# Patient Record
Sex: MALE | Race: WHITE | NOT HISPANIC OR LATINO | URBAN - NONMETROPOLITAN AREA
[De-identification: names, ages, dates, MRNs, and addresses within clinical notes are randomized per-mention and may not be internally consistent; named-entity substitution may affect disease eponyms.]

---

## 2017-01-20 ENCOUNTER — APPOINTMENT (OUTPATIENT)
Dept: LAB | Facility: MEDICAL CENTER | Age: 62
End: 2017-01-20
Payer: COMMERCIAL

## 2017-01-20 ENCOUNTER — TRANSCRIBE ORDERS (OUTPATIENT)
Dept: LAB | Facility: MEDICAL CENTER | Age: 62
End: 2017-01-20

## 2017-01-20 DIAGNOSIS — I10 ESSENTIAL (PRIMARY) HYPERTENSION: ICD-10-CM

## 2017-01-20 DIAGNOSIS — E11.9 TYPE 2 DIABETES MELLITUS WITHOUT COMPLICATIONS (HCC): ICD-10-CM

## 2017-01-20 DIAGNOSIS — I71.2 THORACIC AORTIC ANEURYSM WITHOUT RUPTURE (HCC): ICD-10-CM

## 2017-01-20 DIAGNOSIS — M54.50 LOW BACK PAIN: ICD-10-CM

## 2017-01-20 DIAGNOSIS — G57.92 MONONEUROPATHY OF LEFT LOWER EXTREMITY: ICD-10-CM

## 2017-01-20 DIAGNOSIS — I10 ESSENTIAL HYPERTENSION: ICD-10-CM

## 2017-01-20 DIAGNOSIS — I10 ESSENTIAL HYPERTENSION: Primary | ICD-10-CM

## 2017-01-20 DIAGNOSIS — E78.00 PURE HYPERCHOLESTEROLEMIA: ICD-10-CM

## 2017-01-20 LAB
ALBUMIN SERPL BCP-MCNC: 3.9 G/DL (ref 3.5–5)
ALP SERPL-CCNC: 67 U/L (ref 46–116)
ALT SERPL W P-5'-P-CCNC: 48 U/L (ref 12–78)
ANION GAP SERPL CALCULATED.3IONS-SCNC: 8 MMOL/L (ref 4–13)
AST SERPL W P-5'-P-CCNC: 14 U/L (ref 5–45)
BASOPHILS # BLD AUTO: 0.04 THOUSANDS/ΜL (ref 0–0.1)
BASOPHILS NFR BLD AUTO: 1 % (ref 0–1)
BILIRUB SERPL-MCNC: 0.51 MG/DL (ref 0.2–1)
BUN SERPL-MCNC: 15 MG/DL (ref 5–25)
CALCIUM SERPL-MCNC: 9.3 MG/DL (ref 8.3–10.1)
CHLORIDE SERPL-SCNC: 106 MMOL/L (ref 100–108)
CHOLEST SERPL-MCNC: 178 MG/DL (ref 50–200)
CO2 SERPL-SCNC: 27 MMOL/L (ref 21–32)
CREAT SERPL-MCNC: 1.19 MG/DL (ref 0.6–1.3)
CREAT UR-MCNC: 101 MG/DL
EOSINOPHIL # BLD AUTO: 0.1 THOUSAND/ΜL (ref 0–0.61)
EOSINOPHIL NFR BLD AUTO: 2 % (ref 0–6)
ERYTHROCYTE [DISTWIDTH] IN BLOOD BY AUTOMATED COUNT: 13.4 % (ref 11.6–15.1)
EST. AVERAGE GLUCOSE BLD GHB EST-MCNC: 177 MG/DL
GFR SERPL CREATININE-BSD FRML MDRD: >60 ML/MIN/1.73SQ M
GLUCOSE SERPL-MCNC: 180 MG/DL (ref 65–140)
HBA1C MFR BLD: 7.8 % (ref 4.2–6.3)
HCT VFR BLD AUTO: 43.3 % (ref 36.5–49.3)
HDLC SERPL-MCNC: 42 MG/DL (ref 40–60)
HGB BLD-MCNC: 14.7 G/DL (ref 12–17)
LDLC SERPL CALC-MCNC: 104 MG/DL (ref 0–100)
LYMPHOCYTES # BLD AUTO: 2.06 THOUSANDS/ΜL (ref 0.6–4.47)
LYMPHOCYTES NFR BLD AUTO: 31 % (ref 14–44)
MCH RBC QN AUTO: 32.2 PG (ref 26.8–34.3)
MCHC RBC AUTO-ENTMCNC: 33.9 G/DL (ref 31.4–37.4)
MCV RBC AUTO: 95 FL (ref 82–98)
MICROALBUMIN UR-MCNC: 11.9 MG/L (ref 0–20)
MICROALBUMIN/CREAT 24H UR: 12 MG/G CREATININE (ref 0–30)
MONOCYTES # BLD AUTO: 0.56 THOUSAND/ΜL (ref 0.17–1.22)
MONOCYTES NFR BLD AUTO: 8 % (ref 4–12)
NEUTROPHILS # BLD AUTO: 3.89 THOUSANDS/ΜL (ref 1.85–7.62)
NEUTS SEG NFR BLD AUTO: 58 % (ref 43–75)
NRBC BLD AUTO-RTO: 0 /100 WBCS
PLATELET # BLD AUTO: 192 THOUSANDS/UL (ref 149–390)
PMV BLD AUTO: 11.3 FL (ref 8.9–12.7)
POTASSIUM SERPL-SCNC: 4 MMOL/L (ref 3.5–5.3)
PROT SERPL-MCNC: 7.4 G/DL (ref 6.4–8.2)
RBC # BLD AUTO: 4.57 MILLION/UL (ref 3.88–5.62)
SODIUM SERPL-SCNC: 141 MMOL/L (ref 136–145)
TRIGL SERPL-MCNC: 158 MG/DL
WBC # BLD AUTO: 6.66 THOUSAND/UL (ref 4.31–10.16)

## 2017-01-20 PROCEDURE — 36415 COLL VENOUS BLD VENIPUNCTURE: CPT

## 2017-01-20 PROCEDURE — 83036 HEMOGLOBIN GLYCOSYLATED A1C: CPT

## 2017-01-20 PROCEDURE — 85025 COMPLETE CBC W/AUTO DIFF WBC: CPT

## 2017-01-20 PROCEDURE — 80053 COMPREHEN METABOLIC PANEL: CPT

## 2017-01-20 PROCEDURE — 82043 UR ALBUMIN QUANTITATIVE: CPT

## 2017-01-20 PROCEDURE — 80061 LIPID PANEL: CPT

## 2017-01-20 PROCEDURE — 82570 ASSAY OF URINE CREATININE: CPT

## 2017-01-22 ENCOUNTER — GENERIC CONVERSION - ENCOUNTER (OUTPATIENT)
Dept: OTHER | Facility: OTHER | Age: 62
End: 2017-01-22

## 2017-01-23 ENCOUNTER — ALLSCRIPTS OFFICE VISIT (OUTPATIENT)
Dept: OTHER | Facility: OTHER | Age: 62
End: 2017-01-23

## 2017-01-23 DIAGNOSIS — E04.1 NONTOXIC SINGLE THYROID NODULE: ICD-10-CM

## 2017-01-23 DIAGNOSIS — E11.9 TYPE 2 DIABETES MELLITUS WITHOUT COMPLICATIONS (HCC): ICD-10-CM

## 2017-01-23 DIAGNOSIS — E78.00 PURE HYPERCHOLESTEROLEMIA: ICD-10-CM

## 2017-01-23 DIAGNOSIS — M19.90 OSTEOARTHRITIS: ICD-10-CM

## 2017-01-23 DIAGNOSIS — I10 ESSENTIAL (PRIMARY) HYPERTENSION: ICD-10-CM

## 2017-05-19 ENCOUNTER — APPOINTMENT (OUTPATIENT)
Dept: LAB | Facility: MEDICAL CENTER | Age: 62
End: 2017-05-19
Payer: COMMERCIAL

## 2017-05-19 ENCOUNTER — TRANSCRIBE ORDERS (OUTPATIENT)
Dept: LAB | Facility: MEDICAL CENTER | Age: 62
End: 2017-05-19

## 2017-05-19 DIAGNOSIS — M19.90 OSTEOARTHRITIS: ICD-10-CM

## 2017-05-19 DIAGNOSIS — E11.9 TYPE 2 DIABETES MELLITUS WITHOUT COMPLICATIONS (HCC): ICD-10-CM

## 2017-05-19 DIAGNOSIS — E78.00 PURE HYPERCHOLESTEROLEMIA: ICD-10-CM

## 2017-05-19 DIAGNOSIS — E04.1 NONTOXIC SINGLE THYROID NODULE: ICD-10-CM

## 2017-05-19 DIAGNOSIS — I10 ESSENTIAL (PRIMARY) HYPERTENSION: ICD-10-CM

## 2017-05-19 LAB
ALBUMIN SERPL BCP-MCNC: 3.7 G/DL (ref 3.5–5)
ALP SERPL-CCNC: 64 U/L (ref 46–116)
ALT SERPL W P-5'-P-CCNC: 44 U/L (ref 12–78)
ANION GAP SERPL CALCULATED.3IONS-SCNC: 6 MMOL/L (ref 4–13)
AST SERPL W P-5'-P-CCNC: 14 U/L (ref 5–45)
BASOPHILS # BLD AUTO: 0.02 THOUSANDS/ΜL (ref 0–0.1)
BASOPHILS NFR BLD AUTO: 0 % (ref 0–1)
BILIRUB SERPL-MCNC: 0.43 MG/DL (ref 0.2–1)
BUN SERPL-MCNC: 17 MG/DL (ref 5–25)
CALCIUM SERPL-MCNC: 9.1 MG/DL (ref 8.3–10.1)
CHLORIDE SERPL-SCNC: 107 MMOL/L (ref 100–108)
CHOLEST SERPL-MCNC: 148 MG/DL (ref 50–200)
CO2 SERPL-SCNC: 30 MMOL/L (ref 21–32)
CREAT SERPL-MCNC: 1.12 MG/DL (ref 0.6–1.3)
EOSINOPHIL # BLD AUTO: 0.11 THOUSAND/ΜL (ref 0–0.61)
EOSINOPHIL NFR BLD AUTO: 2 % (ref 0–6)
ERYTHROCYTE [DISTWIDTH] IN BLOOD BY AUTOMATED COUNT: 13.5 % (ref 11.6–15.1)
EST. AVERAGE GLUCOSE BLD GHB EST-MCNC: 151 MG/DL
GFR SERPL CREATININE-BSD FRML MDRD: >60 ML/MIN/1.73SQ M
GLUCOSE P FAST SERPL-MCNC: 145 MG/DL (ref 65–99)
HBA1C MFR BLD: 6.9 % (ref 4.2–6.3)
HCT VFR BLD AUTO: 43.1 % (ref 36.5–49.3)
HDLC SERPL-MCNC: 35 MG/DL (ref 40–60)
HGB BLD-MCNC: 14.1 G/DL (ref 12–17)
LDLC SERPL CALC-MCNC: 92 MG/DL (ref 0–100)
LYMPHOCYTES # BLD AUTO: 1.73 THOUSANDS/ΜL (ref 0.6–4.47)
LYMPHOCYTES NFR BLD AUTO: 33 % (ref 14–44)
MCH RBC QN AUTO: 31.3 PG (ref 26.8–34.3)
MCHC RBC AUTO-ENTMCNC: 32.7 G/DL (ref 31.4–37.4)
MCV RBC AUTO: 96 FL (ref 82–98)
MONOCYTES # BLD AUTO: 0.48 THOUSAND/ΜL (ref 0.17–1.22)
MONOCYTES NFR BLD AUTO: 9 % (ref 4–12)
NEUTROPHILS # BLD AUTO: 2.85 THOUSANDS/ΜL (ref 1.85–7.62)
NEUTS SEG NFR BLD AUTO: 56 % (ref 43–75)
NRBC BLD AUTO-RTO: 0 /100 WBCS
PLATELET # BLD AUTO: 160 THOUSANDS/UL (ref 149–390)
PMV BLD AUTO: 12.1 FL (ref 8.9–12.7)
POTASSIUM SERPL-SCNC: 3.8 MMOL/L (ref 3.5–5.3)
PROT SERPL-MCNC: 7.1 G/DL (ref 6.4–8.2)
RBC # BLD AUTO: 4.5 MILLION/UL (ref 3.88–5.62)
SODIUM SERPL-SCNC: 143 MMOL/L (ref 136–145)
TRIGL SERPL-MCNC: 107 MG/DL
TSH SERPL DL<=0.05 MIU/L-ACNC: 1.19 UIU/ML (ref 0.36–3.74)
WBC # BLD AUTO: 5.19 THOUSAND/UL (ref 4.31–10.16)

## 2017-05-19 PROCEDURE — 85025 COMPLETE CBC W/AUTO DIFF WBC: CPT

## 2017-05-19 PROCEDURE — 83036 HEMOGLOBIN GLYCOSYLATED A1C: CPT

## 2017-05-19 PROCEDURE — 84443 ASSAY THYROID STIM HORMONE: CPT

## 2017-05-19 PROCEDURE — 36415 COLL VENOUS BLD VENIPUNCTURE: CPT

## 2017-05-19 PROCEDURE — 80061 LIPID PANEL: CPT

## 2017-05-19 PROCEDURE — 80053 COMPREHEN METABOLIC PANEL: CPT

## 2017-05-22 ENCOUNTER — GENERIC CONVERSION - ENCOUNTER (OUTPATIENT)
Dept: OTHER | Facility: OTHER | Age: 62
End: 2017-05-22

## 2017-05-25 ENCOUNTER — GENERIC CONVERSION - ENCOUNTER (OUTPATIENT)
Dept: OTHER | Facility: OTHER | Age: 62
End: 2017-05-25

## 2017-05-31 ENCOUNTER — ALLSCRIPTS OFFICE VISIT (OUTPATIENT)
Dept: OTHER | Facility: OTHER | Age: 62
End: 2017-05-31

## 2017-05-31 DIAGNOSIS — E11.9 TYPE 2 DIABETES MELLITUS WITHOUT COMPLICATIONS (HCC): ICD-10-CM

## 2017-05-31 DIAGNOSIS — I10 ESSENTIAL (PRIMARY) HYPERTENSION: ICD-10-CM

## 2017-05-31 DIAGNOSIS — E04.1 NONTOXIC SINGLE THYROID NODULE: ICD-10-CM

## 2017-05-31 DIAGNOSIS — E78.00 PURE HYPERCHOLESTEROLEMIA: ICD-10-CM

## 2017-09-29 ENCOUNTER — APPOINTMENT (OUTPATIENT)
Dept: LAB | Facility: MEDICAL CENTER | Age: 62
End: 2017-09-29
Payer: COMMERCIAL

## 2017-09-29 ENCOUNTER — TRANSCRIBE ORDERS (OUTPATIENT)
Dept: ADMINISTRATIVE | Facility: HOSPITAL | Age: 62
End: 2017-09-29

## 2017-09-29 DIAGNOSIS — E04.1 NONTOXIC SINGLE THYROID NODULE: ICD-10-CM

## 2017-09-29 DIAGNOSIS — E11.9 TYPE 2 DIABETES MELLITUS WITHOUT COMPLICATIONS (HCC): ICD-10-CM

## 2017-09-29 DIAGNOSIS — E78.00 PURE HYPERCHOLESTEROLEMIA: ICD-10-CM

## 2017-09-29 DIAGNOSIS — I10 ESSENTIAL (PRIMARY) HYPERTENSION: ICD-10-CM

## 2017-09-29 LAB
ALBUMIN SERPL BCP-MCNC: 3.4 G/DL (ref 3.5–5)
ALP SERPL-CCNC: 60 U/L (ref 46–116)
ALT SERPL W P-5'-P-CCNC: 48 U/L (ref 12–78)
ANION GAP SERPL CALCULATED.3IONS-SCNC: 9 MMOL/L (ref 4–13)
AST SERPL W P-5'-P-CCNC: 20 U/L (ref 5–45)
BASOPHILS # BLD AUTO: 0.04 THOUSANDS/ΜL (ref 0–0.1)
BASOPHILS NFR BLD AUTO: 1 % (ref 0–1)
BILIRUB SERPL-MCNC: 0.53 MG/DL (ref 0.2–1)
BUN SERPL-MCNC: 15 MG/DL (ref 5–25)
CALCIUM SERPL-MCNC: 8.5 MG/DL (ref 8.3–10.1)
CHLORIDE SERPL-SCNC: 107 MMOL/L (ref 100–108)
CHOLEST SERPL-MCNC: 150 MG/DL (ref 50–200)
CO2 SERPL-SCNC: 26 MMOL/L (ref 21–32)
CREAT SERPL-MCNC: 1.21 MG/DL (ref 0.6–1.3)
EOSINOPHIL # BLD AUTO: 0.15 THOUSAND/ΜL (ref 0–0.61)
EOSINOPHIL NFR BLD AUTO: 3 % (ref 0–6)
ERYTHROCYTE [DISTWIDTH] IN BLOOD BY AUTOMATED COUNT: 13.4 % (ref 11.6–15.1)
EST. AVERAGE GLUCOSE BLD GHB EST-MCNC: 163 MG/DL
GFR SERPL CREATININE-BSD FRML MDRD: 65 ML/MIN/1.73SQ M
GLUCOSE P FAST SERPL-MCNC: 168 MG/DL (ref 65–99)
HBA1C MFR BLD: 7.3 % (ref 4.2–6.3)
HCT VFR BLD AUTO: 42.2 % (ref 36.5–49.3)
HDLC SERPL-MCNC: 36 MG/DL (ref 40–60)
HGB BLD-MCNC: 14.4 G/DL (ref 12–17)
LDLC SERPL CALC-MCNC: 83 MG/DL (ref 0–100)
LYMPHOCYTES # BLD AUTO: 1.8 THOUSANDS/ΜL (ref 0.6–4.47)
LYMPHOCYTES NFR BLD AUTO: 31 % (ref 14–44)
MCH RBC QN AUTO: 32.4 PG (ref 26.8–34.3)
MCHC RBC AUTO-ENTMCNC: 34.1 G/DL (ref 31.4–37.4)
MCV RBC AUTO: 95 FL (ref 82–98)
MONOCYTES # BLD AUTO: 0.42 THOUSAND/ΜL (ref 0.17–1.22)
MONOCYTES NFR BLD AUTO: 7 % (ref 4–12)
NEUTROPHILS # BLD AUTO: 3.43 THOUSANDS/ΜL (ref 1.85–7.62)
NEUTS SEG NFR BLD AUTO: 58 % (ref 43–75)
NRBC BLD AUTO-RTO: 0 /100 WBCS
PLATELET # BLD AUTO: 155 THOUSANDS/UL (ref 149–390)
PMV BLD AUTO: 11.6 FL (ref 8.9–12.7)
POTASSIUM SERPL-SCNC: 3.5 MMOL/L (ref 3.5–5.3)
PROT SERPL-MCNC: 6.9 G/DL (ref 6.4–8.2)
RBC # BLD AUTO: 4.45 MILLION/UL (ref 3.88–5.62)
SODIUM SERPL-SCNC: 142 MMOL/L (ref 136–145)
TRIGL SERPL-MCNC: 155 MG/DL
TSH SERPL DL<=0.05 MIU/L-ACNC: 1.36 UIU/ML (ref 0.36–3.74)
WBC # BLD AUTO: 5.85 THOUSAND/UL (ref 4.31–10.16)

## 2017-09-29 PROCEDURE — 80053 COMPREHEN METABOLIC PANEL: CPT

## 2017-09-29 PROCEDURE — 84443 ASSAY THYROID STIM HORMONE: CPT

## 2017-09-29 PROCEDURE — 80061 LIPID PANEL: CPT

## 2017-09-29 PROCEDURE — 83036 HEMOGLOBIN GLYCOSYLATED A1C: CPT

## 2017-09-29 PROCEDURE — 85025 COMPLETE CBC W/AUTO DIFF WBC: CPT

## 2017-09-29 PROCEDURE — 36415 COLL VENOUS BLD VENIPUNCTURE: CPT

## 2017-10-01 ENCOUNTER — GENERIC CONVERSION - ENCOUNTER (OUTPATIENT)
Dept: OTHER | Facility: OTHER | Age: 62
End: 2017-10-01

## 2017-10-04 ENCOUNTER — ALLSCRIPTS OFFICE VISIT (OUTPATIENT)
Dept: OTHER | Facility: OTHER | Age: 62
End: 2017-10-04

## 2017-10-04 DIAGNOSIS — E11.9 TYPE 2 DIABETES MELLITUS WITHOUT COMPLICATIONS (HCC): ICD-10-CM

## 2017-10-04 DIAGNOSIS — I10 ESSENTIAL (PRIMARY) HYPERTENSION: ICD-10-CM

## 2017-10-04 DIAGNOSIS — E78.00 PURE HYPERCHOLESTEROLEMIA: ICD-10-CM

## 2017-10-05 NOTE — PROGRESS NOTES
Assessment  1  Type 2 diabetes mellitus (250 00) (E11 9)   2  Hypertension (401 9) (I10)   3  Hypercholesterolemia (272 0) (E78 00)   4  Edema (782 3) (R60 9)   5  Acute sinusitis (461 9) (J01 90)    Plan  Acute sinusitis    · Amoxicillin-Pot Clavulanate 875-125 MG Oral Tablet; TAKE 1 TABLET EVERY 12  HOURS WITH MEALS UNTIL GONE   · Drink at least 6 glasses of water or juice a day ; Status:Complete;   Done: 99YUR5016   · How to use a nasal spray ; Status:Complete;   Done: 58UKS6516   · Irrigate your nose twice a day ; Status:Complete;   Done: 47ZBH7937   · Taking a hot steamy shower may help your condition ; Status:Complete;   Done:  00PZY7649   · Call [phone] if: The sinus pain is not better in 1 week ; Status:Complete;   Done:  65SLT3277   · Call [phone] if: The symptoms are not better in 7 days ; Status:Complete;   Done:  78NIK0749   · Call [phone] if: The symptoms come back after the medications are finished ;  Status:Complete;   Done: 32FQG9065   · Call [phone] if: Your sinus pain is worse ; Status:Complete;   Done: 42BRV1025   · Seek Immediate Medical Attention if: You have a fever, headache, and vomiting, or have a  stiff neck ; Status:Complete;   Done: 19NST6742   · Seek Immediate Medical Attention if: You have signs of infection in or around the affected  area ; Status:Complete;   Done: 01PAG1053  Hypercholesterolemia    · Begin or continue regular aerobic exercise   Gradually work up to at least 3 sessions of  30 minutes of exercise a week ; Status:Complete;   Done: 85EKN4646   · Eat no more than 30 grams of fat per day ; Status:Complete;   Done: 05WFT7145   · There are many exercise options for seniors ; Status:Complete;   Done: 46LHN4288   · Call [phone] if: You have muscle cramps ; Status:Complete;   Done: 05CLL3783   · Call [phone] if: You have pain in the stomach area ; Status:Complete;   Done:  87CPS6632   · Call [phone] if: You start vomiting ; Status:Complete;   Done: 45KBZ4703   · Call 911 if: You experience a new kind of chest pain (angina) or pressure ;  Status:Complete;   Done: 58ASA3586   · Call 911 if: You have any symptoms of a stroke ; Status:Complete;   Done: 65UVV1834  Hypercholesterolemia, Hypertension, Type 2 diabetes mellitus    · (1) CBC/PLT/DIFF; Status:Active; Requested for:04Oct2017;    · (1) COMPREHENSIVE METABOLIC PANEL; Status:Active; Requested for:04Oct2017;    · (1) HEMOGLOBIN A1C; Status:Active; Requested for:04Oct2017;    · (1) LIPID PANEL, FASTING; Status:Active; Requested for:04Oct2017;    · (1) MICROALBUMIN CREATININE RATIO, RANDOM URINE; Status:Active; Requested  for:04Oct2017;    · (1) TSH; Status:Active; Requested for:04Oct2017;   Hypertension    · Benicar HCT 40-12 5 MG Oral Tablet (Olmesartan Medoxomil-HCTZ)   · A diet low in sodium and high in potassium, magnesium, and calcium can help your  blood pressure ; Status:Complete;   Done: 35MSG0249   · Begin a limited exercise program ; Status:Complete;   Done: 49MCY0761   · Eat a low fat and low cholesterol diet ; Status:Complete;   Done: 01VPG9798   · We encourage you to begin to make lifestyle changes to help control your blood  pressure  These may include losing weight, increasing your activity level, limiting salt in  your diet, decreasing alcohol intake, and eating a diet low in fat and rich in fruits  and vegetables ; Status:Complete;   Done: 94ADW6827   · Call [phone] if: You become dizzy or lightheaded, especially when you stand up  after sitting for a while ; Status:Complete;   Done: 34DHF1051   · Call [phone] if: You develop double vision (see two of everything) ;  Status:Complete;   Done: 12JAY2305   · Seek Immediate Medical Attention if: You have a severe headache that will not go away ;  Status:Complete;   Done: 92JBM3338   · Seek Immediate Medical Attention if: Your blood pressure is greater than 250/120 for 2  consecutive readings  ; Status:Complete;   Done: 27YCK8171  Type 2 diabetes mellitus    · Call [phone] if: You are having trouble following our instructions or treatment for  any reason ; Status:Complete;   Done: 09XCU7726   · Call [phone] if: Your blood sugar is higher than 250 ; Status:Complete;   Done:  83SXM0539   · Call [phone] if: Your blood sugar is steadily becoming higher ; Status:Complete;    Done: 04XTI0553   · Call 911 if: There are symptoms of ketoacidosis  ; Status:Complete;   Done: 64UXV8174   · Call 911 if: You have a seizure ; Status:Complete;   Done: 60RDH4000   · Call 911 if: You have fainted or passed out ; Status:Complete;   Done: 12BBX0281   · Seek Immediate Medical Attention if: There are signs that the blood sugar is too high  (hyperglycemia) ; Status:Complete;   Done: 16GXP9617   · Seek Immediate Medical Attention if: There are signs that the blood sugar is too low  (hypoglycemia) ; Status:Complete;   Done: 69CDG5893   · Seek Immediate Medical Attention if: You become dehydrated ; Status:Complete;   Done:  10PBR1005   · Seek Immediate Medical Attention if: You notice that breathing is rapid, more than 40  times a minute ; Status:Complete;   Done: 04MHR9111   · Seek Immediate Medical Attention if: Your blood sugar is higher than 400 ;  Status:Complete;   Done: 39ZNF7258   · Seek Immediate Medical Attention if: Your eyesight becomes blurry or you have difficulty  seeing ; Status:Complete;   Done: 91CTH9871   · Cut your nails straight across ; Status:Complete;   Done: 04WET6074   · Have your eyes examined by an eye doctor every year ; Status:Complete;   Done:  98HDN1255   · If you have symptoms of being hypoglycemic or your blood sugar is less than 60, you  need to eat or drink a source of sugar ; Status:Complete;   Done: 21XPD4235   · Inspect your feet and legs daily if you have vascular disease ; Status:Complete;   Done:  74NCL1570   · Inspect your feet daily ; Status:Complete;   Done: 68WAR9334   · It is important to take good care of your feet if you have diabetes ; Status:Complete;    Done: 84XZV2842   · It is important to take good care of your feet ; Status:Complete;   Done: 56FNG1340   · Restrict the salt in your diet by avoiding highly salted foods ; Status:Complete;   Done:  92EFJ0536   · Start eating more fiber ; Status:Complete;   Done: 32GLA0503   · Wear a medical alert bracelet or tag ; Status:Complete;   Done: 88DQG8084   · Wear shoes that give your toes plenty of room ; Status:Complete;   Done: 65XJA8373    Discussion/Summary    Reviewed recent laboratory testing with him  Hemoglobin A1c has increased slightly to 7 3  Advised him to watch his diet more closely  Continue to exercise  Cut back on carbohydrates  He wishes to avoid medication for the diabetes if possible  Blood pressure currently well controlled  Continue with the current medications  Cholesterol controlled  Continue with the atorvastatin  Continue with the Lasix and the potassium as previously for the persistent peripheral edema  Continue to follow-up with cardiology on a regular basis  Treatment for the sinusitis as noted above  Advised him to take the Augmentin with food and's paste 12 hours apart  Call or follow-up if the symptoms worsen or persist  Advised him that he get the flu shot and later that pneumonia shot after he recovers from this illness  He will give our office or call regarding this  We'll have him follow-up in about 3-4 months with laboratory testing prior to that visit  Follow-up sooner if needed  Possible side effects of new medications were reviewed with the patient/guardian today  The treatment plan was reviewed with the patient/guardian  The patient/guardian understands and agrees with the treatment plan      Chief Complaint  CC Patient is here for follow up visit  Complaints of sinus congestion with a cough  History of Present Illness  He presents for routine follow-up   Has been having some issues with nasal congestion and postnasal drip over the past 3-4 weeks  Initially thought it was allergies with nasal congestion being intermittent and not severe  Symptoms then seemed to improve somewhat when he was on vacation but then over the last week have progressively worsened  Feeling hot and cold  Increasingly achy  Some sinus pain  Slight headache  Cough worsening over the last few days  Low-grade fever  Has otherwise been doing relatively well  Back has been relatively stable  Has minimal pain and stiffness despite being very active  Feels this is because his wife is making him exercise more often and they are walking frequently  Tolerating his current medications without difficulty  He would like to try to lose weight and get off some of medications if possible  Tolerating his blood pressure medicines well  Denies any headaches or localized weakness  Still with some peripheral edema which seems to be worse when the weather is warm  Continues with the Lasix and the potassium as previously  Tolerating his atorvastatin without difficulty  Denies chest pain or palpitations  Denies any abdominal pain  Denies any significant muscle aches or weakness other than the intermittent back pain as noted above  Denies any current urinary symptoms  Is up-to-date on his colonoscopy  Review of Systems    Constitutional: feeling tired, but-as noted in HPI  Eyes: no eyesight problems  ENT: as noted in HPI  Cardiovascular: no chest pain-and-no palpitations  Respiratory: no shortness of breath  Gastrointestinal: no nausea-and-no diarrhea  Musculoskeletal: joint stiffness  Neurological: no dizziness  Psychiatric: no anxiety-and-no depression  ROS reviewed  Active Problems  1  Aneurysm of thoracic aorta (441 2) (I71 2)   2  Edema (782 3) (R60 9)   3  Heel spur (726 73) (M77 30)   4  Hypercholesterolemia (272 0) (E78 00)   5  Hypertension (401 9) (I10)   6   Hyperuricemia without signs inflammatory arthritis/tophaceous disease (790 6) (E79 0)   7  Low back pain (724 2) (M54 5)   8  Microscopic hematuria (599 72) (R31 29)   9  Neuropathy of left foot (355 8) (G57 92)   10  Non-toxic uninodular goiter (241 0) (E04 1)   11  Osteoarthritis (715 90) (M19 90)   12  Sciatica (724 3) (M54 30)   13  Type 2 diabetes mellitus (250 00) (E11 9)    Past Medical History  1  History of Cataract (366 9) (H26 9)   2  History of Cough (786 2) (R05)   3  History of Herniated nucleus pulposus, L4-5 left (722 10) (M51 26)   4  History of Herniated nucleus pulposus, L5-S1, left (722 10) (M51 27)   5  History of allergic rhinitis (V12 69) (Z87 09)   6  History of Lumbar radiculopathy (724 4) (M54 16)   7  History of Lumbar Radiculopathy At L4 (724 4)    The active problems and past medical history were reviewed and updated today  Surgical History  1  History of Back Surgery   2  History of Knee Arthroscopy (Therapeutic)   3  History of Tonsillectomy With Adenoidectomy    The surgical history was reviewed and updated today  Family History  Mother    1  Family history of Thyroid Disorder (V18 19)  Father    2  Family history of Hypertension (V17 49)  Brother    3  Family history of Coronary Artery Disease (V17 49)   4  Family history of Prior Myocardial Infarction    The family history was reviewed and updated today  Social History   · Being A Social Drinker   · Never A Smoker   · No drug use  The social history was reviewed and updated today  Current Meds   1  Allopurinol 100 MG Oral Tablet; Take 1 tablet daily; Therapy: 57LGU0751 to (Last TP:42GUP6162)  Requested for: 24Jun2017 Ordered   2  Amlodipine Besy-Benazepril HCl - 5-20 MG Oral Capsule; take 1 capsule twice a day; Therapy: 07EBB1422 to (Last Rx:55Hlv7327)  Requested for: 43Cjb1856 Ordered   3  Atenolol 50 MG Oral Tablet; take 1 tablet twice a day; Therapy: 39OVZ2937 to (Last TO:61SOZ4531)  Requested for: 24Jun2017 Ordered   4  Atorvastatin Calcium 20 MG Oral Tablet; Take 1 tablet daily; Therapy: 18KSY9531 to (Last Rx:09Cdo9848)  Requested for: 57Xil0355 Ordered   5  Benicar HCT 40-12 5 MG Oral Tablet; Take 1 tablet daily; Therapy: 32TMY3209 to (Last ZM:38YUM4317)  Requested for: 81QPA8615 Ordered   6  Furosemide 40 MG Oral Tablet; Take 1 tablet daily; Therapy: 69VDS9462 to (Last [facility])  Requested for: 73FZV2586 Ordered   7  Klor-Con 10 10 MEQ Oral Tablet Extended Release; take 2 tablets four times a day; Therapy: 18Kbc0048 to (Last Rx:57Ogq1080)  Requested for: 27Ubl9977 Ordered   8  Methocarbamol 750 MG Oral Tablet; TAKE 1 TABLET Twice daily PRN; Therapy: 76Efo3546 to (Last Rx:52Ijn6720)  Requested for: 55Hfn5365 Ordered   9  Olmesartan Medoxomil-HCTZ 40-12 5 MG Oral Tablet; Take 1 tablet daily; Therapy: 27IYQ8314 to (Last Rx:00Vzk6541)  Requested for: 75Lcs5364 Ordered    The medication list was reviewed and updated today  Allergies  Denied    1  LevoFLOXacin TABS    Vitals  Vital Signs    Recorded: 80OCD5123 07:09AM   Temperature 99 3 F, Temporal   Heart Rate 73   Respiration 18   Systolic 468, LUE, Sitting   Diastolic 80, LUE, Sitting   Height 5 ft 10 in   Weight 269 lb    BMI Calculated 38 6   BSA Calculated 2 37   O2 Saturation 97     Physical Exam    Constitutional   General appearance: Abnormal   overweight  Eyes   Conjunctiva and lids: No swelling, erythema, or discharge  Ears, Nose, Mouth, and Throat   External inspection of ears and nose: Normal     Otoscopic examination: Abnormal  -Slight cerumen  Nasal mucosa, septum, and turbinates: Abnormal  -Boggy nasal mucosa with purulent nasal discharge right greater than left  Oropharynx: Abnormal  -Posterior pharyngeal erythema  Pulmonary   Auscultation of lungs: Clear to auscultation, equal breath sounds bilaterally, no wheezes, no rales, no rhonci  Cardiovascular   Auscultation of heart: Normal rate and rhythm, normal S1 and S2, without murmurs  Examination of extremities for edema and/or varicosities: Abnormal  -Trace peripheral edema bilateral lower extremities  Lymphatic   Palpation of lymph nodes in neck: No lymphadenopathy  Musculoskeletal   Gait and station: Normal     Psychiatric   Mood and affect: Normal          Results/Data  (1) CBC/PLT/DIFF 81Bih7497 08:20AM [facility] Order Number: [id number]     Test Name Result Flag Reference   WBC COUNT 5 85 Thousand/uL  4 31-10 16   RBC COUNT 4 45 Million/uL  3 88-5 62   HEMOGLOBIN 14 4 g/dL  12 0-17 0   HEMATOCRIT 42 2 %  36 5-49 3   MCV 95 fL  82-98   MCH 32 4 pg  26 8-34 3   MCHC 34 1 g/dL  31 4-37 4   RDW 13 4 %  11 6-15 1   MPV 11 6 fL  8 9-12 7   PLATELET COUNT 606 Thousands/uL  149-390   nRBC AUTOMATED 0 /100 WBCs     NEUTROPHILS RELATIVE PERCENT 58 %  43-75   LYMPHOCYTES RELATIVE PERCENT 31 %  14-44   MONOCYTES RELATIVE PERCENT 7 %  4-12   EOSINOPHILS RELATIVE PERCENT 3 %  0-6   BASOPHILS RELATIVE PERCENT 1 %  0-1   NEUTROPHILS ABSOLUTE COUNT 3 43 Thousands/? ??L  1 85-7 62   LYMPHOCYTES ABSOLUTE COUNT 1 80 Thousands/? ??L  0 60-4 47   MONOCYTES ABSOLUTE COUNT 0 42 Thousand/? ??L  0 17-1 22   EOSINOPHILS ABSOLUTE COUNT 0 15 Thousand/? ??L  0 00-0 61   BASOPHILS ABSOLUTE COUNT 0 04 Thousands/? ??L  0 00-0 10     (1) COMPREHENSIVE METABOLIC PANEL 37KYX3443 13:28FL [facility] Order Number: [id number]     Test Name Result Flag Reference   SODIUM 142 mmol/L  136-145   POTASSIUM 3 5 mmol/L  3 5-5 3   CHLORIDE 107 mmol/L  100-108   CARBON DIOXIDE 26 mmol/L  21-32   ANION GAP (CALC) 9 mmol/L  4-13   BLOOD UREA NITROGEN 15 mg/dL  5-25   CREATININE 1 21 mg/dL  0 60-1 30   Standardized to IDMS reference method   CALCIUM 8 5 mg/dL  8 3-10 1   BILI, TOTAL 0 53 mg/dL  0 20-1 00   ALK PHOSPHATAS 60 U/L     ALT (SGPT) 48 U/L  12-78   Specimen collection should occur prior to Sulfasalazine and/or Sulfapyridine administration due to the potential for falsely collection should occur prior to N-Acetylcysteine or Metamizole administration due to the potential for falsely depressed results  (1) TSH 83Gwl5629 08:20AM [facility] Order Number: [id number]     Test Name Result Flag Reference   TSH 1 360 uIU/mL  0 358-3 740   Patients undergoing fluorescein dye angiography may retain small amounts of fluorescein in the body for 48-72 hours post procedure  Samples containing fluorescein can produce falsely depressed TSH values  If the patient had this procedure,a specimen should be resubmitted post fluorescein clearance  Health Management  History of Colon cancer screening   COLONOSCOPY; every 3 years; Last 45WZI4918; Next Due: 67KJQ0963; Active    Future Appointments    Date/Time Provider Specialty Site   01/04/2018 07:20 AM JAEL Langford   [address] [place] [address]     Signatures   Electronically signed by : JAEL Bobby ; Oct  4 2017  8:46AM EST                       (Author)

## 2017-12-01 ENCOUNTER — HOSPITAL ENCOUNTER (OUTPATIENT)
Dept: ULTRASOUND IMAGING | Facility: HOSPITAL | Age: 62
Discharge: HOME | End: 2017-12-01
Payer: COMMERCIAL

## 2017-12-01 DIAGNOSIS — E04.1 NONTOXIC SINGLE THYROID NODULE: ICD-10-CM

## 2017-12-01 PROCEDURE — 76536 US EXAM OF HEAD AND NECK: CPT

## 2017-12-04 ENCOUNTER — GENERIC CONVERSION - ENCOUNTER (OUTPATIENT)
Dept: OTHER | Facility: OTHER | Age: 62
End: 2017-12-04

## 2018-01-10 NOTE — RESULT NOTES
Verified Results  (1) CBC/PLT/DIFF 20Jan2017 11:24AM [facility] Order Number: [id number]     Test Name Result Flag Reference   WBC COUNT 6 66 Thousand/uL  4 31-10 16   RBC COUNT 4 57 Million/uL  3 88-5 62   HEMOGLOBIN 14 7 g/dL  12 0-17 0   HEMATOCRIT 43 3 %  36 5-49 3   MCV 95 fL  82-98   MCH 32 2 pg  26 8-34 3   MCHC 33 9 g/dL  31 4-37 4   RDW 13 4 %  11 6-15 1   MPV 11 3 fL  8 9-12 7   PLATELET COUNT 152 Thousands/uL  149-390   nRBC AUTOMATED 0 /100 WBCs     NEUTROPHILS RELATIVE PERCENT 58 %  43-75   LYMPHOCYTES RELATIVE PERCENT 31 %  14-44   MONOCYTES RELATIVE PERCENT 8 %  4-12   EOSINOPHILS RELATIVE PERCENT 2 %  0-6   BASOPHILS RELATIVE PERCENT 1 %  0-1   NEUTROPHILS ABSOLUTE COUNT 3 89 Thousands/?L  1 85-7 62   LYMPHOCYTES ABSOLUTE COUNT 2 06 Thousands/?L  0 60-4 47   MONOCYTES ABSOLUTE COUNT 0 56 Thousand/?L  0 17-1 22   EOSINOPHILS ABSOLUTE COUNT 0 10 Thousand/?L  0 00-0 61   BASOPHILS ABSOLUTE COUNT 0 04 Thousands/?L  0 00-0 10   - Patient Instructions: This bloodwork is non-fasting  Please drink two glasses of water morning of bloodwork  - Patient Instructions: This bloodwork is non-fasting  Please drink two glasses of water morning of bloodwork  (1) COMPREHENSIVE METABOLIC PANEL 22OCI1117 03:59TT [facility] Order Number: [id number]     Test Name Result Flag Reference   GLUCOSE,RANDM 180 mg/dL H    If the patient is fasting, the ADA then defines impaired fasting glucose as > 100 mg/dL and diabetes as > or equal to 123 mg/dL     SODIUM 141 mmol/L  136-145   POTASSIUM 4 0 mmol/L  3 5-5 3   CHLORIDE 106 mmol/L  100-108   CARBON DIOXIDE 27 mmol/L  21-32   ANION GAP (CALC) 8 mmol/L  4-13   BLOOD UREA NITROGEN 15 mg/dL  5-25   CREATININE 1 19 mg/dL  0 60-1 30   Standardized to IDMS reference method   CALCIUM 9 3 mg/dL  8 3-10 1   BILI, TOTAL 0 51 mg/dL  0 20-1 00   ALK PHOSPHATAS 67 U/L     ALT (SGPT) 48 U/L  12-78   AST(SGOT) 14 U/L  5-45 ALBUMIN 3 9 g/dL  3 5-5 0   TOTAL PROTEIN 7 4 g/dL  6 4-8 2   eGFR Non-African American      >60 0 ml/min/1 73sq m   - Patient Instructions: This is a fasting blood test  Water,black tea or black  coffee only after 9:00pm the night before test Drink 2 glasses of water the morning of test   National Kidney Disease Education Program recommendations are as follows:  GFR calculation is accurate only with a steady state creatinine  Chronic Kidney disease less than 60 ml/min/1 73 sq  meters  Kidney failure less than 15 ml/min/1 73 sq  meters  (1) HEMOGLOBIN A1C 20Jan2017 11:24AM [place] [facility] Order Number: [id number]     Test Name Result Flag Reference   HEMOGLOBIN A1C 7 8 % H 4 2-6 3   EST  AVG  GLUCOSE 177 mg/dl       (1) LIPID PANEL, FASTING 20Jan2017 11:24AM [facility] [place] Order Number: [id number]     Test Name Result Flag Reference   CHOLESTEROL 178 mg/dL     HDL,DIRECT 42 mg/dL  40-60   Specimen collection should occur prior to Metamizole administration due to the potential for falsely depressed results  LDL CHOLESTEROL CALCULATED 104 mg/dL H 0-100   - Patient Instructions: This is a fasting blood test  Water,black tea or black  coffee only after 9:00pm the night before test   Drink 2 glasses of water the morning of test     - Patient Instructions: This is a fasting blood test  Water,black tea or black  coffee only after 9:00pm the night before test Drink 2 glasses of water the morning of test   Triglyceride:         Normal              <150 mg/dl       Borderline High    150-199 mg/dl       High               200-499 mg/dl       Very High          >499 mg/dl  Cholesterol:         Desirable        <200 mg/dl      Borderline High  200-239 mg/dl      High             >239 mg/dl  HDL Cholesterol:        High    >59 mg/dL      Low     <41 mg/dL  LDL CALCULATED:    This screening LDL is a calculated result    It does not have the accuracy of the Direct Measured LDL in the monitoring of patients with hyperlipidemia and/or statin therapy  Direct Measure LDL (WTV449) must be ordered separately in these patients  TRIGLYCERIDES 158 mg/dL H <=150   Specimen collection should occur prior to N-Acetylcysteine or Metamizole administration due to the potential for falsely depressed results       (1) MICROALBUMIN CREATININE RATIO, RANDOM URINE 20Jan2017 11:24AM [facility] Order Number: [id number]     Test Name Result Flag Reference   MICROALBUMIN/ CREAT R 12 mg/g creatinine  0-30   MICROALBUMIN,URINE 11 9 mg/L  0 0-20 0   CREATININE URINE 101 0 mg/dL

## 2018-01-11 NOTE — RESULT NOTES
Verified Results  (1) CBC/PLT/DIFF [id number] 10:29AM Betha Gowers     Test Name Result Flag Reference   WBC COUNT 5 12 Thousand/uL  4 31-10 16   RBC COUNT 4 64 Million/uL  3 88-5 62   HEMOGLOBIN 14 9 g/dL  12 0-17 0   HEMATOCRIT 44 4 %  36 5-49 3   MCV 96 fL  82-98   MCH 32 1 pg  26 8-34 3   MCHC 33 6 g/dL  31 4-37 4   RDW 13 3 %  11 6-15 1   MPV 11 3 fL  8 9-12 7   PLATELET COUNT 065 Thousands/uL  149-390   NEUTROPHILS RELATIVE PERCENT 59 %  43-75   LYMPHOCYTES RELATIVE PERCENT 29 %  14-44   MONOCYTES RELATIVE PERCENT 9 %  4-12   EOSINOPHILS RELATIVE PERCENT 2 %  0-6   BASOPHILS RELATIVE PERCENT 1 %  0-1   NEUTROPHILS ABSOLUTE COUNT 3 06 Thousands/µL  1 85-7 62   LYMPHOCYTES ABSOLUTE COUNT 1 48 Thousands/µL  0 60-4 47   MONOCYTES ABSOLUTE COUNT 0 46 Thousand/µL  0 17-1 22   EOSINOPHILS ABSOLUTE COUNT 0 09 Thousand/µL  0 00-0 61   BASOPHILS ABSOLUTE COUNT 0 03 Thousands/µL  0 00-0 10     (1) COMPREHENSIVE METABOLIC PANEL [id number] 18:04ZH [facility] Kidney Disease Education Program recommendations are as follows:  GFR calculation is accurate only with a steady state creatinine  Chronic Kidney disease less than 60 ml/min/1 73 sq  meters  Kidney failure less than 15 ml/min/1 73 sq  meters  Test Name Result Flag Reference   GLUCOSE,RANDM 142 mg/dL H    If the patient is fasting, the ADA then defines impaired fasting glucose as > 100 mg/dL and diabetes as > or equal to 123 mg/dL     SODIUM 142 mmol/L  136-145   POTASSIUM 3 7 mmol/L  3 5-5 3   CHLORIDE 104 mmol/L  100-108   CARBON DIOXIDE 28 mmol/L  21-32   ANION GAP (CALC) 10 mmol/L  4-13   BLOOD UREA NITROGEN 16 mg/dL  5-25   CREATININE 1 03 mg/dL  0 60-1 30   Standardized to IDMS reference method   CALCIUM 8 5 mg/dL  8 3-10 1   BILI, TOTAL 0 48 mg/dL  0 20-1 00   ALK PHOSPHATAS 64 U/L     ALT (SGPT) 69 U/L  12-78   AST(SGOT) 21 U/L  5-45   ALBUMIN 4 1 g/dL  3 5-5 0   TOTAL PROTEIN 7 0 g/dL  6 4-8 2   eGFR Non- >60 0 ml/min/1 73sq m     (1) HEMOGLOBIN A1C [id number] 10:29AM Tosha Mtz   5 7-6 4% impaired fasting glucose  >=6 5% diagnosis of diabetes    Falsely low levels are seen in conditions linked to short RBC life span-  hemolytic anemia, and splenomegaly  Falsely elevated levels are seen in situations where there is an increased production of RBC- receipt of erythropoietin or blood transfusions  Adopted from ADA-Clinical Practice Recommendations     Test Name Result Flag Reference   HEMOGLOBIN A1C 7 0 % H 4 0-5 6   EST  AVG  GLUCOSE 154 mg/dl       (1) LIPID PANEL, FASTING [id number] 10:29AM Debbi Abdalla   Triglyceride:         Normal              <150 mg/dl       Borderline High    150-199 mg/dl       High               200-499 mg/dl       Very High          >499 mg/dl  Cholesterol:         Desirable        <200 mg/dl      Borderline High  200-239 mg/dl      High             >239 mg/dl  HDL Cholesterol:        High    >59 mg/dL      Low     <41 mg/dL  LDL CALCULATED:    This screening LDL is a calculated result  It does not have the accuracy of the Direct Measured LDL in the monitoring of patients with hyperlipidemia and/or statin therapy  Direct Measure LDL (UEM866) must be ordered separately in these patients  Test Name Result Flag Reference   CHOLESTEROL 161 mg/dL     HDL,DIRECT 35 mg/dL L 40-60   LDL CHOLESTEROL CALCULATED 104 mg/dL H 0-100   TRIGLYCERIDES 109 mg/dL  <=150     (1) TSH [id number] 10:29AM Tosha Mtz   Patients undergoing fluorescein dye angiography may retain small amounts of fluorescein in the body for 48-72 hours post procedure  Samples containing fluorescein can produce falsely depressed TSH values  If the patient had this procedure,a specimen should be resubmitted post fluorescein clearance       Test Name Result Flag Reference   TSH 0 994 uIU/mL  0 358-3 740     (1) URIC ACID [id number] 10:29AM Debbi Abdalla     Test Name Result Flag Reference   URIC ACID 6 5 mg/dL  4 2-8 0

## 2018-01-12 NOTE — RESULT NOTES
Verified Results  COLONOSCOPY [id number] 03:08PM Madi Canisteo     Test Name Result Flag Reference   Colonoscopy 09/30/2016        Summary / No summary entered :      No summary entered   Documents attached :      EPIC OP NOTE - Madi Campos; Enc: [id number] - Appointment -      Madi Campos - (Gastroenterology Adult) (Result Document)  (1) TISSUE EXAM [id number] 03:01PM Madi Canisteo     Test Name Result Flag Reference   LAB AP CASE REPORT (Report)     Surgical Pathology Report             Case: [id number]                   Authorizing Provider: Yvonne Guardado MD      Collected:      09/30/2016 1501        Ordering Location:   [facility] Received:      09/30/2016 1622                    Operating Room                                 Pathologist:      Licha Sheppard MD                              Specimens:  A) - Large Intestine, Left/Descending Colon, polyp retrieved with a cold bx forcep           B) - Large Intestine, Sigmoid Colon, polyp retrieved with a cold snare                 C) - Rectum, rectal polyp retrieved with a cold bx forcep   LAB AP FINAL DIAGNOSIS (Report)     A  Colon, ascending, polypectomy:        - Polypoid colonic mucosa with no significant pathologic   alteration         - No dysplasia or malignancy is identified  B  Colon, sigmoid, polypectomy:        - Tubular adenoma  - No high-grade dysplasia or malignancy is identified  C  Colon, rectum, polypectomy:        - Hyperplastic polyp         - No dysplasia or malignancy is identified  Interpretation performed at [address] [facility]    Electronically signed by Licha Sheppard MD on 10/4/2016 at 2:25 PM   LAB AP SURGICAL ADDITIONAL INFORMATION (Report)     These tests were developed and their performance characteristics   determined by [facility] or [facility]  They may not be cleared or approved by the [place]   Food and   Drug Administration  The FDA has determined that such clearance or   approval is not necessary  These tests are used for clinical purposes  They should not be regarded as investigational or for research  This   laboratory has been approved by CLIA 88, designated as a high-complexity   laboratory and is qualified to perform these tests  LAB AP GROSS DESCRIPTION (Report)     A  The specimen is received in formalin, labeled with the patient's name   and hospital number, and is designated descending colon polyp  The   specimen consists of one tan-pink soft tissue fragment measuring 0 3 cm in   greatest dimension  Entirely submitted  One cassette  B  The specimen is received in formalin, labeled with the patient's name   and hospital number, and is designated sigmoid colon polyp  The   specimen consists of one tan-pink polypoid to fragment measuring 0 5   centimeters in greatest dimension  The apparent margin of resection is   inked blue  The polypoid fragment is bisected revealing tan-pink dense cut   surfaces  Also submitted in the container is one tan-pink soft tissue   fragment measuring 0 2 cm in greatest dimension  Entirely submitted  One   cassette  C  The specimen is received in formalin, labeled with the patient's name   and hospital number, and is designated rectal polyp biopsy  The   specimen consists of one tan-pink soft tissue fragment measuring 0 2 cm in   greatest dimension  Entirely submitted  One cassette  Note: The estimated total formalin fixation time based upon information   provided by the submitting clinician and the standard processing schedule   is approximately 72 hours  MAS       Plan  Colon cancer screening    · COLONOSCOPY ; every 3 years;  Last [id number]; Status:Active

## 2018-01-12 NOTE — RESULT NOTES
Verified Results  (1) CBC/PLT/DIFF [id number] 07:38AM Georgiana Lout Order Number: [id number]     Test Name Result Flag Reference   WBC COUNT 5 04 Thousand/uL  4 31-10 16   RBC COUNT 4 41 Million/uL  3 88-5 62   HEMOGLOBIN 14 0 g/dL  12 0-17 0   HEMATOCRIT 41 6 %  36 5-49 3   MCV 94 fL  82-98   MCH 31 7 pg  26 8-34 3   MCHC 33 7 g/dL  31 4-37 4   RDW 13 4 %  11 6-15 1   MPV 11 1 fL  8 9-12 7   PLATELET COUNT 208 Thousands/uL  149-390   nRBC AUTOMATED 0 /100 WBCs     NEUTROPHILS RELATIVE PERCENT 50 %  43-75   LYMPHOCYTES RELATIVE PERCENT 38 %  14-44   MONOCYTES RELATIVE PERCENT 8 %  4-12   EOSINOPHILS RELATIVE PERCENT 3 %  0-6   BASOPHILS RELATIVE PERCENT 1 %  0-1   NEUTROPHILS ABSOLUTE COUNT 2 56 Thousands/?L  1 85-7 62   LYMPHOCYTES ABSOLUTE COUNT 1 89 Thousands/?L  0 60-4 47   MONOCYTES ABSOLUTE COUNT 0 38 Thousand/?L  0 17-1 22   EOSINOPHILS ABSOLUTE COUNT 0 16 Thousand/?L  0 00-0 61   BASOPHILS ABSOLUTE COUNT 0 04 Thousands/?L  0 00-0 10     (1) COMPREHENSIVE METABOLIC PANEL [id number] 22:05YO Georgiana Lout Order Number: [id number]     Test Name Result Flag Reference   GLUCOSE,RANDM 160 mg/dL H    If the patient is fasting, the ADA then defines impaired fasting glucose as > 100 mg/dL and diabetes as > or equal to 123 mg/dL     SODIUM 143 mmol/L  136-145   POTASSIUM 3 6 mmol/L  3 5-5 3   CHLORIDE 109 mmol/L H 100-108   CARBON DIOXIDE 26 mmol/L  21-32   ANION GAP (CALC) 8 mmol/L  4-13   BLOOD UREA NITROGEN 18 mg/dL  5-25   CREATININE 1 29 mg/dL  0 60-1 30   Standardized to IDMS reference method   CALCIUM 8 4 mg/dL  8 3-10 1   BILI, TOTAL 0 58 mg/dL  0 20-1 00   ALK PHOSPHATAS 64 U/L     ALT (SGPT) 46 U/L  12-78   AST(SGOT) 18 U/L  5-45   ALBUMIN 3 7 g/dL  3 5-5 0   TOTAL PROTEIN 6 5 g/dL  6 4-8 2   eGFR Non-African American 57 0 ml/min/1 73sq m     [employer] Disease Education Program recommendations are as follows:  GFR calculation is accurate only with a steady state creatinine  Chronic Kidney disease less than 60 ml/min/1 73 sq  meters  Kidney failure less than 15 ml/min/1 73 sq  meters  (1) HEMOGLOBIN A1C 15Sep2016 07:38AM Virgin Loss Order Number: [id number]     Test Name Result Flag Reference   HEMOGLOBIN A1C 7 3 % H 4 2-6 3   EST  AVG  GLUCOSE 163 mg/dl       (1) LIPID PANEL, FASTING [id number] 07:38AM Virgin Loss Order Number: [id number]     Test Name Result Flag Reference   CHOLESTEROL 161 mg/dL     HDL,DIRECT 32 mg/dL L 40-60   Specimen collection should occur prior to Metamizole administration due to the potential for falsely depressed results  LDL CHOLESTEROL CALCULATED 104 mg/dL H 0-100   Triglyceride:         Normal              <150 mg/dl       Borderline High    150-199 mg/dl       High               200-499 mg/dl       Very High          >499 mg/dl  Cholesterol:         Desirable        <200 mg/dl      Borderline High  200-239 mg/dl      High             >239 mg/dl  HDL Cholesterol:        High    >59 mg/dL      Low     <41 mg/dL  LDL CALCULATED:    This screening LDL is a calculated result  It does not have the accuracy of the Direct Measured LDL in the monitoring of patients with hyperlipidemia and/or statin therapy  Direct Measure LDL (ZRN360) must be ordered separately in these patients  TRIGLYCERIDES 125 mg/dL  <=150   Specimen collection should occur prior to N-Acetylcysteine or Metamizole administration due to the potential for falsely depressed results  (1) TSH 15Sep2016 07:38AM Virgin Loss Order Number: [id number]     Test Name Result Flag Reference   TSH 1 360 uIU/mL  0 358-3 740   Patients undergoing fluorescein dye angiography may retain small amounts of fluorescein in the body for 48-72 hours post procedure  Samples containing fluorescein can produce falsely depressed TSH values  If the patient had this procedure,a specimen should be resubmitted post fluorescein clearance

## 2018-01-13 VITALS
WEIGHT: 264 LBS | OXYGEN SATURATION: 98 % | HEART RATE: 60 BPM | SYSTOLIC BLOOD PRESSURE: 126 MMHG | HEIGHT: 70 IN | BODY MASS INDEX: 37.8 KG/M2 | RESPIRATION RATE: 18 BRPM | DIASTOLIC BLOOD PRESSURE: 80 MMHG | TEMPERATURE: 99.1 F

## 2018-01-13 VITALS
OXYGEN SATURATION: 97 % | SYSTOLIC BLOOD PRESSURE: 138 MMHG | TEMPERATURE: 99.3 F | BODY MASS INDEX: 38.51 KG/M2 | HEART RATE: 73 BPM | RESPIRATION RATE: 18 BRPM | DIASTOLIC BLOOD PRESSURE: 80 MMHG | WEIGHT: 269 LBS | HEIGHT: 70 IN

## 2018-01-14 VITALS
WEIGHT: 277 LBS | HEIGHT: 70 IN | BODY MASS INDEX: 39.65 KG/M2 | HEART RATE: 69 BPM | DIASTOLIC BLOOD PRESSURE: 82 MMHG | OXYGEN SATURATION: 96 % | TEMPERATURE: 97.3 F | SYSTOLIC BLOOD PRESSURE: 120 MMHG

## 2018-01-15 NOTE — RESULT NOTES
Verified Results  (1) CBC/PLT/DIFF [id number] 08:36AM [facility] Order Number: [id number]     Test Name Result Flag Reference   WBC COUNT 5 19 Thousand/uL  4 31-10 16   RBC COUNT 4 50 Million/uL  3 88-5 62   HEMOGLOBIN 14 1 g/dL  12 0-17 0   HEMATOCRIT 43 1 %  36 5-49 3   MCV 96 fL  82-98   MCH 31 3 pg  26 8-34 3   MCHC 32 7 g/dL  31 4-37 4   RDW 13 5 %  11 6-15 1   MPV 12 1 fL  8 9-12 7   PLATELET COUNT 531 Thousands/uL  149-390   nRBC AUTOMATED 0 /100 WBCs     NEUTROPHILS RELATIVE PERCENT 56 %  43-75   LYMPHOCYTES RELATIVE PERCENT 33 %  14-44   MONOCYTES RELATIVE PERCENT 9 %  4-12   EOSINOPHILS RELATIVE PERCENT 2 %  0-6   BASOPHILS RELATIVE PERCENT 0 %  0-1   NEUTROPHILS ABSOLUTE COUNT 2 85 Thousands/? ??L  1 85-7 62   LYMPHOCYTES ABSOLUTE COUNT 1 73 Thousands/? ??L  0 60-4 47   MONOCYTES ABSOLUTE COUNT 0 48 Thousand/? ??L  0 17-1 22   EOSINOPHILS ABSOLUTE COUNT 0 11 Thousand/? ??L  0 00-0 61   BASOPHILS ABSOLUTE COUNT 0 02 Thousands/? ??L  0 00-0 10   - Patient Instructions: This bloodwork is non-fasting  Please drink two glasses of water morning of bloodwork       (1) COMPREHENSIVE METABOLIC PANEL [id number] 54:39TE [facility] Order Number: [id number]     Test Name Result Flag Reference   SODIUM 143 mmol/L  136-145   POTASSIUM 3 8 mmol/L  3 5-5 3   CHLORIDE 107 mmol/L  100-108   CARBON DIOXIDE 30 mmol/L  21-32   ANION GAP (CALC) 6 mmol/L  4-13   BLOOD UREA NITROGEN 17 mg/dL  5-25   CREATININE 1 12 mg/dL  0 60-1 30   Standardized to IDMS reference method   CALCIUM 9 1 mg/dL  8 3-10 1   BILI, TOTAL 0 43 mg/dL  0 20-1 00   ALK PHOSPHATAS 64 U/L     ALT (SGPT) 44 U/L  12-78   AST(SGOT) 14 U/L  5-45   ALBUMIN 3 7 g/dL  3 5-5 0   TOTAL PROTEIN 7 1 g/dL  6 4-8 2   eGFR Non-African American      >60 0 ml/min/1 73sq m   [employer] Disease Education Program recommendations are as follows:  GFR calculation is accurate only with a steady state creatinine  Chronic Kidney disease less than 60 ml/min/1 73 sq  meters  Kidney failure less than 15 ml/min/1 73 sq  meters  GLUCOSE FASTING 145 mg/dL H 65-99     (1) HEMOGLOBIN A1C [id number] 08:36 Mark Gaytan Order Number: [id number]     Test Name Result Flag Reference   HEMOGLOBIN A1C 6 9 % H 4 2-6 3   EST  AVG  GLUCOSE 151 mg/dl       (1) LIPID PANEL, FASTING [id number] 08:36 Mark Gaytan Order Number: [id number]     Test Name Result Flag Reference   CHOLESTEROL 148 mg/dL     HDL,DIRECT 35 mg/dL L 40-60   Specimen collection should occur prior to Metamizole administration due to the potential for falsely depressed results  LDL CHOLESTEROL CALCULATED 92 mg/dL  0-100   - Patient Instructions: This is a fasting blood test  Water,black tea or black  coffee only after 9:00pm the night before test   Drink 2 glasses of water the morning of test       Triglyceride:         Normal              <150 mg/dl       Borderline High    150-199 mg/dl       High               200-499 mg/dl       Very High          >499 mg/dl  Cholesterol:         Desirable        <200 mg/dl      Borderline High  200-239 mg/dl      High             >239 mg/dl  HDL Cholesterol:        High    >59 mg/dL      Low     <41 mg/dL  LDL CALCULATED:    This screening LDL is a calculated result  It does not have the accuracy of the Direct Measured LDL in the monitoring of patients with hyperlipidemia and/or statin therapy  Direct Measure LDL (UHH462) must be ordered separately in these patients  TRIGLYCERIDES 107 mg/dL  <=150   Specimen collection should occur prior to N-Acetylcysteine or Metamizole administration due to the potential for falsely depressed results  (1) TSH [id number] 08:36 Mark Gaytan Order Number: [id number]     Test Name Result Flag Reference   TSH 1 190 uIU/mL  0 358-3 740   - Patient Instructions: This bloodwork is non-fasting    Please drink two glasses of water morning of bloodwork  Patients undergoing fluorescein dye angiography may retain small amounts of fluorescein in the body for 48-72 hours post procedure  Samples containing fluorescein can produce falsely depressed TSH values  If the patient had this procedure,a specimen should be resubmitted post fluorescein clearance

## 2018-01-15 NOTE — MISCELLANEOUS
Message  Return to work or school:   Avinash Pitt is under my professional care  He was seen in my office on 9/19/16  No lifting above 35 pounds  Excuse for 9/19/16 and 9/20/16        Future Appointments    Signatures   Electronically signed by : JAEL Soliz ; Sep 19 2016 10:14AM EST                       (Author)    Electronically signed by : JAEL Soliz ; Sep 19 2016  1:30PM EST                       (Author)    Electronically signed by : JAEL Soliz ; Sep 20 2016  4:04AM EST                       (Author)

## 2018-01-15 NOTE — RESULT NOTES
Verified Results  (1) CBC/PLT/DIFF [id number] 08:20AM [facility] Order Number: [id number]     Test Name Result Flag Reference   WBC COUNT 5 85 Thousand/uL  4 31-10 16   RBC COUNT 4 45 Million/uL  3 88-5 62   HEMOGLOBIN 14 4 g/dL  12 0-17 0   HEMATOCRIT 42 2 %  36 5-49 3   MCV 95 fL  82-98   MCH 32 4 pg  26 8-34 3   MCHC 34 1 g/dL  31 4-37 4   RDW 13 4 %  11 6-15 1   MPV 11 6 fL  8 9-12 7   PLATELET COUNT 246 Thousands/uL  149-390   nRBC AUTOMATED 0 /100 WBCs     NEUTROPHILS RELATIVE PERCENT 58 %  43-75   LYMPHOCYTES RELATIVE PERCENT 31 %  14-44   MONOCYTES RELATIVE PERCENT 7 %  4-12   EOSINOPHILS RELATIVE PERCENT 3 %  0-6   BASOPHILS RELATIVE PERCENT 1 %  0-1   NEUTROPHILS ABSOLUTE COUNT 3 43 Thousands/? ??L  1 85-7 62   LYMPHOCYTES ABSOLUTE COUNT 1 80 Thousands/? ??L  0 60-4 47   MONOCYTES ABSOLUTE COUNT 0 42 Thousand/? ??L  0 17-1 22   EOSINOPHILS ABSOLUTE COUNT 0 15 Thousand/? ??L  0 00-0 61   BASOPHILS ABSOLUTE COUNT 0 04 Thousands/? ??L  0 00-0 10     (1) COMPREHENSIVE METABOLIC PANEL [id number] 82:18EV [facility] Order Number: [id number]     Test Name Result Flag Reference   SODIUM 142 mmol/L  136-145   POTASSIUM 3 5 mmol/L  3 5-5 3   CHLORIDE 107 mmol/L  100-108   CARBON DIOXIDE 26 mmol/L  21-32   ANION GAP (CALC) 9 mmol/L  4-13   BLOOD UREA NITROGEN 15 mg/dL  5-25   CREATININE 1 21 mg/dL  0 60-1 30   Standardized to IDMS reference method   CALCIUM 8 5 mg/dL  8 3-10 1   BILI, TOTAL 0 53 mg/dL  0 20-1 00   ALK PHOSPHATAS 60 U/L     ALT (SGPT) 48 U/L  12-78   Specimen collection should occur prior to Sulfasalazine and/or Sulfapyridine administration due to the potential for falsely depressed results  AST(SGOT) 20 U/L  5-45   Specimen collection should occur prior to Sulfasalazine administration due to the potential for falsely depressed results     ALBUMIN 3 4 g/dL L 3 5-5 0   TOTAL PROTEIN 6 9 g/dL  6 4-8 2   eGFR 65 ml/min/1 73sq m     National Kidney Disease Education Program recommendations are as follows:  GFR calculation is accurate only with a steady state creatinine  Chronic Kidney disease less than 60 ml/min/1 73 sq  meters  Kidney failure less than 15 ml/min/1 73 sq  meters  GLUCOSE FASTING 168 mg/dL H 65-99   Specimen collection should occur prior to Sulfasalazine administration due to the potential for falsely depressed results  Specimen collection should occur prior to Sulfapyridine administration due to the potential for falsely elevated results  (1) HEMOGLOBIN A1C [id number] 08:20AM [employer] Order Number: [id number]     Test Name Result Flag Reference   HEMOGLOBIN A1C 7 3 % H 4 2-6 3   EST  AVG  GLUCOSE 163 mg/dl       (1) LIPID PANEL, FASTING [id number] 08:20AM [facility] Order Number: [id number]     Test Name Result Flag Reference   CHOLESTEROL 150 mg/dL     HDL,DIRECT 36 mg/dL L 40-60   Specimen collection should occur prior to Metamizole administration due to the potential for falsley depressed results  LDL CHOLESTEROL CALCULATED 83 mg/dL  0-100   Triglyceride:        Normal <150 mg/dl   Borderline High 150-199 mg/dl   High 200-499 mg/dl   Very High >499 mg/dl      Cholesterol:       Desirable <200 mg/dl    Borderline High 200-239 mg/dl    High >239 mg/dl      HDL Cholesterol:       High>59 mg/dL    Low <41 mg/dL      This screening LDL is a calculated result  It does not have the accuracy of the Direct Measured LDL in the monitoring of patients with hyperlipidemia and/or statin therapy  Direct Measure LDL (FWT267) must be ordered separately in these patients  TRIGLYCERIDES 155 mg/dL H <=150   Specimen collection should occur prior to N-Acetylcysteine or Metamizole administration due to the potential for falsely depressed results       (1) TSH [id number] 08:20AM [employer] Order Number: [id number]     Test Name Result Flag Reference   TSH 1 360 uIU/mL  0 358-3 740 Patients undergoing fluorescein dye angiography may retain small amounts of fluorescein in the body for 48-72 hours post procedure  Samples containing fluorescein can produce falsely depressed TSH values  If the patient had this procedure,a specimen should be resubmitted post fluorescein clearance

## 2018-01-17 ENCOUNTER — TRANSCRIBE ORDERS (OUTPATIENT)
Dept: ADMINISTRATIVE | Facility: HOSPITAL | Age: 63
End: 2018-01-17

## 2018-01-17 DIAGNOSIS — I71.2 THORACIC AORTIC ANEURYSM WITHOUT RUPTURE (HCC): Primary | ICD-10-CM

## 2018-01-22 ENCOUNTER — HOSPITAL ENCOUNTER (OUTPATIENT)
Dept: CT IMAGING | Facility: HOSPITAL | Age: 63
Discharge: HOME | End: 2018-01-22
Payer: COMMERCIAL

## 2018-01-22 ENCOUNTER — TRANSCRIBE ORDERS (OUTPATIENT)
Dept: ADMINISTRATIVE | Facility: HOSPITAL | Age: 63
End: 2018-01-22

## 2018-01-22 ENCOUNTER — TRANSCRIBE ORDERS (OUTPATIENT)
Dept: LAB | Facility: HOSPITAL | Age: 63
End: 2018-01-22

## 2018-01-22 ENCOUNTER — APPOINTMENT (OUTPATIENT)
Dept: LAB | Facility: HOSPITAL | Age: 63
End: 2018-01-22
Payer: COMMERCIAL

## 2018-01-22 DIAGNOSIS — E11.9 TYPE 2 DIABETES MELLITUS WITHOUT COMPLICATIONS (HCC): ICD-10-CM

## 2018-01-22 DIAGNOSIS — I10 ESSENTIAL (PRIMARY) HYPERTENSION: ICD-10-CM

## 2018-01-22 DIAGNOSIS — E78.00 PURE HYPERCHOLESTEROLEMIA: ICD-10-CM

## 2018-01-22 DIAGNOSIS — I71.2 THORACIC AORTIC ANEURYSM WITHOUT RUPTURE (HCC): ICD-10-CM

## 2018-01-22 LAB
ALBUMIN SERPL BCP-MCNC: 3.8 G/DL (ref 3.5–5)
ALP SERPL-CCNC: 60 U/L (ref 46–116)
ALT SERPL W P-5'-P-CCNC: 56 U/L (ref 12–78)
ANION GAP SERPL CALCULATED.3IONS-SCNC: 8 MMOL/L (ref 4–13)
AST SERPL W P-5'-P-CCNC: 21 U/L (ref 5–45)
BASOPHILS # BLD AUTO: 0.03 THOUSANDS/ΜL (ref 0–0.1)
BASOPHILS NFR BLD AUTO: 1 % (ref 0–1)
BILIRUB SERPL-MCNC: 0.4 MG/DL (ref 0.2–1)
BUN SERPL-MCNC: 12 MG/DL (ref 5–25)
CALCIUM SERPL-MCNC: 8.6 MG/DL (ref 8.3–10.1)
CHLORIDE SERPL-SCNC: 105 MMOL/L (ref 100–108)
CHOLEST SERPL-MCNC: 131 MG/DL (ref 50–200)
CO2 SERPL-SCNC: 27 MMOL/L (ref 21–32)
CREAT SERPL-MCNC: 1.02 MG/DL (ref 0.6–1.3)
CREAT UR-MCNC: 150 MG/DL
EOSINOPHIL # BLD AUTO: 0.07 THOUSAND/ΜL (ref 0–0.61)
EOSINOPHIL NFR BLD AUTO: 2 % (ref 0–6)
ERYTHROCYTE [DISTWIDTH] IN BLOOD BY AUTOMATED COUNT: 13.1 % (ref 11.6–15.1)
EST. AVERAGE GLUCOSE BLD GHB EST-MCNC: 177 MG/DL
GFR SERPL CREATININE-BSD FRML MDRD: 79 ML/MIN/1.73SQ M
GLUCOSE P FAST SERPL-MCNC: 171 MG/DL (ref 65–99)
HBA1C MFR BLD: 7.8 % (ref 4.2–6.3)
HCT VFR BLD AUTO: 43.3 % (ref 36.5–49.3)
HDLC SERPL-MCNC: 35 MG/DL (ref 40–60)
HGB BLD-MCNC: 14.7 G/DL (ref 12–17)
LDLC SERPL CALC-MCNC: 71 MG/DL (ref 0–100)
LYMPHOCYTES # BLD AUTO: 1.56 THOUSANDS/ΜL (ref 0.6–4.47)
LYMPHOCYTES NFR BLD AUTO: 36 % (ref 14–44)
MCH RBC QN AUTO: 31.8 PG (ref 26.8–34.3)
MCHC RBC AUTO-ENTMCNC: 33.9 G/DL (ref 31.4–37.4)
MCV RBC AUTO: 94 FL (ref 82–98)
MICROALBUMIN UR-MCNC: 39.8 MG/L (ref 0–20)
MICROALBUMIN/CREAT 24H UR: 27 MG/G CREATININE (ref 0–30)
MONOCYTES # BLD AUTO: 0.36 THOUSAND/ΜL (ref 0.17–1.22)
MONOCYTES NFR BLD AUTO: 8 % (ref 4–12)
NEUTROPHILS # BLD AUTO: 2.32 THOUSANDS/ΜL (ref 1.85–7.62)
NEUTS SEG NFR BLD AUTO: 53 % (ref 43–75)
PLATELET # BLD AUTO: 148 THOUSANDS/UL (ref 149–390)
PMV BLD AUTO: 10.5 FL (ref 8.9–12.7)
POTASSIUM SERPL-SCNC: 3.5 MMOL/L (ref 3.5–5.3)
PROT SERPL-MCNC: 7.1 G/DL (ref 6.4–8.2)
RBC # BLD AUTO: 4.62 MILLION/UL (ref 3.88–5.62)
SODIUM SERPL-SCNC: 140 MMOL/L (ref 136–145)
TRIGL SERPL-MCNC: 127 MG/DL
TSH SERPL DL<=0.05 MIU/L-ACNC: 1.5 UIU/ML (ref 0.36–3.74)
WBC # BLD AUTO: 4.34 THOUSAND/UL (ref 4.31–10.16)

## 2018-01-22 PROCEDURE — 71275 CT ANGIOGRAPHY CHEST: CPT

## 2018-01-22 PROCEDURE — 85025 COMPLETE CBC W/AUTO DIFF WBC: CPT

## 2018-01-22 PROCEDURE — 84443 ASSAY THYROID STIM HORMONE: CPT

## 2018-01-22 PROCEDURE — 80053 COMPREHEN METABOLIC PANEL: CPT

## 2018-01-22 PROCEDURE — 80061 LIPID PANEL: CPT

## 2018-01-22 PROCEDURE — 82043 UR ALBUMIN QUANTITATIVE: CPT

## 2018-01-22 PROCEDURE — 36415 COLL VENOUS BLD VENIPUNCTURE: CPT

## 2018-01-22 PROCEDURE — 82570 ASSAY OF URINE CREATININE: CPT

## 2018-01-22 PROCEDURE — 83036 HEMOGLOBIN GLYCOSYLATED A1C: CPT

## 2018-01-22 RX ADMIN — IOHEXOL 100 ML: 350 INJECTION, SOLUTION INTRAVENOUS at 10:35

## 2018-01-23 ENCOUNTER — GENERIC CONVERSION - ENCOUNTER (OUTPATIENT)
Dept: OTHER | Facility: OTHER | Age: 63
End: 2018-01-23

## 2018-01-23 NOTE — RESULT NOTES
Verified Results  US THYROID [id number] 10:39AM Jenaro Smiley Order Number: [id number]    - Patient Instructions: To schedule this appointment, please contact Central Scheduling at [phone]  Test Name Result Flag Reference   US THYROID (Report)     THYROID ULTRASOUND     INDICATION: Thyroid nodule  COMPARISON: 9/24/2015, 5/21/2014, 12/22/2011  TECHNIQUE:  Ultrasound of the thyroid was performed with a high frequency linear transducer in transverse and sagittal planes including volumetric imaging sweeps as well as traditional still imaging technique  FINDINGS: Thyroid parenchyma is diffusely heterogeneous in echotexture  Right lobe: 3 7 x 1 5 x 1 8 cm  Left lobe: 3 6 x 1 9 x 1 4 cm  Isthmus: 0 3 cm  Nodule #1   Right midpole nodule measuring 0 8 x 0 5 x 0 6 cm  This nodule has decreased in size from prior  COMPOSITION: 2 points, solid or almost completely solid   ECHOGENICITY: 2 points, hypoechoic  SHAPE: 0 points, wider-than-tall  MARGIN: 0 points, smooth  ECHOGENIC FOCI: 0 points, none or large comet-tail artifacts  TI-RADS Score: TR 4 (4-6 points), Moderately suspicious  FNA if > 1 5 cm  Follow if > 1cm  This nodule has been stable or decreased size for greater than 5 years and has a prior benign biopsy (1/20/2012)  If there is a high level of clinical concern,    continued surveillance at 2 year intervals could be considered, otherwise no additional workup recommended  IMPRESSION:   Slightly decreased size of the solitary subcentimeter right lobe nodule  According to ACR criteria, this requires no further biopsy or follow-up ultrasounds  Reference: ACR Thyroid Imaging, Reporting and Data System (TI-RADS): White Paper of the [employer]   J AM Sharmila Radiol [id number]  (additional recommendations based on American Thyroid Association 2015 guidelines )       Workstation performed: [id number]     Signed by:   Matt Baker MD 12/4/17

## 2018-01-24 NOTE — RESULT NOTES
Verified Results  (1) CBC/PLT/DIFF [id number] 09:30AM [facility] [place] Order Number: [id number]     Test Name Result Flag Reference   WBC COUNT 4 34 Thousand/uL  4 31-10 16   RBC COUNT 4 62 Million/uL  3 88-5 62   HEMOGLOBIN 14 7 g/dL  12 0-17 0   HEMATOCRIT 43 3 %  36 5-49 3   MCV 94 fL  82-98   MCH 31 8 pg  26 8-34 3   MCHC 33 9 g/dL  31 4-37 4   RDW 13 1 %  11 6-15 1   MPV 10 5 fL  8 9-12 7   PLATELET COUNT 773 Thousands/uL L 149-390   NEUTROPHILS RELATIVE PERCENT 53 %  43-75   LYMPHOCYTES RELATIVE PERCENT 36 %  14-44   MONOCYTES RELATIVE PERCENT 8 %  4-12   EOSINOPHILS RELATIVE PERCENT 2 %  0-6   BASOPHILS RELATIVE PERCENT 1 %  0-1   NEUTROPHILS ABSOLUTE COUNT 2 32 Thousands/? ??L  1 85-7 62   LYMPHOCYTES ABSOLUTE COUNT 1 56 Thousands/? ??L  0 60-4 47   MONOCYTES ABSOLUTE COUNT 0 36 Thousand/? ??L  0 17-1 22   EOSINOPHILS ABSOLUTE COUNT 0 07 Thousand/? ??L  0 00-0 61   BASOPHILS ABSOLUTE COUNT 0 03 Thousands/? ??L  0 00-0 10     (1) COMPREHENSIVE METABOLIC PANEL [id number] 87:38BU [facility] Order Number: [id number]     Test Name Result Flag Reference   SODIUM 140 mmol/L  136-145   POTASSIUM 3 5 mmol/L  3 5-5 3   CHLORIDE 105 mmol/L  100-108   CARBON DIOXIDE 27 mmol/L  21-32   ANION GAP (CALC) 8 mmol/L  4-13   BLOOD UREA NITROGEN 12 mg/dL  5-25   CREATININE 1 02 mg/dL  0 60-1 30   Standardized to IDMS reference method   CALCIUM 8 6 mg/dL  8 3-10 1   BILI, TOTAL 0 40 mg/dL  0 20-1 00   ALK PHOSPHATAS 60 U/L     ALT (SGPT) 56 U/L  12-78   Specimen collection should occur prior to Sulfasalazine administration due to the potential for falsely depressed results  AST(SGOT) 21 U/L  5-45   Specimen collection should occur prior to Sulfasalazine administration due to the potential for falsely depressed results     ALBUMIN 3 8 g/dL  3 5-5 0   TOTAL PROTEIN 7 1 g/dL  6 4-8 2   eGFR 79 ml/min/1 73sq m     National Kidney Disease Education Program recommendations are as follows:  GFR calculation is accurate only with a steady state creatinine  Chronic Kidney disease less than 60 ml/min/1 73 sq  meters  Kidney failure less than 15 ml/min/1 73 sq  meters  GLUCOSE FASTING 171 mg/dL H 65-99   Specimen collection should occur prior to Sulfasalazine administration due to the potential for falsely depressed results  Specimen collection should occur prior to Sulfapyridine administration due to the potential for falsely elevated results  (1) HEMOGLOBIN A1C 22Jan2018 09:30AM [facility] Order Number: [id number]     Test Name Result Flag Reference   HEMOGLOBIN A1C 7 8 % H 4 2-6 3   EST  AVG  GLUCOSE 177 mg/dl       (1) MICROALBUMIN CREATININE RATIO, RANDOM URINE [id number] 09:30AM [facility] Order Number: [id number]     Test Name Result Flag Reference   MICROALBUMIN/ CREAT R 27 mg/g creatinine  0-30   MICROALBUMIN,URINE 39 8 mg/L H 0 0-20 0   CREATININE URINE 150 0 mg/dL       (1) LIPID PANEL, FASTING [id number] 09:30AM [facility] Order Number: [id number]     Test Name Result Flag Reference   CHOLESTEROL 131 mg/dL     Cholesterol:    Desirable <200 mg/dl    Borderline 200-239 mg/dl    High>239   HDL,DIRECT 35 mg/dL L 40-60   HDL Cholesterol:    High>59 mg/dL    Low <41 mg/dL   LDL CHOLESTEROL CALCULATED 71 mg/dL  0-100   This screening LDL is a calculated result  It does not have the accuracy of the Direct Measured LDL in the monitoring of patients with hyperlipidemia and/or statin therapy  Direct Measure LDL (FQZ950) must be ordered separately in these patients  Triglyceride:        Normal <150 mg/dl   Borderline High 150-199 mg/dl   High 200-499 mg/dl   Very High >499 mg/dl   TRIGLYCERIDES 127 mg/dL  <=150   Specimen collection should occur prior to N-Acetylcysteine or Metamizole administration due to the potential for falsely depressed results       (1) TSH [id number] 09:30AM [facility] Order Number: [id number]     Test Name Result Flag Reference   TSH 1 499 uIU/mL  0 358-3 740   Patients undergoing fluorescein dye angiography may retain small amounts of fluorescein in the body for 48-72 hours post procedure  Samples containing fluorescein can produce falsely depressed TSH values  If the patient had this procedure,a specimen should be resubmitted post fluorescein clearance

## 2018-01-29 ENCOUNTER — IMMUNIZATION (OUTPATIENT)
Dept: INTERNAL MEDICINE CLINIC | Facility: CLINIC | Age: 63
End: 2018-01-29
Payer: COMMERCIAL

## 2018-01-29 DIAGNOSIS — Z23 NEED FOR INFLUENZA VACCINATION: Primary | ICD-10-CM

## 2018-01-29 PROCEDURE — 90656 IIV3 VACC NO PRSV 0.5 ML IM: CPT | Performed by: FAMILY MEDICINE

## 2018-01-29 PROCEDURE — 90471 IMMUNIZATION ADMIN: CPT | Performed by: FAMILY MEDICINE

## 2018-02-14 ENCOUNTER — OFFICE VISIT (OUTPATIENT)
Dept: INTERNAL MEDICINE CLINIC | Facility: CLINIC | Age: 63
End: 2018-02-14
Payer: COMMERCIAL

## 2018-02-14 VITALS
WEIGHT: 269 LBS | RESPIRATION RATE: 16 BRPM | HEIGHT: 70 IN | SYSTOLIC BLOOD PRESSURE: 130 MMHG | DIASTOLIC BLOOD PRESSURE: 70 MMHG | OXYGEN SATURATION: 94 % | TEMPERATURE: 99.6 F | BODY MASS INDEX: 38.51 KG/M2 | HEART RATE: 96 BPM

## 2018-02-14 DIAGNOSIS — E78.00 HYPERCHOLESTEROLEMIA: ICD-10-CM

## 2018-02-14 DIAGNOSIS — E11.9 TYPE 2 DIABETES MELLITUS WITHOUT COMPLICATION, WITHOUT LONG-TERM CURRENT USE OF INSULIN (HCC): Primary | ICD-10-CM

## 2018-02-14 DIAGNOSIS — I10 ESSENTIAL HYPERTENSION: ICD-10-CM

## 2018-02-14 DIAGNOSIS — Z23 NEED FOR PNEUMOCOCCAL VACCINE: ICD-10-CM

## 2018-02-14 DIAGNOSIS — R60.9 EDEMA, UNSPECIFIED TYPE: ICD-10-CM

## 2018-02-14 PROBLEM — M77.30 HEEL SPUR: Status: ACTIVE | Noted: 2017-05-31

## 2018-02-14 PROCEDURE — 99214 OFFICE O/P EST MOD 30 MIN: CPT | Performed by: FAMILY MEDICINE

## 2018-02-14 PROCEDURE — 90471 IMMUNIZATION ADMIN: CPT

## 2018-02-14 PROCEDURE — 90670 PCV13 VACCINE IM: CPT

## 2018-02-14 NOTE — ASSESSMENT & PLAN NOTE
Back symptoms relatively stable  Watch with lifting  Muscle strengthening discussed  Walking regularly discussed

## 2018-02-14 NOTE — PATIENT INSTRUCTIONS
10% - bad control"> 10% - bad control,Hemoglobin A1c (HbA1c) greater than 10% indicating poor diabetic control,Haemoglobin A1c greater than 10% indicating poor diabetic control">   Diabetes Mellitus Type 2 in Adults, Ambulatory Care   GENERAL INFORMATION:   Diabetes mellitus type 2  is a disease that affects how your body uses glucose (sugar)  Insulin helps move sugar out of the blood so it can be used for energy  Normally, when the blood sugar level increases, the pancreas makes more insulin  Type 2 diabetes develops because either the body cannot make enough insulin, or it cannot use the insulin correctly  After many years, your pancreas may stop making insulin  Common symptoms include the following:   · More hunger or thirst than usual     · Frequent urination     · Weight loss without trying     · Blurred vision  Seek immediate care for the following symptoms:   · Severe abdominal pain, or pain that spreads to your back  You may also be vomiting  · Trouble staying awake or focusing    · Shaking or sweating    · Blurred or double vision    · Breath has a fruity, sweet smell    · Breathing is deep and labored, or rapid and shallow    · Heartbeat is fast and weak  Treatment for diabetes mellitus type 2  includes keeping your blood sugar at a normal level  You must eat the right foods, and exercise regularly  You may also need medicine if you cannot control your blood sugar level with nutrition and exercise  Manage diabetes mellitus type 2:   · Check your blood sugar level  You will be taught how to check a small drop of blood in a glucose monitor  Ask your healthcare provider when and how often to check during the day  Ask your healthcare provider what your blood sugar levels should be when you check them  · Keep track of carbohydrates (sugar and starchy foods)  Your blood sugar level can get too high if you eat too many carbohydrates   Your dietitian will help you plan meals and snacks that have the right amount of carbohydrates  · Eat low-fat foods  Some examples are skinless chicken and low-fat milk  · Eat less sodium (salt)  Some examples of high-sodium foods to limit are soy sauce, potato chips, and soup  Do not add salt to food you cook  Limit your use of table salt  · Eat high-fiber foods  Foods that are a good source of fiber include vegetables, whole grain bread, and beans  · Limit alcohol  Alcohol affects your blood sugar level and can make it harder to manage your diabetes  Women should limit alcohol to 1 drink a day  Men should limit alcohol to 2 drinks a day  A drink of alcohol is 12 ounces of beer, 5 ounces of wine, or 1½ ounces of liquor  · Get regular exercise  Exercise can help keep your blood sugar level steady, decrease your risk of heart disease, and help you lose weight  Exercise for at least 30 minutes, 5 days a week  Include muscle strengthening activities 2 days each week  Work with your healthcare provider to create an exercise plan  · Check your feet each day  for injuries or open sores  Ask your healthcare provider for activities you can do if you have an open sore  · Quit smoking  If you smoke, it is never too late to quit  Smoking can worsen the problems that may occur with diabetes  Ask your healthcare provider for information about how to stop smoking if you are having trouble quitting  · Ask about your weight:  Ask healthcare providers if you need to lose weight, and how much to lose  Ask them to help you with a weight loss program  Even a 10 to 15 pound weight loss can help you manage your blood sugar level  · Carry medical alert identification  Wear medical alert jewelry or carry a card that says you have diabetes  Ask your healthcare provider where to get these items  · Ask about vaccines  Diabetes puts you at risk of serious illness if you get the flu, pneumonia, or hepatitis   Ask your healthcare provider if you should get a flu, pneumonia, or hepatitis B vaccine, and when to get the vaccine  Follow up with your healthcare provider as directed:  Write down your questions so you remember to ask them during your visits  CARE AGREEMENT:   You have the right to help plan your care  Learn about your health condition and how it may be treated  Discuss treatment options with your caregivers to decide what care you want to receive  You always have the right to refuse treatment  The above information is an  only  It is not intended as medical advice for individual conditions or treatments  Talk to your doctor, nurse or pharmacist before following any medical regimen to see if it is safe and effective for you  © 2014 [address] is for End User's use only and may not be sold, redistributed or otherwise used for commercial purposes  All illustrations and images included in CareNotes® are the copyrighted property of A D A M [employer]  or Eder Hu

## 2018-02-14 NOTE — ASSESSMENT & PLAN NOTE
Continue with the Lasix for now  Continue with the potassium supplementation  Elevate legs as much as possible  Consider compression stockings  Continue with plan for exercise and weight loss

## 2018-02-14 NOTE — ASSESSMENT & PLAN NOTE
Hemoglobin A1c somewhat more elevated at 7 8  Watch diet more closely  He wishes to avoid medications if possible  Increase activity level as planned  Slowly increase the intensity and the duration of the activities  Watch carbohydrate intake  If hemoglobin A1c remains elevated, will likely need medication  Follow-up with Ophthalmology on a regular basis  Check feet daily

## 2018-02-14 NOTE — ASSESSMENT & PLAN NOTE
Continue follow-up with Cardiology regularly  Recent CT scan did not show any significant change based on the reading available  Continue with blood pressure control

## 2018-02-14 NOTE — PROGRESS NOTES
Assessment/Plan:    Type 2 diabetes mellitus (HCC)  Hemoglobin A1c somewhat more elevated at 7 8  Watch diet more closely  He wishes to avoid medications if possible  Increase activity level as planned  Slowly increase the intensity and the duration of the activities  Watch carbohydrate intake  If hemoglobin A1c remains elevated, will likely need medication  Follow-up with Ophthalmology on a regular basis  Check feet daily  Hypertension  Blood pressure controlled  Continue current medications  Watch salt intake  Aneurysm of thoracic aorta [facility])  Continue follow-up with Cardiology regularly  Recent CT scan did not show any significant change based on the reading available  Continue with blood pressure control  Edema  Continue with the Lasix for now  Continue with the potassium supplementation  Elevate legs as much as possible  Consider compression stockings  Continue with plan for exercise and weight loss  Hypercholesterolemia  Cholesterol well controlled  Continue with the atorvastatin  Low back pain  Back symptoms relatively stable  Watch with lifting  Muscle strengthening discussed  Walking regularly discussed  Diagnoses and all orders for this visit:    Type 2 diabetes mellitus without complication, without long-term current use of insulin (HCC)  -     Comprehensive metabolic panel; Future  -     Hemoglobin A1c; Future    Essential hypertension  -     CBC and differential; Future    Hypercholesterolemia  -     Comprehensive metabolic panel; Future  -     Lipid panel; Future  -     TSH, 3rd generation; Future    Edema, unspecified type  -     CBC and differential; Future    Need for pneumococcal vaccine  -     PNEUMOCOCCAL CONJUGATE VACCINE 13-VALENT GREATER THAN 6 MONTHS        Reviewed recent laboratory testing with him  Blood work is generally stable  Hemoglobin A1c is slightly more elevated  See above    Will have him follow up in about 3-4 months with laboratory testing prior to that visit  Follow up sooner if needed  Subjective:      Patient ID: Jaiden Tucker is a 65 y o  male  He presents for routine follow-up  Has generally been doing relatively well  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  Tolerated this relatively well  Does feel much more relaxed  Is enjoying full detention  Has plans to go fly fishing in the summer  Once become more active  Has not been eating very healthy and has not been exercising regularly  Appetite has been generally good  Denies any nausea, vomiting, or diarrhea  Denies any changes in bowel movements  Back has been relatively stable  Does not have any severe pain or any severe radiation into legs  Continues to follow-up with Cardiology for the hypertension as well as the peripheral edema and thoracic aortic aneurysm  Had a recent CT scan of the chest as ordered by Cardiology  Denies any chest pain or palpitations  Denies any significant shortness of breath  Denies any urinary symptoms  He is trying to lose weight because he would like to get off of some of his medications especially the Lasix  The following portions of the patient's history were reviewed and updated as appropriate:   He  has a past medical history of Allergic rhinitis; Aneurysm of thoracic aorta ([address] ); Cataract; Diabetes mellitus ([address] ); Herniated nucleus pulposus, L4-5 left; Herniated nucleus pulposus, L5-S1, left; Hypercholesterolemia; Hypertension; and Lumbar radiculopathy  He  does not have any pertinent problems on file  He  has a past surgical history that includes Tonsillectomy and adenoidectomy; Knee surgery; Back surgery; pr colonoscopy flx dx w/collj spec when pfrmd (N/A, 9/30/2016); Back surgery; and Knee arthroscopy    His family history includes Coronary artery disease in his brother; Heart attack in his brother; Hypertension in his father; Thyroid disease in his mother  He  reports that he has never smoked  He does not have any smokeless tobacco history on file  He reports that he does not drink alcohol or use drugs  Current Outpatient Prescriptions   Medication Sig Dispense Refill    acetaminophen-codeine (TYLENOL #3) 300-30 mg per tablet Take 1 tablet by mouth every 6 (six) hours as needed for moderate pain      allopurinol (ZYLOPRIM) 100 mg tablet Take 100 mg by mouth daily      amLODIPine-benazepril (LOTREL 5-20) 5-20 MG per capsule Take 1 capsule by mouth 2 (two) times a day      atenolol (TENORMIN) 50 mg tablet Take 50 mg by mouth 2 (two) times a day      atorvastatin (LIPITOR) 20 mg tablet Take 20 mg by mouth daily      furosemide (LASIX) 40 mg tablet Take 40 mg by mouth daily      olmesartan-hydrochlorothiazide (BENICAR HCT) 40-12 5 MG per tablet Take 1 tablet by mouth daily      potassium chloride (K-DUR,KLOR-CON) 10 mEq tablet Take 10 mEq by mouth 4 (four) times a day       No current facility-administered medications for this visit  Current Outpatient Prescriptions on File Prior to Visit   Medication Sig    acetaminophen-codeine (TYLENOL #3) 300-30 mg per tablet Take 1 tablet by mouth every 6 (six) hours as needed for moderate pain    allopurinol (ZYLOPRIM) 100 mg tablet Take 100 mg by mouth daily    amLODIPine-benazepril (LOTREL 5-20) 5-20 MG per capsule Take 1 capsule by mouth 2 (two) times a day    atenolol (TENORMIN) 50 mg tablet Take 50 mg by mouth 2 (two) times a day    atorvastatin (LIPITOR) 20 mg tablet Take 20 mg by mouth daily    furosemide (LASIX) 40 mg tablet Take 40 mg by mouth daily    olmesartan-hydrochlorothiazide (BENICAR HCT) 40-12 5 MG per tablet Take 1 tablet by mouth daily    potassium chloride (K-DUR,KLOR-CON) 10 mEq tablet Take 10 mEq by mouth 4 (four) times a day     No current facility-administered medications on file prior to visit  He has No Known Allergies       Review of Systems Constitutional: Negative for activity change, appetite change, chills and fever  HENT: Negative for hearing loss  Eyes: Negative for pain and visual disturbance  Respiratory: Negative for chest tightness and shortness of breath  Cardiovascular: Negative for chest pain and palpitations  Gastrointestinal: Negative for abdominal pain, blood in stool, diarrhea, nausea and vomiting  Endocrine: Negative for polydipsia, polyphagia and polyuria  Genitourinary: Negative for dysuria  Musculoskeletal: Negative for arthralgias and gait problem  As per HPI   Skin: Negative for color change  Neurological: Negative for dizziness and headaches  Hematological: Does not bruise/bleed easily  Psychiatric/Behavioral: Negative for behavioral problems  The patient is not nervous/anxious  Objective:    Vitals:    02/14/18 0731   BP: 130/70   Pulse: 96   Resp: 16   Temp: 99 6 °F (37 6 °C)   SpO2: 94%        Physical Exam   Constitutional: He is oriented to person, place, and time  No distress  HENT:   Head: Normocephalic and atraumatic  Right Ear: Tympanic membrane normal    Left Ear: Tympanic membrane normal    Nose: Nose normal    Mouth/Throat: Oropharynx is clear and moist  No posterior oropharyngeal erythema  Eyes: Conjunctivae are normal  Pupils are equal, round, and reactive to light  Early cataracts   Neck: Normal range of motion  Cardiovascular: Normal rate, regular rhythm and normal heart sounds  Exam reveals no gallop and no friction rub  No murmur heard  Pulmonary/Chest: Breath sounds normal  He has no wheezes  He has no rales  He exhibits no tenderness  Abdominal: He exhibits no distension  There is no tenderness  There is no rebound and no guarding  Lymphadenopathy:     He has no cervical adenopathy  Neurological: He is alert and oriented to person, place, and time  Skin: Skin is warm and dry  Psychiatric: He has a normal mood and affect   His behavior is normal  Nursing note and vitals reviewed  Below is the patient's most recent value for Albumin, ALT, AST, BUN, Calcium, Chloride, Cholesterol, CO2, Creatinine, GFR, Glucose, HDL, Hematocrit, Hemoglobin, Hemoglobin A1C, LDL, Magnesium, Phosphorus, Platelets, Potassium, PSA, Sodium, Triglycerides, and WBC  Lab Results   Component Value Date    ALT 56 01/22/2018    AST 21 01/22/2018    BUN 12 01/22/2018    CALCIUM 8 6 01/22/2018     01/22/2018    CHOL 131 01/22/2018    CO2 27 01/22/2018    CREATININE 1 02 01/22/2018    HDL 35 (L) 01/22/2018    HCT 43 3 01/22/2018    HGB 14 7 01/22/2018    HGBA1C 7 8 (H) 01/22/2018     (L) 01/22/2018    K 3 5 01/22/2018    PSA 0 6 11/17/2015     01/22/2018    TRIG 127 01/22/2018    WBC 4 34 01/22/2018     Note: for a comprehensive list of the patient's lab results, access the Results Review activity

## 2018-02-28 DIAGNOSIS — E87.6 HYPOKALEMIA: Primary | ICD-10-CM

## 2018-02-28 RX ORDER — POTASSIUM CHLORIDE 750 MG/1
TABLET, FILM COATED, EXTENDED RELEASE ORAL
Qty: 720 TABLET | Refills: 1 | Status: SHIPPED | OUTPATIENT
Start: 2018-02-28 | End: 2018-08-27 | Stop reason: SDUPTHER

## 2018-04-03 ENCOUNTER — TRANSCRIBE ORDERS (OUTPATIENT)
Dept: LAB | Facility: MEDICAL CENTER | Age: 63
End: 2018-04-03

## 2018-04-03 ENCOUNTER — APPOINTMENT (OUTPATIENT)
Dept: LAB | Facility: MEDICAL CENTER | Age: 63
End: 2018-04-03
Payer: COMMERCIAL

## 2018-04-03 DIAGNOSIS — N13.8 ENLARGED PROSTATE WITH URINARY OBSTRUCTION: Primary | ICD-10-CM

## 2018-04-03 DIAGNOSIS — N40.1 ENLARGED PROSTATE WITH URINARY OBSTRUCTION: Primary | ICD-10-CM

## 2018-04-03 DIAGNOSIS — N40.1 ENLARGED PROSTATE WITH URINARY OBSTRUCTION: ICD-10-CM

## 2018-04-03 DIAGNOSIS — N13.8 ENLARGED PROSTATE WITH URINARY OBSTRUCTION: ICD-10-CM

## 2018-04-03 LAB — PSA SERPL-MCNC: 0.6 NG/ML (ref 0–4)

## 2018-04-03 PROCEDURE — 36415 COLL VENOUS BLD VENIPUNCTURE: CPT

## 2018-04-03 PROCEDURE — G0103 PSA SCREENING: HCPCS

## 2018-06-14 ENCOUNTER — APPOINTMENT (OUTPATIENT)
Dept: LAB | Facility: MEDICAL CENTER | Age: 63
End: 2018-06-14
Payer: COMMERCIAL

## 2018-06-14 DIAGNOSIS — I10 ESSENTIAL HYPERTENSION: ICD-10-CM

## 2018-06-14 DIAGNOSIS — R60.9 EDEMA, UNSPECIFIED TYPE: ICD-10-CM

## 2018-06-14 DIAGNOSIS — E78.00 HYPERCHOLESTEROLEMIA: ICD-10-CM

## 2018-06-14 DIAGNOSIS — E11.9 TYPE 2 DIABETES MELLITUS WITHOUT COMPLICATION, WITHOUT LONG-TERM CURRENT USE OF INSULIN (HCC): ICD-10-CM

## 2018-06-14 LAB
ALBUMIN SERPL BCP-MCNC: 3.8 G/DL (ref 3.5–5)
ALP SERPL-CCNC: 64 U/L (ref 46–116)
ALT SERPL W P-5'-P-CCNC: 53 U/L (ref 12–78)
ANION GAP SERPL CALCULATED.3IONS-SCNC: 9 MMOL/L (ref 4–13)
AST SERPL W P-5'-P-CCNC: 24 U/L (ref 5–45)
BASOPHILS # BLD AUTO: 0.04 THOUSANDS/ΜL (ref 0–0.1)
BASOPHILS NFR BLD AUTO: 1 % (ref 0–1)
BILIRUB SERPL-MCNC: 0.51 MG/DL (ref 0.2–1)
BUN SERPL-MCNC: 18 MG/DL (ref 5–25)
CALCIUM SERPL-MCNC: 8.6 MG/DL (ref 8.3–10.1)
CHLORIDE SERPL-SCNC: 105 MMOL/L (ref 100–108)
CHOLEST SERPL-MCNC: 152 MG/DL (ref 50–200)
CO2 SERPL-SCNC: 28 MMOL/L (ref 21–32)
CREAT SERPL-MCNC: 1.24 MG/DL (ref 0.6–1.3)
EOSINOPHIL # BLD AUTO: 0.1 THOUSAND/ΜL (ref 0–0.61)
EOSINOPHIL NFR BLD AUTO: 2 % (ref 0–6)
ERYTHROCYTE [DISTWIDTH] IN BLOOD BY AUTOMATED COUNT: 13.2 % (ref 11.6–15.1)
EST. AVERAGE GLUCOSE BLD GHB EST-MCNC: 183 MG/DL
GFR SERPL CREATININE-BSD FRML MDRD: 62 ML/MIN/1.73SQ M
GLUCOSE P FAST SERPL-MCNC: 165 MG/DL (ref 65–99)
HBA1C MFR BLD: 8 % (ref 4.2–6.3)
HCT VFR BLD AUTO: 44.5 % (ref 36.5–49.3)
HDLC SERPL-MCNC: 33 MG/DL (ref 40–60)
HGB BLD-MCNC: 14.5 G/DL (ref 12–17)
IMM GRANULOCYTES # BLD AUTO: 0.01 THOUSAND/UL (ref 0–0.2)
IMM GRANULOCYTES NFR BLD AUTO: 0 % (ref 0–2)
LDLC SERPL CALC-MCNC: 91 MG/DL (ref 0–100)
LYMPHOCYTES # BLD AUTO: 1.74 THOUSANDS/ΜL (ref 0.6–4.47)
LYMPHOCYTES NFR BLD AUTO: 39 % (ref 14–44)
MCH RBC QN AUTO: 31.7 PG (ref 26.8–34.3)
MCHC RBC AUTO-ENTMCNC: 32.6 G/DL (ref 31.4–37.4)
MCV RBC AUTO: 97 FL (ref 82–98)
MONOCYTES # BLD AUTO: 0.36 THOUSAND/ΜL (ref 0.17–1.22)
MONOCYTES NFR BLD AUTO: 8 % (ref 4–12)
NEUTROPHILS # BLD AUTO: 2.27 THOUSANDS/ΜL (ref 1.85–7.62)
NEUTS SEG NFR BLD AUTO: 50 % (ref 43–75)
NONHDLC SERPL-MCNC: 119 MG/DL
NRBC BLD AUTO-RTO: 0 /100 WBCS
PLATELET # BLD AUTO: 156 THOUSANDS/UL (ref 149–390)
PMV BLD AUTO: 11.1 FL (ref 8.9–12.7)
POTASSIUM SERPL-SCNC: 3.7 MMOL/L (ref 3.5–5.3)
PROT SERPL-MCNC: 7.1 G/DL (ref 6.4–8.2)
RBC # BLD AUTO: 4.57 MILLION/UL (ref 3.88–5.62)
SODIUM SERPL-SCNC: 142 MMOL/L (ref 136–145)
TRIGL SERPL-MCNC: 140 MG/DL
TSH SERPL DL<=0.05 MIU/L-ACNC: 1.03 UIU/ML (ref 0.36–3.74)
WBC # BLD AUTO: 4.52 THOUSAND/UL (ref 4.31–10.16)

## 2018-06-14 PROCEDURE — 80061 LIPID PANEL: CPT

## 2018-06-14 PROCEDURE — 85025 COMPLETE CBC W/AUTO DIFF WBC: CPT

## 2018-06-14 PROCEDURE — 80053 COMPREHEN METABOLIC PANEL: CPT

## 2018-06-14 PROCEDURE — 84443 ASSAY THYROID STIM HORMONE: CPT

## 2018-06-14 PROCEDURE — 36415 COLL VENOUS BLD VENIPUNCTURE: CPT

## 2018-06-14 PROCEDURE — 83036 HEMOGLOBIN GLYCOSYLATED A1C: CPT

## 2018-06-21 ENCOUNTER — OFFICE VISIT (OUTPATIENT)
Dept: INTERNAL MEDICINE CLINIC | Facility: CLINIC | Age: 63
End: 2018-06-21
Payer: COMMERCIAL

## 2018-06-21 VITALS
DIASTOLIC BLOOD PRESSURE: 70 MMHG | OXYGEN SATURATION: 97 % | WEIGHT: 270 LBS | HEART RATE: 70 BPM | HEIGHT: 70 IN | SYSTOLIC BLOOD PRESSURE: 124 MMHG | TEMPERATURE: 97.7 F | BODY MASS INDEX: 38.65 KG/M2

## 2018-06-21 DIAGNOSIS — G25.81 RESTLESS LEG SYNDROME: ICD-10-CM

## 2018-06-21 DIAGNOSIS — M54.5 CHRONIC LOW BACK PAIN, UNSPECIFIED BACK PAIN LATERALITY, WITH SCIATICA PRESENCE UNSPECIFIED: ICD-10-CM

## 2018-06-21 DIAGNOSIS — E11.9 TYPE 2 DIABETES MELLITUS WITHOUT COMPLICATION, WITHOUT LONG-TERM CURRENT USE OF INSULIN (HCC): Primary | ICD-10-CM

## 2018-06-21 DIAGNOSIS — G89.29 CHRONIC LOW BACK PAIN, UNSPECIFIED BACK PAIN LATERALITY, WITH SCIATICA PRESENCE UNSPECIFIED: ICD-10-CM

## 2018-06-21 DIAGNOSIS — I10 ESSENTIAL HYPERTENSION: ICD-10-CM

## 2018-06-21 DIAGNOSIS — E78.00 HYPERCHOLESTEROLEMIA: ICD-10-CM

## 2018-06-21 PROCEDURE — 3074F SYST BP LT 130 MM HG: CPT | Performed by: FAMILY MEDICINE

## 2018-06-21 PROCEDURE — 3008F BODY MASS INDEX DOCD: CPT | Performed by: FAMILY MEDICINE

## 2018-06-21 PROCEDURE — 99214 OFFICE O/P EST MOD 30 MIN: CPT | Performed by: FAMILY MEDICINE

## 2018-06-21 PROCEDURE — 3078F DIAST BP <80 MM HG: CPT | Performed by: FAMILY MEDICINE

## 2018-06-21 RX ORDER — ROPINIROLE 0.25 MG/1
0.5 TABLET, FILM COATED ORAL
Qty: 60 TABLET | Refills: 1 | Status: SHIPPED | OUTPATIENT
Start: 2018-06-21 | End: 2018-10-25

## 2018-06-21 NOTE — PROGRESS NOTES
Assessment/Plan:    No problem-specific Assessment & Plan notes found for this encounter  Diagnoses and all orders for this visit:    Type 2 diabetes mellitus without complication, without long-term current use of insulin (HCC)  -     Comprehensive metabolic panel; Future  -     HEMOGLOBIN A1C W/ EAG ESTIMATION; Future    Essential hypertension  -     CBC and differential; Future    Hypercholesterolemia  -     Comprehensive metabolic panel; Future  -     Lipid panel; Future  -     TSH, 3rd generation; Future    Chronic low back pain, unspecified back pain laterality, with sciatica presence unspecified    Restless leg syndrome  -     rOPINIRole (REQUIP) 0 25 mg tablet; Take 2 tablets (0 5 mg total) by mouth daily at bedtime        Reviewed recent laboratory testing with him  Hemoglobin A1c is more elevated  Discussed with him that goal for his hemoglobin A1c is under 7 and he is currently at 8  He is hesitant to start medication and wants to try to watch his diet more closely and continue to be active  He is willing to have this rechecked in about 3-4 months and if it is elevated would be willing to go on medication at that point  Discussed with him metformin  He will consider this at the next visit if the sugars remain elevated  Blood pressure well controlled  Continue the amlodipine/benazepril, atenolol, and Benicar/hydrochlorothiazide  Continue follow-up with Cardiology as previously  Cholesterol controlled on the atorvastatin  HDL remains low  Continue with increased activity level which may help somewhat  Discussed his restless leg symptoms with him  Discussed options with him  Will start Requip as noted above  Discussed trying tonic water prior to bedtime  Watch for any recurrence symptoms of the gout  Continue with the allopurinol  Continue follow-up with Ophthalmology on a regular basis  Discussed follow-up with Podiatry  He is up-to-date on his colonoscopy and his PSA    Will have him follow up in about 3-4 months with laboratory testing prior to that visit  Follow up sooner if needed  Subjective:      Patient ID: Escobar Murphy is a 65 y o  male  He presents for routine follow-up  Has generally been doing relatively well  Has been active  Has been walking almost on a daily basis and bike riding almost daily  He has done to 12 mi bike rides  Is back to fly fishing  Only complaint is some muscle tightness into his legs bilaterally especially after activity  His wife is complaining that he has restless legs at night that her jumping  This does not usually wake him up  Back has been doing very good  Still has the chronic numbness in the left foot but this has been present for years  Appetite has been generally good  He admits that he does not watch his diet as closely as he should  Has not been watching his carbohydrates  Was hoping that the increased activity would off set what he was eating  Had lost some weight but has not had any continued weight loss recently  Denies any polyuria, polydipsia, or polyphagia  Denies any chest pain or palpitations  Follows up with Cardiology on a regular basis  Tolerating his blood pressure medication without difficulty  Denies any headaches or localized weakness  Tolerating his atorvastatin well  Denies any significant issues with the medication  Vision has been stable  Follows up with Ophthalmology on a regular basis  Does not follow up with Podiatry regularly  The following portions of the patient's history were reviewed and updated as appropriate:   He  has a past medical history of Allergic rhinitis; Aneurysm of thoracic aorta ([address] ); Cataract; Diabetes mellitus ([address] ); Herniated nucleus pulposus, L4-5 left; Herniated nucleus pulposus, L5-S1, left; Hypercholesterolemia; Hypertension; and Lumbar radiculopathy    He   Patient Active Problem List    Diagnosis Date Noted    Restless leg syndrome 06/21/2018    Heel spur 05/31/2017    Neuropathy of left foot 09/19/2016    Type 2 diabetes mellitus ([facility] ) 08/06/2015    Sciatica 03/03/2014    Low back pain 02/27/2014    Edema 01/03/2013    Hyperuricemia without signs inflammatory arthritis/tophaceous disease 10/31/2012    Aneurysm of thoracic aorta ([facility] ) 08/06/2012    Hypercholesterolemia 05/01/2012    Hypertension 05/01/2012    Non-toxic uninodular goiter 05/01/2012    Osteoarthritis 05/01/2012     He  has a past surgical history that includes Tonsillectomy and adenoidectomy; Knee surgery; Back surgery; pr colonoscopy flx dx w/collj spec when pfrmd (N/A, 9/30/2016); Back surgery; and Knee arthroscopy  His family history includes Coronary artery disease in his brother; Heart attack in his brother; Hypertension in his father; Thyroid disease in his mother  He  reports that he has never smoked  He does not have any smokeless tobacco history on file  He reports that he does not drink alcohol or use drugs    Current Outpatient Prescriptions   Medication Sig Dispense Refill    acetaminophen-codeine (TYLENOL #3) 300-30 mg per tablet Take 1 tablet by mouth every 6 (six) hours as needed for moderate pain      allopurinol (ZYLOPRIM) 100 mg tablet Take 100 mg by mouth daily      amLODIPine-benazepril (LOTREL 5-20) 5-20 MG per capsule Take 1 capsule by mouth 2 (two) times a day      atenolol (TENORMIN) 50 mg tablet Take 50 mg by mouth 2 (two) times a day      atorvastatin (LIPITOR) 20 mg tablet Take 20 mg by mouth daily      furosemide (LASIX) 40 mg tablet Take 40 mg by mouth daily      KLOR-CON 10 10 MEQ tablet TAKE 2 TABLETS FOUR TIMES A  tablet 1    olmesartan-hydrochlorothiazide (BENICAR HCT) 40-12 5 MG per tablet Take 1 tablet by mouth daily      potassium chloride (K-DUR,KLOR-CON) 10 mEq tablet Take 10 mEq by mouth 4 (four) times a day      rOPINIRole (REQUIP) 0 25 mg tablet Take 2 tablets (0 5 mg total) by mouth daily at bedtime 60 tablet 1     No current facility-administered medications for this visit  Current Outpatient Prescriptions on File Prior to Visit   Medication Sig    acetaminophen-codeine (TYLENOL #3) 300-30 mg per tablet Take 1 tablet by mouth every 6 (six) hours as needed for moderate pain    allopurinol (ZYLOPRIM) 100 mg tablet Take 100 mg by mouth daily    amLODIPine-benazepril (LOTREL 5-20) 5-20 MG per capsule Take 1 capsule by mouth 2 (two) times a day    atenolol (TENORMIN) 50 mg tablet Take 50 mg by mouth 2 (two) times a day    atorvastatin (LIPITOR) 20 mg tablet Take 20 mg by mouth daily    furosemide (LASIX) 40 mg tablet Take 40 mg by mouth daily    KLOR-CON 10 10 MEQ tablet TAKE 2 TABLETS FOUR TIMES A DAY    olmesartan-hydrochlorothiazide (BENICAR HCT) 40-12 5 MG per tablet Take 1 tablet by mouth daily    potassium chloride (K-DUR,KLOR-CON) 10 mEq tablet Take 10 mEq by mouth 4 (four) times a day     No current facility-administered medications on file prior to visit  He has No Known Allergies       Review of Systems   Constitutional: Positive for activity change  Negative for appetite change, chills, fatigue and fever  HENT: Negative for hearing loss  Eyes: Negative for pain and visual disturbance  Respiratory: Negative for chest tightness and shortness of breath  Cardiovascular: Positive for leg swelling  Negative for chest pain and palpitations  Gastrointestinal: Negative for abdominal pain, blood in stool, diarrhea, nausea and vomiting  Endocrine: Negative for polydipsia, polyphagia and polyuria  Genitourinary: Negative for dysuria  Musculoskeletal: Positive for myalgias  Negative for arthralgias and gait problem  Skin: Negative for color change  Neurological: Positive for numbness  Negative for dizziness and headaches  Hematological: Does not bruise/bleed easily  Psychiatric/Behavioral: Negative for behavioral problems  The patient is not nervous/anxious            Objective:      /70 (BP Location: Left arm, Patient Position: Sitting, Cuff Size: Large)   Pulse 70   Temp 97 7 °F (36 5 °C) (Temporal)   Ht 5' 10" (1 778 m)   Wt 122 kg (270 lb)   SpO2 97%   BMI 38 74 kg/m²          Physical Exam   Constitutional: He is oriented to person, place, and time  He is cooperative  No distress  HENT:   Head: Normocephalic and atraumatic  Nose: Nose normal    Slight cerumen   Eyes: Conjunctivae are normal  Pupils are equal, round, and reactive to light  Cardiovascular: Normal rate, regular rhythm and normal heart sounds  Exam reveals no gallop and no friction rub  No murmur heard  Trace peripheral edema bilaterally   Pulmonary/Chest: He has no wheezes  He has no rales  He exhibits no tenderness  Abdominal: He exhibits no distension  There is no tenderness  There is no rebound and no guarding  Lymphadenopathy:     He has no cervical adenopathy  Neurological: He is alert and oriented to person, place, and time  Skin: Skin is warm and dry  Psychiatric: He has a normal mood and affect  His behavior is normal    Nursing note and vitals reviewed  Below is the patient's most recent value for Albumin, ALT, AST, BUN, Calcium, Chloride, Cholesterol, CO2, Creatinine, GFR, Glucose, HDL, Hematocrit, Hemoglobin, Hemoglobin A1C, LDL, Magnesium, Phosphorus, Platelets, Potassium, PSA, Sodium, Triglycerides, and WBC  Lab Results   Component Value Date    ALT 53 06/14/2018    AST 24 06/14/2018    BUN 18 06/14/2018    CALCIUM 8 6 06/14/2018     06/14/2018    CHOL 152 06/14/2018    CO2 28 06/14/2018    CREATININE 1 24 06/14/2018    HDL 33 (L) 06/14/2018    HCT 44 5 06/14/2018    HGB 14 5 06/14/2018    HGBA1C 8 0 (H) 06/14/2018     06/14/2018    K 3 7 06/14/2018    PSA 0 6 04/03/2018     06/14/2018    TRIG 140 06/14/2018    WBC 4 52 06/14/2018     Note: for a comprehensive list of the patient's lab results, access the Results Review activity

## 2018-06-21 NOTE — PATIENT INSTRUCTIONS
Diabetes in the Older Adult   AMBULATORY CARE:   What you need to know if you are an older adult with diabetes:  Older adults with diabetes are at risk for heart disease, stroke, kidney disease, blindness, and nerve damage  You may also be at risk for any of the following:  · Poor nutrition or low blood sugar levels    · Confusion or problems with memory, attention, or learning new things    · Trouble controlling urination or frequent urinary tract infections    · Trouble with coordination or balance    · Falls and injuries    · Pain    · Depression    · Open sores on your legs or feet  The ABCs of diabetes: The ABCs stand for certain things you can do to manage or prevent problems caused by diabetes:  · A  stands for A1c test   This test shows the average amount of sugar in your blood over the past 2 to 3 months  High levels of sugar in your blood can cause damage to your heart, blood vessels, kidneys, feet, and eyes  Most older adults with diabetes should have an A1c level less than 7 5  Ask your healthcare provider if this A1c goal is right for you  Your provider can help you make changes if your A1c is too high  · B  stands for blood pressure   High blood pressure can increase your risk for a heart attack, stroke, or kidney disease  Most older adults with diabetes should have a systolic blood pressure (first number) of 140  Your diastolic blood pressure (second number) should be below 90  Ask your healthcare provider if these blood pressure goals are right for you  · C  stands for cholesterol   High levels of cholesterol can block your arteries and cause a heart attack or stroke  Ask your healthcare provider what your cholesterol levels should be  · S  stands for stop smoking   Nicotine and other chemicals in cigarettes and cigars can cause lung damage and make it more difficult to manage your diabetes    Call 911 if you have any of the following:   · You have any of the following signs of a stroke: ¨ Numbness or drooping on one side of your face     ¨ Weakness in an arm or leg    ¨ Confusion or difficulty speaking    ¨ Dizziness, a severe headache, or vision loss    · You have any of the following signs of a heart attack:      ¨ Squeezing, pressure, or pain in your chest that lasts longer than 5 minutes or returns    ¨ Discomfort or pain in your back, neck, jaw, stomach, or arm     ¨ Trouble breathing    ¨ Nausea or vomiting    ¨ Lightheadedness or a sudden cold sweat, especially with chest pain or trouble breathing  Seek care immediately if:   · You have severe abdominal pain, or the pain spreads to your back  You may also be vomiting  · You have trouble staying awake or focusing  · You are shaking or sweating  · You have blurred or double vision  · Your breath has a fruity, sweet smell  · Your breathing is deep and labored, or rapid and shallow  · Your heartbeat is fast and weak  · You fall and get hurt  Contact your healthcare provider if:   · You are vomiting or have diarrhea  · You have an upset stomach and cannot eat the foods on your meal plan  · You feel weak or more tired than usual      · You feel dizzy, have headaches, or are easily irritated  · Your skin is red, warm, dry, or swollen  · You have a wound that does not heal      · You have numbness in your arms or legs  · You have trouble coping with your illness, or you feel anxious or depressed  · You have problems with your memory  · You have changes in your vision  · You have questions or concerns about your condition or care  Treatment for diabetes  includes keeping your blood sugar at a normal level  You must eat the right foods, and exercise regularly  You may need medicine if you cannot control your blood sugar level with nutrition and exercise  You may also need medicine to lower your blood pressure or cholesterol, or medicine to prevent blood clots     Manage the ABCs and prevent problems caused by diabetes:   · Check your blood sugar levels as directed  Your healthcare provider will tell you when and how often to check during the day  Your healthcare provider will also tell you what your blood sugar levels should be before and after a meal  You may need to check for ketones in your urine or blood if your level is higher than directed  Write down your results and show them to your healthcare provider  Your provider may use the results to make changes to your medicine, food, or exercise schedules  Ask your healthcare provider for more information about how to treat a high or low blood sugar level  · Follow your meal plan as directed  A dietitian will help you make a meal plan to keep your blood sugar level steady and make sure you get enough nutrition  Do not skip meals  Your blood sugar level may drop too low if you have taken diabetes medicine and do not eat  Ask your healthcare provider about programs in your community that can deliver the meals to your home  · Try to be active for 30 to 60 minutes most days of the week  Exercise can help keep your blood sugar level steady, decrease your risk of heart disease, and help you lose weight  It can also help improve your balance and decrease your risk for falls  Work with your healthcare provider to create an exercise plan  Ask a family member or friend to exercise with you  Start slow and exercise for 5 to 10 minutes at a time  Examples of activities include walking or swimming  Include muscle strengthening activities 2 to 3 days each week  Include balance training 2 to 3 times each week  Activities that help increase balance include yoga and madisyn chi      · Maintain a healthy weight  Ask your healthcare provider how much you should weigh  A healthy weight can help you control your diabetes and prevent heart disease  Ask your provider to help you create a weight loss plan if you are overweight   Together you can set manageable weight loss goals  · Do not smoke  Ask your healthcare provider for information if you currently smoke and need help to quit  Do not use e-cigarettes or smokeless tobacco in place of cigarettes or to help you quit  They still contain nicotine  · Manage stress  Stress may increase your blood sugar level  Deep breathing, muscle relaxation, and music may help you relax  Ask your healthcare provider for more information about these practices  Other ways to manage your diabetes:   · Check your feet every day for sores  Look at your whole foot, including the bottom, and between and under your toes  Check for wounds, corns, and calluses  Use a mirror to see the bottom of your feet  The skin on your feet may be shiny, tight, dry, or darker than normal  Your feet may also be cold and pale  Feel your feet by running your hands along the tops, bottoms, sides, and between your toes  Redness, swelling, and warmth are signs of blood flow problems that can lead to a foot ulcer  Do not try to remove corns or calluses yourself  · Wear medical alert identification  Wear medical alert jewelry or carry a card that says you have diabetes  Ask your healthcare provider where to get these items  · Ask about vaccines  You have a higher risk for serious illness if you get the flu, pneumonia, or hepatitis  Ask your healthcare provider if you should get a flu, pneumonia, shingles, or hepatitis B vaccine, and when to get the vaccine  · Keep all appointments  You may need to return to have your A1c checked every 3 months  You will need to return at least once each year to have your feet checked  You will need an eye exam once a year to check for retinopathy  You will also need urine tests every year to check for kidney problems  You may need tests to monitor for heart disease  Write down your questions so you remember to ask them during your visits  · Get help from family and friends    You may need help checking your blood sugar level, giving insulin injections, or preparing your meals  Ask your family and friends to help you with these tasks  Talk to your healthcare provider if you do not have someone at home to help you  A healthcare provider can come to your home to help you with these tasks  Follow up with your healthcare provider as directed: You may need to return to have your A1c checked every 3 months  You will need to return at least once each year to have your feet checked  You will need an eye exam once a year to check for retinopathy  You will also need urine tests every year to check for kidney problems  You may need tests to monitor for heart disease  Write down your questions so you remember to ask them during your visits  © 2017 [facility] Information is for End User's use only and may not be sold, redistributed or otherwise used for commercial purposes  All illustrations and images included in CareNotes® are the copyrighted property of [employer]  or [employer]  The above information is an  only  It is not intended as medical advice for individual conditions or treatments  Talk to your doctor, nurse or pharmacist before following any medical regimen to see if it is safe and effective for you

## 2018-08-13 DIAGNOSIS — I10 ESSENTIAL HYPERTENSION: Primary | ICD-10-CM

## 2018-08-13 DIAGNOSIS — E78.5 HYPERLIPIDEMIA, UNSPECIFIED HYPERLIPIDEMIA TYPE: ICD-10-CM

## 2018-08-13 RX ORDER — ATORVASTATIN CALCIUM 20 MG/1
TABLET, FILM COATED ORAL
Qty: 90 TABLET | Refills: 3 | Status: SHIPPED | OUTPATIENT
Start: 2018-08-13 | End: 2018-11-30 | Stop reason: SDUPTHER

## 2018-08-13 RX ORDER — AMLODIPINE BESYLATE AND BENAZEPRIL HYDROCHLORIDE 5; 20 MG/1; MG/1
CAPSULE ORAL
Qty: 180 CAPSULE | Refills: 3 | Status: SHIPPED | OUTPATIENT
Start: 2018-08-13 | End: 2019-09-23 | Stop reason: SDUPTHER

## 2018-08-27 DIAGNOSIS — E87.6 HYPOKALEMIA: ICD-10-CM

## 2018-08-27 RX ORDER — POTASSIUM CHLORIDE 750 MG/1
TABLET, FILM COATED, EXTENDED RELEASE ORAL
Qty: 720 TABLET | Refills: 1 | Status: SHIPPED | OUTPATIENT
Start: 2018-08-27 | End: 2018-10-25

## 2018-10-18 ENCOUNTER — APPOINTMENT (OUTPATIENT)
Dept: LAB | Facility: MEDICAL CENTER | Age: 63
End: 2018-10-18
Payer: COMMERCIAL

## 2018-10-18 DIAGNOSIS — E11.9 TYPE 2 DIABETES MELLITUS WITHOUT COMPLICATION, WITHOUT LONG-TERM CURRENT USE OF INSULIN (HCC): ICD-10-CM

## 2018-10-18 DIAGNOSIS — I10 ESSENTIAL HYPERTENSION: ICD-10-CM

## 2018-10-18 DIAGNOSIS — E78.00 HYPERCHOLESTEROLEMIA: ICD-10-CM

## 2018-10-18 LAB
ALBUMIN SERPL BCP-MCNC: 3.9 G/DL (ref 3.5–5)
ALP SERPL-CCNC: 61 U/L (ref 46–116)
ALT SERPL W P-5'-P-CCNC: 39 U/L (ref 12–78)
ANION GAP SERPL CALCULATED.3IONS-SCNC: 6 MMOL/L (ref 4–13)
AST SERPL W P-5'-P-CCNC: 15 U/L (ref 5–45)
BASOPHILS # BLD AUTO: 0.05 THOUSANDS/ΜL (ref 0–0.1)
BASOPHILS NFR BLD AUTO: 1 % (ref 0–1)
BILIRUB SERPL-MCNC: 0.61 MG/DL (ref 0.2–1)
BUN SERPL-MCNC: 17 MG/DL (ref 5–25)
CALCIUM SERPL-MCNC: 8.6 MG/DL (ref 8.3–10.1)
CHLORIDE SERPL-SCNC: 106 MMOL/L (ref 100–108)
CHOLEST SERPL-MCNC: 122 MG/DL (ref 50–200)
CO2 SERPL-SCNC: 27 MMOL/L (ref 21–32)
CREAT SERPL-MCNC: 1.17 MG/DL (ref 0.6–1.3)
EOSINOPHIL # BLD AUTO: 0.09 THOUSAND/ΜL (ref 0–0.61)
EOSINOPHIL NFR BLD AUTO: 2 % (ref 0–6)
ERYTHROCYTE [DISTWIDTH] IN BLOOD BY AUTOMATED COUNT: 13 % (ref 11.6–15.1)
EST. AVERAGE GLUCOSE BLD GHB EST-MCNC: 143 MG/DL
GFR SERPL CREATININE-BSD FRML MDRD: 66 ML/MIN/1.73SQ M
GLUCOSE P FAST SERPL-MCNC: 112 MG/DL (ref 65–99)
HBA1C MFR BLD: 6.6 % (ref 4.2–6.3)
HCT VFR BLD AUTO: 43 % (ref 36.5–49.3)
HDLC SERPL-MCNC: 37 MG/DL (ref 40–60)
HGB BLD-MCNC: 14 G/DL (ref 12–17)
IMM GRANULOCYTES # BLD AUTO: 0.01 THOUSAND/UL (ref 0–0.2)
IMM GRANULOCYTES NFR BLD AUTO: 0 % (ref 0–2)
LDLC SERPL CALC-MCNC: 69 MG/DL (ref 0–100)
LYMPHOCYTES # BLD AUTO: 1.44 THOUSANDS/ΜL (ref 0.6–4.47)
LYMPHOCYTES NFR BLD AUTO: 35 % (ref 14–44)
MCH RBC QN AUTO: 31.5 PG (ref 26.8–34.3)
MCHC RBC AUTO-ENTMCNC: 32.6 G/DL (ref 31.4–37.4)
MCV RBC AUTO: 97 FL (ref 82–98)
MONOCYTES # BLD AUTO: 0.34 THOUSAND/ΜL (ref 0.17–1.22)
MONOCYTES NFR BLD AUTO: 8 % (ref 4–12)
NEUTROPHILS # BLD AUTO: 2.15 THOUSANDS/ΜL (ref 1.85–7.62)
NEUTS SEG NFR BLD AUTO: 54 % (ref 43–75)
NONHDLC SERPL-MCNC: 85 MG/DL
NRBC BLD AUTO-RTO: 0 /100 WBCS
PLATELET # BLD AUTO: 163 THOUSANDS/UL (ref 149–390)
PMV BLD AUTO: 10.7 FL (ref 8.9–12.7)
POTASSIUM SERPL-SCNC: 3.5 MMOL/L (ref 3.5–5.3)
PROT SERPL-MCNC: 7.1 G/DL (ref 6.4–8.2)
RBC # BLD AUTO: 4.44 MILLION/UL (ref 3.88–5.62)
SODIUM SERPL-SCNC: 139 MMOL/L (ref 136–145)
TRIGL SERPL-MCNC: 78 MG/DL
TSH SERPL DL<=0.05 MIU/L-ACNC: 1.26 UIU/ML (ref 0.36–3.74)
WBC # BLD AUTO: 4.08 THOUSAND/UL (ref 4.31–10.16)

## 2018-10-18 PROCEDURE — 84443 ASSAY THYROID STIM HORMONE: CPT

## 2018-10-18 PROCEDURE — 80053 COMPREHEN METABOLIC PANEL: CPT

## 2018-10-18 PROCEDURE — 80061 LIPID PANEL: CPT

## 2018-10-18 PROCEDURE — 83036 HEMOGLOBIN GLYCOSYLATED A1C: CPT

## 2018-10-18 PROCEDURE — 85025 COMPLETE CBC W/AUTO DIFF WBC: CPT

## 2018-10-18 PROCEDURE — 36415 COLL VENOUS BLD VENIPUNCTURE: CPT

## 2018-10-25 ENCOUNTER — OFFICE VISIT (OUTPATIENT)
Dept: INTERNAL MEDICINE CLINIC | Facility: CLINIC | Age: 63
End: 2018-10-25
Payer: COMMERCIAL

## 2018-10-25 VITALS
WEIGHT: 251 LBS | OXYGEN SATURATION: 97 % | HEART RATE: 68 BPM | TEMPERATURE: 97.5 F | DIASTOLIC BLOOD PRESSURE: 70 MMHG | HEIGHT: 70 IN | BODY MASS INDEX: 35.93 KG/M2 | SYSTOLIC BLOOD PRESSURE: 122 MMHG

## 2018-10-25 DIAGNOSIS — I10 ESSENTIAL HYPERTENSION: ICD-10-CM

## 2018-10-25 DIAGNOSIS — Z23 NEED FOR INFLUENZA VACCINATION: ICD-10-CM

## 2018-10-25 DIAGNOSIS — E11.9 TYPE 2 DIABETES MELLITUS WITHOUT COMPLICATION, WITHOUT LONG-TERM CURRENT USE OF INSULIN (HCC): ICD-10-CM

## 2018-10-25 DIAGNOSIS — L91.8 SKIN TAG: ICD-10-CM

## 2018-10-25 DIAGNOSIS — E78.00 HYPERCHOLESTEROLEMIA: ICD-10-CM

## 2018-10-25 PROCEDURE — 90682 RIV4 VACC RECOMBINANT DNA IM: CPT

## 2018-10-25 PROCEDURE — 99214 OFFICE O/P EST MOD 30 MIN: CPT | Performed by: FAMILY MEDICINE

## 2018-10-25 PROCEDURE — 90471 IMMUNIZATION ADMIN: CPT

## 2018-10-25 RX ORDER — POTASSIUM CHLORIDE 750 MG/1
10 TABLET, FILM COATED, EXTENDED RELEASE ORAL
COMMUNITY
End: 2019-12-24 | Stop reason: SDUPTHER

## 2018-10-25 NOTE — PROGRESS NOTES
Assessment/Plan:    Type 2 diabetes mellitus (HCC)  Lab Results   Component Value Date    HGBA1C 6 6 (H) 10/18/2018       No results for input(s): POCGLU in the last 72 hours  Blood Sugar Average: Last 72 hrs:     Hemoglobin A1c much improved with the weight loss and increased activity  Discussed with him continuing to watch his diet  Continue to try to exercise at least 30 minutes 3 to 5 times a week  Follow-up with Ophthalmology regularly  Check feet daily  Recommend follow-up with Podiatry  Hypertension  Blood pressure well controlled  He is hoping to be able to decrease his blood pressure medication  He would like to be able to decrease the low trial to once a day  He would like to try to lose some more weight 1st and then try a trial on once a day  Advised him to continue with the dietary changes  Watch salt intake  Would recommend that he go to once a day about 3-4 weeks prior to his next visit if he continues with the dietary changes and weight loss  Try to follow blood pressure at home  Skin tag  Has skin tag on his right eyelid  This has been increasingly irritating  Will refer him to Dermatology for treatment  Hypercholesterolemia  Cholesterol well controlled  Continue with the atorvastatin  Continue watch diet  Increase fiber in diet  Diagnoses and all orders for this visit:    Type 2 diabetes mellitus without complication, without long-term current use of insulin (HCC)  -     Comprehensive metabolic panel; Future  -     HEMOGLOBIN A1C W/ EAG ESTIMATION; Future    Essential hypertension  -     CBC and differential; Future  -     Comprehensive metabolic panel; Future    Hypercholesterolemia  -     Comprehensive metabolic panel; Future  -     Lipid panel; Future  -     TSH, 3rd generation;  Future    Need for influenza vaccination  -     influenza vaccine, [id number], quadrivalent, recombinant, PF, 0 5 mL, for patients 18 yr+ (FLUBLOK)    Skin tag  -     Ambulatory referral to Dermatology; Future    Other orders  -     Discontinue: POTASSIUM CHLORIDE PO; Take 10 mEq by mouth  -     potassium chloride (K-DUR) 10 mEq tablet; Take 10 mEq by mouth 2 (two) times a day Take 4 tabs by mouth in the morning and 4 tabs by mouth in the evening  Orders and recommendations as noted above  Reviewed recent laboratory testing with him  He is up-to-date on his colonoscopy and his PSA  He is up-to-date on his Prevnar 15  Recommend repeating the Pneumovax at an upcoming appointment since it has been over 5 years  Flu shot given today  Will have him follow up in about 3-4 months with laboratory testing prior to that visit  Follow up sooner if needed  Subjective:      Patient ID: Suresh Raphael is a 59 y o  male  He presents for routine follow-up  Has generally been doing well  Has been exercising frequently  Has been watching his diet more closely  He has been doing this for the last 3-4 months  Does notice that he feels generally better  Back pain has improved  Still has the persistent left foot numbness that has persisted after his back surgery  Joint pains have improved significantly  Energy level has improved  Restless leg symptoms have improved  Has not had any recent episodes of the gout  Tolerating the atorvastatin without difficulty  Denies any significant muscle aches or weakness  Denies any chest pain or palpitations  Follows up with Cardiology regularly  Peripheral edema has improved  Continues to take the Lasix but would like to considering decreasing this or stopping it in the future if he continues with weight loss  Tolerating his blood pressure medications without difficulty  He is interested in trying to decrease the low trial to once a day eventually if he is weight loss and dietary changes continue  Denies any headaches or localized weakness  Appetite has been generally good  Denies any nausea, vomiting, or diarrhea    Denies any changes in bowel movements  He is up-to-date on his colonoscopy  Denies any urinary issues  Follows up with Ophthalmology regularly  Does not follow up with Podiatry  Checks his feet daily  He does have a skin tag on his right eyelid that has been bothering him more  Irritates opening and closing his eye  Denies any recent illnesses  No recent episodes of the gout  The following portions of the patient's history were reviewed and updated as appropriate:   He  has a past medical history of Allergic rhinitis; Aneurysm of thoracic aorta ([facility] ); Cataract; Diabetes mellitus ([facility] ); Herniated nucleus pulposus, L4-5 left; Herniated nucleus pulposus, L5-S1, left; echocardiogram (10/14/2016); Hypercholesterolemia; Hypertension; and Lumbar radiculopathy  He   Patient Active Problem List    Diagnosis Date Noted    Skin tag 10/25/2018    Restless leg syndrome 06/21/2018    Heel spur 05/31/2017    Neuropathy of left foot 09/19/2016    [facility] 08/06/2015    Sciatica 03/03/2014    Low back pain 02/27/2014    Edema 01/03/2013    Hyperuricemia without signs inflammatory arthritis/tophaceous disease 10/31/2012    Aneurysm of thoracic aorta ([facility] ) 08/06/2012    Hypercholesterolemia 05/01/2012    Hypertension 05/01/2012    Non-toxic uninodular goiter 05/01/2012    Osteoarthritis 05/01/2012     He  has a past surgical history that includes Tonsillectomy and adenoidectomy; Knee surgery; Back surgery; pr colonoscopy flx dx w/collj spec when pfrmd (N/A, 9/30/2016); Back surgery; and Knee arthroscopy  His family history includes Coronary artery disease in his brother; Heart attack in his brother; Hypertension in his father; Thyroid disease in his mother  He  reports that he has never smoked  He does not have any smokeless tobacco history on file  He reports that he does not drink alcohol or use drugs    Current Outpatient Prescriptions   Medication Sig Dispense Refill    allopurinol (ZYLOPRIM) 100 mg tablet Take 100 mg by mouth daily      amLODIPine-benazepril (LOTREL 5-20) 5-20 MG per capsule TAKE 1 CAPSULE TWICE A  capsule 3    atenolol (TENORMIN) 50 mg tablet Take 50 mg by mouth 2 (two) times a day      atorvastatin (LIPITOR) 20 mg tablet TAKE 1 TABLET DAILY 90 tablet 3    furosemide (LASIX) 40 mg tablet Take 40 mg by mouth daily      olmesartan-hydrochlorothiazide (BENICAR HCT) 40-12 5 MG per tablet Take 1 tablet by mouth daily      potassium chloride (K-DUR) 10 mEq tablet Take 10 mEq by mouth 2 (two) times a day Take 4 tabs by mouth in the morning and 4 tabs by mouth in the evening  No current facility-administered medications for this visit  Current Outpatient Prescriptions on File Prior to Visit   Medication Sig    allopurinol (ZYLOPRIM) 100 mg tablet Take 100 mg by mouth daily    amLODIPine-benazepril (LOTREL 5-20) 5-20 MG per capsule TAKE 1 CAPSULE TWICE A DAY    atenolol (TENORMIN) 50 mg tablet Take 50 mg by mouth 2 (two) times a day    atorvastatin (LIPITOR) 20 mg tablet TAKE 1 TABLET DAILY    furosemide (LASIX) 40 mg tablet Take 40 mg by mouth daily    olmesartan-hydrochlorothiazide (BENICAR HCT) 40-12 5 MG per tablet Take 1 tablet by mouth daily     No current facility-administered medications on file prior to visit  He has No Known Allergies       Review of Systems   Constitutional: Positive for activity change  Negative for appetite change, chills, fatigue and fever  HENT: Negative for hearing loss  Eyes: Negative for pain and visual disturbance  Respiratory: Negative for chest tightness and shortness of breath  Cardiovascular: Positive for leg swelling  Negative for chest pain and palpitations  Gastrointestinal: Negative for abdominal pain, blood in stool, diarrhea, nausea and vomiting  Endocrine: Negative for polydipsia, polyphagia and polyuria  Genitourinary: Negative for dysuria  Musculoskeletal: Positive for myalgias   Negative for arthralgias and gait problem  Skin: Negative for color change  Neurological: Positive for numbness  Negative for dizziness and headaches  Hematological: Does not bruise/bleed easily  Psychiatric/Behavioral: Negative for behavioral problems  The patient is not nervous/anxious  Objective:      /70 (BP Location: Left arm, Patient Position: Sitting, Cuff Size: Large)   Pulse 68   Temp 97 5 °F (36 4 °C) (Temporal)   Ht 5' 10" (1 778 m)   Wt 114 kg (251 lb)   SpO2 97%   BMI 36 01 kg/m²          Physical Exam   Constitutional: He is oriented to person, place, and time  He is cooperative  No distress  HENT:   Head: Normocephalic and atraumatic  Nose: Nose normal    Slight cerumen   Eyes: Pupils are equal, round, and reactive to light  Conjunctivae are normal    Neck: No JVD present  Carotid bruit is not present  Cardiovascular: Normal rate, regular rhythm and normal heart sounds  Exam reveals no gallop and no friction rub  No murmur heard  Trace peripheral edema bilaterally   Pulmonary/Chest: He has no wheezes  He has no rales  He exhibits no tenderness  Abdominal: He exhibits no distension  There is no tenderness  There is no rebound and no guarding  Lymphadenopathy:     He has no cervical adenopathy  Neurological: He is alert and oriented to person, place, and time  Skin: Skin is warm and dry  Skin tag right eyelid   Psychiatric: He has a normal mood and affect  His behavior is normal    Nursing note and vitals reviewed  Below is the patient's most recent value for Albumin, ALT, AST, BUN, Calcium, Chloride, Cholesterol, CO2, Creatinine, GFR, Glucose, HDL, Hematocrit, Hemoglobin, Hemoglobin A1C, LDL, Magnesium, Phosphorus, Platelets, Potassium, PSA, Sodium, Triglycerides, and WBC     Lab Results   Component Value Date    ALT 39 10/18/2018    AST 15 10/18/2018    BUN 17 10/18/2018    CALCIUM 8 6 10/18/2018     10/18/2018    CHOL 162 09/09/2015    CO2 27 10/18/2018 CREATININE 1 17 10/18/2018    HDL 37 (L) 10/18/2018    HCT 43 0 10/18/2018    HGB 14 0 10/18/2018    HGBA1C 6 6 (H) 10/18/2018     10/18/2018    K 3 5 10/18/2018    PSA 0 6 04/03/2018     09/09/2015    TRIG 78 10/18/2018    WBC 4 08 (L) 10/18/2018     Note: for a comprehensive list of the patient's lab results, access the Results Review activity

## 2018-10-25 NOTE — PATIENT INSTRUCTIONS
Skin Tags   WHAT YOU NEED TO KNOW:   What is a skin tag? A skin tag is a small growth that forms on the skin  The growth hangs off the skin from a small piece of tissue called a stalk  A skin tag often grows where skin rubs against skin and causes friction  Diabetes or obesity can increase your risk for skin tags  The risk also increases with age  Skin tags are usually harmless  What are the signs and symptoms of a skin tag? You may have a few skin tags in the same area  This is common  A skin tag is usually the same color as your skin, or it may be a little darker  A skin tag is usually small but can be as large as 1/2 inch (1 centimeter)  Skin tags usually do not cause pain, but they can cause irritation by rubbing against your clothes or jewelry  Common areas for skin tags to grow are your underarms, between folds of skin, eyelids, or inner thighs  They can also grow on your neck or other body areas  How is a skin tag diagnosed and treated? Your healthcare provider will be able to tell you have a skin tag by looking at it  He or she may do a biopsy (skin sample) if the skin tag looks unusual  This is to make sure it is not skin cancer  Skin tags are not always treated  The skin tag will not go away on its own, but you may not notice it or be bothered by it  You can help remove a skin tag by tying a string or dental floss around the skin tag  This will cut off the blood supply to the skin tag, and it will fall off after a few days  The following may be needed if the skin tag irritates your skin:  · Cryotherapy  is a procedure used to freeze the skin tag  Your healthcare provider uses liquid nitrogen to freeze the area  · Cauterization  is a procedure used to burn the skin tag off  Your healthcare provider uses a device to burn the area  · Surgery  may be used to remove the skin tag  Do not try to cut the skin tag off by yourself with a sharp object   Skin tags can bleed heavily when they still have a blood supply  What can I do to manage or prevent skin tags? · You can put a bandage over the skin tag to help with irritation  Change the bandage every day or if it gets wet or dirty  · Skin tags often cannot be prevented  You may be able to lower your risk by losing weight  This will reduce skin folds where skin tags tend to form  Talk to your healthcare provider about how much you should weigh  He or she can help you create a safe weight loss plan  When should I contact my healthcare provider? · The skin tag changes in size, shape, or color  · You have a rash or other skin problem around the skin tag  · The skin tag bleeds  · Your skin tag is affecting your ability to do your daily activities  · You have questions or concerns about your condition or care  CARE AGREEMENT:   You have the right to help plan your care  Learn about your health condition and how it may be treated  Discuss treatment options with your caregivers to decide what care you want to receive  You always have the right to refuse treatment  The above information is an  only  It is not intended as medical advice for individual conditions or treatments  Talk to your doctor, nurse or pharmacist before following any medical regimen to see if it is safe and effective for you  © 2017 [address]  Information is for End User's use only and may not be sold, redistributed or otherwise used for commercial purposes  All illustrations and images included in CareNotes® are the copyrighted property of [employer]  or [employer]

## 2018-11-12 ENCOUNTER — OFFICE VISIT (OUTPATIENT)
Dept: CARDIOLOGY CLINIC | Facility: CLINIC | Age: 63
End: 2018-11-12
Payer: COMMERCIAL

## 2018-11-12 VITALS
WEIGHT: 252 LBS | BODY MASS INDEX: 36.08 KG/M2 | SYSTOLIC BLOOD PRESSURE: 132 MMHG | DIASTOLIC BLOOD PRESSURE: 78 MMHG | HEART RATE: 64 BPM | HEIGHT: 70 IN

## 2018-11-12 DIAGNOSIS — I71.2 THORACIC AORTIC ANEURYSM WITHOUT RUPTURE (HCC): ICD-10-CM

## 2018-11-12 DIAGNOSIS — E78.00 HYPERCHOLESTEROLEMIA: ICD-10-CM

## 2018-11-12 DIAGNOSIS — I10 ESSENTIAL HYPERTENSION: Primary | ICD-10-CM

## 2018-11-12 PROCEDURE — 93000 ELECTROCARDIOGRAM COMPLETE: CPT | Performed by: INTERNAL MEDICINE

## 2018-11-12 PROCEDURE — 99214 OFFICE O/P EST MOD 30 MIN: CPT | Performed by: INTERNAL MEDICINE

## 2018-11-12 NOTE — ASSESSMENT & PLAN NOTE
Has skin tag on his right eyelid  This has been increasingly irritating  Will refer him to Dermatology for treatment

## 2018-11-12 NOTE — PATIENT INSTRUCTIONS
Cut Lasix to 20 mg daily  Decrease Potassium to 20 meq AM and 20 meq PM     Chronic Hypertension, Ambulatory Care   GENERAL INFORMATION:   Chronic hypertension  is a long-term condition in which your blood pressure (BP) is higher than normal  Your BP is the force of your blood moving against the walls of your arteries  Hypertension is a BP of 140/90 or higher  Common symptoms include the following:   · Headache     · Blurred vision    · Chest pain     · Dizziness or weakness     · Trouble breathing     · Nosebleeds  Seek immediate care for the following symptoms:   · Severe headache or vision loss    · Weakness in an arm or leg    · Confusion or difficulty speaking    · Discomfort in your chest that feels like squeezing, pressure, fullness, or pain    · Suddenly feeling lightheaded or trouble breathing    · Pain or discomfort in your back, neck, jaw, stomach, or arm  Treatment for chronic hypertension  may include medicine to lower your BP  You may also need to make lifestyle changes  Take your medicine exactly as directed  Manage chronic hypertension:   · Take your BP at home  Sit and rest for 5 minutes before you take your BP  Extend your arm and support it on a flat surface  Your arm should be at the same level as your heart  Follow the directions that came with your BP monitor  If possible, take at least 2 BP readings each time  Take your BP at least twice a day at the same times each day, such as morning and evening  Keep a log of your BP readings and bring it to your follow-up visits  · Eat less sodium (salt)  Do not add sodium to your food  Limit foods that are high in sodium, such as canned foods, potato chips, and cold cuts  Your healthcare provider may suggest that you follow the [address] [place] [address]  The plan is low in sodium, unhealthy fats, and total fat  It is high in potassium, calcium, and fiber  · Exercise regularly  Exercise at least 30 minutes per day, on most days of the week  This will help decrease your BP  Ask your healthcare provider about the best exercise plan for you  · Limit alcohol  Women should limit alcohol to 1 drink a day  Men should limit alcohol to 2 drinks a day  A drink of alcohol is 12 ounces of beer, 5 ounces of wine, or 1½ ounces of liquor  · Do not smoke  If you smoke, it is never too late to quit  Smoking can increase your BP  Smoking also worsens other health conditions you may have that can increase your risk for hypertension  Ask your healthcare provider for information if you need help quitting  Follow up with your healthcare provider as directed: You will need to return to have your BP checked and to have other lab tests done  Write down your questions so you remember to ask them during your visits  CARE AGREEMENT:   You have the right to help plan your care  Learn about your health condition and how it may be treated  Discuss treatment options with your caregivers to decide what care you want to receive  You always have the right to refuse treatment  The above information is an  only  It is not intended as medical advice for individual conditions or treatments  Talk to your doctor, nurse or pharmacist before following any medical regimen to see if it is safe and effective for you  © 2014 [address] is for End User's use only and may not be sold, redistributed or otherwise used for commercial purposes  All illustrations and images included in CareNotes® are the copyrighted property of [employer]  or [employer]

## 2018-11-12 NOTE — ASSESSMENT & PLAN NOTE
Cholesterol well controlled  Continue with the atorvastatin  Continue watch diet  Increase fiber in diet

## 2018-11-12 NOTE — ASSESSMENT & PLAN NOTE
Lab Results   Component Value Date    HGBA1C 6 6 (H) 10/18/2018       No results for input(s): POCGLU in the last 72 hours  Blood Sugar Average: Last 72 hrs:     Hemoglobin A1c much improved with the weight loss and increased activity  Discussed with him continuing to watch his diet  Continue to try to exercise at least 30 minutes 3 to 5 times a week  Follow-up with Ophthalmology regularly  Check feet daily  Recommend follow-up with Podiatry

## 2018-11-12 NOTE — ASSESSMENT & PLAN NOTE
Blood pressure well controlled  He is hoping to be able to decrease his blood pressure medication  He would like to be able to decrease the low trial to once a day  He would like to try to lose some more weight 1st and then try a trial on once a day  Advised him to continue with the dietary changes  Watch salt intake  Would recommend that he go to once a day about 3-4 weeks prior to his next visit if he continues with the dietary changes and weight loss  Try to follow blood pressure at home

## 2018-11-30 DIAGNOSIS — E79.0 HYPERURICEMIA: Primary | ICD-10-CM

## 2018-11-30 DIAGNOSIS — E78.5 HYPERLIPIDEMIA, UNSPECIFIED HYPERLIPIDEMIA TYPE: ICD-10-CM

## 2018-11-30 DIAGNOSIS — I10 ESSENTIAL HYPERTENSION: ICD-10-CM

## 2018-11-30 RX ORDER — OLMESARTAN MEDOXOMIL AND HYDROCHLOROTHIAZIDE 40/12.5 40; 12.5 MG/1; MG/1
1 TABLET ORAL DAILY
Qty: 90 TABLET | Refills: 1 | Status: SHIPPED | OUTPATIENT
Start: 2018-11-30 | End: 2018-12-28 | Stop reason: SDUPTHER

## 2018-11-30 RX ORDER — ATENOLOL 50 MG/1
50 TABLET ORAL 2 TIMES DAILY
Qty: 180 TABLET | Refills: 1 | Status: SHIPPED | OUTPATIENT
Start: 2018-11-30 | End: 2018-12-28 | Stop reason: SDUPTHER

## 2018-11-30 RX ORDER — ALLOPURINOL 100 MG/1
100 TABLET ORAL DAILY
Qty: 90 TABLET | Refills: 1 | Status: SHIPPED | OUTPATIENT
Start: 2018-11-30 | End: 2018-12-28 | Stop reason: SDUPTHER

## 2018-11-30 RX ORDER — ATORVASTATIN CALCIUM 20 MG/1
20 TABLET, FILM COATED ORAL DAILY
Qty: 90 TABLET | Refills: 1 | Status: SHIPPED | OUTPATIENT
Start: 2018-11-30 | End: 2019-08-13 | Stop reason: SDUPTHER

## 2018-12-28 DIAGNOSIS — E79.0 HYPERURICEMIA: ICD-10-CM

## 2018-12-28 DIAGNOSIS — I10 ESSENTIAL HYPERTENSION: ICD-10-CM

## 2018-12-28 DIAGNOSIS — R60.9 PERIPHERAL EDEMA: Primary | ICD-10-CM

## 2018-12-28 RX ORDER — ATENOLOL 50 MG/1
50 TABLET ORAL 2 TIMES DAILY
Qty: 180 TABLET | Refills: 3 | Status: SHIPPED | OUTPATIENT
Start: 2018-12-28 | End: 2019-12-23 | Stop reason: SDUPTHER

## 2018-12-28 RX ORDER — OLMESARTAN MEDOXOMIL AND HYDROCHLOROTHIAZIDE 40/12.5 40; 12.5 MG/1; MG/1
1 TABLET ORAL DAILY
Qty: 90 TABLET | Refills: 3 | Status: SHIPPED | OUTPATIENT
Start: 2018-12-28 | End: 2019-12-23 | Stop reason: SDUPTHER

## 2018-12-28 RX ORDER — ALLOPURINOL 100 MG/1
100 TABLET ORAL DAILY
Qty: 90 TABLET | Refills: 3 | Status: SHIPPED | OUTPATIENT
Start: 2018-12-28 | End: 2019-12-23 | Stop reason: SDUPTHER

## 2018-12-28 RX ORDER — FUROSEMIDE 40 MG/1
20 TABLET ORAL DAILY
Qty: 45 TABLET | Refills: 3 | Status: SHIPPED | OUTPATIENT
Start: 2018-12-28 | End: 2019-12-23 | Stop reason: SDUPTHER

## 2019-02-25 ENCOUNTER — APPOINTMENT (OUTPATIENT)
Dept: LAB | Facility: MEDICAL CENTER | Age: 64
End: 2019-02-25
Payer: COMMERCIAL

## 2019-02-25 DIAGNOSIS — E11.9 TYPE 2 DIABETES MELLITUS WITHOUT COMPLICATION, WITHOUT LONG-TERM CURRENT USE OF INSULIN (HCC): ICD-10-CM

## 2019-02-25 DIAGNOSIS — I10 ESSENTIAL HYPERTENSION: ICD-10-CM

## 2019-02-25 DIAGNOSIS — E78.00 HYPERCHOLESTEROLEMIA: ICD-10-CM

## 2019-02-25 LAB
ALBUMIN SERPL BCP-MCNC: 4.2 G/DL (ref 3.5–5)
ALP SERPL-CCNC: 62 U/L (ref 46–116)
ALT SERPL W P-5'-P-CCNC: 35 U/L (ref 12–78)
ANION GAP SERPL CALCULATED.3IONS-SCNC: 6 MMOL/L (ref 4–13)
AST SERPL W P-5'-P-CCNC: 15 U/L (ref 5–45)
BASOPHILS # BLD AUTO: 0.04 THOUSANDS/ΜL (ref 0–0.1)
BASOPHILS NFR BLD AUTO: 1 % (ref 0–1)
BILIRUB SERPL-MCNC: 0.54 MG/DL (ref 0.2–1)
BUN SERPL-MCNC: 16 MG/DL (ref 5–25)
CALCIUM SERPL-MCNC: 8.9 MG/DL (ref 8.3–10.1)
CHLORIDE SERPL-SCNC: 106 MMOL/L (ref 100–108)
CHOLEST SERPL-MCNC: 150 MG/DL (ref 50–200)
CO2 SERPL-SCNC: 30 MMOL/L (ref 21–32)
CREAT SERPL-MCNC: 1.1 MG/DL (ref 0.6–1.3)
EOSINOPHIL # BLD AUTO: 0.15 THOUSAND/ΜL (ref 0–0.61)
EOSINOPHIL NFR BLD AUTO: 2 % (ref 0–6)
ERYTHROCYTE [DISTWIDTH] IN BLOOD BY AUTOMATED COUNT: 13 % (ref 11.6–15.1)
EST. AVERAGE GLUCOSE BLD GHB EST-MCNC: 154 MG/DL
GFR SERPL CREATININE-BSD FRML MDRD: 72 ML/MIN/1.73SQ M
GLUCOSE P FAST SERPL-MCNC: 124 MG/DL (ref 65–99)
HBA1C MFR BLD: 7 % (ref 4.2–6.3)
HCT VFR BLD AUTO: 44.1 % (ref 36.5–49.3)
HDLC SERPL-MCNC: 39 MG/DL (ref 40–60)
HGB BLD-MCNC: 14.2 G/DL (ref 12–17)
IMM GRANULOCYTES # BLD AUTO: 0.01 THOUSAND/UL (ref 0–0.2)
IMM GRANULOCYTES NFR BLD AUTO: 0 % (ref 0–2)
LDLC SERPL CALC-MCNC: 84 MG/DL (ref 0–100)
LYMPHOCYTES # BLD AUTO: 1.89 THOUSANDS/ΜL (ref 0.6–4.47)
LYMPHOCYTES NFR BLD AUTO: 30 % (ref 14–44)
MCH RBC QN AUTO: 31 PG (ref 26.8–34.3)
MCHC RBC AUTO-ENTMCNC: 32.2 G/DL (ref 31.4–37.4)
MCV RBC AUTO: 96 FL (ref 82–98)
MONOCYTES # BLD AUTO: 0.47 THOUSAND/ΜL (ref 0.17–1.22)
MONOCYTES NFR BLD AUTO: 8 % (ref 4–12)
NEUTROPHILS # BLD AUTO: 3.65 THOUSANDS/ΜL (ref 1.85–7.62)
NEUTS SEG NFR BLD AUTO: 59 % (ref 43–75)
NONHDLC SERPL-MCNC: 111 MG/DL
NRBC BLD AUTO-RTO: 0 /100 WBCS
PLATELET # BLD AUTO: 159 THOUSANDS/UL (ref 149–390)
PMV BLD AUTO: 10.8 FL (ref 8.9–12.7)
POTASSIUM SERPL-SCNC: 3.4 MMOL/L (ref 3.5–5.3)
PROT SERPL-MCNC: 7.3 G/DL (ref 6.4–8.2)
RBC # BLD AUTO: 4.58 MILLION/UL (ref 3.88–5.62)
SODIUM SERPL-SCNC: 142 MMOL/L (ref 136–145)
TRIGL SERPL-MCNC: 136 MG/DL
TSH SERPL DL<=0.05 MIU/L-ACNC: 0.85 UIU/ML (ref 0.36–3.74)
WBC # BLD AUTO: 6.21 THOUSAND/UL (ref 4.31–10.16)

## 2019-02-25 PROCEDURE — 36415 COLL VENOUS BLD VENIPUNCTURE: CPT

## 2019-02-25 PROCEDURE — 83036 HEMOGLOBIN GLYCOSYLATED A1C: CPT

## 2019-02-25 PROCEDURE — 85025 COMPLETE CBC W/AUTO DIFF WBC: CPT

## 2019-02-25 PROCEDURE — 80053 COMPREHEN METABOLIC PANEL: CPT

## 2019-02-25 PROCEDURE — 80061 LIPID PANEL: CPT

## 2019-02-25 PROCEDURE — 84443 ASSAY THYROID STIM HORMONE: CPT

## 2019-02-28 ENCOUNTER — OFFICE VISIT (OUTPATIENT)
Dept: INTERNAL MEDICINE CLINIC | Facility: CLINIC | Age: 64
End: 2019-02-28
Payer: COMMERCIAL

## 2019-02-28 VITALS
DIASTOLIC BLOOD PRESSURE: 70 MMHG | HEART RATE: 70 BPM | SYSTOLIC BLOOD PRESSURE: 122 MMHG | WEIGHT: 253 LBS | TEMPERATURE: 96.9 F | HEIGHT: 70 IN | BODY MASS INDEX: 36.22 KG/M2 | OXYGEN SATURATION: 96 %

## 2019-02-28 DIAGNOSIS — I10 ESSENTIAL HYPERTENSION: ICD-10-CM

## 2019-02-28 DIAGNOSIS — E11.9 TYPE 2 DIABETES MELLITUS WITHOUT COMPLICATION, WITHOUT LONG-TERM CURRENT USE OF INSULIN (HCC): Primary | ICD-10-CM

## 2019-02-28 DIAGNOSIS — E78.00 HYPERCHOLESTEROLEMIA: ICD-10-CM

## 2019-02-28 DIAGNOSIS — Z11.59 NEED FOR HEPATITIS C SCREENING TEST: ICD-10-CM

## 2019-02-28 DIAGNOSIS — J06.9 UPPER RESPIRATORY TRACT INFECTION, UNSPECIFIED TYPE: ICD-10-CM

## 2019-02-28 PROCEDURE — 3078F DIAST BP <80 MM HG: CPT | Performed by: FAMILY MEDICINE

## 2019-02-28 PROCEDURE — 3074F SYST BP LT 130 MM HG: CPT | Performed by: FAMILY MEDICINE

## 2019-02-28 PROCEDURE — 3008F BODY MASS INDEX DOCD: CPT | Performed by: FAMILY MEDICINE

## 2019-02-28 PROCEDURE — 99214 OFFICE O/P EST MOD 30 MIN: CPT | Performed by: FAMILY MEDICINE

## 2019-02-28 PROCEDURE — 1036F TOBACCO NON-USER: CPT | Performed by: FAMILY MEDICINE

## 2019-02-28 RX ORDER — AZITHROMYCIN 250 MG/1
TABLET, FILM COATED ORAL
Qty: 6 TABLET | Refills: 0 | Status: SHIPPED | OUTPATIENT
Start: 2019-02-28 | End: 2019-03-05

## 2019-02-28 NOTE — PATIENT INSTRUCTIONS
Basic Carbohydrate Counting   AMBULATORY CARE:   Carbohydrate counting  is a way to plan your meals by counting the amount of carbohydrate in foods  Carbohydrates are the sugars, starches, and fiber found in fruit, grains, vegetables, and milk products  Carbohydrates increase your blood sugar levels  Carbohydrate counting can help you eat the right amount of carbohydrate to keep your blood sugar levels under control  What you need to know about planning meals using carbohydrate counting:  · A dietitian or healthcare provider will help you develop a healthy meal plan that works best for you  You will be taught how much carbohydrate to eat or drink for each meal and snack  Your meal plan will be based on your age, weight, usual food intake, and physical activity level  If you have diabetes, it will also include your blood sugar levels and diabetes medicine  Once you know how much carbohydrate you should eat, you can decide what type of food you want to eat  · You will need to know what foods contain carbohydrate and how much they contain  Keep track of the amount of carbohydrate in meals and snacks in order to follow your meal plan  Do not avoid carbohydrates or skip meals  Your blood sugar may fall too low if you do not eat enough carbohydrate or you skip meals  Foods that contain carbohydrate:   · Breads:  Each serving of food listed below contains about 15 g of carbohydrate   ¨ 1 slice of bread (1 ounce) or 1 flour or corn tortilla (6 inch)    ¨ ½ of a hamburger bun or ¼ of a large bagel (about 1 ounce)    ¨ 1 pancake (about 4 inches across and ¼ inch thick)    · Cereals and grains:  Serving sizes of ready-to-eat cereals vary  Look at the serving size and the total carbohydrate amount listed on the food label  Each serving of food listed below contains about 15 g of carbohydrate       ¨ ¾ cup of dry, unsweetened, ready-to-eat cereal or ¼ cup of low-fat granola     ¨ ½ cup of oatmeal or other cooked cereal ¨ ? cup of cooked rice or pasta    · Starchy vegetables and beans:  Each serving of food listed below contains about 15 g of carbohydrate   ¨ ½ cup of corn, green peas, sweet potatoes, or mashed potatoes    ¨ ¼ of a large baked potato    ¨ ½ cup of beans, lentils, and peas (garbanzo, luis, kidney, white, split, black-eyed)    · Crackers and snacks:  Each serving of food listed below contains about 15 g of carbohydrate   ¨ 3 verona cracker squares or 8 animal crackers     ¨ 6 saltine-type crackers    ¨ 3 cups of popcorn or ¾ ounce of pretzels, potato chips, or tortilla chips    · Fruit:  Each serving of food listed below contains about 15 g of carbohydrate   ¨ 1 small (4 ounce) piece of fresh fruit or ¾ to 1 cup of fresh fruit    ¨ ½ cup of canned or frozen fruit, packed in natural juice    ¨ ½ cup (4 ounces) of unsweetened fruit juice    ¨ 2 tablespoons of dried fruit    · Desserts or sugary foods:  Each serving of food listed below contains about 15 g of carbohydrate   ¨ 2-inch square unfrosted cake or brownie     ¨ 2 small cookies    ¨ ½ cup of ice cream, frozen yogurt, or nondairy frozen yogurt    ¨ ¼ cup of sherbet or sorbet    ¨ 1 tablespoon of regular syrup, jam, or jelly    ¨ 2 tablespoons of light syrup    · Milk and yogurt:  Foods from the milk group contain about 12 g of carbohydrate per serving  ¨ 1 cup of fat-free or low-fat milk    ¨ 1 cup of soy milk    ¨ ? cup of fat-free, yogurt sweetened with artificial sweetener    · Non-starchy vegetables:  Each serving contains about 5 g of carbohydrate   Three servings of non-starch vegetables count as 1 carbohydrate serving  ¨ ½ cup of cooked vegetables or 1 cup of raw vegetables  This includes beets, broccoli, cabbage, cauliflower, cucumber, mushrooms, tomatoes, and zucchini    ¨ ½ cup of vegetable juice  How to use carbohydrate counting to plan meals:   · Count carbohydrate amounts using serving sizes:      ¨ Pasta dinner example:   You plan to have pasta, tossed salad, and an 8-ounce glass of milk  Your healthcare provider tells you that you may have 4 carbohydrate servings for dinner  One carbohydrate serving of pasta is ? cup  One cup of pasta will equal 3 carbohydrate servings  An 8-ounce glass of milk will count as 1 carbohydrate serving  These amounts of food would equal 4 carbohydrate servings  One cup of tossed salad does not count toward your carbohydrate servings  · Count carbohydrate amounts using food labels:  Find the total amount of carbohydrate in a packaged food by reading the food label  Food labels tell you the serving size of the food and the total carbohydrate amount in each serving  Find the serving size on the food label and then decide how many servings you will eat  Multiply the number of servings you plan to eat by the carbohydrate amount per serving  ¨ Granola bar snack example: Your meal plan allows you to have 2 carbohydrate servings (30 grams) of carbohydrate for a snack  You plan to eat 1 package of granola bars, which contains 2 bars  According to the food label, the serving size of food in this package is 1 bar  Each serving (1 bar) contains 25 grams of carbohydrate  The total amount of carbohydrate in this package of granola bars would be 50 g  Based on your meal plan, you should eat only 1 bar  Follow up with your healthcare provider as directed:  Write down your questions so you remember to ask them during your visits  © 2017 [address] [employer] Information is for End User's use only and may not be sold, redistributed or otherwise used for commercial purposes  All illustrations and images included in CareNotes® are the copyrighted property of [employer] , [employer]  or [employer]  The above information is an  only  It is not intended as medical advice for individual conditions or treatments   Talk to your doctor, nurse or pharmacist before following any medical regimen to see if it is safe and effective for you

## 2019-02-28 NOTE — PROGRESS NOTES
Assessment/Plan:    Type 2 diabetes mellitus (HCC)  Lab Results   Component Value Date    HGBA1C 7 0 (H) 02/25/2019       No results for input(s): POCGLU in the last 72 hours  Blood Sugar Average: Last 72 hrs:     Hemoglobin A1c is slightly more elevated than it had been previously  Likely related to decreased activity level and changes in eating habits over the winter months  Discussed with him watching his food discussed goal of definitely keeping his hemoglobin A1c less than 7  He would like to avoid medications if possible  Increase activity level  Continue follow-up with Ophthalmology on a regular basis  Check feet daily  Will check urine microalbumin with next laboratory testing  Consider following up with Podiatry on a regular basis  URI (upper respiratory infection)  Respiratory symptoms persist but have been improving somewhat  Will give him a prescription for a Z-Charanjit  Advised him to start this if his symptoms persist or worsen  Hypertension  Blood pressure well controlled  Continue with the low trial as well as the Benicar HCTZ and the atenolol  Continue follow-up with Cardiology  Watch salt intake  Continue to be as active as possible  Continue with the weight loss  Hypercholesterolemia  Cholesterol well controlled  Continue with the atorvastatin  Watch diet  Try to remain as active as possible  Diagnoses and all orders for this visit:    Type 2 diabetes mellitus without complication, without long-term current use of insulin (HCC)  -     Comprehensive metabolic panel; Future  -     HEMOGLOBIN A1C W/ EAG ESTIMATION; Future  -     Microalbumin / creatinine urine ratio    Essential hypertension  -     CBC and differential; Future  -     Comprehensive metabolic panel; Future    Hypercholesterolemia  -     Comprehensive metabolic panel; Future  -     Lipid panel; Future  -     TSH, 3rd generation;  Future    Upper respiratory tract infection, unspecified type  - azithromycin (ZITHROMAX) 250 mg tablet; 2 pills today, then one daily for 4 more days    Need for hepatitis C screening test  -     Hepatitis C antibody; Future        Orders recommendations as noted above  He is up-to-date on his colonoscopy  He is due for the Pneumovax but is not available at today's visit  Up-to-date on his other immunizations  Will have him follow up in about 3-5 months with laboratory testing prior to that visit  Follow up sooner if needed  Subjective:      Patient ID: Melba Murphy is a 59 y o  male  He presents for routine visit  Has generally been doing relatively well  He has had some issues since the new year  He has not been exercising nearly as he feels that the decreased exercise has affected his blood sugars  Has been able to maintain his weight loss  Has been under more stress because of issues at his [employer] as well  Admits that he has been making poor food choices  Not eating large quantities but not eating healthy  Denies any significant polydipsia or polyphagia  Denies any vision changes S  Does follow-up with Ophthalmology on a regular basis  Tolerating blood pressure medications without difficulty  Denies any headaches or localized weakness  Did follow-up with Dr Rose Hammond and the dosage of his Lasix as well as the dosage of his potassium were decreased  Tolerating statin medication without difficulty  Denies muscle aches or weakness  Denies abdominal pain  Denies chest pain or palpitations  Has had some recent respiratory issues over the past 5 days  Has had increasing postnasal drip and nasal congestion as well as cough  Not sleeping very well because of the cough recently  Denies any fevers or chills  Wife recently had a similar illness  Denies any chest pain or palpitation since  Denies any significant shortness of breath  Appetite has been generally stable    Denies any nausea, vomiting, denies any changes in bowel moved with back symptoms remain relatively stable except for the about with sciatica after lifting heavy things in January  The following portions of the patient's history were reviewed and updated as appropriate:   He  has a past medical history of Allergic rhinitis, Aneurysm of thoracic aorta ([facility] ), Cataract, Diabetes mellitus ([facility] ), Herniated nucleus pulposus, L4-5 left, Herniated nucleus pulposus, L5-S1, left, echocardiogram (10/14/2016), Hypercholesterolemia, Hypertension, and Lumbar radiculopathy  He   Patient Active Problem List    Diagnosis Date Noted    URI (upper respiratory infection) 02/28/2019    Skin tag 10/25/2018    Restless leg syndrome 06/21/2018    Heel spur 05/31/2017    Neuropathy of left foot 09/19/2016    Type 2 diabetes mellitus ([facility] ) 08/06/2015    Sciatica 03/03/2014    Low back pain 02/27/2014    Edema 01/03/2013    Hyperuricemia without signs inflammatory arthritis/tophaceous disease 10/31/2012    Aneurysm of thoracic aorta ([facility] ) 08/06/2012    Hypercholesterolemia 05/01/2012    Hypertension 05/01/2012    Non-toxic uninodular goiter 05/01/2012    Osteoarthritis 05/01/2012     He  has a past surgical history that includes Tonsillectomy and adenoidectomy; Knee surgery; Back surgery; pr colonoscopy flx dx w/collj spec when pfrmd (N/A, 9/30/2016); Back surgery; and Knee arthroscopy  His family history includes Coronary artery disease in his brother; Heart attack in his brother; Hypertension in his father; Thyroid disease in his mother  He  reports that he has never smoked  He has never used smokeless tobacco  He reports that he does not drink alcohol or use drugs    Current Outpatient Medications   Medication Sig Dispense Refill    allopurinol (ZYLOPRIM) 100 mg tablet Take 1 tablet (100 mg total) by mouth daily 90 tablet 3    amLODIPine-benazepril (LOTREL 5-20) 5-20 MG per capsule TAKE 1 CAPSULE TWICE A  capsule 3    atenolol (TENORMIN) 50 mg tablet Take 1 tablet (50 mg total) by mouth 2 (two) times a day 180 tablet 3    atorvastatin (LIPITOR) 20 mg tablet Take 1 tablet (20 mg total) by mouth daily 90 tablet 1    furosemide (LASIX) 40 mg tablet Take 0 5 tablets (20 mg total) by mouth daily 45 tablet 3    olmesartan-hydrochlorothiazide (BENICAR HCT) 40-12 5 MG per tablet Take 1 tablet by mouth daily 90 tablet 3    potassium chloride (K-DUR) 10 mEq tablet Take 10 mEq by mouth 2 (two) times a day Take 4 tabs by mouth in the morning and 4 tabs by mouth in the evening   azithromycin (ZITHROMAX) 250 mg tablet 2 pills today, then one daily for 4 more days 6 tablet 0     No current facility-administered medications for this visit  Current Outpatient Medications on File Prior to Visit   Medication Sig    allopurinol (ZYLOPRIM) 100 mg tablet Take 1 tablet (100 mg total) by mouth daily    amLODIPine-benazepril (LOTREL 5-20) 5-20 MG per capsule TAKE 1 CAPSULE TWICE A DAY    atenolol (TENORMIN) 50 mg tablet Take 1 tablet (50 mg total) by mouth 2 (two) times a day    atorvastatin (LIPITOR) 20 mg tablet Take 1 tablet (20 mg total) by mouth daily    furosemide (LASIX) 40 mg tablet Take 0 5 tablets (20 mg total) by mouth daily    olmesartan-hydrochlorothiazide (BENICAR HCT) 40-12 5 MG per tablet Take 1 tablet by mouth daily    potassium chloride (K-DUR) 10 mEq tablet Take 10 mEq by mouth 2 (two) times a day Take 4 tabs by mouth in the morning and 4 tabs by mouth in the evening  No current facility-administered medications on file prior to visit  He has No Known Allergies       Review of Systems   Constitutional: Negative for activity change, appetite change, chills and fever  HENT: Positive for congestion and postnasal drip  Negative for hearing loss  Eyes: Negative for pain and visual disturbance  Respiratory: Positive for cough  Negative for chest tightness and shortness of breath  Cardiovascular: Negative for chest pain and palpitations     Gastrointestinal: Negative for abdominal pain, blood in stool, diarrhea, nausea and vomiting  Endocrine: Negative for polydipsia, polyphagia and polyuria  Genitourinary: Negative for dysuria  Musculoskeletal: Negative for arthralgias and gait problem  See HPI   Skin: Negative for color change  Neurological: Negative for dizziness and headaches  Hematological: Does not bruise/bleed easily  Psychiatric/Behavioral: Positive for sleep disturbance  Negative for behavioral problems  The patient is not nervous/anxious  Objective:      /70 (BP Location: Right arm, Patient Position: Sitting, Cuff Size: Large)   Pulse 70   Temp (!) 96 9 °F (36 1 °C) (Temporal)   Ht 5' 10" (1 778 m)   Wt 115 kg (253 lb)   SpO2 96%   BMI 36 30 kg/m²          Physical Exam   Constitutional: He is oriented to person, place, and time  He is cooperative  No distress  HENT:   Head: Normocephalic and atraumatic  Nose: Nose normal    Slight cerumen bilaterally; boggy nasal mucosa with slight white nasal discharge; slight posterior pharyngeal erythema   Eyes: Pupils are equal, round, and reactive to light  Conjunctivae are normal    Neck: Carotid bruit is not present  Cardiovascular: Normal rate, regular rhythm and normal heart sounds  Exam reveals no gallop and no friction rub  No murmur heard  Pulmonary/Chest: He has no wheezes  He has no rales  He exhibits no tenderness  Scattered rhonchi   Abdominal: He exhibits no distension  There is no tenderness  There is no rebound and no guarding  Lymphadenopathy:     He has no cervical adenopathy  Neurological: He is alert and oriented to person, place, and time  Skin: Skin is warm and dry  Psychiatric: He has a normal mood and affect  His behavior is normal  Judgment normal  Cognition and memory are normal    Nursing note and vitals reviewed        Below is the patient's most recent value for Albumin, ALT, AST, BUN, Calcium, Chloride, Cholesterol, CO2, Creatinine, GFR, Glucose, HDL, Hematocrit, Hemoglobin, Hemoglobin A1C, LDL, Magnesium, Phosphorus, Platelets, Potassium, PSA, Sodium, Triglycerides, and WBC  Lab Results   Component Value Date    ALT 35 02/25/2019    AST 15 02/25/2019    BUN 16 02/25/2019    CALCIUM 8 9 02/25/2019     02/25/2019    CHOL 162 09/09/2015    CO2 30 02/25/2019    CREATININE 1 10 02/25/2019    HDL 39 (L) 02/25/2019    HCT 44 1 02/25/2019    HGB 14 2 02/25/2019    HGBA1C 7 0 (H) 02/25/2019     02/25/2019    K 3 4 (L) 02/25/2019    PSA 0 6 04/03/2018     09/09/2015    TRIG 136 02/25/2019    WBC 6 21 02/25/2019     Note: for a comprehensive list of the patient's lab results, access the Results Review activity

## 2019-03-01 NOTE — ASSESSMENT & PLAN NOTE
Blood pressure well controlled  Continue with the low trial as well as the Benicar HCTZ and the atenolol  Continue follow-up with Cardiology  Watch salt intake  Continue to be as active as possible  Continue with the weight loss

## 2019-03-01 NOTE — ASSESSMENT & PLAN NOTE
Respiratory symptoms persist but have been improving somewhat  Will give him a prescription for a Z-Charanjit  Advised him to start this if his symptoms persist or worsen

## 2019-03-01 NOTE — ASSESSMENT & PLAN NOTE
Cholesterol well controlled  Continue with the atorvastatin  Watch diet  Try to remain as active as possible

## 2019-03-01 NOTE — ASSESSMENT & PLAN NOTE
Lab Results   Component Value Date    HGBA1C 7 0 (H) 02/25/2019       No results for input(s): POCGLU in the last 72 hours  Blood Sugar Average: Last 72 hrs:     Hemoglobin A1c is slightly more elevated than it had been previously  Likely related to decreased activity level and changes in eating habits over the winter months  Discussed with him watching his food discussed goal of definitely keeping his hemoglobin A1c less than 7  He would like to avoid medications if possible  Increase activity level  Continue follow-up with Ophthalmology on a regular basis  Check feet daily  Will check urine microalbumin with next laboratory testing  Consider following up with Podiatry on a regular basis

## 2019-03-07 DIAGNOSIS — J06.9 UPPER RESPIRATORY TRACT INFECTION, UNSPECIFIED TYPE: Primary | ICD-10-CM

## 2019-03-07 RX ORDER — GUAIFENESIN AND CODEINE PHOSPHATE 100; 10 MG/5ML; MG/5ML
5 SOLUTION ORAL 3 TIMES DAILY PRN
Qty: 120 ML | Refills: 0 | Status: SHIPPED | OUTPATIENT
Start: 2019-03-07 | End: 2019-04-02

## 2019-04-02 ENCOUNTER — OFFICE VISIT (OUTPATIENT)
Dept: INTERNAL MEDICINE CLINIC | Facility: CLINIC | Age: 64
End: 2019-04-02
Payer: COMMERCIAL

## 2019-04-02 VITALS
TEMPERATURE: 97.3 F | HEIGHT: 70 IN | DIASTOLIC BLOOD PRESSURE: 82 MMHG | WEIGHT: 250.5 LBS | SYSTOLIC BLOOD PRESSURE: 120 MMHG | HEART RATE: 71 BPM | BODY MASS INDEX: 35.86 KG/M2 | OXYGEN SATURATION: 95 %

## 2019-04-02 DIAGNOSIS — M25.512 ACUTE PAIN OF LEFT SHOULDER: Primary | ICD-10-CM

## 2019-04-02 PROCEDURE — 99213 OFFICE O/P EST LOW 20 MIN: CPT | Performed by: NURSE PRACTITIONER

## 2019-04-02 PROCEDURE — 1036F TOBACCO NON-USER: CPT | Performed by: NURSE PRACTITIONER

## 2019-04-02 PROCEDURE — 3008F BODY MASS INDEX DOCD: CPT | Performed by: NURSE PRACTITIONER

## 2019-04-02 RX ORDER — CYCLOBENZAPRINE HCL 10 MG
TABLET ORAL
Qty: 60 TABLET | Refills: 0 | Status: SHIPPED | OUTPATIENT
Start: 2019-04-02 | End: 2020-01-07 | Stop reason: HOSPADM

## 2019-05-30 ENCOUNTER — OFFICE VISIT (OUTPATIENT)
Dept: CARDIOLOGY CLINIC | Facility: CLINIC | Age: 64
End: 2019-05-30
Payer: COMMERCIAL

## 2019-05-30 VITALS
SYSTOLIC BLOOD PRESSURE: 124 MMHG | HEIGHT: 70 IN | BODY MASS INDEX: 36.51 KG/M2 | HEART RATE: 70 BPM | DIASTOLIC BLOOD PRESSURE: 78 MMHG | WEIGHT: 255 LBS

## 2019-05-30 DIAGNOSIS — I71.2 THORACIC AORTIC ANEURYSM WITHOUT RUPTURE (HCC): Primary | ICD-10-CM

## 2019-05-30 DIAGNOSIS — I10 ESSENTIAL HYPERTENSION: ICD-10-CM

## 2019-05-30 DIAGNOSIS — E78.00 HYPERCHOLESTEROLEMIA: ICD-10-CM

## 2019-05-30 PROCEDURE — 99214 OFFICE O/P EST MOD 30 MIN: CPT | Performed by: INTERNAL MEDICINE

## 2019-06-13 ENCOUNTER — HOSPITAL ENCOUNTER (OUTPATIENT)
Dept: NON INVASIVE DIAGNOSTICS | Facility: CLINIC | Age: 64
Discharge: HOME | End: 2019-06-13
Payer: COMMERCIAL

## 2019-06-13 DIAGNOSIS — I71.2 THORACIC AORTIC ANEURYSM WITHOUT RUPTURE (HCC): ICD-10-CM

## 2019-06-13 DIAGNOSIS — I10 ESSENTIAL HYPERTENSION: ICD-10-CM

## 2019-06-13 PROCEDURE — 93306 TTE W/DOPPLER COMPLETE: CPT

## 2019-06-13 PROCEDURE — 93306 TTE W/DOPPLER COMPLETE: CPT | Performed by: INTERNAL MEDICINE

## 2019-07-02 ENCOUNTER — OFFICE VISIT (OUTPATIENT)
Dept: INTERNAL MEDICINE CLINIC | Facility: CLINIC | Age: 64
End: 2019-07-02
Payer: COMMERCIAL

## 2019-07-02 VITALS
WEIGHT: 256 LBS | OXYGEN SATURATION: 96 % | DIASTOLIC BLOOD PRESSURE: 70 MMHG | HEIGHT: 70 IN | HEART RATE: 75 BPM | TEMPERATURE: 98.6 F | SYSTOLIC BLOOD PRESSURE: 122 MMHG | BODY MASS INDEX: 36.65 KG/M2

## 2019-07-02 DIAGNOSIS — E11.69 DIABETES MELLITUS TYPE 2 IN OBESE (HCC): ICD-10-CM

## 2019-07-02 DIAGNOSIS — E66.9 DIABETES MELLITUS TYPE 2 IN OBESE (HCC): ICD-10-CM

## 2019-07-02 DIAGNOSIS — G89.29 CHRONIC BILATERAL LOW BACK PAIN, WITH SCIATICA PRESENCE UNSPECIFIED: Primary | ICD-10-CM

## 2019-07-02 DIAGNOSIS — M25.512 ACUTE PAIN OF LEFT SHOULDER: ICD-10-CM

## 2019-07-02 DIAGNOSIS — M79.652 PAIN OF LEFT THIGH: ICD-10-CM

## 2019-07-02 DIAGNOSIS — M54.5 CHRONIC BILATERAL LOW BACK PAIN, WITH SCIATICA PRESENCE UNSPECIFIED: Primary | ICD-10-CM

## 2019-07-02 PROBLEM — R31.29 MICROSCOPIC HEMATURIA: Status: ACTIVE | Noted: 2019-07-02

## 2019-07-02 PROCEDURE — 1036F TOBACCO NON-USER: CPT | Performed by: FAMILY MEDICINE

## 2019-07-02 PROCEDURE — 3008F BODY MASS INDEX DOCD: CPT | Performed by: FAMILY MEDICINE

## 2019-07-02 PROCEDURE — 99213 OFFICE O/P EST LOW 20 MIN: CPT | Performed by: FAMILY MEDICINE

## 2019-07-02 RX ORDER — MELOXICAM 15 MG/1
15 TABLET ORAL DAILY PRN
Qty: 30 TABLET | Refills: 5 | Status: SHIPPED | OUTPATIENT
Start: 2019-07-02 | End: 2021-03-04

## 2019-07-02 NOTE — PROGRESS NOTES
Assessment/Plan:    No problem-specific Assessment & Plan notes found for this encounter  Diagnoses and all orders for this visit:    Chronic bilateral low back pain, with sciatica presence unspecified  -     Ambulatory referral to Physical Therapy; Future  -     meloxicam (MOBIC) 15 mg tablet; Take 1 tablet (15 mg total) by mouth daily as needed for mild pain    Acute pain of left shoulder  -     Ambulatory referral to Physical Therapy; Future  -     meloxicam (MOBIC) 15 mg tablet; Take 1 tablet (15 mg total) by mouth daily as needed for mild pain    Pain of left thigh  -     Ambulatory referral to Physical Therapy; Future  -     meloxicam (MOBIC) 15 mg tablet; Take 1 tablet (15 mg total) by mouth daily as needed for mild pain    Diabetes mellitus type 2 in obese (HCC)  -     Microalbumin / creatinine urine ratio    BMI 36 0-36 9,adult    Other orders  -     Cancel: Microalbumin / creatinine urine ratio  -     Cancel: Pneumococcal polysaccharide vaccine 23-valent greater than or equal to 3yo subcutaneous/IM        Orders recommendations as noted above  Start on the meloxicam for the anti-inflammatory effect  Will try to avoid steroids if possible given his diabetes  Will start physical therapy  Continue with the chiropractor  Muscle relaxant has not been significantly effective  Advised him to avoid this since it is not helping  Would consider MRI in the future if symptoms persist or worsen  Symptoms currently seem to be more muscular  Stretching exercises recommended prior to activity  Will have him follow up as previously scheduled or sooner if needed depending on his symptoms and response to physical therapy  Subjective:      Patient ID: Jessee Rogers is a 59 y o  male  He presents for acute visit  He started a few months ago with some pain into the right buttocks area  He states that it started into the right low back area and then into the right buttocks area and then into the leg  Symptoms slowly improved  He had been doing a lot of fishing and had a new fishing boat that he was taking in and out of the water  He felt that this may be contributing to his symptoms  He then developed some pain into the left shoulder area when he was fishing  He had followed up with chiropractor which did help significantly but still has some symptoms of tightness into the left shoulder area and radiating into the neck  Since that point he has developed some pain into the left low back and then buttocks  This pain started suddenly and worsened suddenly  Has improved somewhat with chiropractor treatment but now has pain radiating down his left leg at times especially into the thigh area with the muscles feeling tight and stiff  He has some chronic numbness into the left side from his previous back issues  This feels different than when he had his sciatic symptoms in the past   This feels much more muscular  The more he walks the more pain he has  Able to only walk short distances  Stretching exercises help somewhat  Denies any bowel or bladder issues  The following portions of the patient's history were reviewed and updated as appropriate:   He  has a past medical history of Allergic rhinitis, Aneurysm of thoracic aorta ([address] ), Cataract, Diabetes mellitus ([address] ), Herniated nucleus pulposus, L4-5 left, Herniated nucleus pulposus, L5-S1, left, echocardiogram (10/14/2016), Hypercholesterolemia, Hypertension, and Lumbar radiculopathy    He   Patient Active Problem List    Diagnosis Date Noted    Microscopic hematuria 07/02/2019    Pain of left thigh 07/02/2019    Acute pain of left shoulder 04/02/2019    URI (upper respiratory infection) 02/28/2019    Skin tag 10/25/2018    Restless leg syndrome 06/21/2018    Heel spur 05/31/2017    Neuropathy of left foot 09/19/2016    Diabetes mellitus type 2 in obese ([facility] [address] ) 08/06/2015    Sciatica 03/03/2014    Chronic bilateral low back pain 02/27/2014    Edema 01/03/2013    Hyperuricemia without signs inflammatory arthritis/tophaceous disease 10/31/2012    Aneurysm of thoracic aorta ([facility] [address] ) 08/06/2012    Hypercholesterolemia 05/01/2012    Hypertension 05/01/2012    Non-toxic uninodular goiter 05/01/2012    Osteoarthritis 05/01/2012     He  has a past surgical history that includes Tonsillectomy and adenoidectomy; Knee surgery; Back surgery; pr colonoscopy flx dx w/collj spec when pfrmd (N/A, 9/30/2016); Back surgery; and Knee arthroscopy  His family history includes Coronary artery disease in his brother; Heart attack in his brother; Hypertension in his father; Thyroid disease in his mother  He  reports that he has never smoked  He has never used smokeless tobacco  He reports that he does not drink alcohol or use drugs  Current Outpatient Medications   Medication Sig Dispense Refill    allopurinol (ZYLOPRIM) 100 mg tablet Take 1 tablet (100 mg total) by mouth daily 90 tablet 3    amLODIPine-benazepril (LOTREL 5-20) 5-20 MG per capsule TAKE 1 CAPSULE TWICE A  capsule 3    atenolol (TENORMIN) 50 mg tablet Take 1 tablet (50 mg total) by mouth 2 (two) times a day 180 tablet 3    atorvastatin (LIPITOR) 20 mg tablet Take 1 tablet (20 mg total) by mouth daily 90 tablet 1    cyclobenzaprine (FLEXERIL) 10 mg tablet Take one tablet by mouth every 12 hours as needed 60 tablet 0    furosemide (LASIX) 40 mg tablet Take 0 5 tablets (20 mg total) by mouth daily 45 tablet 3    olmesartan-hydrochlorothiazide (BENICAR HCT) 40-12 5 MG per tablet Take 1 tablet by mouth daily 90 tablet 3    potassium chloride (K-DUR) 10 mEq tablet Take 10 mEq by mouth Take 3 tabs by mouth in the morning and 3 tabs by mouth in the evening   meloxicam (MOBIC) 15 mg tablet Take 1 tablet (15 mg total) by mouth daily as needed for mild pain 30 tablet 5     No current facility-administered medications for this visit        Current Outpatient Medications on File Prior to Visit Medication Sig    allopurinol (ZYLOPRIM) 100 mg tablet Take 1 tablet (100 mg total) by mouth daily    amLODIPine-benazepril (LOTREL 5-20) 5-20 MG per capsule TAKE 1 CAPSULE TWICE A DAY    atenolol (TENORMIN) 50 mg tablet Take 1 tablet (50 mg total) by mouth 2 (two) times a day    atorvastatin (LIPITOR) 20 mg tablet Take 1 tablet (20 mg total) by mouth daily    cyclobenzaprine (FLEXERIL) 10 mg tablet Take one tablet by mouth every 12 hours as needed    furosemide (LASIX) 40 mg tablet Take 0 5 tablets (20 mg total) by mouth daily    olmesartan-hydrochlorothiazide (BENICAR HCT) 40-12 5 MG per tablet Take 1 tablet by mouth daily    potassium chloride (K-DUR) 10 mEq tablet Take 10 mEq by mouth Take 3 tabs by mouth in the morning and 3 tabs by mouth in the evening  No current facility-administered medications on file prior to visit  He has No Known Allergies       Review of Systems   Constitutional: Positive for activity change  Negative for appetite change, chills, fatigue and fever  Respiratory: Negative for shortness of breath  Gastrointestinal: Negative for abdominal pain  Genitourinary: Negative for difficulty urinating, dysuria and flank pain  Musculoskeletal: Positive for back pain, gait problem, myalgias, neck pain and neck stiffness  Neurological: Positive for numbness  Psychiatric/Behavioral: Positive for sleep disturbance  Objective:      /70 (BP Location: Left arm, Patient Position: Sitting, Cuff Size: Standard)   Pulse 75   Temp 98 6 °F (37 °C) (Temporal)   Ht 5' 10" (1 778 m)   Wt 116 kg (256 lb)   SpO2 96%   BMI 36 73 kg/m²          Physical Exam   Constitutional: He is cooperative  Musculoskeletal:   Some tenderness to palpation over the paraspinal muscles in the low back and buttocks  Some tenderness into the hamstring areas bilaterally left greater than right; sensation to light touch somewhat decreased into the left leg   Neurological: He is alert   A sensory deficit is present  Nursing note and vitals reviewed  BMI Counseling: Body mass index is 36 73 kg/m²  Discussed the patient's BMI with him  The BMI is above average  BMI counseling and education was provided to the patient  Nutrition recommendations include 3-5 servings of fruits/vegetables daily, decreasing soda and/or juice intake, moderation in carbohydrate intake and reducing intake of cholesterol  Exercise recommendations include strength training exercises

## 2019-07-02 NOTE — PATIENT INSTRUCTIONS

## 2019-07-10 ENCOUNTER — EVALUATION (OUTPATIENT)
Dept: PHYSICAL THERAPY | Facility: CLINIC | Age: 64
End: 2019-07-10
Payer: COMMERCIAL

## 2019-07-10 DIAGNOSIS — M79.652 PAIN OF LEFT THIGH: ICD-10-CM

## 2019-07-10 DIAGNOSIS — G89.29 CHRONIC BILATERAL LOW BACK PAIN, WITH SCIATICA PRESENCE UNSPECIFIED: ICD-10-CM

## 2019-07-10 DIAGNOSIS — M54.5 CHRONIC BILATERAL LOW BACK PAIN, WITH SCIATICA PRESENCE UNSPECIFIED: ICD-10-CM

## 2019-07-10 DIAGNOSIS — M25.512 ACUTE PAIN OF LEFT SHOULDER: ICD-10-CM

## 2019-07-10 PROCEDURE — 97162 PT EVAL MOD COMPLEX 30 MIN: CPT | Performed by: PHYSICAL THERAPIST

## 2019-07-10 PROCEDURE — 97535 SELF CARE MNGMENT TRAINING: CPT | Performed by: PHYSICAL THERAPIST

## 2019-07-10 NOTE — PROGRESS NOTES
PT Evaluation     Today's date: 7/10/2019  Patient name: Escobar Murphy  : 1955  MRN: [id number]  Referring provider: Vinay Underwood MD  Dx:   Encounter Diagnosis     ICD-10-CM    1  Chronic bilateral low back pain, with sciatica presence unspecified M54 5 Ambulatory referral to Physical Therapy    G89 29    2  Acute pain of left shoulder M25 512 Ambulatory referral to Physical Therapy   3  Pain of left thigh M79 652 Ambulatory referral to Physical Therapy                  Assessment  Assessment details: PT notes the patient with decrease ROM and strength t/o the lumbar spine as well as the bilateral hip and LE with demonstration of impaired gait pattern and balance leading to increase pain levels and functional limitations with need for course of skilled therapy for 4-6 weeks with focus on lumbar stabilization, manual therapy, posture, analgesic modalities, and HEP  PT notes resolution of left shoulder dysfunction so PT will focus on lumbar spine and LE for POC  Impairments: abnormal gait, abnormal or restricted ROM, activity intolerance, impaired balance, impaired physical strength, lacks appropriate home exercise program and pain with function  Understanding of Dx/Px/POC: good   Prognosis: good    Goals  ST  Initiate HEP  2  Decrease symptoms by 25-50%   3  Increase strength by 1-2 mm grades   4  Increase ROM by 25-50%   LT  Improve gait pattern and balance to good/normal  2  Decrease limitations with squatting, walking, and stair climbing activities  3  Return to recreational activities with minimal to no limitations  4  DC with HEP     Plan  Plan details: PT notes review of POC and findings with patient who is in agreement with PT recommendations of course of skilled therapy      Patient would benefit from: PT eval and skilled physical therapy  Planned modality interventions: thermotherapy: hydrocollator packs  Planned therapy interventions: manual therapy, neuromuscular re-education, patient education, postural training, self care, strengthening, stretching, therapeutic exercise, home exercise program, graded exercise, functional ROM exercises, flexibility, gait training and balance  Frequency: 3x week  Duration in visits: 12  Duration in weeks: 4  Treatment plan discussed with: patient        Subjective Evaluation    History of Present Illness  Mechanism of injury: Patient reports development of right LB and hip pain about 5 months ago from insidious onset  Patient reports overtime the symptoms have worsened with increase pain traveling down the left leg further  Patient reports during that time development of left shoulder and arm pain with N/T traveling down the left arm into the hand  Patient reports the symptoms in the arm have recently dissipated with decrease pain levels in the left shoulder but continuation of occasional N/T in the left hand and forearm  Patient reports PMH of lumbar laminectomy in  with N/T in the left foot  The patient states continuation of low back pain with limitations with bending, lifting, squatting and ADL  Patient reports he did receive multiple treatments from a chiropractor for the low back symptoms with minimal change in status  Patient reports significant PMH of AAA, right knee bursa sac removal, and HBP  Patient reports he his retired at this time     Pain  Current pain ratin  At best pain ratin  At worst pain ratin  Location: Low Back Pain and left LE-Numbness/Tingling   Relieving factors: rest  Aggravating factors: lifting    Treatments  Previous treatment: chiropractic and medication  Current treatment: medication and physical therapy  Patient Goals  Patient goals for therapy: decreased pain, improved balance, increased motion, increased strength, return to sport/leisure activities and independence with ADLs/IADLs          Objective     Postural Observations  Seated posture: fair  Standing posture: fair    Additional Postural Observation Details  PT notes flattening of the lumbar spine secondary to + spasm/tightness of the lumbar paraspinals     Palpation   Left   Muscle spasm in the erector spinae and lumbar paraspinals  Tenderness of the erector spinae and lumbar paraspinals  Right   Muscle spasm in the erector spinae and lumbar paraspinals  Tenderness of the erector spinae and lumbar paraspinals       Additional Palpation Details  PT notes + spasm/tightness of the lumbar paraspinals from levels L3-S1     Neurological Testing     Reflexes   Left   Biceps (C5/C6): normal (2+)  Brachioradialis (C6): normal (2+)  Patellar (L4): normal (2+)  Achilles (S1): normal (2+)    Right   Biceps (C5/C6): normal (2+)  Brachioradialis (C6): normal (2+)  Patellar (L4): normal (2+)  Achilles (S1): normal (2+)    Active Range of Motion   Cervical/Thoracic Spine     Normal active range of motion  Left Shoulder   Normal active range of motion    Right Shoulder   Normal active range of motion    Lumbar   Flexion: 35 degrees   Extension: 15 degrees   Left lateral flexion: 10 degrees       Right lateral flexion: 10 degrees   Left rotation: 15 degrees   Right rotation: 15 degrees   Left Hip   Flexion: 55 degrees   External rotation (90/90): 20 degrees   Internal rotation (90/90): 15 degrees     Right Hip   Flexion: 41 degrees   External rotation (90/90): 15 degrees   Internal rotation (90/90): 10 degrees   Left Knee   Normal active range of motion    Right Knee   Normal active range of motion  Left Ankle/Foot   Normal active range of motion    Right Ankle/Foot   Normal active range of motion    Additional Active Range of Motion Details  PT notes the patient with decrease ROM and strength t/o the lumbar spine   PT notes decrease ROM and strength t/o the bilateral hip and LE     Strength/Myotome Testing     Left Shoulder   Normal muscle strength    Right Shoulder   Normal muscle strength    Left Hip   Planes of Motion   Flexion: 4-  Extension: 4+  Abduction: 4+  Adduction: 4+  External rotation: 4-  Internal rotation: 4-    Right Hip   Planes of Motion   Flexion: 4  Extension: 5  Abduction: 5  Adduction: 5  External rotation: 4-  Internal rotation: 4    Left Knee   Flexion: 4+  Extension: 4    Right Knee   Flexion: 4+  Extension: 4+    Left Ankle/Foot   Dorsiflexion: 4+  Plantar flexion: 5  Inversion: 4+  Eversion: 4+    Right Ankle/Foot   Normal strength    Ambulation     Observational Gait   Decreased walking speed, stride length and left stance time  Additional Observational Gait Details  PT notes demonstration of impaired gait pattern with decrease stance on the left, decrease step length, decrease hip and knee flex, and decrease shekhar with rating of fair+ with static and dynamic activities                    Precautions  PMH Lumbar Laminectomy        Manual  7/10        Bilateral hip and LE stretching with manual lumbar traction  NV                                           Exercise Diary  7/10        Nu-step  10 min L5        Stand lumbar extension         Stand OAL         Monster walk         Side step and squat         High knee walk         TB Side step with resist trunk rotation  Arms Out         Tandem and side step walk on foam         Front and lateral step up         Step over         DKTC with Tball        Bridge with Tball        Supine Tball ABD crunch         Seated Tball LAQ and hip march         Seated Tball trunk rotation and PNF                                                     Modalities 7/10        MHP to the LB in seated  15 min

## 2019-07-11 ENCOUNTER — OFFICE VISIT (OUTPATIENT)
Dept: PHYSICAL THERAPY | Facility: CLINIC | Age: 64
End: 2019-07-11
Payer: COMMERCIAL

## 2019-07-11 DIAGNOSIS — M54.5 CHRONIC BILATERAL LOW BACK PAIN, WITH SCIATICA PRESENCE UNSPECIFIED: Primary | ICD-10-CM

## 2019-07-11 DIAGNOSIS — M25.512 ACUTE PAIN OF LEFT SHOULDER: ICD-10-CM

## 2019-07-11 DIAGNOSIS — M79.652 PAIN OF LEFT THIGH: ICD-10-CM

## 2019-07-11 DIAGNOSIS — G89.29 CHRONIC BILATERAL LOW BACK PAIN, WITH SCIATICA PRESENCE UNSPECIFIED: Primary | ICD-10-CM

## 2019-07-11 PROCEDURE — 97110 THERAPEUTIC EXERCISES: CPT

## 2019-07-11 PROCEDURE — 97140 MANUAL THERAPY 1/> REGIONS: CPT

## 2019-07-11 NOTE — PROGRESS NOTES
Daily Note     Today's date: 2019  Patient name: Cassi Arshad  : 1955  MRN: [id number]  Referring provider: Donnise Seip, MD  Dx:   Encounter Diagnosis     ICD-10-CM    1  Chronic bilateral low back pain, with sciatica presence unspecified M54 5     G89 29    2  Acute pain of left shoulder M25 512    3  Pain of left thigh M79 652                   Subjective: " I had a cramp in my leg this morning but besides that I'm good so far this morning       Objective: See treatment diary below    Precautions  PMH Lumbar Laminectomy        Manual  7/10  7/11      Bilateral hip and LE stretching with manual lumbar traction  NV 15 min                                          Exercise Diary  7/10  7/11      Nu-step  10 min L5  10 min L5      Stand lumbar extension   10x 5" hold      Stand OAL   10x 3" hold       Monster walk   4x 10 ft  Light blue       Side step and squat   4x 10 ft       High knee walk         TB Side step with resist trunk rotation  Arms Out   10x each way  Single green       Tandem and side step walk on foam         Front and lateral step up   15x bilateral  4" step       Step over         DKTC with Tball  10x 5" hold      Bridge with Tball        Supine Tball ABD crunch         Seated Tball LAQ and hip march         Seated Tball trunk rotation and PNF                                                     Modalities 7/10  7/11      MHP to the LB in seated  15 min  15 min                            Assessment: Educated patient on POC established for their specific diagnosis and their LOF  Verbal and visual cues required for proper execution of exercise with program  Overall tolerated treatment well  Will continue to progress in upcoming visits as able      Plan: Continue per plan of care  Progress treatment as tolerated

## 2019-07-22 ENCOUNTER — OFFICE VISIT (OUTPATIENT)
Dept: PHYSICAL THERAPY | Facility: CLINIC | Age: 64
End: 2019-07-22
Payer: COMMERCIAL

## 2019-07-22 DIAGNOSIS — M25.512 ACUTE PAIN OF LEFT SHOULDER: ICD-10-CM

## 2019-07-22 DIAGNOSIS — M54.5 CHRONIC BILATERAL LOW BACK PAIN, WITH SCIATICA PRESENCE UNSPECIFIED: Primary | ICD-10-CM

## 2019-07-22 DIAGNOSIS — G89.29 CHRONIC BILATERAL LOW BACK PAIN, WITH SCIATICA PRESENCE UNSPECIFIED: Primary | ICD-10-CM

## 2019-07-22 DIAGNOSIS — M79.652 PAIN OF LEFT THIGH: ICD-10-CM

## 2019-07-22 PROCEDURE — 97110 THERAPEUTIC EXERCISES: CPT

## 2019-07-22 PROCEDURE — 97140 MANUAL THERAPY 1/> REGIONS: CPT

## 2019-07-22 PROCEDURE — 97112 NEUROMUSCULAR REEDUCATION: CPT

## 2019-07-22 NOTE — PROGRESS NOTES
Daily Note     Today's date: 2019  Patient name: Eddie Orozco  : 1955  MRN: [id number]  Referring provider: Betha Gowers, MD  Dx:   Encounter Diagnosis     ICD-10-CM    1  Chronic bilateral low back pain, with sciatica presence unspecified M54 5     G89 29    2  Acute pain of left shoulder M25 512    3  Pain of left thigh M79 652                   Subjective: Patient reports left sided back and hip pain following an active weekend at a children's camp  Objective: See treatment diary below    Precautions  PMH Lumbar Laminectomy        Manual  7/10  7/11 7/22     Bilateral hip and LE stretching with manual lumbar traction  NV 15 min 15 min                                         Exercise Diary  7/10  7/11 7/22     Nu-step  10 min L5  10 min L5 10 min L5     HR/TR   2x10 4" Step     Stand lumbar extension   10x 5" hold 10x 5" hd     Stand OAL   10x 3" hold  10x 3" hold     Monster walk   4x 10 ft  Light blue  4x 10 ft Light Blue     Side step and squat   4x 10 ft Light Blue 4x 10 ft Light Blue     High knee walk         TB Side step with resist trunk rotation  Arms Out   10x each way  Single green  2x10 each way  Single green      Tandem and side step walk on foam         Front and lateral step up   15x bilateral  4" step  2x10 Bilateral 4" Step     Step over         DKTC with Tball  10x 5" hold 20x 5" hd     Bridge with Tball        Supine Tball ABD crunch         Seated Tball LAQ and hip march         Seated Tball trunk rotation and PNF                                                     Modalities 7/10  7/11 7/22     MHP to the LB in seated  15 min  15 min 15 min                           Assessment: Patient tolerated treatment well  Verbal and visual cues required for proper execution of exercise with program  Overall tolerated treatment well  Will continue to progress in upcoming visits as able      Plan: Continue per plan of care  Progress treatment as tolerated

## 2019-07-24 ENCOUNTER — OFFICE VISIT (OUTPATIENT)
Dept: PHYSICAL THERAPY | Facility: CLINIC | Age: 64
End: 2019-07-24
Payer: COMMERCIAL

## 2019-07-24 DIAGNOSIS — M79.652 PAIN OF LEFT THIGH: ICD-10-CM

## 2019-07-24 DIAGNOSIS — G89.29 CHRONIC BILATERAL LOW BACK PAIN, WITH SCIATICA PRESENCE UNSPECIFIED: Primary | ICD-10-CM

## 2019-07-24 DIAGNOSIS — M25.512 ACUTE PAIN OF LEFT SHOULDER: ICD-10-CM

## 2019-07-24 DIAGNOSIS — M54.5 CHRONIC BILATERAL LOW BACK PAIN, WITH SCIATICA PRESENCE UNSPECIFIED: Primary | ICD-10-CM

## 2019-07-24 PROCEDURE — 97110 THERAPEUTIC EXERCISES: CPT

## 2019-07-24 PROCEDURE — 97140 MANUAL THERAPY 1/> REGIONS: CPT

## 2019-07-24 NOTE — PROGRESS NOTES
Daily Note     Today's date: 2019  Patient name: Caroline Sue  : 1955  MRN: [id number]  Referring provider: Myesha Rosenthal MD  Dx:   Encounter Diagnosis     ICD-10-CM    1  Chronic bilateral low back pain, with sciatica presence unspecified M54 5     G89 29    2  Acute pain of left shoulder M25 512    3  Pain of left thigh M79 652                   Subjective: patient stated he feels good but he wishes he could get rid of the limp  Patient stated he was getting cramping in his leg and he believes it was from the squats he did in therapy        Objective: See treatment diary below    Precautions  PMH Lumbar Laminectomy        Manual  7/10  7/11 7/22 7/24    Bilateral hip and LE stretching with manual lumbar traction  NV 15 min 15 min 15 min                                        Exercise Diary  7/10  7/11 7/22 7/24    Nu-step  10 min L5  10 min L5 10 min L5 10 min L5    HR/TR   2x10 4" Step     Stand lumbar extension   10x 5" hold 10x 5" hd 10x 10" hold    Stand OAL   10x 3" hold  10x 3" hold 15x 3" hold     Monster walk   4x 10 ft  Light blue  4x 10 ft Light Blue 6x 10 feet light blue     Side step and squat   4x 10 ft Light Blue 4x 10 ft Light Blue 4x 10 feet light blue     High knee walk         TB Side step with resist trunk rotation  Arms Out   10x each way  Single green  2x10 each way  Single green  10x each way  Single blue     Tandem and side step walk on foam         Front and lateral step up   15x bilateral  4" step  2x10 Bilateral 4" Step 10x bilateral  6" step    Step over         DKTC with Tball  10x 5" hold 20x 5" hd 20x 5" hold     Bridge with Tball        Supine Tball ABD crunch         Seated Tball LAQ and hip march         Seated Tball trunk rotation and PNF                                                     Modalities 7/10  7/11 7/22 7/24    MHP to the LB in seated  15 min  15 min 15 min 15 min                          Assessment: Patient able to progress with repetitions and resistance with various exercises  Good tolerance with progression with minimal cues required  No progression with sidestep and squat due aforementioned subjective comments  Patient continues to present with deficits with strength and ROM requiring continued skilled physical therapy      Plan: Continue per plan of care  Progress treatment as tolerated

## 2019-07-25 ENCOUNTER — APPOINTMENT (OUTPATIENT)
Dept: LAB | Facility: CLINIC | Age: 64
End: 2019-07-25
Payer: COMMERCIAL

## 2019-07-25 ENCOUNTER — OFFICE VISIT (OUTPATIENT)
Dept: PHYSICAL THERAPY | Facility: CLINIC | Age: 64
End: 2019-07-25
Payer: COMMERCIAL

## 2019-07-25 DIAGNOSIS — G89.29 CHRONIC BILATERAL LOW BACK PAIN, WITH SCIATICA PRESENCE UNSPECIFIED: Primary | ICD-10-CM

## 2019-07-25 DIAGNOSIS — I10 ESSENTIAL HYPERTENSION: ICD-10-CM

## 2019-07-25 DIAGNOSIS — M79.652 PAIN OF LEFT THIGH: ICD-10-CM

## 2019-07-25 DIAGNOSIS — M54.5 CHRONIC BILATERAL LOW BACK PAIN, WITH SCIATICA PRESENCE UNSPECIFIED: Primary | ICD-10-CM

## 2019-07-25 DIAGNOSIS — E11.9 TYPE 2 DIABETES MELLITUS WITHOUT COMPLICATION, WITHOUT LONG-TERM CURRENT USE OF INSULIN (HCC): ICD-10-CM

## 2019-07-25 DIAGNOSIS — E78.00 HYPERCHOLESTEROLEMIA: ICD-10-CM

## 2019-07-25 DIAGNOSIS — Z11.59 NEED FOR HEPATITIS C SCREENING TEST: ICD-10-CM

## 2019-07-25 DIAGNOSIS — M25.512 ACUTE PAIN OF LEFT SHOULDER: ICD-10-CM

## 2019-07-25 LAB
ALBUMIN SERPL BCP-MCNC: 3.8 G/DL (ref 3.5–5)
ALP SERPL-CCNC: 62 U/L (ref 46–116)
ALT SERPL W P-5'-P-CCNC: 42 U/L (ref 12–78)
ANION GAP SERPL CALCULATED.3IONS-SCNC: 4 MMOL/L (ref 4–13)
AST SERPL W P-5'-P-CCNC: 16 U/L (ref 5–45)
BASOPHILS # BLD AUTO: 0.05 THOUSANDS/ΜL (ref 0–0.1)
BASOPHILS NFR BLD AUTO: 1 % (ref 0–1)
BILIRUB SERPL-MCNC: 0.49 MG/DL (ref 0.2–1)
BUN SERPL-MCNC: 18 MG/DL (ref 5–25)
CALCIUM SERPL-MCNC: 8.9 MG/DL (ref 8.3–10.1)
CHLORIDE SERPL-SCNC: 108 MMOL/L (ref 100–108)
CHOLEST SERPL-MCNC: 164 MG/DL (ref 50–200)
CO2 SERPL-SCNC: 30 MMOL/L (ref 21–32)
CREAT SERPL-MCNC: 1.12 MG/DL (ref 0.6–1.3)
CREAT UR-MCNC: 156 MG/DL
EOSINOPHIL # BLD AUTO: 0.21 THOUSAND/ΜL (ref 0–0.61)
EOSINOPHIL NFR BLD AUTO: 4 % (ref 0–6)
ERYTHROCYTE [DISTWIDTH] IN BLOOD BY AUTOMATED COUNT: 12.9 % (ref 11.6–15.1)
EST. AVERAGE GLUCOSE BLD GHB EST-MCNC: 166 MG/DL
GFR SERPL CREATININE-BSD FRML MDRD: 70 ML/MIN/1.73SQ M
GLUCOSE P FAST SERPL-MCNC: 171 MG/DL (ref 65–99)
HBA1C MFR BLD: 7.4 % (ref 4.2–6.3)
HCT VFR BLD AUTO: 42.9 % (ref 36.5–49.3)
HDLC SERPL-MCNC: 37 MG/DL (ref 40–60)
HGB BLD-MCNC: 14.4 G/DL (ref 12–17)
IMM GRANULOCYTES # BLD AUTO: 0.01 THOUSAND/UL (ref 0–0.2)
IMM GRANULOCYTES NFR BLD AUTO: 0 % (ref 0–2)
LDLC SERPL CALC-MCNC: 104 MG/DL (ref 0–100)
LYMPHOCYTES # BLD AUTO: 1.6 THOUSANDS/ΜL (ref 0.6–4.47)
LYMPHOCYTES NFR BLD AUTO: 33 % (ref 14–44)
MCH RBC QN AUTO: 32.7 PG (ref 26.8–34.3)
MCHC RBC AUTO-ENTMCNC: 33.6 G/DL (ref 31.4–37.4)
MCV RBC AUTO: 97 FL (ref 82–98)
MICROALBUMIN UR-MCNC: 45.7 MG/L (ref 0–20)
MICROALBUMIN/CREAT 24H UR: 29 MG/G CREATININE (ref 0–30)
MONOCYTES # BLD AUTO: 0.41 THOUSAND/ΜL (ref 0.17–1.22)
MONOCYTES NFR BLD AUTO: 9 % (ref 4–12)
NEUTROPHILS # BLD AUTO: 2.56 THOUSANDS/ΜL (ref 1.85–7.62)
NEUTS SEG NFR BLD AUTO: 53 % (ref 43–75)
NONHDLC SERPL-MCNC: 127 MG/DL
NRBC BLD AUTO-RTO: 0 /100 WBCS
PLATELET # BLD AUTO: 147 THOUSANDS/UL (ref 149–390)
PMV BLD AUTO: 11.1 FL (ref 8.9–12.7)
POTASSIUM SERPL-SCNC: 3.7 MMOL/L (ref 3.5–5.3)
PROT SERPL-MCNC: 7.1 G/DL (ref 6.4–8.2)
RBC # BLD AUTO: 4.41 MILLION/UL (ref 3.88–5.62)
SODIUM SERPL-SCNC: 142 MMOL/L (ref 136–145)
TRIGL SERPL-MCNC: 115 MG/DL
TSH SERPL DL<=0.05 MIU/L-ACNC: 1.23 UIU/ML (ref 0.36–3.74)
WBC # BLD AUTO: 4.84 THOUSAND/UL (ref 4.31–10.16)

## 2019-07-25 PROCEDURE — 85025 COMPLETE CBC W/AUTO DIFF WBC: CPT

## 2019-07-25 PROCEDURE — 82570 ASSAY OF URINE CREATININE: CPT | Performed by: FAMILY MEDICINE

## 2019-07-25 PROCEDURE — 86803 HEPATITIS C AB TEST: CPT

## 2019-07-25 PROCEDURE — 97140 MANUAL THERAPY 1/> REGIONS: CPT

## 2019-07-25 PROCEDURE — 97110 THERAPEUTIC EXERCISES: CPT

## 2019-07-25 PROCEDURE — 36415 COLL VENOUS BLD VENIPUNCTURE: CPT

## 2019-07-25 PROCEDURE — 84443 ASSAY THYROID STIM HORMONE: CPT

## 2019-07-25 PROCEDURE — 3061F NEG MICROALBUMINURIA REV: CPT | Performed by: FAMILY MEDICINE

## 2019-07-25 PROCEDURE — 83036 HEMOGLOBIN GLYCOSYLATED A1C: CPT

## 2019-07-25 PROCEDURE — 80061 LIPID PANEL: CPT

## 2019-07-25 PROCEDURE — 80053 COMPREHEN METABOLIC PANEL: CPT

## 2019-07-25 PROCEDURE — 82043 UR ALBUMIN QUANTITATIVE: CPT | Performed by: FAMILY MEDICINE

## 2019-07-25 NOTE — PROGRESS NOTES
Daily Note     Today's date: 2019  Patient name: Denise Daigle  : 1955  MRN: [id number]  Referring provider: Melvin Cortes MD  Dx:   Encounter Diagnosis     ICD-10-CM    1  Chronic bilateral low back pain, with sciatica presence unspecified M54 5     G89 29    2  Acute pain of left shoulder M25 512    3  Pain of left thigh M79 652        Start Time: 0900          Subjective: patient stated he has some pain in the hip yesterday but has subsided today  He feels like his walking is getting better         Objective: See treatment diary below    Precautions  PMH Lumbar Laminectomy        Manual  7/10  7/11 7/22 7/24 7/25   Bilateral hip and LE stretching with manual lumbar traction  NV 15 min 15 min 15 min 15 min                                       Exercise Diary  7/10  7/11 7/22 7/24 7/25   Nu-step  10 min L5  10 min L5 10 min L5 10 min L5 10 min L5   HR/TR   2x10 4" Step     Stand lumbar extension   10x 5" hold 10x 5" hd 10x 10" hold 10x 10" hold   Stand OAL   10x 3" hold  10x 3" hold 15x 3" hold  15x 3" hold    Monster walk   4x 10 ft  Light blue  4x 10 ft Light Blue 6x 10 feet light blue  6x 10 feet green   Side step and squat   4x 10 ft Light Blue 4x 10 ft Light Blue 4x 10 feet light blue  4x 10 feet   green   High knee walk         TB Side step with resist trunk rotation  Arms Out   10x each way  Single green  2x10 each way  Single green  10x each way  Single blue  15x each way  Single blue    Tandem and side step walk on foam         Front and lateral step up   15x bilateral  4" step  2x10 Bilateral 4" Step 10x bilateral  6" step x10 bilateral  6" step   Step over         DKTC with Tball  10x 5" hold 20x 5" hd 20x 5" hold  20x 5" hd   Bridge with Tball        Supine Tball ABD crunch         Seated Tball LAQ and hip march         Seated Tball trunk rotation and PNF                                                     Modalities 7/10  7/11 7/22 7/24 7/25   MHP to the LB in seated  15 min  15 min 15 min 15 min 15 min                         Assessment: Patient progressing well towards goals  Good execution of exercise with program  Patient demonstrated the appropriate amount of fatigue for program   Will continue to monitor pain levels and progress as able      Plan: Continue per plan of care  Progress treatment as tolerated

## 2019-07-26 LAB — HCV AB SER QL: NORMAL

## 2019-07-29 ENCOUNTER — OFFICE VISIT (OUTPATIENT)
Dept: PHYSICAL THERAPY | Facility: CLINIC | Age: 64
End: 2019-07-29
Payer: COMMERCIAL

## 2019-07-29 DIAGNOSIS — G89.29 CHRONIC BILATERAL LOW BACK PAIN, WITH SCIATICA PRESENCE UNSPECIFIED: Primary | ICD-10-CM

## 2019-07-29 DIAGNOSIS — M79.652 PAIN OF LEFT THIGH: ICD-10-CM

## 2019-07-29 DIAGNOSIS — M54.5 CHRONIC BILATERAL LOW BACK PAIN, WITH SCIATICA PRESENCE UNSPECIFIED: Primary | ICD-10-CM

## 2019-07-29 DIAGNOSIS — M25.512 ACUTE PAIN OF LEFT SHOULDER: ICD-10-CM

## 2019-07-29 PROCEDURE — 97110 THERAPEUTIC EXERCISES: CPT | Performed by: PHYSICAL THERAPIST

## 2019-07-29 PROCEDURE — 97140 MANUAL THERAPY 1/> REGIONS: CPT | Performed by: PHYSICAL THERAPIST

## 2019-07-29 NOTE — PROGRESS NOTES
Daily Note     Today's date: 2019  Patient name: Temo Milligan  : 1955  MRN: [id number]  Referring provider: Ravin Riley MD  Dx:   Encounter Diagnosis     ICD-10-CM    1  Chronic bilateral low back pain, with sciatica presence unspecified M54 5     G89 29    2  Acute pain of left shoulder M25 512    3  Pain of left thigh M79 652                   Subjective:  Patient reports he was carrying his grandchild yesterday and he slipped  Patient reports he twisted the back leading to increase soreness but prior to that the patient reports he was walking better with decrease pain  Patient reports 2/10 soreness in the low back at the start of the session  Post session, the patient reports no pain or soreness in the back          Objective: See treatment diary below    Precautions  PMH Lumbar Laminectomy        Manual     Bilateral hip and LE stretching with manual lumbar traction  NV    15 min                                       Exercise Diary     Nu-step  10 min L5  10 min L5 10 min L5 10 min L5 10 min L5   HR/TR   2x10 4" Step     Stand lumbar extension  2x10   3" Hold  10x 5" hold 10x 5" hd 10x 10" hold 10x 10" hold   Stand OAL  2x10 Bilat  10x 3" hold  10x 3" hold 15x 3" hold  15x 3" hold    Monster walk  6x10 Feet   Green  4x 10 ft  Light blue  4x 10 ft Light Blue 6x 10 feet light blue  6x 10 feet green   Side step and squat  6x10 Feet   Green  4x 10 ft Light Blue 4x 10 ft Light Blue 4x 10 feet light blue  4x 10 feet   green   High knee walk         TB Side step with resist trunk rotation  Arms Out  15x Bilat   Single Blue  10x each way  Single green  2x10 each way  Single green  10x each way  Single blue  15x each way  Single blue    Tandem and side step walk on foam  4x10 Feet        Front and lateral step up  15x Bilat   6" step  15x bilateral  4" step  2x10 Bilateral 4" Step 10x bilateral  6" step x10 bilateral  6" step   Step over         DKTC with Tball 10x 5" Hold  10x 5" hold 20x 5" hd 20x 5" hold  20x 5" hd   Bridge with Tball        Supine Tball ABD crunch  10x3" Hold   Orange Tball        Seated Tball LAQ and hip march         Seated Tball trunk rotation and PNF                                                     Modalities 7/29 7/25   MHP to the LB in seated  15 min     15 min                           Assessment: Tolerated treatment well  PT notes continuation of progression of TE program with focus on lumbar stabilization and manual therapy to decrease pain levels and improve functional limitations to meet therapy goals  PT notes addition of bridge with Tball and supine Tball ABD crunch to POC to address trunk/core weakness and poor lumbar stability to meet therapy goals  Plan: Continue per plan of care

## 2019-07-31 ENCOUNTER — OFFICE VISIT (OUTPATIENT)
Dept: PHYSICAL THERAPY | Facility: CLINIC | Age: 64
End: 2019-07-31
Payer: COMMERCIAL

## 2019-07-31 ENCOUNTER — OFFICE VISIT (OUTPATIENT)
Dept: INTERNAL MEDICINE CLINIC | Facility: CLINIC | Age: 64
End: 2019-07-31
Payer: COMMERCIAL

## 2019-07-31 VITALS
OXYGEN SATURATION: 98 % | SYSTOLIC BLOOD PRESSURE: 130 MMHG | TEMPERATURE: 97.7 F | BODY MASS INDEX: 37.22 KG/M2 | HEIGHT: 70 IN | WEIGHT: 260 LBS | DIASTOLIC BLOOD PRESSURE: 80 MMHG | HEART RATE: 70 BPM

## 2019-07-31 DIAGNOSIS — E78.00 HYPERCHOLESTEROLEMIA: ICD-10-CM

## 2019-07-31 DIAGNOSIS — E11.69 DIABETES MELLITUS TYPE 2 IN OBESE (HCC): Primary | ICD-10-CM

## 2019-07-31 DIAGNOSIS — E66.9 DIABETES MELLITUS TYPE 2 IN OBESE (HCC): Primary | ICD-10-CM

## 2019-07-31 DIAGNOSIS — M25.512 ACUTE PAIN OF LEFT SHOULDER: ICD-10-CM

## 2019-07-31 DIAGNOSIS — Z23 NEED FOR PNEUMOCOCCAL VACCINATION: ICD-10-CM

## 2019-07-31 DIAGNOSIS — G89.29 CHRONIC BILATERAL LOW BACK PAIN, WITH SCIATICA PRESENCE UNSPECIFIED: Primary | ICD-10-CM

## 2019-07-31 DIAGNOSIS — M54.16 LUMBAR RADICULOPATHY: ICD-10-CM

## 2019-07-31 DIAGNOSIS — M54.5 CHRONIC BILATERAL LOW BACK PAIN, WITH SCIATICA PRESENCE UNSPECIFIED: Primary | ICD-10-CM

## 2019-07-31 DIAGNOSIS — I10 ESSENTIAL HYPERTENSION: ICD-10-CM

## 2019-07-31 DIAGNOSIS — M79.652 PAIN OF LEFT THIGH: ICD-10-CM

## 2019-07-31 PROCEDURE — 1036F TOBACCO NON-USER: CPT | Performed by: FAMILY MEDICINE

## 2019-07-31 PROCEDURE — 90732 PPSV23 VACC 2 YRS+ SUBQ/IM: CPT | Performed by: FAMILY MEDICINE

## 2019-07-31 PROCEDURE — 97140 MANUAL THERAPY 1/> REGIONS: CPT | Performed by: PHYSICAL THERAPIST

## 2019-07-31 PROCEDURE — 3008F BODY MASS INDEX DOCD: CPT | Performed by: FAMILY MEDICINE

## 2019-07-31 PROCEDURE — 90471 IMMUNIZATION ADMIN: CPT | Performed by: FAMILY MEDICINE

## 2019-07-31 PROCEDURE — 99214 OFFICE O/P EST MOD 30 MIN: CPT | Performed by: FAMILY MEDICINE

## 2019-07-31 PROCEDURE — 97110 THERAPEUTIC EXERCISES: CPT | Performed by: PHYSICAL THERAPIST

## 2019-07-31 PROCEDURE — 3075F SYST BP GE 130 - 139MM HG: CPT | Performed by: FAMILY MEDICINE

## 2019-07-31 NOTE — ASSESSMENT & PLAN NOTE
Blood pressure controlled  Continue the Lotrel, atenolol, and Benicar hydrochlorothiazide  Continue follow-up with Cardiology as scheduled  Reviewed recent echocardiogram that cardiology had ordered with him  Watch salt intake  Gradually increase activity level  Resume slow but steady weight loss that he had been doing prior to the recent back issues

## 2019-07-31 NOTE — ASSESSMENT & PLAN NOTE
Cholesterol relatively well controlled but somewhat higher than previously  This is likely related to recent dietary changes and decreased activity with back issues  Continue with the atorvastatin  Watch diet more closely  Increase fiber in diet

## 2019-07-31 NOTE — ASSESSMENT & PLAN NOTE
Continue with physical therapy  Recommended slow but steady strengthening  Watch for any worsening or lack of improvement  Would consider MRI for further investigation if worsening numbness or the mild weakness in the leg persists or worsens  He was advised that since his pain has basically resolved, he may take the Mobic on an as-needed basis

## 2019-07-31 NOTE — PROGRESS NOTES
Daily Note     Today's date: 2019  Patient name: Ana Mcnmaara  : 1955  MRN: [id number]  Referring provider: Genaro Mccann MD  Dx:   Encounter Diagnosis     ICD-10-CM    1  Chronic bilateral low back pain, with sciatica presence unspecified M54 5     G89 29    2  Acute pain of left shoulder M25 512    3  Pain of left thigh M79 652                   Subjective:  Patient reports his back is feeling better but continuation of weakness in the legs  Patient reports he has been feeling better with increase tolerance to walking with increase distances in and around the house  Patient report 1/10 soreness in the low back t/o the session       Objective: See treatment diary below    Precautions  PMH Lumbar Laminectomy        Manual     Bilateral hip and LE stretching with manual lumbar traction  15 min  15 min    15 min                                       Exercise Diary     Nu-step  10 min L5  10 min L5 10 min L5 10 min L5 10 min L5   HR/TR   2x10 4" Step     Stand lumbar extension  2x10   3" Hold  2x10   3" hold 10x 5" hd 10x 10" hold 10x 10" hold   Stand OAL  2x10 Bilat  2x10 Bilat  10x 3" hold 15x 3" hold  15x 3" hold    Monster walk  6x10 Feet   Green  6x 10 Feet   Green  4x 10 ft Light Blue 6x 10 feet light blue  6x 10 feet green   Side step and squat  6x10 Feet   Green  8x 10 Feet  Green  4x 10 ft Light Blue 4x 10 feet light blue  4x 10 feet   green   High knee walk   Leg and calf press   10x Each   40#       TB Side step with resist trunk rotation  Arms Out  15x Bilat   Single Blue  15x each way  Single Blue   2x10 each way  Single green  10x each way  Single blue  15x each way  Single blue    Tandem and side step walk on foam  4x10 Feet  NT      Front and lateral step up  15x Bilat   6" step  2x10 Bilateral  6" step  2x10 Bilateral 4" Step 10x bilateral  6" step x10 bilateral  6" step   Step over   Hip AB/AD machine   45+1#/2x10   45#/2x10       DKTC with Tball 10x 5" Hold  10x 5" hold 20x 5" hd 20x 5" hold  20x 5" hd   Bridge with Tball        Supine Tball ABD crunch  10x3" Hold   Orange Tball  10x3" Hold   Orange Tball       Seated Tball LAQ and hip march         Seated Tball trunk rotation and PNF                                                     Modalities 7/29 7/31 7/25   MHP to the LB in seated  15 min  15 min    15 min                             Assessment: Tolerated treatment well  PT notes continuation of progression of TE program with focus on lumbar stabilization and manual therapy to decrease pain levels and improve functional limitations to meet therapy goals  PT notes addition of leg/calf press and hip AB/AD machine to POc to address LE strength deficits to meet therapy goals  Plan: Continue per plan of care

## 2019-07-31 NOTE — ASSESSMENT & PLAN NOTE
Lab Results   Component Value Date    HGBA1C 7 4 (H) 07/25/2019       No results for input(s): POCGLU in the last 72 hours  Blood Sugar Average: Last 72 hrs:     Hemoglobin A1c somewhat higher  This is likely related to his relative inactivity and somewhat poor eating habits with his recent back issues  Discussed with him gradually increasing his activity level  Start watching diet more closely  Continue follow-up with eye doctor on a regular basis  Check feet daily  Follow up with Podiatry regularly recommended

## 2019-07-31 NOTE — PROGRESS NOTES
Assessment/Plan:    Diabetes mellitus type 2 in obese [facility])  Lab Results   Component Value Date    HGBA1C 7 4 (H) 07/25/2019       No results for input(s): POCGLU in the last 72 hours  Blood Sugar Average: Last 72 hrs:     Hemoglobin A1c somewhat higher  This is likely related to his relative inactivity and somewhat poor eating habits with his recent back issues  Discussed with him gradually increasing his activity level  Start watching diet more closely  Continue follow-up with eye doctor on a regular basis  Check feet daily  Follow up with Podiatry regularly recommended  Hypertension  Blood pressure controlled  Continue the Lotrel, atenolol, and Benicar hydrochlorothiazide  Continue follow-up with Cardiology as scheduled  Reviewed recent echocardiogram that cardiology had ordered with him  Watch salt intake  Gradually increase activity level  Resume slow but steady weight loss that he had been doing prior to the recent back issues  Lumbar radiculopathy  Continue with physical therapy  Recommended slow but steady strengthening  Watch for any worsening or lack of improvement  Would consider MRI for further investigation if worsening numbness or the mild weakness in the leg persists or worsens  He was advised that since his pain has basically resolved, he may take the Mobic on an as-needed basis  Hypercholesterolemia  Cholesterol relatively well controlled but somewhat higher than previously  This is likely related to recent dietary changes and decreased activity with back issues  Continue with the atorvastatin  Watch diet more closely  Increase fiber in diet  Diagnoses and all orders for this visit:    Diabetes mellitus type 2 in obese (HCC)  -     Pneumococcal polysaccharide vaccine 23-valent greater than or equal to 3yo subcutaneous/IM  -     Comprehensive metabolic panel;  Future  -     Hemoglobin A1C; Future    Essential hypertension  -     CBC and differential; Future  - Comprehensive metabolic panel; Future    Hypercholesterolemia  -     Comprehensive metabolic panel; Future  -     Lipid panel; Future  -     TSH, 3rd generation; Future    Lumbar radiculopathy    Need for pneumococcal vaccination  -     Pneumococcal polysaccharide vaccine 23-valent greater than or equal to 3yo subcutaneous/IM        Orders recommendations as noted above  He is up-to-date on the Prevnar 13  Pneumovax given today  Recommend flu shot in the fall  PSA every 1-2 years  He will be due for his colonoscopy again in the fall  Slow but steady weight loss recommended  Will have him follow up in about 3-4 months or sooner if needed  Subjective:      Patient ID: Suraj Smith is a 59 y o  male  He presents for routine follow-up  Has generally been doing somewhat better  He no longer has the back pain but still has some persistent numbness into the left foot which is more pronounced than his chronic numbness  He also has some relative weakness in the left leg compared to the right  Notices that if he walks distances the leg fatigues easier  Has been doing physical therapy and does feel that this is helping but it is very slow  Is hopeful that he will be able to be more active in the near future  Is trying to walk more  Admits that he has not been exercising as regularly and has been eating more poorly at [facility]  States that he is eating ice cream most nights  Not eating as much fruit and vegetables  Denies any polyuria, polydipsia, polyphagia  Denies any vision changes  Did follow-up with Optometry yesterday and no diabetic changes were seen  Tolerating blood pressure medications without difficulty  Denies any headaches or localized weakness  Did follow-up with Cardiology and had a recent echocardiogram   Has intermittent peripheral edema but is doing okay on the lower dose of the Lasix  Tolerating the atorvastatin without difficulty  Denies any significant muscle aches  Appetite has been stable  Denies any nausea, vomiting, or diarrhea  Bowel movements have been stable  He will be due for his next colonoscopy in the fall  Denies any urinary symptoms  The following portions of the patient's history were reviewed and updated as appropriate:   He  has a past medical history of Allergic rhinitis, Aneurysm of thoracic aorta ([facility] ), Cataract, Diabetes mellitus ([facility] ), Herniated nucleus pulposus, L4-5 left, Herniated nucleus pulposus, L5-S1, left, echocardiogram (10/14/2016), Hypercholesterolemia, Hypertension, and Lumbar radiculopathy  He   Patient Active Problem List    Diagnosis Date Noted    Lumbar radiculopathy 07/31/2019    Microscopic hematuria 07/02/2019    Pain of left thigh 07/02/2019    Acute pain of left shoulder 04/02/2019    Skin tag 10/25/2018    Restless leg syndrome 06/21/2018    Heel spur 05/31/2017    Neuropathy of left foot 09/19/2016    Diabetes mellitus type 2 in obese ([facility] ) 08/06/2015    Sciatica 03/03/2014    Chronic bilateral low back pain 02/27/2014    Edema 01/03/2013    Hyperuricemia without signs inflammatory arthritis/tophaceous disease 10/31/2012    Aneurysm of thoracic aorta ([facility] ) 08/06/2012    Hypercholesterolemia 05/01/2012    Hypertension 05/01/2012    Non-toxic uninodular goiter 05/01/2012    Osteoarthritis 05/01/2012     He  has a past surgical history that includes Tonsillectomy and adenoidectomy; Knee surgery; Back surgery; pr colonoscopy flx dx w/collj spec when pfrmd (N/A, 9/30/2016); Back surgery; and Knee arthroscopy  His family history includes Coronary artery disease in his brother; Heart attack in his brother; Hypertension in his father; Thyroid disease in his mother  He  reports that he has never smoked  He has never used smokeless tobacco  He reports that he does not drink alcohol or use drugs    Current Outpatient Medications   Medication Sig Dispense Refill    allopurinol (ZYLOPRIM) 100 mg tablet Take 1 tablet (100 mg total) by mouth daily 90 tablet 3    amLODIPine-benazepril (LOTREL 5-20) 5-20 MG per capsule TAKE 1 CAPSULE TWICE A  capsule 3    atenolol (TENORMIN) 50 mg tablet Take 1 tablet (50 mg total) by mouth 2 (two) times a day 180 tablet 3    atorvastatin (LIPITOR) 20 mg tablet Take 1 tablet (20 mg total) by mouth daily 90 tablet 1    cyclobenzaprine (FLEXERIL) 10 mg tablet Take one tablet by mouth every 12 hours as needed 60 tablet 0    furosemide (LASIX) 40 mg tablet Take 0 5 tablets (20 mg total) by mouth daily 45 tablet 3    meloxicam (MOBIC) 15 mg tablet Take 1 tablet (15 mg total) by mouth daily as needed for mild pain 30 tablet 5    olmesartan-hydrochlorothiazide (BENICAR HCT) 40-12 5 MG per tablet Take 1 tablet by mouth daily 90 tablet 3    potassium chloride (K-DUR) 10 mEq tablet Take 10 mEq by mouth Take 3 tabs by mouth in the morning and 3 tabs by mouth in the evening  No current facility-administered medications for this visit  Current Outpatient Medications on File Prior to Visit   Medication Sig    allopurinol (ZYLOPRIM) 100 mg tablet Take 1 tablet (100 mg total) by mouth daily    amLODIPine-benazepril (LOTREL 5-20) 5-20 MG per capsule TAKE 1 CAPSULE TWICE A DAY    atenolol (TENORMIN) 50 mg tablet Take 1 tablet (50 mg total) by mouth 2 (two) times a day    atorvastatin (LIPITOR) 20 mg tablet Take 1 tablet (20 mg total) by mouth daily    cyclobenzaprine (FLEXERIL) 10 mg tablet Take one tablet by mouth every 12 hours as needed    furosemide (LASIX) 40 mg tablet Take 0 5 tablets (20 mg total) by mouth daily    meloxicam (MOBIC) 15 mg tablet Take 1 tablet (15 mg total) by mouth daily as needed for mild pain    olmesartan-hydrochlorothiazide (BENICAR HCT) 40-12 5 MG per tablet Take 1 tablet by mouth daily    potassium chloride (K-DUR) 10 mEq tablet Take 10 mEq by mouth Take 3 tabs by mouth in the morning and 3 tabs by mouth in the evening       No current facility-administered medications on file prior to visit  He has No Known Allergies       Review of Systems   Constitutional: Negative for activity change, appetite change, chills and fever  HENT: Negative for hearing loss  Eyes: Negative for pain and visual disturbance  Respiratory: Negative for chest tightness and shortness of breath  Cardiovascular: Negative for chest pain and palpitations  Gastrointestinal: Negative for abdominal pain, blood in stool, diarrhea, nausea and vomiting  Endocrine: Negative for polydipsia, polyphagia and polyuria  Genitourinary: Negative for dysuria  Musculoskeletal: Positive for gait problem  Negative for arthralgias  As per HPI   Skin: Negative for color change  Neurological: Positive for weakness and numbness  Negative for dizziness and headaches  Hematological: Does not bruise/bleed easily  Psychiatric/Behavioral: Negative for behavioral problems and decreased concentration  The patient is not nervous/anxious  Objective:      /80 (BP Location: Right arm, Patient Position: Sitting, Cuff Size: Large)   Pulse 70   Temp 97 7 °F (36 5 °C) (Temporal)   Ht 5' 10" (1 778 m)   Wt 118 kg (260 lb)   SpO2 98%   BMI 37 31 kg/m²          Physical Exam   Constitutional: He is oriented to person, place, and time  He is cooperative  No distress  HENT:   Head: Normocephalic and atraumatic  Nose: Nose normal    Slight cerumen   Eyes: Pupils are equal, round, and reactive to light  Conjunctivae are normal    Neck: Carotid bruit is not present  Cardiovascular: Normal rate, regular rhythm and normal heart sounds  Exam reveals no gallop and no friction rub  No murmur heard  Pulmonary/Chest: He has no wheezes  He has no rales  He exhibits no tenderness  Abdominal: He exhibits no distension  There is no tenderness  There is no rebound and no guarding  Lymphadenopathy:     He has no cervical adenopathy     Neurological: He is alert and oriented to person, place, and time  Decreased sensation to light touch over the lateral ankle and into the foot on left   Skin: Skin is warm and dry  Psychiatric: He has a normal mood and affect  His speech is normal and behavior is normal  Cognition and memory are normal    Nursing note and vitals reviewed  Below is the patient's most recent value for Albumin, ALT, AST, BUN, Calcium, Chloride, Cholesterol, CO2, Creatinine, GFR, Glucose, HDL, Hematocrit, Hemoglobin, Hemoglobin A1C, LDL, Magnesium, Phosphorus, Platelets, Potassium, PSA, Sodium, Triglycerides, and WBC  Lab Results   Component Value Date    ALT 42 07/25/2019    AST 16 07/25/2019    BUN 18 07/25/2019    CALCIUM 8 9 07/25/2019     07/25/2019    CHOL 162 09/09/2015    CO2 30 07/25/2019    CREATININE 1 12 07/25/2019    HDL 37 (L) 07/25/2019    HCT 42 9 07/25/2019    HGB 14 4 07/25/2019    HGBA1C 7 4 (H) 07/25/2019     (L) 07/25/2019    K 3 7 07/25/2019    PSA 0 6 04/03/2018     09/09/2015    TRIG 115 07/25/2019    WBC 4 84 07/25/2019     Note: for a comprehensive list of the patient's lab results, access the Results Review activity

## 2019-07-31 NOTE — PATIENT INSTRUCTIONS
Basic Carbohydrate Counting   WHAT YOU NEED TO KNOW:   What is carbohydrate counting? Carbohydrate counting is a way to plan your meals by counting the amount of carbohydrate in foods  Carbohydrates are the sugars, starches, and fiber found in fruit, grains, vegetables, and milk products  Carbohydrates increase your blood sugar levels  Carbohydrate counting can help you eat the right amount of carbohydrate to keep your blood sugar levels under control  What do I need to know about planning meals using carbohydrate counting? · A carbohydrate serving contains about 15 grams (g) of carbohydrate  A dietitian or healthcare provider will tell you how many carbohydrate servings you should have for each meal and snack  The number of servings will be based on your age, weight, usual food intake, and physical activity level  It will also be based on your blood sugar levels and diabetes medicine  · Check your serving sizes using measuring cups or a food scale  Also read nutrition labels to find out how much carbohydrate is in the foods you eat  See "Total Amount of Carbohydrate" on the label for the amount  The amount of carbohydrate in the serving size listed on the package may be more than 15 g  Keep track of the amount of carbohydrate servings you have for each meal and snack  The number of carbohydrate servings you have may need to be adjusted based on your blood sugar levels  Do not avoid carbohydrates or skip meals  Your blood sugar may fall too low if you do not eat enough carbohydrate or you skip meals  What are some foods that contain carbohydrate? · Breads:  Each serving of food listed below contains about 15 g of carbohydrate   ¨ 1 slice of bread (1 ounce) or 1 flour or corn tortilla (6 inch)    ¨ ½ of a hamburger bun or ¼ of a large bagel (about 1 ounce)    ¨ 1 pancake (about 4 inches across and ¼ inch thick)    · Cereals and grains:  Serving sizes of ready-to-eat cereals vary   Look at the serving size and the total carbohydrate amount listed on the food label  Each serving of food listed below contains about 15 g of carbohydrate   ¨ ¾ cup of dry, unsweetened, ready-to-eat cereal or ¼ cup of low-fat granola     ¨ ½ cup of oatmeal or other cooked cereal     ¨ ? cup of cooked rice or pasta    · Starchy vegetables and beans:  Each serving of food listed below contains about 15 g of carbohydrate   ¨ ½ cup of corn, green peas, sweet potatoes, or mashed potatoes    ¨ ¼ of a large baked potato    ¨ ½ cup of beans, lentils, and peas (garbanzo, luis, kidney, white, split, black-eyed)    · Crackers and snacks:  Each serving of food listed below contains about 15 g of carbohydrate   ¨ 3 verona cracker squares or 8 animal crackers     ¨ 6 saltine-type crackers    ¨ 3 cups of popcorn or ¾ ounce of pretzels, potato chips, or tortilla chips    · Fruit:  Each serving of food listed below contains about 15 g of carbohydrate   ¨ 1 small (4 ounce) piece of fresh fruit or ¾ to 1 cup of fresh fruit    ¨ ½ cup of canned or frozen fruit, packed in natural juice    ¨ ½ cup (4 ounces) of unsweetened fruit juice    ¨ 2 tablespoons of dried fruit    · Desserts or sugary foods:  Each serving of food listed below contains about 15 g of carbohydrate   ¨ 2-inch square unfrosted cake or brownie     ¨ 2 small cookies    ¨ ½ cup of ice cream, frozen yogurt, or nondairy frozen yogurt    ¨ ¼ cup of sherbet or sorbet    ¨ 1 tablespoon of regular syrup, jam, or jelly    ¨ 2 tablespoons of light syrup    · Milk and yogurt:  Foods from the milk group contain about 12 g of carbohydrate per serving  ¨ 1 cup of fat-free or low-fat milk    ¨ 1 cup of soy milk    ¨ ? cup of fat-free, yogurt sweetened with artificial sweetener    · Non-starchy vegetables:  Each serving contains about 5 g of carbohydrate   Three servings of non-starch vegetables count as 1 carbohydrate serving       ¨ ½ cup of cooked vegetables or 1 cup of raw vegetables  This includes beets, broccoli, cabbage, cauliflower, cucumber, mushrooms, tomatoes, and zucchini    ¨ ½ cup of vegetable juice  How do I use carbohydrate counting to plan meals? · Count carbohydrate amounts using serving sizes:      ¨ Pasta dinner example: You plan to have pasta, tossed salad, and an 8-ounce glass of milk  Your healthcare provider tells you that you may have 4 carbohydrate servings for dinner  One carbohydrate serving of pasta is ? cup  One cup of pasta will equal 3 carbohydrate servings  An 8-ounce glass of milk will count as 1 carbohydrate serving  These amounts of food would equal 4 carbohydrate servings  One cup of tossed salad does not count toward your carbohydrate servings  · Count carbohydrate amounts using food labels:  Find the total amount of carbohydrate in a packaged food by reading the food label  Food labels tell you the serving size of the food and the total carbohydrate amount in each serving  Find the serving size on the food label and then decide how many servings you will eat  Multiply the number of servings you plan to eat by the carbohydrate amount per serving  ¨ Granola bar snack example: Your meal plan allows you to have 2 carbohydrate servings (30 grams) of carbohydrate for a snack  You plan to eat 1 package of granola bars, which contains 2 bars  According to the food label, the serving size of food in this package is 1 bar  Each serving (1 bar) contains 25 grams of carbohydrate  The total amount of carbohydrate in this package of granola bars would be 50 g  Based on your meal plan, you should eat only 1 bar  CARE AGREEMENT:   You have the right to help plan your care  Discuss treatment options with your caregivers to decide what care you want to receive  You always have the right to refuse treatment  The above information is an  only  It is not intended as medical advice for individual conditions or treatments   Talk to your doctor, nurse or pharmacist before following any medical regimen to see if it is safe and effective for you  © 2017 [employer] Information is for End User's use only and may not be sold, redistributed or otherwise used for commercial purposes  All illustrations and images included in CareNotes® are the copyrighted property of [employer]  or [employer]

## 2019-08-01 ENCOUNTER — OFFICE VISIT (OUTPATIENT)
Dept: PHYSICAL THERAPY | Facility: CLINIC | Age: 64
End: 2019-08-01
Payer: COMMERCIAL

## 2019-08-01 DIAGNOSIS — M79.652 PAIN OF LEFT THIGH: ICD-10-CM

## 2019-08-01 DIAGNOSIS — M54.5 CHRONIC BILATERAL LOW BACK PAIN, WITH SCIATICA PRESENCE UNSPECIFIED: Primary | ICD-10-CM

## 2019-08-01 DIAGNOSIS — G89.29 CHRONIC BILATERAL LOW BACK PAIN, WITH SCIATICA PRESENCE UNSPECIFIED: Primary | ICD-10-CM

## 2019-08-01 DIAGNOSIS — M25.512 ACUTE PAIN OF LEFT SHOULDER: ICD-10-CM

## 2019-08-01 PROCEDURE — 97110 THERAPEUTIC EXERCISES: CPT

## 2019-08-01 PROCEDURE — 97112 NEUROMUSCULAR REEDUCATION: CPT

## 2019-08-01 PROCEDURE — 97140 MANUAL THERAPY 1/> REGIONS: CPT

## 2019-08-01 NOTE — PROGRESS NOTES
Daily Note     Today's date: 2019  Patient name: Yanci Muller  : 1955  MRN: [id number]  Referring provider: Bernarda Geller MD  Dx:   Encounter Diagnosis     ICD-10-CM    1  Chronic bilateral low back pain, with sciatica presence unspecified M54 5     G89 29    2  Acute pain of left shoulder M25 512    3  Pain of left thigh M79 652                   Subjective: Patient reports no pain coming into therapy today      Objective: See treatment diary below    Precautions  PMH Lumbar Laminectomy        Manual     Bilateral hip and LE stretching with manual lumbar traction  15 min  15 min  15 min  15 min                                       Exercise Diary     Nu-step  10 min L5  10 min L5 10 min L5 10 min L5 10 min L5   HR/TR        Stand lumbar extension  2x10   3" Hold  2x10   3" hold 2x10 3" hd 10x 10" hold 10x 10" hold   Stand OAL  2x10 Bilat  2x10 Bilat  2x10 Bilat 15x 3" hold  15x 3" hold    Monster walk  6x10 Feet   Green  6x 10 Feet   Green  6x 10 ft Green 6x 10 feet light blue  6x 10 feet green   Side step and squat  6x10 Feet   Green  8x 10 Feet  Green  4x 10 ft Green 4x 10 feet light blue  4x 10 feet   green   High knee walk   Leg and calf press   10x Each   40#  2x10 40# ea     TB Side step with resist trunk rotation  Arms Out  15x Bilat   Single Blue  15x each way  Single Blue   15x ea way Single Blue 10x each way  Single blue  15x each way  Single blue    Tandem and side step walk on foam  4x10 Feet  NT      Front and lateral step up  15x Bilat   6" step  2x10 Bilateral  6" step  2x10 Bilateral 6" Step 10x bilateral  6" step x10 bilateral  6" step   Step over   Hip AB/AD machine   45+1#/2x10   45#/2x10  45+1# AB  45# AD  2x10     DKTC with Tball 10x 5" Hold  10x 5" hold 20x 3-5" hd 20x 5" hold  20x 5" hd   Bridge with Tball        Supine Tball ABD crunch  10x3" Hold   Orange Tball  10x3" Hold   Orange Tball  10x 3-5" hd     Seated Tball LAQ and hip march         Seated Tball trunk rotation and PNF                                                     Modalities 7/29 7/31 8/1 7/25   MHP to the LB in seated  15 min  15 min  15 min  15 min                             Assessment: Tolerated treatment well  PT notes continuation of progression of TE program with focus on lumbar stabilization and manual therapy to decrease pain levels and improve functional limitations to meet therapy goals  Plan: Continue per plan of care

## 2019-08-06 ENCOUNTER — EVALUATION (OUTPATIENT)
Dept: PHYSICAL THERAPY | Facility: CLINIC | Age: 64
End: 2019-08-06
Payer: COMMERCIAL

## 2019-08-06 DIAGNOSIS — M25.512 ACUTE PAIN OF LEFT SHOULDER: ICD-10-CM

## 2019-08-06 DIAGNOSIS — M79.652 PAIN OF LEFT THIGH: ICD-10-CM

## 2019-08-06 DIAGNOSIS — G89.29 CHRONIC BILATERAL LOW BACK PAIN, WITH SCIATICA PRESENCE UNSPECIFIED: Primary | ICD-10-CM

## 2019-08-06 DIAGNOSIS — M54.5 CHRONIC BILATERAL LOW BACK PAIN, WITH SCIATICA PRESENCE UNSPECIFIED: Primary | ICD-10-CM

## 2019-08-06 PROCEDURE — 97110 THERAPEUTIC EXERCISES: CPT | Performed by: PHYSICAL THERAPIST

## 2019-08-06 PROCEDURE — 97140 MANUAL THERAPY 1/> REGIONS: CPT | Performed by: PHYSICAL THERAPIST

## 2019-08-06 PROCEDURE — 97112 NEUROMUSCULAR REEDUCATION: CPT | Performed by: PHYSICAL THERAPIST

## 2019-08-08 ENCOUNTER — OFFICE VISIT (OUTPATIENT)
Dept: PHYSICAL THERAPY | Facility: CLINIC | Age: 64
End: 2019-08-08
Payer: COMMERCIAL

## 2019-08-08 DIAGNOSIS — M79.652 PAIN OF LEFT THIGH: ICD-10-CM

## 2019-08-08 DIAGNOSIS — M25.512 ACUTE PAIN OF LEFT SHOULDER: ICD-10-CM

## 2019-08-08 DIAGNOSIS — G89.29 CHRONIC BILATERAL LOW BACK PAIN, WITH SCIATICA PRESENCE UNSPECIFIED: Primary | ICD-10-CM

## 2019-08-08 DIAGNOSIS — M54.5 CHRONIC BILATERAL LOW BACK PAIN, WITH SCIATICA PRESENCE UNSPECIFIED: Primary | ICD-10-CM

## 2019-08-08 PROCEDURE — 97112 NEUROMUSCULAR REEDUCATION: CPT

## 2019-08-08 PROCEDURE — 97110 THERAPEUTIC EXERCISES: CPT

## 2019-08-08 PROCEDURE — 97140 MANUAL THERAPY 1/> REGIONS: CPT

## 2019-08-08 NOTE — PROGRESS NOTES
Daily Note     Today's date: 2019  Patient name: Greer Quezada  : 1955  MRN: [id number]  Referring provider: Jose Luis Sousa MD  Dx:   Encounter Diagnosis     ICD-10-CM    1  Chronic bilateral low back pain, with sciatica presence unspecified M54 5     G89 29    2  Acute pain of left shoulder M25 512    3  Pain of left thigh M79 652                   Subjective: patient stated he has some pain but always has some pain  Patient said he feels like his flexibility is getting better and bending is getting easier  Objective: See treatment diary below      Assessment: Tolerated treatment well  Patient demonstrated fatigue post treatment, exhibited good technique with therapeutic exercises and would benefit from continued PT      Plan: Continue per plan of care  Progress treatment as tolerated         Precautions  PMH Lumbar Laminectomy         Manual     Bilateral hip and LE stretching with manual lumbar traction  15 min  15 min  15 min 15 min  15 min                                                             Exercise Diary     Nu-step  10 min L5  10 min L5 10 min L5 L5 10 min  L6 10 min    HR/TR           Stand lumbar extension  2x10   3" Hold  2x10   3" hold 2x10 3" hd 2x10 3" hd 2x10 3" hold   Stand OAL  2x10 Bilat  2x10 Bilat  2x10 Bilat 2x10 B/L  2x10 3" hold    Monster walk  6x10 Feet   Green  6x 10 Feet   Green  6x 10 ft Green 6x10 ft  Blue  6x 10 ft  Blue    Side step and squat  6x10 Feet   Green  8x 10 Feet  Green  4x 10 ft Green 6x10 ft  Blue  6x 10 ft  Blue    High knee walk    Leg and calf press   10x Each   40#  2x10 40# ea 2x10 ea   50# 2x10 each  50#   TB Side step with resist trunk rotation  Arms Out  15x Bilat   Single Blue  15x each way  Single Blue   15x ea way Single Blue 15x ea  Double blue  15x ea  Double bliue    Tandem and side step walk on foam  4x10 Feet  NT       Front and lateral step up  15x Bilat   6" step  2x10 Bilateral  6" step  2x10 Bilateral 6" Step 2x10 B/L  6" step  2x10 B/L  6" step   Step over    Hip AB/AD machine   45+1#/2x10   45#/2x10  45+1# AB  45# AD  2x10 2x10 ea  45+1# AB/AD 2x10 ea  45+1#  AB/AD   DKTC with Tball 10x 5" Hold  10x 5" hold 20x 3-5" hd 20x 3-5" hd 20x 3-5" hold   Bridge with Tball           Supine Tball ABD crunch  10x3" Hold   Orange Tball  10x3" Hold   Orange Tball  10x 3-5" hd 20x 3" hd  20x 3" hold   Seated Tball LAQ and hip march            Seated Tball trunk rotation and PNF                                                                              Modalities 7/29 7/31 8/1 8/6 8/8   MHP to the LB in seated  15 min  15 min  15 min 15 min  15 min

## 2019-08-12 ENCOUNTER — OFFICE VISIT (OUTPATIENT)
Dept: PHYSICAL THERAPY | Facility: CLINIC | Age: 64
End: 2019-08-12
Payer: COMMERCIAL

## 2019-08-12 DIAGNOSIS — G89.29 CHRONIC BILATERAL LOW BACK PAIN, WITH SCIATICA PRESENCE UNSPECIFIED: Primary | ICD-10-CM

## 2019-08-12 DIAGNOSIS — M54.5 CHRONIC BILATERAL LOW BACK PAIN, WITH SCIATICA PRESENCE UNSPECIFIED: Primary | ICD-10-CM

## 2019-08-12 DIAGNOSIS — M25.512 ACUTE PAIN OF LEFT SHOULDER: ICD-10-CM

## 2019-08-12 DIAGNOSIS — M79.652 PAIN OF LEFT THIGH: ICD-10-CM

## 2019-08-12 PROCEDURE — 97110 THERAPEUTIC EXERCISES: CPT

## 2019-08-12 PROCEDURE — 97140 MANUAL THERAPY 1/> REGIONS: CPT

## 2019-08-12 NOTE — PROGRESS NOTES
Daily Note     Today's date: 2019  Patient name: Ra Castro  : 1955  MRN: [id number]  Referring provider: Alison Coelho MD  Dx:   Encounter Diagnosis     ICD-10-CM    1  Chronic bilateral low back pain, with sciatica presence unspecified M54 5     G89 29    2  Acute pain of left shoulder M25 512    3  Pain of left thigh M79 652                   Subjective: patient c/o pain in left hip radiating down knee since last Thursday lasting into weekend  Patient stated he is back to normal pain but stiff  Objective: See treatment diary below      Assessment: Tolerated treatment well  Patient exhibited good technique with therapeutic exercises  No exacerbation of sx with program Stiffness decreased with exercise  Patient would benefit from continued therapy to decrease pain and increase strength and flexibility to improve LOF  Plan: Continue per plan of care  Progress treatment as tolerated         Precautions  PMH Lumbar Laminectomy         Manual     Bilateral hip and LE stretching with manual lumbar traction  15 min     15 min                                                             Exercise Diary     Nu-step  10 min L6     L6 10 min    HR/TR         Stand lumbar extension  2x10   3" Hold     2x10 3" hold   Stand OAL  2x10 Bilat     2x10 3" hold    Monster walk  6x10 Feet   Blue    6x 10 ft  Blue    Side step and squat  6x10 Feet   Blue     6x 10 ft  Blue    Leg and Calf press   50#  2x10 each     2x10 each  50#   TB Side step with resist trunk rotation  Arms Out  15x Bilat   Double  Blue     15x ea  Double bliue    Tandem and side step walk on foam         Front and lateral step up  2x10 Bilat   6" step     2x10 B/L  6" step   Hip abd/add machine    2x10 ea      2x10 ea  45+1#  AB/AD   DKTC with Tball 20xx 5" Hold     20x 3-5" hold   Bridge with Tball         Supine Tball ABD crunch  2x10 3" Hold   Orange Tball     20x 3" hold   Seated Tball LAQ and hip march          Seated Tball trunk rotation and PNF                                                                  Modalities 8/12 8/8   MHP to the LB in seated  15 min     15 min

## 2019-08-13 ENCOUNTER — OFFICE VISIT (OUTPATIENT)
Dept: PHYSICAL THERAPY | Facility: CLINIC | Age: 64
End: 2019-08-13
Payer: COMMERCIAL

## 2019-08-13 DIAGNOSIS — E78.5 HYPERLIPIDEMIA, UNSPECIFIED HYPERLIPIDEMIA TYPE: ICD-10-CM

## 2019-08-13 DIAGNOSIS — G89.29 CHRONIC BILATERAL LOW BACK PAIN, WITH SCIATICA PRESENCE UNSPECIFIED: Primary | ICD-10-CM

## 2019-08-13 DIAGNOSIS — M25.512 ACUTE PAIN OF LEFT SHOULDER: ICD-10-CM

## 2019-08-13 DIAGNOSIS — M54.5 CHRONIC BILATERAL LOW BACK PAIN, WITH SCIATICA PRESENCE UNSPECIFIED: Primary | ICD-10-CM

## 2019-08-13 DIAGNOSIS — M79.652 PAIN OF LEFT THIGH: ICD-10-CM

## 2019-08-13 PROCEDURE — 97110 THERAPEUTIC EXERCISES: CPT

## 2019-08-13 PROCEDURE — 97140 MANUAL THERAPY 1/> REGIONS: CPT

## 2019-08-13 RX ORDER — ATORVASTATIN CALCIUM 20 MG/1
TABLET, FILM COATED ORAL
Qty: 90 TABLET | Refills: 3 | Status: SHIPPED | OUTPATIENT
Start: 2019-08-13 | End: 2020-08-06

## 2019-08-13 NOTE — PROGRESS NOTES
Daily Note     Today's date: 2019  Patient name: Michaela Alexandre  : 1955  MRN: [id number]  Referring provider: Lauri Venegas MD  Dx:   Encounter Diagnosis     ICD-10-CM    1  Chronic bilateral low back pain, with sciatica presence unspecified M54 5     G89 29    2  Acute pain of left shoulder M25 512    3  Pain of left thigh M79 652                   Subjective: "I feel great today  I am walking the best I have walked in a while "      Objective: See treatment diary below      Assessment: Tolerated treatment well  Patient demonstrated fatigue post treatment      Plan: Continue per plan of care        Precautions  PMH Lumbar Laminectomy         Manual     Bilateral hip and LE stretching with manual lumbar traction  15 min  15 min   15 min                                                             Exercise Diary     Nu-step  10 min L6  10 min L6   L6 10 min    HR/TR         Stand lumbar extension  2x10   3" Hold  2x10 3" hd   2x10 3" hold   Stand OAL  2x10 Bilat  2x10 Bilat   2x10 3" hold    Monster walk  6x10 Feet   Blue 6x 10 ft Blue   6x 10 ft  Blue    Side step and squat  6x10 Feet   Blue  6x 10 ft Blue   6x 10 ft  Blue    Leg and Calf press   50#  2x10 each  50# 2x10 ea   2x10 each  50#   TB Side step with resist trunk rotation  Arms Out  15x Bilat   Double  Blue  15x Bilat Double Blue   15x ea  Double bliue    Tandem and side step walk on foam         Front and lateral step up  2x10 Bilat   6" step  Step up & Over 10x ea 6" Fwd/Lat   2x10 B/L  6" step   Hip abd/add machine    2x10 ea   3x10 45+1# AB/AD ea   2x10 ea  45+1#  AB/AD   DKTC with Tball 20x 5" Hold  20x 5" hold   20x 3-5" hold   Bridge with Tball         Supine Tball ABD crunch  2x10 3" Hold   Orange Tball  2x10 3" hd   20x 3" hold   Seated Tball LAQ and hip march    NV      Seated Tball trunk rotation and PNF                                                                  Modalities  MHP to the LB in seated  15 min  15 min   15 min

## 2019-08-19 ENCOUNTER — OFFICE VISIT (OUTPATIENT)
Dept: PHYSICAL THERAPY | Facility: CLINIC | Age: 64
End: 2019-08-19
Payer: COMMERCIAL

## 2019-08-19 DIAGNOSIS — G89.29 CHRONIC BILATERAL LOW BACK PAIN, WITH SCIATICA PRESENCE UNSPECIFIED: Primary | ICD-10-CM

## 2019-08-19 DIAGNOSIS — M54.5 CHRONIC BILATERAL LOW BACK PAIN, WITH SCIATICA PRESENCE UNSPECIFIED: Primary | ICD-10-CM

## 2019-08-19 DIAGNOSIS — M25.512 ACUTE PAIN OF LEFT SHOULDER: ICD-10-CM

## 2019-08-19 DIAGNOSIS — M79.652 PAIN OF LEFT THIGH: ICD-10-CM

## 2019-08-19 PROCEDURE — 97140 MANUAL THERAPY 1/> REGIONS: CPT

## 2019-08-19 PROCEDURE — 97110 THERAPEUTIC EXERCISES: CPT

## 2019-08-19 NOTE — PROGRESS NOTES
Daily Note     Today's date: 2019  Patient name: Sabine Kim  : 1955  MRN: [id number]  Referring provider: Nellie Maki MD  Dx:   Encounter Diagnosis     ICD-10-CM    1  Chronic bilateral low back pain, with sciatica presence unspecified M54 5     G89 29    2  Acute pain of left shoulder M25 512    3  Pain of left thigh M79 652                   Subjective: Patient states he is doing better with short distances, but tends to limp following extended walking  Objective: See treatment diary below      Assessment: Tolerated treatment well  Patient demonstrated fatigue post treatment      Plan: Progress treatment as tolerated         Precautions  PMH Lumbar Laminectomy         Manual     Bilateral hip and LE stretching with manual lumbar traction  15 min  15 min 15 min  15 min                                                             Exercise Diary     Nu-step  10 min L6  10 min L6 10 min L6  L6 10 min    HR/TR         Stand lumbar extension  2x10   3" Hold  2x10 3" hd 2x10 3" hd  2x10 3" hold   Stand OAL  2x10 Bilat  2x10 Bilat 2x10 Bilat  2x10 3" hold    Monster walk  6x10 Feet   Blue 6x 10 ft Blue 6x 10 ft Blue  6x 10 ft  Blue    Side step and squat  6x10 Feet   Blue  6x 10 ft Blue 6x 10 ft Blue  6x 10 ft  Blue    Leg and Calf press   50#  2x10 each  50# 2x10 ea 60# 2x10 ea  2x10 each  50#   TB Side step with resist trunk rotation  Arms Out  15x Bilat   Double  Blue  15x Bilat Double Blue 2x10 Bilat Double Blue  15x ea  Double bliue    Tandem and side step walk on foam         Front and lateral step up  2x10 Bilat   6" step  Step up & Over 10x ea 6" Fwd/Lat Step Up & Over 10x ea Fwd/Lat  2x10 B/L  6" step   Hip abd/add machine    2x10 ea   3x10 45+1# AB/AD ea 3x10 45+1# AB/AD ea  2x10 ea  45+1#  AB/AD   DKTC with Tball 20x 5" Hold  20x 5" hold 20x 5" hd  20x 3-5" hold   Bridge with Tball         Supine Tball ABD crunch  2x10 3" Hold   Orange Tball 2x10 3" hd 2x10 3" hd  20x 3" hold   Seated Tball LAQ and hip march          Seated Tball trunk rotation and PNF                                                                  Modalities 8/12 8/13 8/19 8/8   MHP to the LB in seated  15 min  15 min 15 min  15 min

## 2019-08-20 ENCOUNTER — OFFICE VISIT (OUTPATIENT)
Dept: PHYSICAL THERAPY | Facility: CLINIC | Age: 64
End: 2019-08-20
Payer: COMMERCIAL

## 2019-08-20 DIAGNOSIS — M54.5 CHRONIC BILATERAL LOW BACK PAIN, WITH SCIATICA PRESENCE UNSPECIFIED: Primary | ICD-10-CM

## 2019-08-20 DIAGNOSIS — M79.652 PAIN OF LEFT THIGH: ICD-10-CM

## 2019-08-20 DIAGNOSIS — G89.29 CHRONIC BILATERAL LOW BACK PAIN, WITH SCIATICA PRESENCE UNSPECIFIED: Primary | ICD-10-CM

## 2019-08-20 DIAGNOSIS — M25.512 ACUTE PAIN OF LEFT SHOULDER: ICD-10-CM

## 2019-08-20 PROCEDURE — 97110 THERAPEUTIC EXERCISES: CPT

## 2019-08-20 PROCEDURE — 97140 MANUAL THERAPY 1/> REGIONS: CPT

## 2019-08-20 NOTE — PROGRESS NOTES
Daily Note     Today's date: 2019  Patient name: Allan Jeffries  : 1955  MRN: [id number]  Referring provider: Jairo Sanders MD  Dx:   Encounter Diagnosis     ICD-10-CM    1  Chronic bilateral low back pain, with sciatica presence unspecified M54 5     G89 29    2  Acute pain of left shoulder M25 512    3  Pain of left thigh M79 652                   Subjective: Pt reports "I'm good right now, no pain "       Objective: See treatment diary below      Assessment: Tolerated treatment well  Patient demonstrated fatigue post treatment      Plan: Continue per plan of care        Precautions  PMH Lumbar Laminectomy         Manual     Bilateral hip and LE stretching with manual lumbar traction  15 min  15 min 15 min 15 min 15 min                                                             Exercise Diary     Nu-step  10 min L6  10 min L6 10 min L6 L6 10 min  L6 10 min    HR/TR         Stand lumbar extension  2x10   3" Hold  2x10 3" hd 2x10 3" hd 2x10 3" hd 2x10 3" hold   Stand OAL  2x10 Bilat  2x10 Bilat 2x10 Bilat 2x10 Bilat 2x10 3" hold    Monster walk  6x10 Feet   Blue 6x 10 ft Blue 6x 10 ft Blue 6x 10 ft Blue 6x 10 ft  Blue    Side step and squat  6x10 Feet   Blue  6x 10 ft Blue 6x 10 ft Blue 6x 10 ft Blue 6x 10 ft  Blue    Leg and Calf press   50#  2x10 each  50# 2x10 ea 60# 2x10 ea 60# 2x10 2x10 each  50#   TB Side step with resist trunk rotation  Arms Out  15x Bilat   Double  Blue  15x Bilat Double Blue 2x10 Bilat Double Blue 2x10 Bilat Double Blue 15x ea  Double bliue    Tandem and side step walk on foam         Front and lateral step up  2x10 Bilat   6" step  Step up & Over 10x ea 6" Fwd/Lat Step Up & Over 10x ea Fwd/Lat 2x10 B/L 6" Step 2x10 B/L  6" step   Hip abd/add machine    2x10 ea   3x10 45+1# AB/AD ea 3x10 45+1# AB/AD ea 2x10 60# AB  2x10 60# AD 2x10 ea  45+1#  AB/AD   DKTC with Tball 20x 5" Hold  20x 5" hold 20x 5" hd 20x 5" hd 20x 3-5" hold   Bridge with Tball         Supine Tball ABD crunch  2x10 3" Hold   Orange Tball  2x10 3" hd 2x10 3" hd 2x10 3" hd 20x 3" hold   Seated Tball LAQ and hip march          Seated Tball trunk rotation and PNF                                                                  Modalities 8/12 8/13 8/19 8/20 8/8   MHP to the LB in seated  15 min  15 min 15 min 15 min 15 min

## 2019-08-21 ENCOUNTER — APPOINTMENT (OUTPATIENT)
Dept: PHYSICAL THERAPY | Facility: CLINIC | Age: 64
End: 2019-08-21
Payer: COMMERCIAL

## 2019-08-22 ENCOUNTER — OFFICE VISIT (OUTPATIENT)
Dept: PHYSICAL THERAPY | Facility: CLINIC | Age: 64
End: 2019-08-22
Payer: COMMERCIAL

## 2019-08-22 DIAGNOSIS — G89.29 CHRONIC BILATERAL LOW BACK PAIN, WITH SCIATICA PRESENCE UNSPECIFIED: Primary | ICD-10-CM

## 2019-08-22 DIAGNOSIS — M79.652 PAIN OF LEFT THIGH: ICD-10-CM

## 2019-08-22 DIAGNOSIS — M25.512 ACUTE PAIN OF LEFT SHOULDER: ICD-10-CM

## 2019-08-22 DIAGNOSIS — M54.5 CHRONIC BILATERAL LOW BACK PAIN, WITH SCIATICA PRESENCE UNSPECIFIED: Primary | ICD-10-CM

## 2019-08-22 PROCEDURE — 97140 MANUAL THERAPY 1/> REGIONS: CPT

## 2019-08-22 PROCEDURE — 97110 THERAPEUTIC EXERCISES: CPT

## 2019-08-22 NOTE — PROGRESS NOTES
Daily Note     Today's date: 2019  Patient name: Melissa Mendez  : 1955  MRN: [id number]  Referring provider: Zach Carter MD  Dx:   Encounter Diagnosis     ICD-10-CM    1  Chronic bilateral low back pain, with sciatica presence unspecified M54 5     G89 29    2  Acute pain of left shoulder M25 512    3  Pain of left thigh M79 652                   Subjective: nothing new to report      Objective: See treatment diary below      Assessment: Patient progressing well towards goals  Good execution of exercise with program  Patient demonstrated the appropriate amount of fatigue for program   Will continue to monitor pain levels and progress as able      Plan: Continue per plan of care  Progress treatment as tolerated         Precautions  PMH Lumbar Laminectomy         Manual     Bilateral hip and LE stretching with manual lumbar traction  15 min  15 min 15 min 15 min 15 min                                                             Exercise Diary     Nu-step  10 min L6  10 min L6 10 min L6 L6 10 min  L6 10 min    HR/TR         Stand lumbar extension  2x10   3" Hold  2x10 3" hd 2x10 3" hd 2x10 3" hd 2x10 3" hold   Stand OAL  2x10 Bilat  2x10 Bilat 2x10 Bilat 2x10 Bilat 2x10 3" hold    Monster walk  6x10 Feet   Blue 6x 10 ft Blue 6x 10 ft Blue 6x 10 ft Blue 6x 10 ft  Blue    Side step and squat  6x10 Feet   Blue  6x 10 ft Blue 6x 10 ft Blue 6x 10 ft Blue 6x 10 ft  Blue    Leg and Calf press   50#  2x10 each  50# 2x10 ea 60# 2x10 ea 60# 2x10 2x10 each  60#   TB Side step with resist trunk rotation  Arms Out  15x Bilat   Double  Blue  15x Bilat Double Blue 2x10 Bilat Double Blue 2x10 Bilat Double Blue 15x ea  Double bliue    Tandem and side step walk on foam         Front and lateral step up  2x10 Bilat   6" step  Step up & Over 10x ea 6" Fwd/Lat Step Up & Over 10x ea Fwd/Lat 2x10 B/L 6" Step 2x10 B/L  6" step   Hip abd/add machine    2x10 ea   3x10 45+1# AB/AD ea 3x10 45+1# AB/AD ea 2x10 60# AB  2x10 60# AD 2x10 ea  60+1#  AB/AD   DKTC with Tball 20x 5" Hold  20x 5" hold 20x 5" hd 20x 5" hd 20x 3-5" hold   Bridge with Tball         Supine Tball ABD crunch  2x10 3" Hold   Orange Tball  2x10 3" hd 2x10 3" hd 2x10 3" hd 20x 3" hold   Seated Tball LAQ and hip march          Seated Tball trunk rotation and PNF                                                                  Modalities 8/12 8/13 8/19 8/20 8/22     MHP to the LB in seated  15 min  15 min 15 min 15 min 15 min

## 2019-08-26 ENCOUNTER — OFFICE VISIT (OUTPATIENT)
Dept: PHYSICAL THERAPY | Facility: CLINIC | Age: 64
End: 2019-08-26
Payer: COMMERCIAL

## 2019-08-26 DIAGNOSIS — M54.5 CHRONIC BILATERAL LOW BACK PAIN, WITH SCIATICA PRESENCE UNSPECIFIED: Primary | ICD-10-CM

## 2019-08-26 DIAGNOSIS — M25.512 ACUTE PAIN OF LEFT SHOULDER: ICD-10-CM

## 2019-08-26 DIAGNOSIS — M79.652 PAIN OF LEFT THIGH: ICD-10-CM

## 2019-08-26 DIAGNOSIS — G89.29 CHRONIC BILATERAL LOW BACK PAIN, WITH SCIATICA PRESENCE UNSPECIFIED: Primary | ICD-10-CM

## 2019-08-26 PROCEDURE — 97110 THERAPEUTIC EXERCISES: CPT

## 2019-08-26 PROCEDURE — 97140 MANUAL THERAPY 1/> REGIONS: CPT

## 2019-08-26 NOTE — PROGRESS NOTES
Daily Note     Today's date: 2019  Patient name: Marlys Argueta  : 1955  MRN: [id number]  Referring provider: Vu Barton MD  Dx:   Encounter Diagnosis     ICD-10-CM    1  Chronic bilateral low back pain, with sciatica presence unspecified M54 5     G89 29    2  Acute pain of left shoulder M25 512    3  Pain of left thigh M79 652                   Subjective: patient denied today and stated having a good weekend  Objective: See treatment diary below      Assessment: Patient progressing well towards goals  Good execution of exercise with program  Patient demonstrated the appropriate amount of fatigue for program   Will continue to monitor pain levels and progress as able      Plan: Continue per plan of care  Progress treatment as tolerated         Precautions  PMH Lumbar Laminectomy         Manual     Bilateral hip and LE stretching with manual lumbar traction  15 min  15 min 15 min 15 min 15 min                                                             Exercise Diary     Nu-step  10 min L6  10 min L6 10 min L6 L6 10 min  L6 10 min    HR/TR         Stand lumbar extension  2x10   3" Hold  2x10 3" hd 2x10 3" hd 2x10 3" hd 2x10 3" hold   Stand OAL  2x10 Bilat  2x10 Bilat 2x10 Bilat 2x10 Bilat 2x10 3" hold    Monster walk  6x10 Feet   Blue 6x 10 ft Blue 6x 10 ft Blue 6x 10 ft Blue 6x 10 ft  Blue    Side step and squat  6x10 Feet   Blue  6x 10 ft Blue 6x 10 ft Blue 6x 10 ft Blue 6x 10 ft  Blue    Leg and Calf press  60 #  2x10 each  50# 2x10 ea 60# 2x10 ea 60# 2x10 2x10 each  60#   TB Side step with resist trunk rotation  Arms Out  2x10 Bilat   Double  Blue  15x Bilat Double Blue 2x10 Bilat Double Blue 2x10 Bilat Double Blue 15x ea  Double bliue    Tandem and side step walk on foam         Front and lateral step up  2x10 Bilat   8" step  Step up & Over 10x ea 6" Fwd/Lat Step Up & Over 10x ea Fwd/Lat 2x10 B/L 6" Step 2x10 B/L  6" step   Hip abd/add machine    2x10 ea  60+1#     3x10 45+1# AB/AD ea 3x10 45+1# AB/AD ea 2x10 60# AB  2x10 60# AD 2x10 ea  60+1#  AB/AD   DKTC with Tball 20x 5" Hold  20x 5" hold 20x 5" hd 20x 5" hd 20x 3-5" hold   Bridge with Tball         Supine Tball ABD crunch  2x10 3" Hold   Orange Tball  2x10 3" hd 2x10 3" hd 2x10 3" hd 20x 3" hold   Seated Tball LAQ and hip march          Seated Tball trunk rotation and PNF                                                                  Modalities 8/26 8/13 8/19 8/20 8/22     MHP to the LB in seated  15 min  15 min 15 min 15 min 15 min

## 2019-08-28 ENCOUNTER — OFFICE VISIT (OUTPATIENT)
Dept: PHYSICAL THERAPY | Facility: CLINIC | Age: 64
End: 2019-08-28
Payer: COMMERCIAL

## 2019-08-28 DIAGNOSIS — M25.512 ACUTE PAIN OF LEFT SHOULDER: ICD-10-CM

## 2019-08-28 DIAGNOSIS — G89.29 CHRONIC BILATERAL LOW BACK PAIN, WITH SCIATICA PRESENCE UNSPECIFIED: Primary | ICD-10-CM

## 2019-08-28 DIAGNOSIS — M54.5 CHRONIC BILATERAL LOW BACK PAIN, WITH SCIATICA PRESENCE UNSPECIFIED: Primary | ICD-10-CM

## 2019-08-28 DIAGNOSIS — M79.652 PAIN OF LEFT THIGH: ICD-10-CM

## 2019-08-28 PROCEDURE — 97140 MANUAL THERAPY 1/> REGIONS: CPT

## 2019-08-28 PROCEDURE — 97110 THERAPEUTIC EXERCISES: CPT

## 2019-08-28 NOTE — PROGRESS NOTES
Daily Note     Today's date: 2019  Patient name: Marlys Argueta  : 1955  MRN: [id number]  Referring provider: Vu Barton MD  Dx:   Encounter Diagnosis     ICD-10-CM    1  Chronic bilateral low back pain, with sciatica presence unspecified M54 5     G89 29    2  Acute pain of left shoulder M25 512    3  Pain of left thigh M79 652                   Subjective: patient noticed while he was laying on her right side that he is weak raising his left leg  Objective: See treatment diary below      Assessment: Patient progressing well towards goals  Added cable crossover walkouts to program with good tolerance  Good execution of exercise with program  Patient demonstrated the appropriate amount of fatigue for program   Will continue to monitor pain levels and progress as able      Plan: Continue per plan of care  Progress treatment as tolerated         Precautions  PMH Lumbar Laminectomy         Manual     Bilateral hip and LE stretching with manual lumbar traction  15 min  15 min 15 min 15 min 15 min                                                             Exercise Diary     Nu-step  10 min L6  10 min L7 10 min L6 L6 10 min  L6 10 min    HR/TR         Stand lumbar extension  2x10   3" Hold  2x10 3" hd 2x10 3" hd 2x10 3" hd 2x10 3" hold   Stand OAL  2x10 Bilat  2x10 Bilat 2x10 Bilat 2x10 Bilat 2x10 3" hold    Monster walk  6x10 Feet   Blue 6x 10 ft Blue 6x 10 ft Blue 6x 10 ft Blue 6x 10 ft  Blue    Side step and squat  6x10 Feet   Blue  6x 10 ft Blue 6x 10 ft Blue 6x 10 ft Blue 6x 10 ft  Blue    Leg and Calf press  60 #  2x10 each  60# 2x10 ea 60# 2x10 ea 60# 2x10 2x10 each  60#   TB Side step with resist trunk rotation  Arms Out  2x10 Bilat   Double  Blue   2x10 Bilat Double Blue 2x10 Bilat Double Blue 15x ea  Double bliue    4 way walkout at [facility]  5x each way  24#      Front and lateral step up  2x10 Bilat   8" step  Step up & Over 10x ea 8" Fwd/Lat Step Up & Over 10x ea Fwd/Lat 2x10 B/L 6" Step 2x10 B/L  6" step   Hip abd/add machine    2x10 ea  60+1#     3x10 60+1# AB/AD ea 3x10 45+1# AB/AD ea 2x10 60# AB  2x10 60# AD 2x10 ea  60+1#  AB/AD   DKTC with Tball 20x 5" Hold  20x 5" hold 20x 5" hd 20x 5" hd 20x 3-5" hold   Bridge with Tball         Supine Tball ABD crunch  2x10 3" Hold   Orange Tball  2x10 3" hd 2x10 3" hd 2x10 3" hd 20x 3" hold   Seated Tball LAQ and hip march          Seated Tball trunk rotation and PNF                                                                  Modalities 8/26 8/28 8/19 8/20 8/22     MHP to the LB in seated  15 min  15 min 15 min 15 min 15 min

## 2019-08-30 ENCOUNTER — OFFICE VISIT (OUTPATIENT)
Dept: PHYSICAL THERAPY | Facility: CLINIC | Age: 64
End: 2019-08-30
Payer: COMMERCIAL

## 2019-08-30 DIAGNOSIS — G89.29 CHRONIC BILATERAL LOW BACK PAIN, WITH SCIATICA PRESENCE UNSPECIFIED: Primary | ICD-10-CM

## 2019-08-30 DIAGNOSIS — M25.512 ACUTE PAIN OF LEFT SHOULDER: ICD-10-CM

## 2019-08-30 DIAGNOSIS — M79.652 PAIN OF LEFT THIGH: ICD-10-CM

## 2019-08-30 DIAGNOSIS — M54.5 CHRONIC BILATERAL LOW BACK PAIN, WITH SCIATICA PRESENCE UNSPECIFIED: Primary | ICD-10-CM

## 2019-08-30 PROCEDURE — 97110 THERAPEUTIC EXERCISES: CPT

## 2019-08-30 PROCEDURE — 97140 MANUAL THERAPY 1/> REGIONS: CPT

## 2019-08-30 NOTE — PROGRESS NOTES
Daily Note     Today's date: 2019  Patient name: Saira Sawyer  : 1955  MRN: [id number]  Referring provider: Laura Brown MD  Dx:   Encounter Diagnosis     ICD-10-CM    1  Chronic bilateral low back pain, with sciatica presence unspecified M54 5     G89 29    2  Acute pain of left shoulder M25 512    3  Pain of left thigh M79 652        Start Time: 08  Stop Time: 930  Total time in clinic (min): 85 minutes    Subjective: " i'm definitely getting stronger "      Objective: See treatment diary below      Assessment: Tolerated treatment well  Patient demonstrated fatigue post treatment and exhibited good technique with therapeutic exercises  No exacerbation of sx with program  Patient would benefit from continued therapy to decrease pain and increase strength and flexibility to improve LOF  Plan: Continue per plan of care  Progress treatment as tolerated         Precautions  PMH Lumbar Laminectomy         Manual     Bilateral hip and LE stretching with manual lumbar traction  15 min  15 min 15 min 15 min 15 min                                                             Exercise Diary     Nu-step  10 min L6  10 min L7 10 min L7 L6 10 min  L6 10 min    HR/TR         Stand lumbar extension  2x10   3" Hold  2x10 3" hd 2x10 3" hd 2x10 3" hd 2x10 3" hold   Stand OAL  2x10 Bilat  2x10 Bilat 2x10 Bilat 2x10 Bilat 2x10 3" hold    Monster walk  6x10 Feet   Blue 6x 10 ft Blue 6x 10 ft Blue 6x 10 ft Blue 6x 10 ft  Blue    Side step and squat  6x10 Feet   Blue  6x 10 ft Blue 6x 10 ft Blue 6x 10 ft Blue 6x 10 ft  Blue    Leg and Calf press  60 #  2x10 each  60# 2x10 ea 60# 2x10 ea 60# 2x10 2x10 each  60#   TB Side step with resist trunk rotation  Arms Out  2x10 Bilat   Double  Blue    2x10 Bilat Double Blue 15x ea  Double bliue    4 way walkout at [facility]  5x each way  24# 5x each way  24#     Front and lateral step up  2x10 Bilat   8" step Step up & Over 10x ea 8" Fwd/Lat Step Up & Over 10x ea Fwd/Lat 8" step 2x10 B/L 6" Step 2x10 B/L  6" step   Hip abd/add machine    2x10 ea  60+1#     3x10 60+1# AB/AD ea 3x10 60+1# AB/AD ea 2x10 60# AB  2x10 60# AD 2x10 ea  60+1#  AB/AD   DKTC with Tball 20x 5" Hold  20x 5" hold 20x 5" hd 20x 5" hd 20x 3-5" hold   Bridge with Tball         Supine Tball ABD crunch  2x10 3" Hold   Orange Tball  2x10 3" hd 2x10 3" hd 2x10 3" hd 20x 3" hold   Seated Tball LAQ and hip march          Seated Tball trunk rotation and PNF                                                                  Modalities 8/26 8/28 8/30 8/20 8/22     MHP to the LB in seated  15 min  15 min 15 min 15 min 15 min

## 2019-09-03 ENCOUNTER — EVALUATION (OUTPATIENT)
Dept: PHYSICAL THERAPY | Facility: CLINIC | Age: 64
End: 2019-09-03
Payer: COMMERCIAL

## 2019-09-03 DIAGNOSIS — M54.5 CHRONIC BILATERAL LOW BACK PAIN, WITH SCIATICA PRESENCE UNSPECIFIED: Primary | ICD-10-CM

## 2019-09-03 DIAGNOSIS — M25.512 ACUTE PAIN OF LEFT SHOULDER: ICD-10-CM

## 2019-09-03 DIAGNOSIS — M79.652 PAIN OF LEFT THIGH: ICD-10-CM

## 2019-09-03 DIAGNOSIS — G89.29 CHRONIC BILATERAL LOW BACK PAIN, WITH SCIATICA PRESENCE UNSPECIFIED: Primary | ICD-10-CM

## 2019-09-03 PROCEDURE — 97110 THERAPEUTIC EXERCISES: CPT

## 2019-09-03 PROCEDURE — 97140 MANUAL THERAPY 1/> REGIONS: CPT | Performed by: PHYSICAL THERAPIST

## 2019-09-03 PROCEDURE — 97140 MANUAL THERAPY 1/> REGIONS: CPT

## 2019-09-03 NOTE — PROGRESS NOTES
Daily Note     Today's date: 9/3/2019  Patient name: Nara Pizano  : 1955  MRN: [id number]  Referring provider: Sarah Beth Cheney MD  Dx:   Encounter Diagnosis     ICD-10-CM    1  Chronic bilateral low back pain, with sciatica presence unspecified M54 5     G89 29    2  Acute pain of left shoulder M25 512    3  Pain of left thigh M79 652                   Subjective: patient stated he feels about 80% better but still does not have the stamina walking  Objective: See treatment diary below      Assessment: Patient progressing well towards goals  Good execution of exercise with program  Patient demonstrated the appropriate amount of fatigue for program   Will continue to monitor pain levels and progress as able  See Re-Eval for details       Plan: Continue per plan of care  Progress treatment as tolerated  Precautions  PMH Lumbar Laminectomy         Manual  8/26 8/28 9/3     Bilateral hip and LE stretching with manual lumbar traction  15 min  15 min 15 min                                                               Exercise Diary  8/26 8/28 9/3     Nu-step  10 min L6  10 min L7 10 min L7     HR/TR         Stand lumbar extension  2x10   3" Hold  2x10 3" hd 2x10 3" hd     Stand OAL  2x10 Bilat  2x10 Bilat 2x10 Bilat     Monster walk  6x10 Feet   Blue 6x 10 ft Blue 6x 10 ft Blue D/C    Side step and squat  6x10 Feet   Blue  6x 10 ft Blue 6x 10 ft Blue     Push pull and cart   Start NV     Crate carry    Start NV     Leg and Calf press  60 #  2x10 each  60# 2x10 ea 60# 2x10 ea     TB Side step with resist trunk rotation  Arms Out  2x10 Bilat   Double  Blue        4 way walkout at [facility]  5x each way  24# 5x each way  24#     Front and lateral step up  2x10 Bilat   8" step  Step up & Over 10x ea 8" Fwd/Lat Step Up & Over 10x ea Fwd/Lat 8" step     Hip abd/add machine    2x10 ea  60+1#     3x10 60+1# AB/AD ea 3x10 60+1# AB/AD ea     DKTC with Tball 20x 5" Hold  20x 5" hold 20x 5" hd Bridge with Tball         Supine Tball ABD crunch  2x10 3" Hold   Orange Tball  2x10 3" hd 2x10 3" hd     Seated Tball LAQ and hip march          Seated Tball trunk rotation and PNF                                                                  Modalities 8/26 8/28 9/3     MHP to the LB in seated  15 min  15 min 15 min

## 2019-09-03 NOTE — PROGRESS NOTES
PT Re-Evaluation     Today's date: 9/3/2019  Patient name: Allan Jeffries  : 1955  MRN: [id number]  Referring provider: Jairo Sanders MD  Dx:   Encounter Diagnosis     ICD-10-CM    1  Chronic bilateral low back pain, with sciatica presence unspecified M54 5     G89 29    2  Acute pain of left shoulder M25 512    3  Pain of left thigh M79 652        Start Time: 0800          Assessment  Assessment details: PT notes the patient with continuation of decrease ROM and strength t/o lumbar spine and left hip with demonstration of antalgic gait pattern leading to functional limitations with need for continuation of skilled therapy for 2-3 weeks with focus on lumbar stabilization, gait/balance, manual therapy, posture, analgesic modalities, and update/review of HEP with progression to HEP/wellness program    Impairments: abnormal gait, abnormal or restricted ROM, activity intolerance, impaired balance, impaired physical strength, lacks appropriate home exercise program and pain with function  Understanding of Dx/Px/POC: good   Prognosis: good    Goals  ST  Initiate HEP- MET  2  Decrease symptoms by 25-50%-MET  3  Increase strength by 1-2 mm grades- MET  4  Increase ROM by 25-50%- MET  LT  Improve gait pattern and balance to good/normal-Partial MET  2  Decrease limitations with squatting, walking, and stair climbing activities-Partial MET  3  Return to recreational activities with minimal to no limitations-Partial MET  4   DC with HEP-Progressing     Plan  Plan details: PT notes review of POC and findings with patient who is in agreement with PT recommendations of continuation of skilled therapy     Patient would benefit from: skilled physical therapy and PT eval  Planned modality interventions: thermotherapy: hydrocollator packs  Planned therapy interventions: manual therapy, neuromuscular re-education, patient education, postural training, self care, strengthening, stretching, therapeutic exercise, home exercise program, graded exercise, functional ROM exercises, flexibility, gait training and balance  Frequency: 2x week  Duration in visits: 6  Duration in weeks: 3  Treatment plan discussed with: patient        Subjective Evaluation    History of Present Illness  Mechanism of injury: Patient reports development of right LB and hip pain about 5 months ago from insidious onset  Patient reports overtime the symptoms have worsened with increase pain traveling down the left leg further  Patient reports during that time development of left shoulder and arm pain with N/T traveling down the left arm into the hand  Patient reports the symptoms in the arm have recently dissipated with decrease pain levels in the left shoulder but continuation of occasional N/T in the left hand and forearm  Patient reports PMH of lumbar laminectomy in  with N/T in the left foot  The patient states continuation of low back pain with limitations with bending, lifting, squatting and ADL  Patient reports he did receive multiple treatments from a chiropractor for the low back symptoms with minimal change in status  Patient reports significant PMH of AAA, right knee bursa sac removal, and HBP  Patient reports he his retired at this time  Presently, the patient has attended 19 sessions of skilled therapy and reports approximately 80% overall improvement  Reports significant improvement in LE flexibility  No radiating symptoms in left UE or right LE  Patient reports limitations with prolonged walking and carrying of household objects leading to antalgic gait pattern  Patient feels he needs more therapy to continue to get the legs stronger and improve walking and carrying tolerance      Pain  Current pain ratin  At best pain ratin  At worst pain ratin  Location: Low Back Pain and left LE-Numbness/Tingling   Relieving factors: rest  Aggravating factors: lifting  Progression: improved    Treatments  Previous treatment: chiropractic and medication  Current treatment: medication and physical therapy  Patient Goals  Patient goals for therapy: decreased pain, improved balance, increased motion, increased strength, return to sport/leisure activities and independence with ADLs/IADLs          Objective     Postural Observations  Seated posture: fair  Standing posture: fair    Additional Postural Observation Details  PT notes flattening of the lumbar spine     Palpation   Left   Tenderness of the erector spinae and lumbar paraspinals  Right   Tenderness of the erector spinae and lumbar paraspinals       Additional Palpation Details  PT notes decrease spasm/tightness of the lumbar paraspinals from levels L3-S1     Neurological Testing     Reflexes   Left   Biceps (C5/C6): normal (2+)  Brachioradialis (C6): normal (2+)  Patellar (L4): normal (2+)  Achilles (S1): normal (2+)    Right   Biceps (C5/C6): normal (2+)  Brachioradialis (C6): normal (2+)  Patellar (L4): normal (2+)  Achilles (S1): normal (2+)    Active Range of Motion   Cervical/Thoracic Spine     Normal active range of motion  Left Shoulder   Normal active range of motion    Right Shoulder   Normal active range of motion    Lumbar   Flexion: 47 degrees  Restriction level: minimal  Extension: 21 degrees  Restriction level: minimal  Left lateral flexion: 20 degrees    Restriction level: minimal  Right lateral flexion: 20 degrees  Restriction level: minimal  Left rotation: 15 degrees  Restriction level: minimal  Right rotation: 15 degrees  Restriction level: minimal  Left Hip   Flexion: 63 degrees   External rotation (90/90): 35 degrees   Internal rotation (90/90): 15 degrees     Right Hip   Flexion: 65 degrees   External rotation (90/90): 35 degrees   Internal rotation (90/90): 15 degrees   Left Knee   Normal active range of motion    Right Knee   Normal active range of motion  Left Ankle/Foot   Normal active range of motion    Right Ankle/Foot   Normal active range of motion    Additional Active Range of Motion Details  PT notes the patient with decreased AROM t/o L-spine with muscular tightness reported at end ranges   PT notes decreased flexibility t/o BLE as well as decrease left LE strength     Strength/Myotome Testing     Left Shoulder   Normal muscle strength    Right Shoulder   Normal muscle strength    Left Hip   Planes of Motion   Flexion: 4+  Extension: 5  Abduction: 4  Adduction: 5  External rotation: 4  Internal rotation: 4+    Right Hip   Planes of Motion   Flexion: 5  Extension: 5  Abduction: 5  Adduction: 5  External rotation: 5  Internal rotation: 5    Left Knee   Flexion: 5  Extension: 4+    Right Knee   Flexion: 5  Extension: 5    Left Ankle/Foot   Dorsiflexion: 5  Plantar flexion: 5    Right Ankle/Foot   Normal strength  Dorsiflexion: 5  Plantar flexion: 5    Ambulation     Observational Gait   Walking speed within functional limits  Decreased stride length and left stance time  Additional Observational Gait Details  PT notes demonstration of impaired gait pattern with decrease stance on the left, decrease tibial advancement on the left, and decrease step length with rating of fair+ with static and dynamic activities                    Precautions  PMH Lumbar Laminectomy

## 2019-09-05 ENCOUNTER — OFFICE VISIT (OUTPATIENT)
Dept: PHYSICAL THERAPY | Facility: CLINIC | Age: 64
End: 2019-09-05
Payer: COMMERCIAL

## 2019-09-05 DIAGNOSIS — G89.29 CHRONIC BILATERAL LOW BACK PAIN, WITH SCIATICA PRESENCE UNSPECIFIED: Primary | ICD-10-CM

## 2019-09-05 DIAGNOSIS — M25.512 ACUTE PAIN OF LEFT SHOULDER: ICD-10-CM

## 2019-09-05 DIAGNOSIS — M54.5 CHRONIC BILATERAL LOW BACK PAIN, WITH SCIATICA PRESENCE UNSPECIFIED: Primary | ICD-10-CM

## 2019-09-05 DIAGNOSIS — M79.652 PAIN OF LEFT THIGH: ICD-10-CM

## 2019-09-05 PROCEDURE — 97110 THERAPEUTIC EXERCISES: CPT

## 2019-09-05 PROCEDURE — 97140 MANUAL THERAPY 1/> REGIONS: CPT

## 2019-09-05 NOTE — PROGRESS NOTES
Daily Note     Today's date: 2019  Patient name: Anna Miller  : 1955  MRN: [id number]  Referring provider: Torres Boothe MD  Dx:   Encounter Diagnosis     ICD-10-CM    1  Chronic bilateral low back pain, with sciatica presence unspecified M54 5     G89 29    2  Acute pain of left shoulder M25 512    3  Pain of left thigh M79 652                   Subjective: patient stated he went out on his boat fishing for the first time in awhile and was sore the next day from bending and moving in boat  Objective: See treatment diary below      Assessment: Tolerated treatment well  Patient demonstrated fatigue post treatment and exhibited good technique with therapeutic exercises  No exacerbation of sx with program  Added push/pull cart and crate carry with good Patient would benefit from continued therapy to decrease pain and increase strength and flexibility to improve LOF  Plan: Continue per plan of care  Progress treatment as tolerated         Precautions  PMH Lumbar Laminectomy         Manual  8/26 8/28 9/3 9/5    Bilateral hip and LE stretching with manual lumbar traction  15 min  15 min 15 min 15 min                                                              Exercise Diary  8/26 8/28 9/3 9/5    Nu-step  10 min L6  10 min L7 10 min L7 10 min L7    HR/TR         Stand lumbar extension  2x10   3" Hold  2x10 3" hd 2x10 3" hd 2x10 3" hold    Stand OAL  2x10 Bilat  2x10 Bilat 2x10 Bilat 2x10 3" hold     Monster walk  6x10 Feet   Blue 6x 10 ft Blue 6x 10 ft Blue D/C    Side step and squat  6x10 Feet   Blue  6x 10 ft Blue 6x 10 ft Blue D/C    Push pull and cart   Start NV 15#  4x 10 feet     Crate carry    Start NV     Leg and Calf press  60 #  2x10 each  60# 2x10 ea 60# 2x10 ea 60#   2x10 ea    TB Side step with resist trunk rotation  Arms Out  2x10 Bilat   Double  Blue        4 way walkout at [facility]  5x each way  24# 5x each way  24# 5x each way  24#    Front and lateral step up 2x10 Bilat   8" step  Step up & Over 10x ea 8" Fwd/Lat Step Up & Over 10x ea Fwd/Lat 8" step 2x10  8" step    Hip abd/add machine    2x10 ea  60+1#     3x10 60+1# AB/AD ea 3x10 60+1# AB/AD ea 3x10  60+1 ABD  45+1 ADD    DKTC with Tball 20x 5" Hold  20x 5" hold 20x 5" hd 20x 5" hd    Bridge with Tball         Supine Tball ABD crunch  2x10 3" Hold   Orange Tball  2x10 3" hd 2x10 3" hd 2x10 3#    Seated Tball LAQ and hip march          Seated Tball trunk rotation and PNF                                                                  Modalities 8/26 8/28 9/3 9/5    MHP to the LB in seated  15 min  15 min 15 min 15 min

## 2019-09-06 ENCOUNTER — OFFICE VISIT (OUTPATIENT)
Dept: PHYSICAL THERAPY | Facility: CLINIC | Age: 64
End: 2019-09-06
Payer: COMMERCIAL

## 2019-09-06 DIAGNOSIS — M54.5 CHRONIC BILATERAL LOW BACK PAIN, WITH SCIATICA PRESENCE UNSPECIFIED: Primary | ICD-10-CM

## 2019-09-06 DIAGNOSIS — M25.512 ACUTE PAIN OF LEFT SHOULDER: ICD-10-CM

## 2019-09-06 DIAGNOSIS — M79.652 PAIN OF LEFT THIGH: ICD-10-CM

## 2019-09-06 DIAGNOSIS — G89.29 CHRONIC BILATERAL LOW BACK PAIN, WITH SCIATICA PRESENCE UNSPECIFIED: Primary | ICD-10-CM

## 2019-09-06 PROCEDURE — 97110 THERAPEUTIC EXERCISES: CPT

## 2019-09-06 PROCEDURE — 97140 MANUAL THERAPY 1/> REGIONS: CPT

## 2019-09-06 NOTE — PROGRESS NOTES
Daily Note     Today's date: 2019  Patient name: Cassi Arshad  : 1955  MRN: [id number]  Referring provider: Donnise Seip, MD  Dx:   Encounter Diagnosis     ICD-10-CM    1  Chronic bilateral low back pain, with sciatica presence unspecified M54 5     G89 29    2  Acute pain of left shoulder M25 512    3  Pain of left thigh M79 652                   Subjective: patient stated went fishing yesterday again in boat and had some cramping       Objective: See treatment diary below      Assessment: Tolerated treatment well  Patient demonstrated fatigue post treatment, exhibited good technique with therapeutic exercises and would benefit from continued PT      Plan: Continue per plan of care  Progress treatment as tolerated         Precautions  PMH Lumbar Laminectomy         Manual  8/26 8/28 9/3 9/5 9/6   Bilateral hip and LE stretching with manual lumbar traction  15 min  15 min 15 min 15 min 15 min                                                             Exercise Diary  8/26 8/28 9/3 9/5 9/6   Nu-step  10 min L6  10 min L7 10 min L7 10 min L7 10 min L7   HR/TR         Stand lumbar extension  2x10   3" Hold  2x10 3" hd 2x10 3" hd 2x10 3" hold 2x10 3" hold   Stand OAL  2x10 Bilat  2x10 Bilat 2x10 Bilat 2x10 3" hold  2x10 3" hold    Monster walk  6x10 Feet   Blue 6x 10 ft Blue 6x 10 ft Blue D/C    Side step and squat  6x10 Feet   Blue  6x 10 ft Blue 6x 10 ft Blue D/C    Push pull and cart   Start NV 15#  4x 10 feet  15#  8x 10 feet   Crate carry    Start NV  20#  8x 20 feet    Leg and Calf press  60 #  2x10 each  60# 2x10 ea 60# 2x10 ea 60#   2x10 ea 60#  2x10   TB Side step with resist trunk rotation  Arms Out  2x10 Bilat   Double  Blue        4 way walkout at [facility]  5x each way  24# 5x each way  24# 5x each way  24# 5x each way  24#   Front and lateral step up  2x10 Bilat   8" step  Step up & Over 10x ea 8" Fwd/Lat Step Up & Over 10x ea Fwd/Lat 8" step 2x10  8" step 2x10  8" step   Hip abd/add machine    2x10 ea  60+1#     3x10 60+1# AB/AD ea 3x10 60+1# AB/AD ea 3x10  60+1 ABD  45+1 ADD 2x10  60+1 ABD  60+1 ADD   DKTC with Tball 20x 5" Hold  20x 5" hold 20x 5" hd 20x 5" hd 20x 5" hold   Bridge with Tball         Supine Tball ABD crunch  2x10 3" Hold   Orange Tball  2x10 3" hd 2x10 3" hd 2x10 3# 2x10 3"   Seated Tball LAQ and hip march          Seated Tball trunk rotation and PNF                                                                  Modalities 8/26 8/28 9/3 9/5 9/6   MHP to the LB in seated  15 min  15 min 15 min 15 min 15 min

## 2019-09-09 ENCOUNTER — OFFICE VISIT (OUTPATIENT)
Dept: PHYSICAL THERAPY | Facility: CLINIC | Age: 64
End: 2019-09-09
Payer: COMMERCIAL

## 2019-09-09 DIAGNOSIS — M25.512 ACUTE PAIN OF LEFT SHOULDER: ICD-10-CM

## 2019-09-09 DIAGNOSIS — M54.5 CHRONIC BILATERAL LOW BACK PAIN, WITH SCIATICA PRESENCE UNSPECIFIED: Primary | ICD-10-CM

## 2019-09-09 DIAGNOSIS — G89.29 CHRONIC BILATERAL LOW BACK PAIN, WITH SCIATICA PRESENCE UNSPECIFIED: Primary | ICD-10-CM

## 2019-09-09 DIAGNOSIS — M79.652 PAIN OF LEFT THIGH: ICD-10-CM

## 2019-09-09 PROCEDURE — 97110 THERAPEUTIC EXERCISES: CPT

## 2019-09-09 PROCEDURE — 97140 MANUAL THERAPY 1/> REGIONS: CPT

## 2019-09-09 NOTE — PROGRESS NOTES
Daily Note     Today's date: 2019  Patient name: Sabine Kim  : 1955  MRN: [id number]  Referring provider: Nellie Maki MD  Dx:   Encounter Diagnosis     ICD-10-CM    1  Chronic bilateral low back pain, with sciatica presence unspecified M54 5     G89 29    2  Acute pain of left shoulder M25 512    3  Pain of left thigh M79 652                   Subjective: patient stated he was doing some work around the house this weekend and has a slight pain in his left hip today  Objective: See treatment diary below      Assessment: Patient progressing well towards goals  Good execution of exercise with program  Patient demonstrated the appropriate amount of fatigue for program  No exacerbation of sx with program  Patient continues to amb with gait deviation  Will continue to monitor pain levels and progress as able      Plan: Continue per plan of care  Progress treatment as tolerated         Precautions  PMH Lumbar Laminectomy         Manual  9/9 8/28 9/3 9/5 9/6   Bilateral hip and LE stretching with manual lumbar traction  15 min  15 min 15 min 15 min 15 min                                                             Exercise Diary  9/9 8/28 9/3 9/5 9/6   Nu-step  10 min L7 10 min L7 10 min L7 10 min L7 10 min L7   HR/TR         Stand lumbar extension  2x10   3" Hold  2x10 3" hd 2x10 3" hd 2x10 3" hold 2x10 3" hold   Stand OAL  2x10 Bilat  2x10 Bilat 2x10 Bilat 2x10 3" hold  2x10 3" hold    Monster walk   6x 10 ft Blue 6x 10 ft Blue D/C    Side step and squat   6x 10 ft Blue 6x 10 ft Blue D/C    Push pull and cart 20#  8x 10 feet  Start NV 15#  4x 10 feet  15#  8x 10 feet   Crate carry  20x  8x 20 feet   Start NV  20#  8x 20 feet    Leg and Calf press  60 #  2x10 each   (increase to 65# NV) 60# 2x10 ea 60# 2x10 ea 60#   2x10 ea 60#  2x10   TB Side step with resist trunk rotation  Arms Out          4 way walkout at [facility] 5x each way  24# 5x each way  24# 5x each way  24# 5x each way  24# 5x each way  24#   Front and lateral step up  2x10 Bilat   8" step  Step up & Over 10x ea 8" Fwd/Lat Step Up & Over 10x ea Fwd/Lat 8" step 2x10  8" step 2x10  8" step   Hip abd/add machine    2x10 ea  60+1#  (increase ABD NV)   3x10 60+1# AB/AD ea 3x10 60+1# AB/AD ea 3x10  60+1 ABD  45+1 ADD 2x10  60+1 ABD  60+1 ADD   DKTC with Tball 20x 5" Hold  20x 5" hold 20x 5" hd 20x 5" hd 20x 5" hold   Bridge with Tball         Supine Tball ABD crunch  2x10 3" Hold   Orange Tball  2x10 3" hd 2x10 3" hd 2x10 3# 2x10 3"   Seated Tball LAQ and hip march          Seated Tball trunk rotation and PNF                                                                  Modalities 9/9 8/28 9/3 9/5 9/6   MHP to the LB in seated  15 min  15 min 15 min 15 min 15 min

## 2019-09-11 ENCOUNTER — OFFICE VISIT (OUTPATIENT)
Dept: PHYSICAL THERAPY | Facility: CLINIC | Age: 64
End: 2019-09-11
Payer: COMMERCIAL

## 2019-09-11 DIAGNOSIS — M54.5 CHRONIC BILATERAL LOW BACK PAIN, WITH SCIATICA PRESENCE UNSPECIFIED: Primary | ICD-10-CM

## 2019-09-11 DIAGNOSIS — M25.512 ACUTE PAIN OF LEFT SHOULDER: ICD-10-CM

## 2019-09-11 DIAGNOSIS — M79.652 PAIN OF LEFT THIGH: ICD-10-CM

## 2019-09-11 DIAGNOSIS — G89.29 CHRONIC BILATERAL LOW BACK PAIN, WITH SCIATICA PRESENCE UNSPECIFIED: Primary | ICD-10-CM

## 2019-09-11 PROCEDURE — 97110 THERAPEUTIC EXERCISES: CPT

## 2019-09-11 PROCEDURE — 97140 MANUAL THERAPY 1/> REGIONS: CPT

## 2019-09-11 NOTE — PROGRESS NOTES
Daily Note     Today's date: 2019  Patient name: Anna Miller  : 1955  MRN: [id number]  Referring provider: Torres Boothe MD  Dx:   Encounter Diagnosis     ICD-10-CM    1  Chronic bilateral low back pain, with sciatica presence unspecified M54 5     G89 29    2  Acute pain of left shoulder M25 512    3  Pain of left thigh M79 652                   Subjective: patient offered no complaint  Objective: See treatment diary below      Assessment: Patient able to progress with repetitions and resistance with various exercises  Good tolerance with progression with minimal cues required  Patient continues to present with deficits with strength and ROM requiring continued skilled physical therapy      Plan: Continue per plan of care  Progress treatment as tolerated         Precautions  PMH Lumbar Laminectomy         Manual  9/9 9/11 9/3 9/5 9/6   Bilateral hip and LE stretching with manual lumbar traction  15 min  15 min 15 min 15 min 15 min                                                             Exercise Diary  9/9 9/11 9/3 9/5 9/6   Nu-step  10 min L7 10 min L7 10 min L7 10 min L7 10 min L7   HR/TR         Stand lumbar extension  2x10   3" Hold  2x10 3" hd 2x10 3" hd 2x10 3" hold 2x10 3" hold   Stand OAL  2x10 Bilat  2x10 Bilat 2x10 Bilat 2x10 3" hold  2x10 3" hold    Monster walk    6x 10 ft Blue D/C    Side step and squat    6x 10 ft Blue D/C    Push pull and cart 20#  8x 10 feet 20#  8x 10 feet Start NV 15#  4x 10 feet  15#  8x 10 feet   Crate carry  20x  8x 20 feet  20#  8x 10 feet Start NV  20#  8x 20 feet    Leg and Calf press  60 #  2x10 each   (increase to 65# NV) 65# 2x10 ea 60# 2x10 ea 60#   2x10 ea 60#  2x10   TB Side step with resist trunk rotation  Arms Out          4 way walkout at [facility] 5x each way  24# 5x each way  24# 5x each way  24# 5x each way  24# 5x each way  24#   Front and lateral step up  2x10 Bilat   8" step  Step up & Over 10x ea 8" Fwd/Lat Step Up & Over 10x ea Fwd/Lat 8" step 2x10  8" step 2x10  8" step   Hip abd/add machine    2x10 ea  60+1#  (increase ABD NV)   2x10   72# abd  60+1 add 3x10 60+1# AB/AD ea 3x10  60+1 ABD  45+1 ADD 2x10  60+1 ABD  60+1 ADD   DKTC with Tball 20x 5" Hold  20x 5" hold 20x 5" hd 20x 5" hd 20x 5" hold   Bridge with Tball         Supine Tball ABD crunch  2x10 3" Hold   Orange Tball  2x10 3" hd 2x10 3" hd 2x10 3# 2x10 3"   Seated Tball LAQ and hip march          Seated Tball trunk rotation and PNF                                                                  Modalities 9/9 9/11 9/3 9/5 9/6   MHP to the LB in seated  15 min  15 min 15 min 15 min 15 min

## 2019-09-13 ENCOUNTER — OFFICE VISIT (OUTPATIENT)
Dept: PHYSICAL THERAPY | Facility: CLINIC | Age: 64
End: 2019-09-13
Payer: COMMERCIAL

## 2019-09-13 DIAGNOSIS — M54.5 CHRONIC BILATERAL LOW BACK PAIN, WITH SCIATICA PRESENCE UNSPECIFIED: Primary | ICD-10-CM

## 2019-09-13 DIAGNOSIS — G89.29 CHRONIC BILATERAL LOW BACK PAIN, WITH SCIATICA PRESENCE UNSPECIFIED: Primary | ICD-10-CM

## 2019-09-13 DIAGNOSIS — M25.512 ACUTE PAIN OF LEFT SHOULDER: ICD-10-CM

## 2019-09-13 DIAGNOSIS — M79.652 PAIN OF LEFT THIGH: ICD-10-CM

## 2019-09-13 PROCEDURE — 97140 MANUAL THERAPY 1/> REGIONS: CPT

## 2019-09-13 PROCEDURE — 97110 THERAPEUTIC EXERCISES: CPT

## 2019-09-13 NOTE — PROGRESS NOTES
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 [place]      Objective: See treatment diary below      Assessment: Modified treatment due to aforementioned subjective comment  Will continue to monitor pain levels and level of endurance and adjust program as needed in upcoming visits  Patient would benefit from continued therapy to decrease pain and increase strength and flexibility to improve LOF      Plan: Continue per plan of care  Progress treatment as tolerated         Precautions  PMH Lumbar Laminectomy         Manual     Bilateral hip and LE stretching with manual lumbar traction  15 min  15 min 15 min 15 min 15 min                                                             Exercise Diary     Nu-step  10 min L7 10 min L7 10 min L5 10 min L7 10 min L7   HR/TR         Stand lumbar extension  2x10   3" Hold  2x10 3" hd 2x10 3" hd 2x10 3" hold 2x10 3" hold   Stand OAL  2x10 Bilat  2x10 Bilat 2x10 Bilat 2x10 3" hold  2x10 3" hold    Monster walk     D/C    Side step and squat     D/C    Push pull and cart 20#  8x 10 feet 20#  8x 10 feet 20#  8x 10 feet 15#  4x 10 feet  15#  8x 10 feet   Crate carry  20x  8x 20 feet  20#  8x 10 feet 20#  8x 10 feet  20#  8x 10 feet  20#  8x 20 feet    Leg and Calf press  60 #  2x10 each   (increase to 65# NV) 65# 2x10 ea 60# 2x10 ea 60#   2x10 ea 60#  2x10   TB Side step with resist trunk rotation  Arms Out          4 way walkout at [facility] 5x each way  24# 5x each way  24# 5x each way  24# 5x each way  24# 5x each way  24#   Front and lateral step up  2x10 Bilat   8" step  Step up & Over 10x ea 8" Fwd/Lat Step Up & Over 10x ea Fwd/Lat 8" step 2x10  8" step 2x10  8" step   Hip abd/add machine    2x10 ea  60+1#  (increase ABD NV)   2x10   72# abd  60+1 add 3x10 60+1# AB/AD ea 3x10  60+1 ABD  45+1 ADD 2x10  60+1 ABD  60+1 ADD   DKTC with Tball 20x 5" Hold  20x 5" hold 20x 5" hd 20x 5" hd 20x 5" hold   Bridge with Tball         Supine Tball ABD crunch  2x10 3" Hold   Orange Tball  2x10 3" hd 2x10 3" hd 2x10 3# 2x10 3"   Seated Tball LAQ and hip march          Seated Tball trunk rotation and PNF                                                                  Modalities 9/9 9/11 9/13 9/5 9/6   MHP to the LB in seated  15 min  15 min 15 min 15 min 15 min

## 2019-09-23 ENCOUNTER — OFFICE VISIT (OUTPATIENT)
Dept: PHYSICAL THERAPY | Facility: CLINIC | Age: 64
End: 2019-09-23
Payer: COMMERCIAL

## 2019-09-23 DIAGNOSIS — I10 ESSENTIAL HYPERTENSION: ICD-10-CM

## 2019-09-23 DIAGNOSIS — M79.652 PAIN OF LEFT THIGH: ICD-10-CM

## 2019-09-23 DIAGNOSIS — M54.5 CHRONIC BILATERAL LOW BACK PAIN, WITH SCIATICA PRESENCE UNSPECIFIED: Primary | ICD-10-CM

## 2019-09-23 DIAGNOSIS — M25.512 ACUTE PAIN OF LEFT SHOULDER: ICD-10-CM

## 2019-09-23 DIAGNOSIS — G89.29 CHRONIC BILATERAL LOW BACK PAIN, WITH SCIATICA PRESENCE UNSPECIFIED: Primary | ICD-10-CM

## 2019-09-23 PROCEDURE — 97140 MANUAL THERAPY 1/> REGIONS: CPT

## 2019-09-23 PROCEDURE — 97110 THERAPEUTIC EXERCISES: CPT

## 2019-09-23 RX ORDER — AMLODIPINE BESYLATE AND BENAZEPRIL HYDROCHLORIDE 5; 20 MG/1; MG/1
CAPSULE ORAL
Qty: 180 CAPSULE | Refills: 4 | Status: SHIPPED | OUTPATIENT
Start: 2019-09-23 | End: 2020-12-16

## 2019-09-23 NOTE — PROGRESS NOTES
Daily Note     Today's date: 2019  Patient name: Jakob Gonzalez  : 1955  MRN: [id number]  Referring provider: William Boyd MD  Dx:   Encounter Diagnosis     ICD-10-CM    1  Chronic bilateral low back pain, with sciatica presence unspecified M54 5     G89 29    2  Acute pain of left shoulder M25 512    3  Pain of left thigh M79 652                   Subjective: Patient reports improvement overall but does continue with stiffness after a seated rest       Objective: See treatment diary below      Assessment: Tolerated treatment well  Patient exhibited good technique with therapeutic exercises   Patient would benefit from continued therapy to decrease pain and increase strength and flexibility to improve LOF      Plan: Continue per plan of care  Precautions  PMH Lumbar Laminectomy         Manual      Bilateral hip and LE stretching with manual lumbar traction  15 min  15 min 15 min 15 min                                                              Exercise Diary      Nu-step  10 min L7 10 min L7 10 min L5 10 min L7    HR/TR         Stand lumbar extension  2x10   3" Hold  2x10 3" hd 2x10 3" hd 2x10 3" hd    Stand OAL  2x10 Bilat  2x10 Bilat 2x10 Bilat 2x10 Bilat     Monster walk         Side step and squat         Push pull and cart 20#  8x 10 feet 20#  8x 10 feet 20#  8x 10 feet 20x# 8x 10 ft    Crate carry  20x  8x 20 feet  20#  8x 10 feet 20#  8x 10 feet  20# 8x 10 ft    Leg and Calf press  60 #  2x10 each   (increase to 65# NV) 65# 2x10 ea 60# 2x10 ea 60# 2x10 ea    TB Side step with resist trunk rotation  Arms Out          4 way walkout at [facility] 5x each way  24# 5x each way  24# 5x each way  24# 5x ea way 24#    Front and lateral step up  2x10 Bilat   8" step  Step up & Over 10x ea 8" Fwd/Lat Step Up & Over 10x ea Fwd/Lat 8" step Step Up & Over 10x ea Fwd/Lat 8" Step    Hip abd/add machine    2x10 ea  60+1#  (increase ABD NV)   2x10   72# abd  60+1 add 3x10 60+1# AB/AD ea 3x10 60+1# AB/AD ea    DKTC with Tball 20x 5" Hold  20x 5" hold 20x 5" hd 20x 5" hd    Bridge with Tball         Supine Tball ABD crunch  2x10 3" Hold   Orange Tball  2x10 3" hd 2x10 3" hd 2x10 3" hd    Seated Tball LAQ and hip march          Seated Tball trunk rotation and PNF                                                                  Modalities 9/9 9/11 9/13 9/23    MHP to the LB in seated  15 min  15 min 15 min 15 min

## 2019-09-25 ENCOUNTER — APPOINTMENT (OUTPATIENT)
Dept: PHYSICAL THERAPY | Facility: CLINIC | Age: 64
End: 2019-09-25
Payer: COMMERCIAL

## 2019-09-27 ENCOUNTER — EVALUATION (OUTPATIENT)
Dept: PHYSICAL THERAPY | Facility: CLINIC | Age: 64
End: 2019-09-27
Payer: COMMERCIAL

## 2019-09-27 DIAGNOSIS — M54.5 CHRONIC BILATERAL LOW BACK PAIN, WITH SCIATICA PRESENCE UNSPECIFIED: Primary | ICD-10-CM

## 2019-09-27 DIAGNOSIS — G89.29 CHRONIC BILATERAL LOW BACK PAIN, WITH SCIATICA PRESENCE UNSPECIFIED: Primary | ICD-10-CM

## 2019-09-27 DIAGNOSIS — M79.652 PAIN OF LEFT THIGH: ICD-10-CM

## 2019-09-27 DIAGNOSIS — M25.512 ACUTE PAIN OF LEFT SHOULDER: ICD-10-CM

## 2019-09-27 PROCEDURE — 97140 MANUAL THERAPY 1/> REGIONS: CPT | Performed by: PHYSICAL THERAPIST

## 2019-09-27 PROCEDURE — 97535 SELF CARE MNGMENT TRAINING: CPT | Performed by: PHYSICAL THERAPIST

## 2019-11-11 ENCOUNTER — APPOINTMENT (OUTPATIENT)
Dept: LAB | Facility: CLINIC | Age: 64
End: 2019-11-11
Payer: COMMERCIAL

## 2019-11-11 DIAGNOSIS — E66.9 DIABETES MELLITUS TYPE 2 IN OBESE (HCC): ICD-10-CM

## 2019-11-11 DIAGNOSIS — E78.00 HYPERCHOLESTEROLEMIA: ICD-10-CM

## 2019-11-11 DIAGNOSIS — E11.69 DIABETES MELLITUS TYPE 2 IN OBESE (HCC): ICD-10-CM

## 2019-11-11 DIAGNOSIS — I10 ESSENTIAL HYPERTENSION: ICD-10-CM

## 2019-11-11 LAB
ALBUMIN SERPL BCP-MCNC: 3.8 G/DL (ref 3.5–5)
ALP SERPL-CCNC: 56 U/L (ref 46–116)
ALT SERPL W P-5'-P-CCNC: 45 U/L (ref 12–78)
ANION GAP SERPL CALCULATED.3IONS-SCNC: 6 MMOL/L (ref 4–13)
AST SERPL W P-5'-P-CCNC: 21 U/L (ref 5–45)
BASOPHILS # BLD AUTO: 0.03 THOUSANDS/ΜL (ref 0–0.1)
BASOPHILS NFR BLD AUTO: 1 % (ref 0–1)
BILIRUB SERPL-MCNC: 0.53 MG/DL (ref 0.2–1)
BUN SERPL-MCNC: 16 MG/DL (ref 5–25)
CALCIUM SERPL-MCNC: 9.2 MG/DL (ref 8.3–10.1)
CHLORIDE SERPL-SCNC: 106 MMOL/L (ref 100–108)
CHOLEST SERPL-MCNC: 148 MG/DL (ref 50–200)
CO2 SERPL-SCNC: 27 MMOL/L (ref 21–32)
CREAT SERPL-MCNC: 1.03 MG/DL (ref 0.6–1.3)
EOSINOPHIL # BLD AUTO: 0.11 THOUSAND/ΜL (ref 0–0.61)
EOSINOPHIL NFR BLD AUTO: 3 % (ref 0–6)
ERYTHROCYTE [DISTWIDTH] IN BLOOD BY AUTOMATED COUNT: 12.9 % (ref 11.6–15.1)
EST. AVERAGE GLUCOSE BLD GHB EST-MCNC: 180 MG/DL
GFR SERPL CREATININE-BSD FRML MDRD: 77 ML/MIN/1.73SQ M
GLUCOSE P FAST SERPL-MCNC: 152 MG/DL (ref 65–99)
HBA1C MFR BLD: 7.9 % (ref 4.2–6.3)
HCT VFR BLD AUTO: 42.9 % (ref 36.5–49.3)
HDLC SERPL-MCNC: 38 MG/DL
HGB BLD-MCNC: 14.1 G/DL (ref 12–17)
IMM GRANULOCYTES # BLD AUTO: 0.01 THOUSAND/UL (ref 0–0.2)
IMM GRANULOCYTES NFR BLD AUTO: 0 % (ref 0–2)
LDLC SERPL CALC-MCNC: 89 MG/DL (ref 0–100)
LYMPHOCYTES # BLD AUTO: 1.44 THOUSANDS/ΜL (ref 0.6–4.47)
LYMPHOCYTES NFR BLD AUTO: 36 % (ref 14–44)
MCH RBC QN AUTO: 31.5 PG (ref 26.8–34.3)
MCHC RBC AUTO-ENTMCNC: 32.9 G/DL (ref 31.4–37.4)
MCV RBC AUTO: 96 FL (ref 82–98)
MONOCYTES # BLD AUTO: 0.37 THOUSAND/ΜL (ref 0.17–1.22)
MONOCYTES NFR BLD AUTO: 9 % (ref 4–12)
NEUTROPHILS # BLD AUTO: 2.01 THOUSANDS/ΜL (ref 1.85–7.62)
NEUTS SEG NFR BLD AUTO: 51 % (ref 43–75)
NONHDLC SERPL-MCNC: 110 MG/DL
NRBC BLD AUTO-RTO: 0 /100 WBCS
PLATELET # BLD AUTO: 152 THOUSANDS/UL (ref 149–390)
PMV BLD AUTO: 11.3 FL (ref 8.9–12.7)
POTASSIUM SERPL-SCNC: 3.5 MMOL/L (ref 3.5–5.3)
PROT SERPL-MCNC: 7 G/DL (ref 6.4–8.2)
RBC # BLD AUTO: 4.48 MILLION/UL (ref 3.88–5.62)
SODIUM SERPL-SCNC: 139 MMOL/L (ref 136–145)
TRIGL SERPL-MCNC: 105 MG/DL
TSH SERPL DL<=0.05 MIU/L-ACNC: 0.85 UIU/ML (ref 0.36–3.74)
WBC # BLD AUTO: 3.97 THOUSAND/UL (ref 4.31–10.16)

## 2019-11-11 PROCEDURE — 80053 COMPREHEN METABOLIC PANEL: CPT

## 2019-11-11 PROCEDURE — 80061 LIPID PANEL: CPT

## 2019-11-11 PROCEDURE — 85025 COMPLETE CBC W/AUTO DIFF WBC: CPT

## 2019-11-11 PROCEDURE — 84443 ASSAY THYROID STIM HORMONE: CPT

## 2019-11-11 PROCEDURE — 36415 COLL VENOUS BLD VENIPUNCTURE: CPT

## 2019-11-11 PROCEDURE — 83036 HEMOGLOBIN GLYCOSYLATED A1C: CPT

## 2019-11-14 ENCOUNTER — TELEPHONE (OUTPATIENT)
Dept: GASTROENTEROLOGY | Facility: CLINIC | Age: 64
End: 2019-11-14

## 2019-11-14 ENCOUNTER — OFFICE VISIT (OUTPATIENT)
Dept: INTERNAL MEDICINE CLINIC | Facility: CLINIC | Age: 64
End: 2019-11-14
Payer: COMMERCIAL

## 2019-11-14 VITALS
WEIGHT: 262 LBS | TEMPERATURE: 97.5 F | DIASTOLIC BLOOD PRESSURE: 70 MMHG | HEART RATE: 64 BPM | OXYGEN SATURATION: 95 % | BODY MASS INDEX: 37.51 KG/M2 | HEIGHT: 70 IN | SYSTOLIC BLOOD PRESSURE: 128 MMHG

## 2019-11-14 DIAGNOSIS — E66.9 DIABETES MELLITUS TYPE 2 IN OBESE (HCC): Primary | ICD-10-CM

## 2019-11-14 DIAGNOSIS — M54.16 LUMBAR RADICULOPATHY: ICD-10-CM

## 2019-11-14 DIAGNOSIS — K63.5 POLYP OF COLON, UNSPECIFIED PART OF COLON, UNSPECIFIED TYPE: ICD-10-CM

## 2019-11-14 DIAGNOSIS — Z23 NEED FOR INFLUENZA VACCINATION: ICD-10-CM

## 2019-11-14 DIAGNOSIS — I10 ESSENTIAL HYPERTENSION: ICD-10-CM

## 2019-11-14 DIAGNOSIS — E78.2 MIXED HYPERLIPIDEMIA: ICD-10-CM

## 2019-11-14 DIAGNOSIS — E11.69 DIABETES MELLITUS TYPE 2 IN OBESE (HCC): Primary | ICD-10-CM

## 2019-11-14 PROCEDURE — 99214 OFFICE O/P EST MOD 30 MIN: CPT | Performed by: FAMILY MEDICINE

## 2019-11-14 PROCEDURE — 90682 RIV4 VACC RECOMBINANT DNA IM: CPT

## 2019-11-14 PROCEDURE — 90471 IMMUNIZATION ADMIN: CPT

## 2019-11-14 NOTE — PROGRESS NOTES
Assessment/Plan:    Colonic polyp  Has a history of colon polyps  Is overdue for his colonoscopy  Referral given  Diabetes mellitus type 2 in obese [facility])    Lab Results   Component Value Date    HGBA1C 7 9 (H) 11/11/2019     Hemoglobin A1c still increasing  Discussed with him a goal of hemoglobin A1c less than 7  Recommended medications to him  Discussed with him long-term affects of elevated blood sugars  He declines medication at present and wishes to have 1 more chance to try to control his blood sugar with diet alone  Continue to follow up with Ophthalmology on a regular basis  Check feet daily  Foot exam completed today  Since a shin intact with the monofilament which is surprising given his back issues in addition to the diabetes  Hypertension  Blood pressure controlled  Continue current medications  Watch salt intake  Try to increase activity level  Lumbar radiculopathy  Chronic decreased strength in the left leg  Although symptoms have improved with physical therapy, he is not near baseline at present  Has completed physical therapy  Has used muscle relaxants  Would recommend MRI especially with his previous back issues and persistently decreased strength in left leg  Mixed hyperlipidemia  Cholesterol relatively well controlled  Continue with the amlodipine/benazepril, atenolol, and Benicar HCTZ  Watch salt intake  Remain as active as possible          Diagnoses and all orders for this visit:    Diabetes mellitus type 2 in obese [facility])  -     Comprehensive metabolic panel; Future  -     Hemoglobin A1C; Future  -     Lipid panel; Future    Essential hypertension  -     CBC and differential; Future  -     Comprehensive metabolic panel; Future  -     Lipid panel; Future    Lumbar radiculopathy  -     MRI lumbar spine wo contrast; Future    Mixed hyperlipidemia  -     Lipid panel; Future  -     TSH, 3rd generation;  Future    Polyp of colon, unspecified part of colon, unspecified type  -     Ambulatory referral to Gastroenterology; Future    Need for influenza vaccination  -     influenza vaccine, 9575-3131, quadrivalent, recombinant, PF, 0 5 mL, for patients 18 yr+ (FLUBLOK)    Other orders  -     Cancel: Ambulatory referral to Podiatry; Future        Orders recommendations as noted above  Referral given to GI  Up-to-date on pneumonia vaccines  Up-to-date on flu vaccines  Will have him follow up in about 3-5 months or sooner if needed  Subjective:      Patient ID: Zenobia Orlando is a 62 y o  male  He presents for routine follow-up  Has generally been doing somewhat better  Left leg has improved somewhat  Still strength is not nearly back at baseline despite tires easily  Has still some persistent numbness  Denies any pain at present  He is concerned that this may return since it was very hard to function with his daily activities with this  Has not been exercising or following his diet regularly  He was somewhat down because of the left leg symptoms and his inability to be as active as he wanted throughout the summer months  He expected that his blood sugar review more elevated  Denies any polyuria, polydipsia, polyphagia  Denies any changes in vision in continues with the chronic numbness of the lateral aspect of the left foot  Appetite has been generally stable  Denies any nausea, vomiting, or diarrhea  Denies any significant shortness of breath  Tolerating blood pressure medications without difficulty  Denies any headaches or localized weakness  Tolerating statin medication without difficulty  Denies muscle aches or weakness  Denies abdominal pain  Denies chest pain or palpitations  Usually sleeps relatively well  Denies any recent vision changes he wishes to avoid medications if at all possible for the diabetes        The following portions of the patient's history were reviewed and updated as appropriate:   He  has a past medical history of Allergic rhinitis, Aneurysm of thoracic aorta (HCC), Cataract, Diabetes mellitus ([facility] ), Herniated nucleus pulposus, L4-5 left, Herniated nucleus pulposus, L5-S1, left, echocardiogram (10/14/2016), Hypercholesterolemia, Hypertension, and Lumbar radiculopathy  He   Patient Active Problem List    Diagnosis Date Noted    Colonic polyp 11/14/2019    Lumbar radiculopathy 07/31/2019    Microscopic hematuria 07/02/2019    Pain of left thigh 07/02/2019    Acute pain of left shoulder 04/02/2019    Skin tag 10/25/2018    Restless leg syndrome 06/21/2018    Heel spur 05/31/2017    Neuropathy of left foot 09/19/2016    [facility] ) 08/06/2015    Sciatica 03/03/2014    Chronic bilateral low back pain 02/27/2014    Edema 01/03/2013    Hyperuricemia without signs inflammatory arthritis/tophaceous disease 10/31/2012    Aneurysm of thoracic aorta ([facility] ) 08/06/2012    Mixed hyperlipidemia 05/01/2012    Hypertension 05/01/2012    Non-toxic uninodular goiter 05/01/2012    Osteoarthritis 05/01/2012     He  has a past surgical history that includes Tonsillectomy and adenoidectomy; Knee surgery; Back surgery; pr colonoscopy flx dx w/collj spec when pfrmd (N/A, 9/30/2016); Back surgery; and Knee arthroscopy  His family history includes Coronary artery disease in his brother; Heart attack in his brother; Hypertension in his father; Thyroid disease in his mother  He  reports that he has never smoked  He has never used smokeless tobacco  He reports that he does not drink alcohol or use drugs    Current Outpatient Medications   Medication Sig Dispense Refill    allopurinol (ZYLOPRIM) 100 mg tablet Take 1 tablet (100 mg total) by mouth daily 90 tablet 3    amLODIPine-benazepril (LOTREL 5-20) 5-20 MG per capsule TAKE 1 CAPSULE TWICE A  capsule 4    atenolol (TENORMIN) 50 mg tablet Take 1 tablet (50 mg total) by mouth 2 (two) times a day 180 tablet 3    atorvastatin (LIPITOR) 20 mg tablet TAKE 1 TABLET DAILY 90 tablet 3    cyclobenzaprine (FLEXERIL) 10 mg tablet Take one tablet by mouth every 12 hours as needed 60 tablet 0    furosemide (LASIX) 40 mg tablet Take 0 5 tablets (20 mg total) by mouth daily 45 tablet 3    meloxicam (MOBIC) 15 mg tablet Take 1 tablet (15 mg total) by mouth daily as needed for mild pain 30 tablet 5    olmesartan-hydrochlorothiazide (BENICAR HCT) 40-12 5 MG per tablet Take 1 tablet by mouth daily 90 tablet 3    potassium chloride (K-DUR) 10 mEq tablet Take 10 mEq by mouth Take 3 tabs by mouth in the morning and 3 tabs by mouth in the evening  No current facility-administered medications for this visit  Current Outpatient Medications on File Prior to Visit   Medication Sig    allopurinol (ZYLOPRIM) 100 mg tablet Take 1 tablet (100 mg total) by mouth daily    amLODIPine-benazepril (LOTREL 5-20) 5-20 MG per capsule TAKE 1 CAPSULE TWICE A DAY    atenolol (TENORMIN) 50 mg tablet Take 1 tablet (50 mg total) by mouth 2 (two) times a day    atorvastatin (LIPITOR) 20 mg tablet TAKE 1 TABLET DAILY    cyclobenzaprine (FLEXERIL) 10 mg tablet Take one tablet by mouth every 12 hours as needed    furosemide (LASIX) 40 mg tablet Take 0 5 tablets (20 mg total) by mouth daily    meloxicam (MOBIC) 15 mg tablet Take 1 tablet (15 mg total) by mouth daily as needed for mild pain    olmesartan-hydrochlorothiazide (BENICAR HCT) 40-12 5 MG per tablet Take 1 tablet by mouth daily    potassium chloride (K-DUR) 10 mEq tablet Take 10 mEq by mouth Take 3 tabs by mouth in the morning and 3 tabs by mouth in the evening  No current facility-administered medications on file prior to visit  He has No Known Allergies       Review of Systems   Constitutional: Negative for activity change, appetite change, chills, fatigue and fever  HENT: Negative for hearing loss  Eyes: Negative for pain and visual disturbance  Respiratory: Negative for chest tightness and shortness of breath  Cardiovascular: Negative for chest pain and palpitations  Gastrointestinal: Negative for abdominal pain, blood in stool, diarrhea, nausea and vomiting  Endocrine: Negative for polydipsia, polyphagia and polyuria  Genitourinary: Negative for dysuria  Musculoskeletal: Positive for gait problem  Negative for arthralgias  Skin: Negative for color change  Neurological: Positive for weakness  Negative for dizziness and headaches  Hematological: Does not bruise/bleed easily  Psychiatric/Behavioral: Negative for behavioral problems  The patient is not nervous/anxious  Objective:      /70 (BP Location: Left arm, Patient Position: Sitting, Cuff Size: Large)   Pulse 64   Temp 97 5 °F (36 4 °C) (Temporal)   Ht 5' 10" (1 778 m)   Wt 119 kg (262 lb)   SpO2 95%   BMI 37 59 kg/m²          Physical Exam   Constitutional: He is oriented to person, place, and time  No distress  HENT:   Head: Normocephalic and atraumatic  Nose: Nose normal    Eyes: Pupils are equal, round, and reactive to light  Conjunctivae are normal    Cardiovascular: Normal rate, regular rhythm and normal heart sounds  Exam reveals no gallop and no friction rub  No murmur heard  Pulmonary/Chest: He has no wheezes  He has no rales  He exhibits no tenderness  Abdominal: He exhibits no distension  There is no tenderness  There is no rebound and no guarding  Lymphadenopathy:     He has no cervical adenopathy  Neurological: He is alert and oriented to person, place, and time  Skin: Skin is warm and dry  Psychiatric: He has a normal mood and affect  His behavior is normal    Nursing note and vitals reviewed

## 2019-11-14 NOTE — PATIENT INSTRUCTIONS
Lumbar Radiculopathy   WHAT YOU NEED TO KNOW:   What is lumbar radiculopathy? Lumbar radiculopathy is a painful condition that happens when a nerve in your lumbar spine (lower back) is pinched or irritated  Nerves control feeling and movement in your body  What causes lumbar radiculopathy? You may get a pinched nerve in your lumbar spine if you have disc damage  Discs are natural, spongy cushions between your vertebrae (back bones) that allow your spine to move  Your discs may move out of place and pinch the nerve in your spine  Your risk for a pinched nerve and lumbar radiculopathy increases if:  · You smoke  · You have diabetes, a spinal infection, or a growth in your spine  · You are overweight  · You are male  · You are elderly  What are the signs and symptoms of lumbar radiculopathy? You may have any of the following:  · Pain that moves from your lower back to your buttocks, groin, and the back of your leg  The pain is often felt below your knee  Your pain may worsen when you cough, sneeze, stand, or sit  · Numbness, weakness, or tingling in your back or legs  How is lumbar radiculopathy diagnosed? Your healthcare provider will examine you and ask about your family history of back and leg pain  He may also test you for weakness, numbness, or tingling in your back, buttocks, and legs  Your healthcare provider may ask you to lie on your back and lift your leg to locate your pain  You may have any of the following:  · Blood tests: You may need blood taken to give caregivers information about how your body is working  The blood may be taken from your hand, arm, or IV  · Magnetic resonance imaging (MRI): An MRI machine is used to take a picture of your lower back  Your healthcare provider will use this picture to check for problems and changes in your back bones, nerves, and discs  You will need to lie still during this test  The MRI machine contains a very powerful magnet    Never enter the MRI room with any metal objects  This can cause serious injury  Tell your healthcare provider if you have any metal implants in your body  · X-ray: An x-ray is a picture of your lower back  A lumbar x-ray may show signs of infection or other problems with your spine  · An electromyography (EMG)  test measures the electrical activity of your muscles at rest and with movement  · Computed tomography (CT) scan: A special x-ray machine uses a computer to take pictures of your lower back  It may be used to look at your bones, discs, and nerves  You may be given dye in your IV to help improve the pictures  Tell your healthcare provider if you are allergic to shellfish, or have other allergies or medical conditions  People who are allergic to iodine or shellfish (lobster, crab, or shrimp) may be allergic to some dyes  How is lumbar radiculopathy treated? Treatment of lumbar radiculopathy may reduce pain and swelling, and improve your ability to walk and do your normal activities  Ask your healthcare provider for more information about these and other treatments for lumbar radiculopathy:  · Medicines:     ¨ NSAIDs , such as ibuprofen, help decrease swelling, pain, and fever  This medicine is available with or without a doctor's order  NSAIDs can cause stomach bleeding or kidney problems in certain people  If you take blood thinner medicine, always ask your healthcare provider if NSAIDs are safe for you  Always read the medicine label and follow directions  ¨ Muscle relaxers  help decrease pain and muscle spasms  ¨ Opioids: This is a strong medicine given to reduce severe pain  It is also called narcotic pain medicine  Take this medicine exactly as directed by your healthcare provider  ¨ Oral steroids: Steroids may be given to reduce swelling and pain  ¨ Steroid injections: Healthcare providers may give you steroid medicine through a needle (shot) into your lumbar spine   This may help decrease your nerve pain and swelling  You may need more than 1 injection if your symptoms do not improve after the first treatment  · Physical therapy: Your healthcare provider may suggest physical therapy  Your physical therapist may teach you certain exercises to improve posture (the way you stand and sit), flexibility, and strength in your lower back  He may also teach you how to remain safely active and avoid further injury  Follow the exercise plan given to you by your physical therapist     · Transcutaneous electrical nerve stimulation: This treatment, called TENS, stimulates your nerves and may decrease your pain  Wires are attached to pads  The pads are attached to your skin  The wires send a mild current through your nerves  · Surgery: You may need surgery to relieve your pinched nerve if your condition has not improved within 4 to 6 weeks  You may also need it if you have lumbar radiculopathy more than once  What are the risks of treatment for lumbar radiculopathy? · Without treatment, your pain may worsen  The pinched and swollen nerve may lead to problems when you walk or move  In severe cases, you may lose control of your urine or bowel movements  Bedrest can make your symptoms worse  · Pain medicines can cause vomiting, upset stomach, constipation (large, hard bowel movements that are difficult to pass), or kidney or liver problems  Opioid medicine may be addictive (hard to quit taking once you start)  Oral steroids and steroid injections may have side effects, such as facial redness, fluid retention (water weight gain), and mood changes  Steroid injections may be painful and can cause severe headaches, infections, allergic reactions, or nerve damage  Surgery risks include delayed problems with healing, spinal or bladder infection, damage to the spinal cord or other nerves, and ongoing pain  In rare cases, you could become paralyzed (not able to move your arms or legs)    How can I care for myself when I have lumbar radiculopathy? · Stay active: It is best to be active when you have lumbar radiculopathy  Your healthcare provider may tell you to take walks to ease yourself back into your daily routine  Avoid long periods of bed rest  Bed rest could worsen your symptoms  Do not move in ways that increase your pain  Ask your healthcare provider for more information about the best ways to stay active  · Use ice or heat packs:  Use ice or heat packs on the sore area of your body to decrease the pain and swelling  Put ice in a plastic bag covered with a towel on your low back  Cover heated items with a towel to avoid burns  Use ice and heat as directed  · Avoid heavy lifting: Your condition may worsen if you lift heavy things  Avoid lifting if possible  · Maintain a healthy weight:  Excess body weight may strain your back  Talk with your healthcare provider about ways to lose excess weight if you are overweight  When should I contact my healthcare provider? · Your pain does not improve within 1 to 3 weeks after treatment  · Your pain and weakness keep you from your normal activities at work, home, or school  · You lose more than 10 pounds in 6 months without trying  · You become depressed or sad because of the pain  · You have questions or concerns about your condition or care  When should I seek immediate care or call 911? · You have a fever greater than 100 4°F for longer than 2 days  · You have new, severe back or leg pain, or your pain spreads to both legs  · You have any new signs of numbness or weakness, especially in your lower back, legs, arms, or genital area  · You have new trouble controlling your urine and bowel movements  · You do not feel like your bladder empties when you urinate  CARE AGREEMENT:   You have the right to help plan your care  Learn about your health condition and how it may be treated   Discuss treatment options with your caregivers to decide what care you want to receive  You always have the right to refuse treatment  The above information is an  only  It is not intended as medical advice for individual conditions or treatments  Talk to your doctor, nurse or pharmacist before following any medical regimen to see if it is safe and effective for you  © 2017 [employer] Information is for End User's use only and may not be sold, redistributed or otherwise used for commercial purposes  All illustrations and images included in CareNotes® are the copyrighted property of A D A M , Inc  or [employer]

## 2019-11-14 NOTE — TELEPHONE ENCOUNTER
Patients GI provider:  Dr An Mcclellan    Number to return call: [phone]    Reason for call: Pt calling to schedule his recall Colonoscopy  He passed OA   No changes since the last time he was seen     Scheduled procedure/appointment date if applicable: Apt/procedure - n/a

## 2019-11-15 ENCOUNTER — TELEPHONE (OUTPATIENT)
Dept: INTERNAL MEDICINE CLINIC | Facility: CLINIC | Age: 64
End: 2019-11-15

## 2019-11-15 NOTE — ASSESSMENT & PLAN NOTE
Cholesterol relatively well controlled  Continue with the amlodipine/benazepril, atenolol, and Benicar HCTZ  Watch salt intake  Remain as active as possible  Declan Meredith

## 2019-11-15 NOTE — ASSESSMENT & PLAN NOTE
Chronic decreased strength in the left leg  Although symptoms have improved with physical therapy, he is not near baseline at present  Has completed physical therapy  Has used muscle relaxants  Would recommend MRI especially with his previous back issues and persistently decreased strength in left leg

## 2019-11-15 NOTE — TELEPHONE ENCOUNTER
I called and scheduled appointment for Tuesday November 19th 2019 @ 2:30pm at [facility] in [place]  Test has been approved  Auth # [id number]  Valid until 11/15/19-12/15/19  Tried faxing to [phone] but fax won't go through  Sent email to [email or web address]       I called and spoke with patient's wife and informed her of the appointment

## 2019-11-15 NOTE — ASSESSMENT & PLAN NOTE
Lab Results   Component Value Date    HGBA1C 7 9 (H) 11/11/2019     Hemoglobin A1c still increasing  Discussed with him a goal of hemoglobin A1c less than 7  Recommended medications to him  Discussed with him long-term affects of elevated blood sugars  He declines medication at present and wishes to have 1 more chance to try to control his blood sugar with diet alone  Continue to follow up with Ophthalmology on a regular basis  Check feet daily  Foot exam completed today  Since a shin intact with the monofilament which is surprising given his back issues in addition to the diabetes

## 2019-11-15 NOTE — ASSESSMENT & PLAN NOTE
Blood pressure controlled  Continue current medications  Watch salt intake  Try to increase activity level

## 2019-11-25 ENCOUNTER — PREP FOR PROCEDURE (OUTPATIENT)
Dept: GASTROENTEROLOGY | Facility: CLINIC | Age: 64
End: 2019-11-25

## 2019-11-25 DIAGNOSIS — Z12.11 SCREENING FOR COLON CANCER: Primary | ICD-10-CM

## 2019-11-26 ENCOUNTER — TRANSCRIBE ORDERS (OUTPATIENT)
Dept: GASTROENTEROLOGY | Facility: CLINIC | Age: 64
End: 2019-11-26

## 2019-11-27 ENCOUNTER — TELEPHONE (OUTPATIENT)
Dept: INTERNAL MEDICINE CLINIC | Facility: CLINIC | Age: 64
End: 2019-11-27

## 2019-11-27 NOTE — TELEPHONE ENCOUNTER
Patient's wife called asking for you to refer a surgeon for patient to see because they trust you so much  After speaking with you I called patient and told her, as instructed, that you would like patient to go see the surgeon who did the back surgery before  Wife is fine with that

## 2019-12-03 NOTE — TELEPHONE ENCOUNTER
Pt is scheduled 1/7/20 with Dr Juanis Mancia at [facility]  Miralax/Dulcolax instructions mailed to pt's home

## 2019-12-05 ENCOUNTER — OFFICE VISIT (OUTPATIENT)
Dept: CARDIOLOGY CLINIC | Facility: CLINIC | Age: 64
End: 2019-12-05
Payer: COMMERCIAL

## 2019-12-05 VITALS
HEIGHT: 70 IN | HEART RATE: 60 BPM | SYSTOLIC BLOOD PRESSURE: 140 MMHG | DIASTOLIC BLOOD PRESSURE: 80 MMHG | BODY MASS INDEX: 37.65 KG/M2 | WEIGHT: 263 LBS

## 2019-12-05 DIAGNOSIS — E78.2 MIXED HYPERLIPIDEMIA: ICD-10-CM

## 2019-12-05 DIAGNOSIS — I71.2 THORACIC AORTIC ANEURYSM WITHOUT RUPTURE (HCC): ICD-10-CM

## 2019-12-05 DIAGNOSIS — I10 ESSENTIAL HYPERTENSION: Primary | ICD-10-CM

## 2019-12-05 PROCEDURE — 93000 ELECTROCARDIOGRAM COMPLETE: CPT | Performed by: INTERNAL MEDICINE

## 2019-12-05 PROCEDURE — 99214 OFFICE O/P EST MOD 30 MIN: CPT | Performed by: INTERNAL MEDICINE

## 2019-12-05 NOTE — PROGRESS NOTES
Subjective:        Patient ID: Jaime Avila is a 62 y o  male  Chief Complaint:  Abimael Appiah is here for routine cardiac follow-up  He has been battling shoulder issues and sciatica problems, has been seeing a chiropractor for such  Is feeling a bit better  Denies any cardiopulmonary symptoms on extensive questioning specifically no chest pain shortness of breath palpitations presyncope syncope TIA or claudication like symptoms  Reviewed his echo from the summer revealed stable 4 1 cm thoracic aortic aneurysm dimensions  This has been stable for some time  The following portions of the patient's history were reviewed and updated as appropriate: allergies, current medications, past family history, past medical history, past social history, past surgical history and problem list   Review of Systems   Constitution: Negative for chills, diaphoresis, malaise/fatigue and weight gain  HENT: Negative for nosebleeds and stridor  Eyes: Negative for double vision, vision loss in left eye, vision loss in right eye and visual disturbance  Cardiovascular: Negative for chest pain, claudication, cyanosis, dyspnea on exertion, irregular heartbeat, leg swelling, near-syncope, orthopnea, palpitations, paroxysmal nocturnal dyspnea and syncope  Respiratory: Negative for cough, shortness of breath, snoring and wheezing  Endocrine: Negative for polydipsia, polyphagia and polyuria  Hematologic/Lymphatic: Negative for bleeding problem  Does not bruise/bleed easily  Skin: Negative for flushing and rash  Musculoskeletal: Positive for arthritis, joint pain and stiffness  Negative for falls and myalgias  Gastrointestinal: Negative for abdominal pain, heartburn, hematemesis, hematochezia, melena and nausea  Genitourinary: Negative for hematuria  Neurological: Negative for brief paralysis, dizziness, focal weakness, headaches, light-headedness, loss of balance and vertigo     Psychiatric/Behavioral: Negative for altered mental status and substance abuse  Allergic/Immunologic: Negative for hives  Objective:      /80   Pulse 60   Ht 5' 10" (1 778 m)   Wt 119 kg (263 lb)   BMI 37 74 kg/m²   Physical Exam   Constitutional: He is oriented to person, place, and time  He appears well-developed and well-nourished  No distress  HENT:   Head: Normocephalic and atraumatic  Eyes: Pupils are equal, round, and reactive to light  EOM are normal  No scleral icterus  Neck: Normal range of motion  Neck supple  No JVD present  No thyromegaly present  Cardiovascular: Normal rate, regular rhythm and normal heart sounds  Exam reveals no gallop and no friction rub  No murmur heard  Pulmonary/Chest: Effort normal and breath sounds normal  No stridor  No respiratory distress  He has no wheezes  He has no rales  Abdominal: Soft  Bowel sounds are normal  He exhibits no distension and no mass  There is no tenderness  Musculoskeletal: Normal range of motion  He exhibits no edema or deformity  Neurological: He is alert and oriented to person, place, and time  Coordination normal    Skin: Skin is warm and dry  No erythema  No pallor  Psychiatric: He has a normal mood and affect   His behavior is normal        Lab Review:   Appointment on 11/11/2019   Component Date Value    WBC 11/11/2019 3 97*    RBC 11/11/2019 4 48     Hemoglobin 11/11/2019 14 1     Hematocrit 11/11/2019 42 9     MCV 11/11/2019 96     MCH 11/11/2019 31 5     MCHC 11/11/2019 32 9     RDW 11/11/2019 12 9     MPV 11/11/2019 11 3     Platelets 12/71/0807 152     nRBC 11/11/2019 0     Neutrophils Relative 11/11/2019 51     Immat GRANS % 11/11/2019 0     Lymphocytes Relative 11/11/2019 36     Monocytes Relative 11/11/2019 9     Eosinophils Relative 11/11/2019 3     Basophils Relative 11/11/2019 1     Neutrophils Absolute 11/11/2019 2 01     Immature Grans Absolute 11/11/2019 0 01     Lymphocytes Absolute 11/11/2019 1 44     Monocytes Absolute 11/11/2019 0 37     Eosinophils Absolute 11/11/2019 0 11     Basophils Absolute 11/11/2019 0 03     Sodium 11/11/2019 139     Potassium 11/11/2019 3 5     Chloride 11/11/2019 106     CO2 11/11/2019 27     ANION GAP 11/11/2019 6     BUN 11/11/2019 16     Creatinine 11/11/2019 1 03     Glucose, Fasting 11/11/2019 152*    Calcium 11/11/2019 9 2     AST 11/11/2019 21     ALT 11/11/2019 45     Alkaline Phosphatase 11/11/2019 56     Total Protein 11/11/2019 7 0     Albumin 11/11/2019 3 8     Total Bilirubin 11/11/2019 0 53     eGFR 11/11/2019 77     Hemoglobin A1C 11/11/2019 7 9*    EAG 11/11/2019 180     Cholesterol 11/11/2019 148     Triglycerides 11/11/2019 105     HDL, Direct 11/11/2019 38*    LDL Calculated 11/11/2019 89     Non-HDL-Chol (CHOL-HDL) 11/11/2019 110     TSH 3RD GENERATON 11/11/2019 0 847      No results found  Assessment:       1  Essential hypertension  POCT ECG   2  Thoracic aortic aneurysm without rupture (HCC)  POCT ECG   3  Mixed hyperlipidemia          Plan:       [address] [facility] is without any signs or symptoms reminiscent of angina, heart failure nor electrical instability nor symptoms attributable to his chronic stable mild TAA  Lipids reasonably well controlled as is his blood pressure  At home his BP usually runs 130 over 70s  I made no changes today in his regimen, ordered no surveillance testing  I did tell him that should any elective surgery such as back/orthopedic surgery be required of intermediate risk or higher than I would recommend preoperative risk stratifying stress testing due to his inactivity, hypertension, diabetes and dyslipidemia  He will call should any elective surgery become a reality  Otherwise I will see him back in 6 months time, let me know if you need me to see him sooner    I will follow up with CTA of chest/aorta after my next visit with him

## 2019-12-09 ENCOUNTER — OFFICE VISIT (OUTPATIENT)
Dept: OBGYN CLINIC | Facility: HOSPITAL | Age: 64
End: 2019-12-09
Payer: COMMERCIAL

## 2019-12-09 VITALS
HEIGHT: 70 IN | WEIGHT: 263 LBS | HEART RATE: 71 BPM | BODY MASS INDEX: 37.65 KG/M2 | SYSTOLIC BLOOD PRESSURE: 154 MMHG | DIASTOLIC BLOOD PRESSURE: 88 MMHG

## 2019-12-09 DIAGNOSIS — M54.16 LUMBAR RADICULOPATHY: Primary | ICD-10-CM

## 2019-12-09 PROCEDURE — 99203 OFFICE O/P NEW LOW 30 MIN: CPT | Performed by: ORTHOPAEDIC SURGERY

## 2019-12-09 NOTE — PROGRESS NOTES
Assessment/Plan:    Lumbar radiculopathy  History of lumbar laminectomy L4-S1  · Continue physical therapy exercises  · Weight loss was encouraged  · Follow up as needed       Problem List Items Addressed This Visit        Nervous and Auditory    Lumbar radiculopathy - Primary            Subjective:      Patient ID: Colton Rojas is a 62 y o  male  HPI  Renetta Rich is a 62year old male who presents to the office for evaluation of low back pain ongoing since March 2019  He denies any injury or trauma  He has history of lumbar laminectomy at L4-S1 roughly 7-8 years ago with Dr Sheldon Scheuermann  He is experiencing left lower extremity numbness and weakness  His pain resolved with chiropractors and physical therapy  He notes difficulty ambulating and he has started limping  The following portions of the patient's history were reviewed and updated as appropriate: current medications, past family history, past medical history, past social history, past surgical history and problem list     Review of Systems   Constitutional: Negative for fever and unexpected weight change  HENT: Negative for hearing loss, nosebleeds and sore throat  Eyes: Negative for pain, redness and visual disturbance  Respiratory: Negative for cough, shortness of breath and wheezing  Cardiovascular: Negative for chest pain, palpitations and leg swelling  Gastrointestinal: Negative for abdominal pain, nausea and vomiting  Endocrine: Negative for polydipsia and polyuria  Genitourinary: Negative for dysuria and hematuria  Musculoskeletal: Negative for back pain  Skin: Negative for rash and wound  Neurological: Negative for dizziness and headaches  Psychiatric/Behavioral: Negative for agitation and suicidal ideas  Objective:      /88   Pulse 71   Ht 5' 10" (1 778 m)   Wt 119 kg (263 lb)   BMI 37 74 kg/m²          Physical Exam   Constitutional: He is oriented to person, place, and time   He appears well-developed and well-nourished  HENT:   Head: Normocephalic and atraumatic  Eyes: Pupils are equal, round, and reactive to light  Neck: Neck supple  Cardiovascular: Intact distal pulses  Pulmonary/Chest: Breath sounds normal    Neurological: He is alert and oriented to person, place, and time  Skin: Skin is warm and dry  Psychiatric: He has a normal mood and affect   His behavior is normal        Patient ambulates without assistance  Non-tender to palpation   Modified straight leg raise negative bilaterally  Strength L2-S1 5/5 bilaterally  Sensation L2-S1 intact bilaterally    Imaging  Outside MRI of lumbar spine 11/19/2019 was reviewed and shows left sided stenosis with post-surgical changes at L4-S1    Scribe Attestation    I,:   Zoltan Bethea am acting as a scribe while in the presence of the attending physician :        I,:   Andressa Ellsworth MD personally performed the services described in this documentation    as scribed in my presence :

## 2019-12-23 DIAGNOSIS — I10 ESSENTIAL HYPERTENSION: ICD-10-CM

## 2019-12-23 DIAGNOSIS — R60.9 PERIPHERAL EDEMA: ICD-10-CM

## 2019-12-23 DIAGNOSIS — E79.0 HYPERURICEMIA: ICD-10-CM

## 2019-12-23 RX ORDER — OLMESARTAN MEDOXOMIL AND HYDROCHLOROTHIAZIDE 40/12.5 40; 12.5 MG/1; MG/1
TABLET ORAL
Qty: 90 TABLET | Refills: 4 | Status: SHIPPED | OUTPATIENT
Start: 2019-12-23 | End: 2021-03-17

## 2019-12-23 RX ORDER — ALLOPURINOL 100 MG/1
TABLET ORAL
Qty: 90 TABLET | Refills: 4 | Status: SHIPPED | OUTPATIENT
Start: 2019-12-23 | End: 2021-03-16

## 2019-12-23 RX ORDER — FUROSEMIDE 40 MG/1
TABLET ORAL
Qty: 45 TABLET | Refills: 4 | Status: SHIPPED | OUTPATIENT
Start: 2019-12-23 | End: 2021-03-17

## 2019-12-23 RX ORDER — ATENOLOL 50 MG/1
TABLET ORAL
Qty: 180 TABLET | Refills: 4 | Status: SHIPPED | OUTPATIENT
Start: 2019-12-23 | End: 2021-03-16

## 2019-12-24 RX ORDER — POTASSIUM CHLORIDE 750 MG/1
30 TABLET, FILM COATED, EXTENDED RELEASE ORAL 2 TIMES DAILY
Qty: 540 TABLET | Refills: 2 | Status: SHIPPED | OUTPATIENT
Start: 2019-12-24 | End: 2020-08-27

## 2019-12-24 NOTE — TELEPHONE ENCOUNTER
Received a call from [facility] requesting a refill on potassium      Verified sig and pharmacy  Aware of 48 hr fill policy  Please advise

## 2020-01-02 ENCOUNTER — TELEPHONE (OUTPATIENT)
Dept: GASTROENTEROLOGY | Facility: CLINIC | Age: 65
End: 2020-01-02

## 2020-01-06 ENCOUNTER — ANESTHESIA EVENT (OUTPATIENT)
Dept: PERIOP | Facility: HOSPITAL | Age: 65
End: 2020-01-06

## 2020-01-07 ENCOUNTER — ANESTHESIA (OUTPATIENT)
Dept: PERIOP | Facility: HOSPITAL | Age: 65
End: 2020-01-07

## 2020-01-07 ENCOUNTER — HOSPITAL ENCOUNTER (OUTPATIENT)
Dept: PERIOP | Facility: HOSPITAL | Age: 65
Setting detail: OUTPATIENT SURGERY
Discharge: HOME | End: 2020-01-07
Attending: INTERNAL MEDICINE | Admitting: INTERNAL MEDICINE
Payer: COMMERCIAL

## 2020-01-07 VITALS
OXYGEN SATURATION: 98 % | TEMPERATURE: 97.9 F | DIASTOLIC BLOOD PRESSURE: 70 MMHG | HEART RATE: 70 BPM | RESPIRATION RATE: 18 BRPM | SYSTOLIC BLOOD PRESSURE: 124 MMHG

## 2020-01-07 DIAGNOSIS — Z12.11 SCREENING FOR COLON CANCER: ICD-10-CM

## 2020-01-07 LAB — GLUCOSE SERPL-MCNC: 144 MG/DL (ref 65–140)

## 2020-01-07 PROCEDURE — 88305 TISSUE EXAM BY PATHOLOGIST: CPT | Performed by: PATHOLOGY

## 2020-01-07 PROCEDURE — 82948 REAGENT STRIP/BLOOD GLUCOSE: CPT

## 2020-01-07 PROCEDURE — 45380 COLONOSCOPY AND BIOPSY: CPT | Performed by: INTERNAL MEDICINE

## 2020-01-07 RX ORDER — SODIUM CHLORIDE, SODIUM LACTATE, POTASSIUM CHLORIDE, CALCIUM CHLORIDE 600; 310; 30; 20 MG/100ML; MG/100ML; MG/100ML; MG/100ML
125 INJECTION, SOLUTION INTRAVENOUS CONTINUOUS
Status: DISCONTINUED | OUTPATIENT
Start: 2020-01-07 | End: 2020-01-11 | Stop reason: HOSPADM

## 2020-01-07 RX ORDER — METOPROLOL TARTRATE 5 MG/5ML
INJECTION INTRAVENOUS AS NEEDED
Status: DISCONTINUED | OUTPATIENT
Start: 2020-01-07 | End: 2020-01-07 | Stop reason: SURG

## 2020-01-07 RX ORDER — PROPOFOL 10 MG/ML
INJECTION, EMULSION INTRAVENOUS AS NEEDED
Status: DISCONTINUED | OUTPATIENT
Start: 2020-01-07 | End: 2020-01-07 | Stop reason: SURG

## 2020-01-07 RX ORDER — PROPOFOL 10 MG/ML
INJECTION, EMULSION INTRAVENOUS CONTINUOUS PRN
Status: DISCONTINUED | OUTPATIENT
Start: 2020-01-07 | End: 2020-01-07 | Stop reason: SURG

## 2020-01-07 RX ADMIN — METOPROLOL TARTRATE 1 MG: 1 INJECTION, SOLUTION INTRAVENOUS at 10:46

## 2020-01-07 RX ADMIN — PROPOFOL 100 MCG/KG/MIN: 10 INJECTION, EMULSION INTRAVENOUS at 10:30

## 2020-01-07 RX ADMIN — PROPOFOL 50 MG: 10 INJECTION, EMULSION INTRAVENOUS at 10:30

## 2020-01-07 RX ADMIN — PROPOFOL 50 MG: 10 INJECTION, EMULSION INTRAVENOUS at 10:29

## 2020-01-07 RX ADMIN — SODIUM CHLORIDE, SODIUM LACTATE, POTASSIUM CHLORIDE, AND CALCIUM CHLORIDE 125 ML/HR: .6; .31; .03; .02 INJECTION, SOLUTION INTRAVENOUS at 09:41

## 2020-01-07 NOTE — ANESTHESIA POSTPROCEDURE EVALUATION
Post-Op Assessment Note    CV Status:  Stable  Pain Score: 0    Pain management: adequate     Mental Status:  Alert and awake   Hydration Status:  Euvolemic   PONV Controlled:  Controlled   Airway Patency:  Patent   Post Op Vitals Reviewed: Yes      Staff: Anesthesiologist, CRNA           BP   128/78   Temp   97 8   Pulse  62   Resp   20   SpO2   99%

## 2020-01-07 NOTE — ANESTHESIA PREPROCEDURE EVALUATION
Review of Systems/Medical History  Patient summary reviewed  Chart reviewed  No history of anesthetic complications     Cardiovascular  Exercise tolerance (METS): >4,  Hyperlipidemia, Hypertension , No angina , No ANDREW, Aortic disease (thoracic aortic aneurysm),   Comment: Hx of Thoracic aneurysm,  Pulmonary  Negative pulmonary ROS Not a smoker , No recent URI ,        GI/Hepatic    Bowel prep            Endo/Other  Diabetes type 2 Diet controlled, History of thyroid disease (goiter - nonfunctional) ,   Obesity (BMI 37)    GYN       Hematology   Musculoskeletal  Back pain (with radicular symptoms) , lumbar pain, Sciatica,   Arthritis     Neurology    Neuromuscular disease (restless leg syndrome) ,    Psychology           Physical Exam    Airway    Mallampati score: II  TM Distance: >3 FB  Neck ROM: full     Dental   No notable dental hx     Cardiovascular  Cardiovascular exam normal    Pulmonary  Pulmonary exam normal     Other Findings        Anesthesia Plan  ASA Score- 3     Anesthesia Type- IV sedation with anesthesia with ASA Monitors  Additional Monitors:   Airway Plan:     Comment: Plan discussed  Consent obtained        Plan Factors-    Induction- intravenous  Postoperative Plan-     Informed Consent- Anesthetic plan and risks discussed with patient  I personally reviewed this patient with the CRNA  Discussed and agreed on the Anesthesia Plan with the CRNA  Merry Reynoso

## 2020-01-07 NOTE — H&P
H&P EXAM - Outpatient Endoscopy   Yenifer Palmer 62 y o  male MRN: [id number]    [address] [facility]   Encounter: [id number]        History and Physical -  [facility]  Yenifer Palmer 62 y o  male MRN: [id number]                  HPI: Yenifer Palmer is a 62y o  year old male who presents for colonoscopy      REVIEW OF SYSTEMS: Per the HPI, and otherwise unremarkable  Historical Information   Past Medical History:   Diagnosis Date    Allergic rhinitis     last assessed: 5/4/2015    Aneurysm of thoracic aorta (HCC)     Cataract     resolved: 12/12/2014    Colon polyp     Diabetes mellitus (HCC)     diet control    Herniated nucleus pulposus, L4-5 left     last assessed: 4/9/2014    Herniated nucleus pulposus, L5-S1, left     last assessed: 4/9/2014    Hx of echocardiogram 10/14/2016    EF 55%, Mild LVH, mild aortic root and ascending aorta enlargement, measuring 42 mm, trace MR, trace aortic regurg      Hypercholesterolemia     Hypertension     Lumbar radiculopathy      Past Surgical History:   Procedure Laterality Date    BACK SURGERY      BACK SURGERY      KNEE ARTHROSCOPY      (therapeutic)    KNEE SURGERY      IA COLONOSCOPY FLX DX W/COLLJ SPEC WHEN PFRMD N/A 9/30/2016    Procedure: COLONOSCOPY;  Surgeon: June Srivastava MD;  Location: [facility];  Service: Gastroenterology    TONSILLECTOMY AND ADENOIDECTOMY       Social History   Social History     Substance and Sexual Activity   Alcohol Use No    Comment: social     Social History     Substance and Sexual Activity   Drug Use No     Social History     Tobacco Use   Smoking Status Never Smoker   Smokeless Tobacco Never Used     Family History   Problem Relation Age of Onset    Thyroid disease Mother         disorder    Hypertension Father     Coronary artery disease Brother     Heart attack Brother         prior myocardial infarction       Meds/Allergies       (Not in a hospital admission)    No Known Allergies    Objective     /81   Pulse 69   Temp 98 3 °F (36 8 °C) (Tympanic)   Resp 18   SpO2 96%       PHYSICAL EXAM    Gen: NAD  CV: RRR  CHEST: Clear  ABD: soft, NT/ND  EXT: no edema      ASSESSMENT/PLAN:  This is a 62y o  year old male here for colonoscopy, and he is stable and optimized for his procedure

## 2020-01-16 ENCOUNTER — TELEPHONE (OUTPATIENT)
Dept: GASTROENTEROLOGY | Facility: MEDICAL CENTER | Age: 65
End: 2020-01-16

## 2020-01-16 NOTE — RESULT ENCOUNTER NOTE
I called patient with the results  IC valve polyp biopsy showed a tubular adenoma  I recommend he repeat the colonoscopy in 3-6 months to ensure the polyp was completely removed given the location

## 2020-01-16 NOTE — TELEPHONE ENCOUNTER
Left voicemail to return call for colonoscopy pathology results  IC valve biopsy showed tubular adenoma  He will need repeat colonoscopy in 3-6 months given the location to ensure complete resection of the polyp

## 2020-01-16 NOTE — TELEPHONE ENCOUNTER
Patient calling returning a call from Dr Neto Retnaa for results   Patient can be reached at [phone]

## 2020-01-17 ENCOUNTER — TELEPHONE (OUTPATIENT)
Dept: GASTROENTEROLOGY | Facility: MEDICAL CENTER | Age: 65
End: 2020-01-17

## 2020-01-17 ENCOUNTER — PREP FOR PROCEDURE (OUTPATIENT)
Dept: GASTROENTEROLOGY | Facility: MEDICAL CENTER | Age: 65
End: 2020-01-17

## 2020-01-17 DIAGNOSIS — K63.5 POLYP OF COLON, UNSPECIFIED PART OF COLON, UNSPECIFIED TYPE: Primary | ICD-10-CM

## 2020-01-17 NOTE — TELEPHONE ENCOUNTER
Pt is scheduled at [facility] with dr Zafar Mom on 5/29/20 for a recall colon  I went over miralax with pt and mailed out to pt home  Patient is aware she will need a  to and from   She will get a call the day before with an exact time for arrival

## 2020-01-17 NOTE — TELEPHONE ENCOUNTER
----- Message from Odell Menendez sent at 1/16/2020  2:46 PM EST -----      ----- Message -----  From: Manny Villegas MD  Sent: 1/16/2020  12:29 PM EST  To: [facility]astroenter[facility]    Can you schedule this patient for colonoscopy in 3-6 months with me at [facility]?     Thank you

## 2020-02-26 ENCOUNTER — APPOINTMENT (OUTPATIENT)
Dept: LAB | Facility: CLINIC | Age: 65
End: 2020-02-26
Payer: COMMERCIAL

## 2020-02-26 DIAGNOSIS — I10 ESSENTIAL HYPERTENSION: ICD-10-CM

## 2020-02-26 DIAGNOSIS — E66.9 DIABETES MELLITUS TYPE 2 IN OBESE (HCC): ICD-10-CM

## 2020-02-26 DIAGNOSIS — E78.2 MIXED HYPERLIPIDEMIA: ICD-10-CM

## 2020-02-26 DIAGNOSIS — E11.69 DIABETES MELLITUS TYPE 2 IN OBESE (HCC): ICD-10-CM

## 2020-02-26 LAB
BASOPHILS # BLD AUTO: 0.04 THOUSANDS/ΜL (ref 0–0.1)
BASOPHILS NFR BLD AUTO: 1 % (ref 0–1)
EOSINOPHIL # BLD AUTO: 0.1 THOUSAND/ΜL (ref 0–0.61)
EOSINOPHIL NFR BLD AUTO: 2 % (ref 0–6)
ERYTHROCYTE [DISTWIDTH] IN BLOOD BY AUTOMATED COUNT: 13.2 % (ref 11.6–15.1)
HCT VFR BLD AUTO: 43.7 % (ref 36.5–49.3)
HGB BLD-MCNC: 14.3 G/DL (ref 12–17)
IMM GRANULOCYTES # BLD AUTO: 0.01 THOUSAND/UL (ref 0–0.2)
IMM GRANULOCYTES NFR BLD AUTO: 0 % (ref 0–2)
LYMPHOCYTES # BLD AUTO: 1.59 THOUSANDS/ΜL (ref 0.6–4.47)
LYMPHOCYTES NFR BLD AUTO: 37 % (ref 14–44)
MCH RBC QN AUTO: 30.8 PG (ref 26.8–34.3)
MCHC RBC AUTO-ENTMCNC: 32.7 G/DL (ref 31.4–37.4)
MCV RBC AUTO: 94 FL (ref 82–98)
MONOCYTES # BLD AUTO: 0.34 THOUSAND/ΜL (ref 0.17–1.22)
MONOCYTES NFR BLD AUTO: 8 % (ref 4–12)
NEUTROPHILS # BLD AUTO: 2.21 THOUSANDS/ΜL (ref 1.85–7.62)
NEUTS SEG NFR BLD AUTO: 52 % (ref 43–75)
NRBC BLD AUTO-RTO: 0 /100 WBCS
PLATELET # BLD AUTO: 149 THOUSANDS/UL (ref 149–390)
PMV BLD AUTO: 11 FL (ref 8.9–12.7)
RBC # BLD AUTO: 4.64 MILLION/UL (ref 3.88–5.62)
WBC # BLD AUTO: 4.29 THOUSAND/UL (ref 4.31–10.16)

## 2020-02-26 PROCEDURE — 80061 LIPID PANEL: CPT

## 2020-02-26 PROCEDURE — 36415 COLL VENOUS BLD VENIPUNCTURE: CPT

## 2020-02-26 PROCEDURE — 80053 COMPREHEN METABOLIC PANEL: CPT

## 2020-02-26 PROCEDURE — 84443 ASSAY THYROID STIM HORMONE: CPT

## 2020-02-26 PROCEDURE — 83036 HEMOGLOBIN GLYCOSYLATED A1C: CPT

## 2020-02-26 PROCEDURE — 85025 COMPLETE CBC W/AUTO DIFF WBC: CPT

## 2020-02-27 ENCOUNTER — OFFICE VISIT (OUTPATIENT)
Dept: INTERNAL MEDICINE CLINIC | Facility: CLINIC | Age: 65
End: 2020-02-27
Payer: COMMERCIAL

## 2020-02-27 VITALS
TEMPERATURE: 98.1 F | HEART RATE: 65 BPM | DIASTOLIC BLOOD PRESSURE: 78 MMHG | HEIGHT: 70 IN | BODY MASS INDEX: 38.15 KG/M2 | OXYGEN SATURATION: 97 % | WEIGHT: 266.5 LBS | SYSTOLIC BLOOD PRESSURE: 132 MMHG

## 2020-02-27 DIAGNOSIS — E78.2 MIXED HYPERLIPIDEMIA: ICD-10-CM

## 2020-02-27 DIAGNOSIS — E66.9 DIABETES MELLITUS TYPE 2 IN OBESE (HCC): Primary | ICD-10-CM

## 2020-02-27 DIAGNOSIS — M54.16 LUMBAR RADICULOPATHY: ICD-10-CM

## 2020-02-27 DIAGNOSIS — I10 ESSENTIAL HYPERTENSION: ICD-10-CM

## 2020-02-27 DIAGNOSIS — E11.69 DIABETES MELLITUS TYPE 2 IN OBESE (HCC): Primary | ICD-10-CM

## 2020-02-27 LAB
ALBUMIN SERPL BCP-MCNC: 3.9 G/DL (ref 3.5–5)
ALP SERPL-CCNC: 59 U/L (ref 46–116)
ALT SERPL W P-5'-P-CCNC: 57 U/L (ref 12–78)
ANION GAP SERPL CALCULATED.3IONS-SCNC: 6 MMOL/L (ref 4–13)
AST SERPL W P-5'-P-CCNC: 25 U/L (ref 5–45)
BILIRUB SERPL-MCNC: 0.67 MG/DL (ref 0.2–1)
BUN SERPL-MCNC: 16 MG/DL (ref 5–25)
CALCIUM SERPL-MCNC: 8.8 MG/DL (ref 8.3–10.1)
CHLORIDE SERPL-SCNC: 107 MMOL/L (ref 100–108)
CHOLEST SERPL-MCNC: 170 MG/DL (ref 50–200)
CO2 SERPL-SCNC: 29 MMOL/L (ref 21–32)
CREAT SERPL-MCNC: 1.18 MG/DL (ref 0.6–1.3)
EST. AVERAGE GLUCOSE BLD GHB EST-MCNC: 177 MG/DL
GFR SERPL CREATININE-BSD FRML MDRD: 65 ML/MIN/1.73SQ M
GLUCOSE P FAST SERPL-MCNC: 189 MG/DL (ref 65–99)
HBA1C MFR BLD: 7.8 %
HDLC SERPL-MCNC: 37 MG/DL
LDLC SERPL CALC-MCNC: 105 MG/DL (ref 0–100)
NONHDLC SERPL-MCNC: 133 MG/DL
POTASSIUM SERPL-SCNC: 3.6 MMOL/L (ref 3.5–5.3)
PROT SERPL-MCNC: 7.1 G/DL (ref 6.4–8.2)
SODIUM SERPL-SCNC: 142 MMOL/L (ref 136–145)
TRIGL SERPL-MCNC: 141 MG/DL
TSH SERPL DL<=0.05 MIU/L-ACNC: 1.03 UIU/ML (ref 0.36–3.74)

## 2020-02-27 PROCEDURE — 3078F DIAST BP <80 MM HG: CPT | Performed by: FAMILY MEDICINE

## 2020-02-27 PROCEDURE — 99214 OFFICE O/P EST MOD 30 MIN: CPT | Performed by: FAMILY MEDICINE

## 2020-02-27 PROCEDURE — 1036F TOBACCO NON-USER: CPT | Performed by: FAMILY MEDICINE

## 2020-02-27 PROCEDURE — 3008F BODY MASS INDEX DOCD: CPT | Performed by: FAMILY MEDICINE

## 2020-02-27 PROCEDURE — 3051F HG A1C>EQUAL 7.0%<8.0%: CPT | Performed by: FAMILY MEDICINE

## 2020-02-27 PROCEDURE — 3075F SYST BP GE 130 - 139MM HG: CPT | Performed by: FAMILY MEDICINE

## 2020-02-27 NOTE — PROGRESS NOTES
Assessment/Plan:    Diabetes mellitus type 2 in obese [facility])    Lab Results   Component Value Date    HGBA1C 7 8 (H) 02/26/2020     Hemoglobin A1c persistently higher than goal   Discussed with him goal of hemoglobin A1c less than 7  Has not been improving  Discussed medication with him  Will start him on the metformin  Discussed with him starting the metformin 1 pill in the morning for 1 week then 1 pill twice a day for 1 week then 2 pills in the morning and 1 pill in the evening for 1 week and then 2 pills twice a day thereafter  Potential side effects discussed  Watch carbohydrate intake more closely  Try to remain as active as possible  Continue with the wellness program   Continue follow-up with Ophthalmology on a regular basis  Check feet daily  Recommend following up with Podiatry  Hypertension  Blood pressure well controlled  Continue current medications  Watch salt intake  Remain well hydrated  Lumbar radiculopathy  Continue with the wellness program   Continue to exercise on a regular basis hopefully this will help with his back symptoms  Follow-up with specialist if needed  Mixed hyperlipidemia  Continue with the atorvastatin  Watch diet  Increase fiber in diet  Increase activity level  Diagnoses and all orders for this visit:    Diabetes mellitus type 2 in obese [facility])  -     Comprehensive metabolic panel; Future  -     Hemoglobin A1C; Future  -     metFORMIN (GLUCOPHAGE) 500 mg tablet; Take 2 tablets (1,000 mg total) by mouth 2 (two) times a day with meals    Essential hypertension  -     CBC and differential; Future  -     Comprehensive metabolic panel; Future    Lumbar radiculopathy    Mixed hyperlipidemia  -     Comprehensive metabolic panel; Future  -     Lipid panel; Future  -     TSH, 3rd generation; Future        Orders and recommendations as noted above  Recent laboratory testing reviewed with him  Continue follow-up with GI    Follow-up colonoscopy in May as planned  He is up-to-date on immunizations  Will have him follow up in about 3-4 months and adjust medication if needed thereafter  Subjective:      Patient ID: Kush Fowler is a 62 y o  male  He presents for routine follow-up  Has generally been doing well  Has begun with physical therapy for his back  Did follow-up with back specialist but they deemed that surgery would carry more risks and possibly lack benefit because of his previous surgery  Has been improving with exercise  Still with some issues with balance  Has been working hard in therapy and has noticed significant improvement in his ambulation  Has been trying to walk more regularly but admits this is more difficult over the last year so  He admits that he has not been exercising as regularly except for at the wellness program   He tries to watch his diet but this is somewhat difficult  Gets up usually once to twice a night to urinate  Denies any polyuria, polydipsia, polyphagia  Tolerating blood pressure medications without difficulty  Denies any headaches or localized weakness  Tolerating statin medication without difficulty  Denies muscle aches or weakness  Denies abdominal pain  Denies chest pain or palpitations  Following up with Ophthalmology on a regular basis  Did follow-up with GI and had colonoscopy  Polyp found hand was incompletely removed per his report  He will have a follow-up colonoscopy in May  Usually sleeps relatively well  Denies any difficulty with urination  The following portions of the patient's history were reviewed and updated as appropriate:   He  has a past medical history of Allergic rhinitis, Aneurysm of thoracic aorta ([address] ), Cataract, Colon polyp, Diabetes mellitus ([address] ), Herniated nucleus pulposus, L4-5 left, Herniated nucleus pulposus, L5-S1, left, echocardiogram (10/14/2016), Hypercholesterolemia, Hypertension, and Lumbar radiculopathy    He   Patient Active Problem List Diagnosis Date Noted    Colonic polyp 11/14/2019    Lumbar radiculopathy 07/31/2019    Microscopic hematuria 07/02/2019    Pain of left thigh 07/02/2019    Acute pain of left shoulder 04/02/2019    Skin tag 10/25/2018    Restless leg syndrome 06/21/2018    Heel spur 05/31/2017    Neuropathy of left foot 09/19/2016    Diabetes mellitus type 2 in obese ([facility] ) 08/06/2015    Sciatica 03/03/2014    Chronic bilateral low back pain 02/27/2014    Edema 01/03/2013    Hyperuricemia without signs inflammatory arthritis/tophaceous disease 10/31/2012    Aneurysm of thoracic aorta ([facility] ) 08/06/2012    Mixed hyperlipidemia 05/01/2012    Hypertension 05/01/2012    Non-toxic uninodular goiter 05/01/2012    Osteoarthritis 05/01/2012     He  has a past surgical history that includes Tonsillectomy and adenoidectomy; Knee surgery; Back surgery; pr colonoscopy flx dx w/collj spec when pfrmd (N/A, 9/30/2016); Back surgery; and Knee arthroscopy  His family history includes Coronary artery disease in his brother; Heart attack in his brother; Hypertension in his father; Thyroid disease in his mother  He  reports that he has never smoked  He has never used smokeless tobacco  He reports that he does not drink alcohol or use drugs    Current Outpatient Medications   Medication Sig Dispense Refill    allopurinol (ZYLOPRIM) 100 mg tablet TAKE 1 TABLET DAILY 90 tablet 4    amLODIPine-benazepril (LOTREL 5-20) 5-20 MG per capsule TAKE 1 CAPSULE TWICE A  capsule 4    atenolol (TENORMIN) 50 mg tablet TAKE 1 TABLET TWICE A  tablet 4    atorvastatin (LIPITOR) 20 mg tablet TAKE 1 TABLET DAILY 90 tablet 3    furosemide (LASIX) 40 mg tablet TAKE ONE-HALF (1/2) TABLET DAILY 45 tablet 4    magnesium oxide (MAG-OX) 400 mg Take 400 mg by mouth 2 (two) times a day      meloxicam (MOBIC) 15 mg tablet Take 1 tablet (15 mg total) by mouth daily as needed for mild pain 30 tablet 5    olmesartan-hydrochlorothiazide (BENICAR HCT) 40-12 5 MG per tablet TAKE 1 TABLET DAILY 90 tablet 4    potassium chloride (K-DUR) 10 mEq tablet Take 3 tablets (30 mEq total) by mouth 2 (two) times a day Take 3 tabs by mouth in the morning and 3 tabs by mouth in the evening  540 tablet 2    metFORMIN (GLUCOPHAGE) 500 mg tablet Take 2 tablets (1,000 mg total) by mouth 2 (two) times a day with meals 360 tablet 3     No current facility-administered medications for this visit  Current Outpatient Medications on File Prior to Visit   Medication Sig    allopurinol (ZYLOPRIM) 100 mg tablet TAKE 1 TABLET DAILY    amLODIPine-benazepril (LOTREL 5-20) 5-20 MG per capsule TAKE 1 CAPSULE TWICE A DAY    atenolol (TENORMIN) 50 mg tablet TAKE 1 TABLET TWICE A DAY    atorvastatin (LIPITOR) 20 mg tablet TAKE 1 TABLET DAILY    furosemide (LASIX) 40 mg tablet TAKE ONE-HALF (1/2) TABLET DAILY    magnesium oxide (MAG-OX) 400 mg Take 400 mg by mouth 2 (two) times a day    meloxicam (MOBIC) 15 mg tablet Take 1 tablet (15 mg total) by mouth daily as needed for mild pain    olmesartan-hydrochlorothiazide (BENICAR HCT) 40-12 5 MG per tablet TAKE 1 TABLET DAILY    potassium chloride (K-DUR) 10 mEq tablet Take 3 tablets (30 mEq total) by mouth 2 (two) times a day Take 3 tabs by mouth in the morning and 3 tabs by mouth in the evening  No current facility-administered medications on file prior to visit  He has No Known Allergies       Review of Systems   Constitutional: Positive for activity change  Negative for appetite change, chills and fever  HENT: Negative for hearing loss  Eyes: Negative for pain and visual disturbance  Respiratory: Negative for cough, chest tightness and shortness of breath  Cardiovascular: Negative for chest pain and palpitations  Gastrointestinal: Negative for abdominal pain, blood in stool, diarrhea, nausea and vomiting  Endocrine: Negative for polydipsia, polyphagia and polyuria          As per HPI   Genitourinary: Negative for dysuria  Musculoskeletal: Positive for back pain and gait problem  Negative for arthralgias  Skin: Negative for color change  Neurological: Positive for numbness  Negative for dizziness and headaches  Hematological: Does not bruise/bleed easily  Psychiatric/Behavioral: Negative for behavioral problems, decreased concentration and dysphoric mood  The patient is not nervous/anxious  Objective:      /78 (BP Location: Left arm, Patient Position: Sitting, Cuff Size: Large)   Pulse 65   Temp 98 1 °F (36 7 °C) (Temporal)   Ht 5' 10" (1 778 m)   Wt 121 kg (266 lb 8 oz)   SpO2 97%   BMI 38 24 kg/m²          Physical Exam   Constitutional: He is oriented to person, place, and time  He is cooperative  No distress  HENT:   Head: Normocephalic and atraumatic  Nose: Nose normal    Eyes: Pupils are equal, round, and reactive to light  Conjunctivae are normal    Cardiovascular: Normal rate, regular rhythm and normal heart sounds  Exam reveals no gallop and no friction rub  No murmur heard  Pulmonary/Chest: He has no wheezes  He has no rales  He exhibits no tenderness  Abdominal: He exhibits no distension  There is no tenderness  There is no rebound and no guarding  Lymphadenopathy:     He has no cervical adenopathy  Neurological: He is alert and oriented to person, place, and time  Skin: Skin is warm and dry  Psychiatric: He has a normal mood and affect  His speech is normal and behavior is normal  Cognition and memory are normal    Nursing note and vitals reviewed

## 2020-02-28 NOTE — ASSESSMENT & PLAN NOTE
Blood pressure well controlled  Continue current medications  Watch salt intake  Remain well hydrated

## 2020-02-28 NOTE — ASSESSMENT & PLAN NOTE
Lab Results   Component Value Date    HGBA1C 7 8 (H) 02/26/2020     Hemoglobin A1c persistently higher than goal   Discussed with him goal of hemoglobin A1c less than 7  Has not been improving  Discussed medication with him  Will start him on the metformin  Discussed with him starting the metformin 1 pill in the morning for 1 week then 1 pill twice a day for 1 week then 2 pills in the morning and 1 pill in the evening for 1 week and then 2 pills twice a day thereafter  Potential side effects discussed  Watch carbohydrate intake more closely  Try to remain as active as possible  Continue with the wellness program   Continue follow-up with Ophthalmology on a regular basis  Check feet daily  Recommend following up with Podiatry

## 2020-02-28 NOTE — ASSESSMENT & PLAN NOTE
Continue with the wellness program   Continue to exercise on a regular basis hopefully this will help with his back symptoms  Follow-up with specialist if needed

## 2020-04-27 ENCOUNTER — TELEPHONE (OUTPATIENT)
Dept: INTERNAL MEDICINE CLINIC | Facility: CLINIC | Age: 65
End: 2020-04-27

## 2020-05-08 ENCOUNTER — TELEPHONE (OUTPATIENT)
Dept: GASTROENTEROLOGY | Facility: AMBULARY SURGERY CENTER | Age: 65
End: 2020-05-08

## 2020-05-19 DIAGNOSIS — Z11.59 SCREENING FOR VIRAL DISEASE: Primary | ICD-10-CM

## 2020-05-21 ENCOUNTER — TELEPHONE (OUTPATIENT)
Dept: GASTROENTEROLOGY | Facility: CLINIC | Age: 65
End: 2020-05-21

## 2020-05-22 DIAGNOSIS — Z11.59 SCREENING FOR VIRAL DISEASE: ICD-10-CM

## 2020-05-22 PROCEDURE — U0003 INFECTIOUS AGENT DETECTION BY NUCLEIC ACID (DNA OR RNA); SEVERE ACUTE RESPIRATORY SYNDROME CORONAVIRUS 2 (SARS-COV-2) (CORONAVIRUS DISEASE [COVID-19]), AMPLIFIED PROBE TECHNIQUE, MAKING USE OF HIGH THROUGHPUT TECHNOLOGIES AS DESCRIBED BY CMS-2020-01-R: HCPCS

## 2020-05-23 LAB — SARS-COV-2 RNA SPEC QL NAA+PROBE: NOT DETECTED

## 2020-05-28 ENCOUNTER — ANESTHESIA EVENT (OUTPATIENT)
Dept: PERIOP | Facility: HOSPITAL | Age: 65
End: 2020-05-28

## 2020-05-29 ENCOUNTER — HOSPITAL ENCOUNTER (OUTPATIENT)
Dept: PERIOP | Facility: HOSPITAL | Age: 65
Setting detail: OUTPATIENT SURGERY
Discharge: HOME | End: 2020-05-29
Attending: INTERNAL MEDICINE | Admitting: INTERNAL MEDICINE
Payer: COMMERCIAL

## 2020-05-29 ENCOUNTER — ANESTHESIA (OUTPATIENT)
Dept: PERIOP | Facility: HOSPITAL | Age: 65
End: 2020-05-29

## 2020-05-29 VITALS
SYSTOLIC BLOOD PRESSURE: 124 MMHG | TEMPERATURE: 98 F | OXYGEN SATURATION: 98 % | RESPIRATION RATE: 18 BRPM | DIASTOLIC BLOOD PRESSURE: 70 MMHG | HEART RATE: 78 BPM

## 2020-05-29 DIAGNOSIS — K63.5 POLYP OF COLON, UNSPECIFIED PART OF COLON, UNSPECIFIED TYPE: ICD-10-CM

## 2020-05-29 LAB — GLUCOSE SERPL-MCNC: 154 MG/DL (ref 65–140)

## 2020-05-29 PROCEDURE — 82948 REAGENT STRIP/BLOOD GLUCOSE: CPT

## 2020-05-29 PROCEDURE — 45378 DIAGNOSTIC COLONOSCOPY: CPT | Performed by: INTERNAL MEDICINE

## 2020-05-29 RX ORDER — SODIUM CHLORIDE, SODIUM LACTATE, POTASSIUM CHLORIDE, CALCIUM CHLORIDE 600; 310; 30; 20 MG/100ML; MG/100ML; MG/100ML; MG/100ML
20 INJECTION, SOLUTION INTRAVENOUS CONTINUOUS
Status: CANCELLED | OUTPATIENT
Start: 2020-05-29

## 2020-05-29 RX ORDER — ALBUTEROL SULFATE 2.5 MG/3ML
2.5 SOLUTION RESPIRATORY (INHALATION) ONCE AS NEEDED
Status: DISCONTINUED | OUTPATIENT
Start: 2020-05-29 | End: 2020-06-02 | Stop reason: HOSPADM

## 2020-05-29 RX ORDER — PROPOFOL 10 MG/ML
INJECTION, EMULSION INTRAVENOUS AS NEEDED
Status: DISCONTINUED | OUTPATIENT
Start: 2020-05-29 | End: 2020-05-29 | Stop reason: SURG

## 2020-05-29 RX ORDER — LIDOCAINE HYDROCHLORIDE 10 MG/ML
INJECTION, SOLUTION EPIDURAL; INFILTRATION; INTRACAUDAL; PERINEURAL AS NEEDED
Status: DISCONTINUED | OUTPATIENT
Start: 2020-05-29 | End: 2020-05-29 | Stop reason: SURG

## 2020-05-29 RX ORDER — SODIUM CHLORIDE, SODIUM LACTATE, POTASSIUM CHLORIDE, CALCIUM CHLORIDE 600; 310; 30; 20 MG/100ML; MG/100ML; MG/100ML; MG/100ML
125 INJECTION, SOLUTION INTRAVENOUS CONTINUOUS
Status: DISCONTINUED | OUTPATIENT
Start: 2020-05-29 | End: 2020-06-02 | Stop reason: HOSPADM

## 2020-05-29 RX ADMIN — LIDOCAINE HYDROCHLORIDE 50 MG: 10 INJECTION, SOLUTION EPIDURAL; INFILTRATION; INTRACAUDAL; PERINEURAL at 08:36

## 2020-05-29 RX ADMIN — PROPOFOL 30 MG: 10 INJECTION, EMULSION INTRAVENOUS at 08:44

## 2020-05-29 RX ADMIN — PROPOFOL 100 MG: 10 INJECTION, EMULSION INTRAVENOUS at 08:38

## 2020-05-29 RX ADMIN — PROPOFOL 150 MG: 10 INJECTION, EMULSION INTRAVENOUS at 08:36

## 2020-05-29 RX ADMIN — SODIUM CHLORIDE, SODIUM LACTATE, POTASSIUM CHLORIDE, AND CALCIUM CHLORIDE 125 ML/HR: .6; .31; .03; .02 INJECTION, SOLUTION INTRAVENOUS at 07:41

## 2020-05-29 RX ADMIN — PROPOFOL 30 MG: 10 INJECTION, EMULSION INTRAVENOUS at 08:50

## 2020-06-10 ENCOUNTER — APPOINTMENT (OUTPATIENT)
Dept: PHYSICAL THERAPY | Facility: CLINIC | Age: 65
End: 2020-06-10
Payer: COMMERCIAL

## 2020-06-11 ENCOUNTER — OFFICE VISIT (OUTPATIENT)
Dept: PHYSICAL THERAPY | Facility: CLINIC | Age: 65
End: 2020-06-11
Payer: COMMERCIAL

## 2020-06-11 DIAGNOSIS — M54.16 LUMBAR RADICULOPATHY: Primary | ICD-10-CM

## 2020-06-11 PROCEDURE — 97140 MANUAL THERAPY 1/> REGIONS: CPT | Performed by: PHYSICAL THERAPIST

## 2020-06-11 PROCEDURE — 97535 SELF CARE MNGMENT TRAINING: CPT | Performed by: PHYSICAL THERAPIST

## 2020-06-11 PROCEDURE — 97162 PT EVAL MOD COMPLEX 30 MIN: CPT | Performed by: PHYSICAL THERAPIST

## 2020-06-16 ENCOUNTER — OFFICE VISIT (OUTPATIENT)
Dept: PHYSICAL THERAPY | Facility: CLINIC | Age: 65
End: 2020-06-16
Payer: COMMERCIAL

## 2020-06-16 DIAGNOSIS — M54.16 LUMBAR RADICULOPATHY: Primary | ICD-10-CM

## 2020-06-16 PROCEDURE — 97140 MANUAL THERAPY 1/> REGIONS: CPT | Performed by: PHYSICAL THERAPIST

## 2020-06-16 PROCEDURE — 97112 NEUROMUSCULAR REEDUCATION: CPT | Performed by: PHYSICAL THERAPIST

## 2020-06-16 PROCEDURE — 97110 THERAPEUTIC EXERCISES: CPT | Performed by: PHYSICAL THERAPIST

## 2020-06-18 ENCOUNTER — OFFICE VISIT (OUTPATIENT)
Dept: PHYSICAL THERAPY | Facility: CLINIC | Age: 65
End: 2020-06-18
Payer: COMMERCIAL

## 2020-06-18 DIAGNOSIS — M54.16 LUMBAR RADICULOPATHY: Primary | ICD-10-CM

## 2020-06-18 PROCEDURE — 97112 NEUROMUSCULAR REEDUCATION: CPT | Performed by: PHYSICAL THERAPIST

## 2020-06-18 PROCEDURE — 97140 MANUAL THERAPY 1/> REGIONS: CPT | Performed by: PHYSICAL THERAPIST

## 2020-06-18 PROCEDURE — 97110 THERAPEUTIC EXERCISES: CPT | Performed by: PHYSICAL THERAPIST

## 2020-06-22 ENCOUNTER — OFFICE VISIT (OUTPATIENT)
Dept: CARDIOLOGY CLINIC | Facility: CLINIC | Age: 65
End: 2020-06-22
Payer: COMMERCIAL

## 2020-06-22 VITALS
WEIGHT: 257 LBS | DIASTOLIC BLOOD PRESSURE: 86 MMHG | SYSTOLIC BLOOD PRESSURE: 124 MMHG | BODY MASS INDEX: 36.88 KG/M2 | HEART RATE: 85 BPM

## 2020-06-22 DIAGNOSIS — I10 ESSENTIAL HYPERTENSION: Primary | ICD-10-CM

## 2020-06-22 DIAGNOSIS — I71.2 THORACIC AORTIC ANEURYSM WITHOUT RUPTURE (HCC): ICD-10-CM

## 2020-06-22 PROCEDURE — 1036F TOBACCO NON-USER: CPT | Performed by: INTERNAL MEDICINE

## 2020-06-22 PROCEDURE — 93000 ELECTROCARDIOGRAM COMPLETE: CPT | Performed by: INTERNAL MEDICINE

## 2020-06-22 PROCEDURE — 3079F DIAST BP 80-89 MM HG: CPT | Performed by: INTERNAL MEDICINE

## 2020-06-22 PROCEDURE — 99214 OFFICE O/P EST MOD 30 MIN: CPT | Performed by: INTERNAL MEDICINE

## 2020-06-22 PROCEDURE — 3051F HG A1C>EQUAL 7.0%<8.0%: CPT | Performed by: INTERNAL MEDICINE

## 2020-06-22 PROCEDURE — 3074F SYST BP LT 130 MM HG: CPT | Performed by: INTERNAL MEDICINE

## 2020-06-23 ENCOUNTER — OFFICE VISIT (OUTPATIENT)
Dept: PHYSICAL THERAPY | Facility: CLINIC | Age: 65
End: 2020-06-23
Payer: COMMERCIAL

## 2020-06-23 DIAGNOSIS — M54.16 LUMBAR RADICULOPATHY: Primary | ICD-10-CM

## 2020-06-23 PROCEDURE — 97110 THERAPEUTIC EXERCISES: CPT

## 2020-06-23 PROCEDURE — 97140 MANUAL THERAPY 1/> REGIONS: CPT

## 2020-06-23 PROCEDURE — 97112 NEUROMUSCULAR REEDUCATION: CPT

## 2020-06-25 ENCOUNTER — APPOINTMENT (OUTPATIENT)
Dept: LAB | Facility: MEDICAL CENTER | Age: 65
End: 2020-06-25
Payer: COMMERCIAL

## 2020-06-25 ENCOUNTER — OFFICE VISIT (OUTPATIENT)
Dept: PHYSICAL THERAPY | Facility: CLINIC | Age: 65
End: 2020-06-25
Payer: COMMERCIAL

## 2020-06-25 ENCOUNTER — TRANSCRIBE ORDERS (OUTPATIENT)
Dept: LAB | Facility: MEDICAL CENTER | Age: 65
End: 2020-06-25

## 2020-06-25 DIAGNOSIS — I10 ESSENTIAL HYPERTENSION: ICD-10-CM

## 2020-06-25 DIAGNOSIS — R35.1 NOCTURIA: ICD-10-CM

## 2020-06-25 DIAGNOSIS — E66.9 DIABETES MELLITUS TYPE 2 IN OBESE (HCC): ICD-10-CM

## 2020-06-25 DIAGNOSIS — M54.16 LUMBAR RADICULOPATHY: Primary | ICD-10-CM

## 2020-06-25 DIAGNOSIS — E11.69 DIABETES MELLITUS TYPE 2 IN OBESE (HCC): ICD-10-CM

## 2020-06-25 DIAGNOSIS — E78.2 MIXED HYPERLIPIDEMIA: ICD-10-CM

## 2020-06-25 DIAGNOSIS — R35.1 NOCTURIA: Primary | ICD-10-CM

## 2020-06-25 LAB
ALBUMIN SERPL BCP-MCNC: 3.9 G/DL (ref 3.5–5)
ALP SERPL-CCNC: 51 U/L (ref 46–116)
ALT SERPL W P-5'-P-CCNC: 56 U/L (ref 12–78)
ANION GAP SERPL CALCULATED.3IONS-SCNC: 8 MMOL/L (ref 4–13)
AST SERPL W P-5'-P-CCNC: 26 U/L (ref 5–45)
BASOPHILS # BLD AUTO: 0.06 THOUSANDS/ΜL (ref 0–0.1)
BASOPHILS NFR BLD AUTO: 1 % (ref 0–1)
BILIRUB SERPL-MCNC: 0.58 MG/DL (ref 0.2–1)
BUN SERPL-MCNC: 17 MG/DL (ref 5–25)
CALCIUM SERPL-MCNC: 9.1 MG/DL (ref 8.3–10.1)
CHLORIDE SERPL-SCNC: 105 MMOL/L (ref 100–108)
CHOLEST SERPL-MCNC: 142 MG/DL (ref 50–200)
CO2 SERPL-SCNC: 27 MMOL/L (ref 21–32)
CREAT SERPL-MCNC: 1.18 MG/DL (ref 0.6–1.3)
EOSINOPHIL # BLD AUTO: 0.27 THOUSAND/ΜL (ref 0–0.61)
EOSINOPHIL NFR BLD AUTO: 5 % (ref 0–6)
ERYTHROCYTE [DISTWIDTH] IN BLOOD BY AUTOMATED COUNT: 13.3 % (ref 11.6–15.1)
EST. AVERAGE GLUCOSE BLD GHB EST-MCNC: 160 MG/DL
GFR SERPL CREATININE-BSD FRML MDRD: 65 ML/MIN/1.73SQ M
GLUCOSE P FAST SERPL-MCNC: 139 MG/DL (ref 65–99)
HBA1C MFR BLD: 7.2 %
HCT VFR BLD AUTO: 42.3 % (ref 36.5–49.3)
HDLC SERPL-MCNC: 37 MG/DL
HGB BLD-MCNC: 14 G/DL (ref 12–17)
IMM GRANULOCYTES # BLD AUTO: 0.02 THOUSAND/UL (ref 0–0.2)
IMM GRANULOCYTES NFR BLD AUTO: 0 % (ref 0–2)
LDLC SERPL CALC-MCNC: 79 MG/DL (ref 0–100)
LYMPHOCYTES # BLD AUTO: 2.23 THOUSANDS/ΜL (ref 0.6–4.47)
LYMPHOCYTES NFR BLD AUTO: 40 % (ref 14–44)
MCH RBC QN AUTO: 31.7 PG (ref 26.8–34.3)
MCHC RBC AUTO-ENTMCNC: 33.1 G/DL (ref 31.4–37.4)
MCV RBC AUTO: 96 FL (ref 82–98)
MONOCYTES # BLD AUTO: 0.44 THOUSAND/ΜL (ref 0.17–1.22)
MONOCYTES NFR BLD AUTO: 8 % (ref 4–12)
NEUTROPHILS # BLD AUTO: 2.55 THOUSANDS/ΜL (ref 1.85–7.62)
NEUTS SEG NFR BLD AUTO: 46 % (ref 43–75)
NONHDLC SERPL-MCNC: 105 MG/DL
NRBC BLD AUTO-RTO: 0 /100 WBCS
PLATELET # BLD AUTO: 160 THOUSANDS/UL (ref 149–390)
PMV BLD AUTO: 10.9 FL (ref 8.9–12.7)
POTASSIUM SERPL-SCNC: 3.5 MMOL/L (ref 3.5–5.3)
PROT SERPL-MCNC: 7 G/DL (ref 6.4–8.2)
PSA SERPL-MCNC: 0.8 NG/ML (ref 0–4)
RBC # BLD AUTO: 4.41 MILLION/UL (ref 3.88–5.62)
SODIUM SERPL-SCNC: 140 MMOL/L (ref 136–145)
TRIGL SERPL-MCNC: 131 MG/DL
TSH SERPL DL<=0.05 MIU/L-ACNC: 1.26 UIU/ML (ref 0.36–3.74)
WBC # BLD AUTO: 5.57 THOUSAND/UL (ref 4.31–10.16)

## 2020-06-25 PROCEDURE — 80053 COMPREHEN METABOLIC PANEL: CPT

## 2020-06-25 PROCEDURE — 83036 HEMOGLOBIN GLYCOSYLATED A1C: CPT

## 2020-06-25 PROCEDURE — 36415 COLL VENOUS BLD VENIPUNCTURE: CPT

## 2020-06-25 PROCEDURE — 97535 SELF CARE MNGMENT TRAINING: CPT | Performed by: PHYSICAL THERAPIST

## 2020-06-25 PROCEDURE — 85025 COMPLETE CBC W/AUTO DIFF WBC: CPT

## 2020-06-25 PROCEDURE — 3051F HG A1C>EQUAL 7.0%<8.0%: CPT | Performed by: FAMILY MEDICINE

## 2020-06-25 PROCEDURE — 97110 THERAPEUTIC EXERCISES: CPT | Performed by: PHYSICAL THERAPIST

## 2020-06-25 PROCEDURE — 80061 LIPID PANEL: CPT

## 2020-06-25 PROCEDURE — G0103 PSA SCREENING: HCPCS

## 2020-06-25 PROCEDURE — 84443 ASSAY THYROID STIM HORMONE: CPT

## 2020-06-25 PROCEDURE — 97140 MANUAL THERAPY 1/> REGIONS: CPT | Performed by: PHYSICAL THERAPIST

## 2020-06-30 ENCOUNTER — OFFICE VISIT (OUTPATIENT)
Dept: INTERNAL MEDICINE CLINIC | Facility: CLINIC | Age: 65
End: 2020-06-30
Payer: COMMERCIAL

## 2020-06-30 VITALS
OXYGEN SATURATION: 95 % | BODY MASS INDEX: 37.02 KG/M2 | TEMPERATURE: 98.6 F | HEIGHT: 70 IN | DIASTOLIC BLOOD PRESSURE: 80 MMHG | SYSTOLIC BLOOD PRESSURE: 122 MMHG | WEIGHT: 258.6 LBS | HEART RATE: 78 BPM

## 2020-06-30 DIAGNOSIS — M54.30 SCIATICA, UNSPECIFIED LATERALITY: ICD-10-CM

## 2020-06-30 DIAGNOSIS — E66.9 DIABETES MELLITUS TYPE 2 IN OBESE (HCC): Primary | ICD-10-CM

## 2020-06-30 DIAGNOSIS — E11.69 DIABETES MELLITUS TYPE 2 IN OBESE (HCC): Primary | ICD-10-CM

## 2020-06-30 DIAGNOSIS — I10 ESSENTIAL HYPERTENSION: ICD-10-CM

## 2020-06-30 DIAGNOSIS — E78.2 MIXED HYPERLIPIDEMIA: ICD-10-CM

## 2020-06-30 PROCEDURE — 1036F TOBACCO NON-USER: CPT | Performed by: FAMILY MEDICINE

## 2020-06-30 PROCEDURE — 3008F BODY MASS INDEX DOCD: CPT | Performed by: FAMILY MEDICINE

## 2020-06-30 PROCEDURE — 3079F DIAST BP 80-89 MM HG: CPT | Performed by: FAMILY MEDICINE

## 2020-06-30 PROCEDURE — 3074F SYST BP LT 130 MM HG: CPT | Performed by: FAMILY MEDICINE

## 2020-06-30 PROCEDURE — 99214 OFFICE O/P EST MOD 30 MIN: CPT | Performed by: FAMILY MEDICINE

## 2020-06-30 PROCEDURE — 3051F HG A1C>EQUAL 7.0%<8.0%: CPT | Performed by: FAMILY MEDICINE

## 2020-08-06 DIAGNOSIS — E78.5 HYPERLIPIDEMIA, UNSPECIFIED HYPERLIPIDEMIA TYPE: ICD-10-CM

## 2020-08-06 RX ORDER — ATORVASTATIN CALCIUM 20 MG/1
TABLET, FILM COATED ORAL
Qty: 90 TABLET | Refills: 3 | Status: SHIPPED | OUTPATIENT
Start: 2020-08-06 | End: 2021-08-02

## 2020-08-20 ENCOUNTER — HOSPITAL ENCOUNTER (OUTPATIENT)
Dept: NON INVASIVE DIAGNOSTICS | Facility: CLINIC | Age: 65
Discharge: HOME | End: 2020-08-20
Payer: COMMERCIAL

## 2020-08-20 DIAGNOSIS — I71.2 THORACIC AORTIC ANEURYSM WITHOUT RUPTURE (HCC): ICD-10-CM

## 2020-08-20 DIAGNOSIS — I10 ESSENTIAL HYPERTENSION: ICD-10-CM

## 2020-08-20 PROCEDURE — 93306 TTE W/DOPPLER COMPLETE: CPT

## 2020-08-20 PROCEDURE — 93306 TTE W/DOPPLER COMPLETE: CPT | Performed by: INTERNAL MEDICINE

## 2020-08-27 DIAGNOSIS — R60.9 PERIPHERAL EDEMA: ICD-10-CM

## 2020-08-27 RX ORDER — POTASSIUM CHLORIDE 750 MG/1
TABLET, EXTENDED RELEASE ORAL
Qty: 540 TABLET | Refills: 3 | Status: SHIPPED | OUTPATIENT
Start: 2020-08-27 | End: 2021-08-22

## 2020-10-09 ENCOUNTER — IMMUNIZATIONS (OUTPATIENT)
Dept: INTERNAL MEDICINE CLINIC | Facility: CLINIC | Age: 65
End: 2020-10-09
Payer: COMMERCIAL

## 2020-10-09 DIAGNOSIS — Z23 ENCOUNTER FOR IMMUNIZATION: ICD-10-CM

## 2020-10-09 PROCEDURE — 90471 IMMUNIZATION ADMIN: CPT

## 2020-10-09 PROCEDURE — 90682 RIV4 VACC RECOMBINANT DNA IM: CPT

## 2020-10-30 ENCOUNTER — LAB (OUTPATIENT)
Dept: LAB | Facility: MEDICAL CENTER | Age: 65
End: 2020-10-30
Payer: COMMERCIAL

## 2020-10-30 DIAGNOSIS — E11.69 DIABETES MELLITUS TYPE 2 IN OBESE (HCC): ICD-10-CM

## 2020-10-30 DIAGNOSIS — E66.9 DIABETES MELLITUS TYPE 2 IN OBESE (HCC): ICD-10-CM

## 2020-10-30 DIAGNOSIS — E78.2 MIXED HYPERLIPIDEMIA: ICD-10-CM

## 2020-10-30 DIAGNOSIS — I10 ESSENTIAL HYPERTENSION: ICD-10-CM

## 2020-10-30 LAB
ALBUMIN SERPL BCP-MCNC: 3.9 G/DL (ref 3.5–5)
ALP SERPL-CCNC: 49 U/L (ref 46–116)
ALT SERPL W P-5'-P-CCNC: 59 U/L (ref 12–78)
ANION GAP SERPL CALCULATED.3IONS-SCNC: 7 MMOL/L (ref 4–13)
AST SERPL W P-5'-P-CCNC: 31 U/L (ref 5–45)
BASOPHILS # BLD AUTO: 0.04 THOUSANDS/ΜL (ref 0–0.1)
BASOPHILS NFR BLD AUTO: 1 % (ref 0–1)
BILIRUB SERPL-MCNC: 0.58 MG/DL (ref 0.2–1)
BUN SERPL-MCNC: 14 MG/DL (ref 5–25)
CALCIUM SERPL-MCNC: 9.3 MG/DL (ref 8.3–10.1)
CHLORIDE SERPL-SCNC: 105 MMOL/L (ref 100–108)
CHOLEST SERPL-MCNC: 147 MG/DL (ref 50–200)
CO2 SERPL-SCNC: 30 MMOL/L (ref 21–32)
CREAT SERPL-MCNC: 1.14 MG/DL (ref 0.6–1.3)
EOSINOPHIL # BLD AUTO: 0.25 THOUSAND/ΜL (ref 0–0.61)
EOSINOPHIL NFR BLD AUTO: 5 % (ref 0–6)
ERYTHROCYTE [DISTWIDTH] IN BLOOD BY AUTOMATED COUNT: 12.7 % (ref 11.6–15.1)
EST. AVERAGE GLUCOSE BLD GHB EST-MCNC: 206 MG/DL
GFR SERPL CREATININE-BSD FRML MDRD: 68 ML/MIN/1.73SQ M
GLUCOSE P FAST SERPL-MCNC: 160 MG/DL (ref 65–99)
HBA1C MFR BLD: 8.8 %
HCT VFR BLD AUTO: 41.3 % (ref 36.5–49.3)
HDLC SERPL-MCNC: 44 MG/DL
HGB BLD-MCNC: 14 G/DL (ref 12–17)
IMM GRANULOCYTES # BLD AUTO: 0.01 THOUSAND/UL (ref 0–0.2)
IMM GRANULOCYTES NFR BLD AUTO: 0 % (ref 0–2)
LDLC SERPL CALC-MCNC: 78 MG/DL (ref 0–100)
LYMPHOCYTES # BLD AUTO: 1.93 THOUSANDS/ΜL (ref 0.6–4.47)
LYMPHOCYTES NFR BLD AUTO: 35 % (ref 14–44)
MCH RBC QN AUTO: 32.7 PG (ref 26.8–34.3)
MCHC RBC AUTO-ENTMCNC: 33.9 G/DL (ref 31.4–37.4)
MCV RBC AUTO: 97 FL (ref 82–98)
MONOCYTES # BLD AUTO: 0.44 THOUSAND/ΜL (ref 0.17–1.22)
MONOCYTES NFR BLD AUTO: 8 % (ref 4–12)
NEUTROPHILS # BLD AUTO: 2.8 THOUSANDS/ΜL (ref 1.85–7.62)
NEUTS SEG NFR BLD AUTO: 51 % (ref 43–75)
NONHDLC SERPL-MCNC: 103 MG/DL
NRBC BLD AUTO-RTO: 0 /100 WBCS
PLATELET # BLD AUTO: 170 THOUSANDS/UL (ref 149–390)
PMV BLD AUTO: 10.9 FL (ref 8.9–12.7)
POTASSIUM SERPL-SCNC: 3.3 MMOL/L (ref 3.5–5.3)
PROT SERPL-MCNC: 7 G/DL (ref 6.4–8.2)
RBC # BLD AUTO: 4.28 MILLION/UL (ref 3.88–5.62)
SODIUM SERPL-SCNC: 142 MMOL/L (ref 136–145)
TRIGL SERPL-MCNC: 123 MG/DL
TSH SERPL DL<=0.05 MIU/L-ACNC: 0.81 UIU/ML (ref 0.36–3.74)
WBC # BLD AUTO: 5.47 THOUSAND/UL (ref 4.31–10.16)

## 2020-10-30 PROCEDURE — 80053 COMPREHEN METABOLIC PANEL: CPT

## 2020-10-30 PROCEDURE — 36415 COLL VENOUS BLD VENIPUNCTURE: CPT

## 2020-10-30 PROCEDURE — 85025 COMPLETE CBC W/AUTO DIFF WBC: CPT

## 2020-10-30 PROCEDURE — 80061 LIPID PANEL: CPT

## 2020-10-30 PROCEDURE — 84443 ASSAY THYROID STIM HORMONE: CPT

## 2020-10-30 PROCEDURE — 3052F HG A1C>EQUAL 8.0%<EQUAL 9.0%: CPT | Performed by: INTERNAL MEDICINE

## 2020-10-30 PROCEDURE — 83036 HEMOGLOBIN GLYCOSYLATED A1C: CPT

## 2020-11-04 ENCOUNTER — OFFICE VISIT (OUTPATIENT)
Dept: INTERNAL MEDICINE CLINIC | Facility: CLINIC | Age: 65
End: 2020-11-04
Payer: COMMERCIAL

## 2020-11-04 VITALS
HEIGHT: 70 IN | SYSTOLIC BLOOD PRESSURE: 136 MMHG | DIASTOLIC BLOOD PRESSURE: 82 MMHG | OXYGEN SATURATION: 95 % | HEART RATE: 75 BPM | TEMPERATURE: 96.2 F | WEIGHT: 262.4 LBS | BODY MASS INDEX: 37.56 KG/M2

## 2020-11-04 DIAGNOSIS — E87.6 HYPOKALEMIA: ICD-10-CM

## 2020-11-04 DIAGNOSIS — I10 ESSENTIAL HYPERTENSION: ICD-10-CM

## 2020-11-04 DIAGNOSIS — E11.69 DIABETES MELLITUS TYPE 2 IN OBESE (HCC): Primary | ICD-10-CM

## 2020-11-04 DIAGNOSIS — E78.2 MIXED HYPERLIPIDEMIA: ICD-10-CM

## 2020-11-04 DIAGNOSIS — E66.9 DIABETES MELLITUS TYPE 2 IN OBESE (HCC): Primary | ICD-10-CM

## 2020-11-04 PROCEDURE — 3725F SCREEN DEPRESSION PERFORMED: CPT | Performed by: FAMILY MEDICINE

## 2020-11-04 PROCEDURE — 99214 OFFICE O/P EST MOD 30 MIN: CPT | Performed by: FAMILY MEDICINE

## 2020-11-23 ENCOUNTER — OFFICE VISIT (OUTPATIENT)
Dept: CARDIOLOGY CLINIC | Facility: CLINIC | Age: 65
End: 2020-11-23
Payer: COMMERCIAL

## 2020-11-23 VITALS
HEART RATE: 78 BPM | BODY MASS INDEX: 36.94 KG/M2 | DIASTOLIC BLOOD PRESSURE: 78 MMHG | HEIGHT: 70 IN | WEIGHT: 258 LBS | SYSTOLIC BLOOD PRESSURE: 132 MMHG

## 2020-11-23 DIAGNOSIS — I10 ESSENTIAL HYPERTENSION: ICD-10-CM

## 2020-11-23 DIAGNOSIS — E87.6 HYPOKALEMIA: ICD-10-CM

## 2020-11-23 DIAGNOSIS — I71.2 THORACIC AORTIC ANEURYSM WITHOUT RUPTURE (HCC): Primary | ICD-10-CM

## 2020-11-23 DIAGNOSIS — E78.2 MIXED HYPERLIPIDEMIA: ICD-10-CM

## 2020-11-23 PROCEDURE — 3078F DIAST BP <80 MM HG: CPT | Performed by: INTERNAL MEDICINE

## 2020-11-23 PROCEDURE — 99214 OFFICE O/P EST MOD 30 MIN: CPT | Performed by: INTERNAL MEDICINE

## 2020-11-23 PROCEDURE — 3008F BODY MASS INDEX DOCD: CPT | Performed by: INTERNAL MEDICINE

## 2020-11-23 PROCEDURE — 3075F SYST BP GE 130 - 139MM HG: CPT | Performed by: INTERNAL MEDICINE

## 2020-11-23 PROCEDURE — 1036F TOBACCO NON-USER: CPT | Performed by: INTERNAL MEDICINE

## 2020-12-16 DIAGNOSIS — I10 ESSENTIAL HYPERTENSION: ICD-10-CM

## 2020-12-16 RX ORDER — AMLODIPINE BESYLATE AND BENAZEPRIL HYDROCHLORIDE 5; 20 MG/1; MG/1
CAPSULE ORAL
Qty: 180 CAPSULE | Refills: 3 | Status: SHIPPED | OUTPATIENT
Start: 2020-12-16 | End: 2021-12-13

## 2021-01-06 LAB
LEFT EYE DIABETIC RETINOPATHY: NORMAL
RIGHT EYE DIABETIC RETINOPATHY: NORMAL
SEVERITY (EYE EXAM): NORMAL

## 2021-02-03 DIAGNOSIS — E66.9 DIABETES MELLITUS TYPE 2 IN OBESE (HCC): ICD-10-CM

## 2021-02-03 DIAGNOSIS — E11.69 DIABETES MELLITUS TYPE 2 IN OBESE (HCC): ICD-10-CM

## 2021-02-26 ENCOUNTER — LAB (OUTPATIENT)
Dept: LAB | Facility: MEDICAL CENTER | Age: 66
End: 2021-02-26
Payer: COMMERCIAL

## 2021-02-26 DIAGNOSIS — E78.2 MIXED HYPERLIPIDEMIA: ICD-10-CM

## 2021-02-26 DIAGNOSIS — I10 ESSENTIAL HYPERTENSION: ICD-10-CM

## 2021-02-26 DIAGNOSIS — E11.69 DIABETES MELLITUS TYPE 2 IN OBESE (HCC): ICD-10-CM

## 2021-02-26 DIAGNOSIS — E66.9 DIABETES MELLITUS TYPE 2 IN OBESE (HCC): ICD-10-CM

## 2021-02-26 LAB
ALBUMIN SERPL BCP-MCNC: 4 G/DL (ref 3.5–5)
ALP SERPL-CCNC: 50 U/L (ref 46–116)
ALT SERPL W P-5'-P-CCNC: 66 U/L (ref 12–78)
ANION GAP SERPL CALCULATED.3IONS-SCNC: 6 MMOL/L (ref 4–13)
AST SERPL W P-5'-P-CCNC: 28 U/L (ref 5–45)
BASOPHILS # BLD AUTO: 0.04 THOUSANDS/ΜL (ref 0–0.1)
BASOPHILS NFR BLD AUTO: 1 % (ref 0–1)
BILIRUB SERPL-MCNC: 0.6 MG/DL (ref 0.2–1)
BUN SERPL-MCNC: 16 MG/DL (ref 5–25)
CALCIUM SERPL-MCNC: 9.7 MG/DL (ref 8.3–10.1)
CHLORIDE SERPL-SCNC: 110 MMOL/L (ref 100–108)
CHOLEST SERPL-MCNC: 143 MG/DL (ref 50–200)
CO2 SERPL-SCNC: 30 MMOL/L (ref 21–32)
CREAT SERPL-MCNC: 1.14 MG/DL (ref 0.6–1.3)
EOSINOPHIL # BLD AUTO: 0.28 THOUSAND/ΜL (ref 0–0.61)
EOSINOPHIL NFR BLD AUTO: 5 % (ref 0–6)
ERYTHROCYTE [DISTWIDTH] IN BLOOD BY AUTOMATED COUNT: 13.2 % (ref 11.6–15.1)
EST. AVERAGE GLUCOSE BLD GHB EST-MCNC: 160 MG/DL
GFR SERPL CREATININE-BSD FRML MDRD: 68 ML/MIN/1.73SQ M
GLUCOSE P FAST SERPL-MCNC: 153 MG/DL (ref 65–99)
HBA1C MFR BLD: 7.2 %
HCT VFR BLD AUTO: 42.8 % (ref 36.5–49.3)
HDLC SERPL-MCNC: 38 MG/DL
HGB BLD-MCNC: 14.2 G/DL (ref 12–17)
IMM GRANULOCYTES # BLD AUTO: 0.02 THOUSAND/UL (ref 0–0.2)
IMM GRANULOCYTES NFR BLD AUTO: 0 % (ref 0–2)
LDLC SERPL CALC-MCNC: 79 MG/DL (ref 0–100)
LYMPHOCYTES # BLD AUTO: 1.91 THOUSANDS/ΜL (ref 0.6–4.47)
LYMPHOCYTES NFR BLD AUTO: 31 % (ref 14–44)
MCH RBC QN AUTO: 32.1 PG (ref 26.8–34.3)
MCHC RBC AUTO-ENTMCNC: 33.2 G/DL (ref 31.4–37.4)
MCV RBC AUTO: 97 FL (ref 82–98)
MONOCYTES # BLD AUTO: 0.54 THOUSAND/ΜL (ref 0.17–1.22)
MONOCYTES NFR BLD AUTO: 9 % (ref 4–12)
NEUTROPHILS # BLD AUTO: 3.3 THOUSANDS/ΜL (ref 1.85–7.62)
NEUTS SEG NFR BLD AUTO: 54 % (ref 43–75)
NONHDLC SERPL-MCNC: 105 MG/DL
NRBC BLD AUTO-RTO: 0 /100 WBCS
PLATELET # BLD AUTO: 159 THOUSANDS/UL (ref 149–390)
PMV BLD AUTO: 11.2 FL (ref 8.9–12.7)
POTASSIUM SERPL-SCNC: 3.7 MMOL/L (ref 3.5–5.3)
PROT SERPL-MCNC: 7 G/DL (ref 6.4–8.2)
RBC # BLD AUTO: 4.43 MILLION/UL (ref 3.88–5.62)
SODIUM SERPL-SCNC: 146 MMOL/L (ref 136–145)
TRIGL SERPL-MCNC: 128 MG/DL
TSH SERPL DL<=0.05 MIU/L-ACNC: 2.1 UIU/ML (ref 0.36–3.74)
WBC # BLD AUTO: 6.09 THOUSAND/UL (ref 4.31–10.16)

## 2021-02-26 PROCEDURE — 85025 COMPLETE CBC W/AUTO DIFF WBC: CPT

## 2021-02-26 PROCEDURE — 84443 ASSAY THYROID STIM HORMONE: CPT

## 2021-02-26 PROCEDURE — 80061 LIPID PANEL: CPT

## 2021-02-26 PROCEDURE — 36415 COLL VENOUS BLD VENIPUNCTURE: CPT

## 2021-02-26 PROCEDURE — 3051F HG A1C>EQUAL 7.0%<8.0%: CPT | Performed by: FAMILY MEDICINE

## 2021-02-26 PROCEDURE — 80053 COMPREHEN METABOLIC PANEL: CPT

## 2021-02-26 PROCEDURE — 83036 HEMOGLOBIN GLYCOSYLATED A1C: CPT

## 2021-03-04 ENCOUNTER — OFFICE VISIT (OUTPATIENT)
Dept: INTERNAL MEDICINE CLINIC | Facility: CLINIC | Age: 66
End: 2021-03-04
Payer: COMMERCIAL

## 2021-03-04 VITALS
WEIGHT: 263.7 LBS | BODY MASS INDEX: 37.75 KG/M2 | OXYGEN SATURATION: 96 % | HEART RATE: 76 BPM | DIASTOLIC BLOOD PRESSURE: 84 MMHG | SYSTOLIC BLOOD PRESSURE: 136 MMHG | TEMPERATURE: 97.2 F | HEIGHT: 70 IN

## 2021-03-04 DIAGNOSIS — G89.29 CHRONIC BILATERAL LOW BACK PAIN, UNSPECIFIED WHETHER SCIATICA PRESENT: ICD-10-CM

## 2021-03-04 DIAGNOSIS — E66.9 DIABETES MELLITUS TYPE 2 IN OBESE (HCC): Primary | ICD-10-CM

## 2021-03-04 DIAGNOSIS — E78.2 MIXED HYPERLIPIDEMIA: ICD-10-CM

## 2021-03-04 DIAGNOSIS — E79.0 HYPERURICEMIA WITHOUT SIGNS INFLAMMATORY ARTHRITIS/TOPHACEOUS DISEASE: ICD-10-CM

## 2021-03-04 DIAGNOSIS — I71.2 THORACIC AORTIC ANEURYSM WITHOUT RUPTURE (HCC): ICD-10-CM

## 2021-03-04 DIAGNOSIS — E11.69 DIABETES MELLITUS TYPE 2 IN OBESE (HCC): Primary | ICD-10-CM

## 2021-03-04 DIAGNOSIS — I10 ESSENTIAL HYPERTENSION: ICD-10-CM

## 2021-03-04 DIAGNOSIS — M54.50 CHRONIC BILATERAL LOW BACK PAIN, UNSPECIFIED WHETHER SCIATICA PRESENT: ICD-10-CM

## 2021-03-04 PROCEDURE — 3075F SYST BP GE 130 - 139MM HG: CPT | Performed by: FAMILY MEDICINE

## 2021-03-04 PROCEDURE — 3008F BODY MASS INDEX DOCD: CPT | Performed by: FAMILY MEDICINE

## 2021-03-04 PROCEDURE — 99214 OFFICE O/P EST MOD 30 MIN: CPT | Performed by: FAMILY MEDICINE

## 2021-03-04 PROCEDURE — 3079F DIAST BP 80-89 MM HG: CPT | Performed by: FAMILY MEDICINE

## 2021-03-04 PROCEDURE — 1036F TOBACCO NON-USER: CPT | Performed by: FAMILY MEDICINE

## 2021-03-04 NOTE — PROGRESS NOTES
Assessment/Plan:    No problem-specific Assessment & Plan notes found for this encounter  Diagnoses and all orders for this visit:    Diabetes mellitus type 2 in obese (HCC)  -     canagliflozin (Invokana) 100 mg; Take 1 tablet (100 mg total) by mouth daily before breakfast  -     Hemoglobin A1C; Future  -     Comprehensive metabolic panel; Future    Essential hypertension  -     CBC and differential; Future  -     Comprehensive metabolic panel; Future  -     Comprehensive metabolic panel; Future    Mixed hyperlipidemia  -     Comprehensive metabolic panel; Future  -     Lipid panel; Future  -     TSH, 3rd generation; Future    Chronic bilateral low back pain, unspecified whether sciatica present    Hyperuricemia without signs inflammatory arthritis/tophaceous disease    Thoracic aortic aneurysm without rupture ([facility] )         orders and recommendations as noted above  Reviewed recent laboratory testing with him  Blood work is generally controlled  His hemoglobin A1c has improved  Discussed goal of hemoglobin A1c remaining below 7  He is not tolerating the metformin from a GI standpoint  Has not been able to take as regularly as he should  Discussed other options with him  Will start him on the Invokana 100 mg daily  Take this 1st thing in the morning  Potential side effects discussed  Stay adequately hydrated  Check CMP approximately 4-6 weeks after starting  Watch carbohydrate intake  Continue to increase activity level  Try to exercise regularly  Slow but steady weight loss recommended  Continue follow-up with Ophthalmology regularly  Check feet daily  Discussed with him potentially adding Ozempic or something similar in the future if needed  He will consider this  Blood pressure controlled  Continue current medication  Importance of good blood pressure controlled with stable thoracic aortic aneurysm discussed  Continue follow-up with Cardiology regularly     Cholesterol relatively well controlled  Continue with the atorvastatin  Watch diet  Increase fiber in diet  Discussed with him continuing with his exercise program   Watch for any return of back symptoms  Continue with the allopurinol for significant elevated uric acid in the past   Watch for any signs or symptoms of gout  He is up-to-date on his colorectal cancer screening  He is up-to-date on his PSA  This will be due again over the summer  Coronavirus vaccine discussed  He is up-to-date for on his other immunizations  Coronavirus precautions discussed  Will have him follow up in about 3-5 months with laboratory testing prior to that visit  Follow up sooner if needed  Subjective:      Patient ID: Dustin Luna is a 60 y o  male  He presents for routine follow-up  Has generally been doing relatively well  He has started a home exercise program to get him ready for fishing season  He is very excited about this  He plans on going out as much as possible  Does feel that he is more agile since beginning the exercise program   Has not had many issues with his back recently  Tolerating blood pressure medications without difficulty  Denies any headaches or localized weakness  Tolerating statin medication without difficulty  Denies muscle aches or weakness  Denies abdominal pain  Denies chest pain or palpitations  Continues to follow-up with Cardiology regularly  They have been following his thoracic aortic aneurysm which has been stable  Continues with the allopurinol without difficulty  Denies any episodes of gout  He has had some difficulty with metformin  Has caused reflux symptoms and  Some upper abdominal discomfort after taking  He can sometimes tolerate this for a few days but then has to stop it  He has been missing many doses because of this  Admits that he does not eat as healthy as he should  Denies any polyuria, polydipsia, or polyphagia  Follows up with Ophthalmology on a regular basis  Denies any recent vision changes  Checks feet daily  Has some chronic numbness into the left foot ever since his back issues  Usually gets up about once to twice a night to urinate  The following portions of the patient's history were reviewed and updated as appropriate:   He  has a past medical history of Allergic rhinitis, Aneurysm of thoracic aorta ([facility] ), Cataract, Colon polyp, Diabetes mellitus ([facility] ), Herniated nucleus pulposus, L4-5 left, Herniated nucleus pulposus, L5-S1, left, echocardiogram (10/14/2016), Hypercholesterolemia, Hypertension, and Lumbar radiculopathy  He   Patient Active Problem List    Diagnosis Date Noted    Hypokalemia 11/04/2020    Colonic polyp 11/14/2019    Lumbar radiculopathy 07/31/2019    Microscopic hematuria 07/02/2019    Pain of left thigh 07/02/2019    Acute pain of left shoulder 04/02/2019    Skin tag 10/25/2018    Restless leg syndrome 06/21/2018    Heel spur 05/31/2017    Neuropathy of left foot 09/19/2016    Diabetes mellitus type 2 in obese ([facility] ) 08/06/2015    Sciatica 03/03/2014    Chronic bilateral low back pain 02/27/2014    Edema 01/03/2013    Hyperuricemia without signs inflammatory arthritis/tophaceous disease 10/31/2012    Aneurysm of thoracic aorta ([facility] ) 08/06/2012    Mixed hyperlipidemia 05/01/2012    Hypertension 05/01/2012    Non-toxic uninodular goiter 05/01/2012    Osteoarthritis 05/01/2012     He  has a past surgical history that includes Tonsillectomy and adenoidectomy; Knee surgery; Back surgery; pr colonoscopy flx dx w/collj spec when pfrmd (N/A, 9/30/2016); Back surgery; and Knee arthroscopy  His family history includes Coronary artery disease in his brother; Heart attack in his brother; Hypertension in his father; Thyroid disease in his mother  He  reports that he has never smoked  He has never used smokeless tobacco  He reports that he does not drink alcohol or use drugs    Current Outpatient Medications   Medication Sig Dispense Refill    allopurinol (ZYLOPRIM) 100 mg tablet TAKE 1 TABLET DAILY 90 tablet 4    amLODIPine-benazepril (LOTREL 5-20) 5-20 MG per capsule TAKE 1 CAPSULE TWICE A  capsule 3    atenolol (TENORMIN) 50 mg tablet TAKE 1 TABLET TWICE A  tablet 4    atorvastatin (LIPITOR) 20 mg tablet TAKE 1 TABLET DAILY 90 tablet 3    furosemide (LASIX) 40 mg tablet TAKE ONE-HALF (1/2) TABLET DAILY 45 tablet 4    magnesium oxide (MAG-OX) 400 mg Take 400 mg by mouth 2 (two) times a day      olmesartan-hydrochlorothiazide (BENICAR HCT) 40-12 5 MG per tablet TAKE 1 TABLET DAILY 90 tablet 4    potassium chloride (K-DUR,KLOR-CON) 10 mEq tablet TAKE 3 TABLETS TWICE A DAY  TAKE 3 TABLETS IN THE MORNING AND TAKE 3 TABLETS IN THE EVENING 540 tablet 3    canagliflozin (Invokana) 100 mg Take 1 tablet (100 mg total) by mouth daily before breakfast 90 tablet 1     No current facility-administered medications for this visit  Current Outpatient Medications on File Prior to Visit   Medication Sig    allopurinol (ZYLOPRIM) 100 mg tablet TAKE 1 TABLET DAILY    amLODIPine-benazepril (LOTREL 5-20) 5-20 MG per capsule TAKE 1 CAPSULE TWICE A DAY    atenolol (TENORMIN) 50 mg tablet TAKE 1 TABLET TWICE A DAY    atorvastatin (LIPITOR) 20 mg tablet TAKE 1 TABLET DAILY    furosemide (LASIX) 40 mg tablet TAKE ONE-HALF (1/2) TABLET DAILY    magnesium oxide (MAG-OX) 400 mg Take 400 mg by mouth 2 (two) times a day    olmesartan-hydrochlorothiazide (BENICAR HCT) 40-12 5 MG per tablet TAKE 1 TABLET DAILY    potassium chloride (K-DUR,KLOR-CON) 10 mEq tablet TAKE 3 TABLETS TWICE A DAY   TAKE 3 TABLETS IN THE MORNING AND TAKE 3 TABLETS IN THE EVENING    [DISCONTINUED] metFORMIN (GLUCOPHAGE) 500 mg tablet TAKE 2 TABLETS TWICE A DAY WITH MEALS    [DISCONTINUED] meloxicam (MOBIC) 15 mg tablet Take 1 tablet (15 mg total) by mouth daily as needed for mild pain (Patient not taking: Reported on 11/4/2020)     No current facility-administered medications on file prior to visit  He has No Known Allergies       Review of Systems   Constitutional: Negative for activity change, appetite change, chills, fatigue and fever  HENT: Negative for congestion and hearing loss  Eyes: Negative for pain and visual disturbance  Respiratory: Negative for chest tightness and shortness of breath  Cardiovascular: Negative for chest pain and palpitations  Gastrointestinal: Negative for abdominal pain, blood in stool, diarrhea, nausea and vomiting  As per HPI   Endocrine: Negative for polydipsia, polyphagia and polyuria  Genitourinary: Negative for dysuria  Musculoskeletal: Positive for arthralgias and back pain  Negative for gait problem  Skin: Negative for color change  Neurological: Negative for dizziness and headaches  Hematological: Does not bruise/bleed easily  Psychiatric/Behavioral: Negative for behavioral problems  The patient is not nervous/anxious  Objective:      /84 (BP Location: Left arm, Patient Position: Sitting, Cuff Size: Large)   Pulse 76   Temp (!) 97 2 °F (36 2 °C)   Ht 5' 10" (1 778 m)   Wt 120 kg (263 lb 11 2 oz)   SpO2 96%   BMI 37 84 kg/m²          Physical Exam  Vitals signs and nursing note reviewed  Constitutional:       General: He is not in acute distress  Appearance: He is well-developed and well-groomed  He is obese  HENT:      Head: Normocephalic and atraumatic  Comments: Slight cerumen bilaterally     Nose: Nose normal    Eyes:      Conjunctiva/sclera: Conjunctivae normal       Pupils: Pupils are equal, round, and reactive to light  Neck:      Thyroid: No thyromegaly  Vascular: No carotid bruit  Cardiovascular:      Rate and Rhythm: Normal rate and regular rhythm  Heart sounds: Normal heart sounds  No murmur  No friction rub  No gallop  Pulmonary:      Breath sounds: No wheezing or rales  Chest:      Chest wall: No tenderness  Abdominal:      General: There is no distension  Tenderness: There is no abdominal tenderness  There is no guarding or rebound  Lymphadenopathy:      Cervical: No cervical adenopathy  Skin:     General: Skin is warm and dry  Neurological:      Mental Status: He is alert and oriented to person, place, and time  Psychiatric:         Mood and Affect: Mood and affect normal          Speech: Speech normal          Behavior: Behavior normal  Behavior is cooperative  BMI Counseling: Body mass index is 37 84 kg/m²  The BMI is above normal  Nutrition recommendations include decreasing portion sizes, encouraging healthy choices of fruits and vegetables, consuming healthier snacks, limiting drinks that contain sugar, moderation in carbohydrate intake, reducing intake of saturated and trans fat and reducing intake of cholesterol  Exercise recommendations include exercising 3-5 times per week

## 2021-03-05 ENCOUNTER — TELEPHONE (OUTPATIENT)
Dept: ADMINISTRATIVE | Facility: OTHER | Age: 66
End: 2021-03-05

## 2021-03-05 NOTE — LETTER
Diabetic Eye Exam Form    Date Requested: 21  Patient: Jaime Avila  Patient : 1955   Referring Provider: Irene Tony MD    Dilated Retinal Exam, Optomap-Iris Exam, or Fundus Photography Done         Yes ([place] one type above)         No     Date of Diabetic Eye Exam ______________________________  Left Eye      Exam did show retinopathy    Exam did not show retinopathy         Mild       Moderate       None       Proliferative       Severe     Right Eye     Exam did show retinopathy    Exam did not show retinopathy         Mild       Moderate       None       Proliferative       Severe     Comments ___DOS w/in the last 3 mo  _______________________________________________________    Practice Providing Exam ______________________________________________    Exam Performed By (print name) _______________________________________      Provider Signature ___________________________________________________      These reports are needed for  compliance    Please fax this completed form and a copy of the Diabetic Eye Exam report to our office located at [place] [address] as soon as possible to [phone] sanchez Vargas: Phone [phone]    We thank you for your assistance in treating our mutual patient

## 2021-03-05 NOTE — TELEPHONE ENCOUNTER
Upon review of the In Basket request and the patient's chart, initial outreach has been made via fax, please see Contacts section for details        Att x1 dm eye    Thank you  Nilson Covington

## 2021-03-05 NOTE — TELEPHONE ENCOUNTER
----- Message from Odell Valle sent at 3/4/2021  3:45 PM EST -----  03/04/21 3:46 PM    Hello, our patient Ok Butler has had Diabetic Eye Exam completed/performed  Please assist in updating the patient chart by making an External outreach to Dr Luz [facility] facility located in  [phone]  The date of service is within last 3 months      Thank you,  Arthur Fernandes MA   PRIMARY CARE [place] [address]

## 2021-03-10 DIAGNOSIS — Z23 ENCOUNTER FOR IMMUNIZATION: ICD-10-CM

## 2021-03-12 NOTE — TELEPHONE ENCOUNTER
Upon review of the In Basket request we were able to locate, review, and update the patient chart as requested for Diabetic Eye Exam     Any additional questions or concerns should be emailed to the Practice Liaisons via [email or web address] email, please do not reply via In Basket      Thank you  Bairon Owusu

## 2021-03-16 DIAGNOSIS — E79.0 HYPERURICEMIA: ICD-10-CM

## 2021-03-16 DIAGNOSIS — I10 ESSENTIAL HYPERTENSION: ICD-10-CM

## 2021-03-16 RX ORDER — ALLOPURINOL 100 MG/1
TABLET ORAL
Qty: 90 TABLET | Refills: 3 | Status: SHIPPED | OUTPATIENT
Start: 2021-03-16 | End: 2022-03-11

## 2021-03-16 RX ORDER — ATENOLOL 50 MG/1
TABLET ORAL
Qty: 180 TABLET | Refills: 3 | Status: SHIPPED | OUTPATIENT
Start: 2021-03-16 | End: 2022-03-11

## 2021-03-17 DIAGNOSIS — R60.9 PERIPHERAL EDEMA: ICD-10-CM

## 2021-03-17 DIAGNOSIS — I10 ESSENTIAL HYPERTENSION: ICD-10-CM

## 2021-03-17 RX ORDER — FUROSEMIDE 40 MG/1
TABLET ORAL
Qty: 45 TABLET | Refills: 3 | Status: SHIPPED | OUTPATIENT
Start: 2021-03-17 | End: 2022-02-18

## 2021-03-17 RX ORDER — OLMESARTAN MEDOXOMIL AND HYDROCHLOROTHIAZIDE 40/12.5 40; 12.5 MG/1; MG/1
TABLET ORAL
Qty: 90 TABLET | Refills: 3 | Status: SHIPPED | OUTPATIENT
Start: 2021-03-17 | End: 2022-02-19

## 2021-04-15 ENCOUNTER — APPOINTMENT (OUTPATIENT)
Dept: LAB | Facility: MEDICAL CENTER | Age: 66
End: 2021-04-15
Payer: COMMERCIAL

## 2021-04-15 DIAGNOSIS — I10 ESSENTIAL HYPERTENSION: ICD-10-CM

## 2021-04-15 DIAGNOSIS — E11.69 DIABETES MELLITUS TYPE 2 IN OBESE (HCC): ICD-10-CM

## 2021-04-15 DIAGNOSIS — E66.9 DIABETES MELLITUS TYPE 2 IN OBESE (HCC): ICD-10-CM

## 2021-04-15 LAB
ALBUMIN SERPL BCP-MCNC: 3.9 G/DL (ref 3.5–5)
ALP SERPL-CCNC: 50 U/L (ref 46–116)
ALT SERPL W P-5'-P-CCNC: 64 U/L (ref 12–78)
ANION GAP SERPL CALCULATED.3IONS-SCNC: 2 MMOL/L (ref 4–13)
AST SERPL W P-5'-P-CCNC: 25 U/L (ref 5–45)
BILIRUB SERPL-MCNC: 0.66 MG/DL (ref 0.2–1)
BUN SERPL-MCNC: 15 MG/DL (ref 5–25)
CALCIUM SERPL-MCNC: 9.1 MG/DL (ref 8.3–10.1)
CHLORIDE SERPL-SCNC: 111 MMOL/L (ref 100–108)
CO2 SERPL-SCNC: 32 MMOL/L (ref 21–32)
CREAT SERPL-MCNC: 1.28 MG/DL (ref 0.6–1.3)
GFR SERPL CREATININE-BSD FRML MDRD: 59 ML/MIN/1.73SQ M
GLUCOSE P FAST SERPL-MCNC: 183 MG/DL (ref 65–99)
POTASSIUM SERPL-SCNC: 3.8 MMOL/L (ref 3.5–5.3)
PROT SERPL-MCNC: 7.1 G/DL (ref 6.4–8.2)
SODIUM SERPL-SCNC: 145 MMOL/L (ref 136–145)

## 2021-04-15 PROCEDURE — 80053 COMPREHEN METABOLIC PANEL: CPT

## 2021-04-15 PROCEDURE — 36415 COLL VENOUS BLD VENIPUNCTURE: CPT

## 2021-05-24 ENCOUNTER — OFFICE VISIT (OUTPATIENT)
Dept: CARDIOLOGY CLINIC | Facility: CLINIC | Age: 66
End: 2021-05-24
Payer: COMMERCIAL

## 2021-05-24 VITALS
HEART RATE: 64 BPM | BODY MASS INDEX: 36.22 KG/M2 | WEIGHT: 253 LBS | HEIGHT: 70 IN | SYSTOLIC BLOOD PRESSURE: 138 MMHG | DIASTOLIC BLOOD PRESSURE: 78 MMHG

## 2021-05-24 DIAGNOSIS — E78.2 MIXED HYPERLIPIDEMIA: ICD-10-CM

## 2021-05-24 DIAGNOSIS — I10 ESSENTIAL HYPERTENSION: Primary | ICD-10-CM

## 2021-05-24 DIAGNOSIS — I71.2 THORACIC AORTIC ANEURYSM WITHOUT RUPTURE (HCC): ICD-10-CM

## 2021-05-24 PROCEDURE — 3075F SYST BP GE 130 - 139MM HG: CPT | Performed by: INTERNAL MEDICINE

## 2021-05-24 PROCEDURE — 3008F BODY MASS INDEX DOCD: CPT | Performed by: INTERNAL MEDICINE

## 2021-05-24 PROCEDURE — 3078F DIAST BP <80 MM HG: CPT | Performed by: INTERNAL MEDICINE

## 2021-05-24 PROCEDURE — 1036F TOBACCO NON-USER: CPT | Performed by: INTERNAL MEDICINE

## 2021-05-24 PROCEDURE — 93000 ELECTROCARDIOGRAM COMPLETE: CPT | Performed by: INTERNAL MEDICINE

## 2021-05-24 PROCEDURE — 99214 OFFICE O/P EST MOD 30 MIN: CPT | Performed by: INTERNAL MEDICINE

## 2021-05-24 NOTE — PROGRESS NOTES
Subjective:        Patient ID: Filomena Norris is a 60 y o  male  Chief Complaint:  Chelo Ball is here for hypertensive follow-up in addition to chronic ascending aortic dilatation/aneurysm, dyslipidemia  He continues to lose some weight intentionally, is feeling better, his edema is all but resolved  Denies any PND nor orthopnea  No chest pain arm neck lower jaw pain, no anginal symptoms  No palpitations presyncope or syncope  Offers no complaints  Reviewed his fall echocardiogram which was stable, ascending aorta 4 2 cm  The following portions of the patient's history were reviewed and updated as appropriate: allergies, current medications, past family history, past medical history, past social history, past surgical history and problem list   Review of Systems   Constitution: Negative for chills, diaphoresis, malaise/fatigue and weight gain  HENT: Negative for nosebleeds and stridor  Eyes: Negative for double vision, vision loss in left eye, vision loss in right eye and visual disturbance  Cardiovascular: Negative for chest pain, claudication, cyanosis, dyspnea on exertion, irregular heartbeat, leg swelling, near-syncope, orthopnea, palpitations, paroxysmal nocturnal dyspnea and syncope  Respiratory: Negative for cough, shortness of breath, snoring and wheezing  Endocrine: Negative for polydipsia, polyphagia and polyuria  Hematologic/Lymphatic: Negative for bleeding problem  Does not bruise/bleed easily  Skin: Negative for flushing and rash  Musculoskeletal: Negative for falls and myalgias  Gastrointestinal: Negative for abdominal pain, heartburn, hematemesis, hematochezia, melena and nausea  Genitourinary: Negative for hematuria  Neurological: Negative for brief paralysis, dizziness, focal weakness, headaches, light-headedness, loss of balance and vertigo  Psychiatric/Behavioral: Negative for altered mental status and substance abuse     Allergic/Immunologic: Negative for hives  Objective:      /78   Pulse 64   Ht 5' 10" (1 778 m)   Wt 115 kg (253 lb)   BMI 36 30 kg/m²   Physical Exam   Constitutional: He is oriented to person, place, and time  He appears well-developed and well-nourished  No distress  HENT:   Head: Normocephalic and atraumatic  Eyes: Pupils are equal, round, and reactive to light  EOM are normal  No scleral icterus  Neck: Normal range of motion  Neck supple  No JVD present  No thyromegaly present  Cardiovascular: Normal rate, regular rhythm and normal heart sounds  Exam reveals no gallop and no friction rub  No murmur heard  Pulmonary/Chest: Effort normal and breath sounds normal  No stridor  No respiratory distress  He has no wheezes  He has no rales  Abdominal: Soft  Bowel sounds are normal  He exhibits no distension and no mass  There is no abdominal tenderness  Musculoskeletal: Normal range of motion  General: No deformity or edema  Neurological: He is alert and oriented to person, place, and time  Coordination normal    Skin: Skin is warm and dry  No erythema  No pallor  Psychiatric: He has a normal mood and affect  His behavior is normal  Judgment and thought content normal        Lab Review:   Appointment on 04/15/2021   Component Date Value    Sodium 04/15/2021 145     Potassium 04/15/2021 3 8     Chloride 04/15/2021 111*    CO2 04/15/2021 32     ANION GAP 04/15/2021 2*    BUN 04/15/2021 15     Creatinine 04/15/2021 1 28     Glucose, Fasting 04/15/2021 183*    Calcium 04/15/2021 9 1     AST 04/15/2021 25     ALT 04/15/2021 64     Alkaline Phosphatase 04/15/2021 50     Total Protein 04/15/2021 7 1     Albumin 04/15/2021 3 9     Total Bilirubin 04/15/2021 0 66     eGFR 04/15/2021 59      No results found  Assessment:       1  Essential hypertension  POCT ECG   2  Thoracic aortic aneurysm without rupture ([address] )     3   Mixed hyperlipidemia          Plan:       Geraline Dire is without any evidence for angina heart failure nor electrical instability/dysrhythmia  His thoracic aortic aneurysm remains small, asymptomatic  Discussed signs and symptoms of aortic dissection in detail and provided proper instruction  His blood pressure has been well controlled, will continue aggressive antihypertensive therapy with amlodipine benazepril atenolol and Benicar hydrochlorothiazide with potassium supplementation  His lipids have been well controlled, he will continue statin therapy  I ordered no acute cardiac surveillance testing  Discussed many signs and symptoms of coronary insufficiency enlarging aortic aneurysm in detail, he will call prior to his 6 month scheduled follow-up visit should any of these arise

## 2021-05-24 NOTE — PATIENT INSTRUCTIONS
Chronic Hypertension, Ambulatory Care   GENERAL INFORMATION:   Chronic hypertension  is a long-term condition in which your blood pressure (BP) is higher than normal  Your BP is the force of your blood moving against the walls of your arteries  Hypertension is a BP of 140/90 or higher  Common symptoms include the following:   · Headache     · Blurred vision    · Chest pain     · Dizziness or weakness     · Trouble breathing     · Nosebleeds  Seek immediate care for the following symptoms:   · Severe headache or vision loss    · Weakness in an arm or leg    · Confusion or difficulty speaking    · Discomfort in your chest that feels like squeezing, pressure, fullness, or pain    · Suddenly feeling lightheaded or trouble breathing    · Pain or discomfort in your back, neck, jaw, stomach, or arm  Treatment for chronic hypertension  may include medicine to lower your BP  You may also need to make lifestyle changes  Take your medicine exactly as directed  Manage chronic hypertension:   · Take your BP at home  Sit and rest for 5 minutes before you take your BP  Extend your arm and support it on a flat surface  Your arm should be at the same level as your heart  Follow the directions that came with your BP monitor  If possible, take at least 2 BP readings each time  Take your BP at least twice a day at the same times each day, such as morning and evening  Keep a log of your BP readings and bring it to your follow-up visits  · Eat less sodium (salt)  Do not add sodium to your food  Limit foods that are high in sodium, such as canned foods, potato chips, and cold cuts  Your healthcare provider may suggest that you follow the [address] [place] [address] Street  The plan is low in sodium, unhealthy fats, and total fat  It is high in potassium, calcium, and fiber  · Exercise regularly  Exercise at least 30 minutes per day, on most days of the week  This will help decrease your BP   Ask your healthcare provider about the best exercise plan for you  · Limit alcohol  Women should limit alcohol to 1 drink a day  Men should limit alcohol to 2 drinks a day  A drink of alcohol is 12 ounces of beer, 5 ounces of wine, or 1½ ounces of liquor  · Do not smoke  If you smoke, it is never too late to quit  Smoking can increase your BP  Smoking also worsens other health conditions you may have that can increase your risk for hypertension  Ask your healthcare provider for information if you need help quitting  Follow up with your healthcare provider as directed: You will need to return to have your BP checked and to have other lab tests done  Write down your questions so you remember to ask them during your visits  CARE AGREEMENT:   You have the right to help plan your care  Learn about your health condition and how it may be treated  Discuss treatment options with your caregivers to decide what care you want to receive  You always have the right to refuse treatment  The above information is an  only  It is not intended as medical advice for individual conditions or treatments  Talk to your doctor, nurse or pharmacist before following any medical regimen to see if it is safe and effective for you  © 2014 [address] is for End User's use only and may not be sold, redistributed or otherwise used for commercial purposes  All illustrations and images included in CareNotes® are the copyrighted property of A D A M , Inc  or Eder Hu

## 2021-07-16 ENCOUNTER — RA CDI HCC (OUTPATIENT)
Dept: OTHER | Facility: HOSPITAL | Age: 66
End: 2021-07-16

## 2021-07-16 NOTE — PROGRESS NOTES
[facility]  coding opportunities          Chart reviewed, no opportunity found: CHART REVIEWED, NO OPPORTUNITY FOUND                     Patients insurance company: [employer] (Medicare Advantage and Commercial)

## 2021-07-20 ENCOUNTER — APPOINTMENT (OUTPATIENT)
Dept: LAB | Facility: MEDICAL CENTER | Age: 66
End: 2021-07-20
Payer: COMMERCIAL

## 2021-07-20 DIAGNOSIS — E66.9 DIABETES MELLITUS TYPE 2 IN OBESE (HCC): ICD-10-CM

## 2021-07-20 DIAGNOSIS — I10 ESSENTIAL HYPERTENSION: ICD-10-CM

## 2021-07-20 DIAGNOSIS — E78.2 MIXED HYPERLIPIDEMIA: ICD-10-CM

## 2021-07-20 DIAGNOSIS — E11.69 DIABETES MELLITUS TYPE 2 IN OBESE (HCC): ICD-10-CM

## 2021-07-20 LAB
ALBUMIN SERPL BCP-MCNC: 3.8 G/DL (ref 3.5–5)
ALP SERPL-CCNC: 57 U/L (ref 46–116)
ALT SERPL W P-5'-P-CCNC: 48 U/L (ref 12–78)
ANION GAP SERPL CALCULATED.3IONS-SCNC: 7 MMOL/L (ref 4–13)
AST SERPL W P-5'-P-CCNC: 22 U/L (ref 5–45)
BASOPHILS # BLD AUTO: 0.04 THOUSANDS/ΜL (ref 0–0.1)
BASOPHILS NFR BLD AUTO: 1 % (ref 0–1)
BILIRUB SERPL-MCNC: 0.67 MG/DL (ref 0.2–1)
BUN SERPL-MCNC: 16 MG/DL (ref 5–25)
CALCIUM SERPL-MCNC: 9.6 MG/DL (ref 8.3–10.1)
CHLORIDE SERPL-SCNC: 106 MMOL/L (ref 100–108)
CHOLEST SERPL-MCNC: 197 MG/DL (ref 50–200)
CO2 SERPL-SCNC: 28 MMOL/L (ref 21–32)
CREAT SERPL-MCNC: 1.19 MG/DL (ref 0.6–1.3)
EOSINOPHIL # BLD AUTO: 0.12 THOUSAND/ΜL (ref 0–0.61)
EOSINOPHIL NFR BLD AUTO: 2 % (ref 0–6)
ERYTHROCYTE [DISTWIDTH] IN BLOOD BY AUTOMATED COUNT: 13.5 % (ref 11.6–15.1)
EST. AVERAGE GLUCOSE BLD GHB EST-MCNC: 163 MG/DL
GFR SERPL CREATININE-BSD FRML MDRD: 64 ML/MIN/1.73SQ M
GLUCOSE P FAST SERPL-MCNC: 171 MG/DL (ref 65–99)
HBA1C MFR BLD: 7.3 %
HCT VFR BLD AUTO: 49.8 % (ref 36.5–49.3)
HDLC SERPL-MCNC: 42 MG/DL
HGB BLD-MCNC: 15.8 G/DL (ref 12–17)
IMM GRANULOCYTES # BLD AUTO: 0.02 THOUSAND/UL (ref 0–0.2)
IMM GRANULOCYTES NFR BLD AUTO: 0 % (ref 0–2)
LDLC SERPL CALC-MCNC: 115 MG/DL (ref 0–100)
LYMPHOCYTES # BLD AUTO: 1.92 THOUSANDS/ΜL (ref 0.6–4.47)
LYMPHOCYTES NFR BLD AUTO: 33 % (ref 14–44)
MCH RBC QN AUTO: 31.2 PG (ref 26.8–34.3)
MCHC RBC AUTO-ENTMCNC: 31.7 G/DL (ref 31.4–37.4)
MCV RBC AUTO: 98 FL (ref 82–98)
MONOCYTES # BLD AUTO: 0.46 THOUSAND/ΜL (ref 0.17–1.22)
MONOCYTES NFR BLD AUTO: 8 % (ref 4–12)
NEUTROPHILS # BLD AUTO: 3.18 THOUSANDS/ΜL (ref 1.85–7.62)
NEUTS SEG NFR BLD AUTO: 56 % (ref 43–75)
NONHDLC SERPL-MCNC: 155 MG/DL
NRBC BLD AUTO-RTO: 0 /100 WBCS
PLATELET # BLD AUTO: 157 THOUSANDS/UL (ref 149–390)
PMV BLD AUTO: 11.4 FL (ref 8.9–12.7)
POTASSIUM SERPL-SCNC: 3.9 MMOL/L (ref 3.5–5.3)
PROT SERPL-MCNC: 7.5 G/DL (ref 6.4–8.2)
RBC # BLD AUTO: 5.07 MILLION/UL (ref 3.88–5.62)
SODIUM SERPL-SCNC: 141 MMOL/L (ref 136–145)
TRIGL SERPL-MCNC: 199 MG/DL
TSH SERPL DL<=0.05 MIU/L-ACNC: 2.03 UIU/ML (ref 0.36–3.74)
WBC # BLD AUTO: 5.74 THOUSAND/UL (ref 4.31–10.16)

## 2021-07-20 PROCEDURE — 3051F HG A1C>EQUAL 7.0%<8.0%: CPT | Performed by: FAMILY MEDICINE

## 2021-07-20 PROCEDURE — 80053 COMPREHEN METABOLIC PANEL: CPT

## 2021-07-20 PROCEDURE — 84443 ASSAY THYROID STIM HORMONE: CPT

## 2021-07-20 PROCEDURE — 83036 HEMOGLOBIN GLYCOSYLATED A1C: CPT

## 2021-07-20 PROCEDURE — 36415 COLL VENOUS BLD VENIPUNCTURE: CPT

## 2021-07-20 PROCEDURE — 85025 COMPLETE CBC W/AUTO DIFF WBC: CPT

## 2021-07-20 PROCEDURE — 80061 LIPID PANEL: CPT

## 2021-07-22 ENCOUNTER — OFFICE VISIT (OUTPATIENT)
Dept: INTERNAL MEDICINE CLINIC | Facility: CLINIC | Age: 66
End: 2021-07-22
Payer: COMMERCIAL

## 2021-07-22 VITALS
DIASTOLIC BLOOD PRESSURE: 82 MMHG | SYSTOLIC BLOOD PRESSURE: 134 MMHG | OXYGEN SATURATION: 96 % | HEIGHT: 70 IN | BODY MASS INDEX: 36.26 KG/M2 | WEIGHT: 253.3 LBS | HEART RATE: 59 BPM | TEMPERATURE: 97.3 F

## 2021-07-22 DIAGNOSIS — E78.2 MIXED HYPERLIPIDEMIA: ICD-10-CM

## 2021-07-22 DIAGNOSIS — E11.69 DIABETES MELLITUS TYPE 2 IN OBESE (HCC): Primary | ICD-10-CM

## 2021-07-22 DIAGNOSIS — I71.2 THORACIC AORTIC ANEURYSM WITHOUT RUPTURE (HCC): ICD-10-CM

## 2021-07-22 DIAGNOSIS — E66.9 DIABETES MELLITUS TYPE 2 IN OBESE (HCC): Primary | ICD-10-CM

## 2021-07-22 DIAGNOSIS — E79.0 HYPERURICEMIA WITHOUT SIGNS INFLAMMATORY ARTHRITIS/TOPHACEOUS DISEASE: ICD-10-CM

## 2021-07-22 DIAGNOSIS — I10 ESSENTIAL HYPERTENSION: ICD-10-CM

## 2021-07-22 DIAGNOSIS — Z12.5 SCREENING FOR PROSTATE CANCER: ICD-10-CM

## 2021-07-22 PROCEDURE — 1036F TOBACCO NON-USER: CPT | Performed by: FAMILY MEDICINE

## 2021-07-22 PROCEDURE — 99214 OFFICE O/P EST MOD 30 MIN: CPT | Performed by: FAMILY MEDICINE

## 2021-07-22 PROCEDURE — 3075F SYST BP GE 130 - 139MM HG: CPT | Performed by: FAMILY MEDICINE

## 2021-07-22 PROCEDURE — 3008F BODY MASS INDEX DOCD: CPT | Performed by: FAMILY MEDICINE

## 2021-07-22 PROCEDURE — 3079F DIAST BP 80-89 MM HG: CPT | Performed by: FAMILY MEDICINE

## 2021-07-22 NOTE — PROGRESS NOTES
Assessment/Plan:    No problem-specific Assessment & Plan notes found for this encounter  Diagnoses and all orders for this visit:    Diabetes mellitus type 2 in obese ([facility] )  -     CBC and differential; Future  -     Comprehensive metabolic panel; Future  -     Hemoglobin A1C; Future    Essential hypertension  -     CBC and differential; Future  -     Comprehensive metabolic panel; Future    Mixed hyperlipidemia  -     CBC and differential; Future  -     Comprehensive metabolic panel; Future  -     Lipid panel; Future  -     TSH, 3rd generation; Future    Hyperuricemia without signs inflammatory arthritis/tophaceous disease  -     CBC and differential; Future  -     Uric acid; Future    Screening for prostate cancer  -     PSA, Total Screen; Future    Thoracic aortic aneurysm without rupture ([facility] )      Orders recommendations as noted above  Hemoglobin A1c reviewed with him  This is still above goal   Discussed goal of having hemoglobin A1c less than 7  Discussed continuing to watch diet and increase activity level  Discussed continuing weight loss  Discussed the option also of increasing the Invokana  He wishes to hold off on changes in medication and continue with lifestyle changes  Continue follow-up with Ophthalmology regularly  Check feet daily  Continue follow blood sugars at home  Blood pressure controlled  Continue current medications  Continue follow-up with Cardiology on a regular basis  Watch salt intake  Stay adequately hydrated  Continue with the atorvastatin  Watch fatty food intake  Increase fiber in diet  Continue with the allopurinol  Watch for any episodes of gout  Watch for any exacerbations of the chronic back issues  Up-to-date on COVID vaccination  Will have him follow up in about 3-5 months with laboratory testing prior to that visit  Follow up sooner if needed  Subjective:      Patient ID: Caroline Sue is a 60 y o  male        He presents for routine follow-up  Has generally been doing well  Has been more active  Walking more  Has had a significant weight loss with exercise and diet changes  He is frustrated though because his blood sugars in the morning still seem to be elevated  Denies eating anything heavy before bed  Usually sleeps relatively well  Continues with follow-up with Cardiology regularly  They have been following the aneurysm which has been stable  They have been following his blood pressure is well  Denies any chest pain or palpitations  Denies any significant shortness of breath  Tolerating blood pressure medications without difficulty  Denies any headaches or localized weakness  Tolerating statin medication without difficulty  Denies muscle aches or weakness  Denies abdominal pain  Denies chest pain or palpitations  Appetite has been generally stable  Denies any nausea, vomiting, or diarrhea  Denies any changes in bowel movements  Back symptoms have been relatively stable  No recent exacerbations  He feels that the weight loss and walking has helped this  Usually sleeps relatively well  Denies any urinary symptoms  Intermittent issues with swelling of the legs  The following portions of the patient's history were reviewed and updated as appropriate:   He  has a past medical history of Allergic rhinitis, Aneurysm of thoracic aorta ([address] ), Cataract, Colon polyp, Diabetes mellitus ([address] ), Herniated nucleus pulposus, L4-5 left, Herniated nucleus pulposus, L5-S1, left, echocardiogram (10/14/2016), Hypercholesterolemia, Hypertension, and Lumbar radiculopathy    He   Patient Active Problem List    Diagnosis Date Noted    Hypokalemia 11/04/2020    Colonic polyp 11/14/2019    Lumbar radiculopathy 07/31/2019    Microscopic hematuria 07/02/2019    Pain of left thigh 07/02/2019    Acute pain of left shoulder 04/02/2019    Skin tag 10/25/2018    Restless leg syndrome 06/21/2018    Heel spur 05/31/2017    Neuropathy of left foot 09/19/2016    Diabetes mellitus type 2 in obese ([facility] ) 08/06/2015    Sciatica 03/03/2014    Chronic bilateral low back pain 02/27/2014    Edema 01/03/2013    Hyperuricemia without signs inflammatory arthritis/tophaceous disease 10/31/2012    Aneur[facility] ([facility] ) 08/06/2012    Mixed hyperlipidemia 05/01/2012    Hypertension 05/01/2012    Non-toxic uninodular goiter 05/01/2012    Osteoarthritis 05/01/2012     He  has a past surgical history that includes Tonsillectomy and adenoidectomy; Knee surgery; Back surgery; pr colonoscopy flx dx w/collj spec when pfrmd (N/A, 9/30/2016); Back surgery; and Knee arthroscopy  His family history includes Coronary artery disease in his brother; Heart attack in his brother; Hypertension in his father; Thyroid disease in his mother  He  reports that he has never smoked  He has never used smokeless tobacco  He reports that he does not drink alcohol and does not use drugs  Current Outpatient Medications   Medication Sig Dispense Refill    allopurinol (ZYLOPRIM) 100 mg tablet TAKE 1 TABLET DAILY 90 tablet 3    amLODIPine-benazepril (LOTREL 5-20) 5-20 MG per capsule TAKE 1 CAPSULE TWICE A  capsule 3    atenolol (TENORMIN) 50 mg tablet TAKE 1 TABLET TWICE A  tablet 3    atorvastatin (LIPITOR) 20 mg tablet TAKE 1 TABLET DAILY 90 tablet 3    canagliflozin (Invokana) 100 mg Take 1 tablet (100 mg total) by mouth daily before breakfast 90 tablet 1    furosemide (LASIX) 40 mg tablet TAKE ONE-HALF (1/2) TABLET DAILY (Patient taking differently: Take 1/2 tablet daily) 45 tablet 3    magnesium oxide (MAG-OX) 400 mg Take 400 mg by mouth 2 (two) times a day      olmesartan-hydrochlorothiazide (BENICAR HCT) 40-12 5 MG per tablet TAKE 1 TABLET DAILY 90 tablet 3    potassium chloride (K-DUR,KLOR-CON) 10 mEq tablet TAKE 3 TABLETS TWICE A DAY   TAKE 3 TABLETS IN THE MORNING AND TAKE 3 TABLETS IN THE EVENING 540 tablet 3     No current facility-administered medications for this visit  Current Outpatient Medications on File Prior to Visit   Medication Sig    allopurinol (ZYLOPRIM) 100 mg tablet TAKE 1 TABLET DAILY    amLODIPine-benazepril (LOTREL 5-20) 5-20 MG per capsule TAKE 1 CAPSULE TWICE A DAY    atenolol (TENORMIN) 50 mg tablet TAKE 1 TABLET TWICE A DAY    atorvastatin (LIPITOR) 20 mg tablet TAKE 1 TABLET DAILY    canagliflozin (Invokana) 100 mg Take 1 tablet (100 mg total) by mouth daily before breakfast    furosemide (LASIX) 40 mg tablet TAKE ONE-HALF (1/2) TABLET DAILY (Patient taking differently: Take 1/2 tablet daily)    magnesium oxide (MAG-OX) 400 mg Take 400 mg by mouth 2 (two) times a day    olmesartan-hydrochlorothiazide (BENICAR HCT) 40-12 5 MG per tablet TAKE 1 TABLET DAILY    potassium chloride (K-DUR,KLOR-CON) 10 mEq tablet TAKE 3 TABLETS TWICE A DAY  TAKE 3 TABLETS IN THE MORNING AND TAKE 3 TABLETS IN THE EVENING     No current facility-administered medications on file prior to visit  He has No Known Allergies       Review of Systems   Constitutional: Positive for activity change  Negative for appetite change, chills, fatigue and fever  HENT: Negative for hearing loss  Eyes: Negative for pain and visual disturbance  Respiratory: Negative for cough, chest tightness and shortness of breath  Cardiovascular: Negative for chest pain and palpitations  Gastrointestinal: Negative for abdominal pain, blood in stool, diarrhea, nausea and vomiting  Endocrine: Negative for polydipsia, polyphagia and polyuria  Genitourinary: Negative for dysuria  Musculoskeletal: Negative for arthralgias and gait problem  Skin: Negative for color change  Neurological: Negative for dizziness and headaches  Hematological: Does not bruise/bleed easily  Psychiatric/Behavioral: Negative for behavioral problems and decreased concentration  The patient is not nervous/anxious            Objective:      /82 (BP Location: Left arm, Patient Position: Sitting, Cuff Size: Large)   Pulse 59   Temp (!) 97 3 °F (36 3 °C)   Ht 5' 10" (1 778 m)   Wt 115 kg (253 lb 4 8 oz)   SpO2 96%   BMI 36 34 kg/m²          Physical Exam  Vitals and nursing note reviewed  Constitutional:       General: He is not in acute distress  Appearance: He is well-developed, well-groomed and overweight  HENT:      Head: Normocephalic and atraumatic  Nose: Nose normal    Eyes:      Conjunctiva/sclera: Conjunctivae normal       Pupils: Pupils are equal, round, and reactive to light  Cardiovascular:      Rate and Rhythm: Normal rate and regular rhythm  Heart sounds: Normal heart sounds  No murmur heard  No friction rub  No gallop  Pulmonary:      Breath sounds: No wheezing or rales  Chest:      Chest wall: No tenderness  Abdominal:      General: There is no distension  Tenderness: There is no abdominal tenderness  There is no guarding or rebound  Lymphadenopathy:      Cervical: No cervical adenopathy  Skin:     General: Skin is warm and dry  Neurological:      Mental Status: He is alert and oriented to person, place, and time  Psychiatric:         Mood and Affect: Mood and affect normal          Speech: Speech normal          Behavior: Behavior normal  Behavior is cooperative

## 2021-08-01 DIAGNOSIS — E78.5 HYPERLIPIDEMIA, UNSPECIFIED HYPERLIPIDEMIA TYPE: ICD-10-CM

## 2021-08-02 RX ORDER — ATORVASTATIN CALCIUM 20 MG/1
TABLET, FILM COATED ORAL
Qty: 90 TABLET | Refills: 3 | Status: SHIPPED | OUTPATIENT
Start: 2021-08-02 | End: 2022-06-15 | Stop reason: SDUPTHER

## 2021-08-14 DIAGNOSIS — E11.69 DIABETES MELLITUS TYPE 2 IN OBESE (HCC): ICD-10-CM

## 2021-08-14 DIAGNOSIS — E66.9 DIABETES MELLITUS TYPE 2 IN OBESE (HCC): ICD-10-CM

## 2021-08-15 RX ORDER — CANAGLIFLOZIN 100 MG/1
TABLET, FILM COATED ORAL
Qty: 90 TABLET | Refills: 3 | Status: SHIPPED | OUTPATIENT
Start: 2021-08-15 | End: 2021-11-24 | Stop reason: SDUPTHER

## 2021-08-22 DIAGNOSIS — R60.9 PERIPHERAL EDEMA: ICD-10-CM

## 2021-08-22 RX ORDER — POTASSIUM CHLORIDE 750 MG/1
TABLET, EXTENDED RELEASE ORAL
Qty: 540 TABLET | Refills: 3 | Status: SHIPPED | OUTPATIENT
Start: 2021-08-22

## 2021-09-09 ENCOUNTER — APPOINTMENT (OUTPATIENT)
Dept: LAB | Facility: MEDICAL CENTER | Age: 66
End: 2021-09-09
Payer: COMMERCIAL

## 2021-09-09 DIAGNOSIS — Z12.5 SPECIAL SCREENING FOR MALIGNANT NEOPLASM OF PROSTATE: ICD-10-CM

## 2021-09-09 LAB — PSA SERPL-MCNC: 0.8 NG/ML (ref 0–4)

## 2021-09-09 PROCEDURE — 36415 COLL VENOUS BLD VENIPUNCTURE: CPT

## 2021-09-09 PROCEDURE — G0103 PSA SCREENING: HCPCS

## 2021-09-13 ENCOUNTER — IMMUNIZATIONS (OUTPATIENT)
Dept: INTERNAL MEDICINE CLINIC | Facility: CLINIC | Age: 66
End: 2021-09-13
Payer: COMMERCIAL

## 2021-09-13 DIAGNOSIS — Z23 ENCOUNTER FOR IMMUNIZATION: Primary | ICD-10-CM

## 2021-09-13 PROCEDURE — 90682 RIV4 VACC RECOMBINANT DNA IM: CPT

## 2021-09-13 PROCEDURE — 90471 IMMUNIZATION ADMIN: CPT

## 2021-11-17 ENCOUNTER — RA CDI HCC (OUTPATIENT)
Dept: OTHER | Facility: HOSPITAL | Age: 66
End: 2021-11-17

## 2021-11-18 ENCOUNTER — APPOINTMENT (OUTPATIENT)
Dept: LAB | Facility: MEDICAL CENTER | Age: 66
End: 2021-11-18
Payer: COMMERCIAL

## 2021-11-18 DIAGNOSIS — E79.0 HYPERURICEMIA WITHOUT SIGNS INFLAMMATORY ARTHRITIS/TOPHACEOUS DISEASE: ICD-10-CM

## 2021-11-18 DIAGNOSIS — E11.69 DIABETES MELLITUS TYPE 2 IN OBESE (HCC): ICD-10-CM

## 2021-11-18 DIAGNOSIS — E78.2 MIXED HYPERLIPIDEMIA: ICD-10-CM

## 2021-11-18 DIAGNOSIS — E66.9 DIABETES MELLITUS TYPE 2 IN OBESE (HCC): ICD-10-CM

## 2021-11-18 DIAGNOSIS — I10 ESSENTIAL HYPERTENSION: ICD-10-CM

## 2021-11-18 DIAGNOSIS — Z12.5 SCREENING FOR PROSTATE CANCER: ICD-10-CM

## 2021-11-18 LAB
ALBUMIN SERPL BCP-MCNC: 4.1 G/DL (ref 3.5–5)
ALP SERPL-CCNC: 63 U/L (ref 46–116)
ALT SERPL W P-5'-P-CCNC: 35 U/L (ref 12–78)
ANION GAP SERPL CALCULATED.3IONS-SCNC: 6 MMOL/L (ref 4–13)
AST SERPL W P-5'-P-CCNC: 18 U/L (ref 5–45)
BASOPHILS # BLD AUTO: 0.05 THOUSANDS/ΜL (ref 0–0.1)
BASOPHILS NFR BLD AUTO: 1 % (ref 0–1)
BILIRUB SERPL-MCNC: 0.74 MG/DL (ref 0.2–1)
BUN SERPL-MCNC: 18 MG/DL (ref 5–25)
CALCIUM SERPL-MCNC: 9.3 MG/DL (ref 8.3–10.1)
CHLORIDE SERPL-SCNC: 106 MMOL/L (ref 100–108)
CHOLEST SERPL-MCNC: 190 MG/DL (ref 50–200)
CO2 SERPL-SCNC: 28 MMOL/L (ref 21–32)
CREAT SERPL-MCNC: 1.18 MG/DL (ref 0.6–1.3)
EOSINOPHIL # BLD AUTO: 0.12 THOUSAND/ΜL (ref 0–0.61)
EOSINOPHIL NFR BLD AUTO: 2 % (ref 0–6)
ERYTHROCYTE [DISTWIDTH] IN BLOOD BY AUTOMATED COUNT: 13.2 % (ref 11.6–15.1)
EST. AVERAGE GLUCOSE BLD GHB EST-MCNC: 180 MG/DL
GFR SERPL CREATININE-BSD FRML MDRD: 64 ML/MIN/1.73SQ M
GLUCOSE P FAST SERPL-MCNC: 153 MG/DL (ref 65–99)
HBA1C MFR BLD: 7.9 %
HCT VFR BLD AUTO: 48 % (ref 36.5–49.3)
HDLC SERPL-MCNC: 40 MG/DL
HGB BLD-MCNC: 15.8 G/DL (ref 12–17)
IMM GRANULOCYTES # BLD AUTO: 0.01 THOUSAND/UL (ref 0–0.2)
IMM GRANULOCYTES NFR BLD AUTO: 0 % (ref 0–2)
LDLC SERPL CALC-MCNC: 119 MG/DL (ref 0–100)
LYMPHOCYTES # BLD AUTO: 1.59 THOUSANDS/ΜL (ref 0.6–4.47)
LYMPHOCYTES NFR BLD AUTO: 29 % (ref 14–44)
MCH RBC QN AUTO: 31.6 PG (ref 26.8–34.3)
MCHC RBC AUTO-ENTMCNC: 32.9 G/DL (ref 31.4–37.4)
MCV RBC AUTO: 96 FL (ref 82–98)
MONOCYTES # BLD AUTO: 0.48 THOUSAND/ΜL (ref 0.17–1.22)
MONOCYTES NFR BLD AUTO: 9 % (ref 4–12)
NEUTROPHILS # BLD AUTO: 3.19 THOUSANDS/ΜL (ref 1.85–7.62)
NEUTS SEG NFR BLD AUTO: 59 % (ref 43–75)
NONHDLC SERPL-MCNC: 150 MG/DL
NRBC BLD AUTO-RTO: 0 /100 WBCS
PLATELET # BLD AUTO: 181 THOUSANDS/UL (ref 149–390)
PMV BLD AUTO: 10.9 FL (ref 8.9–12.7)
POTASSIUM SERPL-SCNC: 3.6 MMOL/L (ref 3.5–5.3)
PROT SERPL-MCNC: 7.6 G/DL (ref 6.4–8.2)
RBC # BLD AUTO: 5 MILLION/UL (ref 3.88–5.62)
SODIUM SERPL-SCNC: 140 MMOL/L (ref 136–145)
TRIGL SERPL-MCNC: 157 MG/DL
TSH SERPL DL<=0.05 MIU/L-ACNC: 0.99 UIU/ML (ref 0.36–3.74)
URATE SERPL-MCNC: 5.2 MG/DL (ref 4.2–8)
WBC # BLD AUTO: 5.44 THOUSAND/UL (ref 4.31–10.16)

## 2021-11-18 PROCEDURE — 83036 HEMOGLOBIN GLYCOSYLATED A1C: CPT

## 2021-11-18 PROCEDURE — 84443 ASSAY THYROID STIM HORMONE: CPT

## 2021-11-18 PROCEDURE — 3051F HG A1C>EQUAL 7.0%<8.0%: CPT | Performed by: FAMILY MEDICINE

## 2021-11-18 PROCEDURE — 84550 ASSAY OF BLOOD/URIC ACID: CPT

## 2021-11-18 PROCEDURE — 36415 COLL VENOUS BLD VENIPUNCTURE: CPT

## 2021-11-18 PROCEDURE — 85025 COMPLETE CBC W/AUTO DIFF WBC: CPT

## 2021-11-18 PROCEDURE — 80061 LIPID PANEL: CPT

## 2021-11-18 PROCEDURE — 80053 COMPREHEN METABOLIC PANEL: CPT

## 2021-11-24 ENCOUNTER — OFFICE VISIT (OUTPATIENT)
Dept: INTERNAL MEDICINE CLINIC | Facility: CLINIC | Age: 66
End: 2021-11-24
Payer: COMMERCIAL

## 2021-11-24 VITALS
OXYGEN SATURATION: 94 % | SYSTOLIC BLOOD PRESSURE: 140 MMHG | DIASTOLIC BLOOD PRESSURE: 82 MMHG | TEMPERATURE: 97 F | HEIGHT: 70 IN | BODY MASS INDEX: 35.79 KG/M2 | HEART RATE: 70 BPM | WEIGHT: 250 LBS

## 2021-11-24 DIAGNOSIS — E66.9 DIABETES MELLITUS TYPE 2 IN OBESE (HCC): Primary | ICD-10-CM

## 2021-11-24 DIAGNOSIS — G57.92 NEUROPATHY OF LEFT FOOT: ICD-10-CM

## 2021-11-24 DIAGNOSIS — I10 PRIMARY HYPERTENSION: ICD-10-CM

## 2021-11-24 DIAGNOSIS — E78.2 MIXED HYPERLIPIDEMIA: ICD-10-CM

## 2021-11-24 DIAGNOSIS — E11.69 DIABETES MELLITUS TYPE 2 IN OBESE (HCC): Primary | ICD-10-CM

## 2021-11-24 DIAGNOSIS — M54.16 LUMBAR RADICULOPATHY: ICD-10-CM

## 2021-11-24 PROCEDURE — 3079F DIAST BP 80-89 MM HG: CPT | Performed by: FAMILY MEDICINE

## 2021-11-24 PROCEDURE — 1036F TOBACCO NON-USER: CPT | Performed by: FAMILY MEDICINE

## 2021-11-24 PROCEDURE — 3077F SYST BP >= 140 MM HG: CPT | Performed by: FAMILY MEDICINE

## 2021-11-24 PROCEDURE — 99214 OFFICE O/P EST MOD 30 MIN: CPT | Performed by: FAMILY MEDICINE

## 2021-11-24 PROCEDURE — 3008F BODY MASS INDEX DOCD: CPT | Performed by: FAMILY MEDICINE

## 2021-11-24 PROCEDURE — 3725F SCREEN DEPRESSION PERFORMED: CPT | Performed by: FAMILY MEDICINE

## 2021-12-13 DIAGNOSIS — I10 ESSENTIAL HYPERTENSION: ICD-10-CM

## 2021-12-13 RX ORDER — AMLODIPINE BESYLATE AND BENAZEPRIL HYDROCHLORIDE 5; 20 MG/1; MG/1
CAPSULE ORAL
Qty: 180 CAPSULE | Refills: 3 | Status: SHIPPED | OUTPATIENT
Start: 2021-12-13

## 2021-12-15 ENCOUNTER — OFFICE VISIT (OUTPATIENT)
Dept: CARDIOLOGY CLINIC | Facility: CLINIC | Age: 66
End: 2021-12-15
Payer: COMMERCIAL

## 2021-12-15 VITALS
BODY MASS INDEX: 36.22 KG/M2 | HEIGHT: 70 IN | SYSTOLIC BLOOD PRESSURE: 122 MMHG | WEIGHT: 253 LBS | HEART RATE: 70 BPM | DIASTOLIC BLOOD PRESSURE: 70 MMHG

## 2021-12-15 DIAGNOSIS — E78.2 MIXED HYPERLIPIDEMIA: ICD-10-CM

## 2021-12-15 DIAGNOSIS — I71.2 THORACIC AORTIC ANEURYSM WITHOUT RUPTURE (HCC): Primary | ICD-10-CM

## 2021-12-15 DIAGNOSIS — I10 PRIMARY HYPERTENSION: ICD-10-CM

## 2021-12-15 PROCEDURE — 3078F DIAST BP <80 MM HG: CPT | Performed by: INTERNAL MEDICINE

## 2021-12-15 PROCEDURE — 3008F BODY MASS INDEX DOCD: CPT | Performed by: INTERNAL MEDICINE

## 2021-12-15 PROCEDURE — 99214 OFFICE O/P EST MOD 30 MIN: CPT | Performed by: INTERNAL MEDICINE

## 2021-12-15 PROCEDURE — 3074F SYST BP LT 130 MM HG: CPT | Performed by: INTERNAL MEDICINE

## 2021-12-15 PROCEDURE — 1036F TOBACCO NON-USER: CPT | Performed by: INTERNAL MEDICINE

## 2021-12-27 ENCOUNTER — TELEPHONE (OUTPATIENT)
Dept: INTERNAL MEDICINE CLINIC | Facility: CLINIC | Age: 66
End: 2021-12-27

## 2021-12-27 DIAGNOSIS — Z20.822 EXPOSURE TO COVID-19 VIRUS: Primary | ICD-10-CM

## 2021-12-27 PROCEDURE — U0003 INFECTIOUS AGENT DETECTION BY NUCLEIC ACID (DNA OR RNA); SEVERE ACUTE RESPIRATORY SYNDROME CORONAVIRUS 2 (SARS-COV-2) (CORONAVIRUS DISEASE [COVID-19]), AMPLIFIED PROBE TECHNIQUE, MAKING USE OF HIGH THROUGHPUT TECHNOLOGIES AS DESCRIBED BY CMS-2020-01-R: HCPCS | Performed by: NURSE PRACTITIONER

## 2021-12-27 PROCEDURE — U0005 INFEC AGEN DETEC AMPLI PROBE: HCPCS | Performed by: NURSE PRACTITIONER

## 2021-12-28 ENCOUNTER — TELEPHONE (OUTPATIENT)
Dept: INTERNAL MEDICINE CLINIC | Facility: CLINIC | Age: 66
End: 2021-12-28

## 2021-12-28 NOTE — TELEPHONE ENCOUNTER
Patient's wife returned office call and I informed her that patient is negative for COVID  I asked how patient is feeling and he spoke to me stating that he feels better just that his sinuses are congested, and a little cough yet  I told Wife, as instructed, that Dr Sumi Nelson will send an antibiotic in for patient as a precaution in case he begins to worsen  Patient has no allergies according to wife

## 2022-01-25 ENCOUNTER — IMMUNIZATIONS (OUTPATIENT)
Dept: FAMILY MEDICINE CLINIC | Facility: HOSPITAL | Age: 67
End: 2022-01-25

## 2022-01-25 DIAGNOSIS — Z23 ENCOUNTER FOR IMMUNIZATION: Primary | ICD-10-CM

## 2022-01-25 PROCEDURE — 0064A COVID-19 MODERNA VACC 0.25 ML BOOSTER: CPT

## 2022-01-25 PROCEDURE — 91306 COVID-19 MODERNA VACC 0.25 ML BOOSTER: CPT

## 2022-02-03 ENCOUNTER — HOSPITAL ENCOUNTER (OUTPATIENT)
Dept: NON INVASIVE DIAGNOSTICS | Facility: CLINIC | Age: 67
Discharge: HOME | End: 2022-02-03
Payer: COMMERCIAL

## 2022-02-03 VITALS
BODY MASS INDEX: 36.22 KG/M2 | SYSTOLIC BLOOD PRESSURE: 138 MMHG | DIASTOLIC BLOOD PRESSURE: 80 MMHG | HEIGHT: 70 IN | HEART RATE: 80 BPM | WEIGHT: 253 LBS

## 2022-02-03 DIAGNOSIS — I71.2 THORACIC AORTIC ANEURYSM WITHOUT RUPTURE (HCC): ICD-10-CM

## 2022-02-03 LAB
AORTIC ROOT: 3.9 CM
AORTIC VALVE MEAN VELOCITY: 7 M/S
APICAL FOUR CHAMBER EJECTION FRACTION: 59 %
ASCENDING AORTA: 4.3 CM (ref 2.23–3.34)
AV LVOT MEAN GRADIENT: 2 MMHG
AV LVOT PEAK GRADIENT: 3 MMHG
AV MEAN GRADIENT: 2 MMHG
AV PEAK GRADIENT: 4 MMHG
AV REGURGITATION PRESSURE HALF TIME: 1293 MS
AV VELOCITY RATIO: 0.9
DOP CALC AO PEAK VEL: 0.98 M/S
DOP CALC AO VTI: 22.48 CM
DOP CALC LVOT PEAK VEL VTI: 19.13 CM
DOP CALC LVOT PEAK VEL: 0.88 M/S
DOP CALC RVOT PEAK VEL: 0.95 M/S
DOP CALC RVOT VTI: 19.86 CM
E WAVE DECELERATION TIME: 275 MS
FRACTIONAL SHORTENING: 35 % (ref 28–44)
INTERVENTRICULAR SEPTUM IN DIASTOLE (PARASTERNAL SHORT AXIS VIEW): 1.2 CM (ref 0.57–1.08)
LAAS-AP4: 14.6 CM2
LEFT ATRIUM SIZE: 4.2 CM
LEFT INTERNAL DIMENSION IN SYSTOLE: 3.3 CM (ref 2.1–4)
LEFT VENTRICULAR INTERNAL DIMENSION IN DIASTOLE: 5.1 CM (ref 9.37–13.97)
LEFT VENTRICULAR POSTERIOR WALL IN END DIASTOLE: 1.2 CM (ref 0.56–1.06)
LEFT VENTRICULAR STROKE VOLUME: 77 ML
MV E'TISSUE VEL-SEP: 5 CM/S
MV PEAK A VEL: 0.72 M/S
MV PEAK E VEL: 46 CM/S
MV STENOSIS PRESSURE HALF TIME: 0 MS
RIGHT VENTRICLE ID DIMENSION: 3.3 CM
SL CV AV DECELERATION TIME RETROGRADE: 4458 MS
SL CV AV PEAK GRADIENT RETROGRADE: 18 MMHG
SL CV LV EF: 60
SL CV PED ECHO LEFT VENTRICLE DIASTOLIC VOLUME (MOD BIPLANE) 2D: 122 ML
SL CV PED ECHO LEFT VENTRICLE SYSTOLIC VOLUME (MOD BIPLANE) 2D: 45 ML
SL CV RVOT MAX GRADIENT: 4 MMHG
SL CV RVOT MEAN GRADIENT: 2 MMHG
SL CV RVOT VMEAN: 0.65 M/S
TR MAX PG: 17 MMHG
TRICUSPID VALVE PEAK REGURGITATION VELOCITY: 2.07 M/S
Z-SCORE OF ASCENDING AORTA: 5.41 CM
Z-SCORE OF INTERVENTRICULAR SEPTUM IN END DIASTOLE: 2.92
Z-SCORE OF LEFT VENTRICULAR DIMENSION IN END SYSTOLE: -8.08
Z-SCORE OF LEFT VENTRICULAR POSTERIOR WALL IN END DIASTOLE: 3.03

## 2022-02-03 PROCEDURE — 93306 TTE W/DOPPLER COMPLETE: CPT

## 2022-02-03 PROCEDURE — 93306 TTE W/DOPPLER COMPLETE: CPT | Performed by: INTERNAL MEDICINE

## 2022-02-07 ENCOUNTER — TELEPHONE (OUTPATIENT)
Dept: CARDIOLOGY CLINIC | Facility: CLINIC | Age: 67
End: 2022-02-07

## 2022-02-10 ENCOUNTER — TELEPHONE (OUTPATIENT)
Dept: INTERNAL MEDICINE CLINIC | Facility: CLINIC | Age: 67
End: 2022-02-10

## 2022-02-10 NOTE — TELEPHONE ENCOUNTER
Maria Dolores/Pharmacist [facility]  Matteo Bajwa states that [employer] will not cover Invokana tabs 300mg    Some alternatives are:  -Metformin HCL tabs 500mg,850mg,1,000mg  -Metformin HCL ER tabs 500mg,750mg  -farxiga tabs 5mg,10mg  -jardiance 10mg,25mg  -steglatro 5mg,15mg

## 2022-02-11 NOTE — TELEPHONE ENCOUNTER
Script sent for the Katherine worth  This is very similar to the [address] [place] [address]

## 2022-02-18 DIAGNOSIS — R60.9 PERIPHERAL EDEMA: ICD-10-CM

## 2022-02-18 DIAGNOSIS — I10 ESSENTIAL HYPERTENSION: ICD-10-CM

## 2022-02-19 RX ORDER — FUROSEMIDE 40 MG/1
TABLET ORAL
Qty: 45 TABLET | Refills: 3 | Status: SHIPPED | OUTPATIENT
Start: 2022-02-19

## 2022-02-19 RX ORDER — OLMESARTAN MEDOXOMIL AND HYDROCHLOROTHIAZIDE 40/12.5 40; 12.5 MG/1; MG/1
TABLET ORAL
Qty: 90 TABLET | Refills: 3 | Status: SHIPPED | OUTPATIENT
Start: 2022-02-19

## 2022-03-11 DIAGNOSIS — E79.0 HYPERURICEMIA: ICD-10-CM

## 2022-03-11 DIAGNOSIS — I10 ESSENTIAL HYPERTENSION: ICD-10-CM

## 2022-03-11 RX ORDER — ATENOLOL 50 MG/1
TABLET ORAL
Qty: 180 TABLET | Refills: 3 | Status: SHIPPED | OUTPATIENT
Start: 2022-03-11

## 2022-03-11 RX ORDER — ALLOPURINOL 100 MG/1
TABLET ORAL
Qty: 90 TABLET | Refills: 3 | Status: SHIPPED | OUTPATIENT
Start: 2022-03-11

## 2022-03-14 ENCOUNTER — APPOINTMENT (OUTPATIENT)
Dept: LAB | Facility: MEDICAL CENTER | Age: 67
End: 2022-03-14
Payer: COMMERCIAL

## 2022-03-14 DIAGNOSIS — G57.92 NEUROPATHY OF LEFT FOOT: ICD-10-CM

## 2022-03-14 DIAGNOSIS — E11.69 DIABETES MELLITUS TYPE 2 IN OBESE (HCC): ICD-10-CM

## 2022-03-14 DIAGNOSIS — E78.2 MIXED HYPERLIPIDEMIA: ICD-10-CM

## 2022-03-14 DIAGNOSIS — E66.9 DIABETES MELLITUS TYPE 2 IN OBESE (HCC): ICD-10-CM

## 2022-03-14 DIAGNOSIS — I10 PRIMARY HYPERTENSION: ICD-10-CM

## 2022-03-14 DIAGNOSIS — M54.16 LUMBAR RADICULOPATHY: ICD-10-CM

## 2022-03-14 LAB
ALBUMIN SERPL BCP-MCNC: 4.1 G/DL (ref 3.5–5)
ALP SERPL-CCNC: 56 U/L (ref 46–116)
ALT SERPL W P-5'-P-CCNC: 35 U/L (ref 12–78)
ANION GAP SERPL CALCULATED.3IONS-SCNC: 3 MMOL/L (ref 4–13)
AST SERPL W P-5'-P-CCNC: 16 U/L (ref 5–45)
BASOPHILS # BLD AUTO: 0.04 THOUSANDS/ΜL (ref 0–0.1)
BASOPHILS NFR BLD AUTO: 1 % (ref 0–1)
BILIRUB SERPL-MCNC: 0.57 MG/DL (ref 0.2–1)
BUN SERPL-MCNC: 15 MG/DL (ref 5–25)
CALCIUM SERPL-MCNC: 9.5 MG/DL (ref 8.3–10.1)
CHLORIDE SERPL-SCNC: 109 MMOL/L (ref 100–108)
CHOLEST SERPL-MCNC: 177 MG/DL
CO2 SERPL-SCNC: 31 MMOL/L (ref 21–32)
CREAT SERPL-MCNC: 1.22 MG/DL (ref 0.6–1.3)
EOSINOPHIL # BLD AUTO: 0.08 THOUSAND/ΜL (ref 0–0.61)
EOSINOPHIL NFR BLD AUTO: 2 % (ref 0–6)
ERYTHROCYTE [DISTWIDTH] IN BLOOD BY AUTOMATED COUNT: 13.6 % (ref 11.6–15.1)
EST. AVERAGE GLUCOSE BLD GHB EST-MCNC: 174 MG/DL
GFR SERPL CREATININE-BSD FRML MDRD: 61 ML/MIN/1.73SQ M
GLUCOSE P FAST SERPL-MCNC: 157 MG/DL (ref 65–99)
HBA1C MFR BLD: 7.7 %
HCT VFR BLD AUTO: 45.8 % (ref 36.5–49.3)
HDLC SERPL-MCNC: 39 MG/DL
HGB BLD-MCNC: 15.4 G/DL (ref 12–17)
IMM GRANULOCYTES # BLD AUTO: 0 THOUSAND/UL (ref 0–0.2)
IMM GRANULOCYTES NFR BLD AUTO: 0 % (ref 0–2)
LDLC SERPL CALC-MCNC: 113 MG/DL (ref 0–100)
LYMPHOCYTES # BLD AUTO: 1.51 THOUSANDS/ΜL (ref 0.6–4.47)
LYMPHOCYTES NFR BLD AUTO: 36 % (ref 14–44)
MCH RBC QN AUTO: 31.6 PG (ref 26.8–34.3)
MCHC RBC AUTO-ENTMCNC: 33.6 G/DL (ref 31.4–37.4)
MCV RBC AUTO: 94 FL (ref 82–98)
MONOCYTES # BLD AUTO: 0.32 THOUSAND/ΜL (ref 0.17–1.22)
MONOCYTES NFR BLD AUTO: 8 % (ref 4–12)
NEUTROPHILS # BLD AUTO: 2.3 THOUSANDS/ΜL (ref 1.85–7.62)
NEUTS SEG NFR BLD AUTO: 53 % (ref 43–75)
NONHDLC SERPL-MCNC: 138 MG/DL
NRBC BLD AUTO-RTO: 0 /100 WBCS
PLATELET # BLD AUTO: 158 THOUSANDS/UL (ref 149–390)
PMV BLD AUTO: 10.1 FL (ref 8.9–12.7)
POTASSIUM SERPL-SCNC: 4.2 MMOL/L (ref 3.5–5.3)
PROT SERPL-MCNC: 7.3 G/DL (ref 6.4–8.2)
RBC # BLD AUTO: 4.87 MILLION/UL (ref 3.88–5.62)
SODIUM SERPL-SCNC: 143 MMOL/L (ref 136–145)
TRIGL SERPL-MCNC: 127 MG/DL
TSH SERPL DL<=0.05 MIU/L-ACNC: 1.03 UIU/ML (ref 0.36–3.74)
WBC # BLD AUTO: 4.25 THOUSAND/UL (ref 4.31–10.16)

## 2022-03-14 PROCEDURE — 36415 COLL VENOUS BLD VENIPUNCTURE: CPT

## 2022-03-14 PROCEDURE — 85025 COMPLETE CBC W/AUTO DIFF WBC: CPT

## 2022-03-14 PROCEDURE — 80061 LIPID PANEL: CPT

## 2022-03-14 PROCEDURE — 80053 COMPREHEN METABOLIC PANEL: CPT

## 2022-03-14 PROCEDURE — 83036 HEMOGLOBIN GLYCOSYLATED A1C: CPT

## 2022-03-14 PROCEDURE — 3051F HG A1C>EQUAL 7.0%<8.0%: CPT | Performed by: FAMILY MEDICINE

## 2022-03-14 PROCEDURE — 84443 ASSAY THYROID STIM HORMONE: CPT

## 2022-03-17 ENCOUNTER — RA CDI HCC (OUTPATIENT)
Dept: OTHER | Facility: HOSPITAL | Age: 67
End: 2022-03-17

## 2022-03-17 NOTE — PROGRESS NOTES
[place] [address]  coding opportunities          Chart Reviewed number of suggestions sent to Provider: 4   1) E66 01 Morbid Obesity (HCC)----Per CMS/ICD 10 coding guidelines, to be used when BMI > 35 &<40 with one or more comorbidity (HTN, DM, osteoarthritis, and Hyperlipidemia)     2) Refer to eye exam dated 3/12/21  W16 5531 - Type 2 diabetes mellitus with mild nonproliferative diabetic retinopathy without macular edema (HCC)    3)Refer to A1c labs  E11 65 Type 2 diabetes mellitus with hyperglycemia ([place] [address] )     4) Refer to eGFR labs  E11 22: Type 2 diabetes mellitus with diabetic chronic kidney disease ([place] [address] )                  N18  2  Chronic kidney disease, stage 2  -----According to the ICD 10 coding guidelines, these above two conditions are presumed to have    a causal-effect relationship and thus are always coded together, unless the documentation states they are not related to each other          Patients Insurance        Commercial Insurance: [address] [place] [address]

## 2022-03-18 PROBLEM — E11.65 UNCONTROLLED TYPE 2 DIABETES MELLITUS WITH HYPERGLYCEMIA (HCC): Status: ACTIVE | Noted: 2022-03-18

## 2022-03-18 PROBLEM — E66.01 SEVERE OBESITY (BMI 35.0-35.9 WITH COMORBIDITY) (HCC): Status: ACTIVE | Noted: 2022-03-18

## 2022-03-18 PROBLEM — E11.3293 TYPE 2 DIABETES MELLITUS WITH MILD NONPROLIFERATIVE RETINOPATHY OF BOTH EYES WITHOUT MACULAR EDEMA (HCC): Status: ACTIVE | Noted: 2022-03-18

## 2022-03-18 PROBLEM — N18.2 TYPE 2 DIABETES WITH STAGE 2 CHRONIC KIDNEY DISEASE GFR 60-89 (HCC): Status: ACTIVE | Noted: 2022-03-18

## 2022-03-18 PROBLEM — E11.22 TYPE 2 DIABETES WITH STAGE 2 CHRONIC KIDNEY DISEASE GFR 60-89 (HCC): Status: ACTIVE | Noted: 2022-03-18

## 2022-03-24 ENCOUNTER — OFFICE VISIT (OUTPATIENT)
Dept: INTERNAL MEDICINE CLINIC | Facility: CLINIC | Age: 67
End: 2022-03-24
Payer: COMMERCIAL

## 2022-03-24 VITALS
TEMPERATURE: 97.9 F | HEART RATE: 62 BPM | HEIGHT: 70 IN | OXYGEN SATURATION: 95 % | WEIGHT: 251 LBS | DIASTOLIC BLOOD PRESSURE: 86 MMHG | SYSTOLIC BLOOD PRESSURE: 142 MMHG | BODY MASS INDEX: 35.93 KG/M2

## 2022-03-24 DIAGNOSIS — I10 PRIMARY HYPERTENSION: ICD-10-CM

## 2022-03-24 DIAGNOSIS — N18.2 TYPE 2 DIABETES MELLITUS WITH STAGE 2 CHRONIC KIDNEY DISEASE, WITHOUT LONG-TERM CURRENT USE OF INSULIN (HCC): ICD-10-CM

## 2022-03-24 DIAGNOSIS — E66.01 SEVERE OBESITY (BMI 35.0-35.9 WITH COMORBIDITY) (HCC): ICD-10-CM

## 2022-03-24 DIAGNOSIS — E78.2 MIXED HYPERLIPIDEMIA: ICD-10-CM

## 2022-03-24 DIAGNOSIS — E79.0 HYPERURICEMIA WITHOUT SIGNS INFLAMMATORY ARTHRITIS/TOPHACEOUS DISEASE: ICD-10-CM

## 2022-03-24 DIAGNOSIS — E11.65 UNCONTROLLED TYPE 2 DIABETES MELLITUS WITH HYPERGLYCEMIA (HCC): Primary | ICD-10-CM

## 2022-03-24 DIAGNOSIS — E11.3293 TYPE 2 DIABETES MELLITUS WITH BOTH EYES AFFECTED BY MILD NONPROLIFERATIVE RETINOPATHY WITHOUT MACULAR EDEMA, WITHOUT LONG-TERM CURRENT USE OF INSULIN (HCC): ICD-10-CM

## 2022-03-24 DIAGNOSIS — E11.22 TYPE 2 DIABETES MELLITUS WITH STAGE 2 CHRONIC KIDNEY DISEASE, WITHOUT LONG-TERM CURRENT USE OF INSULIN (HCC): ICD-10-CM

## 2022-03-24 PROCEDURE — 3079F DIAST BP 80-89 MM HG: CPT | Performed by: FAMILY MEDICINE

## 2022-03-24 PROCEDURE — 99214 OFFICE O/P EST MOD 30 MIN: CPT | Performed by: FAMILY MEDICINE

## 2022-03-24 PROCEDURE — 1101F PT FALLS ASSESS-DOCD LE1/YR: CPT | Performed by: FAMILY MEDICINE

## 2022-03-24 PROCEDURE — 3288F FALL RISK ASSESSMENT DOCD: CPT | Performed by: FAMILY MEDICINE

## 2022-03-24 PROCEDURE — 3077F SYST BP >= 140 MM HG: CPT | Performed by: FAMILY MEDICINE

## 2022-03-24 PROCEDURE — 1160F RVW MEDS BY RX/DR IN RCRD: CPT | Performed by: FAMILY MEDICINE

## 2022-03-24 PROCEDURE — 3725F SCREEN DEPRESSION PERFORMED: CPT | Performed by: FAMILY MEDICINE

## 2022-03-24 PROCEDURE — 1036F TOBACCO NON-USER: CPT | Performed by: FAMILY MEDICINE

## 2022-03-24 PROCEDURE — 3066F NEPHROPATHY DOC TX: CPT | Performed by: FAMILY MEDICINE

## 2022-03-24 PROCEDURE — 3008F BODY MASS INDEX DOCD: CPT | Performed by: FAMILY MEDICINE

## 2022-03-24 NOTE — PROGRESS NOTES
Assessment/Plan:    No problem-specific Assessment & Plan notes found for this encounter  Diagnoses and all orders for this visit:    Uncontrolled type 2 diabetes mellitus with hyperglycemia (HCC)  -     CBC and differential; Future  -     Comprehensive metabolic panel; Future  -     Hemoglobin A1C; Future    Type 2 diabetes mellitus with both eyes affected by mild nonproliferative retinopathy without macular edema, without long-term current use of insulin (HCC)  -     CBC and differential; Future  -     Comprehensive metabolic panel; Future  -     Hemoglobin A1C; Future    Type 2 diabetes mellitus with stage 2 chronic kidney disease, without long-term current use of insulin (HCC)  -     CBC and differential; Future  -     Comprehensive metabolic panel; Future    Primary hypertension  -     CBC and differential; Future  -     Comprehensive metabolic panel; Future    Mixed hyperlipidemia  -     CBC and differential; Future  -     Comprehensive metabolic panel; Future  -     Lipid panel; Future  -     TSH, 3rd generation; Future    Severe obesity (BMI 35 0-35 9 with comorbidity) (HCC)  -     CBC and differential; Future    Hyperuricemia without signs inflammatory arthritis/tophaceous disease  -     CBC and differential; Future      orders and recommendations as noted above  Hemoglobin A1c still above goal but improving  Discussed goal of hemoglobin A1c less than 7  Continue with the change from the Invokana to the Jardiance because of formulary change  Did not tolerate the metformin in the past   Discussed consideration of Ozempic in the future  Discussed the potential risks and benefits of this  Continue follow-up with Ophthalmology on a regular basis  Check feet daily  Continue with routine exercises  Try to continue with increased activity level  Watch for any recurrent back symptoms  Blood pressure slightly higher than goal   Continue with the current blood pressure medications  Watch salt intake  Continue follow-up with Cardiology regularly  Stay adequately hydrated  Reviewed recent lipid panel  Cholesterol relatively well controlled  Continue with the atorvastatin  Watch diet  Increase fiber in diet  Increased activity level hopefully will continue  Slow but steady weight loss recommended  Continue with the allopurinol on a daily basis  Watch for any gout symptoms  He is up-to-date on his immunizations  Discussed with him the possibility of a 4th coronavirus vaccination  He is uncertain that he will proceed with this given his side effects with the 3rd shot  Will have him follow up in about 3-5 months with laboratory testing prior to that visit  Follow up sooner if needed  Subjective:      Patient ID: Ashtyn Luz is a 73 y o  male  He presents for routine follow-up  Has generally been doing relatively well  Has been much more active  Has accomplished much more in his yd and property this spring than in the past   Remains active almost every day  He feels that this is because his back symptoms have been well controlled  He is looking forward to the remainder of the spring and summer and is hopeful that he will be able to continue with his increased activity level and go fishing on a regular basis  He and his wife will be going to [place] again next month  Tolerating his blood pressure medications without difficulty  Denies any headaches or localized weakness  Follows up with Cardiology regularly and no changes were made at his last appointment  Denies any chest pain or palpitations  Tolerating the atorvastatin well  Denies any significant muscle aches or weakness with this  Back symptoms have been currently well controlled  Still with some chronic numbness into the left foot but does not seem to be extending up into the leg is much    Is concerned about potentially getting a 4th coronavirus vaccination since he had significant side effects with the 3rd vaccination with chills, nausea, and overall body aches for few days  Appetite has been generally good  Denies any nausea, vomiting, or diarrhea  Denies any changes in bowel movements  The following portions of the patient's history were reviewed and updated as appropriate:   He  has a past medical history of Allergic rhinitis, Aneurysm of thoracic aorta ([facility] ), Cataract, Colon polyp, Diabetes mellitus ([facility] ), Herniated nucleus pulposus, L4-5 left, Herniated nucleus pulposus, L5-S1, left, echocardiogram (10/14/2016), Hypercholesterolemia, Hypertension, and Lumbar radiculopathy  He   Patient Active Problem List    Diagnosis Date Noted    Severe obesity (BMI 35 0-35 9 with comorbidity) ([place] [id number] ) 03/18/2022    Type 2 diabetes mellitus with mild nonproliferative retinopathy of both eyes without macular edema ([place] [id number] ) 03/18/2022    Uncontrolled type 2 diabetes mellitus with hyperglycemia ([place] [id number] ) 03/18/2022    Type 2 diabetes with stage 2 chronic kidney disease GFR 60-89 ([place] [id number] ) 03/18/2022    Hypokalemia 11/04/2020    Colonic polyp 11/14/2019    Lumbar radiculopathy 07/31/2019    Microscopic hematuria 07/02/2019    Pain of left thigh 07/02/2019    Acute pain of left shoulder 04/02/2019    Skin tag 10/25/2018    Restless leg syndrome 06/21/2018    Heel spur 05/31/2017    Neuropathy of left foot 09/19/2016    Diabetes mellitus type 2 in obese ([place] [id number] ) 08/06/2015    Sciatica 03/03/2014    Chronic bilateral low back pain 02/27/2014    Edema 01/03/2013    Hyperuricemia without signs inflammatory arthritis/tophaceous disease 10/31/2012    Aneurysm of thoracic aorta ([facility] ) 08/06/2012    Mixed hyperlipidemia 05/01/2012    Hypertension 05/01/2012    Non-toxic uninodular goiter 05/01/2012    Osteoarthritis 05/01/2012     He  has a past surgical history that includes Tonsillectomy and adenoidectomy; Knee surgery; Back surgery; pr colonoscopy flx dx w/collj spec when pfrmd (N/A, 9/30/2016);  Back surgery; and Knee arthroscopy  His family history includes Coronary artery disease in his brother; Heart attack in his brother; Hypertension in his father; Thyroid disease in his mother  He  reports that he has never smoked  He has never used smokeless tobacco  He reports that he does not drink alcohol and does not use drugs  Current Outpatient Medications   Medication Sig Dispense Refill    allopurinol (ZYLOPRIM) 100 mg tablet TAKE 1 TABLET DAILY 90 tablet 3    amLODIPine-benazepril (LOTREL 5-20) 5-20 MG per capsule TAKE 1 CAPSULE TWICE A  capsule 3    atenolol (TENORMIN) 50 mg tablet TAKE 1 TABLET TWICE A  tablet 3    atorvastatin (LIPITOR) 20 mg tablet TAKE 1 TABLET DAILY 90 tablet 3    Empagliflozin (Jardiance) 25 MG TABS Take 1 tablet (25 mg total) by mouth every morning 90 tablet 1    furosemide (LASIX) 40 mg tablet TAKE ONE-HALF (1/2) TABLET DAILY 45 tablet 3    magnesium oxide (MAG-OX) 400 mg Take 400 mg by mouth 2 (two) times a day      olmesartan-hydrochlorothiazide (BENICAR HCT) 40-12 5 MG per tablet TAKE 1 TABLET DAILY 90 tablet 3    potassium chloride (K-DUR,KLOR-CON) 10 mEq tablet TAKE 3 TABLETS TWICE A DAY  TAKE 3 TABLETS IN THE MORNING AND TAKE 3 TABLETS IN THE EVENING 540 tablet 3     No current facility-administered medications for this visit       Current Outpatient Medications on File Prior to Visit   Medication Sig    allopurinol (ZYLOPRIM) 100 mg tablet TAKE 1 TABLET DAILY    amLODIPine-benazepril (LOTREL 5-20) 5-20 MG per capsule TAKE 1 CAPSULE TWICE A DAY    atenolol (TENORMIN) 50 mg tablet TAKE 1 TABLET TWICE A DAY    atorvastatin (LIPITOR) 20 mg tablet TAKE 1 TABLET DAILY    Empagliflozin (Jardiance) 25 MG TABS Take 1 tablet (25 mg total) by mouth every morning    furosemide (LASIX) 40 mg tablet TAKE ONE-HALF (1/2) TABLET DAILY    magnesium oxide (MAG-OX) 400 mg Take 400 mg by mouth 2 (two) times a day    olmesartan-hydrochlorothiazide (BENICAR HCT) 40-12 5 MG per tablet TAKE 1 TABLET DAILY    potassium chloride (K-DUR,KLOR-CON) 10 mEq tablet TAKE 3 TABLETS TWICE A DAY  TAKE 3 TABLETS IN THE MORNING AND TAKE 3 TABLETS IN THE EVENING     No current facility-administered medications on file prior to visit  He has No Known Allergies       Review of Systems   Constitutional: Positive for activity change  Negative for appetite change, chills, fatigue and fever  HENT: Negative for hearing loss  Eyes: Negative for pain and visual disturbance  Respiratory: Negative for cough, chest tightness and shortness of breath  Cardiovascular: Negative for chest pain and palpitations  Gastrointestinal: Negative for abdominal pain, blood in stool, diarrhea, nausea and vomiting  Endocrine: Negative for polydipsia, polyphagia and polyuria  Genitourinary: Negative for dysuria  Musculoskeletal: Negative for arthralgias and gait problem  Skin: Negative for color change  Neurological: Negative for dizziness and headaches  Hematological: Does not bruise/bleed easily  Psychiatric/Behavioral: Negative for behavioral problems, dysphoric mood and sleep disturbance  The patient is not nervous/anxious  Objective:      /86 (BP Location: Left arm, Patient Position: Sitting, Cuff Size: Large)   Pulse 62   Temp 97 9 °F (36 6 °C)   Ht 5' 10" (1 778 m)   Wt 114 kg (251 lb)   SpO2 95%   BMI 36 01 kg/m²          Physical Exam  Vitals and nursing note reviewed  Constitutional:       General: He is not in acute distress  Appearance: He is well-developed, well-groomed and overweight  HENT:      Head: Normocephalic and atraumatic  Nose: Nose normal    Eyes:      Conjunctiva/sclera: Conjunctivae normal       Pupils: Pupils are equal, round, and reactive to light  Neck:      Thyroid: No thyromegaly  Vascular: No carotid bruit  Cardiovascular:      Rate and Rhythm: Normal rate and regular rhythm  Heart sounds: Normal heart sounds  No murmur heard    No friction rub  No gallop  Pulmonary:      Breath sounds: No decreased breath sounds, wheezing or rales  Chest:      Chest wall: No tenderness  Abdominal:      General: There is no distension  Tenderness: There is no abdominal tenderness  There is no guarding or rebound  Musculoskeletal:      Cervical back: Neck supple  Lymphadenopathy:      Cervical: No cervical adenopathy  Skin:     General: Skin is warm and dry  Neurological:      Mental Status: He is alert and oriented to person, place, and time  Psychiatric:         Mood and Affect: Mood and affect normal          Speech: Speech normal          Behavior: Behavior normal  Behavior is cooperative  Cognition and Memory: Cognition and memory normal            Depression Screening and Follow-up Plan: Patient was screened for depression during today's encounter  They screened negative with a PHQ-2 score of 0

## 2022-03-24 NOTE — PATIENT INSTRUCTIONS

## 2022-05-10 ENCOUNTER — VBI (OUTPATIENT)
Dept: ADMINISTRATIVE | Facility: OTHER | Age: 67
End: 2022-05-10

## 2022-06-15 ENCOUNTER — OFFICE VISIT (OUTPATIENT)
Dept: CARDIOLOGY CLINIC | Facility: CLINIC | Age: 67
End: 2022-06-15
Payer: COMMERCIAL

## 2022-06-15 VITALS
HEART RATE: 61 BPM | SYSTOLIC BLOOD PRESSURE: 130 MMHG | BODY MASS INDEX: 35.5 KG/M2 | DIASTOLIC BLOOD PRESSURE: 80 MMHG | WEIGHT: 248 LBS | HEIGHT: 70 IN

## 2022-06-15 DIAGNOSIS — I10 PRIMARY HYPERTENSION: ICD-10-CM

## 2022-06-15 DIAGNOSIS — E78.5 HYPERLIPIDEMIA, UNSPECIFIED HYPERLIPIDEMIA TYPE: ICD-10-CM

## 2022-06-15 DIAGNOSIS — I71.2 THORACIC AORTIC ANEURYSM WITHOUT RUPTURE (HCC): Primary | ICD-10-CM

## 2022-06-15 PROCEDURE — 3008F BODY MASS INDEX DOCD: CPT | Performed by: INTERNAL MEDICINE

## 2022-06-15 PROCEDURE — 3075F SYST BP GE 130 - 139MM HG: CPT | Performed by: INTERNAL MEDICINE

## 2022-06-15 PROCEDURE — 3079F DIAST BP 80-89 MM HG: CPT | Performed by: INTERNAL MEDICINE

## 2022-06-15 PROCEDURE — 1160F RVW MEDS BY RX/DR IN RCRD: CPT | Performed by: INTERNAL MEDICINE

## 2022-06-15 PROCEDURE — 93000 ELECTROCARDIOGRAM COMPLETE: CPT | Performed by: INTERNAL MEDICINE

## 2022-06-15 PROCEDURE — 1036F TOBACCO NON-USER: CPT | Performed by: INTERNAL MEDICINE

## 2022-06-15 PROCEDURE — 99214 OFFICE O/P EST MOD 30 MIN: CPT | Performed by: INTERNAL MEDICINE

## 2022-06-15 RX ORDER — ATORVASTATIN CALCIUM 40 MG/1
40 TABLET, FILM COATED ORAL DAILY
Qty: 90 TABLET | Refills: 3 | Status: SHIPPED | OUTPATIENT
Start: 2022-06-15

## 2022-06-15 NOTE — PROGRESS NOTES
Subjective:        Patient ID: Jeffery Nash is a 73 y o  male  Chief Complaint:  Abi Hoyos is here for thoracic aortic aneurysm, hypertensive and dyslipidemia follow-up  He feels well no cardiopulmonary symptoms whatsoever on extensive questioning  No chest pain shortness breath palpitations back pain neck pain jaw pain, TIA or claudication symptoms  No edema  He is down 5 lb, intentionally  The following portions of the patient's history were reviewed and updated as appropriate: allergies, current medications, past family history, past medical history, past social history, past surgical history and problem list   Review of Systems   Constitutional: Negative for chills, diaphoresis, malaise/fatigue and weight gain  HENT: Negative for nosebleeds and stridor  Eyes: Negative for double vision, vision loss in left eye, vision loss in right eye and visual disturbance  Cardiovascular: Negative for chest pain, claudication, cyanosis, dyspnea on exertion, irregular heartbeat, leg swelling, near-syncope, orthopnea, palpitations, paroxysmal nocturnal dyspnea and syncope  Respiratory: Negative for cough, shortness of breath, snoring and wheezing  Endocrine: Negative for polydipsia, polyphagia and polyuria  Hematologic/Lymphatic: Negative for bleeding problem  Does not bruise/bleed easily  Skin: Negative for flushing and rash  Musculoskeletal: Negative for falls and myalgias  Gastrointestinal: Negative for abdominal pain, heartburn, hematemesis, hematochezia, melena and nausea  Genitourinary: Negative for hematuria  Neurological: Negative for brief paralysis, dizziness, focal weakness, headaches, light-headedness, loss of balance and vertigo  Psychiatric/Behavioral: Negative for altered mental status and substance abuse  Allergic/Immunologic: Negative for hives            Objective:      /80   Pulse 61   Ht 5' 10" (1 778 m)   Wt 112 kg (248 lb)   BMI 35 58 kg/m²   Physical Exam  Constitutional:       General: He is not in acute distress  Appearance: He is well-developed  He is not diaphoretic  HENT:      Head: Normocephalic and atraumatic  Eyes:      General: No scleral icterus  Pupils: Pupils are equal, round, and reactive to light  Neck:      Thyroid: No thyromegaly  Vascular: No carotid bruit or JVD  Cardiovascular:      Rate and Rhythm: Normal rate and regular rhythm  Heart sounds: Normal heart sounds  No murmur heard  No friction rub  No gallop  Pulmonary:      Effort: Pulmonary effort is normal  No respiratory distress  Breath sounds: Normal breath sounds  No stridor  No wheezing or rales  Abdominal:      General: Bowel sounds are normal  There is no distension  Palpations: Abdomen is soft  There is no mass  Tenderness: There is no abdominal tenderness  Musculoskeletal:         General: No deformity  Normal range of motion  Cervical back: Normal range of motion and neck supple  Right lower leg: No edema  Left lower leg: No edema  Skin:     General: Skin is warm and dry  Coloration: Skin is not pale  Findings: No erythema  Neurological:      Mental Status: He is alert and oriented to person, place, and time  Coordination: Coordination normal    Psychiatric:         Mood and Affect: Mood normal          Behavior: Behavior normal          Thought Content: Thought content normal          Judgment: Judgment normal          Lab Review:   No visits with results within 2 Month(s) from this visit     Latest known visit with results is:   Appointment on 03/14/2022   Component Date Value    WBC 03/14/2022 4 25 (A)    RBC 03/14/2022 4 87     Hemoglobin 03/14/2022 15 4     Hematocrit 03/14/2022 45 8     MCV 03/14/2022 94     MCH 03/14/2022 31 6     MCHC 03/14/2022 33 6     RDW 03/14/2022 13 6     MPV 03/14/2022 10 1     Platelets 20/40/9459 158     nRBC 03/14/2022 0     Neutrophils Relative 03/14/2022 53     Immat GRANS % 03/14/2022 0     Lymphocytes Relative 03/14/2022 36     Monocytes Relative 03/14/2022 8     Eosinophils Relative 03/14/2022 2     Basophils Relative 03/14/2022 1     Neutrophils Absolute 03/14/2022 2 30     Immature Grans Absolute 03/14/2022 0 00     Lymphocytes Absolute 03/14/2022 1 51     Monocytes Absolute 03/14/2022 0 32     Eosinophils Absolute 03/14/2022 0 08     Basophils Absolute 03/14/2022 0 04     Sodium 03/14/2022 143     Potassium 03/14/2022 4 2     Chloride 03/14/2022 109 (A)    CO2 03/14/2022 31     ANION GAP 03/14/2022 3 (A)    BUN 03/14/2022 15     Creatinine 03/14/2022 1 22     Glucose, Fasting 03/14/2022 157 (A)    Calcium 03/14/2022 9 5     AST 03/14/2022 16     ALT 03/14/2022 35     Alkaline Phosphatase 03/14/2022 56     Total Protein 03/14/2022 7 3     Albumin 03/14/2022 4 1     Total Bilirubin 03/14/2022 0 57     eGFR 03/14/2022 61     Hemoglobin A1C 03/14/2022 7 7 (A)    EAG 03/14/2022 174     Cholesterol 03/14/2022 177     Triglycerides 03/14/2022 127     HDL, Direct 03/14/2022 39 (A)    LDL Calculated 03/14/2022 113 (A)    Non-HDL-Chol (CHOL-HDL) 03/14/2022 138     TSH 3RD GENERATON 03/14/2022 1 030      No results found  Assessment:       1  Thoracic aortic aneurysm without rupture (HCC)  POCT ECG    CTA chest wo w contrast   2  Hyperlipidemia, unspecified hyperlipidemia type  atorvastatin (LIPITOR) 40 mg tablet   3  Primary hypertension          Plan:       We discussed how his recent echo suggested a 4 3 cm ascending thoracic aortic aneurysm  Looking back to a 2013 ct scan it was 4 2 cm  Multiple echoes in between as well as another CT scan in 2018 revealed readings between 4 0 and 4 3  He is quite concerned that it may be enlarging a slight degree, which I acknowledged but told him a slight oblique measurement may change things    As he has not had a CT of chest in about 40 years will order 1 this fall, he was reassured today, I told him he is on optimal medical therapy in the way of antihypertensive therapy, and because of some mild atherosclerosis seen on his coronary arteries seen on old CT scan, diabetes, and LDL over 110, will increase Lipitor to 40 mg a day, any myalgias or side effects he is to call my office for directions  You already have appropriate follow-up blood work ordered, asked him to get this done at the end of July if he changes his Lipitor dose today  I made no other changes today  Happy to report he has no signs or symptoms reminiscent of angina pectoris, heart failure, nor electrical instability  I will see him back in 6 months as long as the CT scan this fall is stable and his blood work lipids reveal that they are improved in late July  He will call sooner with any concerns

## 2022-06-15 NOTE — PATIENT INSTRUCTIONS
Cholesterol and Your Health   AMBULATORY CARE:   Cholesterol  is a waxy, fat-like substance  Your body uses cholesterol to make hormones and new cells, and to protect nerves  Cholesterol is made by your body  It also comes from certain foods you eat, such as meat and dairy products  Your healthcare provider can help you set goals for your cholesterol levels  He or she can help you create a plan to meet your goals  Cholesterol level goals: Your cholesterol level goals depend on your risk for heart disease, your age, and your other health conditions  The following are general guidelines: Total cholesterol  includes low-density lipoprotein (LDL), high-density lipoprotein (HDL), and triglyceride levels  The total cholesterol level should be lower than 200 mg/dL and is best at about 150 mg/dL  LDL cholesterol  is called bad cholesterol  because it forms plaque in your arteries  As plaque builds up, your arteries become narrow, and less blood flows through  When plaque decreases blood flow to your heart, you may have chest pain  If plaque completely blocks an artery that brings blood to your heart, you may have a heart attack  Plaque can break off and form blood clots  Blood clots may block arteries in your brain and cause a stroke  The level should be less than 130 mg/dL and is best at about 100 mg/dL  HDL cholesterol  is called good cholesterol  because it helps remove LDL cholesterol from your arteries  It does this by attaching to LDL cholesterol and carrying it to your liver  Your liver breaks down LDL cholesterol so your body can get rid of it  High levels of HDL cholesterol can help prevent a heart attack and stroke  Low levels of HDL cholesterol can increase your risk for heart disease, heart attack, and stroke  The level should be 60 mg/dL or higher  Triglycerides  are a type of fat that store energy from foods you eat  High levels of triglycerides also cause plaque buildup   This can increase your risk for a heart attack or stroke  If your triglyceride level is high, your LDL cholesterol level may also be high  The level should be less than 150 mg/dL  Any of the following can increase your risk for high cholesterol:   Smoking cigarettes    Being overweight or obese, or not getting enough exercise    Drinking large amounts of alcohol    A medical condition such as hypertension (high blood pressure) or diabetes    Certain genes passed from your parents to you    Age older than 65 years    What you need to know about having your cholesterol levels checked: Adults 21to 39years of age should have their cholesterol levels checked every 4 to 6 years  Adults 45 years or older should have their cholesterol checked every 1 to 2 years  You may need your cholesterol checked more often, or at a younger age, if you have risk factors for heart disease  You may also need to have your cholesterol checked more often if you have other health conditions, such as diabetes  Blood tests are used to check cholesterol levels  Blood tests measure your levels of triglycerides, LDL cholesterol, and HDL cholesterol  How healthy fats affect your cholesterol levels:  Healthy fats, also called unsaturated fats, help lower LDL cholesterol and triglyceride levels  Healthy fats include the following:  Monounsaturated fats  are found in foods such as olive oil, canola oil, avocado, nuts, and olives  Polyunsaturated fats,  such as omega 3 fats, are found in fish, such as salmon, trout, and tuna  They can also be found in plant foods such as flaxseed, walnuts, and soybeans  How unhealthy fats affect your cholesterol levels:  Unhealthy fats increase LDL cholesterol and triglyceride levels  They are found in foods high in cholesterol, saturated fat, and trans fat:  Cholesterol  is found in eggs, dairy, and meat  Saturated fat  is found in butter, cheese, ice cream, whole milk, and coconut oil   Saturated fat is also found in meat, such as sausage, hot dogs, and bologna  Trans fat  is found in liquid oils and is used in fried and baked foods  Foods that contain trans fats include chips, crackers, muffins, sweet rolls, microwave popcorn, and cookies  Treatment  for high cholesterol will also decrease your risk of heart disease, heart attack, and stroke  Treatment may include any of the following:  Lifestyle changes  may include food, exercise, weight loss, and quitting smoking  You may also need to decrease the amount of alcohol you drink  Your healthcare provider will want you to start with lifestyle changes  Other treatment may be added if lifestyle changes are not enough  Your healthcare provider may recommend you work with a team to manage hyperlipidemia  The team may include medical experts such as a dietitian, an exercise or physical therapist, and a behavior therapist  Your family members may be included in helping you create lifestyle changes  Medicines  may be given to lower your LDL cholesterol, triglyceride levels, or total cholesterol level  You may need medicines to lower your cholesterol if any of the following is true:    You have a history of stroke, TIA, unstable angina, or a heart attack  Your LDL cholesterol level is 190 mg/dL or higher  You are age 36 to 76 years, have diabetes or heart disease risk factors, and your LDL cholesterol is 70 mg/dL or higher  Supplements  include fish oil, red yeast rice, and garlic  Fish oil may help lower your triglyceride and LDL cholesterol levels  It may also increase your HDL cholesterol level  Red yeast rice may help decrease your total cholesterol level and LDL cholesterol level  Garlic may help lower your total cholesterol level  Do not take any supplements without talking to your healthcare provider  Food changes you can make to lower your cholesterol levels:  A dietitian can help you create a healthy eating plan   He or she can show you how to read food labels and choose foods low in saturated fat, trans fats, and cholesterol  Decrease the total amount of fat you eat  Choose lean meats, fat-free or 1% fat milk, and low-fat dairy products, such as yogurt and cheese  Try to limit or avoid red meats  Limit or do not eat fried foods or baked goods, such as cookies  Replace unhealthy fats with healthy fats  Cook foods in olive oil or canola oil  Choose soft margarines that are low in saturated fat and trans fat  Seeds, nuts, and avocados are other examples of healthy fats  Eat foods with omega-3 fats  Examples include salmon, tuna, mackerel, walnuts, and flaxseed  Eat fish 2 times per week  Pregnant women should not eat fish that have high levels of mercury, such as shark, swordfish, and virgil mackerel  Increase the amount of high-fiber foods you eat  High-fiber foods can help lower your LDL cholesterol  Aim to get between 20 and 30 grams of fiber each day  Fruits and vegetables are high in fiber  Eat at least 5 servings each day  Other high-fiber foods are whole-grain or whole-wheat breads, pastas, or cereals, and brown rice  Eat 3 ounces of whole-grain foods each day  Increase fiber slowly  You may have abdominal discomfort, bloating, and gas if you add fiber to your diet too quickly  Eat healthy protein foods  Examples include low-fat dairy products, skinless chicken and turkey, fish, and nuts  Limit foods and drinks that are high in sugar  Your dietitian or healthcare provider can help you create daily limits for high-sugar foods and drinks  The limit may be lower if you have diabetes or another health condition  Limits can also help you lose weight if needed  Lifestyle changes you can make to lower your cholesterol levels:   Maintain a healthy weight  Ask your healthcare provider what a healthy weight is for you  Ask him or her to help you create a weight loss plan if needed   Weight loss can decrease your total cholesterol and triglyceride levels  Weight loss may also help keep your blood pressure at a healthy level  Be physically active throughout the day  Physical activity, such as exercise, can help lower your total cholesterol level and maintain a healthy weight  Physical activity can also help increase your HDL cholesterol level  Work with your healthcare provider to create an program that is right for you  Get at least 30 to 40 minutes of moderate physical activity most days of the week  Examples of exercise include brisk walking, swimming, or biking  Also include strength training at least 2 times each week  Your healthcare providers can help you create a physical activity plan  Do not smoke  Nicotine and other chemicals in cigarettes and cigars can raise your cholesterol levels  Ask your healthcare provider for information if you currently smoke and need help to quit  E-cigarettes or smokeless tobacco still contain nicotine  Talk to your healthcare provider before you use these products  Limit or do not drink alcohol  Alcohol can increase your triglyceride levels  Ask your healthcare provider before you drink alcohol  Ask how much is okay for you to drink in 24 hours or 1 week  Follow up with your doctor as directed:  Write down your questions so you remember to ask them during your visits  © Copyright [employer] 2022 Information is for End User's use only and may not be sold, redistributed or otherwise used for commercial purposes  All illustrations and images included in CareNotes® are the copyrighted property of [employer]  or [facility] Hemalatha Dumont  The above information is an  only  It is not intended as medical advice for individual conditions or treatments  Talk to your doctor, nurse or pharmacist before following any medical regimen to see if it is safe and effective for you

## 2022-07-06 ENCOUNTER — VBI (OUTPATIENT)
Dept: ADMINISTRATIVE | Facility: OTHER | Age: 67
End: 2022-07-06

## 2022-07-06 LAB
LEFT EYE DIABETIC RETINOPATHY: NORMAL
RIGHT EYE DIABETIC RETINOPATHY: NORMAL

## 2022-07-06 PROCEDURE — 2023F DILAT RTA XM W/O RTNOPTHY: CPT | Performed by: INTERNAL MEDICINE

## 2022-07-08 ENCOUNTER — VBI (OUTPATIENT)
Dept: ADMINISTRATIVE | Facility: OTHER | Age: 67
End: 2022-07-08

## 2022-07-18 DIAGNOSIS — E66.9 DIABETES MELLITUS TYPE 2 IN OBESE (HCC): ICD-10-CM

## 2022-07-18 DIAGNOSIS — E11.69 DIABETES MELLITUS TYPE 2 IN OBESE (HCC): ICD-10-CM

## 2022-07-18 RX ORDER — EMPAGLIFLOZIN 25 MG/1
TABLET, FILM COATED ORAL
Qty: 90 TABLET | Refills: 3 | Status: SHIPPED | OUTPATIENT
Start: 2022-07-18

## 2022-07-28 ENCOUNTER — APPOINTMENT (OUTPATIENT)
Dept: LAB | Facility: MEDICAL CENTER | Age: 67
End: 2022-07-28
Payer: COMMERCIAL

## 2022-07-28 DIAGNOSIS — E78.2 MIXED HYPERLIPIDEMIA: ICD-10-CM

## 2022-07-28 DIAGNOSIS — N18.2 TYPE 2 DIABETES MELLITUS WITH STAGE 2 CHRONIC KIDNEY DISEASE, WITHOUT LONG-TERM CURRENT USE OF INSULIN (HCC): ICD-10-CM

## 2022-07-28 DIAGNOSIS — E11.65 UNCONTROLLED TYPE 2 DIABETES MELLITUS WITH HYPERGLYCEMIA (HCC): ICD-10-CM

## 2022-07-28 DIAGNOSIS — E11.3293 TYPE 2 DIABETES MELLITUS WITH BOTH EYES AFFECTED BY MILD NONPROLIFERATIVE RETINOPATHY WITHOUT MACULAR EDEMA, WITHOUT LONG-TERM CURRENT USE OF INSULIN (HCC): ICD-10-CM

## 2022-07-28 DIAGNOSIS — I10 PRIMARY HYPERTENSION: ICD-10-CM

## 2022-07-28 DIAGNOSIS — E11.22 TYPE 2 DIABETES MELLITUS WITH STAGE 2 CHRONIC KIDNEY DISEASE, WITHOUT LONG-TERM CURRENT USE OF INSULIN (HCC): ICD-10-CM

## 2022-07-28 DIAGNOSIS — E66.01 SEVERE OBESITY (BMI 35.0-35.9 WITH COMORBIDITY) (HCC): ICD-10-CM

## 2022-07-28 DIAGNOSIS — E79.0 HYPERURICEMIA WITHOUT SIGNS INFLAMMATORY ARTHRITIS/TOPHACEOUS DISEASE: ICD-10-CM

## 2022-07-28 LAB
ALBUMIN SERPL BCP-MCNC: 3.8 G/DL (ref 3.5–5)
ALP SERPL-CCNC: 64 U/L (ref 46–116)
ALT SERPL W P-5'-P-CCNC: 34 U/L (ref 12–78)
ANION GAP SERPL CALCULATED.3IONS-SCNC: 3 MMOL/L (ref 4–13)
AST SERPL W P-5'-P-CCNC: 17 U/L (ref 5–45)
BASOPHILS # BLD AUTO: 0.03 THOUSANDS/ΜL (ref 0–0.1)
BASOPHILS NFR BLD AUTO: 1 % (ref 0–1)
BILIRUB SERPL-MCNC: 0.72 MG/DL (ref 0.2–1)
BUN SERPL-MCNC: 13 MG/DL (ref 5–25)
CALCIUM SERPL-MCNC: 9.2 MG/DL (ref 8.3–10.1)
CHLORIDE SERPL-SCNC: 108 MMOL/L (ref 96–108)
CHOLEST SERPL-MCNC: 142 MG/DL
CO2 SERPL-SCNC: 31 MMOL/L (ref 21–32)
CREAT SERPL-MCNC: 1.14 MG/DL (ref 0.6–1.3)
EOSINOPHIL # BLD AUTO: 0.1 THOUSAND/ΜL (ref 0–0.61)
EOSINOPHIL NFR BLD AUTO: 2 % (ref 0–6)
ERYTHROCYTE [DISTWIDTH] IN BLOOD BY AUTOMATED COUNT: 13.8 % (ref 11.6–15.1)
GFR SERPL CREATININE-BSD FRML MDRD: 67 ML/MIN/1.73SQ M
GLUCOSE P FAST SERPL-MCNC: 168 MG/DL (ref 65–99)
HCT VFR BLD AUTO: 45.5 % (ref 36.5–49.3)
HDLC SERPL-MCNC: 35 MG/DL
HGB BLD-MCNC: 15 G/DL (ref 12–17)
IMM GRANULOCYTES # BLD AUTO: 0.01 THOUSAND/UL (ref 0–0.2)
IMM GRANULOCYTES NFR BLD AUTO: 0 % (ref 0–2)
LDLC SERPL CALC-MCNC: 85 MG/DL (ref 0–100)
LYMPHOCYTES # BLD AUTO: 1.48 THOUSANDS/ΜL (ref 0.6–4.47)
LYMPHOCYTES NFR BLD AUTO: 33 % (ref 14–44)
MCH RBC QN AUTO: 31.6 PG (ref 26.8–34.3)
MCHC RBC AUTO-ENTMCNC: 33 G/DL (ref 31.4–37.4)
MCV RBC AUTO: 96 FL (ref 82–98)
MONOCYTES # BLD AUTO: 0.37 THOUSAND/ΜL (ref 0.17–1.22)
MONOCYTES NFR BLD AUTO: 8 % (ref 4–12)
NEUTROPHILS # BLD AUTO: 2.52 THOUSANDS/ΜL (ref 1.85–7.62)
NEUTS SEG NFR BLD AUTO: 56 % (ref 43–75)
NONHDLC SERPL-MCNC: 107 MG/DL
NRBC BLD AUTO-RTO: 0 /100 WBCS
PLATELET # BLD AUTO: 165 THOUSANDS/UL (ref 149–390)
PMV BLD AUTO: 11.4 FL (ref 8.9–12.7)
POTASSIUM SERPL-SCNC: 3.6 MMOL/L (ref 3.5–5.3)
PROT SERPL-MCNC: 7.1 G/DL (ref 6.4–8.4)
RBC # BLD AUTO: 4.74 MILLION/UL (ref 3.88–5.62)
SODIUM SERPL-SCNC: 142 MMOL/L (ref 135–147)
TRIGL SERPL-MCNC: 108 MG/DL
TSH SERPL DL<=0.05 MIU/L-ACNC: 0.91 UIU/ML (ref 0.45–4.5)
WBC # BLD AUTO: 4.51 THOUSAND/UL (ref 4.31–10.16)

## 2022-07-28 PROCEDURE — 80061 LIPID PANEL: CPT

## 2022-07-28 PROCEDURE — 36415 COLL VENOUS BLD VENIPUNCTURE: CPT

## 2022-07-28 PROCEDURE — 80053 COMPREHEN METABOLIC PANEL: CPT

## 2022-07-28 PROCEDURE — 83036 HEMOGLOBIN GLYCOSYLATED A1C: CPT

## 2022-07-28 PROCEDURE — 84443 ASSAY THYROID STIM HORMONE: CPT

## 2022-07-28 PROCEDURE — 85025 COMPLETE CBC W/AUTO DIFF WBC: CPT

## 2022-07-29 LAB
EST. AVERAGE GLUCOSE BLD GHB EST-MCNC: 200 MG/DL
HBA1C MFR BLD: 8.6 %

## 2022-07-29 PROCEDURE — 3052F HG A1C>EQUAL 8.0%<EQUAL 9.0%: CPT | Performed by: INTERNAL MEDICINE

## 2022-08-02 ENCOUNTER — OFFICE VISIT (OUTPATIENT)
Dept: INTERNAL MEDICINE CLINIC | Facility: CLINIC | Age: 67
End: 2022-08-02
Payer: COMMERCIAL

## 2022-08-02 VITALS
WEIGHT: 247.9 LBS | BODY MASS INDEX: 35.49 KG/M2 | SYSTOLIC BLOOD PRESSURE: 142 MMHG | HEIGHT: 70 IN | HEART RATE: 66 BPM | TEMPERATURE: 97.3 F | DIASTOLIC BLOOD PRESSURE: 86 MMHG | OXYGEN SATURATION: 93 %

## 2022-08-02 DIAGNOSIS — I10 PRIMARY HYPERTENSION: ICD-10-CM

## 2022-08-02 DIAGNOSIS — E11.22 TYPE 2 DIABETES MELLITUS WITH STAGE 2 CHRONIC KIDNEY DISEASE, WITHOUT LONG-TERM CURRENT USE OF INSULIN (HCC): ICD-10-CM

## 2022-08-02 DIAGNOSIS — E11.65 UNCONTROLLED TYPE 2 DIABETES MELLITUS WITH HYPERGLYCEMIA (HCC): Primary | ICD-10-CM

## 2022-08-02 DIAGNOSIS — E66.01 SEVERE OBESITY (BMI 35.0-35.9 WITH COMORBIDITY) (HCC): ICD-10-CM

## 2022-08-02 DIAGNOSIS — M54.16 LUMBAR RADICULOPATHY: ICD-10-CM

## 2022-08-02 DIAGNOSIS — E78.2 MIXED HYPERLIPIDEMIA: ICD-10-CM

## 2022-08-02 DIAGNOSIS — G57.92 NEUROPATHY OF LEFT FOOT: ICD-10-CM

## 2022-08-02 DIAGNOSIS — E11.3293 TYPE 2 DIABETES MELLITUS WITH BOTH EYES AFFECTED BY MILD NONPROLIFERATIVE RETINOPATHY WITHOUT MACULAR EDEMA, WITHOUT LONG-TERM CURRENT USE OF INSULIN (HCC): ICD-10-CM

## 2022-08-02 DIAGNOSIS — N18.2 TYPE 2 DIABETES MELLITUS WITH STAGE 2 CHRONIC KIDNEY DISEASE, WITHOUT LONG-TERM CURRENT USE OF INSULIN (HCC): ICD-10-CM

## 2022-08-02 PROCEDURE — 3066F NEPHROPATHY DOC TX: CPT | Performed by: FAMILY MEDICINE

## 2022-08-02 PROCEDURE — 3077F SYST BP >= 140 MM HG: CPT | Performed by: FAMILY MEDICINE

## 2022-08-02 PROCEDURE — 3079F DIAST BP 80-89 MM HG: CPT | Performed by: FAMILY MEDICINE

## 2022-08-02 PROCEDURE — 99214 OFFICE O/P EST MOD 30 MIN: CPT | Performed by: FAMILY MEDICINE

## 2022-08-02 PROCEDURE — 1160F RVW MEDS BY RX/DR IN RCRD: CPT | Performed by: FAMILY MEDICINE

## 2022-08-02 RX ORDER — SEMAGLUTIDE 1.34 MG/ML
INJECTION, SOLUTION SUBCUTANEOUS
Qty: 1.5 ML | Refills: 0 | Status: SHIPPED | OUTPATIENT
Start: 2022-08-02 | End: 2022-08-30

## 2022-08-02 NOTE — PROGRESS NOTES
Assessment/Plan:    No problem-specific Assessment & Plan notes found for this encounter  Diagnoses and all orders for this visit:    Uncontrolled type 2 diabetes mellitus with hyperglycemia (HCC)  -     CBC and differential; Future  -     Comprehensive metabolic panel; Future  -     Hemoglobin A1C; Future  -     Semaglutide,0 25 or 0 5MG/DOS, (Ozempic, 0 25 or 0 5 MG/DOSE,) 2 MG/1 5ML SOPN; Inject 0 25 mg under the skin once a week for 14 days, THEN 0 5 mg once a week for 14 days  Type 2 diabetes mellitus with stage 2 chronic kidney disease, without long-term current use of insulin (HCC)  -     CBC and differential; Future  -     Comprehensive metabolic panel; Future  -     Hemoglobin A1C; Future    Type 2 diabetes mellitus with both eyes affected by mild nonproliferative retinopathy without macular edema, without long-term current use of insulin (HCC)  -     CBC and differential; Future  -     Comprehensive metabolic panel; Future  -     Hemoglobin A1C; Future    Primary hypertension  -     CBC and differential; Future  -     Comprehensive metabolic panel; Future    Mixed hyperlipidemia  -     CBC and differential; Future  -     Comprehensive metabolic panel; Future  -     Lipid panel; Future  -     TSH, 3rd generation; Future    Severe obesity (BMI 35 0-35 9 with comorbidity) (HCC)  -     CBC and differential; Future    Lumbar radiculopathy  -     CBC and differential; Future    Neuropathy of left foot  -     CBC and differential; Future    orders recommendations as noted above  Reviewed recent laboratory testing with him  Hemoglobin A1c has increased significantly  Discussed goal of hemoglobin A1c less than 7  Continue with the Jardiance  Does not tolerate metformin  Discussed medications like Ozempic  He is willing to start these  Advised him to hold off until after vacation to start the medication  Orders as noted above  Will gradually increase the dosage  Potential side effects discussed  Continue follow-up with Ophthalmology regularly  Check feet daily  Watch carbohydrate intake  Increase activity level  Slow but steady weight loss recommended  Blood pressure relatively well controlled  He did just take his blood pressure medications  Watch salt intake  Continue the atorvastatin as previously  Higher does through Cardiology has improved his LDL  Watch for any worsening of back symptoms  Watch for exacerbating activities  Be very careful to avoid falls  Coronavirus booster discussed  Recommend flu shot in the fall  Will have him follow up in about 3-5 months with laboratory testing prior to that visit  Follow up sooner if needed  Subjective:      Patient ID: Melba Murphy is a 73 y o  male  He presents for routine follow-up  Has generally been doing relatively well  Did have an exacerbation of his low back symptoms and sciatica about 2 months ago  This took quite a while to improved  Still working on strengthening the left leg again  He feels this may have affected his blood sugars since he could not be as active as he would have liked  Has been trying to watch his diet  Tolerating the Jardiance without difficulty  Did not tolerate the metformin because of GI issues  Vision has been stable  Follows up with Ophthalmology regularly  Checks his feet regularly  Tolerating the atorvastatin well  Cardiology did increase the dosage  Denies any muscle aches or weakness with this  Continues with his blood pressure medications  Denies any headaches or localized weakness  Denies any orthostatics symptoms  Appetite has been stable  Denies any nausea, vomiting, or diarrhea  Denies any changes in bowel movements  Usually sleeps relatively well  Will be going on vacation at the end of the week to [place]        The following portions of the patient's history were reviewed and updated as appropriate:   He  has a past medical history of Allergic rhinitis, Aneurysm of thoracic aorta ([facility] ), Cataract, Colon polyp, Diabetes mellitus ([facility] ), Herniated nucleus pulposus, L4-5 left, Herniated nucleus pulposus, L5-S1, left, echocardiogram (10/14/2016), Hypercholesterolemia, Hypertension, and Lumbar radiculopathy  He   Patient Active Problem List    Diagnosis Date Noted    Severe obesity (BMI 35 0-35 9 with comorbidity) ([place] [address] ) 03/18/2022    Type 2 diabetes mellitus with mild nonproliferative retinopathy of both eyes without macular edema ([facility] ) 03/18/2022    Uncontrolled type 2 diabetes mellitus with hyperglycemia ([place] [address] ) 03/18/2022    Type 2 diabetes with stage 2 chronic kidney disease GFR 60-89 ([place] [address] ) 03/18/2022    Hypokalemia 11/04/2020    Colonic polyp 11/14/2019    Lumbar radiculopathy 07/31/2019    Microscopic hematuria 07/02/2019    Pain of left thigh 07/02/2019    Acute pain of left shoulder 04/02/2019    Skin tag 10/25/2018    Restless leg syndrome 06/21/2018    Heel spur 05/31/2017    Neuropathy of left foot 09/19/2016    Diabetes mellitus type 2 in obese ([place] [address] ) 08/06/2015    Sciatica 03/03/2014    Chronic bilateral low back pain 02/27/2014    Edema 01/03/2013    Hyperuricemia without signs inflammatory arthritis/tophaceous disease 10/31/2012    Aneurysm of thoracic aorta ([facility] ) 08/06/2012    Mixed hyperlipidemia 05/01/2012    Hypertension 05/01/2012    Non-toxic uninodular goiter 05/01/2012    Osteoarthritis 05/01/2012     He  has a past surgical history that includes Tonsillectomy and adenoidectomy; Knee surgery; Back surgery; pr colonoscopy flx dx w/collj spec when pfrmd (N/A, 9/30/2016); Back surgery; and Knee arthroscopy  His family history includes Coronary artery disease in his brother; Heart attack in his brother; Hypertension in his father; Thyroid disease in his mother  He  reports that he has never smoked  He has never used smokeless tobacco  He reports that he does not drink alcohol and does not use drugs    Current Outpatient Medications Medication Sig Dispense Refill    allopurinol (ZYLOPRIM) 100 mg tablet TAKE 1 TABLET DAILY 90 tablet 3    amLODIPine-benazepril (LOTREL 5-20) 5-20 MG per capsule TAKE 1 CAPSULE TWICE A  capsule 3    atenolol (TENORMIN) 50 mg tablet TAKE 1 TABLET TWICE A  tablet 3    atorvastatin (LIPITOR) 40 mg tablet Take 1 tablet (40 mg total) by mouth daily 90 tablet 3    furosemide (LASIX) 40 mg tablet TAKE ONE-HALF (1/2) TABLET DAILY 45 tablet 3    Jardiance 25 MG TABS TAKE 1 TABLET EVERY MORNING 90 tablet 3    magnesium oxide (MAG-OX) 400 mg Take 400 mg by mouth 2 (two) times a day      olmesartan-hydrochlorothiazide (BENICAR HCT) 40-12 5 MG per tablet TAKE 1 TABLET DAILY 90 tablet 3    potassium chloride (K-DUR,KLOR-CON) 10 mEq tablet TAKE 3 TABLETS TWICE A DAY  TAKE 3 TABLETS IN THE MORNING AND TAKE 3 TABLETS IN THE EVENING 540 tablet 3    Semaglutide,0 25 or 0 5MG/DOS, (Ozempic, 0 25 or 0 5 MG/DOSE,) 2 MG/1 5ML SOPN Inject 0 25 mg under the skin once a week for 14 days, THEN 0 5 mg once a week for 14 days  1 5 mL 0     No current facility-administered medications for this visit  Current Outpatient Medications on File Prior to Visit   Medication Sig    allopurinol (ZYLOPRIM) 100 mg tablet TAKE 1 TABLET DAILY    amLODIPine-benazepril (LOTREL 5-20) 5-20 MG per capsule TAKE 1 CAPSULE TWICE A DAY    atenolol (TENORMIN) 50 mg tablet TAKE 1 TABLET TWICE A DAY    atorvastatin (LIPITOR) 40 mg tablet Take 1 tablet (40 mg total) by mouth daily    furosemide (LASIX) 40 mg tablet TAKE ONE-HALF (1/2) TABLET DAILY    Jardiance 25 MG TABS TAKE 1 TABLET EVERY MORNING    magnesium oxide (MAG-OX) 400 mg Take 400 mg by mouth 2 (two) times a day    olmesartan-hydrochlorothiazide (BENICAR HCT) 40-12 5 MG per tablet TAKE 1 TABLET DAILY    potassium chloride (K-DUR,KLOR-CON) 10 mEq tablet TAKE 3 TABLETS TWICE A DAY   TAKE 3 TABLETS IN THE MORNING AND TAKE 3 TABLETS IN THE EVENING     No current facility-administered medications on file prior to visit  He has No Known Allergies       Review of Systems   Constitutional: Positive for activity change  Negative for appetite change, chills, fatigue and fever  HENT: Negative for hearing loss  Eyes: Negative for pain and visual disturbance  Respiratory: Negative for cough, chest tightness and shortness of breath  Cardiovascular: Negative for chest pain and palpitations  Gastrointestinal: Negative for abdominal pain, blood in stool, diarrhea, nausea and vomiting  Endocrine: Negative for polydipsia, polyphagia and polyuria  Genitourinary: Negative for dysuria  Musculoskeletal: Positive for back pain  Negative for arthralgias and gait problem  Skin: Negative for color change  Neurological: Positive for weakness and numbness  Negative for dizziness and headaches  Hematological: Does not bruise/bleed easily  Psychiatric/Behavioral: Negative for behavioral problems  The patient is not nervous/anxious  Objective:      /86 (BP Location: Left arm, Patient Position: Sitting, Cuff Size: Large)   Pulse 66   Temp (!) 97 3 °F (36 3 °C)   Ht 5' 10" (1 778 m)   Wt 112 kg (247 lb 14 4 oz)   SpO2 93%   BMI 35 57 kg/m²          Physical Exam  Vitals and nursing note reviewed  Constitutional:       General: He is not in acute distress  Appearance: He is well-developed, well-groomed and overweight  HENT:      Head: Normocephalic and atraumatic  Nose: Nose normal    Eyes:      Conjunctiva/sclera: Conjunctivae normal       Pupils: Pupils are equal, round, and reactive to light  Cardiovascular:      Rate and Rhythm: Normal rate and regular rhythm  Heart sounds: Normal heart sounds  No murmur heard  No friction rub  No gallop  Pulmonary:      Breath sounds: No wheezing or rales  Chest:      Chest wall: No tenderness  Abdominal:      General: There is no distension  Tenderness:  There is no abdominal tenderness  There is no guarding or rebound  Lymphadenopathy:      Cervical: No cervical adenopathy  Skin:     General: Skin is warm and dry  Neurological:      Mental Status: He is alert and oriented to person, place, and time  Psychiatric:         Mood and Affect: Mood and affect normal          Speech: Speech normal          Behavior: Behavior normal  Behavior is cooperative  Cognition and Memory: Cognition and memory normal          BMI Counseling: Body mass index is 35 57 kg/m²  The BMI is above normal  Nutrition recommendations include decreasing portion sizes, encouraging healthy choices of fruits and vegetables, decreasing fast food intake, consuming healthier snacks, limiting drinks that contain sugar, moderation in carbohydrate intake and reducing intake of cholesterol  Exercise recommendations include exercising 3-5 times per week  Rationale for BMI follow-up plan is due to patient being overweight or obese  Below is the patient's most recent value for Albumin, ALT, AST, BUN, Calcium, Chloride, Cholesterol, CO2, Creatinine, GFR, Glucose, HDL, Hematocrit, Hemoglobin, Hemoglobin A1C, LDL, Magnesium, Phosphorus, Platelets, Potassium, PSA, Sodium, Triglycerides, and WBC  Lab Results   Component Value Date    ALT 34 07/28/2022    AST 17 07/28/2022    BUN 13 07/28/2022    CALCIUM 9 2 07/28/2022     07/28/2022    CHOL 162 09/09/2015    CO2 31 07/28/2022    CREATININE 1 14 07/28/2022    HDL 35 (L) 07/28/2022    HCT 45 5 07/28/2022    HGB 15 0 07/28/2022    HGBA1C 8 6 (H) 07/28/2022     07/28/2022    K 3 6 07/28/2022    PSA 0 8 09/09/2021     09/09/2015    TRIG 108 07/28/2022    WBC 4 51 07/28/2022     Note: for a comprehensive list of the patient's lab results, access the Results Review activity

## 2022-08-16 ENCOUNTER — TELEPHONE (OUTPATIENT)
Dept: INTERNAL MEDICINE CLINIC | Facility: CLINIC | Age: 67
End: 2022-08-16

## 2022-08-16 NOTE — TELEPHONE ENCOUNTER
Patient called and left a message stating he had questions about his medications  I did return patients call, had to leave a message for him to call our office back

## 2022-08-16 NOTE — TELEPHONE ENCOUNTER
Patient returned call  He started ozempic this week, he wanted to make sure he continued taking the Jardiance  Did see in last note that he is to continue the medication and ozempic is in addition to the jardiance

## 2022-08-17 DIAGNOSIS — R60.9 PERIPHERAL EDEMA: ICD-10-CM

## 2022-08-17 RX ORDER — POTASSIUM CHLORIDE 750 MG/1
TABLET, EXTENDED RELEASE ORAL
Qty: 540 TABLET | Refills: 3 | Status: SHIPPED | OUTPATIENT
Start: 2022-08-17

## 2022-08-22 ENCOUNTER — HOSPITAL ENCOUNTER (OUTPATIENT)
Dept: CT IMAGING | Facility: HOSPITAL | Age: 67
Discharge: HOME | End: 2022-08-22
Attending: INTERNAL MEDICINE
Payer: COMMERCIAL

## 2022-08-22 DIAGNOSIS — I71.20 THORACIC AORTIC ANEURYSM WITHOUT RUPTURE: ICD-10-CM

## 2022-08-22 PROCEDURE — G1004 CDSM NDSC: HCPCS

## 2022-08-22 PROCEDURE — 71275 CT ANGIOGRAPHY CHEST: CPT

## 2022-08-22 RX ADMIN — IOHEXOL 85 ML: 350 INJECTION, SOLUTION INTRAVENOUS at 08:36

## 2022-09-08 ENCOUNTER — TELEPHONE (OUTPATIENT)
Dept: INTERNAL MEDICINE CLINIC | Facility: CLINIC | Age: 67
End: 2022-09-08

## 2022-09-08 DIAGNOSIS — E11.65 UNCONTROLLED TYPE 2 DIABETES MELLITUS WITH HYPERGLYCEMIA (HCC): Primary | ICD-10-CM

## 2022-09-08 RX ORDER — SEMAGLUTIDE 1.34 MG/ML
1 INJECTION, SOLUTION SUBCUTANEOUS WEEKLY
Qty: 3 ML | Refills: 1 | Status: SHIPPED | OUTPATIENT
Start: 2022-09-08 | End: 2022-09-28 | Stop reason: SDUPTHER

## 2022-09-09 NOTE — TELEPHONE ENCOUNTER
Order placed for the higher dose  He had done 0 25 mg for 2 weeks and than 0 5 mg for 2 weeks according to previous order  If he is tolerated this, will increase to 1 mg weekly for at least 1 month and then increase thereafter if needed

## 2022-09-28 DIAGNOSIS — E11.65 UNCONTROLLED TYPE 2 DIABETES MELLITUS WITH HYPERGLYCEMIA (HCC): ICD-10-CM

## 2022-09-29 RX ORDER — SEMAGLUTIDE 1.34 MG/ML
1 INJECTION, SOLUTION SUBCUTANEOUS WEEKLY
Qty: 9 ML | Refills: 1 | Status: SHIPPED | OUTPATIENT
Start: 2022-09-29

## 2022-10-10 ENCOUNTER — IMMUNIZATIONS (OUTPATIENT)
Dept: INTERNAL MEDICINE CLINIC | Facility: CLINIC | Age: 67
End: 2022-10-10
Payer: COMMERCIAL

## 2022-10-10 DIAGNOSIS — Z23 ENCOUNTER FOR IMMUNIZATION: Primary | ICD-10-CM

## 2022-10-10 PROCEDURE — G0008 ADMIN INFLUENZA VIRUS VAC: HCPCS

## 2022-10-10 PROCEDURE — 90662 IIV NO PRSV INCREASED AG IM: CPT

## 2022-10-25 ENCOUNTER — VBI (OUTPATIENT)
Dept: ADMINISTRATIVE | Facility: OTHER | Age: 67
End: 2022-10-25

## 2022-11-08 ENCOUNTER — VBI (OUTPATIENT)
Dept: ADMINISTRATIVE | Facility: OTHER | Age: 67
End: 2022-11-08

## 2022-12-06 DIAGNOSIS — I10 ESSENTIAL HYPERTENSION: ICD-10-CM

## 2022-12-06 RX ORDER — AMLODIPINE BESYLATE AND BENAZEPRIL HYDROCHLORIDE 5; 20 MG/1; MG/1
CAPSULE ORAL
Qty: 180 CAPSULE | Refills: 3 | Status: SHIPPED | OUTPATIENT
Start: 2022-12-06

## 2022-12-07 ENCOUNTER — RA CDI HCC (OUTPATIENT)
Dept: OTHER | Facility: HOSPITAL | Age: 67
End: 2022-12-07

## 2022-12-07 NOTE — PROGRESS NOTES
[facility]  coding opportunities       Chart reviewed, no opportunity found: CHART REVIEWED, NO OPPORTUNITY FOUND        Patients Insurance        Commercial Insurance: [address] [place] [address]

## 2022-12-08 ENCOUNTER — APPOINTMENT (OUTPATIENT)
Dept: LAB | Facility: MEDICAL CENTER | Age: 67
End: 2022-12-08

## 2022-12-08 ENCOUNTER — VBI (OUTPATIENT)
Dept: ADMINISTRATIVE | Facility: OTHER | Age: 67
End: 2022-12-08

## 2022-12-08 DIAGNOSIS — E78.2 MIXED HYPERLIPIDEMIA: ICD-10-CM

## 2022-12-08 DIAGNOSIS — I10 PRIMARY HYPERTENSION: ICD-10-CM

## 2022-12-08 DIAGNOSIS — N18.2 TYPE 2 DIABETES MELLITUS WITH STAGE 2 CHRONIC KIDNEY DISEASE, WITHOUT LONG-TERM CURRENT USE OF INSULIN (HCC): ICD-10-CM

## 2022-12-08 DIAGNOSIS — E11.3293 TYPE 2 DIABETES MELLITUS WITH BOTH EYES AFFECTED BY MILD NONPROLIFERATIVE RETINOPATHY WITHOUT MACULAR EDEMA, WITHOUT LONG-TERM CURRENT USE OF INSULIN (HCC): ICD-10-CM

## 2022-12-08 DIAGNOSIS — E11.65 UNCONTROLLED TYPE 2 DIABETES MELLITUS WITH HYPERGLYCEMIA (HCC): ICD-10-CM

## 2022-12-08 DIAGNOSIS — E66.01 SEVERE OBESITY (BMI 35.0-35.9 WITH COMORBIDITY) (HCC): ICD-10-CM

## 2022-12-08 DIAGNOSIS — G57.92 NEUROPATHY OF LEFT FOOT: ICD-10-CM

## 2022-12-08 DIAGNOSIS — M54.16 LUMBAR RADICULOPATHY: ICD-10-CM

## 2022-12-08 DIAGNOSIS — E11.22 TYPE 2 DIABETES MELLITUS WITH STAGE 2 CHRONIC KIDNEY DISEASE, WITHOUT LONG-TERM CURRENT USE OF INSULIN (HCC): ICD-10-CM

## 2022-12-08 LAB
ALBUMIN SERPL BCP-MCNC: 4 G/DL (ref 3.5–5)
ALP SERPL-CCNC: 67 U/L (ref 46–116)
ALT SERPL W P-5'-P-CCNC: 36 U/L (ref 12–78)
ANION GAP SERPL CALCULATED.3IONS-SCNC: 6 MMOL/L (ref 4–13)
AST SERPL W P-5'-P-CCNC: 16 U/L (ref 5–45)
BASOPHILS # BLD AUTO: 0.03 THOUSANDS/ÂΜL (ref 0–0.1)
BASOPHILS NFR BLD AUTO: 1 % (ref 0–1)
BILIRUB SERPL-MCNC: 0.7 MG/DL (ref 0.2–1)
BUN SERPL-MCNC: 17 MG/DL (ref 5–25)
CALCIUM SERPL-MCNC: 9.4 MG/DL (ref 8.3–10.1)
CHLORIDE SERPL-SCNC: 105 MMOL/L (ref 96–108)
CHOLEST SERPL-MCNC: 141 MG/DL
CO2 SERPL-SCNC: 30 MMOL/L (ref 21–32)
CREAT SERPL-MCNC: 1.05 MG/DL (ref 0.6–1.3)
EOSINOPHIL # BLD AUTO: 0.09 THOUSAND/ÂΜL (ref 0–0.61)
EOSINOPHIL NFR BLD AUTO: 2 % (ref 0–6)
ERYTHROCYTE [DISTWIDTH] IN BLOOD BY AUTOMATED COUNT: 13.5 % (ref 11.6–15.1)
GFR SERPL CREATININE-BSD FRML MDRD: 73 ML/MIN/1.73SQ M
GLUCOSE P FAST SERPL-MCNC: 134 MG/DL (ref 65–99)
HCT VFR BLD AUTO: 47.9 % (ref 36.5–49.3)
HDLC SERPL-MCNC: 49 MG/DL
HGB BLD-MCNC: 15.3 G/DL (ref 12–17)
IMM GRANULOCYTES # BLD AUTO: 0.01 THOUSAND/UL (ref 0–0.2)
IMM GRANULOCYTES NFR BLD AUTO: 0 % (ref 0–2)
LDLC SERPL CALC-MCNC: 69 MG/DL (ref 0–100)
LYMPHOCYTES # BLD AUTO: 1.55 THOUSANDS/ÂΜL (ref 0.6–4.47)
LYMPHOCYTES NFR BLD AUTO: 27 % (ref 14–44)
MCH RBC QN AUTO: 30.8 PG (ref 26.8–34.3)
MCHC RBC AUTO-ENTMCNC: 31.9 G/DL (ref 31.4–37.4)
MCV RBC AUTO: 96 FL (ref 82–98)
MONOCYTES # BLD AUTO: 0.49 THOUSAND/ÂΜL (ref 0.17–1.22)
MONOCYTES NFR BLD AUTO: 9 % (ref 4–12)
NEUTROPHILS # BLD AUTO: 3.49 THOUSANDS/ÂΜL (ref 1.85–7.62)
NEUTS SEG NFR BLD AUTO: 61 % (ref 43–75)
NONHDLC SERPL-MCNC: 92 MG/DL
NRBC BLD AUTO-RTO: 0 /100 WBCS
PLATELET # BLD AUTO: 171 THOUSANDS/UL (ref 149–390)
PMV BLD AUTO: 10.4 FL (ref 8.9–12.7)
POTASSIUM SERPL-SCNC: 3.3 MMOL/L (ref 3.5–5.3)
PROT SERPL-MCNC: 7.3 G/DL (ref 6.4–8.4)
RBC # BLD AUTO: 4.97 MILLION/UL (ref 3.88–5.62)
SODIUM SERPL-SCNC: 141 MMOL/L (ref 135–147)
TRIGL SERPL-MCNC: 115 MG/DL
TSH SERPL DL<=0.05 MIU/L-ACNC: 0.93 UIU/ML (ref 0.45–4.5)
WBC # BLD AUTO: 5.66 THOUSAND/UL (ref 4.31–10.16)

## 2022-12-10 LAB
EST. AVERAGE GLUCOSE BLD GHB EST-MCNC: 146 MG/DL
HBA1C MFR BLD: 6.7 %

## 2022-12-14 ENCOUNTER — OFFICE VISIT (OUTPATIENT)
Dept: INTERNAL MEDICINE CLINIC | Facility: CLINIC | Age: 67
End: 2022-12-14

## 2022-12-14 VITALS
BODY MASS INDEX: 34.73 KG/M2 | OXYGEN SATURATION: 95 % | TEMPERATURE: 97 F | WEIGHT: 242.6 LBS | HEART RATE: 90 BPM | SYSTOLIC BLOOD PRESSURE: 138 MMHG | HEIGHT: 70 IN | DIASTOLIC BLOOD PRESSURE: 84 MMHG

## 2022-12-14 DIAGNOSIS — E11.22 TYPE 2 DIABETES MELLITUS WITH STAGE 2 CHRONIC KIDNEY DISEASE, WITHOUT LONG-TERM CURRENT USE OF INSULIN (HCC): ICD-10-CM

## 2022-12-14 DIAGNOSIS — E78.2 MIXED HYPERLIPIDEMIA: ICD-10-CM

## 2022-12-14 DIAGNOSIS — E11.3293 TYPE 2 DIABETES MELLITUS WITH BOTH EYES AFFECTED BY MILD NONPROLIFERATIVE RETINOPATHY WITHOUT MACULAR EDEMA, WITHOUT LONG-TERM CURRENT USE OF INSULIN (HCC): Primary | ICD-10-CM

## 2022-12-14 DIAGNOSIS — N18.2 TYPE 2 DIABETES MELLITUS WITH STAGE 2 CHRONIC KIDNEY DISEASE, WITHOUT LONG-TERM CURRENT USE OF INSULIN (HCC): ICD-10-CM

## 2022-12-14 DIAGNOSIS — E79.0 HYPERURICEMIA WITHOUT SIGNS INFLAMMATORY ARTHRITIS/TOPHACEOUS DISEASE: ICD-10-CM

## 2022-12-14 DIAGNOSIS — I10 PRIMARY HYPERTENSION: ICD-10-CM

## 2022-12-15 ENCOUNTER — OFFICE VISIT (OUTPATIENT)
Dept: CARDIOLOGY CLINIC | Facility: CLINIC | Age: 67
End: 2022-12-15

## 2022-12-15 VITALS
WEIGHT: 241 LBS | DIASTOLIC BLOOD PRESSURE: 76 MMHG | HEART RATE: 100 BPM | SYSTOLIC BLOOD PRESSURE: 124 MMHG | BODY MASS INDEX: 34.5 KG/M2 | HEIGHT: 70 IN

## 2022-12-15 DIAGNOSIS — E78.2 MIXED HYPERLIPIDEMIA: ICD-10-CM

## 2022-12-15 DIAGNOSIS — I71.21 ANEURYSM OF ASCENDING AORTA WITHOUT RUPTURE: Primary | ICD-10-CM

## 2022-12-15 DIAGNOSIS — I10 PRIMARY HYPERTENSION: ICD-10-CM

## 2022-12-15 NOTE — PROGRESS NOTES
Subjective:        Patient ID: Chandni Suresh is a 78 y o  male  Chief Complaint:  [facility] is here for routine cardiac follow-up for small thoracic ascending aortic aneurysm, hypertension and dyslipidemia  He offers no complaints of any concerns staying active denies any chest pain back pain shortness of breath palpitations presyncope syncope TIA or claudication-like symptoms  August CTA of the chest revealed an aortic root of 4 4 cm, ascending aorta maximal dimension 4 1 cm  In 2013 CT chest showed his ascending aorta to be 4 2 cm  Echo last year 4 3 cm here, aortic valve trileaflet  We discussed how this is quite stable  The following portions of the patient's history were reviewed and updated as appropriate: allergies, current medications, past family history, past medical history, past social history, past surgical history and problem list   Review of Systems   Constitutional: Negative for chills, diaphoresis, malaise/fatigue and weight gain  HENT: Negative for nosebleeds and stridor  Eyes: Negative for double vision, vision loss in left eye, vision loss in right eye and visual disturbance  Cardiovascular: Negative for chest pain, claudication, cyanosis, dyspnea on exertion, irregular heartbeat, leg swelling, near-syncope, orthopnea, palpitations, paroxysmal nocturnal dyspnea and syncope  Respiratory: Negative for cough, shortness of breath, snoring and wheezing  Endocrine: Negative for polydipsia, polyphagia and polyuria  Hematologic/Lymphatic: Negative for bleeding problem  Does not bruise/bleed easily  Skin: Negative for flushing and rash  Musculoskeletal: Negative for falls and myalgias  Gastrointestinal: Negative for abdominal pain, heartburn, hematemesis, hematochezia, melena and nausea  Genitourinary: Negative for hematuria  Neurological: Negative for brief paralysis, dizziness, focal weakness, headaches, light-headedness, loss of balance and vertigo  Psychiatric/Behavioral: Negative for altered mental status and substance abuse  Allergic/Immunologic: Negative for hives  Objective:      /76 (BP Location: Left arm, Patient Position: Sitting)   Pulse 100   Ht 5' 10" (1 778 m)   Wt 109 kg (241 lb)   BMI 34 58 kg/m²   Physical Exam  Constitutional:       General: He is not in acute distress  Appearance: He is well-developed  He is not diaphoretic  HENT:      Head: Normocephalic and atraumatic  Eyes:      General: No scleral icterus  Pupils: Pupils are equal, round, and reactive to light  Neck:      Thyroid: No thyromegaly  Vascular: No carotid bruit or JVD  Cardiovascular:      Rate and Rhythm: Normal rate and regular rhythm  Heart sounds: Normal heart sounds  No murmur heard  No friction rub  No gallop  Pulmonary:      Effort: Pulmonary effort is normal  No respiratory distress  Breath sounds: Normal breath sounds  No stridor  No wheezing or rales  Abdominal:      General: Bowel sounds are normal  There is no distension  Palpations: Abdomen is soft  There is no mass  Tenderness: There is no abdominal tenderness  Musculoskeletal:         General: No deformity  Cervical back: Normal range of motion and neck supple  Right lower leg: No edema  Left lower leg: No edema  Skin:     General: Skin is warm and dry  Coloration: Skin is not pale  Findings: No erythema  Neurological:      Mental Status: He is alert and oriented to person, place, and time        Coordination: Coordination normal    Psychiatric:         Mood and Affect: Mood normal          Behavior: Behavior normal          Lab Review:   Appointment on 12/08/2022   Component Date Value   • WBC 12/08/2022 5 66    • RBC 12/08/2022 4 97    • Hemoglobin 12/08/2022 15 3    • Hematocrit 12/08/2022 47 9    • MCV 12/08/2022 96    • MCH 12/08/2022 30 8    • MCHC 12/08/2022 31 9    • RDW 12/08/2022 13 5    • MPV 12/08/2022 10 4    • Platelets 36/34/1008 171    • nRBC 12/08/2022 0    • Neutrophils Relative 12/08/2022 61    • Immat GRANS % 12/08/2022 0    • Lymphocytes Relative 12/08/2022 27    • Monocytes Relative 12/08/2022 9    • Eosinophils Relative 12/08/2022 2    • Basophils Relative 12/08/2022 1    • Neutrophils Absolute 12/08/2022 3 49    • Immature Grans Absolute 12/08/2022 0 01    • Lymphocytes Absolute 12/08/2022 1 55    • Monocytes Absolute 12/08/2022 0 49    • Eosinophils Absolute 12/08/2022 0 09    • Basophils Absolute 12/08/2022 0 03    • Sodium 12/08/2022 141    • Potassium 12/08/2022 3 3 (L)    • Chloride 12/08/2022 105    • CO2 12/08/2022 30    • ANION GAP 12/08/2022 6    • BUN 12/08/2022 17    • Creatinine 12/08/2022 1 05    • Glucose, Fasting 12/08/2022 134 (H)    • Calcium 12/08/2022 9 4    • AST 12/08/2022 16    • ALT 12/08/2022 36    • Alkaline Phosphatase 12/08/2022 67    • Total Protein 12/08/2022 7 3    • Albumin 12/08/2022 4 0    • Total Bilirubin 12/08/2022 0 70    • eGFR 12/08/2022 73    • Hemoglobin A1C 12/08/2022 6 7 (H)    • EAG 12/08/2022 146    • Cholesterol 12/08/2022 141    • Triglycerides 12/08/2022 115    • HDL, Direct 12/08/2022 49    • LDL Calculated 12/08/2022 69    • Non-HDL-Chol (CHOL-HDL) 12/08/2022 92    • TSH 3RD GENERATON 12/08/2022 0 925    Appointment on 07/28/2022   Component Date Value   • WBC 07/28/2022 4 51    • RBC 07/28/2022 4 74    • Hemoglobin 07/28/2022 15 0    • Hematocrit 07/28/2022 45 5    • MCV 07/28/2022 96    • MCH 07/28/2022 31 6    • MCHC 07/28/2022 33 0    • RDW 07/28/2022 13 8    • MPV 07/28/2022 11 4    • Platelets 52/67/4926 165    • nRBC 07/28/2022 0    • Neutrophils Relative 07/28/2022 56    • Immat GRANS % 07/28/2022 0    • Lymphocytes Relative 07/28/2022 33    • Monocytes Relative 07/28/2022 8    • Eosinophils Relative 07/28/2022 2    • Basophils Relative 07/28/2022 1    • Neutrophils Absolute 07/28/2022 2 52    • Immature Grans Absolute 07/28/2022 0 01    • Lymphocytes Absolute 07/28/2022 1 48    • Monocytes Absolute 07/28/2022 0 37    • Eosinophils Absolute 07/28/2022 0 10    • Basophils Absolute 07/28/2022 0 03    • Sodium 07/28/2022 142    • Potassium 07/28/2022 3 6    • Chloride 07/28/2022 108    • CO2 07/28/2022 31    • ANION GAP 07/28/2022 3 (L)    • BUN 07/28/2022 13    • Creatinine 07/28/2022 1 14    • Glucose, Fasting 07/28/2022 168 (H)    • Calcium 07/28/2022 9 2    • AST 07/28/2022 17    • ALT 07/28/2022 34    • Alkaline Phosphatase 07/28/2022 64    • Total Protein 07/28/2022 7 1    • Albumin 07/28/2022 3 8    • Total Bilirubin 07/28/2022 0 72    • eGFR 07/28/2022 67    • Hemoglobin A1C 07/28/2022 8 6 (H)    • EAG 07/28/2022 200    • Cholesterol 07/28/2022 142    • Triglycerides 07/28/2022 108    • HDL, Direct 07/28/2022 35 (L)    • LDL Calculated 07/28/2022 85    • Non-HDL-Chol (CHOL-HDL) 07/28/2022 107    • TSH 3RD GENERATON 07/28/2022 0 907    Orders Only on 07/06/2022   Component Date Value   • Right Eye Diabetic Retin* 07/06/2022 None    • Left Eye Diabetic Retino* 07/06/2022 None      No results found  Assessment:       1  Aneurysm of ascending aorta without rupture  Echo complete w/ contrast if indicated      2  Primary hypertension  Echo complete w/ contrast if indicated      3  Mixed hyperlipidemia             Plan:       Nida Galvez has no signs or symptoms reminiscent of angina heart failure electrical instability nor enlarging aortic aneurysm  Blood pressure stable, lipids well controlled, I recommended no changes today  Advised that he continue atenolol 50 mg twice a day, amlodipine benazepril 5-20 twice daily, and olmisartan hydrochlorothiazide 40-12 0 5 daily  Lipids controlled continue atorvastatin 40 mg daily  Return office 6 months, echocardiogram however planned in February for hypertension and thoracic aortic aneurysm follow-up

## 2022-12-15 NOTE — PATIENT INSTRUCTIONS
Cholesterol and Your Health   AMBULATORY CARE:   Cholesterol  is a waxy, fat-like substance  Your body uses cholesterol to make hormones and new cells, and to protect nerves  Cholesterol is made by your body  It also comes from certain foods you eat, such as meat and dairy products  Your healthcare provider can help you set goals for your cholesterol levels  He or she can help you create a plan to meet your goals  Cholesterol level goals: Your cholesterol level goals depend on your risk for heart disease, your age, and your other health conditions  The following are general guidelines: Total cholesterol  includes low-density lipoprotein (LDL), high-density lipoprotein (HDL), and triglyceride levels  The total cholesterol level should be lower than 200 mg/dL and is best at about 150 mg/dL  LDL cholesterol  is called bad cholesterol  because it forms plaque in your arteries  As plaque builds up, your arteries become narrow, and less blood flows through  When plaque decreases blood flow to your heart, you may have chest pain  If plaque completely blocks an artery that brings blood to your heart, you may have a heart attack  Plaque can break off and form blood clots  Blood clots may block arteries in your brain and cause a stroke  The level should be less than 130 mg/dL and is best at about 100 mg/dL  HDL cholesterol  is called good cholesterol  because it helps remove LDL cholesterol from your arteries  It does this by attaching to LDL cholesterol and carrying it to your liver  Your liver breaks down LDL cholesterol so your body can get rid of it  High levels of HDL cholesterol can help prevent a heart attack and stroke  Low levels of HDL cholesterol can increase your risk for heart disease, heart attack, and stroke  The level should be 60 mg/dL or higher  Triglycerides  are a type of fat that store energy from foods you eat  High levels of triglycerides also cause plaque buildup   This can increase your risk for a heart attack or stroke  If your triglyceride level is high, your LDL cholesterol level may also be high  The level should be less than 150 mg/dL  Any of the following can increase your risk for high cholesterol:   Smoking cigarettes    Being overweight or obese, or not getting enough exercise    Drinking large amounts of alcohol    A medical condition such as hypertension (high blood pressure) or diabetes    Certain genes passed from your parents to you    Age older than 65 years    What you need to know about having your cholesterol levels checked: Adults 21to 39years of age should have their cholesterol levels checked every 4 to 6 years  Adults 45 years or older should have their cholesterol checked every 1 to 2 years  You may need your cholesterol checked more often, or at a younger age, if you have risk factors for heart disease  You may also need to have your cholesterol checked more often if you have other health conditions, such as diabetes  Blood tests are used to check cholesterol levels  Blood tests measure your levels of triglycerides, LDL cholesterol, and HDL cholesterol  How healthy fats affect your cholesterol levels:  Healthy fats, also called unsaturated fats, help lower LDL cholesterol and triglyceride levels  Healthy fats include the following:  Monounsaturated fats  are found in foods such as olive oil, canola oil, avocado, nuts, and olives  Polyunsaturated fats,  such as omega 3 fats, are found in fish, such as salmon, trout, and tuna  They can also be found in plant foods such as flaxseed, walnuts, and soybeans  How unhealthy fats affect your cholesterol levels:  Unhealthy fats increase LDL cholesterol and triglyceride levels  They are found in foods high in cholesterol, saturated fat, and trans fat:  Cholesterol  is found in eggs, dairy, and meat  Saturated fat  is found in butter, cheese, ice cream, whole milk, and coconut oil   Saturated fat is also found in meat, such as sausage, hot dogs, and bologna  Trans fat  is found in liquid oils and is used in fried and baked foods  Foods that contain trans fats include chips, crackers, muffins, sweet rolls, microwave popcorn, and cookies  Treatment  for high cholesterol will also decrease your risk of heart disease, heart attack, and stroke  Treatment may include any of the following:  Lifestyle changes  may include food, exercise, weight loss, and quitting smoking  You may also need to decrease the amount of alcohol you drink  Your healthcare provider will want you to start with lifestyle changes  Other treatment may be added if lifestyle changes are not enough  Your healthcare provider may recommend you work with a team to manage hyperlipidemia  The team may include medical experts such as a dietitian, an exercise or physical therapist, and a behavior therapist  Your family members may be included in helping you create lifestyle changes  Medicines  may be given to lower your LDL cholesterol, triglyceride levels, or total cholesterol level  You may need medicines to lower your cholesterol if any of the following is true:    You have a history of stroke, TIA, unstable angina, or a heart attack  Your LDL cholesterol level is 190 mg/dL or higher  You are age 36 to 76 years, have diabetes or heart disease risk factors, and your LDL cholesterol is 70 mg/dL or higher  Supplements  include fish oil, red yeast rice, and garlic  Fish oil may help lower your triglyceride and LDL cholesterol levels  It may also increase your HDL cholesterol level  Red yeast rice may help decrease your total cholesterol level and LDL cholesterol level  Garlic may help lower your total cholesterol level  Do not take any supplements without talking to your healthcare provider  Food changes you can make to lower your cholesterol levels:  A dietitian can help you create a healthy eating plan   He or she can show you how to read food labels and choose foods low in saturated fat, trans fats, and cholesterol  Decrease the total amount of fat you eat  Choose lean meats, fat-free or 1% fat milk, and low-fat dairy products, such as yogurt and cheese  Try to limit or avoid red meats  Limit or do not eat fried foods or baked goods, such as cookies  Replace unhealthy fats with healthy fats  Cook foods in olive oil or canola oil  Choose soft margarines that are low in saturated fat and trans fat  Seeds, nuts, and avocados are other examples of healthy fats  Eat foods with omega-3 fats  Examples include salmon, tuna, mackerel, walnuts, and flaxseed  Eat fish 2 times per week  Pregnant women should not eat fish that have high levels of mercury, such as shark, swordfish, and virgil mackerel  Increase the amount of high-fiber foods you eat  High-fiber foods can help lower your LDL cholesterol  Aim to get between 20 and 30 grams of fiber each day  Fruits and vegetables are high in fiber  Eat at least 5 servings each day  Other high-fiber foods are whole-grain or whole-wheat breads, pastas, or cereals, and brown rice  Eat 3 ounces of whole-grain foods each day  Increase fiber slowly  You may have abdominal discomfort, bloating, and gas if you add fiber to your diet too quickly  Eat healthy protein foods  Examples include low-fat dairy products, skinless chicken and turkey, fish, and nuts  Limit foods and drinks that are high in sugar  Your dietitian or healthcare provider can help you create daily limits for high-sugar foods and drinks  The limit may be lower if you have diabetes or another health condition  Limits can also help you lose weight if needed  Lifestyle changes you can make to lower your cholesterol levels:   Maintain a healthy weight  Ask your healthcare provider what a healthy weight is for you  Ask him or her to help you create a weight loss plan if needed   Weight loss can decrease your total cholesterol and triglyceride levels  Weight loss may also help keep your blood pressure at a healthy level  Be physically active throughout the day  Physical activity, such as exercise, can help lower your total cholesterol level and maintain a healthy weight  Physical activity can also help increase your HDL cholesterol level  Work with your healthcare provider to create an program that is right for you  Get at least 30 to 40 minutes of moderate physical activity most days of the week  Examples of exercise include brisk walking, swimming, or biking  Also include strength training at least 2 times each week  Your healthcare providers can help you create a physical activity plan  Do not smoke  Nicotine and other chemicals in cigarettes and cigars can raise your cholesterol levels  Ask your healthcare provider for information if you currently smoke and need help to quit  E-cigarettes or smokeless tobacco still contain nicotine  Talk to your healthcare provider before you use these products  Limit or do not drink alcohol  Alcohol can increase your triglyceride levels  Ask your healthcare provider before you drink alcohol  Ask how much is okay for you to drink in 24 hours or 1 week  Follow up with your doctor as directed:  Write down your questions so you remember to ask them during your visits  © Copyright [employer] 2022 Information is for End User's use only and may not be sold, redistributed or otherwise used for commercial purposes  All illustrations and images included in CareNotes® are the copyrighted property of [employer]  or [facility] Hemalatha Dumont  The above information is an  only  It is not intended as medical advice for individual conditions or treatments  Talk to your doctor, nurse or pharmacist before following any medical regimen to see if it is safe and effective for you

## 2022-12-20 ENCOUNTER — OFFICE VISIT (OUTPATIENT)
Dept: URGENT CARE | Facility: MEDICAL CENTER | Age: 67
End: 2022-12-20

## 2022-12-20 ENCOUNTER — TELEPHONE (OUTPATIENT)
Dept: INTERNAL MEDICINE CLINIC | Facility: CLINIC | Age: 67
End: 2022-12-20

## 2022-12-20 VITALS
BODY MASS INDEX: 34.58 KG/M2 | OXYGEN SATURATION: 94 % | HEART RATE: 110 BPM | RESPIRATION RATE: 18 BRPM | WEIGHT: 241 LBS | TEMPERATURE: 101.2 F

## 2022-12-20 DIAGNOSIS — U07.1 COVID-19: Primary | ICD-10-CM

## 2022-12-20 DIAGNOSIS — R50.9 FEVER, UNSPECIFIED FEVER CAUSE: ICD-10-CM

## 2022-12-20 LAB
SARS-COV-2 AG UPPER RESP QL IA: POSITIVE
VALID CONTROL: ABNORMAL

## 2022-12-20 RX ORDER — ACETAMINOPHEN 325 MG/1
975 TABLET ORAL ONCE
Status: COMPLETED | OUTPATIENT
Start: 2022-12-20 | End: 2022-12-20

## 2022-12-20 RX ORDER — ALBUTEROL SULFATE 90 UG/1
2 AEROSOL, METERED RESPIRATORY (INHALATION) EVERY 6 HOURS PRN
Qty: 8.5 G | Refills: 0 | Status: SHIPPED | OUTPATIENT
Start: 2022-12-20

## 2022-12-20 RX ORDER — BENZONATATE 200 MG/1
200 CAPSULE ORAL 3 TIMES DAILY PRN
Qty: 20 CAPSULE | Refills: 0 | Status: SHIPPED | OUTPATIENT
Start: 2022-12-20

## 2022-12-20 RX ADMIN — ACETAMINOPHEN 975 MG: 325 TABLET ORAL at 18:50

## 2022-12-20 NOTE — TELEPHONE ENCOUNTER
Patients wife called - Patient came home with fever of 101 4, chills, body aches, nauseous  Patient wasn't sure If he wanted to leave the house today as he's afraid of throwing up  Did let them know I can schedule an appt for tomorrow as we are getting ready to close, or they can go the  and get seen right away  She'll talk to [place] and see what he wants to do   Advised if they want seen tomorrow they should call ASAP as sick appts have been filling up fast

## 2022-12-20 NOTE — LETTER
December 20, 2022     Patient: Danica Crandall   YOB: 1955   Date of Visit: 12/20/2022       To Whom It May Concern: It is my medical opinion that Melissa Armendariz should not return to work until receipt of a negative Covid/Influenza test result  If you have any questions or concerns, please don't hesitate to call           Sincerely,        ROMEL Garcia    CC: No Recipients

## 2022-12-20 NOTE — PROGRESS NOTES
[facility]        NAME: Genia Benson is a 78 y o  male  : 1955    MRN: [id number]  DATE: 2022  TIME: 7:06 PM    Assessment and Plan   COVID-19 [U07 1]  1  COVID-19  Cov/Flu-Collected at [facility] or [facility]    benzonatate (TESSALON) 200 MG capsule    albuterol (ProAir HFA) 90 mcg/act inhaler    Poct Covid 19 Rapid Antigen Test      2  Fever, unspecified fever cause  acetaminophen (TYLENOL) tablet 975 mg        Patient instructed to report to the ED if any worsening or concerning symptoms  PCP follow up tomorrow to discuss further treatment options  Pt O2 sats increased to 97% w/ deep breathing  Pt stable upon d/c  Patient Instructions       Follow up with PCP in 3-5 days  Proceed to  ER if symptoms worsen  Chief Complaint     Chief Complaint   Patient presents with   • Flu Symptoms     X 1 day         History of Present Illness       Patient is a 79 y/o/m presenting [facility] with cough, fever, nausea, chills and body aches  Symptoms x 1 day  Patient has low grade temp at [facility] stime  Pt is in NAD  Pt denies any CP or SOB  Temp of 101 at home  Has not taken anything for symptoms  Flu Symptoms  The current episode started today  The problem occurs constantly  The problem has been gradually worsening since onset  Associated symptoms include congestion, fatigue, a fever, coughing, nausea and muscle aches  Pertinent negatives include no ear pain, eye pain, sore throat, chest pain, shortness of breath, abdominal pain, vomiting or rash  Review of Systems   Review of Systems   Constitutional: Positive for fatigue and fever  Negative for chills  HENT: Positive for congestion  Negative for ear pain and sore throat  Eyes: Negative for pain and visual disturbance  Respiratory: Positive for cough  Negative for shortness of breath  Cardiovascular: Negative for chest pain and palpitations  Gastrointestinal: Positive for nausea   Negative for abdominal pain and vomiting  Genitourinary: Negative for dysuria and hematuria  Musculoskeletal: Positive for myalgias  Negative for arthralgias and back pain  Skin: Negative for color change and rash  Neurological: Negative for seizures and syncope  All other systems reviewed and are negative  Current Medications       Current Outpatient Medications:   •  albuterol (ProAir HFA) 90 mcg/act inhaler, Inhale 2 puffs every 6 (six) hours as needed for wheezing, Disp: 8 5 g, Rfl: 0  •  allopurinol (ZYLOPRIM) 100 mg tablet, TAKE 1 TABLET DAILY, Disp: 90 tablet, Rfl: 3  •  amLODIPine-benazepril (LOTREL 5-20) 5-20 MG per capsule, TAKE 1 CAPSULE TWICE A DAY, Disp: 180 capsule, Rfl: 3  •  atenolol (TENORMIN) 50 mg tablet, TAKE 1 TABLET TWICE A DAY, Disp: 180 tablet, Rfl: 3  •  atorvastatin (LIPITOR) 40 mg tablet, Take 1 tablet (40 mg total) by mouth daily, Disp: 90 tablet, Rfl: 3  •  benzonatate (TESSALON) 200 MG capsule, Take 1 capsule (200 mg total) by mouth 3 (three) times a day as needed for cough, Disp: 20 capsule, Rfl: 0  •  furosemide (LASIX) 40 mg tablet, TAKE ONE-HALF (1/2) TABLET DAILY, Disp: 45 tablet, Rfl: 3  •  Jardiance 25 MG TABS, TAKE 1 TABLET EVERY MORNING, Disp: 90 tablet, Rfl: 3  •  magnesium oxide (MAG-OX) 400 mg, Take 400 mg by mouth 2 (two) times a day, Disp: , Rfl:   •  olmesartan-hydrochlorothiazide (BENICAR HCT) 40-12 5 MG per tablet, TAKE 1 TABLET DAILY, Disp: 90 tablet, Rfl: 3  •  potassium chloride (K-DUR,KLOR-CON) 10 mEq tablet, TAKE 3 TABLETS TWICE A DAY  TAKE 3 TABLETS IN THE MORNING AND TAKE 3 TABLETS IN THE EVENING, Disp: 540 tablet, Rfl: 3  •  semaglutide, 1 mg/dose, (Ozempic, 1 MG/DOSE,) 4 MG/3ML SOPN injection pen, Inject 0 75 mL (1 mg total) under the skin once a week, Disp: 9 mL, Rfl: 1  No current facility-administered medications for this visit      Current Allergies     Allergies as of 12/20/2022   • (No Known Allergies)            The following portions of the patient's history were reviewed and updated as appropriate: allergies, current medications, past family history, past medical history, past social history, past surgical history and problem list      Past Medical History:   Diagnosis Date   • Allergic rhinitis     last assessed: 5/4/2015   • Aneurysm of thoracic aorta    • Cataract     resolved: 12/12/2014   • Colon polyp    • Diabetes mellitus ([address] )     diet control   • Herniated nucleus pulposus, L4-5 left     last assessed: 4/9/2014   • Herniated nucleus pulposus, L5-S1, left     last assessed: 4/9/2014   • Hx of echocardiogram 10/14/2016    EF 55%, Mild LVH, mild aortic root and ascending aorta enlargement, measuring 42 mm, trace MR, trace aortic regurg  • Hypercholesterolemia    • Hypertension    • Lumbar radiculopathy        Past Surgical History:   Procedure Laterality Date   • BACK SURGERY     • BACK SURGERY     • KNEE ARTHROSCOPY      (therapeutic)   • KNEE SURGERY     • WA COLONOSCOPY FLX DX W/COLLJ SPEC WHEN PFRMD N/A 9/30/2016    Procedure: COLONOSCOPY;  Surgeon: Néstor Morfin MD;  Location: [facility];  Service: Gastroenterology   • TONSILLECTOMY AND ADENOIDECTOMY         Family History   Problem Relation Age of Onset   • Thyroid disease Mother         disorder   • Hypertension Father    • Coronary artery disease Brother    • Heart attack Brother         prior myocardial infarction         Medications have been verified  Objective   Pulse (!) 110   Temp (!) 101 2 °F (38 4 °C)   Resp 18   Wt 109 kg (241 lb)   SpO2 94%   BMI 34 58 kg/m²   No LMP for male patient  Physical Exam     Physical Exam  Constitutional:       Appearance: Normal appearance  He is normal weight  HENT:      Head: Normocephalic and atraumatic  Nose: Congestion present  Mouth/Throat:      Mouth: Mucous membranes are moist    Eyes:      Extraocular Movements: Extraocular movements intact        Conjunctiva/sclera: Conjunctivae normal       Pupils: Pupils are equal, round, and reactive to light  Cardiovascular:      Rate and Rhythm: Regular rhythm  Tachycardia present  Pulmonary:      Effort: Pulmonary effort is normal       Breath sounds: No wheezing, rhonchi or rales  Musculoskeletal:         General: Normal range of motion  Cervical back: Normal range of motion and neck supple  Skin:     General: Skin is warm and dry  Neurological:      General: No focal deficit present  Mental Status: He is alert and oriented to person, place, and time     Psychiatric:         Mood and Affect: Mood normal          Behavior: Behavior normal

## 2022-12-21 ENCOUNTER — TELEMEDICINE (OUTPATIENT)
Dept: INTERNAL MEDICINE CLINIC | Facility: CLINIC | Age: 67
End: 2022-12-21

## 2022-12-21 VITALS — TEMPERATURE: 100.4 F | HEART RATE: 104 BPM | OXYGEN SATURATION: 95 %

## 2022-12-21 DIAGNOSIS — U07.1 COVID-19: Primary | ICD-10-CM

## 2022-12-21 LAB
FLUAV RNA RESP QL NAA+PROBE: NEGATIVE
FLUBV RNA RESP QL NAA+PROBE: NEGATIVE
SARS-COV-2 RNA RESP QL NAA+PROBE: POSITIVE

## 2022-12-21 RX ORDER — NIRMATRELVIR AND RITONAVIR 300-100 MG
3 KIT ORAL 2 TIMES DAILY
Qty: 30 TABLET | Refills: 0 | Status: SHIPPED | OUTPATIENT
Start: 2022-12-21 | End: 2022-12-26

## 2022-12-21 RX ORDER — NIRMATRELVIR AND RITONAVIR 300-100 MG
3 KIT ORAL 2 TIMES DAILY
Qty: 30 TABLET | Refills: 0 | Status: SHIPPED | OUTPATIENT
Start: 2022-12-21 | End: 2022-12-21 | Stop reason: SDUPTHER

## 2022-12-21 NOTE — PROGRESS NOTES
COVID-19 Outpatient Progress Note    Assessment/Plan:    Problem List Items Addressed This Visit    None  Visit Diagnoses     COVID-19    -  Primary    Relevant Medications    nirmatrelvir & ritonavir (Paxlovid, 300/100,) tablet therapy pack         Disposition:     Patient has asymptomatic or mild COVID-19 infection  Based off CDC guidelines, they were recommended to isolate for 5 days  If they are asymptomatic or symptoms are improving with no fevers in the past 24 hours, isolation may be ended followed by 5 days of wearing a mask when around othes to minimize risk of infecting others  If still have a fever or other symptoms have not improved, continue to isolate until they improve  Regardless of when they end isolation, avoid being around people who are more likely to get very sick from COVID-19 until at least day 11  If COVID symptoms worsen after ending isolation, restart isolation at day 0  Discussed symptom directed medication options with patient  Fluids  Rest   Tylenol or Motrin if needed  Stay adequately hydrated  Deep breathing exercises recommended  Frequent position changes recommended  Prone positioning recommended  Coronavirus testing completed  Rapid coronavirus testing completed and was positive  Warning signs and symptoms discussed  Continue with the inhaler and the Tessalon as per urgent care  Flu testing pending  Antiviral treatment discussed  He is agreeable to this  Risks and benefits discussed  Potential for rebound symptoms discussed  Hold the Lipitor for the course of treatment and for 5 days thereafter  Patient meets criteria for PAXLOVID and they have been counseled appropriately according to EUA documentation released by the FDA  After discussion, patient agrees to treatment      Avinash Freedmanius is an investigational medicine used to treat mild-to-moderate COVID-19 in adults and children (15years of age and older weighing at least 80 pounds (40 kg)) with positive results of direct SARS-CoV-2 viral testing, and who are at high risk for progression to severe COVID-19, including hospitalization or death  PAXLOVID is investigational because it is still being studied  There is limited information about the safety and effectiveness of using PAXLOVID to treat people with mild-to-moderate COVID-19  The FDA has authorized the emergency use of PAXLOVID for the treatment of mild-tomoderate COVID-19 in adults and children (15years of age and older weighing at least 80 pounds (40 kg)) with a positive test for the virus that causes COVID-19, and who are at high risk for progression to severe COVID-19, including hospitalization or death, under an EUA  What should I tell my healthcare provider before I take PAXLOVID? Tell your healthcare provider if you:  - Have any allergies  - Have liver or kidney disease  - Are pregnant or plan to become pregnant  - Are breastfeeding a child  - Have any serious illnesses    Tell your healthcare provider about all the medicines you take, including prescription and over-the-counter medicines, vitamins, and herbal supplements  Some medicines may interact with PAXLOVID and may cause serious side effects  Keep a list of your medicines to show your healthcare provider and pharmacist when you get a new medicine  You can ask your healthcare provider or pharmacist for a list of medicines that interact with PAXLOVID  Do not start taking a new medicine without telling your healthcare provider  Your healthcare provider can tell you if it is safe to take PAXLOVID with other medicines  Tell your healthcare provider if you are taking combined hormonal contraceptive  PAXLOVID may affect how your birth control pills work  Females who are able to become pregnant should use another effective alternative form of contraception or an additional barrier method of contraception   Talk to your healthcare provider if you have any questions about contraceptive methods that might be right for you  How do I take PAXLOVID? PAXLOVID consists of 2 medicines: nirmatrelvir and ritonavir  - Take 2 pink tablets of nirmatrelvir with 1 white tablet of ritonavir by mouth 2 times each day (in the morning and in the evening) for 5 days  For each dose, take all 3 tablets at the same time  - If you have kidney disease, talk to your healthcare provider  You may need a different dose  - Swallow the tablets whole  Do not chew, break, or crush the tablets  - Take PAXLOVID with or without food  - Do not stop taking PAXLOVID without talking to your healthcare provider, even if you feel better  - If you miss a dose of PAXLOVID within 8 hours of the time it is usually taken, take it as soon as you remember  If you miss a dose by more than 8 hours, skip the missed dose and take the next dose at your regular time  Do not take 2 doses of PAXLOVID at the same time  - If you take too much PAXLOVID, call your healthcare provider or go to the nearest hospital emergency room right away  - If you are taking a ritonavir- or cobicistat-containing medicine to treat hepatitis C or Human Immunodeficiency Virus (HIV), you should continue to take your medicine as prescribed by your healthcare provider   - Talk to your healthcare provider if you do not feel better or if you feel worse after 5 days  Who should generally not take PAXLOVID? Do not take PAXLOVID if:  You are allergic to nirmatrelvir, ritonavir, or any of the ingredients in PAXLOVID      You are taking any of the following medicines:  - Alfuzosin  - Pethidine, piroxicam, propoxyphene  - Ranolazine  - Amiodarone, dronedarone, flecainide, propafenone, quinidine  - Colchicine  - Lurasidone, pimozide, clozapine  - Dihydroergotamine, ergotamine, methylergonovine  - Lovastatin, simvastatin  - Sildenafil (Revatio®) for pulmonary arterial hypertension (PAH)  - Triazolam, oral midazolam  - Apalutamide  - Carbamazepine, phenobarbital, phenytoin  - Rifampin  - St  Dilip’s Wort (hypericum perforatum)    What are the important possible side effects of PAXLOVID? Possible side effects of PAXLOVID are:  - Liver Problems  Tell your healthcare provider right away if you have any of these signs and symptoms of liver problems: loss of appetite, yellowing of your skin and the whites of eyes (jaundice), dark-colored urine, pale colored stools and itchy skin, stomach area (abdominal) pain  - Resistance to HIV Medicines  If you have untreated HIV infection, PAXLOVID may lead to some HIV medicines not working as well in the future  - Other possible side effects include: altered sense of taste, diarrhea, high blood pressure, or muscle aches    These are not all the possible side effects of PAXLOVID  Not many people have taken PAXLOVID  Serious and unexpected side effects may happen  Johnlincoln Marmolejo is still being studied, so it is possible that all of the risks are not known at this time  What other treatment choices are there? Like Daniel Benites may allow for the emergency use of other medicines to treat people with COVID-19  Go to https://[employer]/ for information on the emergency use of other medicines that are authorized by FDA to treat people with COVID-19  Your healthcare provider may talk with you about clinical trials for which you may be eligible  It is your choice to be treated or not to be treated with PAXLOVID  Should you decide not to receive it or for your child not to receive it, it will not change your standard medical care  What if I am pregnant or breastfeeding? There is no experience treating pregnant women or breastfeeding mothers with PAXLOVID  For a mother and unborn baby, the benefit of taking PAXLOVID may be greater than the risk from the treatment   If you are pregnant, discuss your options and specific situation with your healthcare provider  It is recommended that you use effective barrier contraception or do not have sexual activity while taking PAXLOVID  If you are breastfeeding, discuss your options and specific situation with your healthcare provider  How do I report side effects with PAXLOVID? Contact your healthcare provider if you have any side effects that bother you or do not go away  Report side effects to FDA MedWatch at www fda gov/medwatch or call 8-371-SMX8245 or you can report side effects to [employer]  at the contact information provided below  Website Fax number Telephone number   [employer] [phone]     How should I store [address]? Store PAXLOVID tablets at room temperature between 68°F to 77°F (20°C to 25°C)  Full fact sheet for patients, parents, and caregivers can be found at: [employer]    I have spent 15 minutes directly with the patient  Greater than 50% of this time was spent in counseling/coordination of care regarding: diagnostic results, prognosis, risks and benefits of treatment options, instructions for management, patient and family education, importance of treatment compliance, risk factor reductions and impressions  Encounter provider: Edgar Tijerina MD     Provider located at: [address] [address] [address] [facility] [id number]     Recent Visits  Date Type Provider Dept   12/21/22 Telephone Susan Chirinos [facility] [place]   12/20/22 Telephone Chris Banuelos MA  [facility] [place]   Showing recent visits within past 7 days and meeting all other requirements  Future Appointments  No visits were found meeting these conditions  Showing future appointments within next 150 days and meeting all other requirements     Subjective:   Genia Benson is a 78 y o  male who has been screened for COVID-19   Symptom change since last report: unchanged  Patient's symptoms include fever, chills, fatigue, malaise, nasal congestion, rhinorrhea, sore throat, cough, myalgias and headache  Patient denies shortness of breath, chest tightness, abdominal pain, nausea, vomiting and diarrhea  - Date of symptom onset: 12/19/2022  - Date of positive COVID-19 test: 12/20/2022  Type of test: Rapid antigen  COVID-19 vaccination status: Fully vaccinated with booster    Maritza Humphrey has been staying home and has isolated themselves in his home  He is taking care to not share personal items and is cleaning all surfaces that are touched often, like counters, tabletops, and doorknobs using household cleaning sprays or wipes  He is wearing a mask when he leaves his room  Was evaluated to the urgent care yesterday and tested positive by rapid testing for COVID  Flu test pending  They started him on Tessalon and inhaler  They were concerned because his pulse ox was borderline  Pulse ox at home since that point has been relatively stable  Continues with fevers and some chills  Persistent cough and some body aches  Is interested in antiviral treatment  Lab Results   Component Value Date    SARSCOV2 Positive (A) 12/20/2022    SARSCOV2 Not Detected 05/22/2020    SARSCOVAG Positive (A) 12/20/2022       Review of Systems   Constitutional: Positive for chills, fatigue and fever  HENT: Positive for congestion, rhinorrhea and sore throat  Respiratory: Positive for cough  Negative for chest tightness and shortness of breath  Gastrointestinal: Negative for abdominal pain, diarrhea, nausea and vomiting  Musculoskeletal: Positive for myalgias  Neurological: Positive for headaches       Current Outpatient Medications on File Prior to Visit   Medication Sig   • albuterol (ProAir HFA) 90 mcg/act inhaler Inhale 2 puffs every 6 (six) hours as needed for wheezing   • allopurinol (ZYLOPRIM) 100 mg tablet TAKE 1 TABLET DAILY   • amLODIPine-benazepril (LOTREL 5-20) 5-20 MG per capsule TAKE 1 CAPSULE TWICE A DAY   • atenolol (TENORMIN) 50 mg tablet TAKE 1 TABLET TWICE A DAY   • atorvastatin (LIPITOR) 40 mg tablet Take 1 tablet (40 mg total) by mouth daily   • benzonatate (TESSALON) 200 MG capsule Take 1 capsule (200 mg total) by mouth 3 (three) times a day as needed for cough   • furosemide (LASIX) 40 mg tablet TAKE ONE-HALF (1/2) TABLET DAILY   • Jardiance 25 MG TABS TAKE 1 TABLET EVERY MORNING   • magnesium oxide (MAG-OX) 400 mg Take 400 mg by mouth 2 (two) times a day   • olmesartan-hydrochlorothiazide (BENICAR HCT) 40-12 5 MG per tablet TAKE 1 TABLET DAILY   • potassium chloride (K-DUR,KLOR-CON) 10 mEq tablet TAKE 3 TABLETS TWICE A DAY  TAKE 3 TABLETS IN THE MORNING AND TAKE 3 TABLETS IN THE EVENING   • semaglutide, 1 mg/dose, (Ozempic, 1 MG/DOSE,) 4 MG/3ML SOPN injection pen Inject 0 75 mL (1 mg total) under the skin once a week       Objective:    Pulse 104   Temp 100 4 °F (38 °C)   SpO2 95%      Physical Exam  Vitals and nursing note reviewed  Constitutional:       Appearance: He is well-developed, well-groomed and overweight  HENT:      Head:      Comments: Moist mucous membranes; slight posterior pharyngeal erythema; boggy nasal mucosa with clear nasal discharge  Cardiovascular:      Rate and Rhythm: Regular rhythm  Tachycardia present  Pulmonary:      Breath sounds: Transmitted upper airway sounds present  No decreased breath sounds, wheezing or rhonchi  Musculoskeletal:      Cervical back: Neck supple  Lymphadenopathy:      Cervical: No cervical adenopathy  Neurological:      Mental Status: He is alert  Psychiatric:         Behavior: Behavior is cooperative         Raissa Lal MD

## 2022-12-22 ENCOUNTER — TELEPHONE (OUTPATIENT)
Dept: INTERNAL MEDICINE CLINIC | Facility: CLINIC | Age: 67
End: 2022-12-22

## 2022-12-29 ENCOUNTER — VBI (OUTPATIENT)
Dept: ADMINISTRATIVE | Facility: OTHER | Age: 67
End: 2022-12-29

## 2023-01-04 ENCOUNTER — VBI (OUTPATIENT)
Dept: ADMINISTRATIVE | Facility: OTHER | Age: 68
End: 2023-01-04

## 2023-02-13 DIAGNOSIS — R60.9 PERIPHERAL EDEMA: ICD-10-CM

## 2023-02-13 DIAGNOSIS — I10 ESSENTIAL HYPERTENSION: ICD-10-CM

## 2023-02-13 RX ORDER — OLMESARTAN MEDOXOMIL AND HYDROCHLOROTHIAZIDE 40/12.5 40; 12.5 MG/1; MG/1
TABLET ORAL
Qty: 90 TABLET | Refills: 3 | Status: SHIPPED | OUTPATIENT
Start: 2023-02-13

## 2023-02-14 RX ORDER — FUROSEMIDE 40 MG/1
TABLET ORAL
Qty: 45 TABLET | Refills: 3 | Status: SHIPPED | OUTPATIENT
Start: 2023-02-14

## 2023-02-16 ENCOUNTER — HOSPITAL ENCOUNTER (OUTPATIENT)
Dept: NON INVASIVE DIAGNOSTICS | Facility: CLINIC | Age: 68
Discharge: HOME | End: 2023-02-16

## 2023-02-16 VITALS
SYSTOLIC BLOOD PRESSURE: 130 MMHG | DIASTOLIC BLOOD PRESSURE: 70 MMHG | BODY MASS INDEX: 34.5 KG/M2 | HEART RATE: 71 BPM | HEIGHT: 70 IN | WEIGHT: 241 LBS

## 2023-02-16 DIAGNOSIS — I71.21 ANEURYSM OF ASCENDING AORTA WITHOUT RUPTURE: ICD-10-CM

## 2023-02-16 DIAGNOSIS — I10 PRIMARY HYPERTENSION: ICD-10-CM

## 2023-02-16 LAB
AORTIC ARCH: 3.4 CM
AORTIC ROOT: 4.1 CM
AORTIC VALVE MEAN VELOCITY: 7.4 M/S
APICAL FOUR CHAMBER EJECTION FRACTION: 50 %
ASCENDING AORTA: 4.4 CM
AV LVOT MEAN GRADIENT: 2 MMHG
AV LVOT PEAK GRADIENT: 4 MMHG
AV MEAN GRADIENT: 3 MMHG
AV PEAK GRADIENT: 5 MMHG
AV REGURGITATION PRESSURE HALF TIME: 925 MS
AV VELOCITY RATIO: 0.9
DOP CALC AO PEAK VEL: 1.07 M/S
DOP CALC AO VTI: 21.92 CM
DOP CALC LVOT PEAK VEL VTI: 19.34 CM
DOP CALC LVOT PEAK VEL: 0.96 M/S
DOP CALC RVOT PEAK VEL: 0.73 M/S
DOP CALC RVOT VTI: 15.72 CM
FRACTIONAL SHORTENING: 28 (ref 28–44)
INTERVENTRICULAR SEPTUM IN DIASTOLE (PARASTERNAL SHORT AXIS VIEW): 1.1 CM
INTERVENTRICULAR SEPTUM: 1.1 CM (ref 0.6–1.1)
LAAS-AP2: 13.4 CM2
LAAS-AP4: 14.2 CM2
LEFT ATRIUM SIZE: 3.6 CM
LEFT INTERNAL DIMENSION IN SYSTOLE: 3.4 CM (ref 2.1–4)
LEFT VENTRICULAR INTERNAL DIMENSION IN DIASTOLE: 4.7 CM (ref 3.5–6)
LEFT VENTRICULAR POSTERIOR WALL IN END DIASTOLE: 1.2 CM
LEFT VENTRICULAR STROKE VOLUME: 55 ML
LVSV (TEICH): 55 ML
RA PRESSURE ESTIMATED: 8 MMHG
RIGHT VENTRICLE ID DIMENSION: 2.4 CM
RV PSP: 34 MMHG
SL CV AV DECELERATION TIME RETROGRADE: 3188 MS
SL CV AV PEAK GRADIENT RETROGRADE: 55 MMHG
SL CV LEFT ATRIUM LENGTH A2C: 4.4 CM
SL CV LV EF: 60
SL CV PED ECHO LEFT VENTRICLE DIASTOLIC VOLUME (MOD BIPLANE) 2D: 102 ML
SL CV PED ECHO LEFT VENTRICLE SYSTOLIC VOLUME (MOD BIPLANE) 2D: 47 ML
SL CV RVOT MAX GRADIENT: 2 MMHG
SL CV RVOT MEAN GRADIENT: 1 MMHG
SL CV RVOT VMEAN: 0.52 M/S
TR MAX PG: 26 MMHG
TR PEAK VELOCITY: 2.6 M/S
TRICUSPID VALVE PEAK REGURGITATION VELOCITY: 2.56 M/S

## 2023-02-28 DIAGNOSIS — E11.65 UNCONTROLLED TYPE 2 DIABETES MELLITUS WITH HYPERGLYCEMIA (HCC): ICD-10-CM

## 2023-02-28 RX ORDER — SEMAGLUTIDE 1.34 MG/ML
INJECTION, SOLUTION SUBCUTANEOUS
Qty: 9 ML | Refills: 3 | Status: SHIPPED | OUTPATIENT
Start: 2023-02-28

## 2023-03-06 DIAGNOSIS — I10 ESSENTIAL HYPERTENSION: ICD-10-CM

## 2023-03-06 DIAGNOSIS — E79.0 HYPERURICEMIA: ICD-10-CM

## 2023-03-06 RX ORDER — ATENOLOL 50 MG/1
TABLET ORAL
Qty: 180 TABLET | Refills: 3 | Status: SHIPPED | OUTPATIENT
Start: 2023-03-06

## 2023-03-06 RX ORDER — ALLOPURINOL 100 MG/1
TABLET ORAL
Qty: 90 TABLET | Refills: 3 | Status: SHIPPED | OUTPATIENT
Start: 2023-03-06

## 2023-03-31 ENCOUNTER — APPOINTMENT (OUTPATIENT)
Dept: LAB | Facility: HOSPITAL | Age: 68
End: 2023-03-31

## 2023-03-31 ENCOUNTER — TELEPHONE (OUTPATIENT)
Dept: INTERNAL MEDICINE CLINIC | Facility: CLINIC | Age: 68
End: 2023-03-31

## 2023-03-31 ENCOUNTER — HOSPITAL ENCOUNTER (OUTPATIENT)
Dept: CT IMAGING | Facility: HOSPITAL | Age: 68
Discharge: HOME | End: 2023-03-31

## 2023-03-31 ENCOUNTER — OFFICE VISIT (OUTPATIENT)
Dept: INTERNAL MEDICINE CLINIC | Facility: CLINIC | Age: 68
End: 2023-03-31

## 2023-03-31 ENCOUNTER — HOSPITAL ENCOUNTER (EMERGENCY)
Facility: HOSPITAL | Age: 68
Discharge: HOME | End: 2023-03-31
Attending: EMERGENCY MEDICINE

## 2023-03-31 VITALS
TEMPERATURE: 97.7 F | RESPIRATION RATE: 18 BRPM | HEART RATE: 103 BPM | DIASTOLIC BLOOD PRESSURE: 79 MMHG | OXYGEN SATURATION: 92 % | SYSTOLIC BLOOD PRESSURE: 121 MMHG

## 2023-03-31 VITALS — TEMPERATURE: 98.2 F | HEART RATE: 72 BPM | OXYGEN SATURATION: 98 %

## 2023-03-31 DIAGNOSIS — R11.2 NAUSEA AND VOMITING, UNSPECIFIED VOMITING TYPE: ICD-10-CM

## 2023-03-31 DIAGNOSIS — N20.0 NEPHROLITHIASIS: Primary | ICD-10-CM

## 2023-03-31 DIAGNOSIS — R10.84 GENERALIZED ABDOMINAL PAIN: ICD-10-CM

## 2023-03-31 DIAGNOSIS — R11.2 NAUSEA AND VOMITING, UNSPECIFIED VOMITING TYPE: Primary | ICD-10-CM

## 2023-03-31 LAB
ALBUMIN SERPL BCP-MCNC: 4.4 G/DL (ref 3.5–5)
ALP SERPL-CCNC: 56 U/L (ref 34–104)
ALT SERPL W P-5'-P-CCNC: 12 U/L (ref 7–52)
ANION GAP SERPL CALCULATED.3IONS-SCNC: 11 MMOL/L (ref 4–13)
AST SERPL W P-5'-P-CCNC: 8 U/L (ref 13–39)
BACTERIA UR QL AUTO: ABNORMAL /HPF
BASOPHILS # BLD AUTO: 0.05 THOUSANDS/ÂΜL (ref 0–0.1)
BASOPHILS NFR BLD AUTO: 1 % (ref 0–1)
BILIRUB SERPL-MCNC: 0.85 MG/DL (ref 0.2–1)
BILIRUB UR QL STRIP: NEGATIVE
BUN SERPL-MCNC: 28 MG/DL (ref 5–25)
CALCIUM SERPL-MCNC: 9.8 MG/DL (ref 8.4–10.2)
CHLORIDE SERPL-SCNC: 98 MMOL/L (ref 96–108)
CLARITY UR: CLEAR
CO2 SERPL-SCNC: 28 MMOL/L (ref 21–32)
COLOR UR: YELLOW
CREAT SERPL-MCNC: 2.01 MG/DL (ref 0.6–1.3)
EOSINOPHIL # BLD AUTO: 0.05 THOUSAND/ÂΜL (ref 0–0.61)
EOSINOPHIL NFR BLD AUTO: 1 % (ref 0–6)
ERYTHROCYTE [DISTWIDTH] IN BLOOD BY AUTOMATED COUNT: 13.4 % (ref 11.6–15.1)
GFR SERPL CREATININE-BSD FRML MDRD: 33 ML/MIN/1.73SQ M
GLUCOSE P FAST SERPL-MCNC: 143 MG/DL (ref 65–99)
GLUCOSE UR STRIP-MCNC: ABNORMAL MG/DL
HCT VFR BLD AUTO: 47.4 % (ref 36.5–49.3)
HGB BLD-MCNC: 16.2 G/DL (ref 12–17)
HGB UR QL STRIP.AUTO: ABNORMAL
HYALINE CASTS #/AREA URNS LPF: ABNORMAL /LPF
IMM GRANULOCYTES # BLD AUTO: 0.03 THOUSAND/UL (ref 0–0.2)
IMM GRANULOCYTES NFR BLD AUTO: 0 % (ref 0–2)
KETONES UR STRIP-MCNC: ABNORMAL MG/DL
LEUKOCYTE ESTERASE UR QL STRIP: ABNORMAL
LYMPHOCYTES # BLD AUTO: 1.31 THOUSANDS/ÂΜL (ref 0.6–4.47)
LYMPHOCYTES NFR BLD AUTO: 12 % (ref 14–44)
MCH RBC QN AUTO: 32.3 PG (ref 26.8–34.3)
MCHC RBC AUTO-ENTMCNC: 34.2 G/DL (ref 31.4–37.4)
MCV RBC AUTO: 95 FL (ref 82–98)
MONOCYTES # BLD AUTO: 1.14 THOUSAND/ÂΜL (ref 0.17–1.22)
MONOCYTES NFR BLD AUTO: 11 % (ref 4–12)
NEUTROPHILS # BLD AUTO: 8.08 THOUSANDS/ÂΜL (ref 1.85–7.62)
NEUTS SEG NFR BLD AUTO: 75 % (ref 43–75)
NITRITE UR QL STRIP: NEGATIVE
NON-SQ EPI CELLS URNS QL MICRO: ABNORMAL /HPF
NRBC BLD AUTO-RTO: 0 /100 WBCS
PH UR STRIP.AUTO: 5.5 [PH]
PLATELET # BLD AUTO: 213 THOUSANDS/UL (ref 149–390)
PMV BLD AUTO: 10.1 FL (ref 8.9–12.7)
POTASSIUM SERPL-SCNC: 3.7 MMOL/L (ref 3.5–5.3)
PROT SERPL-MCNC: 7.5 G/DL (ref 6.4–8.4)
PROT UR STRIP-MCNC: NEGATIVE MG/DL
RBC # BLD AUTO: 5.01 MILLION/UL (ref 3.88–5.62)
RBC #/AREA URNS AUTO: ABNORMAL /HPF
SODIUM SERPL-SCNC: 137 MMOL/L (ref 135–147)
SP GR UR STRIP.AUTO: 1.01 (ref 1–1.03)
UROBILINOGEN UR QL STRIP.AUTO: 0.2 E.U./DL
WBC # BLD AUTO: 10.66 THOUSAND/UL (ref 4.31–10.16)
WBC #/AREA URNS AUTO: ABNORMAL /HPF

## 2023-03-31 RX ORDER — ONDANSETRON 4 MG/1
4 TABLET, ORALLY DISINTEGRATING ORAL ONCE
Status: COMPLETED | OUTPATIENT
Start: 2023-03-31 | End: 2023-03-31

## 2023-03-31 RX ORDER — ONDANSETRON 4 MG/1
4 TABLET, FILM COATED ORAL EVERY 8 HOURS PRN
Qty: 12 TABLET | Refills: 0 | Status: SHIPPED | OUTPATIENT
Start: 2023-03-31

## 2023-03-31 RX ORDER — TAMSULOSIN HYDROCHLORIDE 0.4 MG/1
0.4 CAPSULE ORAL
Qty: 5 CAPSULE | Refills: 0 | Status: SHIPPED | OUTPATIENT
Start: 2023-03-31 | End: 2023-04-05

## 2023-03-31 RX ORDER — OXYCODONE HYDROCHLORIDE 5 MG/1
5 TABLET ORAL ONCE
Status: COMPLETED | OUTPATIENT
Start: 2023-03-31 | End: 2023-03-31

## 2023-03-31 RX ORDER — OXYCODONE HYDROCHLORIDE 5 MG/1
5 TABLET ORAL EVERY 6 HOURS PRN
Qty: 11 TABLET | Refills: 0 | Status: SHIPPED | OUTPATIENT
Start: 2023-03-31

## 2023-03-31 RX ORDER — TAMSULOSIN HYDROCHLORIDE 0.4 MG/1
0.4 CAPSULE ORAL ONCE
Status: COMPLETED | OUTPATIENT
Start: 2023-03-31 | End: 2023-03-31

## 2023-03-31 RX ADMIN — ONDANSETRON 4 MG: 4 TABLET, ORALLY DISINTEGRATING ORAL at 13:16

## 2023-03-31 RX ADMIN — OXYCODONE HYDROCHLORIDE 5 MG: 5 TABLET ORAL at 13:16

## 2023-03-31 RX ADMIN — TAMSULOSIN HYDROCHLORIDE 0.4 MG: 0.4 CAPSULE ORAL at 13:16

## 2023-03-31 NOTE — TELEPHONE ENCOUNTER
Yady Saliva  This message is for Natalya Coradomariam  It's Haze Aguilera I have Prashant at the and the doctor said it was just a small stone  He's going to give him some medication to try and pass it  I just want to make sure with you that that's at home instead of being admitted to the hospital  And he said there was something else by his kidney and I'm not sure if you got to see that or not  If you can give me a call back at [phone]  Thank you

## 2023-03-31 NOTE — ED PROVIDER NOTES
Final Diagnosis:  1  Nephrolithiasis        Chief Complaint   Patient presents with   • Flank Pain     Right sided flank pain for the past week along with nausea and vomiting  Had outpatient CT scan done today, has a kidney stone  Denies any history of kidney stones      HPI  Patient presents for evaluation of right-sided flank pain  Review of records show that he was at his PCP today  They did blood work which showed decrease in renal function  CT performed without contrast showed right-sided kidney stone  Patient noticed then instructed come into the emergency department for further evaluation  Wife is at bedside  They state that the patient has not had any oral intake for approximately the last 4 to 5 days  Has had ongoing nausea associated with the flank pain as well  Denies any fever or chills  No vanda blood in his urine  No malodorous urine  No history of kidney stones in the past       Unless otherwise specified:  - No language barrier    - History obtained from patient  - There are no limitations to the history obtained  - Previous charting was reviewed    PMH:   has a past medical history of Allergic rhinitis, Aneurysm of thoracic aorta, Cataract, Colon polyp, Diabetes mellitus ([facility] [address] ), Herniated nucleus pulposus, L4-5 left, Herniated nucleus pulposus, L5-S1, left, echocardiogram (10/14/2016), Hypercholesterolemia, Hypertension, and Lumbar radiculopathy  PSH:   has a past surgical history that includes Tonsillectomy and adenoidectomy; Knee surgery; Back surgery; pr colonoscopy flx dx w/collj spec when pfrmd (N/A, 9/30/2016); Back surgery; and Knee arthroscopy  ROS:  Review of Systems   - 13 point ROS was performed and all are normal unless stated in the history above  - Nursing note reviewed  Vitals reviewed  - Orders placed by myself and/or advanced practitioner / resident          PE:   Vitals:    03/31/23 1236 03/31/23 1500   BP: 145/64 121/79   BP Location: Left arm Left arm Pulse: 90 103   Resp: 18 18   Temp: 97 7 °F (36 5 °C)    TempSrc: Temporal    SpO2: 91% 92%     Vitals reviewed by me  Patient appears uncomfortable in bed  General right-sided flank tenderness  Abdomen is soft and nontender  Unless otherwise specified above:    General: VS reviewed  Appears in NAD    Head: Normocephalic, atraumatic  Eyes: EOM-I      ENT: Atraumatic external nose and ears  No drooling  Neck: No JVD  CV: No pallor noted  Lungs:   No tachypnea  No respiratory distress    Abdomen:  Soft, non-tender, non-distended    MSK:   No obvious deformity    Skin: Dry, intact  No obvious rash  Psychiatric/Behavioral: Appropriate mood and affect   Exam: deferred    Physical Exam     Procedures   A:  - Nursing note reviewed  ED Course as of 03/31/23 1556   Fri Mar 31, 2023   1416 Patient reevaluated  Feels improved  Provided with glass of water  No orders to display     Orders Placed This Encounter   Procedures   • UA w Reflex to Microscopic w Reflex to Culture   • Urine Microscopic   • Ambulatory Referral to Urology     Labs Reviewed   UA W REFLEX TO MICROSCOPIC WITH REFLEX TO CULTURE - Abnormal       Result Value Ref Range Status    Color, UA Yellow   Final    Clarity, UA Clear   Final    Specific [place], UA 1 010  1 003 - 1 030 Final    pH, UA 5 5  4 5, 5 0, 5 5, 6 0, 6 5, 7 0, 7 5, 8 0 Final    Leukocytes, UA   (*) Negative Final    Value: Elevated glucose may cause decreased leukocyte values   See urine microscopic for [facility] result/    Nitrite, UA Negative  Negative Final    Protein, UA Negative  Negative mg/dl Final    Glucose, UA >=1000 (1%) (*) Negative mg/dl Final    Ketones, UA 15 (1+) (*) Negative mg/dl Final    Urobilinogen, UA 0 2  0 2, 1 0 E U /dl E U /dl Final    Bilirubin, UA Negative  Negative Final    Occult Blood, UA Moderate (*) Negative Final   URINE MICROSCOPIC - Abnormal    RBC, UA 1-2  None Seen, 0-1, 1-2, 2-4, 0-5 /hpf Final    WBC, UA 0-1  None Seen, 0-1, 1-2, 0-5, 2-4 /hpf Final    Epithelial Cells Occasional  None Seen, Occasional /hpf Final    Bacteria, UA None Seen  None Seen, Occasional /hpf Final    Hyaline Casts, UA 1-2 (*) (none) /lpf Final         Final Diagnosis:  1  Nephrolithiasis        P:  -I reviewed options with patient and wife who is bedside  Given his baseline kidney function appears to be 1 and on recent labs this was down to a 2 he meets criteria for an ALLIE and could be admitted to the hospital for hydration, fluids and monitoring  Patient states that he would prefer to go home if possible  Overall I believe this could be a reasonable choice as his ALLIE is likely secondary to dehydration due to his nausea  We will start off with symptomatic management and reassess  We will also get urinalysis to evaluate for any signs of infection though I have a lower suspicion for this given recent imaging that did not show any acute pyelonephritis  - Reviewed the case with urology  Particularly discussed the incidental finding of the possible blood in the left collecting system  I discussed with patient and he states that he has not had any contrast recently  -UA without signs of infection  Patient felt improved here in the emergency department  I discussed with him and wife options  I discussed with him admission to the hospital secondary to his ALLIE as well as pain versus discharge home  Patient states that he would prefer to go home  Overall I believe this is reasonable  He is likely dehydrated leading to his ALLIE  Otherwise he has no signs of impending renal issues  Will provide with antiemetics, analgesia, Flomax  I did review the case with urology  They stated that he could follow-up with them for his incidental CT findings  Referral placed  I also encouraged him to call his PCP next week and get follow-up blood work to ensure improvement of renal function        Unless otherwise noted the patient's medications were reviewed and they should continue as directed  Medications   oxyCODONE (ROXICODONE) IR tablet 5 mg (5 mg Oral Given 3/31/23 1316)   ondansetron (ZOFRAN-ODT) dispersible tablet 4 mg (4 mg Oral Given 3/31/23 1316)   tamsulosin (FLOMAX) capsule 0 4 mg (0 4 mg Oral Given 3/31/23 1316)     Time reflects when diagnosis was documented in both MDM as applicable and the Disposition within this note     Time User Action Codes Description Comment    3/31/2023  3:52 PM Sherol Dates Add [N20 0] Nephrolithiasis       ED Disposition     ED Disposition   Discharge    Condition   Stable    Date/Time   Fri Mar 31, 2023  3:52 PM    [facility]self care                 Follow-up Information     Follow up With Specialties Details Why 101[address] Reji Dumont MD Family Medicine   [address] For U[facility]   [address] [id number]  [address] [place] [address] For Urology [address], [address] [place] [address]        Patient's Medications   Discharge Prescriptions    ONDANSETRON (ZOFRAN) 4 MG TABLET    Take 1 tablet (4 mg total) by mouth every 8 (eight) hours as needed for nausea or vomiting       Start Date: 3/31/2023 End Date: --       Order Dose: 4 mg       Quantity: 12 tablet    Refills: 0    OXYCODONE (ROXICODONE) 5 IMMEDIATE RELEASE TABLET    Take 1 tablet (5 mg total) by mouth every 6 (six) hours as needed for moderate pain for up to 11 doses Max Daily Amount: 20 mg       Start Date: 3/31/2023 End Date: --       Order Dose: 5 mg       Quantity: 11 tablet    Refills: 0    TAMSULOSIN (FLOMAX) 0 4 MG    Take 1 capsule (0 4 mg total) by mouth daily with dinner for 5 days       Start Date: 3/31/2023 End Date: 4/5/2023       Order Dose: 0 4 mg       Quantity: 5 capsule    Refills: 0       Prior to Admission Medications "  Prescriptions Last Dose Informant Patient Reported? Taking? Jardiance 25 MG TABS   No No   Sig: TAKE 1 TABLET EVERY MORNING   Ozempic, 1 MG/DOSE, 4 MG/3ML injection pen   No No   Sig: INJECT 1 MG UNDER THE SKIN ONCE A WEEK   albuterol (ProAir HFA) 90 mcg/act inhaler   No No   Sig: Inhale 2 puffs every 6 (six) hours as needed for wheezing   allopurinol (ZYLOPRIM) 100 mg tablet   No No   Sig: TAKE 1 TABLET DAILY   amLODIPine-benazepril (LOTREL 5-20) 5-20 MG per capsule   No No   Sig: TAKE 1 CAPSULE TWICE A DAY   atenolol (TENORMIN) 50 mg tablet   No No   Sig: TAKE 1 TABLET TWICE A DAY   atorvastatin (LIPITOR) 40 mg tablet   No No   Sig: Take 1 tablet (40 mg total) by mouth daily   benzonatate (TESSALON) 200 MG capsule   No No   Sig: Take 1 capsule (200 mg total) by mouth 3 (three) times a day as needed for cough   furosemide (LASIX) 40 mg tablet   No No   Sig: TAKE ONE-HALF (1/2) TABLET DAILY   magnesium oxide (MAG-OX) 400 mg   Yes No   Sig: Take 400 mg by mouth 2 (two) times a day   olmesartan-hydrochlorothiazide (BENICAR HCT) 40-12 5 MG per tablet   No No   Sig: TAKE 1 TABLET DAILY   potassium chloride (K-DUR,KLOR-CON) 10 mEq tablet   No No   Sig: TAKE 3 TABLETS TWICE A DAY  TAKE 3 TABLETS IN THE MORNING AND TAKE 3 TABLETS IN THE EVENING      Facility-Administered Medications: None       Portions of the record may have been created with voice recognition software  Occasional wrong word or \"sound a like\" substitutions may have occurred due to the inherent limitations of voice recognition software  Read the chart carefully and recognize, using context, where substitutions have occurred      Electronically signed by:  MD Zoe Linares MD  03/31/23 3241    "

## 2023-03-31 NOTE — PROGRESS NOTES
Name: Weston Farrell      : 1955      MRN: [id number]  Encounter Provider: VIN Pennington  Encounter Date: 3/31/2023   Encounter department: [address] [place] [address] W  Will obtain STAT ct of the abdomen and pelvis  Will order STAT labs as well  Will notify once labs and imaging are back  1  Nausea and vomiting, unspecified vomiting type  -     Comprehensive metabolic panel; Future  -     CBC and differential; Future  -     CT abdomen pelvis w contrast; Future; Expected date: 2023    2  Generalized abdominal pain  -     Comprehensive metabolic panel; Future  -     CBC and differential; Future  -     CT abdomen pelvis w contrast; Future; Expected date: 2023           [address] [place] [address]  is for an acute visit  He was on vacation in [place] and did come home last night  He did end up getting sick with vomiting and abdominal pain while there  He is not having any fever but can not keep anything down  He has not moved his bowels since   He is having abdominal pain radiating to his lower back and does take Tylenol with some relief but after around two hours the pain is back  He is not voiding much but can not drink anything due to not feeling well  He denies any abdominal issues  He offers no other issues  Review of Systems   Gastrointestinal: Positive for abdominal pain, nausea and vomiting  All other systems reviewed and are negative        Current Outpatient Medications on File Prior to Visit   Medication Sig   • albuterol (ProAir HFA) 90 mcg/act inhaler Inhale 2 puffs every 6 (six) hours as needed for wheezing   • allopurinol (ZYLOPRIM) 100 mg tablet TAKE 1 TABLET DAILY   • amLODIPine-benazepril (LOTREL 5-20) 5-20 MG per capsule TAKE 1 CAPSULE TWICE A DAY   • atenolol (TENORMIN) 50 mg tablet TAKE 1 TABLET TWICE A DAY   • atorvastatin (LIPITOR) 40 mg tablet Take 1 tablet (40 mg total) by mouth daily   • benzonatate (TESSALON) 200 MG capsule Take 1 capsule (200 mg total) by mouth 3 (three) times a day as needed for cough   • furosemide (LASIX) 40 mg tablet TAKE ONE-HALF (1/2) TABLET DAILY   • Jardiance 25 MG TABS TAKE 1 TABLET EVERY MORNING   • magnesium oxide (MAG-OX) 400 mg Take 400 mg by mouth 2 (two) times a day   • olmesartan-hydrochlorothiazide (BENICAR HCT) 40-12 5 MG per tablet TAKE 1 TABLET DAILY   • Ozempic, 1 MG/DOSE, 4 MG/3ML injection pen INJECT 1 MG UNDER THE SKIN ONCE A WEEK   • potassium chloride (K-DUR,KLOR-CON) 10 mEq tablet TAKE 3 TABLETS TWICE A DAY  TAKE 3 TABLETS IN THE MORNING AND TAKE 3 TABLETS IN THE EVENING       Objective     Pulse 72   Temp 98 2 °F (36 8 °C)   SpO2 98%     Physical Exam  Vitals reviewed  Constitutional:       Appearance: Normal appearance  He is ill-appearing  Cardiovascular:      Rate and Rhythm: Normal rate and regular rhythm  Pulses: Normal pulses  Heart sounds: Normal heart sounds  Pulmonary:      Effort: Pulmonary effort is normal       Breath sounds: Normal breath sounds  Abdominal:      General: Abdomen is flat  Bowel sounds are normal       Palpations: Abdomen is soft  Tenderness: There is abdominal tenderness  Musculoskeletal:         General: Normal range of motion  Skin:     General: Skin is warm and dry  Capillary Refill: Capillary refill takes less than 2 seconds  Neurological:      General: No focal deficit present  Mental Status: He is alert and oriented to person, place, and time  Mental status is at baseline  Psychiatric:         Mood and Affect: Mood normal          Behavior: Behavior normal          Thought Content:  Thought content normal          Judgment: Judgment normal        VIN Thompson

## 2023-04-03 ENCOUNTER — APPOINTMENT (OUTPATIENT)
Dept: LAB | Facility: MEDICAL CENTER | Age: 68
End: 2023-04-03

## 2023-04-03 ENCOUNTER — TELEPHONE (OUTPATIENT)
Dept: UROLOGY | Facility: AMBULATORY SURGERY CENTER | Age: 68
End: 2023-04-03

## 2023-04-03 DIAGNOSIS — N20.0 KIDNEY STONES: Primary | ICD-10-CM

## 2023-04-03 DIAGNOSIS — E11.3293 TYPE 2 DIABETES MELLITUS WITH BOTH EYES AFFECTED BY MILD NONPROLIFERATIVE RETINOPATHY WITHOUT MACULAR EDEMA, WITHOUT LONG-TERM CURRENT USE OF INSULIN (HCC): ICD-10-CM

## 2023-04-03 DIAGNOSIS — E11.22 TYPE 2 DIABETES MELLITUS WITH STAGE 2 CHRONIC KIDNEY DISEASE, WITHOUT LONG-TERM CURRENT USE OF INSULIN (HCC): ICD-10-CM

## 2023-04-03 DIAGNOSIS — N18.2 TYPE 2 DIABETES MELLITUS WITH STAGE 2 CHRONIC KIDNEY DISEASE, WITHOUT LONG-TERM CURRENT USE OF INSULIN (HCC): ICD-10-CM

## 2023-04-03 DIAGNOSIS — N20.0 KIDNEY STONES: ICD-10-CM

## 2023-04-03 DIAGNOSIS — E79.0 HYPERURICEMIA WITHOUT SIGNS INFLAMMATORY ARTHRITIS/TOPHACEOUS DISEASE: ICD-10-CM

## 2023-04-03 DIAGNOSIS — I10 PRIMARY HYPERTENSION: ICD-10-CM

## 2023-04-03 DIAGNOSIS — E78.2 MIXED HYPERLIPIDEMIA: ICD-10-CM

## 2023-04-03 LAB
ALBUMIN SERPL BCP-MCNC: 3.3 G/DL (ref 3.5–5)
ALP SERPL-CCNC: 60 U/L (ref 46–116)
ALT SERPL W P-5'-P-CCNC: 17 U/L (ref 12–78)
ANION GAP SERPL CALCULATED.3IONS-SCNC: 5 MMOL/L (ref 4–13)
AST SERPL W P-5'-P-CCNC: 10 U/L (ref 5–45)
BASOPHILS # BLD AUTO: 0.03 THOUSANDS/ÂΜL (ref 0–0.1)
BASOPHILS NFR BLD AUTO: 0 % (ref 0–1)
BILIRUB SERPL-MCNC: 0.51 MG/DL (ref 0.2–1)
BUN SERPL-MCNC: 22 MG/DL (ref 5–25)
CALCIUM ALBUM COR SERPL-MCNC: 9.6 MG/DL (ref 8.3–10.1)
CALCIUM SERPL-MCNC: 9 MG/DL (ref 8.3–10.1)
CHLORIDE SERPL-SCNC: 103 MMOL/L (ref 96–108)
CHOLEST SERPL-MCNC: 124 MG/DL
CO2 SERPL-SCNC: 29 MMOL/L (ref 21–32)
CREAT SERPL-MCNC: 2.09 MG/DL (ref 0.6–1.3)
EOSINOPHIL # BLD AUTO: 0.12 THOUSAND/ÂΜL (ref 0–0.61)
EOSINOPHIL NFR BLD AUTO: 2 % (ref 0–6)
ERYTHROCYTE [DISTWIDTH] IN BLOOD BY AUTOMATED COUNT: 13.3 % (ref 11.6–15.1)
GFR SERPL CREATININE-BSD FRML MDRD: 32 ML/MIN/1.73SQ M
GLUCOSE P FAST SERPL-MCNC: 168 MG/DL (ref 65–99)
HCT VFR BLD AUTO: 43.1 % (ref 36.5–49.3)
HDLC SERPL-MCNC: 34 MG/DL
HGB BLD-MCNC: 14.5 G/DL (ref 12–17)
IMM GRANULOCYTES # BLD AUTO: 0.02 THOUSAND/UL (ref 0–0.2)
IMM GRANULOCYTES NFR BLD AUTO: 0 % (ref 0–2)
LDLC SERPL CALC-MCNC: 61 MG/DL (ref 0–100)
LYMPHOCYTES # BLD AUTO: 1.14 THOUSANDS/ÂΜL (ref 0.6–4.47)
LYMPHOCYTES NFR BLD AUTO: 17 % (ref 14–44)
MCH RBC QN AUTO: 31.6 PG (ref 26.8–34.3)
MCHC RBC AUTO-ENTMCNC: 33.6 G/DL (ref 31.4–37.4)
MCV RBC AUTO: 94 FL (ref 82–98)
MONOCYTES # BLD AUTO: 0.7 THOUSAND/ÂΜL (ref 0.17–1.22)
MONOCYTES NFR BLD AUTO: 10 % (ref 4–12)
NEUTROPHILS # BLD AUTO: 4.86 THOUSANDS/ÂΜL (ref 1.85–7.62)
NEUTS SEG NFR BLD AUTO: 71 % (ref 43–75)
NONHDLC SERPL-MCNC: 90 MG/DL
NRBC BLD AUTO-RTO: 0 /100 WBCS
PLATELET # BLD AUTO: 215 THOUSANDS/UL (ref 149–390)
PMV BLD AUTO: 10.6 FL (ref 8.9–12.7)
POTASSIUM SERPL-SCNC: 3.6 MMOL/L (ref 3.5–5.3)
PROT SERPL-MCNC: 7.1 G/DL (ref 6.4–8.4)
RBC # BLD AUTO: 4.59 MILLION/UL (ref 3.88–5.62)
SODIUM SERPL-SCNC: 137 MMOL/L (ref 135–147)
TRIGL SERPL-MCNC: 147 MG/DL
TSH SERPL DL<=0.05 MIU/L-ACNC: 1.36 UIU/ML (ref 0.45–4.5)
URATE SERPL-MCNC: 6.1 MG/DL (ref 3.5–8.5)
WBC # BLD AUTO: 6.87 THOUSAND/UL (ref 4.31–10.16)

## 2023-04-03 NOTE — TELEPHONE ENCOUNTER
Please Triage  New Patient    What is the reason for the patient’s appointment? NP- ER f/u for kidney stone and abnormal CT  Akhil May from PCP office called to make the appointment  Her phone number is [phone] option 1  KIDNEYS/URETERS:  Obstructive 3 mm right ureteral calculus located just proximal to the right UVJ causes mild right hydroureteronephrosis      High density material within the left renal collecting system could represent hemorrhage or contrast from a prior study      Simple left renal parapelvic cysts  What office location does the patient prefer? Any [facility] but willing to go anywhere that could see him soon  Imaging/Lab Results: in Epic     Do we accept the patient's insurance or is the patient Self-Pay? CBC     Has the patient had any previous Urologist(s)? No    Have patient records been requested? N/A    Has the patient had any outside testing done? N/A    Does the patient have a personal history of cancer?  N/A

## 2023-04-03 NOTE — TELEPHONE ENCOUNTER
Called and scheduled patient for NP appointment with Cindy Brown on 4/18/23 @ 10:00 am in [facility]  Patient provided with office address

## 2023-04-04 LAB
EST. AVERAGE GLUCOSE BLD GHB EST-MCNC: 140 MG/DL
HBA1C MFR BLD: 6.5 %

## 2023-04-05 DIAGNOSIS — N20.0 NEPHROLITHIASIS: Primary | ICD-10-CM

## 2023-04-06 ENCOUNTER — APPOINTMENT (OUTPATIENT)
Dept: LAB | Facility: MEDICAL CENTER | Age: 68
End: 2023-04-06

## 2023-04-06 DIAGNOSIS — N20.0 NEPHROLITHIASIS: ICD-10-CM

## 2023-04-06 LAB
BASOPHILS # BLD AUTO: 0.05 THOUSANDS/ÂΜL (ref 0–0.1)
BASOPHILS NFR BLD AUTO: 1 % (ref 0–1)
EOSINOPHIL # BLD AUTO: 0.15 THOUSAND/ÂΜL (ref 0–0.61)
EOSINOPHIL NFR BLD AUTO: 2 % (ref 0–6)
ERYTHROCYTE [DISTWIDTH] IN BLOOD BY AUTOMATED COUNT: 13.2 % (ref 11.6–15.1)
HCT VFR BLD AUTO: 45.2 % (ref 36.5–49.3)
HGB BLD-MCNC: 14.4 G/DL (ref 12–17)
IMM GRANULOCYTES # BLD AUTO: 0.02 THOUSAND/UL (ref 0–0.2)
IMM GRANULOCYTES NFR BLD AUTO: 0 % (ref 0–2)
LYMPHOCYTES # BLD AUTO: 1.53 THOUSANDS/ÂΜL (ref 0.6–4.47)
LYMPHOCYTES NFR BLD AUTO: 20 % (ref 14–44)
MCH RBC QN AUTO: 30.8 PG (ref 26.8–34.3)
MCHC RBC AUTO-ENTMCNC: 31.9 G/DL (ref 31.4–37.4)
MCV RBC AUTO: 97 FL (ref 82–98)
MONOCYTES # BLD AUTO: 0.69 THOUSAND/ÂΜL (ref 0.17–1.22)
MONOCYTES NFR BLD AUTO: 9 % (ref 4–12)
NEUTROPHILS # BLD AUTO: 5.07 THOUSANDS/ÂΜL (ref 1.85–7.62)
NEUTS SEG NFR BLD AUTO: 68 % (ref 43–75)
NRBC BLD AUTO-RTO: 0 /100 WBCS
PLATELET # BLD AUTO: 260 THOUSANDS/UL (ref 149–390)
PMV BLD AUTO: 10.1 FL (ref 8.9–12.7)
RBC # BLD AUTO: 4.68 MILLION/UL (ref 3.88–5.62)
WBC # BLD AUTO: 7.51 THOUSAND/UL (ref 4.31–10.16)

## 2023-04-07 LAB
ALBUMIN SERPL BCP-MCNC: 3.4 G/DL (ref 3.5–5)
ALP SERPL-CCNC: 66 U/L (ref 46–116)
ALT SERPL W P-5'-P-CCNC: 30 U/L (ref 12–78)
ANION GAP SERPL CALCULATED.3IONS-SCNC: 6 MMOL/L (ref 4–13)
AST SERPL W P-5'-P-CCNC: 17 U/L (ref 5–45)
BILIRUB SERPL-MCNC: 0.51 MG/DL (ref 0.2–1)
BUN SERPL-MCNC: 15 MG/DL (ref 5–25)
CALCIUM ALBUM COR SERPL-MCNC: 10.1 MG/DL (ref 8.3–10.1)
CALCIUM SERPL-MCNC: 9.6 MG/DL (ref 8.3–10.1)
CHLORIDE SERPL-SCNC: 104 MMOL/L (ref 96–108)
CO2 SERPL-SCNC: 29 MMOL/L (ref 21–32)
CREAT SERPL-MCNC: 1.6 MG/DL (ref 0.6–1.3)
GFR SERPL CREATININE-BSD FRML MDRD: 44 ML/MIN/1.73SQ M
GLUCOSE P FAST SERPL-MCNC: 135 MG/DL (ref 65–99)
POTASSIUM SERPL-SCNC: 3.6 MMOL/L (ref 3.5–5.3)
PROT SERPL-MCNC: 7.3 G/DL (ref 6.4–8.4)
SODIUM SERPL-SCNC: 139 MMOL/L (ref 135–147)

## 2023-04-20 PROBLEM — N18.31 STAGE 3A CHRONIC KIDNEY DISEASE (HCC): Status: ACTIVE | Noted: 2023-04-20

## 2023-04-28 ENCOUNTER — APPOINTMENT (OUTPATIENT)
Dept: LAB | Facility: MEDICAL CENTER | Age: 68
End: 2023-04-28

## 2023-04-28 DIAGNOSIS — R93.89 ABNORMAL FINDING ON CT SCAN: ICD-10-CM

## 2023-04-28 DIAGNOSIS — N20.0 NEPHROLITHIASIS: ICD-10-CM

## 2023-04-28 LAB
ANION GAP SERPL CALCULATED.3IONS-SCNC: 9 MMOL/L (ref 4–13)
BUN SERPL-MCNC: 27 MG/DL (ref 5–25)
CALCIUM SERPL-MCNC: 9.5 MG/DL (ref 8.3–10.1)
CHLORIDE SERPL-SCNC: 105 MMOL/L (ref 96–108)
CO2 SERPL-SCNC: 27 MMOL/L (ref 21–32)
CREAT SERPL-MCNC: 1.26 MG/DL (ref 0.6–1.3)
GFR SERPL CREATININE-BSD FRML MDRD: 59 ML/MIN/1.73SQ M
GLUCOSE P FAST SERPL-MCNC: 129 MG/DL (ref 65–99)
POTASSIUM SERPL-SCNC: 3.1 MMOL/L (ref 3.5–5.3)
SODIUM SERPL-SCNC: 141 MMOL/L (ref 135–147)

## 2023-05-02 ENCOUNTER — HOSPITAL ENCOUNTER (OUTPATIENT)
Dept: CT IMAGING | Facility: HOSPITAL | Age: 68
Discharge: HOME | End: 2023-05-02

## 2023-05-02 ENCOUNTER — TELEPHONE (OUTPATIENT)
Dept: OTHER | Facility: OTHER | Age: 68
End: 2023-05-02

## 2023-05-02 DIAGNOSIS — R93.89 ABNORMAL FINDING ON CT SCAN: ICD-10-CM

## 2023-05-02 DIAGNOSIS — R93.89 ABNORMAL FINDING ON CT SCAN: Primary | ICD-10-CM

## 2023-05-02 RX ADMIN — IOHEXOL 100 ML: 350 INJECTION, SOLUTION INTRAVENOUS at 07:10

## 2023-05-02 NOTE — LETTER
[address] [place] [address]  [address] [facility] [address]      May 5, 2023    MRN: [id number]     Phone: [phone]     Dear Mr  Gemma Carrasquillo recently had a(n) Cat Scan performed on 5/2/2023 at  [address] [place] [address] that was requested by Fernando Gill  The study was reviewed by a radiologist, which is a physician who specializes in medical imaging  The radiologist issued a report describing his or her findings  In that report there was a finding that the radiologist felt warranted further discussion with your health care provider and that discussion would be beneficial to you  The results were sent to Artie Cole on 05/02/2023  8:58 AM  We recommend that you contact Artie Cole at [phone] or set up an appointment to discuss the results of the imaging test  If you have already heard from Fernando Gill regarding the results of your study, you can disregard this letter  This letter is not meant to alarm you, but intended to encourage you to follow-up on your results with the provider that sent you for the imaging study  In addition, we have enclosed answers to frequently asked questions by other patients who have also received a letter to review results with their health care provider (see page two)  Thank you for choosing [address] [place] [address] for your medical imaging needs  FREQUENTLY ASKED QUESTIONS    1  Why am I receiving this letter? [address] [place] [address] requires us to notify patients who have findings on imaging exams that may require more testing or follow-up with a health professional within the next 3 months          2  How serious is the finding on the imaging test?  This letter is sent to all patients who may need follow-up or more testing within the next 3 months  Receiving this letter does not necessarily mean you have a life-threatening imaging finding or disease  Recommendations in the radiologists imaging report are general in nature and it is up to your healthcare provider to say whether those recommendations make sense for your situation  You are strongly encouraged to talk to your health care provider about the results and ask whether additional steps need to be taken  3  Where can I get a copy of the final report for my recent radiology exam?  To get a full copy of the report you can access your records online at [email or web address] or please contact [address] [place] [address] Records Department at [phone] Monday through Friday between 8 am and 6 pm          4  What do I need to do now? Please contact your health care provider who requested the imaging study to discuss what further actions (if any) are needed  You may have already heard from (your ordering provider) in regard to this test in which case you can disregard this letter  NOTICE IN ACCORDANCE WITH THE [facility] PATIENT TEST RESULT INFORMATION ACT OF 2018    You are receiving this notice as a result of a determination by your diagnostic imaging service that further discussions of your test results are warranted and would be beneficial to you  The complete results of your test or tests have been or will be sent to the health care practitioner that ordered the test or tests  It is recommended that you contact your health care practitioner to discuss your results as soon as possible

## 2023-05-02 NOTE — TELEPHONE ENCOUNTER
Patient requesting a call back to discuss test results         Ordering Provider:   Date Completed:  05/02/2023    Lab []  MRI []  X-Ray []  CT [x]  Colonoscopy []  EGD []  Biopsy []  Other []

## 2023-05-02 NOTE — RESULT ENCOUNTER NOTE
I personally reviewed these results with the patient over the phone today  There is resolution of the previously seen right ureteral calculi and hydronephrosis  There is also resolution of the hyperdense appearance of the left collecting system on prior CT imaging  There is no enhancing solid renal mass identified bilaterally  He does have a stable 14 mm right adrenal nodule  There is a incidental finding of enhancing mesenteric mass or enlarged lymph nodes in the mid abdomen  There is concern for possible neoplastic process  I have placed a ambulatory referral to surgical oncology for further evaluation

## 2023-05-03 ENCOUNTER — TELEPHONE (OUTPATIENT)
Dept: INTERNAL MEDICINE CLINIC | Facility: CLINIC | Age: 68
End: 2023-05-03

## 2023-05-03 NOTE — TELEPHONE ENCOUNTER
Patient wife is asking to speak to you about the CT results and the DR Betsy Escobedo they are sending him to for oncology    Just so they have a little info before they go to appt on 5/11

## 2023-05-09 PROBLEM — R59.0 LYMPHADENOPATHY, ABDOMINAL: Status: ACTIVE | Noted: 2023-05-09

## 2023-05-09 PROBLEM — E27.9 ADRENAL NODULE (HCC): Status: ACTIVE | Noted: 2023-05-09

## 2023-05-09 PROBLEM — E27.8 ADRENAL NODULE (HCC): Status: ACTIVE | Noted: 2023-05-09

## 2023-05-11 ENCOUNTER — CONSULT (OUTPATIENT)
Dept: SURGICAL ONCOLOGY | Facility: CLINIC | Age: 68
End: 2023-05-11

## 2023-05-11 VITALS
HEART RATE: 96 BPM | TEMPERATURE: 97.5 F | OXYGEN SATURATION: 97 % | DIASTOLIC BLOOD PRESSURE: 78 MMHG | HEIGHT: 70 IN | WEIGHT: 231 LBS | SYSTOLIC BLOOD PRESSURE: 120 MMHG | BODY MASS INDEX: 33.07 KG/M2

## 2023-05-11 DIAGNOSIS — R59.0 LYMPHADENOPATHY, ABDOMINAL: Primary | ICD-10-CM

## 2023-05-11 DIAGNOSIS — E27.8 ADRENAL NODULE (HCC): ICD-10-CM

## 2023-05-11 DIAGNOSIS — R93.89 ABNORMAL FINDING ON CT SCAN: ICD-10-CM

## 2023-05-11 RX ORDER — DEXAMETHASONE 1 MG
1 TABLET ORAL ONCE
Qty: 1 TABLET | Refills: 0 | Status: SHIPPED | OUTPATIENT
Start: 2023-05-11 | End: 2023-05-11

## 2023-05-11 NOTE — PROGRESS NOTES
Surgical Oncology Consult       [facility] [place] [id number] [facility] [address]  [address]  1955  [id number]  [address] [place] [address] [facility] [address] [address] [phone]    Diagnoses and all orders for this visit:    Lymphadenopathy, abdominal  -     Cancel: NM PET CT skull base to mid thigh; Future  -     NM PET CT skull base to mid thigh; Future    Adrenal nodule (HCC)  -     Cancel: NM PET CT skull base to mid thigh; Future  -     Aldosterone; Future  -     Catecholamines, fractionated, plasma; Future  -     Basic metabolic panel; Future  -     Catecholamines, fractionated, urine, 24 hour; Future  -     Cortisol Level, AM Specimen; Future  -     Cortisol, Free, Urine, 24 Hour; Future  -     Metanephrine, Fractionated Plasma Free; Future  -     Renin Direct Assay; Future  -     dexamethasone (DECADRON) 1 mg tablet; Take 1 tablet (1 mg total) by mouth once for 1 dose Take at midnight  Lab draw at 8 am     Abnormal finding on CT scan  -     Ambulatory Referral to Surgical Oncology        Chief Complaint   Patient presents with   • Consult       Return in about 3 weeks (around 6/1/2023) for Office Visit, Imaging - See orders, Labs - See Treatment Plan  Oncology History    No history exists  History of Present Illness: 29-year-old male who was recently found to have a kidney stone  CT of the abdomen on March 31, 2023 showed low-density lesions in the liver  There is a 1 4 cm right adrenal nodule, but this has been stable for over a year  There was no suspicious lymphadenopathy and there was some high density material within the left collecting system  Follow-up CT of the abdomen with and without IV contrast   This showed no renal masses  The 1 4 cm right adrenal nodule was indeterminate but stable    There was a 3 5 x 2 7 x 3 2 cm lymph node/mass  I personally reviewed the films  On my review, the lymphadenopathy was present on his initial CT in March  He denies any flushing, diarrhea, palpitations, headaches or sweats  No fevers, chills or night sweats  He has lost approximately 35 pounds since starting Ozempic  His blood sugars are under good control  He denies any abdominal pain or bloating after eating  No personal history of malignancy  His mother did have leukemia and his brother had bladder cancer  Review of Systems  Complete ROS Surg Onc:   Constitutional: The patient denies new or recent history of general fatigue, no recent weight loss, no change in appetite  Eyes: No complaints of visual problems, no scleral icterus  ENT: no complaints of ear pain, no hoarseness, no difficulty swallowing,  no tinnitus and no new masses in head, oral cavity, or neck  Cardiovascular: No complaints of chest pain, no palpitations, no ankle edema  Respiratory: No complaints of shortness of breath, no cough  Gastrointestinal: No complaints of jaundice, no bloody stools, no pale stools  Genitourinary: No complaints of dysuria, no hematuria, no nocturia, no frequent urination, no urethral discharge  Musculoskeletal: No complaints of weakness, paralysis, joint stiffness or arthralgias  Integumentary: No complaints of rash, no new lesions  Neurological: No complaints of convulsions, no seizures, no dizziness  Hematologic/Lymphatic: No complaints of easy bruising  Endocrine:  No hot or cold intolerance  No polydipsia, polyphagia, or polyuria  Allergy/immunology:  No environmental allergies  No food allergies  Not immunocompromised  Skin:  No pallor or rash  No wound            Patient Active Problem List   Diagnosis   • Aneurysm of thoracic aorta (HCC)   • Edema   • Heel spur   • Mixed hyperlipidemia   • Hypertension   • Hyperuricemia without signs inflammatory arthritis/tophaceous disease   • Chronic bilateral low back pain   • Neuropathy of left foot   • Non-toxic uninodular goiter   • Osteoarthritis   • Sciatica   • Diabetes mellitus type 2 in obese [facility])   • Restless leg syndrome   • Skin tag   • Acute pain of left shoulder   • Microscopic hematuria   • Pain of left thigh   • Lumbar radiculopathy   • Colonic polyp   • Hypokalemia   • Severe obesity (BMI 35 0-35 9 with comorbidity) ([facility])   • 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)   • Type 2 diabetes with stage 2 chronic kidney disease GFR 60-89 (HCC)   • Nausea and vomiting   • Generalized abdominal pain   • Stage 3a chronic kidney disease (HCC)   • Adrenal nodule ([facility])   • Lymphadenopathy, abdominal     Past Medical History:   Diagnosis Date   • Allergic rhinitis     last assessed: 5/4/2015   • Aneurysm of thoracic aorta ([facility])    • Cataract     resolved: 12/12/2014   • Colon polyp    • Diabetes mellitus ([address] )     diet control   • Herniated nucleus pulposus, L4-5 left     last assessed: 4/9/2014   • Herniated nucleus pulposus, L5-S1, left     last assessed: 4/9/2014   • Hx of echocardiogram 10/14/2016    EF 55%, Mild LVH, mild aortic root and ascending aorta enlargement, measuring 42 mm, trace MR, trace aortic regurg     • Hypercholesterolemia    • Hypertension    • Lumbar radiculopathy      Past Surgical History:   Procedure Laterality Date   • BACK SURGERY     • BACK SURGERY     • KNEE ARTHROSCOPY      (therapeutic)   • KNEE SURGERY     • NH COLONOSCOPY FLX DX W/COLLJ SPEC WHEN PFRMD N/A 9/30/2016    Procedure: COLONOSCOPY;  Surgeon: Rossana Peng MD;  Location: [facility];  Service: Gastroenterology   • TONSILLECTOMY AND ADENOIDECTOMY       Family History   Problem Relation Age of Onset   • Thyroid disease Mother         disorder   • Hypertension Father    • Coronary artery disease Brother    • Heart attack Brother         prior myocardial infarction     Social History     Socioeconomic History   • Marital status: /Civil Union     Spouse name: Not on file   • Number of children: Not on file   • Years of education: Not on file   • Highest education level: Not on file   Occupational History   • Not on file   Tobacco Use   • Smoking status: Never   • Smokeless tobacco: Never   Vaping Use   • Vaping Use: Never used   Substance and Sexual Activity   • Alcohol use: No     Comment: social   • Drug use: No   • Sexual activity: Not on file   Other Topics Concern   • Not on file   Social History Narrative   • Not on file     Social Determinants of Health     Financial Resource Strain: Not on file   Food Insecurity: Not on file   Transportation Needs: Not on file   Physical Activity: Not on file   Stress: Not on file   Social Connections: Not on file   Intimate Partner Violence: Not on file   Housing Stability: Not on file       Current Outpatient Medications:   •  allopurinol (ZYLOPRIM) 100 mg tablet, TAKE 1 TABLET DAILY, Disp: 90 tablet, Rfl: 3  •  amLODIPine-benazepril (LOTREL 5-20) 5-20 MG per capsule, TAKE 1 CAPSULE TWICE A DAY, Disp: 180 capsule, Rfl: 3  •  atenolol (TENORMIN) 50 mg tablet, TAKE 1 TABLET TWICE A DAY, Disp: 180 tablet, Rfl: 3  •  atorvastatin (LIPITOR) 40 mg tablet, Take 1 tablet (40 mg total) by mouth daily, Disp: 90 tablet, Rfl: 3  •  dexamethasone (DECADRON) 1 mg tablet, Take 1 tablet (1 mg total) by mouth once for 1 dose Take at midnight   Lab draw at 8 am , Disp: 1 tablet, Rfl: 0  •  furosemide (LASIX) 40 mg tablet, TAKE ONE-HALF (1/2) TABLET DAILY, Disp: 45 tablet, Rfl: 3  •  Jardiance 25 MG TABS, TAKE 1 TABLET EVERY MORNING, Disp: 90 tablet, Rfl: 3  •  magnesium oxide (MAG-OX) 400 mg, Take 400 mg by mouth 2 (two) times a day, Disp: , Rfl:   •  olmesartan-hydrochlorothiazide (BENICAR HCT) 40-12 5 MG per tablet, TAKE 1 TABLET DAILY, Disp: 90 tablet, Rfl: 3  •  Ozempic, 1 MG/DOSE, 4 MG/3ML injection pen, INJECT 1 MG UNDER THE SKIN ONCE A WEEK, Disp: 9 mL, Rfl: 3  •  potassium chloride (K-DUR,KLOR-CON) 10 mEq tablet, TAKE 3 TABLETS TWICE A DAY  TAKE 3 TABLETS IN THE MORNING AND TAKE 3 TABLETS IN THE EVENING, Disp: 540 tablet, Rfl: 3  •  predniSONE 10 mg tablet, Five pills a day for 2 days, 4 pills a day for 2 days, 3 pills a day for 2 days, 2 pills a day for 2 days, 1 pill a day for 2 days (Patient not taking: Reported on 5/11/2023), Disp: 30 tablet, Rfl: 0  No Known Allergies  Vitals:    05/11/23 0941   BP: 120/78   Pulse: 96   Temp: 97 5 °F (36 4 °C)   SpO2: 97%       Physical Exam   Constitutional: General appearance: The Patient is well-developed and well-nourished who appears the stated age in no acute distress  Patient is pleasant and talkative  HEENT:  Normocephalic  Sclerae are anicteric  Mucous membranes are moist  Neck is supple without adenopathy  No JVD  Chest: The lungs are clear to auscultation  Cardiac: Heart is regular rate  Abdomen: Abdomen is soft, non-tender, non-distended and without masses  Extremities: There is no clubbing or cyanosis  There is no edema  Symmetric  Neuro: Grossly nonfocal  Gait is normal      Lymphatic: No evidence of cervical adenopathy bilaterally  No evidence of axillary adenopathy bilaterally  No evidence of inguinal adenopathy bilaterally  Skin: Warm, anicteric  Psych:  Patient is pleasant and talkative  Breasts:      Pathology:  [unfilled]    Labs:      Imaging  CT renal protocol    Result Date: 5/2/2023  Narrative: CT ABDOMEN AND PELVIS WITH AND WITHOUT IV CONTRAST INDICATION:   R93 89: Abnormal findings on diagnostic imaging of other specified body structures   COMPARISON: 3/31/2023 TECHNIQUE: Initial CT of the abdomen and pelvis was performed without intravenous contrast   Subsequent dynamic CT evaluation of the abdomen and pelvis was performed after the administration of intravenous contrast in both nephrographic and delayed phases after the administration of intravenous contrast    Multiplanar 2D reformatted images were created from the source data  Radiation dose length product (DLP) for this visit:  2699 37 mGy-cm   This examination, like all CT scans performed in the [facility], was performed utilizing techniques to minimize radiation dose exposure, including the use of iterative reconstruction and automated exposure control  IV Contrast:  100 mL of iohexol (OMNIPAQUE) Enteric Contrast:  Enteric contrast was not administered  FINDINGS: ABDOMEN RIGHT KIDNEY AND URETER: No solid renal mass  No detectable urothelial mass  Extrarenal right renal pelvis  No hydronephrosis or hydroureter  No urinary tract calculi  No perinephric collection  LEFT KIDNEY AND URETER: No solid renal mass  No detectable urothelial mass  2 7 cm interpolar cyst  No hydronephrosis or hydroureter  No urinary tract calculi  No perinephric collection  URINARY BLADDER: No bladder wall mass  No calculi  LOWER CHEST:  No clinically significant abnormality identified in the visualized lower chest  LIVER/BILIARY TREE:  One or more subcentimeter sharply circumscribed low-density hepatic lesion(s) are noted, too small to accurately characterize, but statistically most likely to represent subcentimeter hepatic cysts  No suspicious solid hepatic lesion is identified  Hepatic contours are normal   No biliary dilatation  GALLBLADDER:  No calcified gallstones  No pericholecystic inflammatory change  SPLEEN:  Unremarkable  PANCREAS:  Unremarkable  ADRENAL GLANDS: There is a 14 mm right adrenal nodule  Although its imaging features on this study is indeterminate, because this lesion has been stable for > 1 year, it is considered benign and no further followup or workup is needed  Adrenal recommendation based on institutional consensus and Journal of [employer] of Radiology 2017;14:[id number] Left adrenal gland is unremarkable  STOMACH AND BOWEL: There is colonic diverticulosis without evidence of acute diverticulitis  APPENDIX:  No findings to suggest appendicitis  ABDOMINOPELVIC CAVITY: No ascites or intraperitoneal free air  Interloop mesenteric mass or lymph node in the right mid abdomen measuring 3 5 x 2 7 x 3 2 cm and a similar enhancing mass or lymph node measuring 1 8 x 2 9 x 3 cm  VESSELS:  Unremarkable for patient's age  PELVIS REPRODUCTIVE ORGANS:  Unremarkable for patient's age  ABDOMINAL WALL/INGUINAL REGIONS: Bilateral fat-containing inguinal hernias  OSSEOUS STRUCTURES:  There are age appropriate degenerative changes  No acute fracture or destructive osseous lesion  Impression: 1  Resolution of the hyperdense appearance of the left collecting system on the prior exam  No enhancing solid renal masses are identified bilaterally  2  Resolution of the previously seen right-sided hydronephrosis and hydroureter  Dilatation of the right renal pelvis  3  Enhancing mesenteric masses or enlarged lymph nodes in the midabdomen  Concern for a neoplastic process  4  Stable 14 mm right adrenal nodule  This study was marked significant in epic  Workstation performed: [id number]     I reviewed the above laboratory and imaging data  Discussion/Summary: 59-year-old male with right adrenal nodule and mesenteric lymphadenopathy  I suspect the adrenal nodule is benign  He has never had a functional work-up that he can recall  We will perform the functional work-up  We also discussed the lymphadenopathy  It is unclear how long he has had this  I could not find this on previous imaging in the distant past   We discussed this could be benign or potentially malignant  On the CT in March, there would be a window for IR biopsy, but on his most recent CT there would be in the window  I discussed going directly to IR to see if this could be biopsied  We also discussed surgical intervention as well as obtaining a PET/CT  If the PET shows this is not FDG avid we could consider close observation    We also discussed this could be a neuroendocrine tumor, but I would suspect the lymph nodes would look a little more worrisome  I will see him back once we have the results of the PET/CT and a functional work-up  If the PET/CT does show activity, we will plan on surgical intervention  He is agreeable to this plan  All his questions were answered

## 2023-05-15 ENCOUNTER — APPOINTMENT (OUTPATIENT)
Dept: LAB | Facility: MEDICAL CENTER | Age: 68
End: 2023-05-15

## 2023-05-15 ENCOUNTER — TELEPHONE (OUTPATIENT)
Dept: OTHER | Facility: OTHER | Age: 68
End: 2023-05-15

## 2023-05-15 DIAGNOSIS — E11.22 TYPE 2 DIABETES MELLITUS WITH STAGE 2 CHRONIC KIDNEY DISEASE, WITHOUT LONG-TERM CURRENT USE OF INSULIN (HCC): ICD-10-CM

## 2023-05-15 DIAGNOSIS — M54.16 LUMBAR RADICULOPATHY: ICD-10-CM

## 2023-05-15 DIAGNOSIS — E27.8 ADRENAL NODULE (HCC): ICD-10-CM

## 2023-05-15 DIAGNOSIS — E66.9 DIABETES MELLITUS TYPE 2 IN OBESE (HCC): ICD-10-CM

## 2023-05-15 DIAGNOSIS — N18.2 TYPE 2 DIABETES MELLITUS WITH STAGE 2 CHRONIC KIDNEY DISEASE, WITHOUT LONG-TERM CURRENT USE OF INSULIN (HCC): ICD-10-CM

## 2023-05-15 DIAGNOSIS — E11.3293 TYPE 2 DIABETES MELLITUS WITH BOTH EYES AFFECTED BY MILD NONPROLIFERATIVE RETINOPATHY WITHOUT MACULAR EDEMA, WITHOUT LONG-TERM CURRENT USE OF INSULIN (HCC): ICD-10-CM

## 2023-05-15 DIAGNOSIS — E11.69 DIABETES MELLITUS TYPE 2 IN OBESE (HCC): ICD-10-CM

## 2023-05-15 DIAGNOSIS — E78.2 MIXED HYPERLIPIDEMIA: ICD-10-CM

## 2023-05-15 DIAGNOSIS — I10 PRIMARY HYPERTENSION: ICD-10-CM

## 2023-05-15 LAB
ANION GAP SERPL CALCULATED.3IONS-SCNC: 1 MMOL/L (ref 4–13)
BUN SERPL-MCNC: 16 MG/DL (ref 5–25)
CALCIUM SERPL-MCNC: 9.2 MG/DL (ref 8.3–10.1)
CHLORIDE SERPL-SCNC: 109 MMOL/L (ref 96–108)
CO2 SERPL-SCNC: 28 MMOL/L (ref 21–32)
CORTIS AM PEAK SERPL-MCNC: 1.6 UG/DL (ref 4.2–22.4)
CREAT SERPL-MCNC: 1.09 MG/DL (ref 0.6–1.3)
GFR SERPL CREATININE-BSD FRML MDRD: 70 ML/MIN/1.73SQ M
GLUCOSE P FAST SERPL-MCNC: 176 MG/DL (ref 65–99)
POTASSIUM SERPL-SCNC: 3.8 MMOL/L (ref 3.5–5.3)
SODIUM SERPL-SCNC: 138 MMOL/L (ref 135–147)

## 2023-05-15 NOTE — TELEPHONE ENCOUNTER
Lab Result: Cortisol 1 6   Date/Time Drawn: 5/15 @ 8:04am   Ordering Provider: Keri Velázquez Name: Anayeli Rivers       The following critical/stat result was read back to the lab as stated above and [employer] to the on-call provider  The provider confirmed receipt of the message

## 2023-05-18 ENCOUNTER — APPOINTMENT (OUTPATIENT)
Dept: LAB | Facility: MEDICAL CENTER | Age: 68
End: 2023-05-18

## 2023-05-18 DIAGNOSIS — E27.8 ADRENAL NODULE (HCC): ICD-10-CM

## 2023-05-18 LAB
ALDOST SERPL-MCNC: 16.1 NG/DL (ref 0–30)
DOPAMINE 24H UR-MRATE: <30 PG/ML (ref 0–48)
EPINEPH PLAS-MCNC: 40 PG/ML (ref 0–62)
NOREPINEPH PLAS-MCNC: 442 PG/ML (ref 0–874)

## 2023-05-19 LAB
METANEPH FREE SERPL-MCNC: 37.7 PG/ML (ref 0–88)
NORMETANEPHRINE SERPL-MCNC: 129.9 PG/ML (ref 0–285.2)

## 2023-05-22 LAB
CORTIS F 24H UR-MRATE: 51 UG/24 HR (ref 5–64)
CORTIS F UR-MCNC: 20 UG/L
RENIN PLAS-CCNC: <0.167 NG/ML/HR (ref 0.17–5.38)

## 2023-05-23 DIAGNOSIS — E78.5 HYPERLIPIDEMIA, UNSPECIFIED HYPERLIPIDEMIA TYPE: ICD-10-CM

## 2023-05-23 LAB
DOPAMINE 24H UR-MRATE: 237 UG/24 HR (ref 0–510)
DOPAMINE UR-MCNC: 93 UG/L
EPINEPH 24H UR-MRATE: 5 UG/24 HR (ref 0–20)
EPINEPH UR-MCNC: 2 UG/L
NOREPINEPH 24H UR-MRATE: 54 UG/24 HR (ref 0–135)
NOREPINEPH UR-MCNC: 21 UG/L

## 2023-05-23 RX ORDER — ATORVASTATIN CALCIUM 40 MG/1
TABLET, FILM COATED ORAL
Qty: 90 TABLET | Refills: 3 | Status: SHIPPED | OUTPATIENT
Start: 2023-05-23

## 2023-05-24 ENCOUNTER — HOSPITAL ENCOUNTER (OUTPATIENT)
Dept: RADIOLOGY | Age: 68
Discharge: HOME | End: 2023-05-24

## 2023-05-24 ENCOUNTER — TELEPHONE (OUTPATIENT)
Dept: SURGICAL ONCOLOGY | Facility: CLINIC | Age: 68
End: 2023-05-24

## 2023-05-24 ENCOUNTER — TELEPHONE (OUTPATIENT)
Dept: HEMATOLOGY ONCOLOGY | Facility: CLINIC | Age: 68
End: 2023-05-24

## 2023-05-24 DIAGNOSIS — D48.7 NEOPLASM OF UNCERTAIN BEHAVIOR OF LYMPH NODE: ICD-10-CM

## 2023-05-24 DIAGNOSIS — D48.1 NEOPLASM OF UNCERTAIN BEHAVIOR OF SOFT TISSUES OF ABDOMEN: ICD-10-CM

## 2023-05-24 DIAGNOSIS — R59.0 LYMPHADENOPATHY, ABDOMINAL: ICD-10-CM

## 2023-05-24 LAB — GLUCOSE SERPL-MCNC: 142 MG/DL (ref 65–140)

## 2023-05-24 NOTE — TELEPHONE ENCOUNTER
Spoke to patient and discussed PET CT results  Will discuss more with Dr Colonel Daniel when he is back from his vacation

## 2023-05-24 NOTE — TELEPHONE ENCOUNTER
----- Message from Radha Jeronimo RN sent at 5/24/2023 10:19 AM EDT -----  Patient had all his testing done sooner than expected  Can you call and see if he would want to come in next week instead of waiting until 6/9?

## 2023-05-24 NOTE — TELEPHONE ENCOUNTER
Patient Call    Who are you speaking with? Patient    If it is not the patient, are they listed on an active communication consent form? N/A   What is the reason for this call? Patient is requesting if somebody from Dr Isela Potts team can go over the CT results with him when they come back  Does this require a call back? Yes   If a call back is required, please list best call back number                  [phone]    If a call back is required, advise that a message will be forwarded to their care team and someone will return their call as soon as possible  Did you relay this information to the patient?  Yes

## 2023-05-24 NOTE — LETTER
[address] [place] [address]  [address] [facility] [address]      May 30, 2023    MRN: [id number]     Phone: [phone]     Dear Mr Joe Menendez recently had a(n) Nuclear Medicine performed on 5/24/2023 at  [address] [place] [address] that was requested by Aristides Colunga MD  The study was reviewed by a radiologist, which is a physician who specializes in medical imaging  The radiologist issued a report describing his or her findings  In that report there was a finding that the radiologist felt warranted further discussion with your health care provider and that discussion would be beneficial to you  The results were sent to Aristides Colunga MD on 05/24/2023 10:10 AM  We recommend that you contact Aristides Colunga MD at [phone] or set up an appointment to discuss the results of the imaging test  If you have already heard from Aristides Colunga MD regarding the results of your study, you can disregard this letter  This letter is not meant to alarm you, but intended to encourage you to follow-up on your results with the provider that sent you for the imaging study  In addition, we have enclosed answers to frequently asked questions by other patients who have also received a letter to review results with their health care provider (see page two)  Thank you for choosing [address] [place] [address] for your medical imaging needs  FREQUENTLY ASKED QUESTIONS    Why am I receiving this letter? [facility] requires us to notify patients who have findings on imaging exams that may require more testing or follow-up with a health professional within the next 3 months  How serious is the finding on the imaging test?  This letter is sent to all patients who may need follow-up or more testing within the next 3 months  Receiving this letter does not necessarily mean you have a life-threatening imaging finding or disease  Recommendations in the radiologist’s imaging report are general in nature and it is up to your healthcare provider to say whether those recommendations make sense for your situation  You are strongly encouraged to talk to your health care provider about the results and ask whether additional steps need to be taken  Where can I get a copy of the final report for my recent radiology exam?  To get a full copy of the report you can access your records online at [email or web address] or please contact [facility] Department at [phone] Monday through Friday between 8 am and 6 pm          What do I need to do now? Please contact your health care provider who requested the imaging study to discuss what further actions (if any) are needed  You may have already heard from (your ordering provider) in regard to this test in which case you can disregard this letter  NOTICE IN ACCORDANCE WITH THE [place] STATE “PATIENT TEST RESULT INFORMATION ACT OF 2018”    You are receiving this notice as a result of a determination by your diagnostic imaging service that further discussions of your test results are warranted and would be beneficial to you  The complete results of your test or tests have been or will be sent to the health care practitioner that ordered the test or tests  It is recommended that you contact your health care practitioner to discuss your results as soon as possible

## 2023-05-24 NOTE — TELEPHONE ENCOUNTER
Called and spoke with patient and offered sooner appointment  He declined as they will be away next week at the [place]  Appointment kept as scheduled  He was agreeable

## 2023-06-06 ENCOUNTER — OFFICE VISIT (OUTPATIENT)
Dept: UROLOGY | Facility: CLINIC | Age: 68
End: 2023-06-06
Payer: COMMERCIAL

## 2023-06-06 VITALS
HEIGHT: 70 IN | BODY MASS INDEX: 32.96 KG/M2 | SYSTOLIC BLOOD PRESSURE: 122 MMHG | HEART RATE: 64 BPM | WEIGHT: 230.2 LBS | DIASTOLIC BLOOD PRESSURE: 70 MMHG

## 2023-06-06 DIAGNOSIS — N20.0 NEPHROLITHIASIS: Primary | ICD-10-CM

## 2023-06-06 DIAGNOSIS — E27.8 ADRENAL NODULE (HCC): ICD-10-CM

## 2023-06-06 DIAGNOSIS — R59.1 LYMPHADENOPATHY: ICD-10-CM

## 2023-06-06 DIAGNOSIS — Z12.5 PROSTATE CANCER SCREENING: ICD-10-CM

## 2023-06-06 DIAGNOSIS — R93.89 ABNORMAL FINDING ON CT SCAN: ICD-10-CM

## 2023-06-06 PROCEDURE — 99213 OFFICE O/P EST LOW 20 MIN: CPT

## 2023-06-06 NOTE — PROGRESS NOTES
6/6/2023    Chief Complaint   Patient presents with   • Follow-up     Ct results        Assessment and Plan    78 y o  male     1  Ureterolithiasis   · Spontaneous passage of a 3 mm right ureteral calculi confirmed on follow-up CT Renal Protocol from 5/2/2023  · Follow-up as needed    2  Abnormal findings on CT  · Repeat CT Renal protocol from 5/2/2023 shows resolution of previously seen high density material within left renal collecting system seen on prior CT from March  There is no enhancing solid renal mass identified bilaterally  3  Adrenal Nodule  · Stable 14 mm right adrenal nodule noted on CT Renal protocol from 5/2/2023  · Biochemical workup was normal   · Follow-up 1 year    4  Prostate Cancer Screening  · Last PSA 0 8 (9/9/2021)  · ERIC benign today  · Will check update PSA and call him with those results  5  Lymphadenopathy  · Incidental finding of enhancing mesenteric masses or enlarged lymph nodes in the midabdomen on CT Renal Protocol from 5/2/2023  · Seen by Surgical Oncology Dr Radha Vee who ordered PET/CT and biochemical workup for adrenal nodule  · PET/CT (5/24/2023)  · At most there is mild radiotracer uptake in the mesenteric masses  Additionally, mild patchy radiotracer uptake related to a suspected small bowel lesion in the right hemiabdomen  · Incidentally noted small focus of FDG uptake at the left base of the tongue  Asymmetric activity also noted in the left lingual tonsillar region  · Has follow-up with Surgical Oncology on Thursday to review imaging in person  Likely plan for IR biopsy  Subjective:     He presents today reporting doing well since our last office visit  He continues to deny any flank pain or gross hematuria  At baseline he has no issues voiding  He is scheduled for follow-up with surgical oncology in 2 days to review his PET/CT in person and will likely require a IR biopsy  We reviewed his CT renal protocol imaging in detail today    All questions were answered  History of Present Illness  Patti Quintero is a 78 y o  male here for follow-up evaluation of a 3 mm right ureteral calculi as well as abnormal findings on his CT imaging from 3/31/2023  He was initially seen by me on 4/18/2023 for evaluation of the above  He reported prior urologic care in the past with Dr Valentine Stanford for history of microscopic hematuria and annual prostate cancer screening  He denies any history of kidney stones  He was last seen by Dr Garett Louis about 2-3 years ago and reports negative microscopic hematuria workup in the past       He presented to [address] [place] [address] emergency department on 3/31/2023 for complaints of right flank pain with nausea and vomiting  Outpatient CT from 3/31/2023 showed a obstructing 3 mm right ureteral calculi located just proximal to the right UVJ with mild right-sided hydroureteronephrosis  Additional finding of high density material in the left collecting system could represent hemorrhage or excreting recently administered IV contrast material   There is also a simple left renal parapelvic cyst   Review of labs at time of ER visit showed ALLIE with creatinine 2 01, slight leukocytosis of 10 66, and urine was negative for infection  He has had repeat labs completed on 4/6/2023 which showed a improved ALLIE with creatinine 1 60 and again negative leukocytosis  At the time of his initial office visit with me he reported that he believed he may have passed the right ureteral calculi about 1 week prior  He reported that he had experienced some discomfort and pressure with urination, with sudden relief of pain  He did not strain his urine and did not visualize any stone  He has since not required any pain medication and denies any gross hematuria       He denies any smoking or chemical exposure history  He does reports a family history of bladder cancer in his brother who was a heavy smoker        I recommended a CT renal protocol for evaluation of both the right ureteral calculi as well as for reassessment of the high density material in the left collecting system  CT renal protocol was completed on 5/2/2023 which showed resolution of the hyperdense appearance of the left collecting system on prior exam   There is no enhancing solid renal mass identified bilaterally  There is also resolution of the previously seen right-sided hydronephrosis and hydroureter  There is a 1 4 cm right adrenal nodule, but this has been stable for over a year  Incidentally there was a enhancing mesenteric masses or enlarged lymph nodes in the mid abdomen with concern for neoplastic process  Personally reviewed these findings with the patient over the phone and referred him to surgical oncology for further evaluation of the mesenteric masses or enlarged lymph nodes  He will be seen by Dr Ida Page with Surgical Oncology on 5/11/2023 felt that the adrenal nodule was benign however he never had a functional work-up and this was ordered  In regards to the lymphadenopathy he discussed consideration for IR biopsy versus a PET/CT  If the PET showed no FDG avid we will consider close observation  I also discussed the possibility of a neuroendocrine tumor, but felt the lymph nodes would look more worrisome  PET/CT was completed on 5/24/2023 which showed mild radiotracer uptake in the mesenteric masses  Additionally, mild patchy radiotracer uptake related to a suspected small bowel lesion in the right hemiabdomen  Differential would include entities such as well-differentiated neuroendocrine tumor vs low-grade lymphoma  Additionally incidentally noted small focus of FDG uptake at the left base of the tongue  Asymmetric activity also noted in the left lingual tonsillar region  Review of Systems   Constitutional: Negative for chills and fever  HENT: Negative for congestion and sore throat  Respiratory: Negative for cough and shortness of breath      Cardiovascular: "Negative for chest pain and leg swelling  Gastrointestinal: Negative for abdominal pain, constipation and diarrhea  Genitourinary: Negative for difficulty urinating, dysuria, frequency, hematuria and urgency  Musculoskeletal: Negative for back pain and gait problem  Skin: Negative for wound  Allergic/Immunologic: Negative for immunocompromised state  Hematological: Does not bruise/bleed easily  Vitals  Vitals:    06/06/23 1010   BP: 122/70   BP Location: Right arm   Patient Position: Sitting   Cuff Size: Adult   Pulse: 64   Weight: 104 kg (230 lb 3 2 oz)   Height: 5' 10\" (1 778 m)       Physical Exam  Vitals reviewed  Constitutional:       General: He is not in acute distress  Appearance: Normal appearance  He is not ill-appearing or toxic-appearing  HENT:      Head: Normocephalic and atraumatic  Eyes:      General: No scleral icterus  Conjunctiva/sclera: Conjunctivae normal    Cardiovascular:      Rate and Rhythm: Normal rate  Pulmonary:      Effort: Pulmonary effort is normal  No respiratory distress  Abdominal:      Tenderness: There is no right CVA tenderness or left CVA tenderness  Hernia: No hernia is present  Genitourinary:     Comments: Rectal exam reveals normal tone, no masses and his prostate is smooth, symmetric, and benign  No nodules  Musculoskeletal:      Cervical back: Normal range of motion  Right lower leg: No edema  Left lower leg: No edema  Skin:     General: Skin is warm and dry  Coloration: Skin is not jaundiced or pale  Neurological:      General: No focal deficit present  Mental Status: He is alert and oriented to person, place, and time  Mental status is at baseline  Gait: Gait normal    Psychiatric:         Mood and Affect: Mood normal          Behavior: Behavior normal          Thought Content:  Thought content normal          Judgment: Judgment normal          Past History  Past Medical History:   Diagnosis " Date   • Allergic rhinitis     last assessed: 2015   • Aneurysm of thoracic aorta [facility])    • Cataract     resolved: 2014   • Colon polyp    • Diabetes mellitus (HCC)     diet control   • Herniated nucleus pulposus, L4-5 left     last assessed: 2014   • Herniated nucleus pulposus, L5-S1, left     last assessed: 2014   • Hx of echocardiogram 10/14/2016    EF 55%, Mild LVH, mild aortic root and ascending aorta enlargement, measuring 42 mm, trace MR, trace aortic regurg     • Hypercholesterolemia    • Hypertension    • Lumbar radiculopathy      Social History     Socioeconomic History   • Marital status: /Civil Union     Spouse name: None   • Number of children: None   • Years of education: None   • Highest education level: None   Occupational History   • None   Tobacco Use   • Smoking status: Never   • Smokeless tobacco: Never   Vaping Use   • Vaping Use: Never used   Substance and Sexual Activity   • Alcohol use: No     Comment: social   • Drug use: No   • Sexual activity: None   Other Topics Concern   • None   Social History Narrative   • None     Social Determinants of Health     Financial Resource Strain: Not on file   Food Insecurity: Not on file   Transportation Needs: Not on file   Physical Activity: Not on file   Stress: Not on file   Social Connections: Not on file   Intimate Partner Violence: Not on file   Housing Stability: Not on file     Social History     Tobacco Use   Smoking Status Never   Smokeless Tobacco Never     Family History   Problem Relation Age of Onset   • Thyroid disease Mother         disorder   • Hypertension Father    • Coronary artery disease Brother    • Heart attack Brother         prior myocardial infarction       The following portions of the patient's history were reviewed and updated as appropriate allergies, current medications, past medical history, past social history, past surgical history and problem list    Imagin/02/2023  CT ABDOMEN AND PELVIS WITH AND WITHOUT IV CONTRAST     INDICATION:   R93 89: Abnormal findings on diagnostic imaging of other specified body structures      COMPARISON: 3/31/2023     TECHNIQUE: Initial CT of the abdomen and pelvis was performed without intravenous contrast   Subsequent dynamic CT evaluation of the abdomen and pelvis was performed after the administration of intravenous contrast in both nephrographic and delayed   phases after the administration of intravenous contrast    Multiplanar 2D reformatted images were created from the source data      Radiation dose length product (DLP) for this visit:  2699 37 mGy-cm   This examination, like all CT scans performed in the [facility], was performed utilizing techniques to minimize radiation dose exposure, including the use of   iterative reconstruction and automated exposure control      IV Contrast:  100 mL of iohexol (OMNIPAQUE)  Enteric Contrast:  Enteric contrast was not administered      FINDINGS:     ABDOMEN     RIGHT KIDNEY AND URETER:  No solid renal mass  No detectable urothelial mass  Extrarenal right renal pelvis  No hydronephrosis or hydroureter  No urinary tract calculi  No perinephric collection      LEFT KIDNEY AND URETER:  No solid renal mass  No detectable urothelial mass  2 7 cm interpolar cyst   No hydronephrosis or hydroureter  No urinary tract calculi  No perinephric collection      URINARY BLADDER:  No bladder wall mass  No calculi         LOWER CHEST:  No clinically significant abnormality identified in the visualized lower chest      LIVER/BILIARY TREE:  One or more subcentimeter sharply circumscribed low-density hepatic lesion(s) are noted, too small to accurately characterize, but statistically most likely to represent subcentimeter hepatic cysts  No suspicious solid hepatic   lesion is identified  Hepatic contours are normal   No biliary dilatation      GALLBLADDER:  No calcified gallstones   No pericholecystic inflammatory change      SPLEEN:  Unremarkable      PANCREAS:  Unremarkable      ADRENAL GLANDS: There is a 14 mm right adrenal nodule  Although its imaging features on this study is indeterminate, because this lesion has been stable for > 1 year, it is considered benign and no further followup or workup is needed      Adrenal recommendation based on institutional consensus and Journal of [facility] of Radiology 2017;14:[id number]     Left adrenal gland is unremarkable      STOMACH AND BOWEL: There is colonic diverticulosis without evidence of acute diverticulitis      APPENDIX:  No findings to suggest appendicitis      ABDOMINOPELVIC CAVITY: No ascites or intraperitoneal free air  Interloop mesenteric mass or lymph node in the right mid abdomen measuring 3 5 x 2 7 x 3 2 cm and a similar enhancing mass or lymph node measuring 1 8 x 2 9 x 3 cm      VESSELS:  Unremarkable for patient's age      PELVIS     REPRODUCTIVE ORGANS:  Unremarkable for patient's age      ABDOMINAL WALL/INGUINAL REGIONS: Bilateral fat-containing inguinal hernias      OSSEOUS STRUCTURES:  There are age appropriate degenerative changes  No acute fracture or destructive osseous lesion      IMPRESSION:     1  Resolution of the hyperdense appearance of the left collecting system on the prior exam  No enhancing solid renal masses are identified bilaterally  2  Resolution of the previously seen right-sided hydronephrosis and hydroureter  Dilatation of the right renal pelvis  3  Enhancing mesenteric masses or enlarged lymph nodes in the midabdomen  Concern for a neoplastic process  4  Stable 14 mm right adrenal nodule                      3/31/2023  CT ABDOMEN AND PELVIS WITHOUT IV CONTRAST     INDICATION:   R11 2: Nausea with vomiting, unspecified  R10 84: Generalized abdominal pain      COMPARISON:  Retroperitoneal ultrasound 9/22/2009      TECHNIQUE:  CT examination of the abdomen and pelvis was performed without intravenous contrast  Multiplanar 2D reformatted images were created from the source data      Radiation dose length product (DLP) for this visit:  1001 49 mGy-cm   This examination, like all CT scans performed in the [facility], was performed utilizing techniques to minimize radiation dose exposure, including the use of   iterative reconstruction and automated exposure control       Enteric contrast was administered       FINDINGS:     ABDOMEN     LOWER CHEST:  No clinically significant abnormality identified in the visualized lower chest      LIVER/BILIARY TREE:  One or more subcentimeter sharply circumscribed low-density hepatic lesion(s) are noted, too small to accurately characterize, but statistically most likely to represent subcentimeter hepatic cysts  No suspicious solid hepatic   lesion is identified  Hepatic contours are normal   No biliary dilatation      GALLBLADDER:  No calcified gallstones  No pericholecystic inflammatory change      SPLEEN:  Unremarkable      PANCREAS:  Unremarkable      ADRENAL GLANDS: There is a 14 mm right adrenal nodule  Although its imaging features on this study is indeterminate, because this lesion has been stable for > 1 year, it is considered benign and no further followup or workup is needed       Adrenal recommendation based on institutional consensus and Journal of [address] [place] [address] of Radiology 2017;14:[id number]           KIDNEYS/URETERS:  Obstructive 3 mm right ureteral calculus located just proximal to the right UVJ causes mild right hydroureteronephrosis      High density material within the left renal collecting system could represent hemorrhage or contrast from a prior study      Simple left renal parapelvic cysts      STOMACH AND BOWEL:  There is colonic diverticulosis without evidence of acute diverticulitis      APPENDIX:  No findings to suggest appendicitis      ABDOMINOPELVIC CAVITY:  No ascites  No pneumoperitoneum    No lymphadenopathy      VESSELS: Unremarkable for patient's age      PELVIS     REPRODUCTIVE ORGANS:  Prostamegaly      URINARY BLADDER:  Unremarkable      ABDOMINAL WALL/INGUINAL REGIONS:  Small uncomplicated fat-containing left inguinal hernia      OSSEOUS STRUCTURES:  No acute fracture or destructive osseous lesion      IMPRESSION:     Obstructive 3 mm right ureteral calculus located just proximal to the right UVJ causes mild right hydroureteronephrosis      High density material in the left collecting system could represent hemorrhage or excreted recently administered IV contrast material   Correlate with the patient's recent outside history; if the patient has not recently had IV contrast then consider   follow-up with CT renal protocol  Results  No results found for this or any previous visit (from the past 1 hour(s)) ]  Lab Results   Component Value Date    PSA 0 8 09/09/2021    PSA 0 8 06/25/2020    PSA 0 6 04/03/2018    PSA 0 6 11/17/2015     Lab Results   Component Value Date    BUN 16 05/15/2023    CALCIUM 9 2 05/15/2023     (H) 05/15/2023    CO2 28 05/15/2023    CREATININE 1 09 05/15/2023    GLUCOSE 141 (H) 09/09/2015    K 3 8 05/15/2023     09/09/2015     Lab Results   Component Value Date    HCT 45 2 04/06/2023    HGB 14 4 04/06/2023    MCV 97 04/06/2023     04/06/2023    WBC 7 51 04/06/2023       Please Note:  Voice dictation software has been used to create this document  There may be inadvertent transcriptions errors       VIN Richmond 06/06/23

## 2023-06-08 ENCOUNTER — OFFICE VISIT (OUTPATIENT)
Dept: SURGICAL ONCOLOGY | Facility: CLINIC | Age: 68
End: 2023-06-08
Payer: COMMERCIAL

## 2023-06-08 ENCOUNTER — APPOINTMENT (OUTPATIENT)
Dept: LAB | Facility: HOSPITAL | Age: 68
End: 2023-06-08
Payer: COMMERCIAL

## 2023-06-08 ENCOUNTER — APPOINTMENT (OUTPATIENT)
Dept: LAB | Facility: HOSPITAL | Age: 68
End: 2023-06-08
Attending: SURGERY
Payer: COMMERCIAL

## 2023-06-08 VITALS
DIASTOLIC BLOOD PRESSURE: 70 MMHG | BODY MASS INDEX: 32.64 KG/M2 | HEART RATE: 67 BPM | SYSTOLIC BLOOD PRESSURE: 124 MMHG | RESPIRATION RATE: 18 BRPM | WEIGHT: 228 LBS | HEIGHT: 70 IN | OXYGEN SATURATION: 97 % | TEMPERATURE: 96.7 F

## 2023-06-08 DIAGNOSIS — R59.0 LYMPHADENOPATHY, ABDOMINAL: ICD-10-CM

## 2023-06-08 DIAGNOSIS — R59.0 LYMPHADENOPATHY, ABDOMINAL: Primary | ICD-10-CM

## 2023-06-08 DIAGNOSIS — E27.8 ADRENAL NODULE (HCC): ICD-10-CM

## 2023-06-08 LAB
ALBUMIN SERPL BCP-MCNC: 4.7 G/DL (ref 3.5–5)
ALP SERPL-CCNC: 65 U/L (ref 34–104)
ALT SERPL W P-5'-P-CCNC: 26 U/L (ref 7–52)
ANION GAP SERPL CALCULATED.3IONS-SCNC: 9 MMOL/L (ref 4–13)
APTT PPP: 24 SECONDS (ref 23–37)
AST SERPL W P-5'-P-CCNC: 14 U/L (ref 13–39)
BASOPHILS # BLD AUTO: 0.03 THOUSANDS/ÂΜL (ref 0–0.1)
BASOPHILS NFR BLD AUTO: 1 % (ref 0–1)
BILIRUB SERPL-MCNC: 0.71 MG/DL (ref 0.2–1)
BUN SERPL-MCNC: 16 MG/DL (ref 5–25)
CALCIUM SERPL-MCNC: 10.3 MG/DL (ref 8.4–10.2)
CHLORIDE SERPL-SCNC: 103 MMOL/L (ref 96–108)
CO2 SERPL-SCNC: 34 MMOL/L (ref 21–32)
CREAT SERPL-MCNC: 1.14 MG/DL (ref 0.6–1.3)
EOSINOPHIL # BLD AUTO: 0.07 THOUSAND/ÂΜL (ref 0–0.61)
EOSINOPHIL NFR BLD AUTO: 1 % (ref 0–6)
ERYTHROCYTE [DISTWIDTH] IN BLOOD BY AUTOMATED COUNT: 13.8 % (ref 11.6–15.1)
GFR SERPL CREATININE-BSD FRML MDRD: 66 ML/MIN/1.73SQ M
GLUCOSE SERPL-MCNC: 169 MG/DL (ref 65–140)
HCT VFR BLD AUTO: 47.3 % (ref 36.5–49.3)
HGB BLD-MCNC: 15.4 G/DL (ref 12–17)
IMM GRANULOCYTES # BLD AUTO: 0 THOUSAND/UL (ref 0–0.2)
IMM GRANULOCYTES NFR BLD AUTO: 0 % (ref 0–2)
INR PPP: 0.94 (ref 0.84–1.19)
LYMPHOCYTES # BLD AUTO: 1.27 THOUSANDS/ÂΜL (ref 0.6–4.47)
LYMPHOCYTES NFR BLD AUTO: 22 % (ref 14–44)
MCH RBC QN AUTO: 31.5 PG (ref 26.8–34.3)
MCHC RBC AUTO-ENTMCNC: 32.6 G/DL (ref 31.4–37.4)
MCV RBC AUTO: 97 FL (ref 82–98)
MONOCYTES # BLD AUTO: 0.44 THOUSAND/ÂΜL (ref 0.17–1.22)
MONOCYTES NFR BLD AUTO: 7 % (ref 4–12)
NEUTROPHILS # BLD AUTO: 4.11 THOUSANDS/ÂΜL (ref 1.85–7.62)
NEUTS SEG NFR BLD AUTO: 69 % (ref 43–75)
NRBC BLD AUTO-RTO: 0 /100 WBCS
PLATELET # BLD AUTO: 163 THOUSANDS/UL (ref 149–390)
PMV BLD AUTO: 9.8 FL (ref 8.9–12.7)
POTASSIUM SERPL-SCNC: 3.4 MMOL/L (ref 3.5–5.3)
PROT SERPL-MCNC: 7.2 G/DL (ref 6.4–8.4)
PROTHROMBIN TIME: 12.8 SECONDS (ref 11.6–14.5)
RBC # BLD AUTO: 4.89 MILLION/UL (ref 3.88–5.62)
SODIUM SERPL-SCNC: 146 MMOL/L (ref 135–147)
WBC # BLD AUTO: 5.92 THOUSAND/UL (ref 4.31–10.16)

## 2023-06-08 PROCEDURE — 80053 COMPREHEN METABOLIC PANEL: CPT

## 2023-06-08 PROCEDURE — 99215 OFFICE O/P EST HI 40 MIN: CPT | Performed by: SURGERY

## 2023-06-08 PROCEDURE — 85025 COMPLETE CBC W/AUTO DIFF WBC: CPT

## 2023-06-08 PROCEDURE — 85730 THROMBOPLASTIN TIME PARTIAL: CPT

## 2023-06-08 PROCEDURE — 36415 COLL VENOUS BLD VENIPUNCTURE: CPT

## 2023-06-08 PROCEDURE — 93005 ELECTROCARDIOGRAM TRACING: CPT

## 2023-06-08 PROCEDURE — 85610 PROTHROMBIN TIME: CPT

## 2023-06-08 RX ORDER — OXYCODONE HYDROCHLORIDE 5 MG/1
5 TABLET ORAL EVERY 6 HOURS PRN
Qty: 10 TABLET | Refills: 0 | Status: SHIPPED | OUTPATIENT
Start: 2023-06-08 | End: 2023-06-08 | Stop reason: CLARIF

## 2023-06-08 RX ORDER — CEFAZOLIN SODIUM 2 G/50ML
2000 SOLUTION INTRAVENOUS ONCE
OUTPATIENT
Start: 2023-06-08 | End: 2023-06-08

## 2023-06-08 NOTE — PROGRESS NOTES
Surgical Oncology Follow Up       [address] [facility] [place]  [address] [facility] [place]  [place] [address]  Nayeli Basilio Solid  1955  [id number]  [address] [facility] [place]  [address] [facility] [address] [place] [address] [phone]    Diagnoses and all orders for this visit:    Lymphadenopathy, abdominal  -     Case request operating room: RESECTION MESENTERIC MASS, POSSIBLE SMALL BOWEL RESECTION; Standing  -     Comprehensive metabolic panel; Future  -     CBC and differential; Future  -     EKG 12 lead; Future  -     oxyCODONE (Roxicodone) 5 immediate release tablet; Take 1 tablet (5 mg total) by mouth every 6 (six) hours as needed for moderate pain Max Daily Amount: 20 mg  -     Case request operating room: RESECTION MESENTERIC MASS, POSSIBLE SMALL BOWEL RESECTION    Adrenal nodule ([facility] [address] )    Other orders  -     Incentive spirometry; Standing  -     Insert and maintain IV line; Standing  -     Void On-Call to O R ; Standing  -     Place sequential compression device; Standing  -     ceFAZolin (ANCEF) IVPB (premix in dextrose) 2,000 mg 50 mL        Chief Complaint   Patient presents with   • Follow-up       No follow-ups on file  Oncology History    No history exists  History of Present Illness: Patient returns in follow-up  He is feeling well  No abdominal pain, nausea or vomiting  He has lost 35 pounds since starting Ozempic  His biggest complaint is his sciatica  PET/CT from May 24, 2023 shows low-level FDG uptake in the right mesenteric masses  There is the possibility of a nodularity in the small bowel loop with an SUV of 3 2  There is no FDG activity in the adrenal nodule  There was incidental FDG activity at the base of the tongue  I personally reviewed the films      Review of Systems  Complete ROS Surg Onc:   Complete ROS Surg Onc:   Constitutional: The patient denies new or recent history of general fatigue, no recent weight loss, no change in appetite  Eyes: No complaints of visual problems, no scleral icterus  ENT: no complaints of ear pain, no hoarseness, no difficulty swallowing,  no tinnitus and no new masses in head, oral cavity, or neck  Cardiovascular: No complaints of chest pain, no palpitations, no ankle edema  Respiratory: No complaints of shortness of breath, no cough  Gastrointestinal: No complaints of jaundice, no bloody stools, no pale stools  Genitourinary: No complaints of dysuria, no hematuria, no nocturia, no frequent urination, no urethral discharge  Musculoskeletal: No complaints of weakness, paralysis, joint stiffness or arthralgias  Integumentary: No complaints of rash, no new lesions  Neurological: No complaints of convulsions, no seizures, no dizziness  Hematologic/Lymphatic: No complaints of easy bruising  Endocrine:  No hot or cold intolerance  No polydipsia, polyphagia, or polyuria  Allergy/immunology:  No environmental allergies  No food allergies  Not immunocompromised  Skin:  No pallor or rash  No wound          Patient Active Problem List   Diagnosis   • Aneurysm of thoracic aorta ([facility])   • Edema   • Heel spur   • Mixed hyperlipidemia   • Hypertension   • Hyperuricemia without signs inflammatory arthritis/tophaceous disease   • Chronic bilateral low back pain   • Neuropathy of left foot   • Non-toxic uninodular goiter   • Osteoarthritis   • Sciatica   • Diabetes mellitus type 2 in obese [facility])   • Restless leg syndrome   • Skin tag   • Acute pain of left shoulder   • Microscopic hematuria   • Pain of left thigh   • Lumbar radiculopathy   • Colonic polyp   • Hypokalemia   • Severe obesity (BMI 35 0-35 9 with comorbidity) ([facility])   • Type 2 diabetes mellitus with mild nonproliferative retinopathy of both eyes without macular edema ([facility])   • Type 2 diabetes with stage 2 chronic kidney disease GFR 60-89 ([facility] [address] ) • Nausea and vomiting   • Generalized abdominal pain   • Stage 3a chronic kidney disease (HCC)   • Adrenal nodule (HCC)   • Lymphadenopathy, abdominal     Past Medical History:   Diagnosis Date   • Allergic rhinitis     last assessed: 5/4/2015   • Aneurysm of thoracic aorta (HCC)    • Cataract     resolved: 12/12/2014   • Colon polyp    • Diabetes mellitus (HCC)     diet control   • Herniated nucleus pulposus, L4-5 left     last assessed: 4/9/2014   • Herniated nucleus pulposus, L5-S1, left     last assessed: 4/9/2014   • Hx of echocardiogram 10/14/2016    EF 55%, Mild LVH, mild aortic root and ascending aorta enlargement, measuring 42 mm, trace MR, trace aortic regurg     • Hypercholesterolemia    • Hypertension    • Lumbar radiculopathy      Past Surgical History:   Procedure Laterality Date   • BACK SURGERY     • BACK SURGERY     • KNEE ARTHROSCOPY      (therapeutic)   • KNEE SURGERY     • RI COLONOSCOPY FLX DX W/COLLJ SPEC WHEN PFRMD N/A 9/30/2016    Procedure: COLONOSCOPY;  Surgeon: Jonh Silvestre MD;  Location: [facility];  Service: Gastroenterology   • TONSILLECTOMY AND ADENOIDECTOMY       Family History   Problem Relation Age of Onset   • Thyroid disease Mother         disorder   • Hypertension Father    • Coronary artery disease Brother    • Heart attack Brother         prior myocardial infarction     Social History     Socioeconomic History   • Marital status: /Civil Union     Spouse name: Not on file   • Number of children: Not on file   • Years of education: Not on file   • Highest education level: Not on file   Occupational History   • Not on file   Tobacco Use   • Smoking status: Never   • Smokeless tobacco: Never   Vaping Use   • Vaping Use: Never used   Substance and Sexual Activity   • Alcohol use: No     Comment: social   • Drug use: No   • Sexual activity: Not on file   Other Topics Concern   • Not on file   Social History Narrative   • Not on file     Social Determinants of Health Financial Resource Strain: Not on file   Food Insecurity: Not on file   Transportation Needs: Not on file   Physical Activity: Not on file   Stress: Not on file   Social Connections: Not on file   Intimate Partner Violence: Not on file   Housing Stability: Not on file       Current Outpatient Medications:   •  allopurinol (ZYLOPRIM) 100 mg tablet, TAKE 1 TABLET DAILY, Disp: 90 tablet, Rfl: 3  •  amLODIPine-benazepril (LOTREL 5-20) 5-20 MG per capsule, TAKE 1 CAPSULE TWICE A DAY, Disp: 180 capsule, Rfl: 3  •  atenolol (TENORMIN) 50 mg tablet, TAKE 1 TABLET TWICE A DAY, Disp: 180 tablet, Rfl: 3  •  atorvastatin (LIPITOR) 40 mg tablet, TAKE 1 TABLET DAILY, Disp: 90 tablet, Rfl: 3  •  furosemide (LASIX) 40 mg tablet, TAKE ONE-HALF (1/2) TABLET DAILY, Disp: 45 tablet, Rfl: 3  •  Jardiance 25 MG TABS, TAKE 1 TABLET EVERY MORNING, Disp: 90 tablet, Rfl: 3  •  magnesium oxide (MAG-OX) 400 mg, Take 400 mg by mouth 2 (two) times a day, Disp: , Rfl:   •  olmesartan-hydrochlorothiazide (BENICAR HCT) 40-12 5 MG per tablet, TAKE 1 TABLET DAILY, Disp: 90 tablet, Rfl: 3  •  oxyCODONE (Roxicodone) 5 immediate release tablet, Take 1 tablet (5 mg total) by mouth every 6 (six) hours as needed for moderate pain Max Daily Amount: 20 mg, Disp: 10 tablet, Rfl: 0  •  Ozempic, 1 MG/DOSE, 4 MG/3ML injection pen, INJECT 1 MG UNDER THE SKIN ONCE A WEEK, Disp: 9 mL, Rfl: 3  •  potassium chloride (K-DUR,KLOR-CON) 10 mEq tablet, TAKE 3 TABLETS TWICE A DAY   TAKE 3 TABLETS IN THE MORNING AND TAKE 3 TABLETS IN THE EVENING, Disp: 540 tablet, Rfl: 3  •  predniSONE 10 mg tablet, Five pills a day for 2 days, 4 pills a day for 2 days, 3 pills a day for 2 days, 2 pills a day for 2 days, 1 pill a day for 2 days (Patient not taking: Reported on 5/11/2023), Disp: 30 tablet, Rfl: 0  No Known Allergies  Vitals:    06/08/23 0813   BP: 124/70   Pulse: 67   Resp: 18   Temp: (!) 96 7 °F (35 9 °C)   SpO2: 97%       Physical Exam  Constitutional: General appearance: The Patient is well-developed and well-nourished who appears the stated age in no acute distress  Patient is pleasant and talkative  HEENT:  Normocephalic  Sclerae are anicteric  Mucous membranes are moist  Neck is supple without adenopathy  No JVD  Chest: The lungs are clear to auscultation  Cardiac: Heart is regular rate  Abdomen: Abdomen is soft, non-tender, non-distended and without masses  Extremities: There is no clubbing or cyanosis  There is no edema  Symmetric  Neuro: Grossly nonfocal  Gait is normal      Lymphatic: No evidence of cervical adenopathy bilaterally  No evidence of axillary adenopathy bilaterally  Skin: Warm, anicteric  Psych:  Patient is pleasant and talkative  Breasts:        Pathology:  [unfilled]    Labs:   Ref Range & Units 5/15/23  8:04 AM   Aldosterone 0 0 - 30 0 ng/dL 16 1       Ref Range & Units 5/15/23  8:04 AM   Norepinephrine Plasma 0 - 874 pg/mL 442    Epinephrine Plasma 0 - 62 pg/mL 40    Dopamine Plasma 0 - 48 pg/mL <30       Ref Range & Units 5/15/23  8:04 AM   Cortisol - AM 4 2 - 22 4 ug/dL 1 6 Low Panic       Ref Range & Units 5/15/23  8:04 AM   Normetanephrine, Free 0 0 - 285 2 pg/mL 129 9    Metanephrine, Free 0 0 - 88 0 pg/mL 37 7       Ref Range & Units 5/15/23  8:04 AM   Renin 0 167 - 5 380 ng/mL/hr <0 167       0 Result Notes       Component Ref Range & Units 5/18/23 10:40 AM   Epinephrine, 24H Ur 0 - 20 ug/24 hr 5    Norepinephrine, 24H Ur 0 - 135 ug/24 hr 54    Dopamine , 24H Ur 0 - 510 ug/24 hr 237    Epinephrine, Mulga Ur Undefined ug/L 2    Comment: Total Volume: 2550 mL   Norepinephrine, Rand Ur Undefined ug/L 21    Dopamine, Rand Ur Undefined ug/L 93               Imaging  NM PET CT skull base to mid thigh    Result Date: 5/24/2023  Narrative: PET/CT SCAN INDICATION: Abnormal CT  Lymphadenopathy   D48 7: Neoplasm of uncertain behavior of other specified sites D48 1: Neoplasm of uncertain behavior of connective and other soft tissue R59 0: Localized enlarged lymph nodes MODIFIER: PI COMPARISON: CT abdomen pelvis 5/2/2023 and additional priors CELL TYPE: N/A TECHNIQUE:   10 1 mCi F-18-FDG administered IV  Multiplanar attenuation corrected and non attenuation corrected PET images are available for interpretation, and contiguous, low dose, axial CT sections were obtained from the skull base through the femurs  Intravenous contrast material was not utilized  This examination, like all CT scans performed in the [place], was performed utilizing techniques to minimize radiation dose exposure, including the use of iterative reconstruction and automated exposure control  Fasting serum glucose: 142 mg/dl FINDINGS: VISUALIZED BRAIN: No acute abnormalities are seen  HEAD/NECK: There is a small focus of FDG uptake at the left base of the tongue, SUV max of 3 8  No obvious findings on the limited CT  See image 46 series 1200  There is also asymmetric activity in the left lingual tonsillar region SUV max of 3 7  No FDG-avid lymph nodes  CT images: No additional significant findings  CHEST: No FDG avid soft tissue lesions are seen  CT images: Moderate coronary artery calcifications  ABDOMEN: There is low-level FDG uptake in the right mesenteric masses  More superior mesenteric mass demonstrates SUV max of 2 5 and measures 3 4 x 2 7 cm image 175 series 3  The more inferolateral mass demonstrates SUV max of 1 7  This measures 3 0 x 1 5 cm image 181 series 3  Slight nodularity related to a small bowel loop in the right hemiabdomen with mild patchy radiotracer uptake, SUV max of 3 2  In retrospect on prior contrast CT there is likely an enhancing small bowel lesion in this region  See for example image 85 series 3 on the CT of 5/2/2023  Otherwise variable physiologic bowel activity  CT images: Stable 1 4 cm right adrenal nodule, not FDG avid  Small left renal parapelvic cyst  Colonic diverticulosis   PELVIS: No FDG avid soft tissue lesions are seen  Slightly linear activity in the region of the prostatic urethra probably related to urine retention  CT images: Prostate is mildly enlarged  Suggestion of a small fat-containing right inguinal hernia  OSSEOUS STRUCTURES: No FDG avid lesions are seen  CT images: No significant findings  For reference: SUV max of the mediastinal blood pool: 2 9 SUV max of the liver: 3 9     Impression: 1  At most there is mild radiotracer uptake in the mesenteric masses  Additionally, mild patchy radiotracer uptake related to a suspected small bowel lesion in the right hemiabdomen  Differential would include entities such as well-differentiated neuroendocrine tumor vs low-grade lymphoma  If an underlying neuroendocrine tumor is suspected based on clinical grounds, consider follow-up with Netspot PET/CT as this would be more sensitive than FDG for neuroendocrine lesions  2  Incidentally noted small focus of FDG uptake at the left base of the tongue  Asymmetric activity also noted in the left lingual tonsillar region  Underlying lesions should be excluded  Correlate with direct visualization  The study was marked in EPIC for significant notification  Workstation performed: [id number]     I reviewed the above laboratory and imaging data  Discussion/Summary: 60-year-old male with a right adrenal nodule and mesenteric lymphadenopathy and a possible small bowel lesion  A functional work-up of his adrenal lesion is essentially negative  His low reading level, I suspect is related to his medications  I would not recommend any intervention outside of continued observation  He will discuss this with his primary physician  In regard to the mesenteric lymphadenopathy, this has low-level SUV  This may be neuroendocrine tumor  I suspect because of the low level of FDG activity this is a low-grade tumor  I would recommend resection of this with a possible small bowel resection    A dotatate PET/CT would likely not change any management  The risks of resection of his mesenteric masses with possible small bowel resection were explained and included bleeding, infection, recurrence, need for further surgery, wound complications and adjacent organ injury and anastomotic leak  Informed consent was obtained  We will schedule this at our earliest mutual convenience  He is agreeable to this plan  All his questions were answered

## 2023-06-08 NOTE — LETTER
June 8, 2023     Darby Gamboa, [address] [facility]  [address]    Patient: Lane Azar   YOB: 1955   Date of Visit: 6/8/2023       Dear Dr Lorna Mauricio:    Thank you for referring Lesli Hernandez to me for evaluation  Below are my notes for this consultation  If you have questions, please do not hesitate to call me  I look forward to following your patient along with you  Sincerely,        Cecy Azar MD        CC: No Recipients    Cecy Azar MD  6/8/2023  8:38 AM  Sign when Signing Visit               Surgical Oncology Follow Up       [address] [facility] [address] [address]  [facility]   1955  [id number]  [address] [place] [address] [facility] [address] [address] [phone]    Diagnoses and all orders for this visit:    Lymphadenopathy, abdominal  -     Case request operating room: RESECTION MESENTERIC MASS, POSSIBLE SMALL BOWEL RESECTION; Standing  -     Comprehensive metabolic panel; Future  -     CBC and differential; Future  -     EKG 12 lead; Future  -     oxyCODONE (Roxicodone) 5 immediate release tablet; Take 1 tablet (5 mg total) by mouth every 6 (six) hours as needed for moderate pain Max Daily Amount: 20 mg  -     Case request operating room: RESECTION MESENTERIC MASS, POSSIBLE SMALL BOWEL RESECTION    Adrenal nodule ([address] )    Other orders  -     Incentive spirometry; Standing  -     Insert and maintain IV line; Standing  -     Void On-Call to O R ; Standing  -     Place sequential compression device; Standing  -     ceFAZolin (ANCEF) IVPB (premix in dextrose) 2,000 mg 50 mL        Chief Complaint   Patient presents with   • Follow-up       No follow-ups on file  Oncology History    No history exists  History of Present Illness: Patient returns in follow-up  He is feeling well  No abdominal pain, nausea or vomiting  He has lost 35 pounds since starting Ozempic  His biggest complaint is his sciatica  PET/CT from May 24, 2023 shows low-level FDG uptake in the right mesenteric masses  There is the possibility of a nodularity in the small bowel loop with an SUV of 3 2  There is no FDG activity in the adrenal nodule  There was incidental FDG activity at the base of the tongue  I personally reviewed the films  Review of Systems  Complete ROS Surg Onc:   Complete ROS Surg Onc:   Constitutional: The patient denies new or recent history of general fatigue, no recent weight loss, no change in appetite  Eyes: No complaints of visual problems, no scleral icterus  ENT: no complaints of ear pain, no hoarseness, no difficulty swallowing,  no tinnitus and no new masses in head, oral cavity, or neck  Cardiovascular: No complaints of chest pain, no palpitations, no ankle edema  Respiratory: No complaints of shortness of breath, no cough  Gastrointestinal: No complaints of jaundice, no bloody stools, no pale stools  Genitourinary: No complaints of dysuria, no hematuria, no nocturia, no frequent urination, no urethral discharge  Musculoskeletal: No complaints of weakness, paralysis, joint stiffness or arthralgias  Integumentary: No complaints of rash, no new lesions  Neurological: No complaints of convulsions, no seizures, no dizziness  Hematologic/Lymphatic: No complaints of easy bruising  Endocrine:  No hot or cold intolerance  No polydipsia, polyphagia, or polyuria  Allergy/immunology:  No environmental allergies  No food allergies  Not immunocompromised  Skin:  No pallor or rash  No wound          Patient Active Problem List   Diagnosis   • Aneurysm of thoracic aorta (HCC)   • Edema   • Heel spur   • Mixed hyperlipidemia   • Hypertension   • Hyperuricemia without signs inflammatory arthritis/tophaceous disease   • Chronic bilateral low back pain   • Neuropathy of left foot   • Non-toxic uninodular goiter   • Osteoarthritis   • Sciatica   • Diabetes mellitus type 2 in obese [facility])   • Restless leg syndrome   • Skin tag   • Acute pain of left shoulder   • Microscopic hematuria   • Pain of left thigh   • Lumbar radiculopathy   • Colonic polyp   • Hypokalemia   • Severe obesity (BMI 35 0-35 9 with comorbidity) ([facility])   • 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)   • Type 2 diabetes with stage 2 chronic kidney disease GFR 60-89 (HCC)   • Nausea and vomiting   • Generalized abdominal pain   • Stage 3a chronic kidney disease (HCC)   • Adrenal nodule ([facility])   • Lymphadenopathy, abdominal     Past Medical History:   Diagnosis Date   • Allergic rhinitis     last assessed: 5/4/2015   • Aneurysm of thoracic aorta ([facility])    • Cataract     resolved: 12/12/2014   • Colon polyp    • Diabetes mellitus ([address] )     diet control   • Herniated nucleus pulposus, L4-5 left     last assessed: 4/9/2014   • Herniated nucleus pulposus, L5-S1, left     last assessed: 4/9/2014   • Hx of echocardiogram 10/14/2016    EF 55%, Mild LVH, mild aortic root and ascending aorta enlargement, measuring 42 mm, trace MR, trace aortic regurg     • Hypercholesterolemia    • Hypertension    • Lumbar radiculopathy      Past Surgical History:   Procedure Laterality Date   • BACK SURGERY     • BACK SURGERY     • KNEE ARTHROSCOPY      (therapeutic)   • KNEE SURGERY     • MA COLONOSCOPY FLX DX W/COLLJ SPEC WHEN PFRMD N/A 9/30/2016    Procedure: COLONOSCOPY;  Surgeon: Margarito Mak MD;  Location: [facility];  Service: Gastroenterology   • TONSILLECTOMY AND ADENOIDECTOMY       Family History   Problem Relation Age of Onset   • Thyroid disease Mother         disorder   • Hypertension Father    • Coronary artery disease Brother    • Heart attack Brother         prior myocardial infarction     Social History     Socioeconomic History   • Marital status: /[employer]     Spouse name: Not on file   • Number of children: Not on file   • Years of education: Not on file   • Highest education level: Not on file   Occupational History   • Not on file   Tobacco Use   • Smoking status: Never   • Smokeless tobacco: Never   Vaping Use   • Vaping Use: Never used   Substance and Sexual Activity   • Alcohol use: No     Comment: social   • Drug use: No   • Sexual activity: Not on file   Other Topics Concern   • Not on file   Social History Narrative   • Not on file     Social Determinants of Health     Financial Resource Strain: Not on file   Food Insecurity: Not on file   Transportation Needs: Not on file   Physical Activity: Not on file   Stress: Not on file   Social Connections: Not on file   Intimate Partner Violence: Not on file   Housing Stability: Not on file       Current Outpatient Medications:   •  allopurinol (ZYLOPRIM) 100 mg tablet, TAKE 1 TABLET DAILY, Disp: 90 tablet, Rfl: 3  •  amLODIPine-benazepril (LOTREL 5-20) 5-20 MG per capsule, TAKE 1 CAPSULE TWICE A DAY, Disp: 180 capsule, Rfl: 3  •  atenolol (TENORMIN) 50 mg tablet, TAKE 1 TABLET TWICE A DAY, Disp: 180 tablet, Rfl: 3  •  atorvastatin (LIPITOR) 40 mg tablet, TAKE 1 TABLET DAILY, Disp: 90 tablet, Rfl: 3  •  furosemide (LASIX) 40 mg tablet, TAKE ONE-HALF (1/2) TABLET DAILY, Disp: 45 tablet, Rfl: 3  •  Jardiance 25 MG TABS, TAKE 1 TABLET EVERY MORNING, Disp: 90 tablet, Rfl: 3  •  magnesium oxide (MAG-OX) 400 mg, Take 400 mg by mouth 2 (two) times a day, Disp: , Rfl:   •  olmesartan-hydrochlorothiazide (BENICAR HCT) 40-12 5 MG per tablet, TAKE 1 TABLET DAILY, Disp: 90 tablet, Rfl: 3  •  oxyCODONE (Roxicodone) 5 immediate release tablet, Take 1 tablet (5 mg total) by mouth every 6 (six) hours as needed for moderate pain Max Daily Amount: 20 mg, Disp: 10 tablet, Rfl: 0  •  Ozempic, 1 MG/DOSE, 4 MG/3ML injection pen, INJECT 1 MG UNDER THE SKIN ONCE A WEEK, Disp: 9 mL, Rfl: 3  •  potassium chloride (K-DUR,KLOR-CON) 10 mEq tablet, TAKE 3 TABLETS TWICE A DAY  TAKE 3 TABLETS IN THE MORNING AND TAKE 3 TABLETS IN THE EVENING, Disp: 540 tablet, Rfl: 3  •  predniSONE 10 mg tablet, Five pills a day for 2 days, 4 pills a day for 2 days, 3 pills a day for 2 days, 2 pills a day for 2 days, 1 pill a day for 2 days (Patient not taking: Reported on 5/11/2023), Disp: 30 tablet, Rfl: 0  No Known Allergies  Vitals:    06/08/23 0813   BP: 124/70   Pulse: 67   Resp: 18   Temp: (!) 96 7 °F (35 9 °C)   SpO2: 97%       Physical Exam  Constitutional: General appearance: The Patient is well-developed and well-nourished who appears the stated age in no acute distress  Patient is pleasant and talkative  HEENT:  Normocephalic  Sclerae are anicteric  Mucous membranes are moist  Neck is supple without adenopathy  No JVD  Chest: The lungs are clear to auscultation  Cardiac: Heart is regular rate  Abdomen: Abdomen is soft, non-tender, non-distended and without masses  Extremities: There is no clubbing or cyanosis  There is no edema  Symmetric  Neuro: Grossly nonfocal  Gait is normal      Lymphatic: No evidence of cervical adenopathy bilaterally  No evidence of axillary adenopathy bilaterally  Skin: Warm, anicteric  Psych:  Patient is pleasant and talkative    Breasts:        Pathology:  [unfilled]    Labs:   Ref Range & Units 5/15/23  8:04 AM   Aldosterone 0 0 - 30 0 ng/dL 16 1       Ref Range & Units 5/15/23  8:04 AM   Norepinephrine Plasma 0 - 874 pg/mL 442    Epinephrine Plasma 0 - 62 pg/mL 40    Dopamine Plasma 0 - 48 pg/mL <30       Ref Range & Units 5/15/23  8:04 AM   Cortisol - AM 4 2 - 22 4 ug/dL 1 6 Low Panic       Ref Range & Units 5/15/23  8:04 AM   Normetanephrine, Free 0 0 - 285 2 pg/mL 129 9    Metanephrine, Free 0 0 - 88 0 pg/mL 37 7       Ref Range & Units 5/15/23  8:04 AM   Renin 0 167 - 5 380 ng/mL/hr <0 167       0 Result Notes       Component Ref Range & Units 5/18/23 10:40 AM Epinephrine, 24H Ur 0 - 20 ug/24 hr 5    Norepinephrine, 24H Ur 0 - 135 ug/24 hr 54    Dopamine , 24H Ur 0 - 510 ug/24 hr 237    Epinephrine, [place] Ur Undefined ug/L 2    Comment: Total Volume: 2550 mL   Norepinephrine, [place] Ur Undefined ug/L 21    Dopamine, [place] Ur Undefined ug/L 93               Imaging  NM PET CT skull base to mid thigh    Result Date: 5/24/2023  Narrative: PET/CT SCAN INDICATION: Abnormal CT  Lymphadenopathy  D48 7: Neoplasm of uncertain behavior of other specified sites D48 1: Neoplasm of uncertain behavior of connective and other soft tissue R59 0: Localized enlarged lymph nodes MODIFIER: PI COMPARISON: CT abdomen pelvis 5/2/2023 and additional priors CELL TYPE: N/A TECHNIQUE:   10 1 mCi F-18-FDG administered IV  Multiplanar attenuation corrected and non attenuation corrected PET images are available for interpretation, and contiguous, low dose, axial CT sections were obtained from the skull base through the femurs  Intravenous contrast material was not utilized  This examination, like all CT scans performed in the [facility], was performed utilizing techniques to minimize radiation dose exposure, including the use of iterative reconstruction and automated exposure control  Fasting serum glucose: 142 mg/dl FINDINGS: VISUALIZED BRAIN: No acute abnormalities are seen  HEAD/NECK: There is a small focus of FDG uptake at the left base of the tongue, SUV max of 3 8  No obvious findings on the limited CT  See image 46 series 1200  There is also asymmetric activity in the left lingual tonsillar region SUV max of 3 7  No FDG-avid lymph nodes  CT images: No additional significant findings  CHEST: No FDG avid soft tissue lesions are seen  CT images: Moderate coronary artery calcifications  ABDOMEN: There is low-level FDG uptake in the right mesenteric masses  More superior mesenteric mass demonstrates SUV max of 2 5 and measures 3 4 x 2 7 cm image 175 series 3   The more inferolateral mass demonstrates SUV max of 1 7  This measures 3 0 x 1 5 cm image 181 series 3  Slight nodularity related to a small bowel loop in the right hemiabdomen with mild patchy radiotracer uptake, SUV max of 3 2  In retrospect on prior contrast CT there is likely an enhancing small bowel lesion in this region  See for example image 85 series 3 on the CT of 5/2/2023  Otherwise variable physiologic bowel activity  CT images: Stable 1 4 cm right adrenal nodule, not FDG avid  Small left renal parapelvic cyst  Colonic diverticulosis  PELVIS: No FDG avid soft tissue lesions are seen  Slightly linear activity in the region of the prostatic urethra probably related to urine retention  CT images: Prostate is mildly enlarged  Suggestion of a small fat-containing right inguinal hernia  OSSEOUS STRUCTURES: No FDG avid lesions are seen  CT images: No significant findings  For reference: SUV max of the mediastinal blood pool: 2 9 SUV max of the liver: 3 9     Impression: 1  At most there is mild radiotracer uptake in the mesenteric masses  Additionally, mild patchy radiotracer uptake related to a suspected small bowel lesion in the right hemiabdomen  Differential would include entities such as well-differentiated neuroendocrine tumor vs low-grade lymphoma  If an underlying neuroendocrine tumor is suspected based on clinical grounds, consider follow-up with Netspot PET/CT as this would be more sensitive than FDG for neuroendocrine lesions  2  Incidentally noted small focus of FDG uptake at the left base of the tongue  Asymmetric activity also noted in the left lingual tonsillar region  Underlying lesions should be excluded  Correlate with direct visualization  The study was marked in EPIC for significant notification  Workstation performed: [id number]     I reviewed the above laboratory and imaging data      Discussion/Summary: 60-year-old male with a right adrenal nodule and mesenteric lymphadenopathy and a possible small bowel lesion  A functional work-up of his adrenal lesion is essentially negative  His low reading level, I suspect is related to his medications  I would not recommend any intervention outside of continued observation  He will discuss this with his primary physician  In regard to the mesenteric lymphadenopathy, this has low-level SUV  This may be neuroendocrine tumor  I suspect because of the low level of FDG activity this is a low-grade tumor  I would recommend resection of this with a possible small bowel resection  A dotatate PET/CT would likely not change any management  The risks of resection of his mesenteric masses with possible small bowel resection were explained and included bleeding, infection, recurrence, need for further surgery, wound complications and adjacent organ injury and anastomotic leak  Informed consent was obtained  We will schedule this at our earliest mutual convenience  He is agreeable to this plan  All his questions were answered

## 2023-06-08 NOTE — H&P (VIEW-ONLY)
Surgical Oncology Follow Up       [address] [facility] [place]  [address]  [place] [address]  [place] Fitting  1955  [id number]  [address] [facility] [place]  [address]  [place] [id number] [phone]    Diagnoses and all orders for this visit:    Lymphadenopathy, abdominal  -     Case request operating room: RESECTION MESENTERIC MASS, POSSIBLE SMALL BOWEL RESECTION; Standing  -     Comprehensive metabolic panel; Future  -     CBC and differential; Future  -     EKG 12 lead; Future  -     oxyCODONE (Roxicodone) 5 immediate release tablet; Take 1 tablet (5 mg total) by mouth every 6 (six) hours as needed for moderate pain Max Daily Amount: 20 mg  -     Case request operating room: RESECTION MESENTERIC MASS, POSSIBLE SMALL BOWEL RESECTION    Adrenal nodule ([address] )    Other orders  -     Incentive spirometry; Standing  -     Insert and maintain IV line; Standing  -     Void On-Call to O R ; Standing  -     Place sequential compression device; Standing  -     ceFAZolin (ANCEF) IVPB (premix in dextrose) 2,000 mg 50 mL        Chief Complaint   Patient presents with   • Follow-up       No follow-ups on file  Oncology History    No history exists  History of Present Illness: Patient returns in follow-up  He is feeling well  No abdominal pain, nausea or vomiting  He has lost 35 pounds since starting Ozempic  His biggest complaint is his sciatica  PET/CT from May 24, 2023 shows low-level FDG uptake in the right mesenteric masses  There is the possibility of a nodularity in the small bowel loop with an SUV of 3 2  There is no FDG activity in the adrenal nodule  There was incidental FDG activity at the base of the tongue  I personally reviewed the films      Review of Systems  Complete ROS Surg Onc:   Complete ROS Surg Onc:   Constitutional: The patient denies new or recent history of general fatigue, no recent weight loss, no change in appetite  Eyes: No complaints of visual problems, no scleral icterus  ENT: no complaints of ear pain, no hoarseness, no difficulty swallowing,  no tinnitus and no new masses in head, oral cavity, or neck  Cardiovascular: No complaints of chest pain, no palpitations, no ankle edema  Respiratory: No complaints of shortness of breath, no cough  Gastrointestinal: No complaints of jaundice, no bloody stools, no pale stools  Genitourinary: No complaints of dysuria, no hematuria, no nocturia, no frequent urination, no urethral discharge  Musculoskeletal: No complaints of weakness, paralysis, joint stiffness or arthralgias  Integumentary: No complaints of rash, no new lesions  Neurological: No complaints of convulsions, no seizures, no dizziness  Hematologic/Lymphatic: No complaints of easy bruising  Endocrine:  No hot or cold intolerance  No polydipsia, polyphagia, or polyuria  Allergy/immunology:  No environmental allergies  No food allergies  Not immunocompromised  Skin:  No pallor or rash  No wound          Patient Active Problem List   Diagnosis   • Aneurysm of thoracic aorta ([facility])   • Edema   • Heel spur   • Mixed hyperlipidemia   • Hypertension   • Hyperuricemia without signs inflammatory arthritis/tophaceous disease   • Chronic bilateral low back pain   • Neuropathy of left foot   • Non-toxic uninodular goiter   • Osteoarthritis   • Sciatica   • Diabetes mellitus type 2 in obese [facility])   • Restless leg syndrome   • Skin tag   • Acute pain of left shoulder   • Microscopic hematuria   • Pain of left thigh   • Lumbar radiculopathy   • Colonic polyp   • Hypokalemia   • Severe obesity (BMI 35 0-35 9 with comorbidity) ([facility])   • Type 2 diabetes mellitus with mild nonproliferative retinopathy of both eyes without macular edema ([facility])   • Type 2 diabetes with stage 2 chronic kidney disease GFR 60-89 ([facility] [address] ) • Nausea and vomiting   • Generalized abdominal pain   • Stage 3a chronic kidney disease (HCC)   • Adrenal nodule (HCC)   • Lymphadenopathy, abdominal     Past Medical History:   Diagnosis Date   • Allergic rhinitis     last assessed: 5/4/2015   • Aneurysm of thoracic aorta (HCC)    • Cataract     resolved: 12/12/2014   • Colon polyp    • Diabetes mellitus (HCC)     diet control   • Herniated nucleus pulposus, L4-5 left     last assessed: 4/9/2014   • Herniated nucleus pulposus, L5-S1, left     last assessed: 4/9/2014   • Hx of echocardiogram 10/14/2016    EF 55%, Mild LVH, mild aortic root and ascending aorta enlargement, measuring 42 mm, trace MR, trace aortic regurg     • Hypercholesterolemia    • Hypertension    • Lumbar radiculopathy      Past Surgical History:   Procedure Laterality Date   • BACK SURGERY     • BACK SURGERY     • KNEE ARTHROSCOPY      (therapeutic)   • KNEE SURGERY     • FL COLONOSCOPY FLX DX W/COLLJ SPEC WHEN PFRMD N/A 9/30/2016    Procedure: COLONOSCOPY;  Surgeon: Leidy Tabares MD;  Location: [facility];  Service: Gastroenterology   • TONSILLECTOMY AND ADENOIDECTOMY       Family History   Problem Relation Age of Onset   • Thyroid disease Mother         disorder   • Hypertension Father    • Coronary artery disease Brother    • Heart attack Brother         prior myocardial infarction     Social History     Socioeconomic History   • Marital status: /Civil Union     Spouse name: Not on file   • Number of children: Not on file   • Years of education: Not on file   • Highest education level: Not on file   Occupational History   • Not on file   Tobacco Use   • Smoking status: Never   • Smokeless tobacco: Never   Vaping Use   • Vaping Use: Never used   Substance and Sexual Activity   • Alcohol use: No     Comment: social   • Drug use: No   • Sexual activity: Not on file   Other Topics Concern   • Not on file   Social History Narrative   • Not on file     Social Determinants of Health Financial Resource Strain: Not on file   Food Insecurity: Not on file   Transportation Needs: Not on file   Physical Activity: Not on file   Stress: Not on file   Social Connections: Not on file   Intimate Partner Violence: Not on file   Housing Stability: Not on file       Current Outpatient Medications:   •  allopurinol (ZYLOPRIM) 100 mg tablet, TAKE 1 TABLET DAILY, Disp: 90 tablet, Rfl: 3  •  amLODIPine-benazepril (LOTREL 5-20) 5-20 MG per capsule, TAKE 1 CAPSULE TWICE A DAY, Disp: 180 capsule, Rfl: 3  •  atenolol (TENORMIN) 50 mg tablet, TAKE 1 TABLET TWICE A DAY, Disp: 180 tablet, Rfl: 3  •  atorvastatin (LIPITOR) 40 mg tablet, TAKE 1 TABLET DAILY, Disp: 90 tablet, Rfl: 3  •  furosemide (LASIX) 40 mg tablet, TAKE ONE-HALF (1/2) TABLET DAILY, Disp: 45 tablet, Rfl: 3  •  Jardiance 25 MG TABS, TAKE 1 TABLET EVERY MORNING, Disp: 90 tablet, Rfl: 3  •  magnesium oxide (MAG-OX) 400 mg, Take 400 mg by mouth 2 (two) times a day, Disp: , Rfl:   •  olmesartan-hydrochlorothiazide (BENICAR HCT) 40-12 5 MG per tablet, TAKE 1 TABLET DAILY, Disp: 90 tablet, Rfl: 3  •  oxyCODONE (Roxicodone) 5 immediate release tablet, Take 1 tablet (5 mg total) by mouth every 6 (six) hours as needed for moderate pain Max Daily Amount: 20 mg, Disp: 10 tablet, Rfl: 0  •  Ozempic, 1 MG/DOSE, 4 MG/3ML injection pen, INJECT 1 MG UNDER THE SKIN ONCE A WEEK, Disp: 9 mL, Rfl: 3  •  potassium chloride (K-DUR,KLOR-CON) 10 mEq tablet, TAKE 3 TABLETS TWICE A DAY   TAKE 3 TABLETS IN THE MORNING AND TAKE 3 TABLETS IN THE EVENING, Disp: 540 tablet, Rfl: 3  •  predniSONE 10 mg tablet, Five pills a day for 2 days, 4 pills a day for 2 days, 3 pills a day for 2 days, 2 pills a day for 2 days, 1 pill a day for 2 days (Patient not taking: Reported on 5/11/2023), Disp: 30 tablet, Rfl: 0  No Known Allergies  Vitals:    06/08/23 0813   BP: 124/70   Pulse: 67   Resp: 18   Temp: (!) 96 7 °F (35 9 °C)   SpO2: 97%       Physical Exam  Constitutional: General appearance: The Patient is well-developed and well-nourished who appears the stated age in no acute distress  Patient is pleasant and talkative  HEENT:  Normocephalic  Sclerae are anicteric  Mucous membranes are moist  Neck is supple without adenopathy  No JVD  Chest: The lungs are clear to auscultation  Cardiac: Heart is regular rate  Abdomen: Abdomen is soft, non-tender, non-distended and without masses  Extremities: There is no clubbing or cyanosis  There is no edema  Symmetric  Neuro: Grossly nonfocal  Gait is normal      Lymphatic: No evidence of cervical adenopathy bilaterally  No evidence of axillary adenopathy bilaterally  Skin: Warm, anicteric  Psych:  Patient is pleasant and talkative  Breasts:        Pathology:  [unfilled]    Labs:   Ref Range & Units 5/15/23  8:04 AM   Aldosterone 0 0 - 30 0 ng/dL 16 1       Ref Range & Units 5/15/23  8:04 AM   Norepinephrine Plasma 0 - 874 pg/mL 442    Epinephrine Plasma 0 - 62 pg/mL 40    Dopamine Plasma 0 - 48 pg/mL <30       Ref Range & Units 5/15/23  8:04 AM   Cortisol - AM 4 2 - 22 4 ug/dL 1 6 Low Panic       Ref Range & Units 5/15/23  8:04 AM   Normetanephrine, Free 0 0 - 285 2 pg/mL 129 9    Metanephrine, Free 0 0 - 88 0 pg/mL 37 7       Ref Range & Units 5/15/23  8:04 AM   Renin 0 167 - 5 380 ng/mL/hr <0 167       0 Result Notes       Component Ref Range & Units 5/18/23 10:40 AM   Epinephrine, 24H Ur 0 - 20 ug/24 hr 5    Norepinephrine, 24H Ur 0 - 135 ug/24 hr 54    Dopamine , 24H Ur 0 - 510 ug/24 hr 237    Epinephrine, Aiken Ur Undefined ug/L 2    Comment: Total Volume: 2550 mL   Norepinephrine, Rand Ur Undefined ug/L 21    Dopamine, Rand Ur Undefined ug/L 93               Imaging  NM PET CT skull base to mid thigh    Result Date: 5/24/2023  Narrative: PET/CT SCAN INDICATION: Abnormal CT  Lymphadenopathy   D48 7: Neoplasm of uncertain behavior of other specified sites D48 1: Neoplasm of uncertain behavior of connective and other soft tissue R59 0: Localized enlarged lymph nodes MODIFIER: PI COMPARISON: CT abdomen pelvis 5/2/2023 and additional priors CELL TYPE: N/A TECHNIQUE:   10 1 mCi F-18-FDG administered IV  Multiplanar attenuation corrected and non attenuation corrected PET images are available for interpretation, and contiguous, low dose, axial CT sections were obtained from the skull base through the femurs  Intravenous contrast material was not utilized  This examination, like all CT scans performed in the [facility], was performed utilizing techniques to minimize radiation dose exposure, including the use of iterative reconstruction and automated exposure control  Fasting serum glucose: 142 mg/dl FINDINGS: VISUALIZED BRAIN: No acute abnormalities are seen  HEAD/NECK: There is a small focus of FDG uptake at the left base of the tongue, SUV max of 3 8  No obvious findings on the limited CT  See image 46 series 1200  There is also asymmetric activity in the left lingual tonsillar region SUV max of 3 7  No FDG-avid lymph nodes  CT images: No additional significant findings  CHEST: No FDG avid soft tissue lesions are seen  CT images: Moderate coronary artery calcifications  ABDOMEN: There is low-level FDG uptake in the right mesenteric masses  More superior mesenteric mass demonstrates SUV max of 2 5 and measures 3 4 x 2 7 cm image 175 series 3  The more inferolateral mass demonstrates SUV max of 1 7  This measures 3 0 x 1 5 cm image 181 series 3  Slight nodularity related to a small bowel loop in the right hemiabdomen with mild patchy radiotracer uptake, SUV max of 3 2  In retrospect on prior contrast CT there is likely an enhancing small bowel lesion in this region  See for example image 85 series 3 on the CT of 5/2/2023  Otherwise variable physiologic bowel activity  CT images: Stable 1 4 cm right adrenal nodule, not FDG avid  Small left renal parapelvic cyst  Colonic diverticulosis   PELVIS: No FDG avid soft tissue lesions are seen  Slightly linear activity in the region of the prostatic urethra probably related to urine retention  CT images: Prostate is mildly enlarged  Suggestion of a small fat-containing right inguinal hernia  OSSEOUS STRUCTURES: No FDG avid lesions are seen  CT images: No significant findings  For reference: SUV max of the mediastinal blood pool: 2 9 SUV max of the liver: 3 9     Impression: 1  At most there is mild radiotracer uptake in the mesenteric masses  Additionally, mild patchy radiotracer uptake related to a suspected small bowel lesion in the right hemiabdomen  Differential would include entities such as well-differentiated neuroendocrine tumor vs low-grade lymphoma  If an underlying neuroendocrine tumor is suspected based on clinical grounds, consider follow-up with Netspot PET/CT as this would be more sensitive than FDG for neuroendocrine lesions  2  Incidentally noted small focus of FDG uptake at the left base of the tongue  Asymmetric activity also noted in the left lingual tonsillar region  Underlying lesions should be excluded  Correlate with direct visualization  The study was marked in EPIC for significant notification  Workstation performed: [id number]     I reviewed the above laboratory and imaging data  Discussion/Summary: 80-year-old male with a right adrenal nodule and mesenteric lymphadenopathy and a possible small bowel lesion  A functional work-up of his adrenal lesion is essentially negative  His low reading level, I suspect is related to his medications  I would not recommend any intervention outside of continued observation  He will discuss this with his primary physician  In regard to the mesenteric lymphadenopathy, this has low-level SUV  This may be neuroendocrine tumor  I suspect because of the low level of FDG activity this is a low-grade tumor  I would recommend resection of this with a possible small bowel resection    A dotatate PET/CT would likely not change any management  The risks of resection of his mesenteric masses with possible small bowel resection were explained and included bleeding, infection, recurrence, need for further surgery, wound complications and adjacent organ injury and anastomotic leak  Informed consent was obtained  We will schedule this at our earliest mutual convenience  He is agreeable to this plan  All his questions were answered

## 2023-06-09 LAB
ATRIAL RATE: 82 BPM
P AXIS: 41 DEGREES
PR INTERVAL: 160 MS
QRS AXIS: -10 DEGREES
QRSD INTERVAL: 100 MS
QT INTERVAL: 376 MS
QTC INTERVAL: 439 MS
T WAVE AXIS: 18 DEGREES
VENTRICULAR RATE: 82 BPM

## 2023-06-09 PROCEDURE — 93010 ELECTROCARDIOGRAM REPORT: CPT | Performed by: INTERNAL MEDICINE

## 2023-06-15 ENCOUNTER — ANESTHESIA EVENT (OUTPATIENT)
Dept: PERIOP | Facility: HOSPITAL | Age: 68
End: 2023-06-15
Payer: COMMERCIAL

## 2023-06-15 ENCOUNTER — APPOINTMENT (OUTPATIENT)
Dept: LAB | Facility: MEDICAL CENTER | Age: 68
End: 2023-06-15
Payer: COMMERCIAL

## 2023-06-15 DIAGNOSIS — Z12.5 PROSTATE CANCER SCREENING: ICD-10-CM

## 2023-06-15 LAB — PSA SERPL-MCNC: 0.73 NG/ML (ref 0–4)

## 2023-06-15 PROCEDURE — G0103 PSA SCREENING: HCPCS

## 2023-06-15 PROCEDURE — 36415 COLL VENOUS BLD VENIPUNCTURE: CPT

## 2023-06-15 NOTE — PRE-PROCEDURE INSTRUCTIONS
Pre-Surgery Instructions:   Medication Instructions   • allopurinol (ZYLOPRIM) 100 mg tablet take evening   • amLODIPine-benazepril (LOTREL 5-20) 5-20 MG per capsule Do not take this medication the day before and the morning of the day of surgery/procedure   • atenolol (TENORMIN) 50 mg tablet Take day of surgery  • atorvastatin (LIPITOR) 40 mg tablet take evening   • furosemide (LASIX) 40 mg tablet Hold day of surgery  • Jardiance 25 MG TABS Stop taking 4 days prior to surgery  • magnesium oxide (MAG-OX) 400 mg Stop taking 7 days prior to surgery  • olmesartan-hydrochlorothiazide (BENICAR HCT) 40-12 5 MG per tablet Do not take this medication the day before and the morning of the day of surgery/procedure   • Ozempic, 1 MG/DOSE, 4 MG/3ML injection pen sundays   • potassium chloride (K-DUR,KLOR-CON) 10 mEq tablet Hold day of surgery  Review with patient via phone medications and showering instructions  Instructed to avoid all ASA and OTC Vit/Supp prior to surgery and to avoid NSAIDs 3 days prior to surgery per anesthesia instructions  Tylenol ok to take prn  [facility] call with surgery schedule time, nothing eat or drink after midnight  Verbalized understanding 
same name as above

## 2023-06-19 ENCOUNTER — OFFICE VISIT (OUTPATIENT)
Dept: CARDIOLOGY CLINIC | Facility: CLINIC | Age: 68
End: 2023-06-19
Payer: COMMERCIAL

## 2023-06-19 VITALS
HEIGHT: 70 IN | DIASTOLIC BLOOD PRESSURE: 82 MMHG | HEART RATE: 88 BPM | SYSTOLIC BLOOD PRESSURE: 124 MMHG | WEIGHT: 228 LBS | BODY MASS INDEX: 32.64 KG/M2

## 2023-06-19 DIAGNOSIS — E78.2 MIXED HYPERLIPIDEMIA: ICD-10-CM

## 2023-06-19 DIAGNOSIS — I71.21 ANEURYSM OF ASCENDING AORTA WITHOUT RUPTURE (HCC): Primary | ICD-10-CM

## 2023-06-19 DIAGNOSIS — I10 PRIMARY HYPERTENSION: ICD-10-CM

## 2023-06-19 PROCEDURE — 99213 OFFICE O/P EST LOW 20 MIN: CPT | Performed by: INTERNAL MEDICINE

## 2023-06-19 NOTE — PATIENT INSTRUCTIONS
Cholesterol and Your Health   AMBULATORY CARE:   Cholesterol  is a waxy, fat-like substance  Your body uses cholesterol to make hormones and new cells, and to protect nerves  Cholesterol is made by your body  It also comes from certain foods you eat, such as meat and dairy products  Your healthcare provider can help you set goals for your cholesterol levels  He or she can help you create a plan to meet your goals  Cholesterol level goals: Your cholesterol level goals depend on your risk for heart disease, your age, and your other health conditions  The following are general guidelines: Total cholesterol  includes low-density lipoprotein (LDL), high-density lipoprotein (HDL), and triglyceride levels  The total cholesterol level should be lower than 200 mg/dL and is best at about 150 mg/dL  LDL cholesterol  is called bad cholesterol  because it forms plaque in your arteries  As plaque builds up, your arteries become narrow, and less blood flows through  When plaque decreases blood flow to your heart, you may have chest pain  If plaque completely blocks an artery that brings blood to your heart, you may have a heart attack  Plaque can break off and form blood clots  Blood clots may block arteries in your brain and cause a stroke  The level should be less than 130 mg/dL and is best at about 100 mg/dL  HDL cholesterol  is called good cholesterol  because it helps remove LDL cholesterol from your arteries  It does this by attaching to LDL cholesterol and carrying it to your liver  Your liver breaks down LDL cholesterol so your body can get rid of it  High levels of HDL cholesterol can help prevent a heart attack and stroke  Low levels of HDL cholesterol can increase your risk for heart disease, heart attack, and stroke  The level should be 60 mg/dL or higher  Triglycerides  are a type of fat that store energy from foods you eat  High levels of triglycerides also cause plaque buildup   This can increase your risk for a heart attack or stroke  If your triglyceride level is high, your LDL cholesterol level may also be high  The level should be less than 150 mg/dL  Any of the following can increase your risk for high cholesterol:   Smoking cigarettes    Being overweight or obese, or not getting enough exercise    Drinking large amounts of alcohol    A medical condition such as hypertension (high blood pressure) or diabetes    Certain genes passed from your parents to you    Age older than 65 years    What you need to know about having your cholesterol levels checked: Adults 21to 39years of age should have their cholesterol levels checked every 4 to 6 years  Adults 45 years or older should have their cholesterol checked every 1 to 2 years  You may need your cholesterol checked more often, or at a younger age, if you have risk factors for heart disease  You may also need to have your cholesterol checked more often if you have other health conditions, such as diabetes  Blood tests are used to check cholesterol levels  Blood tests measure your levels of triglycerides, LDL cholesterol, and HDL cholesterol  How healthy fats affect your cholesterol levels:  Healthy fats, also called unsaturated fats, help lower LDL cholesterol and triglyceride levels  Healthy fats include the following:  Monounsaturated fats  are found in foods such as olive oil, canola oil, avocado, nuts, and olives  Polyunsaturated fats,  such as omega 3 fats, are found in fish, such as salmon, trout, and tuna  They can also be found in plant foods such as flaxseed, walnuts, and soybeans  How unhealthy fats affect your cholesterol levels:  Unhealthy fats increase LDL cholesterol and triglyceride levels  They are found in foods high in cholesterol, saturated fat, and trans fat:  Cholesterol  is found in eggs, dairy, and meat  Saturated fat  is found in butter, cheese, ice cream, whole milk, and coconut oil   Saturated fat is also found in meat, such as sausage, hot dogs, and bologna  Trans fat  is found in liquid oils and is used in fried and baked foods  Foods that contain trans fats include chips, crackers, muffins, sweet rolls, microwave popcorn, and cookies  Treatment  for high cholesterol will also decrease your risk of heart disease, heart attack, and stroke  Treatment may include any of the following:  Lifestyle changes  may include food, exercise, weight loss, and quitting smoking  You may also need to decrease the amount of alcohol you drink  Your healthcare provider will want you to start with lifestyle changes  Other treatment may be added if lifestyle changes are not enough  Your healthcare provider may recommend you work with a team to manage hyperlipidemia  The team may include medical experts such as a dietitian, an exercise or physical therapist, and a behavior therapist  Your family members may be included in helping you create lifestyle changes  Medicines  may be given to lower your LDL cholesterol, triglyceride levels, or total cholesterol level  You may need medicines to lower your cholesterol if any of the following is true:    You have a history of stroke, TIA, unstable angina, or a heart attack  Your LDL cholesterol level is 190 mg/dL or higher  You are age 36 to 76 years, have diabetes or heart disease risk factors, and your LDL cholesterol is 70 mg/dL or higher  Supplements  include fish oil, red yeast rice, and garlic  Fish oil may help lower your triglyceride and LDL cholesterol levels  It may also increase your HDL cholesterol level  Red yeast rice may help decrease your total cholesterol level and LDL cholesterol level  Garlic may help lower your total cholesterol level  Do not take any supplements without talking to your healthcare provider  Food changes you can make to lower your cholesterol levels:  A dietitian can help you create a healthy eating plan   He or she can show you how to read food labels and choose foods low in saturated fat, trans fats, and cholesterol  Decrease the total amount of fat you eat  Choose lean meats, fat-free or 1% fat milk, and low-fat dairy products, such as yogurt and cheese  Try to limit or avoid red meats  Limit or do not eat fried foods or baked goods, such as cookies  Replace unhealthy fats with healthy fats  Cook foods in olive oil or canola oil  Choose soft margarines that are low in saturated fat and trans fat  Seeds, nuts, and avocados are other examples of healthy fats  Eat foods with omega-3 fats  Examples include salmon, tuna, mackerel, walnuts, and flaxseed  Eat fish 2 times per week  Pregnant women should not eat fish that have high levels of mercury, such as shark, swordfish, and virgil mackerel  Increase the amount of high-fiber foods you eat  High-fiber foods can help lower your LDL cholesterol  Aim to get between 20 and 30 grams of fiber each day  Fruits and vegetables are high in fiber  Eat at least 5 servings each day  Other high-fiber foods are whole-grain or whole-wheat breads, pastas, or cereals, and brown rice  Eat 3 ounces of whole-grain foods each day  Increase fiber slowly  You may have abdominal discomfort, bloating, and gas if you add fiber to your diet too quickly  Eat healthy protein foods  Examples include low-fat dairy products, skinless chicken and turkey, fish, and nuts  Limit foods and drinks that are high in sugar  Your dietitian or healthcare provider can help you create daily limits for high-sugar foods and drinks  The limit may be lower if you have diabetes or another health condition  Limits can also help you lose weight if needed  Lifestyle changes you can make to lower your cholesterol levels:   Maintain a healthy weight  Ask your healthcare provider what a healthy weight is for you  Ask him or her to help you create a weight loss plan if needed   Weight loss can decrease your total cholesterol and triglyceride levels  Weight loss may also help keep your blood pressure at a healthy level  Be physically active throughout the day  Physical activity, such as exercise, can help lower your total cholesterol level and maintain a healthy weight  Physical activity can also help increase your HDL cholesterol level  Work with your healthcare provider to create an program that is right for you  Get at least 30 to 40 minutes of moderate physical activity most days of the week  Examples of exercise include brisk walking, swimming, or biking  Also include strength training at least 2 times each week  Your healthcare providers can help you create a physical activity plan  Do not smoke  Nicotine and other chemicals in cigarettes and cigars can raise your cholesterol levels  Ask your healthcare provider for information if you currently smoke and need help to quit  E-cigarettes or smokeless tobacco still contain nicotine  Talk to your healthcare provider before you use these products  Limit or do not drink alcohol  Alcohol can increase your triglyceride levels  Ask your healthcare provider before you drink alcohol  Ask how much is okay for you to drink in 24 hours or 1 week  Follow up with your doctor as directed:  Write down your questions so you remember to ask them during your visits  © Copyright [employer] 2022 Information is for End User's use only and may not be sold, redistributed or otherwise used for commercial purposes  The above information is an  only  It is not intended as medical advice for individual conditions or treatments  Talk to your doctor, nurse or pharmacist before following any medical regimen to see if it is safe and effective for you

## 2023-06-19 NOTE — PROGRESS NOTES
"Subjective:        Patient ID: Maribel Natarajan is a 78 y o  male  Chief Complaint:  Tim Tomlin is here for routine follow-up  Echo in February showed stable 4 4 cm ascending aortic dimension, unchanged from 2022 CT scan  Denies any chest pain shortness of breath palpitations presyncope syncope TIA or claudication-like symptoms  The following portions of the patient's history were reviewed and updated as appropriate: allergies, current medications, past family history, past medical history, past social history, past surgical history and problem list   Review of Systems   Constitutional: Negative for chills, diaphoresis, malaise/fatigue and weight gain  HENT: Negative for nosebleeds and stridor  Eyes: Negative for double vision, vision loss in left eye, vision loss in right eye and visual disturbance  Cardiovascular: Negative for chest pain, claudication, cyanosis, dyspnea on exertion, irregular heartbeat, leg swelling, near-syncope, orthopnea, palpitations, paroxysmal nocturnal dyspnea and syncope  Respiratory: Negative for cough, shortness of breath, snoring and wheezing  Endocrine: Negative for polydipsia, polyphagia and polyuria  Hematologic/Lymphatic: Negative for bleeding problem  Does not bruise/bleed easily  Skin: Negative for flushing and rash  Musculoskeletal: Negative for falls and myalgias  Gastrointestinal: Negative for abdominal pain, heartburn, hematemesis, hematochezia, melena and nausea  Genitourinary: Negative for hematuria  Neurological: Negative for brief paralysis, dizziness, focal weakness, headaches, light-headedness, loss of balance and vertigo  Psychiatric/Behavioral: Negative for altered mental status and substance abuse  Allergic/Immunologic: Negative for hives  Objective:      /82   Pulse 88   Ht 5' 10\" (1 778 m)   Wt 103 kg (228 lb)   BMI 32 71 kg/m²   Physical Exam  Constitutional:       General: He is not in acute distress       " Appearance: He is well-developed  He is not diaphoretic  HENT:      Head: Normocephalic and atraumatic  Eyes:      General: No scleral icterus  Pupils: Pupils are equal, round, and reactive to light  Neck:      Thyroid: No thyromegaly  Vascular: No carotid bruit or JVD  Cardiovascular:      Rate and Rhythm: Normal rate and regular rhythm  Heart sounds: Normal heart sounds  No murmur heard  No friction rub  No gallop  Pulmonary:      Effort: Pulmonary effort is normal  No respiratory distress  Breath sounds: Normal breath sounds  No stridor  No wheezing or rales  Abdominal:      General: Bowel sounds are normal  There is no distension  Palpations: Abdomen is soft  There is no mass  Tenderness: There is no abdominal tenderness  Musculoskeletal:      Cervical back: Normal range of motion and neck supple  Right lower leg: No edema  Left lower leg: No edema  Skin:     General: Skin is warm and dry  Coloration: Skin is not pale  Findings: No erythema  Neurological:      Mental Status: He is alert and oriented to person, place, and time  Coordination: Coordination normal    Psychiatric:         Mood and Affect: Mood normal          Behavior: Behavior normal          Thought Content:  Thought content normal          Judgment: Judgment normal          Lab Review:   Lab on 06/15/2023   Component Date Value   • PSA 06/15/2023 0 73    Appointment on 06/08/2023   Component Date Value   • Ventricular Rate 06/08/2023 82    • Atrial Rate 06/08/2023 82    • TN Interval 06/08/2023 160    • QRSD Interval 06/08/2023 100    • QT Interval 06/08/2023 376    • QTC Interval 06/08/2023 439    • P Axis 06/08/2023 41    • QRS Axis 06/08/2023 -10    • T Wave [place] 06/08/2023 18    Appointment on 06/08/2023   Component Date Value   • Sodium 06/08/2023 146    • Potassium 06/08/2023 3 4 (L)    • Chloride 06/08/2023 103    • CO2 06/08/2023 34 (H)    • ANION GAP 06/08/2023 9 • BUN 06/08/2023 16    • Creatinine 06/08/2023 1 14    • Glucose 06/08/2023 169 (H)    • Calcium 06/08/2023 10 3 (H)    • AST 06/08/2023 14    • ALT 06/08/2023 26    • Alkaline Phosphatase 06/08/2023 65    • Total Protein 06/08/2023 7 2    • Albumin 06/08/2023 4 7    • Total Bilirubin 06/08/2023 0 71    • eGFR 06/08/2023 66    • WBC 06/08/2023 5 92    • RBC 06/08/2023 4 89    • Hemoglobin 06/08/2023 15 4    • Hematocrit 06/08/2023 47 3    • MCV 06/08/2023 97    • MCH 06/08/2023 31 5    • MCHC 06/08/2023 32 6    • RDW 06/08/2023 13 8    • MPV 06/08/2023 9 8    • Platelets 32/96/5113 163    • nRBC 06/08/2023 0    • Neutrophils Relative 06/08/2023 69    • Immat GRANS % 06/08/2023 0    • Lymphocytes Relative 06/08/2023 22    • Monocytes Relative 06/08/2023 7    • Eosinophils Relative 06/08/2023 1    • Basophils Relative 06/08/2023 1    • Neutrophils Absolute 06/08/2023 4 11    • Immature Grans Absolute 06/08/2023 0 00    • Lymphocytes Absolute 06/08/2023 1 27    • Monocytes Absolute 06/08/2023 0 44    • Eosinophils Absolute 06/08/2023 0 07    • Basophils Absolute 06/08/2023 0 03    • PTT 06/08/2023 24    • Protime 06/08/2023 12 8    • INR 06/08/2023 0 94    Hospital Outpatient Visit on 05/24/2023   Component Date Value   • POC Glucose 05/24/2023 142 (H)    Appointment on 05/18/2023   Component Date Value   • Epinephrine, 24H Ur 05/18/2023 5    • Norepinephrine, 24H Ur 05/18/2023 54    • Dopamine , 24H Ur 05/18/2023 237    • Epinephrine, Rand Ur 05/18/2023 2    • Norepinephrine, Rand Ur 05/18/2023 21    • Dopamine, Rand Ur 05/18/2023 93    • Cortisol,F,ug/L,U 05/18/2023 20    • Cortisol, 24H Ur 05/18/2023 51    Appointment on 05/15/2023   Component Date Value   • Aldosterone 05/15/2023 16 1    • Norepinephrine Plasma 05/15/2023 442    • Epinephrine Plasma 05/15/2023 40    • Dopamine Plasma 05/15/2023 <30    • Sodium 05/15/2023 138    • Potassium 05/15/2023 3 8    • Chloride 05/15/2023 109 (H)    • CO2 05/15/2023 28 • ANION GAP 05/15/2023 1 (L)    • BUN 05/15/2023 16    • Creatinine 05/15/2023 1 09    • Glucose, Fasting 05/15/2023 176 (H)    • Calcium 05/15/2023 9 2    • eGFR 05/15/2023 70    • Cortisol - AM 05/15/2023 1 6 (LL)    • Normetanephrine, Free 05/15/2023 129 9    • Metanephrine, Free 05/15/2023 37 7    • Renin 05/15/2023 <0 167 (L)    Appointment on 04/28/2023   Component Date Value   • Sodium 04/28/2023 141    • Potassium 04/28/2023 3 1 (L)    • Chloride 04/28/2023 105    • CO2 04/28/2023 27    • ANION GAP 04/28/2023 9    • BUN 04/28/2023 27 (H)    • Creatinine 04/28/2023 1 26    • Glucose, Fasting 04/28/2023 129 (H)    • Calcium 04/28/2023 9 5    • eGFR 04/28/2023 59      NM PET CT skull base to mid thigh    Result Date: 5/24/2023  Narrative: PET/CT SCAN INDICATION: Abnormal CT  Lymphadenopathy  D48 7: Neoplasm of uncertain behavior of other specified sites D48 1: Neoplasm of uncertain behavior of connective and other soft tissue R59 0: Localized enlarged lymph nodes MODIFIER: PI COMPARISON: CT abdomen pelvis 5/2/2023 and additional priors CELL TYPE: N/A TECHNIQUE:   10 1 mCi F-18-FDG administered IV  Multiplanar attenuation corrected and non attenuation corrected PET images are available for interpretation, and contiguous, low dose, axial CT sections were obtained from the skull base through the femurs  Intravenous contrast material was not utilized  This examination, like all CT scans performed in the [facility], was performed utilizing techniques to minimize radiation dose exposure, including the use of iterative reconstruction and automated exposure control  Fasting serum glucose: 142 mg/dl FINDINGS: VISUALIZED BRAIN: No acute abnormalities are seen  HEAD/NECK: There is a small focus of FDG uptake at the left base of the tongue, SUV max of 3 8  No obvious findings on the limited CT  See image 46 series 1200   There is also asymmetric activity in the left lingual tonsillar region SUV max of 3 7  No FDG-avid lymph nodes  CT images: No additional significant findings  CHEST: No FDG avid soft tissue lesions are seen  CT images: Moderate coronary artery calcifications  ABDOMEN: There is low-level FDG uptake in the right mesenteric masses  More superior mesenteric mass demonstrates SUV max of 2 5 and measures 3 4 x 2 7 cm image 175 series 3  The more inferolateral mass demonstrates SUV max of 1 7  This measures 3 0 x 1 5 cm image 181 series 3  Slight nodularity related to a small bowel loop in the right hemiabdomen with mild patchy radiotracer uptake, SUV max of 3 2  In retrospect on prior contrast CT there is likely an enhancing small bowel lesion in this region  See for example image 85 series 3 on the CT of 5/2/2023  Otherwise variable physiologic bowel activity  CT images: Stable 1 4 cm right adrenal nodule, not FDG avid  Small left renal parapelvic cyst  Colonic diverticulosis  PELVIS: No FDG avid soft tissue lesions are seen  Slightly linear activity in the region of the prostatic urethra probably related to urine retention  CT images: Prostate is mildly enlarged  Suggestion of a small fat-containing right inguinal hernia  OSSEOUS STRUCTURES: No FDG avid lesions are seen  CT images: No significant findings  For reference: SUV max of the mediastinal blood pool: 2 9 SUV max of the liver: 3 9     Impression: 1  At most there is mild radiotracer uptake in the mesenteric masses  Additionally, mild patchy radiotracer uptake related to a suspected small bowel lesion in the right hemiabdomen  Differential would include entities such as well-differentiated neuroendocrine tumor vs low-grade lymphoma  If an underlying neuroendocrine tumor is suspected based on clinical grounds, consider follow-up with Netspot PET/CT as this would be more sensitive than FDG for neuroendocrine lesions  2  Incidentally noted small focus of FDG uptake at the left base of the tongue   Asymmetric activity also noted in the left lingual tonsillar region  Underlying lesions should be excluded  Correlate with direct visualization  The study was marked in EPIC for significant notification  Workstation performed: [id number]         Assessment:       1  Aneurysm of ascending aorta without rupture ([address] )        2  Primary hypertension        3  Mixed hyperlipidemia             Plan:       Tim Tomlin has no signs nor symptoms reminiscent of angina pectoris, heart failure, nor electrical instability  Examines in sinus rhythm, euvolemic, and essentially normotensive  A sending thoracic aortic aneurysm remains stable in dimension, asymptomatic  Continue current antihypertensive regimen which includes atenolol Lotrel and Benicar hydrochlorothiazide  I made no changes today, ordered no acute cardiac testing  We will see him back in 6 months asked him to call sooner with any concerning potential cardiac symptoms in the meantime

## 2023-06-19 NOTE — ANESTHESIA PREPROCEDURE EVALUATION
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)   (+) Type 2 diabetes with stage 2 chronic kidney disease GFR 60-89 (HCC)      /RENAL   (+) Stage 3a chronic kidney disease (HCC)      MUSCULOSKELETAL   (+) Chronic bilateral low back pain   (+) Osteoarthritis   (+) Sciatica      NEURO/PSYCH   (+) Chronic bilateral low back pain      Other   (+) Lymphadenopathy, abdominal     TTE: normal biventricular systolic function, mild MR, mild TR          Anesthesia Plan  ASA Score- 3     Anesthesia Type- general with ASA Monitors  Additional Monitors:   Airway Plan: ETT  Comment: General anesthesia, endotracheal tube; standard ASA monitors  Risks and benefits discussed with patient; patient consented and agrees to proceed  I saw and evaluated the patient  If seen with CRNA, we have discussed the anesthetic plan and I am in agreement that the plan is appropriate for the patient  Tap block by surgeon intra-op  Plan Factors-    Induction- intravenous  Postoperative Plan- Plan for postoperative opioid use  Planned trial extubation    Informed Consent- Anesthetic plan and risks discussed with patient  I personally reviewed this patient with the CRNA  Discussed and agreed on the Anesthesia Plan with the CRNA  Xavier Wheatley

## 2023-06-20 ENCOUNTER — HOSPITAL ENCOUNTER (INPATIENT)
Facility: HOSPITAL | Age: 68
LOS: 4 days | Discharge: HOME | DRG: 804 | End: 2023-06-24
Attending: SURGERY | Admitting: SURGERY
Payer: COMMERCIAL

## 2023-06-20 ENCOUNTER — ANESTHESIA (OUTPATIENT)
Dept: PERIOP | Facility: HOSPITAL | Age: 68
End: 2023-06-20
Payer: COMMERCIAL

## 2023-06-20 DIAGNOSIS — R59.0 LYMPHADENOPATHY, ABDOMINAL: ICD-10-CM

## 2023-06-20 LAB
ABO GROUP BLD: NORMAL
ABO GROUP BLD: NORMAL
BLD GP AB SCN SERPL QL: NEGATIVE
GLUCOSE SERPL-MCNC: 131 MG/DL (ref 65–140)
GLUCOSE SERPL-MCNC: 146 MG/DL (ref 65–140)
GLUCOSE SERPL-MCNC: 202 MG/DL (ref 65–140)
GLUCOSE SERPL-MCNC: 205 MG/DL (ref 65–140)
RH BLD: POSITIVE
RH BLD: POSITIVE
SPECIMEN EXPIRATION DATE: NORMAL

## 2023-06-20 PROCEDURE — 88307 TISSUE EXAM BY PATHOLOGIST: CPT | Performed by: PATHOLOGY

## 2023-06-20 PROCEDURE — 88341 IMHCHEM/IMCYTCHM EA ADD ANTB: CPT | Performed by: PATHOLOGY

## 2023-06-20 PROCEDURE — 07BB0ZX EXCISION OF MESENTERIC LYMPHATIC, OPEN APPROACH, DIAGNOSTIC: ICD-10-PCS | Performed by: SURGERY

## 2023-06-20 PROCEDURE — 88331 PATH CONSLTJ SURG 1 BLK 1SPC: CPT | Performed by: PATHOLOGY

## 2023-06-20 PROCEDURE — 86850 RBC ANTIBODY SCREEN: CPT | Performed by: ANESTHESIOLOGY

## 2023-06-20 PROCEDURE — 88185 FLOWCYTOMETRY/TC ADD-ON: CPT

## 2023-06-20 PROCEDURE — 86900 BLOOD TYPING SEROLOGIC ABO: CPT | Performed by: ANESTHESIOLOGY

## 2023-06-20 PROCEDURE — 88342 IMHCHEM/IMCYTCHM 1ST ANTB: CPT | Performed by: PATHOLOGY

## 2023-06-20 PROCEDURE — 86901 BLOOD TYPING SEROLOGIC RH(D): CPT | Performed by: ANESTHESIOLOGY

## 2023-06-20 PROCEDURE — 88184 FLOWCYTOMETRY/ TC 1 MARKER: CPT | Performed by: SURGERY

## 2023-06-20 PROCEDURE — 88309 TISSUE EXAM BY PATHOLOGIST: CPT | Performed by: PATHOLOGY

## 2023-06-20 PROCEDURE — 82948 REAGENT STRIP/BLOOD GLUCOSE: CPT

## 2023-06-20 PROCEDURE — 88360 TUMOR IMMUNOHISTOCHEM/MANUAL: CPT | Performed by: PATHOLOGY

## 2023-06-20 PROCEDURE — C9290 INJ, BUPIVACAINE LIPOSOME: HCPCS | Performed by: ANESTHESIOLOGY

## 2023-06-20 PROCEDURE — 0DT80ZZ RESECTION OF SMALL INTESTINE, OPEN APPROACH: ICD-10-PCS | Performed by: SURGERY

## 2023-06-20 RX ORDER — CEFAZOLIN SODIUM 2 G/50ML
2000 SOLUTION INTRAVENOUS ONCE
Status: DISCONTINUED | OUTPATIENT
Start: 2023-06-20 | End: 2023-06-20 | Stop reason: HOSPADM

## 2023-06-20 RX ORDER — SODIUM CHLORIDE, SODIUM LACTATE, POTASSIUM CHLORIDE, CALCIUM CHLORIDE 600; 310; 30; 20 MG/100ML; MG/100ML; MG/100ML; MG/100ML
125 INJECTION, SOLUTION INTRAVENOUS CONTINUOUS
Status: DISCONTINUED | OUTPATIENT
Start: 2023-06-20 | End: 2023-06-20

## 2023-06-20 RX ORDER — FENTANYL CITRATE 50 UG/ML
INJECTION, SOLUTION INTRAMUSCULAR; INTRAVENOUS AS NEEDED
Status: DISCONTINUED | OUTPATIENT
Start: 2023-06-20 | End: 2023-06-20

## 2023-06-20 RX ORDER — PROPOFOL 10 MG/ML
INJECTION, EMULSION INTRAVENOUS AS NEEDED
Status: DISCONTINUED | OUTPATIENT
Start: 2023-06-20 | End: 2023-06-20

## 2023-06-20 RX ORDER — SODIUM CHLORIDE 9 MG/ML
INJECTION, SOLUTION INTRAVENOUS CONTINUOUS PRN
Status: DISCONTINUED | OUTPATIENT
Start: 2023-06-20 | End: 2023-06-20

## 2023-06-20 RX ORDER — SUCCINYLCHOLINE/SOD CL,ISO/PF 100 MG/5ML
SYRINGE (ML) INTRAVENOUS AS NEEDED
Status: DISCONTINUED | OUTPATIENT
Start: 2023-06-20 | End: 2023-06-20

## 2023-06-20 RX ORDER — ATENOLOL 50 MG/1
50 TABLET ORAL 2 TIMES DAILY
Status: DISCONTINUED | OUTPATIENT
Start: 2023-06-20 | End: 2023-06-24 | Stop reason: HOSPADM

## 2023-06-20 RX ORDER — KETAMINE HCL IN NACL, ISO-OSM 100MG/10ML
SYRINGE (ML) INJECTION AS NEEDED
Status: DISCONTINUED | OUTPATIENT
Start: 2023-06-20 | End: 2023-06-20

## 2023-06-20 RX ORDER — METRONIDAZOLE 500 MG/100ML
INJECTION, SOLUTION INTRAVENOUS CONTINUOUS PRN
Status: DISCONTINUED | OUTPATIENT
Start: 2023-06-20 | End: 2023-06-20

## 2023-06-20 RX ORDER — LIDOCAINE HYDROCHLORIDE 20 MG/ML
INJECTION, SOLUTION EPIDURAL; INFILTRATION; INTRACAUDAL; PERINEURAL AS NEEDED
Status: DISCONTINUED | OUTPATIENT
Start: 2023-06-20 | End: 2023-06-20

## 2023-06-20 RX ORDER — HYDROMORPHONE HCL/PF 1 MG/ML
0.5 SYRINGE (ML) INJECTION
Status: DISCONTINUED | OUTPATIENT
Start: 2023-06-20 | End: 2023-06-20 | Stop reason: HOSPADM

## 2023-06-20 RX ORDER — HYDROMORPHONE HCL/PF 1 MG/ML
SYRINGE (ML) INJECTION AS NEEDED
Status: DISCONTINUED | OUTPATIENT
Start: 2023-06-20 | End: 2023-06-20

## 2023-06-20 RX ORDER — ENOXAPARIN SODIUM 100 MG/ML
40 INJECTION SUBCUTANEOUS DAILY
Status: DISCONTINUED | OUTPATIENT
Start: 2023-06-20 | End: 2023-06-24 | Stop reason: HOSPADM

## 2023-06-20 RX ORDER — INSULIN LISPRO 100 [IU]/ML
1-6 INJECTION, SOLUTION INTRAVENOUS; SUBCUTANEOUS 4 TIMES DAILY
Status: DISCONTINUED | OUTPATIENT
Start: 2023-06-20 | End: 2023-06-24 | Stop reason: HOSPADM

## 2023-06-20 RX ORDER — CEFAZOLIN SODIUM 1 G/3ML
INJECTION, POWDER, FOR SOLUTION INTRAMUSCULAR; INTRAVENOUS AS NEEDED
Status: DISCONTINUED | OUTPATIENT
Start: 2023-06-20 | End: 2023-06-20

## 2023-06-20 RX ORDER — SODIUM CHLORIDE, SODIUM LACTATE, POTASSIUM CHLORIDE, CALCIUM CHLORIDE 600; 310; 30; 20 MG/100ML; MG/100ML; MG/100ML; MG/100ML
INJECTION, SOLUTION INTRAVENOUS CONTINUOUS PRN
Status: DISCONTINUED | OUTPATIENT
Start: 2023-06-20 | End: 2023-06-20

## 2023-06-20 RX ORDER — HYDROMORPHONE HCL/PF 1 MG/ML
0.5 SYRINGE (ML) INJECTION
Status: DISCONTINUED | OUTPATIENT
Start: 2023-06-20 | End: 2023-06-24 | Stop reason: HOSPADM

## 2023-06-20 RX ORDER — INSULIN LISPRO 100 [IU]/ML
1-6 INJECTION, SOLUTION INTRAVENOUS; SUBCUTANEOUS 4 TIMES DAILY
Status: DISCONTINUED | OUTPATIENT
Start: 2023-06-20 | End: 2023-06-20

## 2023-06-20 RX ORDER — BUPIVACAINE HYDROCHLORIDE 2.5 MG/ML
INJECTION, SOLUTION EPIDURAL; INFILTRATION; INTRACAUDAL AS NEEDED
Status: DISCONTINUED | OUTPATIENT
Start: 2023-06-20 | End: 2023-06-20 | Stop reason: HOSPADM

## 2023-06-20 RX ORDER — MIDAZOLAM HYDROCHLORIDE 2 MG/2ML
INJECTION, SOLUTION INTRAMUSCULAR; INTRAVENOUS AS NEEDED
Status: DISCONTINUED | OUTPATIENT
Start: 2023-06-20 | End: 2023-06-20

## 2023-06-20 RX ORDER — FENTANYL CITRATE/PF 50 MCG/ML
25 SYRINGE (ML) INJECTION
Status: DISCONTINUED | OUTPATIENT
Start: 2023-06-20 | End: 2023-06-20 | Stop reason: HOSPADM

## 2023-06-20 RX ORDER — MAGNESIUM HYDROXIDE 1200 MG/15ML
LIQUID ORAL AS NEEDED
Status: DISCONTINUED | OUTPATIENT
Start: 2023-06-20 | End: 2023-06-20 | Stop reason: HOSPADM

## 2023-06-20 RX ORDER — ROCURONIUM BROMIDE 10 MG/ML
INJECTION, SOLUTION INTRAVENOUS AS NEEDED
Status: DISCONTINUED | OUTPATIENT
Start: 2023-06-20 | End: 2023-06-20

## 2023-06-20 RX ORDER — ACETAMINOPHEN 325 MG/1
975 TABLET ORAL EVERY 6 HOURS SCHEDULED
Status: DISCONTINUED | OUTPATIENT
Start: 2023-06-20 | End: 2023-06-24 | Stop reason: HOSPADM

## 2023-06-20 RX ORDER — ONDANSETRON 2 MG/ML
INJECTION INTRAMUSCULAR; INTRAVENOUS AS NEEDED
Status: DISCONTINUED | OUTPATIENT
Start: 2023-06-20 | End: 2023-06-20

## 2023-06-20 RX ORDER — SODIUM CHLORIDE, SODIUM LACTATE, POTASSIUM CHLORIDE, CALCIUM CHLORIDE 600; 310; 30; 20 MG/100ML; MG/100ML; MG/100ML; MG/100ML
125 INJECTION, SOLUTION INTRAVENOUS CONTINUOUS
Status: DISCONTINUED | OUTPATIENT
Start: 2023-06-20 | End: 2023-06-21

## 2023-06-20 RX ORDER — ONDANSETRON 2 MG/ML
4 INJECTION INTRAMUSCULAR; INTRAVENOUS EVERY 4 HOURS PRN
Status: DISCONTINUED | OUTPATIENT
Start: 2023-06-20 | End: 2023-06-24 | Stop reason: HOSPADM

## 2023-06-20 RX ADMIN — SODIUM CHLORIDE, SODIUM LACTATE, POTASSIUM CHLORIDE, AND CALCIUM CHLORIDE: .6; .31; .03; .02 INJECTION, SOLUTION INTRAVENOUS at 07:26

## 2023-06-20 RX ADMIN — ROCURONIUM BROMIDE 30 MG: 10 INJECTION, SOLUTION INTRAVENOUS at 08:05

## 2023-06-20 RX ADMIN — SODIUM CHLORIDE, SODIUM LACTATE, POTASSIUM CHLORIDE, AND CALCIUM CHLORIDE 125 ML/HR: .6; .31; .03; .02 INJECTION, SOLUTION INTRAVENOUS at 14:14

## 2023-06-20 RX ADMIN — FENTANYL CITRATE 50 MCG: 50 INJECTION, SOLUTION INTRAMUSCULAR; INTRAVENOUS at 07:59

## 2023-06-20 RX ADMIN — SUGAMMADEX 300 MG: 100 INJECTION, SOLUTION INTRAVENOUS at 09:40

## 2023-06-20 RX ADMIN — PROPOFOL 200 MG: 10 INJECTION, EMULSION INTRAVENOUS at 07:35

## 2023-06-20 RX ADMIN — ENOXAPARIN SODIUM 40 MG: 40 INJECTION SUBCUTANEOUS at 15:55

## 2023-06-20 RX ADMIN — FENTANYL CITRATE 50 MCG: 50 INJECTION, SOLUTION INTRAMUSCULAR; INTRAVENOUS at 07:35

## 2023-06-20 RX ADMIN — PHENYLEPHRINE HYDROCHLORIDE 30 MCG/MIN: 10 INJECTION INTRAVENOUS at 07:42

## 2023-06-20 RX ADMIN — METRONIDAZOLE: 500 SOLUTION INTRAVENOUS at 07:55

## 2023-06-20 RX ADMIN — SODIUM CHLORIDE: 0.9 INJECTION, SOLUTION INTRAVENOUS at 07:42

## 2023-06-20 RX ADMIN — ACETAMINOPHEN 975 MG: 325 TABLET ORAL at 13:38

## 2023-06-20 RX ADMIN — HYDROMORPHONE HYDROCHLORIDE: 10 INJECTION, SOLUTION INTRAMUSCULAR; INTRAVENOUS; SUBCUTANEOUS at 10:14

## 2023-06-20 RX ADMIN — CEFAZOLIN 2000 MG: 1 INJECTION, POWDER, FOR SOLUTION INTRAMUSCULAR; INTRAVENOUS at 07:52

## 2023-06-20 RX ADMIN — SODIUM CHLORIDE, SODIUM LACTATE, POTASSIUM CHLORIDE, AND CALCIUM CHLORIDE 125 ML/HR: .6; .31; .03; .02 INJECTION, SOLUTION INTRAVENOUS at 17:42

## 2023-06-20 RX ADMIN — SODIUM CHLORIDE, SODIUM LACTATE, POTASSIUM CHLORIDE, AND CALCIUM CHLORIDE 125 ML/HR: .6; .31; .03; .02 INJECTION, SOLUTION INTRAVENOUS at 10:45

## 2023-06-20 RX ADMIN — Medication 100 MG: at 07:35

## 2023-06-20 RX ADMIN — ONDANSETRON 4 MG: 2 INJECTION INTRAMUSCULAR; INTRAVENOUS at 07:42

## 2023-06-20 RX ADMIN — SODIUM CHLORIDE, SODIUM LACTATE, POTASSIUM CHLORIDE, AND CALCIUM CHLORIDE 125 ML/HR: .6; .31; .03; .02 INJECTION, SOLUTION INTRAVENOUS at 21:49

## 2023-06-20 RX ADMIN — LIDOCAINE HYDROCHLORIDE 100 MG: 20 INJECTION, SOLUTION EPIDURAL; INFILTRATION; INTRACAUDAL; PERINEURAL at 07:35

## 2023-06-20 RX ADMIN — ATENOLOL 50 MG: 50 TABLET ORAL at 17:41

## 2023-06-20 RX ADMIN — Medication 20 MG: at 07:35

## 2023-06-20 RX ADMIN — Medication 30 MG: at 07:54

## 2023-06-20 RX ADMIN — HYDROMORPHONE HYDROCHLORIDE 0.5 MG: 1 INJECTION, SOLUTION INTRAMUSCULAR; INTRAVENOUS; SUBCUTANEOUS at 09:58

## 2023-06-20 RX ADMIN — ROCURONIUM BROMIDE 20 MG: 10 INJECTION, SOLUTION INTRAVENOUS at 07:44

## 2023-06-20 RX ADMIN — ACETAMINOPHEN 975 MG: 325 TABLET ORAL at 19:57

## 2023-06-20 RX ADMIN — ROCURONIUM BROMIDE 30 MG: 10 INJECTION, SOLUTION INTRAVENOUS at 07:37

## 2023-06-20 RX ADMIN — MIDAZOLAM 2 MG: 1 INJECTION INTRAMUSCULAR; INTRAVENOUS at 07:26

## 2023-06-20 NOTE — ANESTHESIA POSTPROCEDURE EVALUATION
Post-Op Assessment Note    CV Status:  Stable    Pain management: adequate     Mental Status:  Alert and awake   Hydration Status:  Euvolemic   PONV Controlled:  Controlled   Airway Patency:  Patent   Two or more mitigation strategies used for obstructive sleep apnea  There is a medical reason for not screening for obstructive sleep apnea and/or for not using two or more mitigation strategies   Post Op Vitals Reviewed: Yes      Staff: Anesthesiologist, CRNA         No notable events documented      /87 (06/20/23 0956)    Temp 97 8 °F (36 6 °C) (06/20/23 0956)    Pulse 86 (06/20/23 0956)   Resp   16   SpO2   97

## 2023-06-20 NOTE — INTERVAL H&P NOTE
H&P reviewed  After examining the patient I find no changes in the patients condition since the H&P had been written      Vitals:    06/20/23 0612   BP: 150/96   Pulse: 88   Resp: 20   Temp: 97 9 °F (36 6 °C)   SpO2: 94%

## 2023-06-20 NOTE — PROGRESS NOTES
"[facility] 78 y o  male MRN: [id number]  Unit/Bed#: [facility] 923-01 Encounter: [id number]    Assessment:  72yo male with PMHx of NIDDM2, HTN/HLD, ATAA, nephrolithiasis, CKD3 (1 1 - 1 2), and mesenteric mass POD0 s/p Ex-lap, biopsy of mesenteric mass, SBR, aborted mass resection, intraop TAP block by Dr Leonardo Cook  Plan:  -DM Clear liquid diet  -LR @125  -PCA-D for pain control  -Strict I's and O's  -Lovenox for DVT prophylaxis  -As needed antiemetics  -Monitor blood sugars before meals and at bedtime  -Sliding scale insulin coverage  -Encourage incentive spirometer use  -Encourage out of bed and ambulation    Subjective/Objective   Chief Complaint: Patient offers no significant point complaints at this time  He denies any fevers, chills, chest pain, shortness of breath  He states that the PCA Dilaudid pump is helping with his pain control  Subjective:   Pain: PCA Dilaudid pump, controlling pain  N/V: Denies  Tolerating Diet: Tolerating clear liquid diet  -Flatus and -BM  OOB and ambulating    Objective:     Blood pressure 128/89, pulse 86, temperature 98 3 °F (36 8 °C), resp  rate 12, height 5' 10\" (1 778 m), weight 103 kg (228 lb), SpO2 96 %  ,Body mass index is 32 71 kg/m²  Intake/Output Summary (Last 24 hours) at 6/20/2023 1643  Last data filed at 6/20/2023 1555  Gross per 24 hour   Intake 1562 5 ml   Output 825 ml   Net 737 5 ml       Invasive Devices     Peripheral Intravenous Line  Duration           Peripheral IV 06/20/23 Left Hand <1 day    Peripheral IV 06/20/23 Right Hand <1 day              Physical Exam:    General: Pt is AAOx3, lying down in bed in NAD  HEENT:  normocephalic, no scleral icterus,  moist mucous membranes   Neck: Supple, non-tender, ROM intact   CV: RRR, no murmurs, gallops, rubs  S1 and S2     Resp: Lung sounds clear to auscultation B/L, normal respiratory effort no  wheezes, rhonchi, rhales   Abd: Soft, with minimal tenderness, " mildly-distended, non-tympanitic  Hypoactive bowelsounds all 4 quadrants  No rebound or guarding  No CVA tenderness  Abdominal incision clean, dry, intact, with glue in place   Ext: Warm with no cyanosis, no edema, no deformities  ROM intact   Skin: No rashes, bruises, ulcers  Neuro: Sensation intact all 4 extremities  5+ strength all 4 extremities  Lab, Imaging and other studies:I have personally reviewed pertinent lab results      VTE Pharmacologic Prophylaxis: Enoxaparin (Lovenox)  VTE Mechanical Prophylaxis: sequential compression device     Debbie Martin

## 2023-06-20 NOTE — OP NOTE
OPERATIVE REPORT  PATIENT NAME: Tierney Betancur    :  1955  MRN: [id number]  Pt Location: BE OR ROOM 14    SURGERY DATE: 2023    Surgeon(s) and Role:     * Austin Finnegan MD - Primary     * Ricardo Chaney MD - Assisting    Preop Diagnosis:  Lymphadenopathy, abdominal [R59 0]    Post-Op Diagnosis Codes:     * Lymphadenopathy, abdominal [R59 0]    Procedure(s):  biopsy of MESENTERIC MASS  tap block  SMALL BOWEL RESECTION    Specimen(s):  ID Type Source Tests Collected by Time Destination   1 : Mesenteric mass Tissue Mesentery TISSUE EXAM Austin Finnegan MD 2023  8:01 AM    2 : Small Bowel  Tissue Other TISSUE EXAM Austin Finnegan MD 2023  8:26 AM    3 : Mesenteric Mass part 2 Tissue Mesentery TISSUE EXAM Austin Finnegan MD 2023  8:49 AM        Estimated Blood Loss:   Minimal    Drains:  * No LDAs found *    Anesthesia Type:   General    Operative Indications:  Lymphadenopathy, abdominal [R610]  79-year-old male with abdominal lymphadenopathy  It was recommended that he undergo resection  Risks and benefits were explained  Informed consent was obtained  Patient was brought to the operating room  Operative Findings:  Frozen section revealed nonspecific tissue  Specimen was sent for flow cytometry  Clinically, this appeared to be a neuroendocrine tumor with a small bowel mass with enlarged mesenteric lymph nodes  Resection of all of the enlarged mesenteric lymph nodes would have required significant small bowel resection of a large portion of normal bowel  Consequently biopsy of the lymph node was performed for diagnostic purposes  Complications:   None    Procedure and Technique:  After identifying the patient, general anesthesia was achieved  Patient was prepped and draped in the usual sterile fashion  A timeout was performed  Small upper midline incision was made    Using sharp dissection this taken through the skin, subcutaneous tissue and through the fascia into the peritoneal cavity  The mass was identified essentially at the base of the transverse colon small bowel mesentery  This was the larger mass identified  A second mass was seen more proximal   We now ran the small bowel from the ligament of Treitz to the ileocecal valve and there was a small presumed neuroendocrine tumor seen in the small bowel  This had impending distraction  At this time, there was significant bulky adenopathy around the 2 larger nodes and I was concerned that excising all of the involved area would necessitate resection of a large amount of normal small bowel, potentially causing GI complications  Consequently we formed just a biopsy of the larger lymph node  This was sent fresh to pathology  We now proceeded with our small bowel resection  Small bowel was isolated proximally distally, 5 cm from the mass and divided with a TREVOR 75 stapling device  The mesentery was clamped, divided and ligated with 2-0 silk suture  Small bowel was brought side to side with 3-0 silk suture  The distal portion of each staple line was removed  A TREVOR 75 stapling device was placed in each limb and fired  The defect was closed with a TA 60 stapling device  The mesentery was now closed with a running 3-0 Vicryl suture  The TA staple line was oversewn with 3-0 silk suture in a Lembert fashion  The frozen section returned back as nonspecific tissue  There was some lymphoid tissue, consequently we took a larger biopsy of this larger node  We essentially took this flush with the mesentery and sent this fresh to pathology to rule out lymphoma  Clinically I felt this was a neuroendocrine tumor, but the pathology did not think this was a neuroendocrine tumor  At this time, Floseal was placed over the lymph node and hemostasis was achieved with direct pressure  Wound was now copiously irrigated  There was excellent hemostasis    The fascia was approximated with #1 9 looped PDS suture starting at either end of the incision and secured centrally  Subcutaneous tissue was irrigated  Skin was approximated with interrupted 2-0 Vicryl suture subcutaneously and a running 4-0 Monocryl suture in a subsequent fashion  Skin glue was used on the skin  Patient was extubated and taken to the recovery room in stable condition having tolerated the procedure well  I was present and participated in all aspects of this procedure  I was present for the entire procedure      Patient Disposition:  PACU     This procedure was not performed to treat colon cancer through resection      SIGNATURE: Diana Avelar MD  DATE: June 20, 2023  TIME: 9:43 AM

## 2023-06-21 LAB
ANION GAP SERPL CALCULATED.3IONS-SCNC: 3 MMOL/L
BUN SERPL-MCNC: 7 MG/DL (ref 5–25)
CALCIUM SERPL-MCNC: 8.7 MG/DL (ref 8.3–10.1)
CHLORIDE SERPL-SCNC: 105 MMOL/L (ref 96–108)
CO2 SERPL-SCNC: 31 MMOL/L (ref 21–32)
CREAT SERPL-MCNC: 0.71 MG/DL (ref 0.6–1.3)
ERYTHROCYTE [DISTWIDTH] IN BLOOD BY AUTOMATED COUNT: 13.8 % (ref 11.6–15.1)
GFR SERPL CREATININE-BSD FRML MDRD: 97 ML/MIN/1.73SQ M
GLUCOSE SERPL-MCNC: 147 MG/DL (ref 65–140)
GLUCOSE SERPL-MCNC: 173 MG/DL (ref 65–140)
GLUCOSE SERPL-MCNC: 179 MG/DL (ref 65–140)
GLUCOSE SERPL-MCNC: 196 MG/DL (ref 65–140)
GLUCOSE SERPL-MCNC: 206 MG/DL (ref 65–140)
HCT VFR BLD AUTO: 42.1 % (ref 36.5–49.3)
HGB BLD-MCNC: 13.8 G/DL (ref 12–17)
MAGNESIUM SERPL-MCNC: 1.8 MG/DL (ref 1.6–2.6)
MCH RBC QN AUTO: 31.8 PG (ref 26.8–34.3)
MCHC RBC AUTO-ENTMCNC: 32.8 G/DL (ref 31.4–37.4)
MCV RBC AUTO: 97 FL (ref 82–98)
PLATELET # BLD AUTO: 136 THOUSANDS/UL (ref 149–390)
PMV BLD AUTO: 10.5 FL (ref 8.9–12.7)
POTASSIUM SERPL-SCNC: 3.2 MMOL/L (ref 3.5–5.3)
RBC # BLD AUTO: 4.34 MILLION/UL (ref 3.88–5.62)
SODIUM SERPL-SCNC: 139 MMOL/L (ref 135–147)
WBC # BLD AUTO: 8.53 THOUSAND/UL (ref 4.31–10.16)

## 2023-06-21 PROCEDURE — 80048 BASIC METABOLIC PNL TOTAL CA: CPT | Performed by: SURGERY

## 2023-06-21 PROCEDURE — 97163 PT EVAL HIGH COMPLEX 45 MIN: CPT

## 2023-06-21 PROCEDURE — 99024 POSTOP FOLLOW-UP VISIT: CPT | Performed by: SURGERY

## 2023-06-21 PROCEDURE — 97167 OT EVAL HIGH COMPLEX 60 MIN: CPT

## 2023-06-21 PROCEDURE — 83735 ASSAY OF MAGNESIUM: CPT | Performed by: SURGERY

## 2023-06-21 PROCEDURE — 82948 REAGENT STRIP/BLOOD GLUCOSE: CPT

## 2023-06-21 PROCEDURE — 85027 COMPLETE CBC AUTOMATED: CPT | Performed by: SURGERY

## 2023-06-21 PROCEDURE — 97535 SELF CARE MNGMENT TRAINING: CPT

## 2023-06-21 RX ORDER — DEXTROSE MONOHYDRATE, SODIUM CHLORIDE, AND POTASSIUM CHLORIDE 50; 1.49; 4.5 G/1000ML; G/1000ML; G/1000ML
75 INJECTION, SOLUTION INTRAVENOUS CONTINUOUS
Status: DISCONTINUED | OUTPATIENT
Start: 2023-06-21 | End: 2023-06-23

## 2023-06-21 RX ADMIN — INSULIN LISPRO 2 UNITS: 100 INJECTION, SOLUTION INTRAVENOUS; SUBCUTANEOUS at 18:00

## 2023-06-21 RX ADMIN — DEXTROSE, SODIUM CHLORIDE, AND POTASSIUM CHLORIDE 100 ML/HR: 5; .45; .15 INJECTION INTRAVENOUS at 18:06

## 2023-06-21 RX ADMIN — ATENOLOL 50 MG: 50 TABLET ORAL at 08:40

## 2023-06-21 RX ADMIN — SODIUM CHLORIDE, SODIUM LACTATE, POTASSIUM CHLORIDE, AND CALCIUM CHLORIDE 125 ML/HR: .6; .31; .03; .02 INJECTION, SOLUTION INTRAVENOUS at 06:00

## 2023-06-21 RX ADMIN — ACETAMINOPHEN 975 MG: 325 TABLET ORAL at 06:00

## 2023-06-21 RX ADMIN — INSULIN LISPRO 2 UNITS: 100 INJECTION, SOLUTION INTRAVENOUS; SUBCUTANEOUS at 12:01

## 2023-06-21 RX ADMIN — ENOXAPARIN SODIUM 40 MG: 40 INJECTION SUBCUTANEOUS at 08:40

## 2023-06-21 RX ADMIN — INSULIN LISPRO 1 UNITS: 100 INJECTION, SOLUTION INTRAVENOUS; SUBCUTANEOUS at 08:40

## 2023-06-21 RX ADMIN — ACETAMINOPHEN 975 MG: 325 TABLET ORAL at 23:09

## 2023-06-21 RX ADMIN — ATENOLOL 50 MG: 50 TABLET ORAL at 17:59

## 2023-06-21 RX ADMIN — ACETAMINOPHEN 975 MG: 325 TABLET ORAL at 00:56

## 2023-06-21 RX ADMIN — ONDANSETRON 4 MG: 2 INJECTION INTRAMUSCULAR; INTRAVENOUS at 15:45

## 2023-06-21 RX ADMIN — ACETAMINOPHEN 975 MG: 325 TABLET ORAL at 18:00

## 2023-06-21 RX ADMIN — DEXTROSE, SODIUM CHLORIDE, AND POTASSIUM CHLORIDE 100 ML/HR: 5; .45; .15 INJECTION INTRAVENOUS at 08:45

## 2023-06-21 NOTE — UTILIZATION REVIEW
Initial Clinical Review    Elective IP surgical procedure  Age/Sex: 78 y o  male  Surgery Date:  6/20/2023  Procedure:   biopsy of MESENTERIC MASS  tap block  SMALL BOWEL RESECTION   Anesthesia: General  Operative Findings:   Frozen section revealed nonspecific tissue  Specimen was sent for flow cytometry  Clinically, this appeared to be a neuroendocrine tumor with a small bowel mass with enlarged mesenteric lymph nodes  Resection of all of the enlarged mesenteric lymph nodes would have required significant small bowel resection of a large portion of normal bowel  Consequently biopsy of the lymph node was performed for diagnostic purposes        POD#1 Progress Note: No acute events overnight  Pain controlled with PCA  Had episode of nausea but no vomiting  Tolerating sips of clears  Denies chest pain shortness of breath no bowel function yet urinating adequately  VSS intermittently tachycardic to the low 100s  UOP: 2 9  advance diet as gracie  Transition to po regimen  Accuchecks w/ ssi  Follow AM labs  Encourage OOB/ambulate  PT/OT  Admission Orders: Date/Time/Statement:   Admission Orders (From admission, onward)     Ordered        06/20/23 0948  Inpatient Admission  Once                      Orders Placed This Encounter   Procedures   • Inpatient Admission     Standing Status:   Standing     Number of Occurrences:   1     Order Specific Question:   Level of Care     Answer:   Med Surg [16]     Order Specific Question:   Estimated length of stay     Answer:   More than 2 Midnights     Order Specific Question:   Certification     Answer:   I certify that inpatient services are medically necessary for this patient for a duration of greater than two midnights  See H&P and MD Progress Notes for additional information about the patient's course of treatment       Vital Signs:  Date/Time Temp Pulse Resp BP MAP (mmHg) SpO2 Calculated FIO2 (%) - Nasal Cannula O2 Flow Rate (L/min) Nasal Cannula O2 Flow Rate (L/min) O2 Device   06/21/23 07:30:25 98 9 °F (37 2 °C) -- 16 140/85 103 -- -- -- -- --   06/21/23 02:29:32 98 9 °F (37 2 °C) 105 18 136/88 104 94 % -- -- -- --   06/20/23 20:50:42 99 1 °F (37 3 °C) 107 Abnormal  18 122/81 95 96 % -- -- -- --   06/20/23 17:41:08 -- 106 Abnormal  -- 141/90 107 93 % -- -- -- --   06/20/23 14:22:30 98 3 °F (36 8 °C) -- -- 128/89 102 -- -- -- -- --   06/20/23 14:21:25 98 3 °F (36 8 °C) -- -- 128/89 102 -- -- -- -- --   06/20/23 1300 -- -- -- -- -- -- 36 -- 4 L/min --   06/20/23 1230 98 °F (36 7 °C) 86 12 127/79 94 96 % 36 -- 4 L/min Nasal cannula   06/20/23 1200 -- 92 15 122/85 96 96 % 36 -- 4 L/min Nasal cannula   06/20/23 1130 -- 88 18 119/82 91 96 % 36 -- 4 L/min Nasal cannula   06/20/23 1115 -- 86 13 121/77 90 96 % 36 -- 4 L/min Nasal cannula   06/20/23 1100 -- 86 13 110/75 86 96 % 36 -- 4 L/min Nasal cannula   06/20/23 1045 -- 86 15 113/72 83 96 % 36 -- 4 L/min Nasal cannula   06/20/23 1031 -- 86 11 Abnormal  117/77 91 96 % 44 -- 6 L/min Nasal cannula   06/20/23 1030 -- 88 14 117/77 91 94 % 36 -- 4 L/min Nasal cannula   06/20/23 1015 -- 86 17 116/74 84 99 % -- 4 L/min -- Simple mask   06/20/23 1014 -- 86 13 129/75 91 99 % -- -- -- --   06/20/23 1000 -- 86 12 120/82 98 97 % -- 6 L/min -- Simple mask   06/20/23 0956 97 8 °F (36 6 °C) 86 13 124/87 99 98 % -- 6 L/min -- Simple mask   06/20/23 0612 97 9 °F (36 6 °C) 88 20 150/96 -- 94 % -- -- -- None (Room air)     Pertinent Labs/Diagnostic Test Results:   Results from last 7 days   Lab Units 06/21/23  0631   WBC Thousand/uL 8 53   HEMOGLOBIN g/dL 13 8   HEMATOCRIT % 42 1   PLATELETS Thousands/uL 136*     Results from last 7 days   Lab Units 06/21/23  0631   SODIUM mmol/L 139   POTASSIUM mmol/L 3 2*   CHLORIDE mmol/L 105   CO2 mmol/L 31   ANION GAP mmol/L 3   BUN mg/dL 7   CREATININE mg/dL 0 71   EGFR ml/min/1 73sq m 97   CALCIUM mg/dL 8 7   MAGNESIUM mg/dL 1 8     Results from last 7 days   Lab Units 06/21/23 0728 06/20/23 2048 06/20/23  1738 06/20/23  1004 06/20/23  0610   POC GLUCOSE mg/dl 179* 131 146* 202* 205*     Results from last 7 days   Lab Units 06/21/23  0631   GLUCOSE RANDOM mg/dL 173*           Scheduled Medications:  acetaminophen, 975 mg, Oral, Q6H CONNOR  atenolol, 50 mg, Oral, BID  enoxaparin, 40 mg, Subcutaneous, Daily  insulin lispro, 1-6 Units, Subcutaneous, 4x Daily    Continuous IV Infusions:  dextrose 5 % and sodium chloride 0 45 % with KCl 20 mEq/L, 100 mL/hr, Intravenous, Continuous  HYDROmorphone, , Intravenous, Continuous    PRN Meds:  HYDROmorphone, 0 5 mg, Intravenous, Q3H PRN  ondansetron, 4 mg, Intravenous, Q4H PRN        Network Utilization Review Department  ATTENTION: Please call with any questions or concerns to [phone] and carefully listen to the prompts so that you are directed to the right person  All voicemails are confidential   Florencia Choe all requests for admission clinical reviews, approved or denied determinations and any other requests to dedicated fax number below belonging to the campus where the patient is receiving treatment   List of dedicated fax numbers for the Facilities:  [address] DENIALS (Administrative/Medical Necessity) [phone]   [address] (Maternity/NICU/Pediatrics) [phone]   915 Yoselyn Daugherty [phone]   [facility] [place] [facility] [phone]   [address] [place] [address] [facility] 28 [phone]   1550 [facility] 134 815 [facility] [phone]

## 2023-06-21 NOTE — CASE MANAGEMENT
Case Management Assessment & Discharge Planning Note    Patient name Eric Grimes  [facility]61 MRN [id number]  : 1955 Date 2023       Current Admission Date: 2023  Current Admission Diagnosis:Lymphadenopathy, abdominal   Patient Active Problem List    Diagnosis Date Noted   • Adrenal nodule ([email or web address] 75 ) 2023   • Lymphadenopathy, abdominal 2023   • Stage 3a chronic kidney [facility] ([facility] ) 2023   • Nausea and vomiting 2023   • Generalized abdominal pain 2023   • Severe obesity (BMI 35 0-35 9 with comorbidity) ([place] [address] ) 2022   • Type 2 diabetes mellitus with mild nonproliferative retinopathy of both eyes without macular edema ([place] [address] ) 2022   • Type 2 diabetes with stage 2 chronic kidney disease GFR 60-89 ([place] [address] ) 2022   • Hypokalemia 2020   • Colonic polyp 2019   • Lumbar radiculopathy 2019   • Microscopic hematuria 2019   • Pain of left thigh 2019   • Acute pain of left shoulder 2019   • Skin tag 10/25/2018   • Restless leg syndrome 2018   • Heel spur 2017   • Neuropathy of left foot 2016   • Diabetes mellitus type 2 in obese ([facility] ) 2015   • Sciatica 2014   • Chronic bilateral low back pain 2014   • Edema 2013   • Hyperuricemia without signs inflammatory arthritis/tophaceous disease 10/31/2012   • Aneurysm of thoracic aorta ([facility] ) 2012   • Mixed hyperlipidemia 2012   • Hypertension 2012   • Non-toxic uninodular goiter 2012   • Osteoarthritis 2012      LOS (days): 1  Geometric Mean LOS (GMLOS) (days): 1 80  Days to GMLOS:0 8     OBJECTIVE:    Risk of Unplanned Readmission Score: 9 45         Current admission status: Inpatient       Preferred Pharmacy:   [address] [place] [address] [address]  [address] [place] [address] [address]  [place] [id number]  Phone: [phone] Fax: [phone] [address]  Jeannine Nolasco, [address] [facility] [address]  Phone: [phone] Fax: 9075 [facility], [address] [facility] [address] [facility] 94 [place] [address] [facility] 77 [address]  Phone: [phone] Fax: [phone]    Primary Care Provider: Florian Barrera MD    Primary Insurance: BLUE CROSS  Secondary Insurance: MEDICARE    ASSESSMENT:  Chaitanya 26 Proxies    There are no active Health Care Proxies on file  Readmission Root Cause  30 Day Readmission: No    Patient Information  Admitted from[de-identified] Home  Mental Status: Alert  During Assessment patient was accompanied by: Not accompanied during assessment  Assessment information provided by[de-identified] Patient  Primary Caregiver: Self  Support Systems: Spouse/significant other  Home entry access options   Select all that apply : Stairs  Number of steps to enter home : 6  Do the steps have railings?: Yes  Type of Current Residence: Bi-level (Bedroom on 2nd level, half bath 1st, fulll bath 2nd )  Upon entering residence, is there a bedroom on the main floor (no further steps)?: No  A bedroom is located on the following floor levels of residence (select all that apply):: 2nd Floor  Upon entering residence, is there a bathroom on the main floor (no further steps)?: Yes  Living Arrangements: Lives w/ Spouse/significant other    Activities of Daily Living Prior to Admission  Functional Status: Independent  Completes ADLs independently?: Yes  Ambulates independently?: Yes  Does patient use assisted devices?: No  Does patient currently own DME?: No  Does patient have a history of Outpatient Therapy (PT/OT)?: Yes  Does the patient have a history of Short-Term Rehab?: No  Does patient have a history of HHC?: No  Does patient currently have [address]?: No         Patient Information Continued  Income Source: Pension/USP  Does patient have prescription coverage?: Yes  Food insecurity resource given?: N/A  Does patient receive dialysis treatments?: No  Does patient have a history of substance abuse?: No  Does patient have a history of Mental Health Diagnosis?: No         Means of Transportation  [facility] D[facility] Self        DISCHARGE DETAILS:    Discharge planning discussed with[de-identified] patient  Freedom of Choice: Yes     CM contacted family/caregiver?: No- see comments  Were Treatment Team discharge recommendations reviewed with patient/caregiver?: Yes  Did patient/caregiver verbalize understanding of patient care needs?: Yes  Were patient/caregiver advised of the risks associated with not following Treatment Team discharge recommendations?: Yes    Contacts  Patient Contacts: wife Cielo Soto  Relationship to Patient[de-identified] Family  Contact Method: Phone  Phone Number: [phone]  Reason/Outcome: Emergency Contact           Transport at Discharge : Family

## 2023-06-21 NOTE — PROGRESS NOTES
"Progress Note - Surgical Oncology  Jesi Mckeon 78 y o  male MRN: [id number]  [facility] Encounter: [id number]    Assessment:  Patient is a 78 y o  male who presented with mesenteric mass, now status post ex lap, bx and SBR on 6/20  Unfortunately mass was unable to be resected safely  Afebrile,VSS intermittently tachycardic to the low 100s  UOP: 2 9    Plan:  Advance diet as tolerated  Transition to PO pain regimen  SSI  DVT px  Follow-up a m  labs  Encourage out of bed and ambulation  PT/OT    Subjective/Objective     Subjective:   No acute events overnight  Pain controlled with PCA  Had episode of nausea but no vomiting  Tolerating sips of clears  Denies chest pain shortness of breath no bowel function yet urinating adequately    Objective:    Blood pressure 122/81, pulse (!) 107, temperature 99 1 °F (37 3 °C), resp  rate 18, height 5' 10\" (1 778 m), weight 103 kg (228 lb), SpO2 96 %  ,Body mass index is 32 71 kg/m²  Intake/Output Summary (Last 24 hours) at 6/20/2023 2131  Last data filed at 6/20/2023 1911  Gross per 24 hour   Intake 1562 5 ml   Output 1075 ml   Net 487 5 ml       Invasive Devices     Peripheral Intravenous Line  Duration           Peripheral IV 06/20/23 Left Hand <1 day    Peripheral IV 06/20/23 Right Hand <1 day                Physical Exam  Vitals reviewed  Constitutional:       General: He is not in acute distress  Appearance: He is not ill-appearing, toxic-appearing or diaphoretic  HENT:      Head: Normocephalic and atraumatic  Eyes:      Extraocular Movements: Extraocular movements intact  Cardiovascular:      Rate and Rhythm: Normal rate  Pulmonary:      Effort: Pulmonary effort is normal  No respiratory distress  Abdominal:      General: There is distension  Palpations: Abdomen is soft  Tenderness: There is abdominal tenderness  There is no guarding or rebound        Comments: Incisions clean, dry and intact   Skin:     General: Skin is " "warm and dry  Neurological:      Mental Status: He is alert and oriented to person, place, and time     Psychiatric:         Mood and Affect: Mood normal          Behavior: Behavior normal                        Invalid input(s): \"LABGLOM\"             "

## 2023-06-21 NOTE — PHYSICAL THERAPY NOTE
Physical Therapy Eval    Patient Name: Sera Barakat    IEFBI'N Date: 6/21/2023     Problem List  Principal Problem:    Lymphadenopathy, abdominal       Past Medical History  Past Medical History:   Diagnosis Date    Allergic rhinitis     last assessed: 5/4/2015    Aneurysm of thoracic aorta ([address] )     Cataract     resolved: 12/12/2014    Colon polyp     Diabetes mellitus (HCC)     diet control    Herniated nucleus pulposus, L4-5 left     last assessed: 4/9/2014    Herniated nucleus pulposus, L5-S1, left     last assessed: 4/9/2014    Hx of echocardiogram 10/14/2016    EF 55%, Mild LVH, mild aortic root and ascending aorta enlargement, measuring 42 mm, trace MR, trace aortic regurg  Hypercholesterolemia     Hypertension     Kidney stone     Lumbar radiculopathy         Past Surgical History  Past Surgical History:   Procedure Laterality Date    BACK SURGERY      BACK SURGERY      COLONOSCOPY      KNEE ARTHROSCOPY      (therapeutic)    KNEE SURGERY      [place] COLONOSCOPY FLX DX W/COLLJ SPEC WHEN PFRMD N/A 09/30/2016    Procedure: COLONOSCOPY;  Surgeon: Zoey Fatima MD;  [facility]: [facility];  Service: Gastroenterology    TONSILLECTOMY AND ADENOIDECTOMY           06/21/23 0911   PT Last Visit   PT Visit Date 06/21/23   Note Type   Note type Evaluation   Pain Assessment   Pain Assessment Tool 0-10   Pain Score 2   Pain Location/Orientation Location: Abdomen   Patient's Stated Pain Goal No pain   Hospital Pain Intervention(s) Repositioned; Ambulation/increased activity   Restrictions/Precautions   Weight Bearing Precautions Per Order No   Other Precautions O2;Fall Risk;Pain;Telemetry;Multiple lines  (4L O2)   Home Living   Type of [address] [place] [address] Two level;1/2 bath on main level;Bed/bath upstairs;Stairs to enter with rails  (6STE)   Bathroom Shower/Tub Tub/shower unit   Bathroom Toilet Standard   Bathroom Equipment   (none)   Bathroom Accessibility Accessible   Home Equipment   (none)   Prior Function   Level of [place] Independent with ADLs; Independent with functional mobility; Independent with IADLS   Lives With Spouse   Receives Help From   (none)   IADLs Independent with driving; Independent with meal prep; Independent with medication management   Falls in the last 6 months 1 to 4   Vocational Retired   General   Family/Caregiver Present No   Cognition   Overall Cognitive Status WFL   Arousal/Participation Alert   Orientation Level Oriented X4   Memory Within functional limits   Following Commands Follows all commands and directions without difficulty   Subjective   Subjective pt pleasant and cooperative throughout therapy session  pt received seated in bedside recliner   RLE Assessment   RLE Assessment WFL   LLE Assessment   LLE Assessment WFL   Bed Mobility   Supine to Sit Unable to assess   Sit to Supine Unable to assess   Transfers   Sit to Stand 5  Supervision   Additional items Increased time required;Verbal cues   Stand to Sit 5  Supervision   Additional items Increased time required;Verbal cues   Ambulation/Elevation   Gait pattern Excessively slow;Decreased heel strike;Decreased toe off;Step through pattern; Foward flexed; Short stride; Shuffling;Narrow VICTORINO   Gait Assistance 5  Supervision   Additional items Verbal cues   Assistive Device Rolling walker   Distance 60'   Stair Management Assistance Not tested   Balance   Static Sitting Fair   Dynamic Sitting Fair -   Static Standing Fair -   Dynamic Standing Fair -   Ambulatory Poor +   Endurance Deficit   Endurance Deficit Yes   Endurance Deficit Description generalized weakness, decreased exercise tolerance   Activity Tolerance   Activity Tolerance Patient limited by fatigue;Patient limited by pain   Medical Staff Made Aware OT felicity Peterson due to medical complexity and multiple comorbidities   Nurse Made Aware RN cleared and updated   Assessment   Prognosis Good   Problem List Decreased strength;Decreased range of motion;Decreased endurance; Impaired balance;Decreased mobility;Pain;Obesity   Assessment PT orders received and acknowledged  Patient was seen today for high complexity PT evaluation  High complexity evaluation due to Ongoing medical management for primary dx, Increased reliance on more restrictive AD compared to baseline, Decreased activity tolerance compared to baseline, Fall risk, Increased O2 via NC from pts baseline, Continuous pulse oximetry monitoring , s/p surgical intervention Patient is a 78 y o  male who was admitted to [facility] on 6/20/23 for resection of mesenteric mass   Patients current diagnosis/problem list include lymphadenopathy  Adrenal nodule, and chronic kidney disease   Patient performed functional mobility as described above  Patient requires increased time to complete gait and transfers today secondary to pain in abdomen  Patient displays decreased activity tolerance compared to baseline  Patient educated about safety in standing and proper use of RW  Patient reports understanding  Patient returned to room and deferred further functional mobility  Patient currently presents below baseline with limitations in transfers, balance, and gait  Patient will benefit from continued PT services while in hospital in order to address remaining limitations  The patients AM-PAC Basic Mobility Inpatient Short From Raw Score is 23   A Raw score of 23 suggests that the patient may benefit from discharge to home   PT recommendation at this time is for no rehab needs vs home with HHPT pending progress in hospital   Please also refer to the recommendation of the Physical Therapist for safe discharge planning     Barriers to Discharge None   Goals   Patient Goals to feel better   STG Expiration Date 07/05/23   Short Term Goal #1 Patient will be able to perform bed mobility tasks with modified independence in order to improve overall functional mobility and assist in safe d/c  STG 2 Patient will sit EOB for at least 25 minutes at modified independence level in order to strengthen abdominal musculature and assist in future transfers and ambulation  STG 3 Patient will be able to perform functional transfer with modified independence in order to improve overall functional mobility and assist in safe discharge  STG 4 Patient will be able to ambulate at least 300 feet with least restrictive device with modified independence assist in order to improve overall functional mobility and assist in safe discharge  STG 5 Patient will improve sitting/standing static/dynamic balance 1/2 grade in order to improve functional mobility and assist in safe discharge  STG 6 Patient will improve LE strength by 1/2 grade in order to improve functional mobility and assist in safe discharge  STG 7 Patient will be able to negotiate at least 1 flight of stairs with least restrictive device with modified independence A in order to improve overall functional mobility and assist in safe discharge   PT Treatment Day 0   Plan   Treatment/Interventions ADL retraining;Functional transfer training;LE strengthening/ROM; Elevations; Therapeutic exercise; Endurance training;Bed mobility;Gait training;Spoke to nursing;OT   PT Frequency 2-3x/wk   Recommendation   PT Discharge Recommendation No rehabilitation needs  (vs HHPT pending progress made in hospital)   AM-PAC Basic Mobility Inpatient   Turning in Flat Bed Without Bedrails 4   Lying on Back to Sitting on Edge of Flat Bed Without Bedrails 4   Moving Bed to Chair 4   Standing Up From Chair Using Arms 4   Walk in Room 4   Climb 3-5 Stairs With Railing 3   Basic Mobility Inpatient Raw Score 23   Basic Mobility Standardized Score 50 88   Highest Level Of Mobility   JH-HLM Goal 7: Walk 25 feet or more   JH-HLM Achieved 7: Walk 25 feet or more   End of Consult   Patient Position at End of Consult Bedside chair; All needs within reach;Bed/Chair alarm activated       Bala Stearns Betty ROMAN, PÉREZT

## 2023-06-21 NOTE — RESTORATIVE TECHNICIAN NOTE
Restorative Technician Note      Patient Name: Ana Adrian     Restorative Tech Visit Date: 06/21/23  Note Type: Mobility  Patient Position Upon Consult: Supine  Activity Performed: Ambulated  Assistive Device: Roller walker  Patient Position at End of Consult: Bedside chair;  All needs within [facility]

## 2023-06-21 NOTE — OCCUPATIONAL THERAPY NOTE
Occupational Therapy Evaluation + Treatment     Patient Name: Vangie Elliott  PTWLT'N Date: 6/21/2023  Problem List  Principal Problem:    Lymphadenopathy, abdominal    Past Medical History  Past Medical History:   Diagnosis Date    Allergic rhinitis     last assessed: 5/4/2015    Aneurysm of thoracic aorta ([address] )     Cataract     resolved: 12/12/2014    Colon polyp     Diabetes mellitus (HCC)     diet control    Herniated nucleus pulposus, L4-5 left     last assessed: 4/9/2014    Herniated nucleus pulposus, L5-S1, left     last assessed: 4/9/2014    Hx of echocardiogram 10/14/2016    EF 55%, Mild LVH, mild aortic root and ascending aorta enlargement, measuring 42 mm, trace MR, trace aortic regurg  Hypercholesterolemia     Hypertension     Kidney stone     Lumbar radiculopathy      Past Surgical History  Past Surgical History:   Procedure Laterality Date    BACK SURGERY      BACK SURGERY      COLONOSCOPY      KNEE ARTHROSCOPY      (therapeutic)    KNEE SURGERY      LYMPH NODE DISSECTION N/A 6/20/2023    Procedure: biopsy of MESENTERIC MASS, tap block;  Surgeon: Silvia Collado MD;  [facility];  Service: Surgical Oncology    [place] COLONOSCOPY FLX DX W/COLLJ SPEC WHEN PFRMD N/A 09/30/2016    Procedure: COLONOSCOPY;  Surgeon: Kameron Michaels MD;  [facility]: [facility];  Service: Gastroenterology    SMALL INTESTINE SURGERY N/A 6/20/2023    Procedure: SMALL BOWEL RESECTION;  Surgeon: Silvia Collado MD;  [facility];  Service: Surgical Oncology    TONSILLECTOMY AND ADENOIDECTOMY             06/21/23 0856   OT Last Visit   OT Visit Date 06/21/23   Note Type   Note type Evaluation  (and treatment)   Pain Assessment   Pain Assessment Tool 0-10   Pain Score 2   Pain Location/Orientation Location: Abdomen   Hospital Pain Intervention(s) Repositioned; Ambulation/increased activity   Restrictions/Precautions   Weight Bearing Precautions Per Order No   Other Precautions Multiple lines;O2;Fall Risk;Pain  (4LO2, "PCA)   Home Living   Type of [address] [place] [address] Two level;1/2 bath on main level  (6 vs 12STE)   Bathroom Shower/Tub Walk-in shower  (+ tub/shower)   Bathroom Toilet Standard   Additional Comments Denies AD/DME   Prior Function   Level of [place] Independent with ADLs; Independent with IADLS   Lives With Spouse   Receives Help From Family   IADLs Independent with driving; Independent with meal prep   Falls in the last 6 months 1 to 4  (pt reports trip and fall when fishing once)   Vocational Retired   Lifestyle   Autonomy PTA, pt reports being I with ADLs, shares IADLs with wife, I fnxl mobility, (+)    Reciprocal Relationships Wife, kids, grandkids   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 \"   ADL   Where Assessed Chair   Eating Assistance 7  Independent   Grooming Assistance 7  Independent   UB Bathing Assistance 5  Supervision/Setup   LB Bathing Assistance 4  Minimal Assistance   [address] 5  Supervision/Setup   LB Dressing Assistance 4  Minimal Assistance   Toileting Assistance  5  Supervision/Setup   Additional Comments Pt reports having increased difficulty with LBD lately 2* back pain   Bed Mobility   Supine to Sit Unable to assess   Sit to Supine Unable to assess   Transfers   Sit to Stand 5  Supervision   Additional items Increased time required   Stand to Sit 5  Supervision   Additional items Increased time required   Additional Comments transfers with RW   Functional Mobility   Functional Mobility 5  Supervision   Additional items Rolling walker   Balance   Static Sitting Good   Dynamic Sitting Fair +   Static Standing Fair   Dynamic Standing Fair   Ambulatory Fair -   Activity Tolerance   Activity Tolerance Patient tolerated treatment well   Medical Staff Made Aware PT Braydon Schultz   Nurse Made Aware RN clearance for session   RUE " Assessment   RUE Assessment WFL   LUE Assessment   LUE Assessment WFL   Vision-Basic Assessment   Current Vision Wears glasses all the time   Psychosocial   Psychosocial (WDL) WDL   Cognition   Overall Cognitive Status WFL   Arousal/Participation Responsive; Cooperative   Attention Within functional limits   Orientation Level Oriented X4   Memory Within functional limits   Following Commands Follows all commands and directions without difficulty   Comments Pt pleasant and cooperative t/o session   Assessment   Limitation Decreased ADL status; Decreased endurance   Prognosis Good   Assessment Pt is a 78 y o  male admitted to [facility] on 6/20/2023 w/ Lymphadenopathy, mesenteric mass  Pt now s/p ex lap, bx, and SBR  has a past medical history of Allergic rhinitis, Aneurysm of thoracic aorta, Cataract, Colon polyp, Diabetes mellitus, Herniated nucleus pulposus, L4-5 left, Herniated nucleus pulposus, L5-S1, left, Hypercholesterolemia, Hypertension, Kidney stone, and Lumbar radiculopathy  Pt with active OT orders and ambulate  orders  Pt seen as a co-evaluation with PT due to the patient's co-morbidities, clinically unstable presentation/clinical complexity, and present impairments  As per pt report, pta, resides with his wife in a 2STH, 6 vs 12STE  Pt was I w/  ADLS and shares IADLS with wife, (+) drove  Upon evaluation, pt currently requires S with RW for transfers and mobility  Pt currently requires I eating, I grooming, S UB ADLs, MIN A LB ADLs, and S toileting  Pt with decreased performance of LB ADLs, reports having increased difficulty lately 2* back issues  Pt to benefit from additional skilled OT tx session(s) to address deficits and provide education on LHAE  From OT standpoint, recommendation would be home with increased social support  Pt was left after session with all current needs met  The patient's raw score on the AM-PAC Daily Activity Inpatient Short Form is 20   A raw score of greater than or equal to 19 suggests the patient may benefit from discharge to home  Please refer to the recommendation of the Occupational Therapist for safe discharge planning  Goals   STG Time Frame 1-3   Plan   Treatment Interventions ADL retraining;Equipment evaluation/education;Patient/family training; Compensatory technique education;Continued evaluation; Energy conservation; Activityengagement   Goal Expiration Date 06/21/23   OT Treatment Day 1   OT Frequency Eval only  (+ follow-up tx)   Recommendation   OT Discharge Recommendation No rehabilitation needs   AM-PAC Daily Activity Inpatient   Lower Body Dressing 3   Bathing 3   Toileting 3   Upper Body Dressing 3   Grooming 4   Eating 4   Daily Activity Raw Score 20   Daily Activity Standardized Score (Calc for Raw Score >=11) 42 03   AM-PAC Applied Cognition Inpatient   Following a Speech/Presentation 4   Understanding Ordinary Conversation 4   Taking Medications 4   Remembering Where Things Are Placed or Put Away 4   Remembering List of 4-5 Errands 4   Taking Care of Complicated Tasks 4   Applied Cognition Raw Score 24   Applied Cognition Standardized Score 62 21   Additional Treatment Session   Start Time 1438   End Time 1502   Treatment Assessment Patient participated in Skilled OT session this date with interventions consisting of ADL re training with the use of correct body mechnaics, safety awareness and fall prevention techniques and  long handle equipment   Upon arrival patient was found supine in bed  Pt educated on [facility]  Pt able to carryover education to doff and don socks with reacher and sock aide  Pt performs additional STS, bed mobility, and fnxl mobility with supervision and RW  Spouse present during session  Pt and spouse with no further questions or concerns at this time  From OT standpoint, recommendation would be home with soical support  No further acute OT needs  D/C OT  Please re-consult if needed  Thank you  Pt was left after session with all current needs met  The patient's raw score on the AM-PAC Daily Activity Inpatient Short Form is 20  A raw score of greater than or equal to 19 suggests the patient may benefit from discharge to home  Please refer to the recommendation of the Occupational Therapist for safe discharge planning  Additional Treatment Day 1     GOALS    - Pt will complete LB dressing/self care/bathing w/ S using adaptive device and DME as needed    - Pt will improve functional transfers to S on/off all surfaces using DME as needed w/ G balance/safety     - Pt will improve functional mobility during ADL/IADL/leisure tasks to S using DME as needed w/ G balance/safety     - Pt will demonstrate G carryover of pt/caregiver education and training as appropriate        Shaniqua Juarez MS, OTR/L

## 2023-06-21 NOTE — PLAN OF CARE
Problem: OCCUPATIONAL THERAPY ADULT  Goal: Performs self-care activities at highest level of function for planned discharge setting  See evaluation for individualized goals  Description: Treatment Interventions: ADL retraining, Equipment evaluation/education, Patient/family training, Compensatory technique education, Continued evaluation, Energy conservation, Activityengagement          See flowsheet documentation for full assessment, interventions and recommendations  Outcome: Adequate for Discharge  Note: Limitation: Decreased ADL status, Decreased endurance  Prognosis: Good  Assessment: Pt is a 78 y o  male admitted to [facility] on 6/20/2023 w/ Lymphadenopathy, mesenteric mass  Pt now s/p ex lap, bx, and SBR  has a past medical history of Allergic rhinitis, Aneurysm of thoracic aorta, Cataract, Colon polyp, Diabetes mellitus, Herniated nucleus pulposus, L4-5 left, Herniated nucleus pulposus, L5-S1, left, Hypercholesterolemia, Hypertension, Kidney stone, and Lumbar radiculopathy  Pt with active OT orders and ambulate  orders  Pt seen as a co-evaluation with PT due to the patient's co-morbidities, clinically unstable presentation/clinical complexity, and present impairments  As per pt report, pta, resides with his wife in a 2STH, 6 vs 12STE  Pt was I w/  ADLS and shares IADLS with wife, (+) drove  Upon evaluation, pt currently requires S with RW for transfers and mobility  Pt currently requires I eating, I grooming, S UB ADLs, MIN A LB ADLs, and S toileting  Pt with decreased performance of LB ADLs, reports having increased difficulty lately 2* back issues  Pt to benefit from additional skilled OT tx session(s) to address deficits and provide education on LHAE  From OT standpoint, recommendation would be home with increased social support  Pt was left after session with all current needs met  The patient's raw score on the AM-PAC Daily Activity Inpatient Short Form is 20   A raw score of greater than or equal to 19 suggests the patient may benefit from discharge to home  Please refer to the recommendation of the Occupational Therapist for safe discharge planning       OT Discharge Recommendation: No rehabilitation needs

## 2023-06-21 NOTE — PLAN OF CARE
Problem: PHYSICAL THERAPY ADULT  Goal: Performs mobility at highest level of function for planned discharge setting  See evaluation for individualized goals  Description: Treatment/Interventions: ADL retraining, Functional transfer training, LE strengthening/ROM, Elevations, Therapeutic exercise, Endurance training, Bed mobility, Gait training, Spoke to nursing, OT          See flowsheet documentation for full assessment, interventions and recommendations  Note: Prognosis: Good  Problem List: Decreased strength, Decreased range of motion, Decreased endurance, Impaired balance, Decreased mobility, Pain, Obesity  Assessment: PT orders received and acknowledged  Patient was seen today for high complexity PT evaluation  High complexity evaluation due to Ongoing medical management for primary dx, Increased reliance on more restrictive AD compared to baseline, Decreased activity tolerance compared to baseline, Fall risk, Increased O2 via NC from pts baseline, Continuous pulse oximetry monitoring , s/p surgical intervention Patient is a 78 y o  male who was admitted to [facility] on 6/20/23 for resection of mesenteric mass   Patients current diagnosis/problem list include lymphadenopathy  Adrenal nodule, and chronic kidney disease   Patient performed functional mobility as described above  Patient requires increased time to complete gait and transfers today secondary to pain in abdomen  Patient displays decreased activity tolerance compared to baseline  Patient educated about safety in standing and proper use of RW  Patient reports understanding  Patient returned to room and deferred further functional mobility  Patient currently presents below baseline with limitations in transfers, balance, and gait  Patient will benefit from continued PT services while in hospital in order to address remaining limitations  The patients AM-PAC Basic Mobility Inpatient Short From Raw Score is 23    A Raw score of 23 suggests that the patient may benefit from discharge to home   PT recommendation at this time is for no rehab needs vs home with HHPT pending progress in hospital   Please also refer to the recommendation of the Physical Therapist for safe discharge planning  Barriers to Discharge: None     PT Discharge Recommendation: No rehabilitation needs (vs HHPT pending progress made in hospital)    See flowsheet documentation for full assessment

## 2023-06-21 NOTE — RESTORATIVE TECHNICIAN NOTE
Restorative Technician Note      Patient Name: Maribel Natarajan     Restorative Tech Visit Date: 06/21/23  Note Type: Mobility  Patient Position Upon Consult: Bedside chair  Activity Performed: Ambulated  Assistive Device: Roller walker  Patient Position at End of Consult: Bedside chair;  All needs within [facility]

## 2023-06-21 NOTE — PLAN OF CARE
Problem: MOBILITY - ADULT  Goal: Maintain or return to baseline ADL function  Description: INTERVENTIONS:  -  Assess patient's ability to carry out ADLs; assess patient's baseline for ADL function and identify physical deficits which impact ability to perform ADLs (bathing, care of mouth/teeth, toileting, grooming, dressing, etc )  - Assess/evaluate cause of self-care deficits   - Assess range of motion  - Assess patient's mobility; develop plan if impaired  - Assess patient's need for assistive devices and provide as appropriate  - Encourage maximum independence but intervene and supervise when necessary  - Involve family in performance of ADLs  - Assess for home care needs following discharge   - Consider OT consult to assist with ADL evaluation and planning for discharge  - Provide patient education as appropriate  Outcome: Not Progressing  Goal: Maintains/Returns to pre admission functional level  Description: INTERVENTIONS:  - Perform BMAT or MOVE assessment daily    - Set and communicate daily mobility goal to care team and patient/family/caregiver     - Collaborate with rehabilitation services on mobility goals if consulted  - Out of bed for toileting  - Record patient progress and toleration of activity level   Outcome: Not Progressing     Problem: PAIN - ADULT  Goal: Verbalizes/displays adequate comfort level or baseline comfort level  Description: Interventions:  - Encourage patient to monitor pain and request assistance  - Assess pain using appropriate pain scale  - Administer analgesics based on type and severity of pain and evaluate response  - Implement non-pharmacological measures as appropriate and evaluate response  - Consider cultural and social influences on pain and pain management  - Notify physician/advanced practitioner if interventions unsuccessful or patient reports new pain  Outcome: Not Progressing     Problem: INFECTION - ADULT  Goal: Absence or prevention of progression during hospitalization  Description: INTERVENTIONS:  - Assess and monitor for signs and symptoms of infection  - Monitor lab/diagnostic results  - Monitor all insertion sites, i e  indwelling lines, tubes, and drains  - Monitor endotracheal if appropriate and nasal secretions for changes in amount and color  - [place] appropriate cooling/warming therapies per order  - Administer medications as ordered  - Instruct and encourage patient and family to use good hand hygiene technique  - Identify and instruct in appropriate isolation precautions for identified infection/condition  Outcome: Not Progressing  Goal: Absence of fever/infection during neutropenic period  Description: INTERVENTIONS:  - Monitor WBC    Outcome: Not Progressing     Problem: SAFETY ADULT  Goal: Maintain or return to baseline ADL function  Description: INTERVENTIONS:  -  Assess patient's ability to carry out ADLs; assess patient's baseline for ADL function and identify physical deficits which impact ability to perform ADLs (bathing, care of mouth/teeth, toileting, grooming, dressing, etc )  - Assess/evaluate cause of self-care deficits   - Assess range of motion  - Assess patient's mobility; develop plan if impaired  - Assess patient's need for assistive devices and provide as appropriate  - Encourage maximum independence but intervene and supervise when necessary  - Involve family in performance of ADLs  - Assess for home care needs following discharge   - Consider OT consult to assist with ADL evaluation and planning for discharge  - Provide patient education as appropriate  Outcome: Not Progressing  Goal: Maintains/Returns to pre admission functional level  Description: INTERVENTIONS:  - Perform BMAT or MOVE assessment daily    - Set and communicate daily mobility goal to care team and patient/family/caregiver     - Collaborate with rehabilitation services on mobility goals if consulted  - Out of bed for toileting  - Record patient progress and toleration of activity level   Outcome: Not Progressing  Goal: Patient will remain free of falls  Description: INTERVENTIONS:  - Educate patient/family on patient safety including physical limitations  - Instruct patient to call for assistance with activity   - Consult OT/PT to assist with strengthening/mobility   - Keep Call bell within reach  - Keep bed low and locked with side rails adjusted as appropriate  - Keep care items and personal belongings within reach  - Initiate and maintain comfort rounds  - Make Fall Risk Sign visible to staff  - Apply yellow socks and bracelet for high fall risk patients  - Consider moving patient to room near nurses station  Outcome: Not Progressing     Problem: DISCHARGE PLANNING  Goal: Discharge to home or other facility with appropriate resources  Description: INTERVENTIONS:  - Identify barriers to discharge w/patient and caregiver  - Arrange for needed discharge resources and transportation as appropriate  - Identify discharge learning needs (meds, wound care, etc )  - Arrange for interpretive services to assist at discharge as needed  - Refer to Case Management Department for coordinating discharge planning if the patient needs post-hospital services based on physician/advanced practitioner order or complex needs related to functional status, cognitive ability, or social support system  Outcome: Not Progressing     Problem: Knowledge Deficit  Goal: Patient/family/caregiver demonstrates understanding of disease process, treatment plan, medications, and discharge instructions  Description: Complete learning assessment and assess knowledge base    Interventions:  - Provide teaching at level of understanding  - Provide teaching via preferred learning methods  Outcome: Not Progressing

## 2023-06-22 LAB
ANION GAP SERPL CALCULATED.3IONS-SCNC: 6 MMOL/L
BASOPHILS # BLD AUTO: 0.02 THOUSANDS/ÂΜL (ref 0–0.1)
BASOPHILS NFR BLD AUTO: 0 % (ref 0–1)
BUN SERPL-MCNC: 7 MG/DL (ref 5–25)
CALCIUM SERPL-MCNC: 9 MG/DL (ref 8.3–10.1)
CHLORIDE SERPL-SCNC: 105 MMOL/L (ref 96–108)
CO2 SERPL-SCNC: 29 MMOL/L (ref 21–32)
CREAT SERPL-MCNC: 0.75 MG/DL (ref 0.6–1.3)
EOSINOPHIL # BLD AUTO: 0.13 THOUSAND/ÂΜL (ref 0–0.61)
EOSINOPHIL NFR BLD AUTO: 2 % (ref 0–6)
ERYTHROCYTE [DISTWIDTH] IN BLOOD BY AUTOMATED COUNT: 13.6 % (ref 11.6–15.1)
GFR SERPL CREATININE-BSD FRML MDRD: 95 ML/MIN/1.73SQ M
GLUCOSE SERPL-MCNC: 145 MG/DL (ref 65–140)
GLUCOSE SERPL-MCNC: 154 MG/DL (ref 65–140)
GLUCOSE SERPL-MCNC: 168 MG/DL (ref 65–140)
GLUCOSE SERPL-MCNC: 172 MG/DL (ref 65–140)
GLUCOSE SERPL-MCNC: 181 MG/DL (ref 65–140)
HCT VFR BLD AUTO: 39.1 % (ref 36.5–49.3)
HGB BLD-MCNC: 12.6 G/DL (ref 12–17)
IMM GRANULOCYTES # BLD AUTO: 0.02 THOUSAND/UL (ref 0–0.2)
IMM GRANULOCYTES NFR BLD AUTO: 0 % (ref 0–2)
LYMPHOCYTES # BLD AUTO: 1.02 THOUSANDS/ÂΜL (ref 0.6–4.47)
LYMPHOCYTES NFR BLD AUTO: 15 % (ref 14–44)
MCH RBC QN AUTO: 32.1 PG (ref 26.8–34.3)
MCHC RBC AUTO-ENTMCNC: 32.2 G/DL (ref 31.4–37.4)
MCV RBC AUTO: 100 FL (ref 82–98)
MONOCYTES # BLD AUTO: 0.71 THOUSAND/ÂΜL (ref 0.17–1.22)
MONOCYTES NFR BLD AUTO: 11 % (ref 4–12)
NEUTROPHILS # BLD AUTO: 4.85 THOUSANDS/ÂΜL (ref 1.85–7.62)
NEUTS SEG NFR BLD AUTO: 72 % (ref 43–75)
NRBC BLD AUTO-RTO: 0 /100 WBCS
PLATELET # BLD AUTO: 131 THOUSANDS/UL (ref 149–390)
PMV BLD AUTO: 10.6 FL (ref 8.9–12.7)
POTASSIUM SERPL-SCNC: 3 MMOL/L (ref 3.5–5.3)
RBC # BLD AUTO: 3.93 MILLION/UL (ref 3.88–5.62)
SCAN RESULT: NORMAL
SODIUM SERPL-SCNC: 140 MMOL/L (ref 135–147)
WBC # BLD AUTO: 6.75 THOUSAND/UL (ref 4.31–10.16)

## 2023-06-22 PROCEDURE — 85025 COMPLETE CBC W/AUTO DIFF WBC: CPT | Performed by: SURGERY

## 2023-06-22 PROCEDURE — 82948 REAGENT STRIP/BLOOD GLUCOSE: CPT

## 2023-06-22 PROCEDURE — 99024 POSTOP FOLLOW-UP VISIT: CPT | Performed by: SURGERY

## 2023-06-22 PROCEDURE — 80048 BASIC METABOLIC PNL TOTAL CA: CPT | Performed by: SURGERY

## 2023-06-22 RX ORDER — OXYCODONE HYDROCHLORIDE 5 MG/1
5 TABLET ORAL EVERY 4 HOURS PRN
Status: DISCONTINUED | OUTPATIENT
Start: 2023-06-22 | End: 2023-06-24 | Stop reason: HOSPADM

## 2023-06-22 RX ORDER — ATORVASTATIN CALCIUM 40 MG/1
40 TABLET, FILM COATED ORAL
Status: DISCONTINUED | OUTPATIENT
Start: 2023-06-22 | End: 2023-06-24 | Stop reason: HOSPADM

## 2023-06-22 RX ORDER — OXYCODONE HYDROCHLORIDE 10 MG/1
10 TABLET ORAL EVERY 4 HOURS PRN
Status: DISCONTINUED | OUTPATIENT
Start: 2023-06-22 | End: 2023-06-24 | Stop reason: HOSPADM

## 2023-06-22 RX ORDER — AMLODIPINE BESYLATE 5 MG/1
5 TABLET ORAL DAILY
Status: DISCONTINUED | OUTPATIENT
Start: 2023-06-22 | End: 2023-06-24 | Stop reason: HOSPADM

## 2023-06-22 RX ORDER — ALLOPURINOL 100 MG/1
100 TABLET ORAL EVERY EVENING
Status: DISCONTINUED | OUTPATIENT
Start: 2023-06-22 | End: 2023-06-24 | Stop reason: HOSPADM

## 2023-06-22 RX ORDER — POTASSIUM CHLORIDE 20 MEQ/1
40 TABLET, EXTENDED RELEASE ORAL 2 TIMES DAILY
Status: COMPLETED | OUTPATIENT
Start: 2023-06-22 | End: 2023-06-22

## 2023-06-22 RX ORDER — LABETALOL HYDROCHLORIDE 5 MG/ML
10 INJECTION, SOLUTION INTRAVENOUS EVERY 6 HOURS PRN
Status: DISCONTINUED | OUTPATIENT
Start: 2023-06-22 | End: 2023-06-24 | Stop reason: HOSPADM

## 2023-06-22 RX ADMIN — ACETAMINOPHEN 975 MG: 325 TABLET ORAL at 11:40

## 2023-06-22 RX ADMIN — ACETAMINOPHEN 975 MG: 325 TABLET ORAL at 23:34

## 2023-06-22 RX ADMIN — ACETAMINOPHEN 975 MG: 325 TABLET ORAL at 05:04

## 2023-06-22 RX ADMIN — POTASSIUM CHLORIDE 40 MEQ: 1500 TABLET, EXTENDED RELEASE ORAL at 17:22

## 2023-06-22 RX ADMIN — ALLOPURINOL 100 MG: 100 TABLET ORAL at 17:21

## 2023-06-22 RX ADMIN — ATENOLOL 50 MG: 50 TABLET ORAL at 08:53

## 2023-06-22 RX ADMIN — ATORVASTATIN CALCIUM 40 MG: 40 TABLET, FILM COATED ORAL at 17:21

## 2023-06-22 RX ADMIN — INSULIN LISPRO 1 UNITS: 100 INJECTION, SOLUTION INTRAVENOUS; SUBCUTANEOUS at 17:25

## 2023-06-22 RX ADMIN — DEXTROSE, SODIUM CHLORIDE, AND POTASSIUM CHLORIDE 100 ML/HR: 5; .45; .15 INJECTION INTRAVENOUS at 04:04

## 2023-06-22 RX ADMIN — DEXTROSE, SODIUM CHLORIDE, AND POTASSIUM CHLORIDE 75 ML/HR: 5; .45; .15 INJECTION INTRAVENOUS at 16:00

## 2023-06-22 RX ADMIN — ENOXAPARIN SODIUM 40 MG: 40 INJECTION SUBCUTANEOUS at 08:53

## 2023-06-22 RX ADMIN — POTASSIUM CHLORIDE 40 MEQ: 1500 TABLET, EXTENDED RELEASE ORAL at 11:40

## 2023-06-22 RX ADMIN — AMLODIPINE BESYLATE 5 MG: 5 TABLET ORAL at 08:53

## 2023-06-22 RX ADMIN — INSULIN LISPRO 1 UNITS: 100 INJECTION, SOLUTION INTRAVENOUS; SUBCUTANEOUS at 08:53

## 2023-06-22 RX ADMIN — INSULIN LISPRO 1 UNITS: 100 INJECTION, SOLUTION INTRAVENOUS; SUBCUTANEOUS at 11:42

## 2023-06-22 RX ADMIN — ATENOLOL 50 MG: 50 TABLET ORAL at 17:25

## 2023-06-22 RX ADMIN — ACETAMINOPHEN 975 MG: 325 TABLET ORAL at 17:21

## 2023-06-22 RX ADMIN — OXYCODONE HYDROCHLORIDE 10 MG: 10 TABLET ORAL at 17:29

## 2023-06-22 RX ADMIN — LABETALOL HYDROCHLORIDE 10 MG: 5 INJECTION, SOLUTION INTRAVENOUS at 22:35

## 2023-06-22 NOTE — PLAN OF CARE
Problem: MOBILITY - ADULT  Goal: Maintain or return to baseline ADL function  Description: INTERVENTIONS:  -  Assess patient's ability to carry out ADLs; assess patient's baseline for ADL function and identify physical deficits which impact ability to perform ADLs (bathing, care of mouth/teeth, toileting, grooming, dressing, etc )  - Assess/evaluate cause of self-care deficits   - Assess range of motion  - Assess patient's mobility; develop plan if impaired  - Assess patient's need for assistive devices and provide as appropriate  - Encourage maximum independence but intervene and supervise when necessary  - Involve family in performance of ADLs  - Assess for home care needs following discharge   - Consider OT consult to assist with ADL evaluation and planning for discharge  - Provide patient education as appropriate  Outcome: Progressing  Goal: Maintains/Returns to pre admission functional level  Description: INTERVENTIONS:  - Perform BMAT or MOVE assessment daily    - Set and communicate daily mobility goal to care team and patient/family/caregiver     - Collaborate with rehabilitation services on mobility goals if consulted  - Out of bed for toileting  - Record patient progress and toleration of activity level   Outcome: Progressing     Problem: PAIN - ADULT  Goal: Verbalizes/displays adequate comfort level or baseline comfort level  Description: Interventions:  - Encourage patient to monitor pain and request assistance  - Assess pain using appropriate pain scale  - Administer analgesics based on type and severity of pain and evaluate response  - Implement non-pharmacological measures as appropriate and evaluate response  - Consider cultural and social influences on pain and pain management  - Notify physician/advanced practitioner if interventions unsuccessful or patient reports new pain  Outcome: Progressing     Problem: INFECTION - ADULT  Goal: Absence or prevention of progression during hospitalization  Description: INTERVENTIONS:  - Assess and monitor for signs and symptoms of infection  - Monitor lab/diagnostic results  - Monitor all insertion sites, i e  indwelling lines, tubes, and drains  - Monitor endotracheal if appropriate and nasal secretions for changes in amount and color  - [place] appropriate cooling/warming therapies per order  - Administer medications as ordered  - Instruct and encourage patient and family to use good hand hygiene technique  - Identify and instruct in appropriate isolation precautions for identified infection/condition  Outcome: Progressing  Goal: Absence of fever/infection during neutropenic period  Description: INTERVENTIONS:  - Monitor WBC    Outcome: Progressing     Problem: SAFETY ADULT  Goal: Maintain or return to baseline ADL function  Description: INTERVENTIONS:  -  Assess patient's ability to carry out ADLs; assess patient's baseline for ADL function and identify physical deficits which impact ability to perform ADLs (bathing, care of mouth/teeth, toileting, grooming, dressing, etc )  - Assess/evaluate cause of self-care deficits   - Assess range of motion  - Assess patient's mobility; develop plan if impaired  - Assess patient's need for assistive devices and provide as appropriate  - Encourage maximum independence but intervene and supervise when necessary  - Involve family in performance of ADLs  - Assess for home care needs following discharge   - Consider OT consult to assist with ADL evaluation and planning for discharge  - Provide patient education as appropriate  Outcome: Progressing  Goal: Maintains/Returns to pre admission functional level  Description: INTERVENTIONS:  - Perform BMAT or MOVE assessment daily    - Set and communicate daily mobility goal to care team and patient/family/caregiver     - Collaborate with rehabilitation services on mobility goals if consulted  - Out of bed for toileting  - Record patient progress and toleration of activity level   Outcome: Progressing  Goal: Patient will remain free of falls  Description: INTERVENTIONS:  - Educate patient/family on patient safety including physical limitations  - Instruct patient to call for assistance with activity   - Consult OT/PT to assist with strengthening/mobility   - Keep Call bell within reach  - Keep bed low and locked with side rails adjusted as appropriate  - Keep care items and personal belongings within reach  - Initiate and maintain comfort rounds  - Make Fall Risk Sign visible to staff  - Apply yellow socks and bracelet for high fall risk patients  - Consider moving patient to room near nurses station  Outcome: Progressing     Problem: DISCHARGE PLANNING  Goal: Discharge to home or other facility with appropriate resources  Description: INTERVENTIONS:  - Identify barriers to discharge w/patient and caregiver  - Arrange for needed discharge resources and transportation as appropriate  - Identify discharge learning needs (meds, wound care, etc )  - Arrange for interpretive services to assist at discharge as needed  - Refer to Case Management Department for coordinating discharge planning if the patient needs post-hospital services based on physician/advanced practitioner order or complex needs related to functional status, cognitive ability, or social support system  Outcome: Progressing     Problem: Knowledge Deficit  Goal: Patient/family/caregiver demonstrates understanding of disease process, treatment plan, medications, and discharge instructions  Description: Complete learning assessment and assess knowledge base    Interventions:  - Provide teaching at level of understanding  - Provide teaching via preferred learning methods  Outcome: Progressing

## 2023-06-22 NOTE — PROGRESS NOTES
"Progress Note - Surgical Oncology  Esther Valentine 78 y o  male MRN: [id number]  [facility] Encounter: [id number]    Assessment:  Patient is a 78 y o  male who presented with mesenteric mass, now status post ex lap, bx and SBR on 6/20  Unfortunately mass was unable to be resected safely  Afebrile,VSS intermittently tachycardic to the low 100s  UOP: 2 9 L    Plan:  Advance diet as tolerated  Transition to PO pain regimen  SSI  DVT px  Encourage out of bed and ambulation  PT/OT-no rehab needs    Subjective/Objective     Subjective:   Doing well  Had episode of vomiting, but tolerated diet  No bowel function yet  Walking and urinating  Pain controlled    Objective:    Blood pressure 131/92, pulse 92, temperature 98 9 °F (37 2 °C), resp  rate 17, height 5' 10\" (1 778 m), weight 103 kg (228 lb), SpO2 92 %  ,Body mass index is 32 71 kg/m²  Intake/Output Summary (Last 24 hours) at 6/22/2023 1379  Last data filed at 6/22/2023 0506  Gross per 24 hour   Intake 2234 87 ml   Output 2975 ml   Net -740 13 ml       Invasive Devices     Peripheral Intravenous Line  Duration           Peripheral IV 06/20/23 Left Hand 1 day                Physical Exam  Vitals reviewed  Constitutional:       General: He is not in acute distress  Appearance: He is not ill-appearing, toxic-appearing or diaphoretic  HENT:      Head: Normocephalic and atraumatic  Eyes:      Extraocular Movements: Extraocular movements intact  Cardiovascular:      Rate and Rhythm: Normal rate  Pulmonary:      Effort: Pulmonary effort is normal  No respiratory distress  Abdominal:      General: There is no distension  Palpations: Abdomen is soft  Tenderness: There is no abdominal tenderness  There is no guarding or rebound  Comments: Incisions clean, dry and intact   Skin:     General: Skin is warm and dry  Neurological:      Mental Status: He is alert and oriented to person, place, and time     Psychiatric:         Mood " and Affect: Mood normal          Behavior: Behavior normal              Results from last 7 days   Lab Units 06/21/23  0631   WBC Thousand/uL 8 53   HEMOGLOBIN g/dL 13 8   HEMATOCRIT % 42 1   PLATELETS Thousands/uL 136*     Results from last 7 days   Lab Units 06/21/23  0631   POTASSIUM mmol/L 3 2*   CHLORIDE mmol/L 105   CO2 mmol/L 31   BUN mg/dL 7   CREATININE mg/dL 0 71   CALCIUM mg/dL 8 7

## 2023-06-23 LAB
ANION GAP SERPL CALCULATED.3IONS-SCNC: 2 MMOL/L
BUN SERPL-MCNC: 7 MG/DL (ref 5–25)
CALCIUM SERPL-MCNC: 8.9 MG/DL (ref 8.3–10.1)
CHLORIDE SERPL-SCNC: 107 MMOL/L (ref 96–108)
CO2 SERPL-SCNC: 30 MMOL/L (ref 21–32)
CREAT SERPL-MCNC: 0.76 MG/DL (ref 0.6–1.3)
GFR SERPL CREATININE-BSD FRML MDRD: 95 ML/MIN/1.73SQ M
GLUCOSE SERPL-MCNC: 138 MG/DL (ref 65–140)
GLUCOSE SERPL-MCNC: 151 MG/DL (ref 65–140)
GLUCOSE SERPL-MCNC: 155 MG/DL (ref 65–140)
GLUCOSE SERPL-MCNC: 164 MG/DL (ref 65–140)
GLUCOSE SERPL-MCNC: 165 MG/DL (ref 65–140)
POTASSIUM SERPL-SCNC: 3.3 MMOL/L (ref 3.5–5.3)
SODIUM SERPL-SCNC: 139 MMOL/L (ref 135–147)

## 2023-06-23 PROCEDURE — 82948 REAGENT STRIP/BLOOD GLUCOSE: CPT

## 2023-06-23 PROCEDURE — 99024 POSTOP FOLLOW-UP VISIT: CPT | Performed by: SURGERY

## 2023-06-23 PROCEDURE — 80048 BASIC METABOLIC PNL TOTAL CA: CPT | Performed by: PHYSICIAN ASSISTANT

## 2023-06-23 RX ORDER — POTASSIUM CHLORIDE 20 MEQ/1
40 TABLET, EXTENDED RELEASE ORAL 2 TIMES DAILY
Status: COMPLETED | OUTPATIENT
Start: 2023-06-23 | End: 2023-06-23

## 2023-06-23 RX ORDER — FUROSEMIDE 20 MG/1
20 TABLET ORAL DAILY
Status: DISCONTINUED | OUTPATIENT
Start: 2023-06-23 | End: 2023-06-24 | Stop reason: HOSPADM

## 2023-06-23 RX ADMIN — INSULIN LISPRO 1 UNITS: 100 INJECTION, SOLUTION INTRAVENOUS; SUBCUTANEOUS at 12:32

## 2023-06-23 RX ADMIN — AMLODIPINE BESYLATE 5 MG: 5 TABLET ORAL at 09:02

## 2023-06-23 RX ADMIN — ENOXAPARIN SODIUM 40 MG: 40 INJECTION SUBCUTANEOUS at 09:02

## 2023-06-23 RX ADMIN — ACETAMINOPHEN 975 MG: 325 TABLET ORAL at 12:32

## 2023-06-23 RX ADMIN — ALLOPURINOL 100 MG: 100 TABLET ORAL at 18:36

## 2023-06-23 RX ADMIN — OXYCODONE HYDROCHLORIDE 5 MG: 5 TABLET ORAL at 09:02

## 2023-06-23 RX ADMIN — ACETAMINOPHEN 975 MG: 325 TABLET ORAL at 05:27

## 2023-06-23 RX ADMIN — ONDANSETRON 4 MG: 2 INJECTION INTRAMUSCULAR; INTRAVENOUS at 17:42

## 2023-06-23 RX ADMIN — ATORVASTATIN CALCIUM 40 MG: 40 TABLET, FILM COATED ORAL at 18:39

## 2023-06-23 RX ADMIN — ACETAMINOPHEN 975 MG: 325 TABLET ORAL at 18:36

## 2023-06-23 RX ADMIN — DEXTROSE, SODIUM CHLORIDE, AND POTASSIUM CHLORIDE 75 ML/HR: 5; .45; .15 INJECTION INTRAVENOUS at 04:07

## 2023-06-23 RX ADMIN — INSULIN LISPRO 1 UNITS: 100 INJECTION, SOLUTION INTRAVENOUS; SUBCUTANEOUS at 09:03

## 2023-06-23 RX ADMIN — LABETALOL HYDROCHLORIDE 10 MG: 5 INJECTION, SOLUTION INTRAVENOUS at 20:01

## 2023-06-23 RX ADMIN — POTASSIUM CHLORIDE 40 MEQ: 1500 TABLET, EXTENDED RELEASE ORAL at 09:02

## 2023-06-23 RX ADMIN — FUROSEMIDE 20 MG: 20 TABLET ORAL at 09:02

## 2023-06-23 RX ADMIN — ATENOLOL 50 MG: 50 TABLET ORAL at 18:36

## 2023-06-23 RX ADMIN — ATENOLOL 50 MG: 50 TABLET ORAL at 09:02

## 2023-06-23 RX ADMIN — POTASSIUM CHLORIDE 40 MEQ: 1500 TABLET, EXTENDED RELEASE ORAL at 18:36

## 2023-06-23 RX ADMIN — INSULIN LISPRO 1 UNITS: 100 INJECTION, SOLUTION INTRAVENOUS; SUBCUTANEOUS at 18:36

## 2023-06-23 NOTE — PROGRESS NOTES
"Progress Note - Surgical Oncology  Tho Ty 78 y o  male MRN: [id number]  [facility] Encounter: [id number]    Assessment:  Patient is a 78 y o  male who presented with mesenteric mass, now status post ex lap, bx and SBR on 6/20  Unfortunately mass was unable to be resected safely  Afebrile,VSS intermittently tachycardic to the low 100s  UOP: 1 8 L    Plan:  Advance diet as tolerated  Transition to PO pain regimen  SSI  DVT px  Encourage out of bed and ambulation  PT/OT-no rehab needs    Subjective/Objective     Subjective:   Doing well  Tolerated diet  No n/v  Not passing gas or BM yet, but feels stomach rumbling  Ambulated x1  urinating    Objective:    Blood pressure 145/95, pulse 97, temperature 98 6 °F (37 °C), resp  rate 14, height 5' 10\" (1 778 m), weight 103 kg (228 lb), SpO2 93 %  ,Body mass index is 32 71 kg/m²  Intake/Output Summary (Last 24 hours) at 6/22/2023 2230  Last data filed at 6/22/2023 2201  Gross per 24 hour   Intake 2641 2 ml   Output 3125 ml   Net -483 8 ml       Invasive Devices     Peripheral Intravenous Line  Duration           Peripheral IV 06/20/23 Left Hand 2 days                Physical Exam  Vitals reviewed  Constitutional:       General: He is not in acute distress  Appearance: He is not ill-appearing, toxic-appearing or diaphoretic  HENT:      Head: Normocephalic and atraumatic  Eyes:      Extraocular Movements: Extraocular movements intact  Cardiovascular:      Rate and Rhythm: Normal rate  Pulmonary:      Effort: Pulmonary effort is normal  No respiratory distress  Abdominal:      General: There is no distension  Palpations: Abdomen is soft  Tenderness: There is no abdominal tenderness  There is no guarding or rebound  Comments: Incisions clean, dry and intact   Skin:     General: Skin is warm and dry  Neurological:      Mental Status: He is alert and oriented to person, place, and time     Psychiatric:         Mood and " Affect: Mood normal          Behavior: Behavior normal              Results from last 7 days   Lab Units 06/22/23  0521 06/21/23  0631   WBC Thousand/uL 6 75 8 53   HEMOGLOBIN g/dL 12 6 13 8   HEMATOCRIT % 39 1 42 1   PLATELETS Thousands/uL 131* 136*     Results from last 7 days   Lab Units 06/22/23  0521 06/21/23  0631   POTASSIUM mmol/L 3 0* 3 2*   CHLORIDE mmol/L 105 105   CO2 mmol/L 29 31   BUN mg/dL 7 7   CREATININE mg/dL 0 75 0 71   CALCIUM mg/dL 9 0 8 7

## 2023-06-23 NOTE — PLAN OF CARE
Problem: MOBILITY - ADULT  Goal: Maintain or return to baseline ADL function  Description: INTERVENTIONS:  -  Assess patient's ability to carry out ADLs; assess patient's baseline for ADL function and identify physical deficits which impact ability to perform ADLs (bathing, care of mouth/teeth, toileting, grooming, dressing, etc )  - Assess/evaluate cause of self-care deficits   - Assess range of motion  - Assess patient's mobility; develop plan if impaired  - Assess patient's need for assistive devices and provide as appropriate  - Encourage maximum independence but intervene and supervise when necessary  - Involve family in performance of ADLs  - Assess for home care needs following discharge   - Consider OT consult to assist with ADL evaluation and planning for discharge  - Provide patient education as appropriate  Outcome: Progressing  Goal: Maintains/Returns to pre admission functional level  Description: INTERVENTIONS:  - Perform BMAT or MOVE assessment daily    - Set and communicate daily mobility goal to care team and patient/family/caregiver     - Collaborate with rehabilitation services on mobility goals if consulted  - Ambulate patient 3 times a day  - Out of bed to chair 3 times a day   - Out of bed for meals 3 times a day  - Out of bed for toileting  - Record patient progress and toleration of activity level   Outcome: Progressing     Problem: PAIN - ADULT  Goal: Verbalizes/displays adequate comfort level or baseline comfort level  Description: Interventions:  - Encourage patient to monitor pain and request assistance  - Assess pain using appropriate pain scale  - Administer analgesics based on type and severity of pain and evaluate response  - Implement non-pharmacological measures as appropriate and evaluate response  - Consider cultural and social influences on pain and pain management  - Notify physician/advanced practitioner if interventions unsuccessful or patient reports new pain  Outcome: Progressing     Problem: INFECTION - ADULT  Goal: Absence or prevention of progression during hospitalization  Description: INTERVENTIONS:  - Assess and monitor for signs and symptoms of infection  - Monitor lab/diagnostic results  - Monitor all insertion sites, i e  indwelling lines, tubes, and drains  - Monitor endotracheal if appropriate and nasal secretions for changes in amount and color  - [place] appropriate cooling/warming therapies per order  - Administer medications as ordered  - Instruct and encourage patient and family to use good hand hygiene technique  - Identify and instruct in appropriate isolation precautions for identified infection/condition  Outcome: Progressing  Goal: Absence of fever/infection during neutropenic period  Description: INTERVENTIONS:  - Monitor WBC    Outcome: Progressing     Problem: SAFETY ADULT  Goal: Maintain or return to baseline ADL function  Description: INTERVENTIONS:  -  Assess patient's ability to carry out ADLs; assess patient's baseline for ADL function and identify physical deficits which impact ability to perform ADLs (bathing, care of mouth/teeth, toileting, grooming, dressing, etc )  - Assess/evaluate cause of self-care deficits   - Assess range of motion  - Assess patient's mobility; develop plan if impaired  - Assess patient's need for assistive devices and provide as appropriate  - Encourage maximum independence but intervene and supervise when necessary  - Involve family in performance of ADLs  - Assess for home care needs following discharge   - Consider OT consult to assist with ADL evaluation and planning for discharge  - Provide patient education as appropriate  Outcome: Progressing  Goal: Maintains/Returns to pre admission functional level  Description: INTERVENTIONS:  - Perform BMAT or MOVE assessment daily    - Set and communicate daily mobility goal to care team and patient/family/caregiver     - Collaborate with rehabilitation services on mobility goals if consulted  - Ambulate patient 3 times a day  - Out of bed to chair 3 times a day   - Out of bed for meals 3 times a day  - Out of bed for toileting  - Record patient progress and toleration of activity level   Outcome: Progressing  Goal: Patient will remain free of falls  Description: INTERVENTIONS:  - Educate patient/family on patient safety including physical limitations  - Instruct patient to call for assistance with activity   - Consult OT/PT to assist with strengthening/mobility   - Keep Call bell within reach  - Keep bed low and locked with side rails adjusted as appropriate  - Keep care items and personal belongings within reach  - Initiate and maintain comfort rounds  - Make Fall Risk Sign visible to staff  - Offer Toileting every 2 Hours, in advance of need  - Initiate/Maintain bed alarm  - Obtain necessary fall risk management equipment  - Apply yellow socks and bracelet for high fall risk patients  - Consider moving patient to room near nurses station  Outcome: Progressing

## 2023-06-23 NOTE — PROGRESS NOTES
"Progress Note - Ana Adrian 78 y o  male MRN: [id number]    Unit/Bed#: [facility] 923-01 Encounter: [id number]      Assessment:  79 y/o M w mesenteric mass, s/p ex lap, mesenteric mass bx, SBR, LN bx on 6/20  Path pending  Doing well  Vss  Afebrile  abd s/ nt/ nd  Passing flatus and having bowel mvt  Plan:  Diabetic diet  Ambulate  dvt ppx  Discharge home today  Wean off oxygen  Subjective:   Passing flatus and having bowel mvts  feels well  No complaints  Denied fever, chills, chest pain, shortness of breath, nausea, vomiting, or abdominal pain this morning  Objective:     Vitals: Blood pressure 133/88, pulse 105, temperature 98 2 °F (36 8 °C), resp  rate 22, height 5' 10\" (1 778 m), weight 103 kg (228 lb), SpO2 96 %  ,Body mass index is 32 71 kg/m²  Intake/Output Summary (Last 24 hours) at 6/24/2023 0801  Last data filed at 6/24/2023 0601  Gross per 24 hour   Intake 960 ml   Output 700 ml   Net 260 ml       Physical Exam  General: NAD  HEENT: NC/AT  MMM  Cv: RRR     Lungs: normal effort  Ab: Soft, NT/ND  Ex: no CCE  Neuro: AAOx3    Scheduled Meds:  Current Facility-Administered Medications   Medication Dose Route Frequency Provider Last Rate   • acetaminophen  975 mg Oral Q6H [address] Edgar Toledo MD     • allopurinol  100 mg Oral QPM Kenn Evans MD     • amLODIPine  5 mg Oral Daily Kenn Evans MD     • atenolol  50 mg Oral BID Edgar Toledo MD     • atorvastatin  40 mg Oral Daily With Bladimir Rivero MD     • enoxaparin  40 mg Subcutaneous Daily Edgar Toledo MD     • furosemide  20 mg Oral Daily Julissa Burton PA-C     • HYDROmorphone  0 5 mg Intravenous Q3H PRN Edgar Toledo MD     • insulin lispro  1-6 Units Subcutaneous 4x Daily Julissa Burton PA-C     • labetalol  10 mg Intravenous Q6H PRN Kenn Evans MD     • ondansetron  4 mg Intravenous Q4H PRN Edgar Toledo MD     • oxyCODONE  10 mg Oral Q4H PRN Kenn Evans MD     • oxyCODONE  " 5 mg Oral Q4H PRN Jeff Christie MD     • potassium chloride  40 mEq Oral Once Santhosh Mata MD       Continuous Infusions:   PRN Meds: •  HYDROmorphone  •  labetalol  •  ondansetron  •  oxyCODONE  •  oxyCODONE      Invasive Devices     Peripheral Intravenous Line  Duration           Peripheral IV 06/20/23 Left Hand 4 days                Lab, Imaging and other studies: I have personally reviewed pertinent reports      VTE Pharmacologic Prophylaxis: Sequential compression device (Venodyne)   VTE Mechanical Prophylaxis: sequential compression device

## 2023-06-23 NOTE — RESTORATIVE TECHNICIAN NOTE
Restorative Technician Note      Patient Name: Margarita Oneal     Restorative Tech Visit Date: 06/23/23  Note Type: Mobility  Patient Position Upon Consult: Bedside chair  Activity Performed: Ambulated  Assistive Device: Other (Comment) (none)  Patient Position at End of Consult: Bedside chair;  All needs within [facility]

## 2023-06-23 NOTE — PLAN OF CARE
Problem: MOBILITY - ADULT  Goal: Maintain or return to baseline ADL function  Description: INTERVENTIONS:  -  Assess patient's ability to carry out ADLs; assess patient's baseline for ADL function and identify physical deficits which impact ability to perform ADLs (bathing, care of mouth/teeth, toileting, grooming, dressing, etc )  - Assess/evaluate cause of self-care deficits   - Assess range of motion  - Assess patient's mobility; develop plan if impaired  - Assess patient's need for assistive devices and provide as appropriate  - Encourage maximum independence but intervene and supervise when necessary  - Involve family in performance of ADLs  - Assess for home care needs following discharge   - Consider OT consult to assist with ADL evaluation and planning for discharge  - Provide patient education as appropriate  Outcome: Progressing  Goal: Maintains/Returns to pre admission functional level  Description: INTERVENTIONS:  - Perform BMAT or MOVE assessment daily    - Set and communicate daily mobility goal to care team and patient/family/caregiver     - Collaborate with rehabilitation services on mobility goals if consulted  - Record patient progress and toleration of activity level   Outcome: Progressing     Problem: PAIN - ADULT  Goal: Verbalizes/displays adequate comfort level or baseline comfort level  Description: Interventions:  - Encourage patient to monitor pain and request assistance  - Assess pain using appropriate pain scale  - Administer analgesics based on type and severity of pain and evaluate response  - Implement non-pharmacological measures as appropriate and evaluate response  - Consider cultural and social influences on pain and pain management  - Notify physician/advanced practitioner if interventions unsuccessful or patient reports new pain  Outcome: Progressing     Problem: INFECTION - ADULT  Goal: Absence or prevention of progression during hospitalization  Description: INTERVENTIONS:  - Assess and monitor for signs and symptoms of infection  - Monitor lab/diagnostic results  - Monitor all insertion sites, i e  indwelling lines, tubes, and drains  - Monitor endotracheal if appropriate and nasal secretions for changes in amount and color  - [place] appropriate cooling/warming therapies per order  - Administer medications as ordered  - Instruct and encourage patient and family to use good hand hygiene technique  - Identify and instruct in appropriate isolation precautions for identified infection/condition  Outcome: Progressing  Goal: Absence of fever/infection during neutropenic period  Description: INTERVENTIONS:  - Monitor WBC    Outcome: Progressing     Problem: SAFETY ADULT  Goal: Maintain or return to baseline ADL function  Description: INTERVENTIONS:  -  Assess patient's ability to carry out ADLs; assess patient's baseline for ADL function and identify physical deficits which impact ability to perform ADLs (bathing, care of mouth/teeth, toileting, grooming, dressing, etc )  - Assess/evaluate cause of self-care deficits   - Assess range of motion  - Assess patient's mobility; develop plan if impaired  - Assess patient's need for assistive devices and provide as appropriate  - Encourage maximum independence but intervene and supervise when necessary  - Involve family in performance of ADLs  - Assess for home care needs following discharge   - Consider OT consult to assist with ADL evaluation and planning for discharge  - Provide patient education as appropriate  Outcome: Progressing  Goal: Maintains/Returns to pre admission functional level  Description: INTERVENTIONS:  - Perform BMAT or MOVE assessment daily    - Set and communicate daily mobility goal to care team and patient/family/caregiver     - Collaborate with rehabilitation services on mobility goals if consulted  - Record patient progress and toleration of activity level   Outcome: Progressing  Goal: Patient will remain free of falls  Description: INTERVENTIONS:  - Educate patient/family on patient safety including physical limitations  - Instruct patient to call for assistance with activity   - Consult OT/PT to assist with strengthening/mobility   - Keep Call bell within reach  - Keep bed low and locked with side rails adjusted as appropriate  - Keep care items and personal belongings within reach  - Initiate and maintain comfort rounds  - Make Fall Risk Sign visible to staff  - Apply yellow socks and bracelet for high fall risk patients  - Consider moving patient to room near nurses station  Outcome: Progressing     Problem: DISCHARGE PLANNING  Goal: Discharge to home or other facility with appropriate resources  Description: INTERVENTIONS:  - Identify barriers to discharge w/patient and caregiver  - Arrange for needed discharge resources and transportation as appropriate  - Identify discharge learning needs (meds, wound care, etc )  - Arrange for interpretive services to assist at discharge as needed  - Refer to Case Management Department for coordinating discharge planning if the patient needs post-hospital services based on physician/advanced practitioner order or complex needs related to functional status, cognitive ability, or social support system  Outcome: Progressing     Problem: Knowledge Deficit  Goal: Patient/family/caregiver demonstrates understanding of disease process, treatment plan, medications, and discharge instructions  Description: Complete learning assessment and assess knowledge base    Interventions:  - Provide teaching at level of understanding  - Provide teaching via preferred learning methods  Outcome: Progressing

## 2023-06-23 NOTE — RESTORATIVE TECHNICIAN NOTE
Restorative Technician Note      Patient Name: Jesi Bean     Restorative Tech Visit Date: 06/23/23  Note Type: Mobility  Patient Position Upon Consult: Bedside chair  Activity Performed: Ambulated  Assistive Device: Other (Comment) (none)  Patient Position at End of Consult: Bedside chair;  All needs within [facility]

## 2023-06-23 NOTE — RESTORATIVE TECHNICIAN NOTE
Restorative Technician Note      Patient Name: Abimbola Crystal     Restorative Tech Visit Date: 06/23/23  Note Type: Mobility  Patient Position Upon Consult: Bedside chair  Activity Performed: Ambulated  Assistive Device: Other (Comment) (none)  Patient Position at End of Consult: Bedside chair;  All needs within [facility]

## 2023-06-24 VITALS
SYSTOLIC BLOOD PRESSURE: 133 MMHG | RESPIRATION RATE: 22 BRPM | DIASTOLIC BLOOD PRESSURE: 88 MMHG | TEMPERATURE: 98.2 F | HEIGHT: 70 IN | OXYGEN SATURATION: 96 % | WEIGHT: 228 LBS | HEART RATE: 105 BPM | BODY MASS INDEX: 32.64 KG/M2

## 2023-06-24 LAB
ANION GAP SERPL CALCULATED.3IONS-SCNC: 4 MMOL/L
BUN SERPL-MCNC: 11 MG/DL (ref 5–25)
CALCIUM SERPL-MCNC: 9.1 MG/DL (ref 8.3–10.1)
CHLORIDE SERPL-SCNC: 109 MMOL/L (ref 96–108)
CO2 SERPL-SCNC: 28 MMOL/L (ref 21–32)
CREAT SERPL-MCNC: 0.81 MG/DL (ref 0.6–1.3)
GFR SERPL CREATININE-BSD FRML MDRD: 92 ML/MIN/1.73SQ M
GLUCOSE SERPL-MCNC: 143 MG/DL (ref 65–140)
GLUCOSE SERPL-MCNC: 171 MG/DL (ref 65–140)
POTASSIUM SERPL-SCNC: 3.4 MMOL/L (ref 3.5–5.3)
SODIUM SERPL-SCNC: 141 MMOL/L (ref 135–147)

## 2023-06-24 PROCEDURE — 80048 BASIC METABOLIC PNL TOTAL CA: CPT | Performed by: PHYSICIAN ASSISTANT

## 2023-06-24 PROCEDURE — 99024 POSTOP FOLLOW-UP VISIT: CPT | Performed by: STUDENT IN AN ORGANIZED HEALTH CARE EDUCATION/TRAINING PROGRAM

## 2023-06-24 PROCEDURE — NC001 PR NO CHARGE: Performed by: SURGERY

## 2023-06-24 PROCEDURE — 82948 REAGENT STRIP/BLOOD GLUCOSE: CPT

## 2023-06-24 RX ORDER — OXYCODONE HYDROCHLORIDE AND ACETAMINOPHEN 5; 325 MG/1; MG/1
1 TABLET ORAL EVERY 6 HOURS PRN
Qty: 10 TABLET | Refills: 0 | Status: SHIPPED | OUTPATIENT
Start: 2023-06-24 | End: 2023-07-04

## 2023-06-24 RX ORDER — POTASSIUM CHLORIDE 20 MEQ/1
40 TABLET, EXTENDED RELEASE ORAL ONCE
Status: COMPLETED | OUTPATIENT
Start: 2023-06-24 | End: 2023-06-24

## 2023-06-24 RX ADMIN — ENOXAPARIN SODIUM 40 MG: 40 INJECTION SUBCUTANEOUS at 08:50

## 2023-06-24 RX ADMIN — AMLODIPINE BESYLATE 5 MG: 5 TABLET ORAL at 08:50

## 2023-06-24 RX ADMIN — ACETAMINOPHEN 975 MG: 325 TABLET ORAL at 06:09

## 2023-06-24 RX ADMIN — FUROSEMIDE 20 MG: 20 TABLET ORAL at 08:50

## 2023-06-24 RX ADMIN — ATENOLOL 50 MG: 50 TABLET ORAL at 08:50

## 2023-06-24 RX ADMIN — INSULIN LISPRO 1 UNITS: 100 INJECTION, SOLUTION INTRAVENOUS; SUBCUTANEOUS at 08:50

## 2023-06-24 RX ADMIN — POTASSIUM CHLORIDE 40 MEQ: 1500 TABLET, EXTENDED RELEASE ORAL at 08:50

## 2023-06-24 NOTE — DISCHARGE INSTR - AVS FIRST PAGE
No dressing is necessary  You may shower  Do not scrub the incision, gently wash with soap and water and rinse  Do not soak the incision including baths, hot tubs, swimming, until your postoperative visit  Do not lift weight greater than 10 lb until  your postoperative visit  Keep your postop appointment  You may eat a regular diet  If no bowel movement by evening of Sunday 6/25, take one dose of over the counter miralax up to twice daily until having bowel movements

## 2023-06-24 NOTE — DISCHARGE SUMMARY
"Discharge Summary - Irma Dhaliwal 78 y o  male MRN: [id number]    [facility] Encounter: [id number]    Admission Date:   Admission Orders (From admission, onward)     Ordered        06/20/23 0948  Inpatient Admission  Once                        Admitting Diagnosis: Lymphadenopathy, abdominal [R59 0]    HPI: Per Dr Shaun Garcia H&P:   \"Patient returns in follow-up  He is feeling well  No abdominal pain, nausea or vomiting  He has lost 35 pounds since starting Ozempic  His biggest complaint is his sciatica  PET/CT from May 24, 2023 shows low-level FDG uptake in the right mesenteric masses  There is the possibility of a nodularity in the small bowel loop with an SUV of 3 2  There is no FDG activity in the adrenal nodule  There was incidental FDG activity at the base of the tongue  I personally reviewed the films  \"    Procedures Performed: No orders of the defined types were placed in this encounter  Summary of Hospital Course: The patient underwent laparotomy 6/20/23 intended for resection of mesenteric mass, however intraoperatively, resection of all enlarged mesenteric lymph nodes would have required extensive resection of normal-appearing small bowel; as such, biopsy of mesenteric lymph node, small bowel resection of the mass, along with TAP block were performed  An epidural was used for post-operative pain management and removed on 6/22  His diet was gradually advanced and he had return of bowel function on 6/23  Throughout his hospital course, daily labs were drawn and electrolytes were repleted as needed  He was placed on low-flow nasal cannula post-operatively, which was weaned off on 6/23  Significant Findings, Care, Treatment and Services Provided:   Above procedure  6/20 Frozen section: densely fibrotic tissue with scattered small aggregates of lymphoplasmacytic sized cells  6/20 Mesenteric mass, lymph node biopsies: pending    6/20 Leukemia/lymphoma flow cytometry: No evidence " of B or T cell lymphoma  Population of CD45-/CD56+ cells present  Consistent with non-hematolymphoid malignancy    Complications: None    Discharge Diagnosis: Mesenteric Mass    Medical Problems     Resolved Problems  Date Reviewed: 6/19/2023   None         Condition at Discharge: good         Discharge instructions/Information to patient and family:   See after visit summary for information provided to patient and family  Provisions for Follow-Up Care:  See after visit summary for information related to follow-up care and any pertinent home health orders  PCP: Imelda Ormond, MD    Disposition: Home    Planned Readmission: No      Discharge Statement   I spent 26 minutes discharging the patient  This time was spent on the day of discharge  I had direct contact with the patient on the day of discharge  Additional documentation is required if more than 30 minutes were spent on discharge  Discharge Medications:  See after visit summary for reconciled discharge medications provided to patient and family

## 2023-06-26 ENCOUNTER — TRANSITIONAL CARE MANAGEMENT (OUTPATIENT)
Dept: INTERNAL MEDICINE CLINIC | Facility: CLINIC | Age: 68
End: 2023-06-26

## 2023-06-26 NOTE — UTILIZATION REVIEW
NOTIFICATION OF ADMISSION DISCHARGE   This is a Notification of Discharge from [address]  Please be advised that this patient has been discharge from our facility  Below you will find the admission and discharge date and time including the patient’s disposition  UTILIZATION REVIEW CONTACT:  Frank Edgar  Utilization   Network Utilization Review Department  Phone: [phone] x carefully listen to the prompts  All voicemails are confidential   Email: [email or web address]     ADMISSION INFORMATION  PRESENTATION DATE: 6/20/2023  5:23 AM  OBERVATION ADMISSION DATE:   INPATIENT ADMISSION DATE: 6/20/23  9:48 AM   DISCHARGE DATE: 6/24/2023 11:29 AM   DISPOSITION:Home/Self Care    IMPORTANT INFORMATION:  Send all requests for admission clinical reviews, approved or denied determinations and any other requests to dedicated fax number below belonging to the campus where the patient is receiving treatment   List of dedicated fax numbers:  [address] DENIALS (Administrative/Medical Necessity) [phone]   [address] (Maternity/NICU/Pediatrics) [phone]   [facility] [phone]   [place] [address] [phone]   [phone]   [address] [facility] [phone]   90 [facility] [phone]   [address] [place] [address] 119 [phone]   [facility]  [phone]   [address] [facility] [phone]   [address] [facility] [address] [facility] [phone]

## 2023-07-01 ENCOUNTER — TELEPHONE (OUTPATIENT)
Dept: OTHER | Facility: OTHER | Age: 68
End: 2023-07-01

## 2023-07-01 NOTE — TELEPHONE ENCOUNTER
He is post op since 06/20 patient is experiencing drainage with a tinge of blood in it right below the belly button by the incision site  It is pinkish color, no heat to it, no fever  Patient state it actually feels better since the procedure  They placed a ABS pad over it so it would not drain on his clothing   She wants to make sure this is normal

## 2023-07-02 ENCOUNTER — APPOINTMENT (EMERGENCY)
Dept: RADIOLOGY | Facility: HOSPITAL | Age: 68
End: 2023-07-02
Attending: EMERGENCY MEDICINE
Payer: COMMERCIAL

## 2023-07-02 ENCOUNTER — HOSPITAL ENCOUNTER (EMERGENCY)
Facility: HOSPITAL | Age: 68
Discharge: HOME | End: 2023-07-02
Attending: EMERGENCY MEDICINE
Payer: COMMERCIAL

## 2023-07-02 ENCOUNTER — OFFICE VISIT (OUTPATIENT)
Dept: URGENT CARE | Facility: CLINIC | Age: 68
End: 2023-07-02
Payer: COMMERCIAL

## 2023-07-02 VITALS
HEART RATE: 88 BPM | BODY MASS INDEX: 32.64 KG/M2 | RESPIRATION RATE: 18 BRPM | HEIGHT: 70 IN | TEMPERATURE: 99.2 F | OXYGEN SATURATION: 95 % | SYSTOLIC BLOOD PRESSURE: 117 MMHG | DIASTOLIC BLOOD PRESSURE: 78 MMHG | WEIGHT: 228 LBS

## 2023-07-02 VITALS
SYSTOLIC BLOOD PRESSURE: 118 MMHG | RESPIRATION RATE: 15 BRPM | OXYGEN SATURATION: 93 % | HEART RATE: 92 BPM | DIASTOLIC BLOOD PRESSURE: 85 MMHG | TEMPERATURE: 97.4 F

## 2023-07-02 DIAGNOSIS — T81.31XA WOUND DISRUPTION, POST-OP, SKIN, INITIAL ENCOUNTER: Primary | ICD-10-CM

## 2023-07-02 DIAGNOSIS — L03.90 CELLULITIS, UNSPECIFIED CELLULITIS SITE: Primary | ICD-10-CM

## 2023-07-02 DIAGNOSIS — L03.90 CELLULITIS: ICD-10-CM

## 2023-07-02 LAB
ALBUMIN SERPL BCP-MCNC: 3.1 G/DL (ref 3.5–5)
ALP SERPL-CCNC: 65 U/L (ref 46–116)
ALT SERPL W P-5'-P-CCNC: 60 U/L (ref 12–78)
ANION GAP SERPL CALCULATED.3IONS-SCNC: 3 MMOL/L
APTT PPP: 29 SECONDS (ref 23–37)
AST SERPL W P-5'-P-CCNC: 31 U/L (ref 5–45)
ATRIAL RATE: 82 BPM
BASOPHILS # BLD AUTO: 0.03 THOUSANDS/ÂΜL (ref 0–0.1)
BASOPHILS NFR BLD AUTO: 1 % (ref 0–1)
BILIRUB SERPL-MCNC: 0.34 MG/DL (ref 0.2–1)
BUN SERPL-MCNC: 19 MG/DL (ref 5–25)
CALCIUM ALBUM COR SERPL-MCNC: 10.1 MG/DL (ref 8.3–10.1)
CALCIUM SERPL-MCNC: 9.4 MG/DL (ref 8.3–10.1)
CHLORIDE SERPL-SCNC: 110 MMOL/L (ref 96–108)
CO2 SERPL-SCNC: 30 MMOL/L (ref 21–32)
CREAT SERPL-MCNC: 1.1 MG/DL (ref 0.6–1.3)
EOSINOPHIL # BLD AUTO: 0.15 THOUSAND/ÂΜL (ref 0–0.61)
EOSINOPHIL NFR BLD AUTO: 2 % (ref 0–6)
ERYTHROCYTE [DISTWIDTH] IN BLOOD BY AUTOMATED COUNT: 13.2 % (ref 11.6–15.1)
GFR SERPL CREATININE-BSD FRML MDRD: 69 ML/MIN/1.73SQ M
GLUCOSE SERPL-MCNC: 201 MG/DL (ref 65–140)
HCT VFR BLD AUTO: 39.1 % (ref 36.5–49.3)
HGB BLD-MCNC: 13 G/DL (ref 12–17)
IMM GRANULOCYTES # BLD AUTO: 0.02 THOUSAND/UL (ref 0–0.2)
IMM GRANULOCYTES NFR BLD AUTO: 0 % (ref 0–2)
INR PPP: 1.01 (ref 0.84–1.19)
LACTATE SERPL-SCNC: 1.6 MMOL/L (ref 0.5–2)
LYMPHOCYTES # BLD AUTO: 1.02 THOUSANDS/ÂΜL (ref 0.6–4.47)
LYMPHOCYTES NFR BLD AUTO: 16 % (ref 14–44)
MCH RBC QN AUTO: 32.2 PG (ref 26.8–34.3)
MCHC RBC AUTO-ENTMCNC: 33.2 G/DL (ref 31.4–37.4)
MCV RBC AUTO: 97 FL (ref 82–98)
MONOCYTES # BLD AUTO: 0.48 THOUSAND/ÂΜL (ref 0.17–1.22)
MONOCYTES NFR BLD AUTO: 8 % (ref 4–12)
NEUTROPHILS # BLD AUTO: 4.68 THOUSANDS/ÂΜL (ref 1.85–7.62)
NEUTS SEG NFR BLD AUTO: 73 % (ref 43–75)
NRBC BLD AUTO-RTO: 0 /100 WBCS
P AXIS: 54 DEGREES
PLATELET # BLD AUTO: 313 THOUSANDS/UL (ref 149–390)
PMV BLD AUTO: 9.6 FL (ref 8.9–12.7)
POTASSIUM SERPL-SCNC: 3.5 MMOL/L (ref 3.5–5.3)
PR INTERVAL: 176 MS
PROCALCITONIN SERPL-MCNC: 0.13 NG/ML
PROT SERPL-MCNC: 6.8 G/DL (ref 6.4–8.4)
PROTHROMBIN TIME: 13.5 SECONDS (ref 11.6–14.5)
QRS AXIS: -3 DEGREES
QRSD INTERVAL: 96 MS
QT INTERVAL: 360 MS
QTC INTERVAL: 420 MS
RBC # BLD AUTO: 4.04 MILLION/UL (ref 3.88–5.62)
SODIUM SERPL-SCNC: 143 MMOL/L (ref 135–147)
T WAVE AXIS: 33 DEGREES
VENTRICULAR RATE: 82 BPM
WBC # BLD AUTO: 6.38 THOUSAND/UL (ref 4.31–10.16)

## 2023-07-02 PROCEDURE — 36415 COLL VENOUS BLD VENIPUNCTURE: CPT

## 2023-07-02 PROCEDURE — 85025 COMPLETE CBC W/AUTO DIFF WBC: CPT

## 2023-07-02 PROCEDURE — 96366 THER/PROPH/DIAG IV INF ADDON: CPT

## 2023-07-02 PROCEDURE — 96365 THER/PROPH/DIAG IV INF INIT: CPT

## 2023-07-02 PROCEDURE — NC001 PR NO CHARGE: Performed by: STUDENT IN AN ORGANIZED HEALTH CARE EDUCATION/TRAINING PROGRAM

## 2023-07-02 PROCEDURE — 74177 CT ABD & PELVIS W/CONTRAST: CPT

## 2023-07-02 PROCEDURE — 87154 CUL TYP ID BLD PTHGN 6+ TRGT: CPT

## 2023-07-02 PROCEDURE — 85730 THROMBOPLASTIN TIME PARTIAL: CPT

## 2023-07-02 PROCEDURE — 85610 PROTHROMBIN TIME: CPT

## 2023-07-02 PROCEDURE — G1004 CDSM NDSC: HCPCS

## 2023-07-02 PROCEDURE — 99284 EMERGENCY DEPT VISIT MOD MDM: CPT

## 2023-07-02 PROCEDURE — 93005 ELECTROCARDIOGRAM TRACING: CPT

## 2023-07-02 PROCEDURE — 93010 ELECTROCARDIOGRAM REPORT: CPT | Performed by: INTERNAL MEDICINE

## 2023-07-02 PROCEDURE — 87040 BLOOD CULTURE FOR BACTERIA: CPT

## 2023-07-02 PROCEDURE — 99213 OFFICE O/P EST LOW 20 MIN: CPT | Performed by: PHYSICIAN ASSISTANT

## 2023-07-02 PROCEDURE — 84145 PROCALCITONIN (PCT): CPT

## 2023-07-02 PROCEDURE — 80053 COMPREHEN METABOLIC PANEL: CPT

## 2023-07-02 PROCEDURE — 87077 CULTURE AEROBIC IDENTIFY: CPT

## 2023-07-02 PROCEDURE — 87186 SC STD MICRODIL/AGAR DIL: CPT

## 2023-07-02 PROCEDURE — 83605 ASSAY OF LACTIC ACID: CPT

## 2023-07-02 RX ORDER — LIDOCAINE HYDROCHLORIDE 10 MG/ML
20 INJECTION, SOLUTION EPIDURAL; INFILTRATION; INTRACAUDAL; PERINEURAL ONCE
Status: COMPLETED | OUTPATIENT
Start: 2023-07-02 | End: 2023-07-02

## 2023-07-02 RX ORDER — DOXYCYCLINE HYCLATE 100 MG/1
100 CAPSULE ORAL 2 TIMES DAILY
Qty: 20 CAPSULE | Refills: 0 | Status: SHIPPED | OUTPATIENT
Start: 2023-07-02 | End: 2023-07-02 | Stop reason: RX

## 2023-07-02 RX ORDER — DOXYCYCLINE HYCLATE 100 MG/1
100 CAPSULE ORAL ONCE
Status: DISCONTINUED | OUTPATIENT
Start: 2023-07-02 | End: 2023-07-02

## 2023-07-02 RX ORDER — CEPHALEXIN 500 MG/1
500 CAPSULE ORAL ONCE
Status: DISCONTINUED | OUTPATIENT
Start: 2023-07-02 | End: 2023-07-02

## 2023-07-02 RX ORDER — SULFAMETHOXAZOLE AND TRIMETHOPRIM 800; 160 MG/1; MG/1
1 TABLET ORAL 2 TIMES DAILY
Qty: 14 TABLET | Refills: 0 | Status: SHIPPED | OUTPATIENT
Start: 2023-07-02 | End: 2023-07-02 | Stop reason: SDUPTHER

## 2023-07-02 RX ORDER — SULFAMETHOXAZOLE AND TRIMETHOPRIM 800; 160 MG/1; MG/1
1 TABLET ORAL 2 TIMES DAILY
Qty: 14 TABLET | Refills: 0 | Status: SHIPPED | OUTPATIENT
Start: 2023-07-02 | End: 2023-07-09

## 2023-07-02 RX ORDER — CEPHALEXIN 500 MG/1
500 CAPSULE ORAL EVERY 6 HOURS SCHEDULED
Qty: 28 CAPSULE | Refills: 0 | Status: SHIPPED | OUTPATIENT
Start: 2023-07-02 | End: 2023-07-02 | Stop reason: RX

## 2023-07-02 RX ORDER — SULFAMETHOXAZOLE AND TRIMETHOPRIM 800; 160 MG/1; MG/1
1 TABLET ORAL ONCE
Status: COMPLETED | OUTPATIENT
Start: 2023-07-02 | End: 2023-07-02

## 2023-07-02 RX ORDER — SODIUM CHLORIDE, SODIUM GLUCONATE, SODIUM ACETATE, POTASSIUM CHLORIDE, MAGNESIUM CHLORIDE, SODIUM PHOSPHATE, DIBASIC, AND POTASSIUM PHOSPHATE .53; .5; .37; .037; .03; .012; .00082 G/100ML; G/100ML; G/100ML; G/100ML; G/100ML; G/100ML; G/100ML
500 INJECTION, SOLUTION INTRAVENOUS ONCE
Status: COMPLETED | OUTPATIENT
Start: 2023-07-02 | End: 2023-07-02

## 2023-07-02 RX ADMIN — SULFAMETHOXAZOLE AND TRIMETHOPRIM 1 TABLET: 800; 160 TABLET ORAL at 19:57

## 2023-07-02 RX ADMIN — IOHEXOL 100 ML: 350 INJECTION, SOLUTION INTRAVENOUS at 16:55

## 2023-07-02 RX ADMIN — LIDOCAINE HYDROCHLORIDE 20 ML: 10 INJECTION, SOLUTION EPIDURAL; INFILTRATION; INTRACAUDAL; PERINEURAL at 19:17

## 2023-07-02 RX ADMIN — SODIUM CHLORIDE, SODIUM GLUCONATE, SODIUM ACETATE, POTASSIUM CHLORIDE, MAGNESIUM CHLORIDE, SODIUM PHOSPHATE, DIBASIC, AND POTASSIUM PHOSPHATE 500 ML: .53; .5; .37; .037; .03; .012; .00082 INJECTION, SOLUTION INTRAVENOUS at 16:10

## 2023-07-02 NOTE — DISCHARGE INSTR - AVS FIRST PAGE
Please remove packing strip from each wound on Monday 7/3. You may shower after. Keeping it covered is optional, but recommended especially if there is active drainage from the wounds. You will be on antibiotics for 7 days. Please follow up with Dr. Jeannie Piña as scheduled.

## 2023-07-02 NOTE — PROCEDURES
Incision and drain    Date/Time: 7/2/2023 7:54 PM    Performed by: William Pedro MD  Authorized by: William Pedro MD  Universal Protocol:  Consent: Verbal consent obtained. Written consent not obtained. Risks and benefits: risks, benefits and alternatives were discussed  Consent given by: patient and spouse  Time out: Immediately prior to procedure a "time out" was called to verify the correct patient, procedure, equipment, support staff and site/side marked as required. Patient understanding: patient states understanding of the procedure being performed  Radiology Images displayed and confirmed. If images not available, report reviewed: imaging studies available  Patient identity confirmed: verbally with patient and arm band      Patient location:  ED  Location:     Type:  Abscess and seroma    Location:  Trunk    Trunk location:  Abdomen  Pre-procedure details:     Skin preparation:  Betadine  Anesthesia (see MAR for exact dosages): Anesthesia method:  Local infiltration    Local anesthetic:  Lidocaine 1% w/o epi  Procedure details:     Complexity:  Simple    Needle aspiration: no      Incision types:  Stab incision    Scalpel blade:  11    Approach:  Open    Incision depth:  Subcutaneous    Wound management:  Probed and deloculated, irrigated with saline and extensive cleaning    Drainage:  Purulent and serosanguinous    Drainage amount: Moderate    Wound treatment:  Packing placed    Packing materials:  1/2 in gauze  Post-procedure details:     Patient tolerance of procedure:   Tolerated well, no immediate complications

## 2023-07-02 NOTE — ED PROVIDER NOTES
History  Chief Complaint   Patient presents with   • Post-Op Infection     Pt referred in from urgent care for post-op infection of the abdomen; reports night sweats and a temp of 101.9 prior to the drainage starting a few days ago; purulent drainage noted during triage      Is a 55-year-old male who recently underwent laparotomy on 6/20/2023 for resection of a mesenteric mass. Past medical history significant for hypertension, diabetes. Patient states that since surgery has been doing generally well. Of note 4 days ago he did have a fever of 101.4 which went away with symptomatic treatment. Patient notes that over the past couple days he has been experiencing night sweats as of yesterday he noticed drainage from the incision site and it is gotten red and hot. This morning his wife looked today and noticed that pus was draining they went to urgent care was instructed to come to the hospital for further evaluation. He denies any headaches dizziness tinnitus vision change neck pain back pain chest pain shortness of breath nausea vomiting diarrhea or constipation denies any dysuria hematuria. Prior to Admission Medications   Prescriptions Last Dose Informant Patient Reported? Taking?    Jardiance 25 MG TABS  Self No No   Sig: TAKE 1 TABLET EVERY MORNING   Ozempic, 1 MG/DOSE, 4 MG/3ML injection pen  Self No No   Sig: INJECT 1 MG UNDER THE SKIN ONCE A WEEK   Patient taking differently: Monday   allopurinol (ZYLOPRIM) 100 mg tablet  Self No No   Sig: TAKE 1 TABLET DAILY   Patient taking differently: every evening   amLODIPine-benazepril (LOTREL 5-20) 5-20 MG per capsule  Self No No   Sig: TAKE 1 CAPSULE TWICE A DAY   atenolol (TENORMIN) 50 mg tablet  Self No No   Sig: TAKE 1 TABLET TWICE A DAY   atorvastatin (LIPITOR) 40 mg tablet  Self No No   Sig: TAKE 1 TABLET DAILY   Patient taking differently: every evening   furosemide (LASIX) 40 mg tablet  Self No No   Sig: TAKE ONE-HALF (1/2) TABLET DAILY   magnesium oxide (MAG-OX) 400 mg  Self Yes No   Sig: Take 400 mg by mouth daily   olmesartan-hydrochlorothiazide (BENICAR HCT) 40-12.5 MG per tablet  Self No No   Sig: TAKE 1 TABLET DAILY   oxyCODONE-acetaminophen (Percocet) 5-325 mg per tablet   No No   Sig: Take 1 tablet by mouth every 6 (six) hours as needed for moderate pain for up to 10 days Max Daily Amount: 4 tablets   Patient not taking: Reported on 7/2/2023   potassium chloride (K-DUR,KLOR-CON) 10 mEq tablet  Self No No   Sig: TAKE 3 TABLETS TWICE A DAY. TAKE 3 TABLETS IN THE MORNING AND TAKE 3 TABLETS IN THE EVENING      Facility-Administered Medications: None       Past Medical History:   Diagnosis Date   • Allergic rhinitis     last assessed: 5/4/2015   • Aneurysm of thoracic aorta (HCC)    • Cataract     resolved: 12/12/2014   • Colon polyp    • Diabetes mellitus (HCC)     diet control   • Herniated nucleus pulposus, L4-5 left     last assessed: 4/9/2014   • Herniated nucleus pulposus, L5-S1, left     last assessed: 4/9/2014   • Hx of echocardiogram 10/14/2016    EF 55%, Mild LVH, mild aortic root and ascending aorta enlargement, measuring 42 mm, trace MR, trace aortic regurg.    • Hypercholesterolemia    • Hypertension    • Kidney stone    • Lumbar radiculopathy        Past Surgical History:   Procedure Laterality Date   • BACK SURGERY     • BACK SURGERY     • COLONOSCOPY     • KNEE ARTHROSCOPY      (therapeutic)   • KNEE SURGERY     • LYMPH NODE DISSECTION N/A 6/20/2023    Procedure: biopsy of MESENTERIC MASS, tap block;  Surgeon: Levy Steinberg MD;  Location:  MAIN OR;  Service: Surgical Oncology   • [place] COLONOSCOPY FLX DX W/COLLJ SPEC WHEN PFRMD N/A 09/30/2016    Procedure: COLONOSCOPY;  Surgeon: Brain Suárez MD;  Location: [facility];  Service: Gastroenterology   • SMALL INTESTINE SURGERY N/A 6/20/2023    Procedure: SMALL BOWEL RESECTION;  Surgeon: Levy Steinberg MD;  Location:  MAIN OR;  Service: Surgical Oncology   • TONSILLECTOMY AND ADENOIDECTOMY Family History   Problem Relation Age of Onset   • Thyroid disease Mother         disorder   • Hypertension Father    • Coronary artery disease Brother    • Heart attack Brother         prior myocardial infarction     I have reviewed and agree with the history as documented. E-Cigarette/Vaping   • E-Cigarette Use Never User      E-Cigarette/Vaping Substances   • Nicotine No    • THC No    • CBD No    • Flavoring No    • Other No    • Unknown No      Social History     Tobacco Use   • Smoking status: Never   • Smokeless tobacco: Never   Vaping Use   • Vaping Use: Never used   Substance Use Topics   • Alcohol use: Yes     Comment: social once a month   • Drug use: Never        Review of Systems   Constitutional: Positive for chills and fever. Negative for activity change and fatigue. HENT: Negative for congestion, ear pain and sore throat. Eyes: Negative for pain and visual disturbance. Respiratory: Negative for cough, chest tightness, shortness of breath and wheezing. Cardiovascular: Negative for chest pain and palpitations. Gastrointestinal: Positive for abdominal pain. Negative for blood in stool, constipation, diarrhea, nausea and vomiting. Genitourinary: Negative for dysuria and hematuria. Musculoskeletal: Negative for arthralgias and back pain. Skin: Positive for wound. Negative for color change and rash. Neurological: Negative for dizziness, seizures, syncope, light-headedness, numbness and headaches. Psychiatric/Behavioral: Negative for agitation and behavioral problems. All other systems reviewed and are negative.       Physical Exam  ED Triage Vitals [07/02/23 1512]   Temperature Pulse Respirations Blood Pressure SpO2   (!) 97.4 °F (36.3 °C) 94 18 124/80 96 %      Temp Source Heart Rate Source Patient Position - Orthostatic VS BP Location FiO2 (%)   Oral Monitor Sitting Right arm --      Pain Score       No Pain             Orthostatic Vital Signs  Vitals:    07/02/23 1512 07/02/23 1600 07/02/23 1800   BP: 124/80 126/82 118/85   Pulse: 94 92 92   Patient Position - Orthostatic VS: Sitting         Physical Exam  Vitals and nursing note reviewed. Constitutional:       General: He is not in acute distress. Appearance: He is well-developed. HENT:      Head: Normocephalic and atraumatic. Right Ear: External ear normal.      Left Ear: External ear normal.      Nose: Nose normal.      Mouth/Throat:      Mouth: Mucous membranes are moist.   Eyes:      Extraocular Movements: Extraocular movements intact. Conjunctiva/sclera: Conjunctivae normal.   Cardiovascular:      Rate and Rhythm: Normal rate and regular rhythm. Heart sounds: Normal heart sounds. No murmur heard. Pulmonary:      Effort: Pulmonary effort is normal. No respiratory distress. Breath sounds: Normal breath sounds. Abdominal:      Palpations: Abdomen is soft. Tenderness: There is no abdominal tenderness. There is no right CVA tenderness or left CVA tenderness. Comments: Postop incision pictured below the incision is red hot and if I press on it I can express serosanguineous fluid   Musculoskeletal:         General: No swelling. Normal range of motion. Cervical back: Normal range of motion and neck supple. Right lower leg: No edema. Left lower leg: No edema. Skin:     General: Skin is warm and dry. Capillary Refill: Capillary refill takes less than 2 seconds. Neurological:      General: No focal deficit present. Mental Status: He is alert and oriented to person, place, and time. Psychiatric:         Mood and Affect: Mood normal.         Behavior: Behavior normal.        Media Information      Document Information    Clinical Image - Mobile Device      07/02/2023 15:17   Attached To:    Hospital Encounter on 7/2/23     [address] [facility]         ED Medications  Medications   multi-electrolyte (ISOLYTE-S PH 7.4) bolus 500 mL (0 mL Intravenous Stopped 7/2/23 1757)   iohexol (OMNIPAQUE) 350 MG/ML injection (SINGLE-DOSE) 100 mL (100 mL Intravenous Given 7/2/23 1655)   lidocaine (PF) (XYLOCAINE-MPF) 1 % injection 20 mL (20 mL Infiltration Given by Other 7/2/23 1917)   sulfamethoxazole-trimethoprim (BACTRIM DS) 800-160 mg per tablet 1 tablet (1 tablet Oral Given 7/2/23 1957)       Diagnostic Studies  Results Reviewed     Procedure Component Value Units Date/Time    Blood culture #1 [[id number]] Collected: 07/02/23 1600    Lab Status: Preliminary result Specimen: Blood from Arm, Right Updated: 07/02/23 2001     Blood Culture Received in Microbiology Lab. Culture in Progress. Blood culture #2 [[id number]] Collected: 07/02/23 1600    Lab Status: Preliminary result Specimen: Blood from Arm, Left Updated: 07/02/23 2001     Blood Culture Received in Microbiology Lab. Culture in Progress. Procalcitonin [[id number]]  (Normal) Collected: 07/02/23 1600    Lab Status: Final result Specimen: Blood from Arm, Left Updated: 07/02/23 1708     Procalcitonin 0.13 ng/ml     Lactic acid [[id number]]  (Normal) Collected: 07/02/23 1600    Lab Status: Final result Specimen: Blood from Arm, Left Updated: 07/02/23 1658     LACTIC ACID 1.6 mmol/L     Narrative:      Result may be elevated if tourniquet was used during collection.     Comprehensive metabolic panel [[id number]]  (Abnormal) Collected: 07/02/23 1600    Lab Status: Final result Specimen: Blood from Arm, Left Updated: 07/02/23 1641     Sodium 143 mmol/L      Potassium 3.5 mmol/L      Chloride 110 mmol/L      CO2 30 mmol/L      ANION GAP 3 mmol/L      BUN 19 mg/dL      Creatinine 1.10 mg/dL      Glucose 201 mg/dL      Calcium 9.4 mg/dL      Corrected Calcium 10.1 mg/dL      AST 31 U/L      ALT 60 U/L      Alkaline Phosphatase 65 U/L      Total Protein 6.8 g/dL      Albumin 3.1 g/dL      Total Bilirubin 0.34 mg/dL      eGFR 69 ml/min/1.73sq m     Narrative:      [facility] guidelines for Chronic Kidney Disease (CKD):   •  Stage 1 with normal or high GFR (GFR > 90 mL/min/1.73 square meters)  •  Stage 2 Mild CKD (GFR = 60-89 mL/min/1.73 square meters)  •  Stage 3A Moderate CKD (GFR = 45-59 mL/min/1.73 square meters)  •  Stage 3B Moderate CKD (GFR = 30-44 mL/min/1.73 square meters)  •  Stage 4 Severe CKD (GFR = 15-29 mL/min/1.73 square meters)  •  Stage 5 End Stage CKD (GFR <15 mL/min/1.73 square meters)  Note: GFR calculation is accurate only with a steady state creatinine    Protime-INR [[id number]]  (Normal) Collected: 07/02/23 1600    Lab Status: Final result Specimen: Blood from Arm, Left Updated: 07/02/23 1635     Protime 13.5 seconds      INR 1.01    APTT [[id number]]  (Normal) Collected: 07/02/23 1600    Lab Status: Final result Specimen: Blood from Arm, Left Updated: 07/02/23 1635     PTT 29 seconds     CBC and differential [[id number]] Collected: 07/02/23 1600    Lab Status: Final result Specimen: Blood from Arm, Left Updated: 07/02/23 1612     WBC 6.38 Thousand/uL      RBC 4.04 Million/uL      Hemoglobin 13.0 g/dL      Hematocrit 39.1 %      MCV 97 fL      MCH 32.2 pg      MCHC 33.2 g/dL      RDW 13.2 %      MPV 9.6 fL      Platelets 042 Thousands/uL      nRBC 0 /100 WBCs      Neutrophils Relative 73 %      Immat GRANS % 0 %      Lymphocytes Relative 16 %      Monocytes Relative 8 %      Eosinophils Relative 2 %      Basophils Relative 1 %      Neutrophils Absolute 4.68 Thousands/µL      Immature Grans Absolute 0.02 Thousand/uL      Lymphocytes Absolute 1.02 Thousands/µL      Monocytes Absolute 0.48 Thousand/µL      Eosinophils Absolute 0.15 Thousand/µL      Basophils Absolute 0.03 Thousands/µL                  CT abdomen pelvis with contrast   Final Result by Shari Shah MD (07/02 1801)      Postoperative changes in the periumbilical region with probable small superficial abscess and surrounding cellulitis. No abdominal wall dehiscence identified.       Enhancing mesenteric mass is again present. Workstation performed: [id number]               Procedures  Procedures      ED Course  ED Course as of 07/02/23 2004   Sun Jul 02, 2023   1531 Blood Pressure: 124/80   1531 Temperature(!): 97.4 °F (36.3 °C)   1531 Temp Source: Oral   1531 Pulse: 94   1531 Respirations: 18   1531 SpO2: 96 %  Patient is a 61-year-old male who recently underwent laparotomy on 620 for resection of mesenteric mass presenting to the emergency department with a few days of night sweats and 1 day of fevers. Patient's incision did open up yesterday and started to drain fluid. This morning patient's wife states she pressed on it and pus was expressed. For me when I look at it it appears to be cellulitic in nature when I press on it serosanguineous fluid drainage from the incision site. Given high risk for infection we will start septic work-up on patient. We will also put in for a CT abdomen pelvis to evaluate for any intra-abdominal process that may be going on. I did reach out to surgery on resident for further evaluation of patient patient is aware he is no questions or concerns we will hold off on antibiotics at this time as he does not meet SIRS criteria but ultimately will get antibiotics. [address] surgery resident at bedside to evaluate patient   1615 WBC: 6.38   1615 Hemoglobin: 13.0   1637 Surgery evaluated and belives might be an abscess will call for STAT ct.    1637 Calling Ct now. Informed them that they can do without labs   1642 Creatinine: 1.10   1642 BUN: 19   1803 Postoperative changes in the periumbilical region with probable small superficial abscess and surrounding cellulitis. No abdominal wall dehiscence identified. 1803 Surgery aware of CT findings; will give patient ABX   1838 Bactrim x7 days and I&D by surgery team.    1949 Pt re-examined and evaluated after testing and treatment. Patient informed of all lab and imaging findings.  Spoke with the patient and feeling improved and sxs have resolved. Will discharge home with close f/u with pcp and instructed to return to the ED if sxs worsen or continue. Pt agrees with the plan for discharge and feels comfortable to go home with proper f/u. Advised to return for worsening or additional problems. Diagnostic tests were reviewed and questions answered. Diagnosis, care plan and treatment options were discussed. The patient understand instructions and will follow up as directed. Advised to follow up with their pcp in a few days for re-evaluation. Also advised to call and schedule an appointment with Surgical oncology for further evaluation and workup. Advised to continue symptomatic care with over the counter mediations. Patient is stable for discharge. SBIRT 22yo+    Flowsheet Row Most Recent Value   Initial Alcohol Screen: US AUDIT-C     1. How often do you have a drink containing alcohol? 0 Filed at: 07/02/2023 1935   2. How many drinks containing alcohol do you have on a typical day you are drinking? 0 Filed at: 07/02/2023 1935   3a. Male UNDER 65: How often do you have five or more drinks on one occasion? 0 Filed at: 07/02/2023 1935   3b. FEMALE Any Age, or MALE 65+: How often do you have 4 or more drinks on one occassion? 0 Filed at: 07/02/2023 1935   Audit-C Score 0 Filed at: 07/02/2023 1935   OMARI: How many times in the past year have you. .. Used an illegal drug or used a prescription medication for non-medical reasons?  Never Filed at: 07/02/2023 1935                MDM      Disposition  Final diagnoses:   Wound disruption, post-op, skin, initial encounter   Cellulitis     Time reflects when diagnosis was documented in both MDM as applicable and the Disposition within this note     Time User Action Codes Description Comment    7/2/2023  4:17 PM Jero Meier Add [T81.31XA] Postoperative dehiscence of skin wound, initial encounter     7/2/2023  4:17 PM Palladino, Moise Barth Add [T81.31XA] Wound disruption, post-op, skin, initial encounter     7/2/2023  6:39 PM Tad Rack Modify [T81.31XA] Wound disruption, post-op, skin, initial encounter     7/2/2023  6:39 PM Palladino, Katherin Ax Remove [T81.31XA] Postoperative dehiscence of skin wound, initial encounter     7/2/2023  6:39 PM Tad Rack Add [L03.90] Cellulitis       ED Disposition     ED Disposition   Discharge    Condition   Stable    Date/Time   Sun Jul 2, 2023  6:39 PM    [address] [facility] discharge to home/self care. Follow-up Information     Follow up With Specialties Details Why Contact Info Additional Information    Wade Dowd MD Family Medicine Schedule an appointment as soon as possible for a visit  for follow up [address] [place] [address] [address] [facility] Emergency Medicine Go to  As needed, If symptoms worsen [address] [id number]  [facility] Emergency Department, [id number] [place] [address]    Levy Steinberg MD Surgical Oncology Schedule an appointment as soon as possible for a visit  for follow up [id number] [place] [address] 24  [address]             Patient's Medications   Discharge Prescriptions    SULFAMETHOXAZOLE-TRIMETHOPRIM (BACTRIM DS) 800-160 MG PER TABLET    Take 1 tablet by mouth 2 (two) times a day for 7 days smx-tmp DS (BACTRIM) 800-160 mg tabs (1tab q12 D10)       Start Date: 7/2/2023  End Date: 7/9/2023       Order Dose: 1 tablet       Quantity: 14 tablet    Refills: 0     No discharge procedures on file. PDMP Review       Value Time User    PDMP Reviewed  Yes 6/8/2023  8:37 AM Levy Steinberg MD           ED Provider  Attending physically available and evaluated Sharonda Major. I managed the patient along with the ED Attending.     Electronically Signed by         Elizabeth Marie DO  07/02/23 2004

## 2023-07-02 NOTE — PROGRESS NOTES
[facility] [address] [facility]        NAME: Omayra Goodman is a 78 y.o. male  : 1955    MRN: [id number]  DATE: 2023  TIME: 2:18 PM    Assessment and Plan   Cellulitis, unspecified cellulitis site [L03.90]  1. Cellulitis, unspecified cellulitis site  Transfer to other facility        Patient Instructions     Patient to [facility] ED via [facility] for further evaluation and management  Verbal report given to ED nursing staff    Chief Complaint     Chief Complaint   Patient presents with   • Post-op Problem     Had small bowel resection and lymph node biopsies on 23. Noticed serosanguinous drainage yesterday. Today larger amount of yellow drainage. Low grade fever 4 days ago. No fever since. History of Present Illness       74yo diabetic M presents c/o post-operative erythema and drainage to incision since yesterday. Patient had SB resection and biopsy of mesenteric mass on 23 at [address] [place] [address]. Post operative course uncomplicated. Patient began with faint erythema to inferior most aspect of incision yesterday. Redness noted to have spread superiorly toward middle of incision today with new thick yellow drainage. Patient states he had some pain/pressure to the incision wound which was relieved when it began to drain. Patient denies fever, chills, n/v/d, sob, fatigue, malaise. Review of Systems   Review of Systems   Constitutional: Negative for chills, diaphoresis, fatigue and fever. Respiratory: Negative for shortness of breath. Cardiovascular: Negative for chest pain. Gastrointestinal: Negative for abdominal pain, diarrhea, nausea and vomiting. Skin: Positive for color change and wound.          Current Medications       Current Outpatient Medications:   •  allopurinol (ZYLOPRIM) 100 mg tablet, TAKE 1 TABLET DAILY (Patient taking differently: every evening), Disp: 90 tablet, Rfl: 3  •  amLODIPine-benazepril (LOTREL 5-20) 5-20 MG per capsule, TAKE 1 CAPSULE TWICE A DAY, Disp: 180 capsule, Rfl: 3  •  atenolol (TENORMIN) 50 mg tablet, TAKE 1 TABLET TWICE A DAY, Disp: 180 tablet, Rfl: 3  •  atorvastatin (LIPITOR) 40 mg tablet, TAKE 1 TABLET DAILY (Patient taking differently: every evening), Disp: 90 tablet, Rfl: 3  •  furosemide (LASIX) 40 mg tablet, TAKE ONE-HALF (1/2) TABLET DAILY, Disp: 45 tablet, Rfl: 3  •  Jardiance 25 MG TABS, TAKE 1 TABLET EVERY MORNING, Disp: 90 tablet, Rfl: 3  •  magnesium oxide (MAG-OX) 400 mg, Take 400 mg by mouth daily, Disp: , Rfl:   •  olmesartan-hydrochlorothiazide (BENICAR HCT) 40-12.5 MG per tablet, TAKE 1 TABLET DAILY, Disp: 90 tablet, Rfl: 3  •  Ozempic, 1 MG/DOSE, 4 MG/3ML injection pen, INJECT 1 MG UNDER THE SKIN ONCE A WEEK (Patient taking differently: Monday), Disp: 9 mL, Rfl: 3  •  potassium chloride (K-DUR,KLOR-CON) 10 mEq tablet, TAKE 3 TABLETS TWICE A DAY.  TAKE 3 TABLETS IN THE MORNING AND TAKE 3 TABLETS IN THE EVENING, Disp: 540 tablet, Rfl: 3  •  oxyCODONE-acetaminophen (Percocet) 5-325 mg per tablet, Take 1 tablet by mouth every 6 (six) hours as needed for moderate pain for up to 10 days Max Daily Amount: 4 tablets (Patient not taking: Reported on 7/2/2023), Disp: 10 tablet, Rfl: 0    Current Allergies     Allergies as of 07/02/2023   • (No Known Allergies)            The following portions of the patient's history were reviewed and updated as appropriate: allergies, current medications, past family history, past medical history, past social history, past surgical history and problem list.     Past Medical History:   Diagnosis Date   • Allergic rhinitis     last assessed: 5/4/2015   • Aneurysm of thoracic aorta ([address])    • Cataract     resolved: 12/12/2014   • Colon polyp    • Diabetes mellitus ([address])     diet control   • Herniated nucleus pulposus, L4-5 left     last assessed: 4/9/2014   • Herniated nucleus pulposus, L5-S1, left     last assessed: 4/9/2014   • Hx of echocardiogram 10/14/2016    EF 55%, Mild LVH, mild aortic root and ascending aorta enlargement, measuring 42 mm, trace MR, trace aortic regurg. • Hypercholesterolemia    • Hypertension    • Kidney stone    • Lumbar radiculopathy        Past Surgical History:   Procedure Laterality Date   • BACK SURGERY     • BACK SURGERY     • COLONOSCOPY     • KNEE ARTHROSCOPY      (therapeutic)   • KNEE SURGERY     • LYMPH NODE DISSECTION N/A 6/20/2023    Procedure: biopsy of MESENTERIC MASS, tap block;  Surgeon: Tk Marcum MD;  [facility];  Service: Surgical Oncology   • [place] COLONOSCOPY FLX DX W/COLLJ SPEC WHEN PFRMD N/A 09/30/2016    Procedure: COLONOSCOPY;  Surgeon: Medardo Giron MD;  Location: [facility];  Service: Gastroenterology   • SMALL INTESTINE SURGERY N/A 6/20/2023    Procedure: SMALL BOWEL RESECTION;  Surgeon: Tk Marcum MD;  [facility]: [facility];  Service: Surgical Oncology   • TONSILLECTOMY AND ADENOIDECTOMY         Family History   Problem Relation Age of Onset   • Thyroid disease Mother         disorder   • Hypertension Father    • Coronary artery disease Brother    • Heart attack Brother         prior myocardial infarction         Medications have been verified. Objective   /78   Pulse 88   Temp 99.2 °F (37.3 °C)   Resp 18   Ht 5' 10" (1.778 m)   Wt 103 kg (228 lb)   SpO2 95%   BMI 32.71 kg/m²   No LMP for male patient. Physical Exam     Physical Exam  Constitutional:       Appearance: Normal appearance. Cardiovascular:      Rate and Rhythm: Normal rate and regular rhythm. Pulmonary:      Effort: Pulmonary effort is normal. No respiratory distress. Breath sounds: Normal breath sounds. Abdominal:      General: Abdomen is protuberant. Palpations: Abdomen is soft. Tenderness: There is abdominal tenderness. Hernia: No hernia is present.    Skin:     Comments: Linear midline excision extending from umbilicus to just inferior to xyphoid process that is erythematous, edematous, and warm to touch; erythema and edema appear to be worst around center of incision with copious amounts of purulent drainage from this part of the incision   Neurological:      Mental Status: He is alert.

## 2023-07-02 NOTE — CONSULTS
Consultation - Surgical Oncology   Carla Mckeon 78 y.o. male MRN: [id number]  Unit/Bed#: ED 01 Encounter: [id number]    Assessment/Plan     Assessment:  72yo M with cellulitis and underlying seroma of midline wound from open biopsy of mesenteric mass and small bowel resection on 6/20 for a neuroendocrine tumor. Afebrile, vitals stable  WBC 6.38  hgb 13  Creatinine 1.10    7/2 CT AP personally reviewed and discussed with radiologist. There is no evidence of fascial dehiscence. There is a small abscess with surrounding cellulitis present periumbilically. Plan:  - bedside I&D performed  - two wounds packed with 1/2 inch packing, to be removed tomorrow  - no need for surgical intervention at this time  - follow up with Dr. Doris Espino as scheduled on 7/7    History of Present Illness   HPI:  Carla Mckeon is a 78 y.o. male who presents with concern about surgical wound. He has a midline incision from 6/20 after a biopsy of a mesenteric mass and small bowel resection. He was feeling well until about 4 days ago when he had a fever of 101.4 that resolved with tylenol. Then started having chills 2 days ago. Yesterday there started to be serous drainage from the umbilical region which wasn't too worrisome until the superior portion of the wound started draining red pus pretty copiously. They were advised by the surg onc clinic to go to urgent care who then told the patient to seek evaluation in the ED. The patient has felt well otherwise, eating normally and having normal bowel function. Denies nausea, vomiting. Inpatient Consult to Surgical Oncology  Consult performed by: Segundo Avila MD  Consult ordered by: Carmela Scott DO          Review of Systems   Constitutional: Positive for fever. HENT: Negative. Respiratory: Negative. Cardiovascular: Negative. Gastrointestinal: Negative for abdominal pain, nausea and vomiting. Musculoskeletal: Negative. Skin: Positive for wound.    Neurological: Negative. Psychiatric/Behavioral: Negative. Historical Information   Past Medical History:   Diagnosis Date   • Allergic rhinitis     last assessed: 5/4/2015   • Aneurysm of thoracic aorta (HCC)    • Cataract     resolved: 12/12/2014   • Colon polyp    • Diabetes mellitus (HCC)     diet control   • Herniated nucleus pulposus, L4-5 left     last assessed: 4/9/2014   • Herniated nucleus pulposus, L5-S1, left     last assessed: 4/9/2014   • Hx of echocardiogram 10/14/2016    EF 55%, Mild LVH, mild aortic root and ascending aorta enlargement, measuring 42 mm, trace MR, trace aortic regurg.    • Hypercholesterolemia    • Hypertension    • Kidney stone    • Lumbar radiculopathy      Past Surgical History:   Procedure Laterality Date   • BACK SURGERY     • BACK SURGERY     • COLONOSCOPY     • KNEE ARTHROSCOPY      (therapeutic)   • KNEE SURGERY     • LYMPH NODE DISSECTION N/A 6/20/2023    Procedure: biopsy of MESENTERIC MASS, tap block;  Surgeon: Vasile Dodd MD;  Location:  MAIN OR;  Service: Surgical Oncology   • ME COLONOSCOPY FLX DX W/COLLJ SPEC WHEN PFRMD N/A 09/30/2016    Procedure: COLONOSCOPY;  Surgeon: Yobany Dela Cruz MD;  Location: [facility];  Service: Gastroenterology   • SMALL INTESTINE SURGERY N/A 6/20/2023    Procedure: SMALL BOWEL RESECTION;  Surgeon: Vasile Dodd MD;  Location:  MAIN OR;  Service: Surgical Oncology   • TONSILLECTOMY AND ADENOIDECTOMY       Social History   Social History     Substance and Sexual Activity   Alcohol Use Yes    Comment: social once a month     Social History     Substance and Sexual Activity   Drug Use Never     E-Cigarette/Vaping   • E-Cigarette Use Never User      E-Cigarette/Vaping Substances   • Nicotine No    • THC No    • CBD No    • Flavoring No    • Other No    • Unknown No      Social History     Tobacco Use   Smoking Status Never   Smokeless Tobacco Never     Family History: non-contributory    Meds/Allergies   all current active meds have been reviewed  No Known Allergies    Objective   First Vitals:   Blood Pressure: 124/80 (07/02/23 1512)  Pulse: 94 (07/02/23 1512)  Temperature: (!) 97.4 °F (36.3 °C) (07/02/23 1512)  Temp Source: Oral (07/02/23 1512)  Respirations: 18 (07/02/23 1512)  SpO2: 96 % (07/02/23 1512)    Current Vitals:   Blood Pressure: 126/82 (07/02/23 1600)  Pulse: 92 (07/02/23 1600)  Temperature: (!) 97.4 °F (36.3 °C) (07/02/23 1512)  Temp Source: Oral (07/02/23 1512)  Respirations: (!) 25 (07/02/23 1600)  SpO2: 98 % (07/02/23 1600)    No intake or output data in the 24 hours ending 07/02/23 1658    Invasive Devices     Peripheral Intravenous Line  Duration           Peripheral IV 07/02/23 Distal;Right;Ventral (anterior) Forearm <1 day    Peripheral IV 07/02/23 Left Antecubital <1 day                Physical Exam:  General: No acute distress  Neuro: alert and oriented  HEENT: moist mucous membranes  CV: Well perfused, regular rate and rhythm  Lungs: Normal work of breathing, no increased respiratory effort  Abdomen: Soft, non-tender, non-distended. Incision intact with exofin still present. The mid portion of the incision is indurated and erythematous, also warm to the touch. On palpation there is purulent SS drainage that can be expressed easily.   Extremities: No edema, clubbing or cyanosis  Skin: Warm, dry    Lab Results:   CBC:   Lab Results   Component Value Date    WBC 6.38 07/02/2023    HGB 13.0 07/02/2023    HCT 39.1 07/02/2023    MCV 97 07/02/2023     07/02/2023    RBC 4.04 07/02/2023    MCH 32.2 07/02/2023    MCHC 33.2 07/02/2023    RDW 13.2 07/02/2023    MPV 9.6 07/02/2023    NRBC 0 07/02/2023   , CMP:   Lab Results   Component Value Date    SODIUM 143 07/02/2023    K 3.5 07/02/2023     (H) 07/02/2023    CO2 30 07/02/2023    BUN 19 07/02/2023    CREATININE 1.10 07/02/2023    CALCIUM 9.4 07/02/2023    AST 31 07/02/2023    ALT 60 07/02/2023    ALKPHOS 65 07/02/2023    EGFR 69 07/02/2023     Imaging: I have personally reviewed pertinent films in PACS  EKG, Pathology, and Other Studies: I have personally reviewed pertinent films in PACS    Counseling / Coordination of Care  Total floor / unit time spent today 20 minutes. Greater than 50% of total time was spent with the patient and / or family counseling and / or coordination of care.        Siri Jiménez MD  General Surgery PGY2

## 2023-07-02 NOTE — TELEPHONE ENCOUNTER
Pt's wife called in stating pt has another drainage in the area above where the drainage was yesterday and it looks like a pus with red  On call paged via TC

## 2023-07-06 ENCOUNTER — HOSPITAL ENCOUNTER (EMERGENCY)
Facility: HOSPITAL | Age: 68
Discharge: HOME | End: 2023-07-06
Attending: EMERGENCY MEDICINE
Payer: COMMERCIAL

## 2023-07-06 ENCOUNTER — TELEPHONE (OUTPATIENT)
Dept: INTERNAL MEDICINE CLINIC | Facility: CLINIC | Age: 68
End: 2023-07-06

## 2023-07-06 ENCOUNTER — OFFICE VISIT (OUTPATIENT)
Dept: INTERNAL MEDICINE CLINIC | Facility: CLINIC | Age: 68
End: 2023-07-06
Payer: COMMERCIAL

## 2023-07-06 ENCOUNTER — TELEPHONE (OUTPATIENT)
Dept: HEMATOLOGY ONCOLOGY | Facility: CLINIC | Age: 68
End: 2023-07-06

## 2023-07-06 VITALS
HEART RATE: 85 BPM | RESPIRATION RATE: 18 BRPM | OXYGEN SATURATION: 92 % | DIASTOLIC BLOOD PRESSURE: 83 MMHG | SYSTOLIC BLOOD PRESSURE: 126 MMHG | TEMPERATURE: 98.1 F

## 2023-07-06 VITALS
TEMPERATURE: 98.2 F | HEIGHT: 70 IN | BODY MASS INDEX: 31.77 KG/M2 | SYSTOLIC BLOOD PRESSURE: 106 MMHG | HEART RATE: 95 BPM | OXYGEN SATURATION: 95 % | DIASTOLIC BLOOD PRESSURE: 68 MMHG | WEIGHT: 221.9 LBS

## 2023-07-06 DIAGNOSIS — R78.81 POSITIVE BLOOD CULTURE: Primary | ICD-10-CM

## 2023-07-06 DIAGNOSIS — D3A.8 NEUROENDOCRINE TUMOR: ICD-10-CM

## 2023-07-06 DIAGNOSIS — R59.0 LYMPHADENOPATHY, ABDOMINAL: ICD-10-CM

## 2023-07-06 DIAGNOSIS — E11.69 DIABETES MELLITUS TYPE 2 IN OBESE (HCC): ICD-10-CM

## 2023-07-06 DIAGNOSIS — N18.31 STAGE 3A CHRONIC KIDNEY DISEASE (HCC): ICD-10-CM

## 2023-07-06 DIAGNOSIS — R78.81 BACTEREMIA: Primary | ICD-10-CM

## 2023-07-06 DIAGNOSIS — I10 PRIMARY HYPERTENSION: ICD-10-CM

## 2023-07-06 DIAGNOSIS — E66.9 DIABETES MELLITUS TYPE 2 IN OBESE (HCC): ICD-10-CM

## 2023-07-06 LAB
ANION GAP SERPL CALCULATED.3IONS-SCNC: 2 MMOL/L
BACTERIA BLD CULT: ABNORMAL
BASOPHILS # BLD AUTO: 0.06 THOUSANDS/ÂΜL (ref 0–0.1)
BASOPHILS NFR BLD AUTO: 1 % (ref 0–1)
BUN SERPL-MCNC: 20 MG/DL (ref 5–25)
CALCIUM SERPL-MCNC: 9 MG/DL (ref 8.3–10.1)
CHLORIDE SERPL-SCNC: 110 MMOL/L (ref 96–108)
CO2 SERPL-SCNC: 25 MMOL/L (ref 21–32)
CREAT SERPL-MCNC: 1.42 MG/DL (ref 0.6–1.3)
EOSINOPHIL # BLD AUTO: 0.18 THOUSAND/ÂΜL (ref 0–0.61)
EOSINOPHIL NFR BLD AUTO: 3 % (ref 0–6)
ERYTHROCYTE [DISTWIDTH] IN BLOOD BY AUTOMATED COUNT: 13.4 % (ref 11.6–15.1)
GFR SERPL CREATININE-BSD FRML MDRD: 51 ML/MIN/1.73SQ M
GLUCOSE SERPL-MCNC: 136 MG/DL (ref 65–140)
GRAM STN SPEC: ABNORMAL
GRAM STN SPEC: ABNORMAL
HCT VFR BLD AUTO: 40 % (ref 36.5–49.3)
HGB BLD-MCNC: 13 G/DL (ref 12–17)
IMM GRANULOCYTES # BLD AUTO: 0.05 THOUSAND/UL (ref 0–0.2)
IMM GRANULOCYTES NFR BLD AUTO: 1 % (ref 0–2)
LYMPHOCYTES # BLD AUTO: 1.23 THOUSANDS/ÂΜL (ref 0.6–4.47)
LYMPHOCYTES NFR BLD AUTO: 20 % (ref 14–44)
MCH RBC QN AUTO: 31.6 PG (ref 26.8–34.3)
MCHC RBC AUTO-ENTMCNC: 32.5 G/DL (ref 31.4–37.4)
MCV RBC AUTO: 97 FL (ref 82–98)
MONOCYTES # BLD AUTO: 0.39 THOUSAND/ÂΜL (ref 0.17–1.22)
MONOCYTES NFR BLD AUTO: 6 % (ref 4–12)
NEUTROPHILS # BLD AUTO: 4.19 THOUSANDS/ÂΜL (ref 1.85–7.62)
NEUTS SEG NFR BLD AUTO: 69 % (ref 43–75)
NRBC BLD AUTO-RTO: 0 /100 WBCS
PLATELET # BLD AUTO: 290 THOUSANDS/UL (ref 149–390)
PMV BLD AUTO: 9.8 FL (ref 8.9–12.7)
POTASSIUM SERPL-SCNC: 3.9 MMOL/L (ref 3.5–5.3)
PROCALCITONIN SERPL-MCNC: 0.08 NG/ML
RBC # BLD AUTO: 4.12 MILLION/UL (ref 3.88–5.62)
S EPIDERMIDIS DNA BLD POS QL NAA+NON-PRB: DETECTED
SODIUM SERPL-SCNC: 137 MMOL/L (ref 135–147)
WBC # BLD AUTO: 6.1 THOUSAND/UL (ref 4.31–10.16)

## 2023-07-06 PROCEDURE — 99495 TRANSJ CARE MGMT MOD F2F 14D: CPT | Performed by: FAMILY MEDICINE

## 2023-07-06 PROCEDURE — 36415 COLL VENOUS BLD VENIPUNCTURE: CPT | Performed by: STUDENT IN AN ORGANIZED HEALTH CARE EDUCATION/TRAINING PROGRAM

## 2023-07-06 PROCEDURE — 87040 BLOOD CULTURE FOR BACTERIA: CPT | Performed by: STUDENT IN AN ORGANIZED HEALTH CARE EDUCATION/TRAINING PROGRAM

## 2023-07-06 PROCEDURE — 84145 PROCALCITONIN (PCT): CPT | Performed by: STUDENT IN AN ORGANIZED HEALTH CARE EDUCATION/TRAINING PROGRAM

## 2023-07-06 PROCEDURE — 85025 COMPLETE CBC W/AUTO DIFF WBC: CPT | Performed by: STUDENT IN AN ORGANIZED HEALTH CARE EDUCATION/TRAINING PROGRAM

## 2023-07-06 PROCEDURE — 80048 BASIC METABOLIC PNL TOTAL CA: CPT | Performed by: STUDENT IN AN ORGANIZED HEALTH CARE EDUCATION/TRAINING PROGRAM

## 2023-07-06 RX ORDER — AMOXICILLIN AND CLAVULANATE POTASSIUM 875; 125 MG/1; MG/1
1 TABLET, FILM COATED ORAL EVERY 12 HOURS
Qty: 14 TABLET | Refills: 0 | Status: SHIPPED | OUTPATIENT
Start: 2023-07-09 | End: 2023-07-12

## 2023-07-06 RX ADMIN — CEFTRIAXONE SODIUM 1000 MG: 10 INJECTION, POWDER, FOR SOLUTION INTRAVENOUS at 16:01

## 2023-07-06 NOTE — TELEPHONE ENCOUNTER
Di Tucker did call back. As discussed, we will send patient to [facility] due to the positive blood culture. [facility] was advised incase he needs to be evaluated by  as the surgical site does still look infected and patient is still having discharge. Patient is advised, and in agreement. F/U appt with Dr. Keenan Alejandra for tomorrow will be canceled.

## 2023-07-06 NOTE — ED ATTENDING ATTESTATION
7/2/2023  IAmauri DO, saw and evaluated the patient. I have discussed the patient with the resident/non-physician practitioner and agree with the resident's/non-physician practitioner's findings, Plan of Care, and MDM as documented in the resident's/non-physician practitioner's note, except where noted. All available labs and Radiology studies were reviewed. I was present for key portions of any procedure(s) performed by the resident/non-physician practitioner and I was immediately available to provide assistance. At this point I agree with the current assessment done in the Emergency Department. I have conducted an independent evaluation of this patient a history and physical is as follows:    78 yom s/p ex lap for mesenteric mass 6/20. Central abd incision erythema, purulent drainage. Pain. No fever. No systemic sx.  Plan consutl surg onc, ct scan, labs    ED Course         Critical Care Time  Procedures

## 2023-07-06 NOTE — QUICK NOTE
Evaluated patient at bedside. Patient states that he presents to the emergency department secondary to his medical oncology  Reporting positive blood cultures from last ED visit on 7/2/2023.     2/2 blood cx G+ cocci in clusters, staph epidermidis. Currently patient states that he is afebrile, without chills, nausea/vomiting, or abdominal pain. He does admit to a small area inferior to the umbilicus which was opened and draining a small amount of fluid. On evaluation midline incision looks clean, dry, intact, with glue in place. Small portion inferior to the umbilicus with small amount of serosanguineous drainage on gauze pad. Area has a small opening, no fluctuance, indurated. No surrounding erythema or swelling. General: Pt is AAOx3, lying down in bed in NAD. HEENT:  normocephalic, no scleral icterus,  moist mucous membranes   Neck: Supple, non-tender, ROM intact   CV: RRR, no murmurs, gallops, rubs. S1 and S2. Resp: Lung sounds clear to auscultation B/L, normal respiratory effort no  wheezes, rhonchi, rhales   Abd: Soft, with no tenderness, non-distended, non-tympanitic. Normoactive bowelsounds all 4 quadrants. No rebound or guarding. Ext: Warm with no cyanosis, no edema, no deformities. ROM intact   Skin: No rashes, bruises, ulcers. Neuro: Sensation intact all 4 extremities. 5+ strength all 4 extremities.      Plan: Recommend repeating blood cultures  -Recommend repeating blood cultures  -Patient may finish Bactrim  -Follow-up blood cultures in the outpatient setting  -No surgical oncology intervention warranted at this time    David Castle

## 2023-07-06 NOTE — ED PROVIDER NOTES
History  Chief Complaint   Patient presents with   • Abnormal Lab     Recent I&D, was sent over for PCP for "bacteria growth from I&D" was told pt will need antibiotics     79 yo M with PMHx HTN, HLD, DM, and ex-lap on 6/20/23 for mesenteric mass biopsy and SBR, advised to present to the ED by his PCP after blood cultures came back positive for staph epidermidis x2. He was seen in the emergency department on July 2, 2023 for an infected midline incision, which was opened and packed by surgery. Blood cultures were drawn and patient sent home with Bactrim. Since he was last seen patient has experienced 1 episode of night sweats. No fevers, chills, or purulent discharge from midline incision.  used: No        Prior to Admission Medications   Prescriptions Last Dose Informant Patient Reported? Taking? Jardiance 25 MG TABS  Self No No   Sig: TAKE 1 TABLET EVERY MORNING   Ozempic, 1 MG/DOSE, 4 MG/3ML injection pen  Self No No   Sig: INJECT 1 MG UNDER THE SKIN ONCE A WEEK   Patient taking differently: Monday   allopurinol (ZYLOPRIM) 100 mg tablet  Self No No   Sig: TAKE 1 TABLET DAILY   Patient taking differently: every evening   amLODIPine-benazepril (LOTREL 5-20) 5-20 MG per capsule  Self No No   Sig: TAKE 1 CAPSULE TWICE A DAY   atenolol (TENORMIN) 50 mg tablet  Self No No   Sig: TAKE 1 TABLET TWICE A DAY   atorvastatin (LIPITOR) 40 mg tablet  Self No No   Sig: TAKE 1 TABLET DAILY   Patient taking differently: every evening   furosemide (LASIX) 40 mg tablet  Self No No   Sig: TAKE ONE-HALF (1/2) TABLET DAILY   magnesium oxide (MAG-OX) 400 mg  Self Yes No   Sig: Take 400 mg by mouth daily   olmesartan-hydrochlorothiazide (BENICAR HCT) 40-12.5 MG per tablet  Self No No   Sig: TAKE 1 TABLET DAILY   potassium chloride (K-DUR,KLOR-CON) 10 mEq tablet  Self No No   Sig: TAKE 3 TABLETS TWICE A DAY.  TAKE 3 TABLETS IN THE MORNING AND TAKE 3 TABLETS IN THE EVENING   sulfamethoxazole-trimethoprim (BACTRIM DS) 800-160 mg per tablet   No No   Sig: Take 1 tablet by mouth 2 (two) times a day for 7 days smx-tmp DS (BACTRIM) 800-160 mg tabs (1tab q12 D10)      Facility-Administered Medications: None       Past Medical History:   Diagnosis Date   • Allergic rhinitis     last assessed: 5/4/2015   • Aneurysm of thoracic aorta (HCC)    • Cataract     resolved: 12/12/2014   • Colon polyp    • Diabetes mellitus ([address])     diet control   • Herniated nucleus pulposus, L4-5 left     last assessed: 4/9/2014   • Herniated nucleus pulposus, L5-S1, left     last assessed: 4/9/2014   • Hx of echocardiogram 10/14/2016    EF 55%, Mild LVH, mild aortic root and ascending aorta enlargement, measuring 42 mm, trace MR, trace aortic regurg. • Hypercholesterolemia    • Hypertension    • Kidney stone    • Lumbar radiculopathy        Past Surgical History:   Procedure Laterality Date   • BACK SURGERY     • BACK SURGERY     • COLONOSCOPY     • KNEE ARTHROSCOPY      (therapeutic)   • KNEE SURGERY     • LYMPH NODE DISSECTION N/A 6/20/2023    Procedure: biopsy of MESENTERIC MASS, tap block;  Surgeon: Piedad Chatterjee MD;  Location:  MAIN OR;  Service: Surgical Oncology   • [place] COLONOSCOPY FLX DX W/COLLJ SPEC WHEN PFRMD N/A 09/30/2016    Procedure: COLONOSCOPY;  Surgeon: Silvestre Lucero MD;  Location: [facility];  Service: Gastroenterology   • SMALL INTESTINE SURGERY N/A 6/20/2023    Procedure: SMALL BOWEL RESECTION;  Surgeon: Piedad Chatterjee MD;  Location:  MAIN OR;  Service: Surgical Oncology   • TONSILLECTOMY AND ADENOIDECTOMY         Family History   Problem Relation Age of Onset   • Thyroid disease Mother         disorder   • Hypertension Father    • Coronary artery disease Brother    • Heart attack Brother         prior myocardial infarction     I have reviewed and agree with the history as documented.     E-Cigarette/Vaping   • E-Cigarette Use Never User      E-Cigarette/Vaping Substances   • Nicotine No    • THC No    • CBD No    • Flavoring No    • Other No    • Unknown No      Social History     Tobacco Use   • Smoking status: Never   • Smokeless tobacco: Never   Vaping Use   • Vaping Use: Never used   Substance Use Topics   • Alcohol use: Yes     Comment: social once a month   • Drug use: Never        Review of Systems   Constitutional: Negative. HENT: Negative. Respiratory: Negative. Cardiovascular: Negative. Gastrointestinal: Negative. Genitourinary: Negative. Musculoskeletal: Negative. Skin: Positive for wound. Physical Exam  ED Triage Vitals   Temperature Pulse Respirations Blood Pressure SpO2   07/06/23 1239 07/06/23 1238 07/06/23 1238 07/06/23 1238 07/06/23 1238   98.1 °F (36.7 °C) 85 18 115/87 94 %      Temp Source Heart Rate Source Patient Position - Orthostatic VS BP Location FiO2 (%)   07/06/23 1239 07/06/23 1238 07/06/23 1238 07/06/23 1238 --   Temporal Monitor Lying Right arm       Pain Score       07/06/23 1238       No Pain             Orthostatic Vital Signs  Vitals:    07/06/23 1238 07/06/23 1539   BP: 115/87 126/83   Pulse: 85 85   Patient Position - Orthostatic VS: Lying Lying       Physical Exam  Vitals and nursing note reviewed. Constitutional:       General: He is not in acute distress. Appearance: Normal appearance. HENT:      Head: Normocephalic and atraumatic. Mouth/Throat:      Mouth: Mucous membranes are moist.      Pharynx: Oropharynx is clear. Eyes:      Extraocular Movements: Extraocular movements intact. Pupils: Pupils are equal, round, and reactive to light. Cardiovascular:      Rate and Rhythm: Normal rate and regular rhythm. Pulses: Normal pulses. Heart sounds: Normal heart sounds. Pulmonary:      Effort: Pulmonary effort is normal.      Breath sounds: Normal breath sounds. Abdominal:      Palpations: Abdomen is soft. Tenderness: There is no abdominal tenderness. There is no guarding.           Comments: Serous drainage from infraumbilical incision site. No purulence, erythema, or fluctuance. Musculoskeletal:         General: Normal range of motion. Skin:     General: Skin is warm and dry. Capillary Refill: Capillary refill takes less than 2 seconds. Neurological:      General: No focal deficit present. Mental Status: He is alert and oriented to person, place, and time. ED Medications  Medications   cefTRIAXone (ROCEPHIN) 1,000 mg in dextrose 5 % 50 mL IVPB (0 mg Intravenous Stopped 7/6/23 1631)       Diagnostic Studies  Results Reviewed     Procedure Component Value Units Date/Time    Blood culture [[id number]] Collected: 07/06/23 1433    Lab Status: Preliminary result Specimen: Blood from Arm, Right Updated: 07/06/23 1801     Blood Culture Received in Microbiology Lab. Culture in Progress. Blood culture [[id number]] Collected: 07/06/23 1449    Lab Status: Preliminary result Specimen: Blood from Arm, Left Updated: 07/06/23 1801     Blood Culture Received in Microbiology Lab. Culture in Progress.     Basic metabolic panel [[id number]]  (Abnormal) Collected: 07/06/23 1433    Lab Status: Final result Specimen: Blood from Hand, Right Updated: 07/06/23 1555     Sodium 137 mmol/L      Potassium 3.9 mmol/L      Chloride 110 mmol/L      CO2 25 mmol/L      ANION GAP 2 mmol/L      BUN 20 mg/dL      Creatinine 1.42 mg/dL      Glucose 136 mg/dL      Calcium 9.0 mg/dL      eGFR 51 ml/min/1.73sq m     Narrative:      [facility] guidelines for Chronic Kidney Disease (CKD):   •  Stage 1 with normal or high GFR (GFR > 90 mL/min/1.73 square meters)  •  Stage 2 Mild CKD (GFR = 60-89 mL/min/1.73 square meters)  •  Stage 3A Moderate CKD (GFR = 45-59 mL/min/1.73 square meters)  •  Stage 3B Moderate CKD (GFR = 30-44 mL/min/1.73 square meters)  •  Stage 4 Severe CKD (GFR = 15-29 mL/min/1.73 square meters)  •  Stage 5 End Stage CKD (GFR <15 mL/min/1.73 square meters)  Note: GFR calculation is accurate only with a steady state creatinine    CBC and differential [[id number]] Collected: 07/06/23 1433    Lab Status: Final result Specimen: Blood from Hand, Right Updated: 07/06/23 1502     WBC 6.10 Thousand/uL      RBC 4.12 Million/uL      Hemoglobin 13.0 g/dL      Hematocrit 40.0 %      MCV 97 fL      MCH 31.6 pg      MCHC 32.5 g/dL      RDW 13.4 %      MPV 9.8 fL      Platelets 489 Thousands/uL      nRBC 0 /100 WBCs      Neutrophils Relative 69 %      Immat GRANS % 1 %      Lymphocytes Relative 20 %      Monocytes Relative 6 %      Eosinophils Relative 3 %      Basophils Relative 1 %      Neutrophils Absolute 4.19 Thousands/µL      Immature Grans Absolute 0.05 Thousand/uL      Lymphocytes Absolute 1.23 Thousands/µL      Monocytes Absolute 0.39 Thousand/µL      Eosinophils Absolute 0.18 Thousand/µL      Basophils Absolute 0.06 Thousands/µL     Procalcitonin [[id number]] Collected: 07/06/23 1433    Lab Status: No result Specimen: Blood from Arm, Right                  No orders to display         Procedures  Procedures      ED Course  ED Course as of 07/06/23 1803   Thu Jul 06, 2023   1602 Procalcitonin       1330 - Spoke with surgical oncology, no surgical intervention warranted at this time. 1600 - Spoke with IM admitting team, they declined the admission. Recommend continuing current management, outpatient ECHO. Pt will follow-up with surgical oncologist tomorrow, as scheduled. SBIRT 20yo+    Flowsheet Row Most Recent Value   Initial Alcohol Screen: US AUDIT-C     1. How often do you have a drink containing alcohol? 0 Filed at: 07/06/2023 1239   2. How many drinks containing alcohol do you have on a typical day you are drinking? 0 Filed at: 07/06/2023 1239   3b. FEMALE Any Age, or MALE 65+: How often do you have 4 or more drinks on one occassion?  0 Filed at: 07/06/2023 1239   Audit-C Score 0 Filed at: 07/06/2023 1239                Medical Decision Making  Amount and/or Complexity of Data Reviewed  External Data Reviewed: labs. Details: Blood cultures postive for staph epi x 2            Disposition  Final diagnoses:   Bacteremia     Time reflects when diagnosis was documented in both MDM as applicable and the Disposition within this note     Time User Action Codes Description Comment    7/6/2023  5:51 PM Nakul Brennan [R78.81] Bacteremia       ED Disposition     ED Disposition   Discharge    Condition   Stable    Date/Time   Thu Jul 6, 2023  5:52 PM    [facility] discharge to home/self care. Follow-up Information     Follow up With Specialties Details Why Contact Info    Ariel Santos MD Surgical Oncology Call  Follow-up as scheduled [address]  [phone]            Discharge Medication List as of 7/6/2023  5:53 PM      START taking these medications    Details   amoxicillin-clavulanate (AUGMENTIN) 875-125 mg per tablet Take 1 tablet by mouth every 12 (twelve) hours for 3 days Do not start before July 9, 2023., Starting Sun 7/9/2023, Until Wed 7/12/2023, Normal         CONTINUE these medications which have NOT CHANGED    Details   allopurinol (ZYLOPRIM) 100 mg tablet TAKE 1 TABLET DAILY, Normal      amLODIPine-benazepril (LOTREL 5-20) 5-20 MG per capsule TAKE 1 CAPSULE TWICE A DAY, Normal      atenolol (TENORMIN) 50 mg tablet TAKE 1 TABLET TWICE A DAY, Normal      atorvastatin (LIPITOR) 40 mg tablet TAKE 1 TABLET DAILY, Normal      furosemide (LASIX) 40 mg tablet TAKE ONE-HALF (1/2) TABLET DAILY, Normal      Jardiance 25 MG TABS TAKE 1 TABLET EVERY MORNING, Normal      magnesium oxide (MAG-OX) 400 mg Take 400 mg by mouth daily, Historical Med      olmesartan-hydrochlorothiazide (BENICAR HCT) 40-12.5 MG per tablet TAKE 1 TABLET DAILY, Normal      Ozempic, 1 MG/DOSE, 4 MG/3ML injection pen INJECT 1 MG UNDER THE SKIN ONCE A WEEK, Normal      potassium chloride (K-DUR,KLOR-CON) 10 mEq tablet TAKE 3 TABLETS TWICE A DAY.  TAKE 3 TABLETS IN THE MORNING AND TAKE 3 TABLETS IN THE EVENING, Normal      sulfamethoxazole-trimethoprim (BACTRIM DS) 800-160 mg per tablet Take 1 tablet by mouth 2 (two) times a day for 7 days smx-tmp DS (BACTRIM) 800-160 mg tabs (1tab q12 D10), Starting Sun 7/2/2023, Until Sun 7/9/2023, Normal           Outpatient Discharge Orders   Echo complete w/ contrast if indicated   Standing Status: Future Standing Exp. Date: 07/06/27       PDMP Review       Value Time User    PDMP Reviewed  Yes 6/8/2023  8:37 AM Vasile Dodd MD           ED Provider  Attending physically available and evaluated Sav Albright. I managed the patient along with the ED Attending.     Electronically Signed by         Melvi Cabrera MD  07/06/23 0069

## 2023-07-06 NOTE — ED ATTENDING ATTESTATION
7/6/2023  IDeloris MD, saw and evaluated the patient. I have discussed the patient with the resident/non-physician practitioner and agree with the resident's/non-physician practitioner's findings, Plan of Care, and MDM as documented in the resident's/non-physician practitioner's note, except where noted. All available labs and Radiology studies were reviewed. I was present for key portions of any procedure(s) performed by the resident/non-physician practitioner and I was immediately available to provide assistance. At this point I agree with the current assessment done in the Emergency Department. I have conducted an independent evaluation of this patient a history and physical is as follows:    78 y.o. male presenting with a positive blood culture x 2. Patient underwent ex-lap with Dr. 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 He developed drainage of the laparotomy incision and fevers and chills on 7/2, was seen in ER and was started on Bactrim. Both blood cultures from 7/2 grew out staph epidermidis. Patient has been taking Bactrim without stephani effects. He feels like his surgical incision is improving. He did have night sweats last night. No chest pain, shortness of breath, nausea, vomiting or diarrhea. He feels like his surgical incision is improving/healing. There is still a small area in the distal aspect of the incision that is open and draining. /83 (BP Location: Right arm)   Pulse 85   Temp 98.1 °F (36.7 °C) (Temporal)   Resp 18   SpO2 92%      Vital signs and nursing notes reviewed    CONSTITUTIONAL: male appearing stated age resting in bed, in no acute distress  HEENT: atraumatic, normocephalic. Sclera anicteric, conjunctiva are not injected. Moist oral mucosa  CARDIOVASCULAR/CHEST: RRR, no M/R/G. 2+ radial pulses  PULMONARY: Breathing comfortably on RA. Breath sounds are equal and clear to auscultation  ABDOMEN: non-distended.  BS present, normoactive. Midline laparotomy incision is clean, intact and dry with one small area of dehiscence just inferior to the umbilicus with minimal serosanguinous drainage. MSK: moves all extremities, no deformities, no peripheral edema, no calf asymmetry  NEURO: Awake, alert, and oriented x 3. Face symmetric. Moves all extremities spontaneously. No focal neurologic deficits  SKIN: Warm, appears well-perfused  MENTAL STATUS: Normal affect      A&P: 78 y.o. male presenting with 2/2 positive blood cultures for staph epidermidis. Suspect this is true bacteremia though patient does not appear to be septic. A dose of ceftriaxone IV administered in ED. Labs reveal BMP with mild Cr elevation of 1.42 suggestive of dehydration and likely due to failure to excrete creatinine in setting of bactrim therapy. Procal is normal at 0.08. CBC without leukocytosis. Blood cultures repeated. I initially planned on admitting the patient given positive blood cultures, however, after surgery evaluated the patient and I discussed the case with internal medicine, since patient is overall doing well, has no signs of sepsis (vital signs, reassuring labs), and the bacteria is susceptible to Bactrim, we will cautiously discharge the patient home with strict return precautions. Dr Corky Silva will see the patient in the clinic tomorrow. Patient discharged to home with recommendations for symptom control, return precautions, and plan for follow up.

## 2023-07-06 NOTE — TELEPHONE ENCOUNTER
Spoke to G[place], PCP noted that the blood cultures came back positive last night. Plan to send to [facility] ER for evaluation of IV antibiotics. Dr Pedro Duffy can be consulted for any intervention regarding the incision.

## 2023-07-06 NOTE — TELEPHONE ENCOUNTER
Patient Call    Who are you speaking with? [facility] primary care, Nick Gaytan     If it is not the patient, are they listed on an active communication consent form? N/A   What is the reason for this call? Nick Gaytan is requesting to speak to Dr. Harish Louis nurse ASAP since the patient is currently in the office. Does this require a call back? Yes   If a call back is required, please list best call back number [phone]   If a call back is required, advise that a message will be forwarded to their care team and someone will return their call as soon as possible. Did you relay this information to the patient?  Yes

## 2023-07-07 ENCOUNTER — TELEPHONE (OUTPATIENT)
Dept: HEMATOLOGY ONCOLOGY | Facility: CLINIC | Age: 68
End: 2023-07-07

## 2023-07-07 ENCOUNTER — OFFICE VISIT (OUTPATIENT)
Dept: SURGICAL ONCOLOGY | Facility: CLINIC | Age: 68
End: 2023-07-07
Payer: COMMERCIAL

## 2023-07-07 VITALS
SYSTOLIC BLOOD PRESSURE: 118 MMHG | HEART RATE: 85 BPM | DIASTOLIC BLOOD PRESSURE: 82 MMHG | OXYGEN SATURATION: 98 % | TEMPERATURE: 97.3 F | WEIGHT: 220 LBS | BODY MASS INDEX: 31.5 KG/M2 | RESPIRATION RATE: 18 BRPM | HEIGHT: 70 IN

## 2023-07-07 DIAGNOSIS — C7A.8 NEUROENDOCRINE CARCINOMA (HCC): ICD-10-CM

## 2023-07-07 DIAGNOSIS — D3A.8 NEUROENDOCRINE TUMOR: Primary | ICD-10-CM

## 2023-07-07 PROCEDURE — 99213 OFFICE O/P EST LOW 20 MIN: CPT | Performed by: SURGERY

## 2023-07-07 NOTE — TELEPHONE ENCOUNTER
Patient's 1430 appointment for today was cancelled by the office because they thought he was going to be admitted to the hospital yesterday. Patient was not admitted and his wife would like that appointment back if possible. She would like a call to advise.

## 2023-07-07 NOTE — PROGRESS NOTES
Surgical Oncology Follow Up       [address] [facility]  [address]  [facility] [address]  [address] Richard Hoot  1955  [id number]  [address] [facility] Olivia Morillo  [address] [address] [phone]    Diagnoses and all orders for this visit:    Neuroendocrine tumor  -     Ambulatory referral to Hematology / Oncology; Future  -     Ambulatory Referral to Otolaryngology; Future  -     CT abdomen pelvis w contrast; Future  -     BUN; Future  -     Creatinine, serum; Future    Neuroendocrine carcinoma [facility])        Chief Complaint   Patient presents with   • Post-op       Return in about 6 months (around 1/7/2024) for Office Visit, Imaging - See orders. Oncology History   Neuroendocrine tumor   6/20/2023 Initial Diagnosis    Neuroendocrine tumor     6/20/2023 Surgery    Small Bowel Resection and biopsy mesenteric mass:    A. Soft Tissue, Other, Mesenteric mass:  - Dense fibrotic tissue with chronic inflammation. Immunostains did not reveal neuroendocrine tumor cells. B. Small Bowel, NOS, Small Bowel :  - Well differential neuroendocrine tumor, grade 2.   - Tumor measures 1.9 cm in largest dimension.   - Tumor extends to inked serosal surface (slide B6)  - Resection margins negative for differentiated neuroendocrine tumor. Tumor is 1.6 cm (16 mm) from closest margin of resection  - Lymphovascular invasion present (slide B10). - Perineural invasion present (slide B10)  - Favor metastatic carcinoma involving 1 lymph node (slide B10). C. Soft Tissue, Other, Mesenteric Mass part 2:  - Fragments of well differential neuroendocrine tumor. Favor tumor effaces at least 1 lymph node (1/1). Largest dimension of tumor measuring 1.2 cm (slide C1). No residual lymph node tissue seen. Adjacent dense fibrotic tissue with inflammation present.   - Please see separate report for flow cytometry study result. Cancer Staged    Cancer Staging   T4N1M0       Neuroendocrine carcinoma ([address])   7/7/2023 Initial Diagnosis    Neuroendocrine carcinoma [facility])      Cancer Staged    Cancer Staging   T4N1M0           Staging: [id number] small bowel neuroendocrine tumor  Ki-67 is 4%  Treatment history: Small bowel resection with biopsy of mesenteric mass, June 2023  Resection of the mesenteric mass would have required extensive normal small bowel resection. Current treatment: Octreotide pending  Disease status:      History of Present Illness: Patient returns in follow-up. He is doing well. He was seen in the emergency room last week and had a superficial wound infection. Purulent fluid was expressed. Blood cultures did reveal Staph epidermidis in 2 bottles, he was started on Bactrim and he is completing a 7-day course. He had 1 episode of fever prior to that visit, and has not had any fevers or chills since. He denies any abdominal pain, nausea or vomiting. His appetite is good. He is passing flatus and having bowel movements. Review of Systems  Complete ROS Surg Onc:   Complete ROS Surg Onc:   Constitutional: The patient denies new or recent history of general fatigue, no recent weight loss, no change in appetite. Eyes: No complaints of visual problems, no scleral icterus. ENT: no complaints of ear pain, no hoarseness, no difficulty swallowing,  no tinnitus and no new masses in head, oral cavity, or neck. Cardiovascular: No complaints of chest pain, no palpitations, no ankle edema. Respiratory: No complaints of shortness of breath, no cough. Gastrointestinal: No complaints of jaundice, no bloody stools, no pale stools. Genitourinary: No complaints of dysuria, no hematuria, no nocturia, no frequent urination, no urethral discharge. Musculoskeletal: No complaints of weakness, paralysis, joint stiffness or arthralgias.   Integumentary: No complaints of rash, no new lesions. Neurological: No complaints of convulsions, no seizures, no dizziness. Hematologic/Lymphatic: No complaints of easy bruising. Endocrine:  No hot or cold intolerance. No polydipsia, polyphagia, or polyuria. Allergy/immunology:  No environmental allergies. No food allergies. Not immunocompromised. Skin:  No pallor or rash. No wound.         Patient Active Problem List   Diagnosis   • Aneurysm of thoracic aorta (HCC)   • Edema   • Heel spur   • Mixed hyperlipidemia   • Hypertension   • Hyperuricemia without signs inflammatory arthritis/tophaceous disease   • Chronic bilateral low back pain   • Neuropathy of left foot   • Non-toxic uninodular goiter   • Osteoarthritis   • Sciatica   • Diabetes mellitus type 2 in obese [facility])   • Restless leg syndrome   • Skin tag   • Acute pain of left shoulder   • Microscopic hematuria   • Pain of left thigh   • Lumbar radiculopathy   • Colonic polyp   • Hypokalemia   • Severe obesity (BMI 35.0-35.9 with comorbidity) ([facility])   • 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)   • Type 2 diabetes with stage 2 chronic kidney disease GFR 60-89 ([facility])   • Nausea and vomiting   • Generalized abdominal pain   • Stage 3a chronic kidney disease (HCC)   • Adrenal nodule (HCC)   • Lymphadenopathy, abdominal   • Neuroendocrine tumor   • Positive blood culture   • Neuroendocrine carcinoma (HCC)     Past Medical History:   Diagnosis Date   • Allergic rhinitis     last assessed: 5/4/2015   • Aneurysm of thoracic aorta (HCC)    • Cataract     resolved: 12/12/2014   • Colon polyp    • Diabetes mellitus ([address])     diet control   • Herniated nucleus pulposus, L4-5 left     last assessed: 4/9/2014   • Herniated nucleus pulposus, L5-S1, left     last assessed: 4/9/2014   • Hx of echocardiogram 10/14/2016    EF 55%, Mild LVH, mild aortic root and ascending aorta enlargement, measuring 42 mm, trace MR, trace aortic regurg.    • Hypercholesterolemia    • Hypertension    • Kidney stone    • Lumbar radiculopathy      Past Surgical History:   Procedure Laterality Date   • BACK SURGERY     • BACK SURGERY     • COLONOSCOPY     • KNEE ARTHROSCOPY      (therapeutic)   • KNEE SURGERY     • LYMPH NODE DISSECTION N/A 6/20/2023    Procedure: biopsy of MESENTERIC MASS, tap block;  Surgeon: Gar Kocher, MD;  Location: [facility];  Service: Surgical Oncology   • RI COLONOSCOPY FLX DX W/COLLJ SPEC WHEN PFRMD N/A 09/30/2016    Procedure: COLONOSCOPY;  Surgeon: Nelma Lesches, MD;  Location: [facility];  Service: Gastroenterology   • SMALL INTESTINE SURGERY N/A 6/20/2023    Procedure: SMALL BOWEL RESECTION;  Surgeon: Gar Kocher, MD;  Location: [facility];  Service: Surgical Oncology   • TONSILLECTOMY AND ADENOIDECTOMY       Family History   Problem Relation Age of Onset   • Thyroid disease Mother         disorder   • Hypertension Father    • Coronary artery disease Brother    • Heart attack Brother         prior myocardial infarction     Social History     Socioeconomic History   • Marital status: /Civil Union     Spouse name: Not on file   • Number of children: Not on file   • Years of education: Not on file   • Highest education level: Not on file   Occupational History   • Not on file   Tobacco Use   • Smoking status: Never   • Smokeless tobacco: Never   Vaping Use   • Vaping Use: Never used   Substance and Sexual Activity   • Alcohol use: Yes     Comment: social once a month   • Drug use: Never   • Sexual activity: Not on file   Other Topics Concern   • Not on file   Social History Narrative   • Not on file     Social Determinants of Health     Financial Resource Strain: Not on file   Food Insecurity: Not on file   Transportation Needs: Not on file   Physical Activity: Not on file   Stress: Not on file   Social Connections: Not on file   Intimate Partner Violence: Not on file   Housing Stability: Not on file       Current Outpatient Medications:   •  allopurinol (ZYLOPRIM) 100 mg tablet, TAKE 1 TABLET DAILY (Patient taking differently: every evening), Disp: 90 tablet, Rfl: 3  •  amLODIPine-benazepril (LOTREL 5-20) 5-20 MG per capsule, TAKE 1 CAPSULE TWICE A DAY, Disp: 180 capsule, Rfl: 3  •  [START ON 7/9/2023] amoxicillin-clavulanate (AUGMENTIN) 875-125 mg per tablet, Take 1 tablet by mouth every 12 (twelve) hours for 3 days Do not start before July 9, 2023., Disp: 14 tablet, Rfl: 0  •  atenolol (TENORMIN) 50 mg tablet, TAKE 1 TABLET TWICE A DAY, Disp: 180 tablet, Rfl: 3  •  atorvastatin (LIPITOR) 40 mg tablet, TAKE 1 TABLET DAILY (Patient taking differently: every evening), Disp: 90 tablet, Rfl: 3  •  furosemide (LASIX) 40 mg tablet, TAKE ONE-HALF (1/2) TABLET DAILY, Disp: 45 tablet, Rfl: 3  •  Jardiance 25 MG TABS, TAKE 1 TABLET EVERY MORNING, Disp: 90 tablet, Rfl: 3  •  magnesium oxide (MAG-OX) 400 mg, Take 400 mg by mouth daily, Disp: , Rfl:   •  olmesartan-hydrochlorothiazide (BENICAR HCT) 40-12.5 MG per tablet, TAKE 1 TABLET DAILY, Disp: 90 tablet, Rfl: 3  •  Ozempic, 1 MG/DOSE, 4 MG/3ML injection pen, INJECT 1 MG UNDER THE SKIN ONCE A WEEK (Patient taking differently: Monday), Disp: 9 mL, Rfl: 3  •  potassium chloride (K-DUR,KLOR-CON) 10 mEq tablet, TAKE 3 TABLETS TWICE A DAY. TAKE 3 TABLETS IN THE MORNING AND TAKE 3 TABLETS IN THE EVENING, Disp: 540 tablet, Rfl: 3  •  sulfamethoxazole-trimethoprim (BACTRIM DS) 800-160 mg per tablet, Take 1 tablet by mouth 2 (two) times a day for 7 days smx-tmp DS (BACTRIM) 800-160 mg tabs (1tab q12 D10), Disp: 14 tablet, Rfl: 0  No current facility-administered medications for this visit. No Known Allergies  Vitals:    07/07/23 1333   BP: 118/82   Pulse: 85   Resp: 18   Temp: (!) 97.3 °F (36.3 °C)   SpO2: 98%       Physical Exam  Constitutional: General appearance: The Patient is well-developed and well-nourished who appears the stated age in no acute distress. Patient is pleasant and talkative. HEENT:  Normocephalic. Sclerae are anicteric. Mucous membranes are moist.   Abdomen: Abdomen is soft, non-tender, non-distended and without masses. Incision is C/D/I   Extremities: There is no clubbing or cyanosis. There is no edema. Symmetric. Neuro: Grossly nonfocal. Gait is normal.     Skin: Warm, anicteric. Psych:  Patient is pleasant and talkative. Breasts:        Pathology:  Addendum 2      B. Small Bowel, NOS, Small Bowel :  - Remaining adipose tissue entirely submitted in blocks B19-B22. No additional lymph nodes were found. Lymphovascular invasion present on slide B22. See note. - Updated TNM stage: pN1     NOTE B: Following initial microscopic evaluation, the remainder of the fat is entirely submitted in biopsy bags in cassettes B19-B22. EDoolittle. Addendum electronically signed by Valentine Figueredo MD on 7/7/2023 at 10:29 AM   Addendum   BEST TUMOR BLOCK(S) FOR POTENTIAL FUTURE STUDIES: B8 and B9      Addendum electronically signed by Valentine Figueredo MD on 7/4/2023 at  3:17 PM   Final Diagnosis   A. Soft Tissue, Other, Mesenteric mass:  - Dense fibrotic tissue with chronic inflammation. Immunostains did not reveal neuroendocrine tumor cells. See note.     NOTE: Immunostains were performed. Fibrotic tissue are positive for CAM 5.2, but negative for chromogranin and synaptophysin. CD45 positive inflammatory cells are present. Calretinin stain is negative. WT-1 stain shows scattered positive cells.     B. Small Bowel, NOS, Small Bowel :  - Well differential neuroendocrine tumor, grade 2.   - Tumor measures 1.9 cm in largest dimension.   - Tumor extends to inked serosal surface (slide B6)  - Resection margins negative for differentiated neuroendocrine tumor. Tumor is 1.6 cm (16 mm) from closest margin of resection  - Lymphovascular invasion present (slide B10). - Perineural invasion present (slide B10)  - Favor metastatic carcinoma involving 1 lymph node (slide B10). See note.   - Pending submission of the residual adipose tissue to search for more lymph nodes. ADDENDUM TO FOLLOW. - Please see CAP checklist     NOTE B: The lymph node appeared to be entirely replaced by metastatic carcinoma. No residual lymph node tissue is seen (slide B10).    Immunostains were performed. The tumor cells are positive for synaptophysin and chromogranin. Ki-67 is positive in about 4% of tumor cells.      C. Soft Tissue, Other, Mesenteric Mass part 2:  - Fragments of well differential neuroendocrine tumor. Favor tumor effaces at least 1 lymph node (1/1). Largest dimension of tumor measuring 1.2 cm (slide C1). No residual lymph node tissue seen. Adjacent dense fibrotic tissue with inflammation present. - Please see separate report for flow cytometry study result.       Electronically signed by Jorge Escobar MD on 7/4/2023 at  3:12 PM         Comments: This is an appended report. These results have been appended to a previously preliminary verified report. Note  [facility]   Intradepartmental consultation was obtained. Findings were communicated to Dr. An Esquivel by [facility] on 7/3/2023. .      Comment: This is an appended report. These results have been appended to a previously preliminary verified report. Additional Information  [facility]   All reported additional testing was performed with appropriately reactive controls.  These tests were developed and their performance characteristics determined by [facility] or appropriate performing facility, though some tests may be performed on tissues which have not been validated for performance characteristics (such as staining performed on alcohol exposed cell blocks and decalcified tissues).  Results should be interpreted with caution and in the context of the patients’ clinical condition. These tests may not be cleared or approved by the U.S.  Food and Drug Administration, though the FDA has determined that such clearance or approval is not necessary. These tests are used for clinical purposes and they should not be regarded as investigational or for research. This laboratory has been approved by CLIA 88, designated as a high-complexity laboratory and is qualified to perform these tests.     Interpretation performed at [facility], [address] [place] [address] Se: This is an appended report. These results have been appended to a previously preliminary verified report. Synoptic Checklist   JEJUNUM AND ILEUM NEUROENDOCRINE TUMOR  8th Edition - Protocol posted: 3/23/2022  JEJUNUM AND ILEUM NEUROENDOCRINE TUMOR - All Specimens  SPECIMEN   Procedure  Segmental resection, small intestine    TUMOR   Tumor Site  Small intestine, not otherwise specified    Histologic Type and Grade  G2, well-differentiated neuroendocrine tumor         Ki-67 Labeling Index  4 %   Tumor Size  Greatest Dimension (Centimeters): 1.9 cm   Tumor Focality  Unifocal    Tumor Extent  Invades visceral peritoneum (serosa)    Lymphovascular Invasion  Present    Perineural Invasion  Present    Large Mesenteric Masses (greater than 2 cm)  Not identified    Tumor Comment  Mesenteric mass is present (slide C1), but dimesion based on tissue received is not larger than 2 cm. Please also correlate clinically. MARGINS   Margin Status  All margins negative for tumor    Closest Margin(s) to Tumor  Cannot be determined    Distance from Tumor to Closest Margin  16 mm   REGIONAL LYMPH NODES   Regional Lymph Node Status  Tumor present in regional lymph node(s)    Number of Lymph Nodes with Tumor  At least two positive nodes present (based on parts B and C). Pending submission of residual adipose tissue.  ADDENDUM TO FOLLOW.     Number of Lymph Nodes Examined  At least: 2    PATHOLOGIC STAGE CLASSIFICATION (pTNM, AJCC 8th Edition)   Reporting of pT, pN, and (when applicable) pM categories is based on information available to the pathologist at the time the report is issued. As per the AJCC (Chapter 1, 8th Ed.) it is the managing physician’s responsibility to establish the final pathologic stage based upon all pertinent information, including but potentially not limited to this pathology report. pT Category  pT4    pN Category  pN not assigned (cannot be determined based on available pathological information)    Comment(s)  At least two positive nodes present (based on part B and C). Pending submission of residual adipose tissue. ADDENDUM TO FOLLOW. Labs:      Imaging  CT abdomen pelvis with contrast    Result Date: 7/2/2023  Narrative: CT ABDOMEN AND PELVIS WITH IV CONTRAST INDICATION:   Abdominal abscess/infection suspected abd wall cellulitis around incision. COMPARISON: 5/2/2023, PET/CT 5/24/2023. TECHNIQUE:  CT examination of the abdomen and pelvis was performed. Multiplanar 2D reformatted images were created from the source data. This examination, like all CT scans performed in the [place], was performed utilizing techniques to minimize radiation dose exposure, including the use of iterative reconstruction and automated exposure control. Radiation dose length product (DLP) for this visit:  919.67 mGy-cm IV Contrast:  100 mL of iohexol (OMNIPAQUE) Enteric Contrast:  Enteric contrast was not administered. FINDINGS: ABDOMEN LOWER CHEST:  No clinically significant abnormality identified in the visualized lower chest. LIVER/BILIARY TREE:  Unremarkable. GALLBLADDER:  No calcified gallstones. No pericholecystic inflammatory change. SPLEEN:  Unremarkable. PANCREAS:  Unremarkable. ADRENAL GLANDS:  Unremarkable. KIDNEYS/URETERS:  Unremarkable. No hydronephrosis. STOMACH AND BOWEL: Postoperative changes involving the right lower quadrant small bowel identified. APPENDIX:  No findings to suggest appendicitis. ABDOMINOPELVIC CAVITY: Enhancing mesenteric masses are again identified with the largest measuring 4.0 x 3.3 cm series 2 image 97.  Adjacent smaller 3.0 x 1.9 cm lesion is present. No ascites. No pneumoperitoneum. No lymphadenopathy. VESSELS:  Unremarkable for patient's age. PELVIS REPRODUCTIVE ORGANS:  Unremarkable for patient's age. URINARY BLADDER:  Unremarkable. ABDOMINAL WALL/INGUINAL REGIONS: Postoperative changes are seen in the periumbilical region. Some surrounding inflammatory change is present. There is suggestion of a small superficial abscess measuring 1.5 x 1.6 cm series 2 image 142. OSSEOUS STRUCTURES:  No acute fracture or destructive osseous lesion. Impression: Postoperative changes in the periumbilical region with probable small superficial abscess and surrounding cellulitis. No abdominal wall dehiscence identified. Enhancing mesenteric mass is again present. Workstation performed: [id number]     I reviewed the above laboratory and imaging data. Discussion/Summary: 56-year-old male status post small bowel resection and biopsy of a mesenteric mass for a T4N1M0 neuroendocrine tumor. This is well differentiated, grade 2. The mesenteric masses were not resected secondary to the potential of short gut and resecting a significant portion of normal small bowel. We had a long discussion regarding treatment options. I would plan on treating him with octreotide. I will set him up with medical oncology to discuss this. I would plan on obtaining 6-month imaging. I will repeat his CT in 6 months. He will likely require a dotatate PET in either 6 months or a year. We discussed if he has any flushing, diarrhea, palpitations, headaches or sweats to contact us. Also in regard to his blood cultures, he will complete his course of Bactrim. If he has any fevers he will have to come back in the hospital.  I did discuss this with infectious disease. We will follow-up his repeat blood cultures that were performed yesterday. I will place a referral to ENT because of the PET/CT findings at the base of the tongue.   He and his wife are agreeable to this plan. All their questions were answered. This office visit took 25 minutes of face-to-face time the patient greater than 50% time spent counseling and coordinating care for his neuroendocrine tumor.

## 2023-07-07 NOTE — PROGRESS NOTES
Assessment/Plan:     No problem-specific Assessment & Plan notes found for this encounter. Diagnoses and all orders for this visit:    Positive blood culture    Neuroendocrine tumor    Lymphadenopathy, abdominal    Diabetes mellitus type 2 in obese ([address])    Primary hypertension    Stage 3a chronic kidney disease ([address])     Orders and recommendations as noted above. Recent hospitalization records were reviewed with him. Recent emergency room visit reviewed. Discussed 2 positive blood cultures from his recent emergency room visit. Our office contacted the surgeons office regarding this. They had recommended evaluation at [facility] emergency room. [place] text sent to the emergency room charge nurse. Continue to follow-up with Dr. An Esquivel as previously. Discussed with him and his wife potential treatment options for the neuroendocrine tumor. Follow-up with surgical oncology as well as likely medical oncology to discuss options. Continue to gradually increase activity level as well as diet. Watch for any worsening of drainage from the surgical incision especially around the area of the umbilicus. Continue with regular dressing changes. We will have him follow-up as previously scheduled or sooner if needed. Subjective:     Patient ID: Leidy Cordova is a 78 y.o. male. He presents for follow-up after recent hospitalization. He was hospitalized for exploratory laparotomy and excision of adenopathy/mass. They were unable to excise the entire mass but were able to get biopsies and evaluate the area. Pathology was consistent with neuroendocrine tumor. Was present in the lymph nodes. This was deemed to metastatic. Had been having some increased drainage from the incision. Was also having some chills as well as night sweats over the weekend. Did present to the emergency room and had laboratory testing completed. Was treated with Bactrim.   Blood cultures came back yesterday afternoon with them being positive both for Staph epidermidis. Still with some night sweats. Appetite is not back to baseline yet. Gradually increasing his diet. Having some loose bowel movements. Did have night sweats again last night. Was not sure if this was related to the warm weather, however. Denies any significant abdominal pain. Still with some drainage around the periumbilical area of the incision. Review of Systems   Constitutional: Positive for activity change, appetite change, diaphoresis and fatigue. Negative for chills and fever. HENT: Negative for hearing loss. Eyes: Negative for pain and visual disturbance. Respiratory: Negative for cough, chest tightness and shortness of breath. Cardiovascular: Negative for chest pain and palpitations. Gastrointestinal: Negative for abdominal pain, blood in stool, nausea and vomiting. Per HPI   Endocrine: Negative for polydipsia, polyphagia and polyuria. Genitourinary: Negative for dysuria. Musculoskeletal: Positive for arthralgias. Negative for gait problem. Skin: Negative for color change. As per HPI   Neurological: Negative for dizziness and headaches. Hematological: Does not bruise/bleed easily. Psychiatric/Behavioral: Negative for behavioral problems, decreased concentration and sleep disturbance. The patient is not nervous/anxious. The following portions of the patient's history were reviewed and updated as appropriate:   He  has a past medical history of Allergic rhinitis, Aneurysm of thoracic aorta ([address]), Cataract, Colon polyp, Diabetes mellitus ([address]), Herniated nucleus pulposus, L4-5 left, Herniated nucleus pulposus, L5-S1, left, echocardiogram (10/14/2016), Hypercholesterolemia, Hypertension, Kidney stone, and Lumbar radiculopathy.   He   Patient Active Problem List    Diagnosis Date Noted   • Neuroendocrine carcinoma ([address]) 07/07/2023   • Positive blood culture 07/06/2023   • Neuroendocrine tumor 06/20/2023 • Adrenal nodule ([address]) 05/09/2023   • Lymphadenopathy, abdominal 05/09/2023   • Stage 3a chronic kidney disease ([address]) 04/20/2023   • Nausea and vomiting 03/31/2023   • Generalized abdominal pain 03/31/2023   • Severe obesity (BMI 35.0-35.9 with comorbidity) ([address]) 03/18/2022   • Type 2 diabetes mellitus with mild nonproliferative retinopathy of both eyes without macular edema ([address]) 03/18/2022   • Type 2 diabetes with stage 2 chronic kidney disease GFR 60-89 ([address]) 03/18/2022   • Hypokalemia 11/04/2020   • Colonic polyp 11/14/2019   • Lumbar radiculopathy 07/31/2019   • Microscopic hematuria 07/02/2019   • Pain of left thigh 07/02/2019   • Acute pain of left shoulder 04/02/2019   • Skin tag 10/25/2018   • Restless leg syndrome 06/21/2018   • Heel spur 05/31/2017   • Neuropathy of left foot 09/19/2016   • Diabetes mellitus type 2 in obese ([address]) 08/06/2015   • Sciatica 03/03/2014   • Chronic bilateral low back pain 02/27/2014   • Edema 01/03/2013   • Hyperuricemia without signs inflammatory arthritis/tophaceous disease 10/31/2012   • Aneurysm of thoracic aorta ([address]) 08/06/2012   • Mixed hyperlipidemia 05/01/2012   • Hypertension 05/01/2012   • Non-toxic uninodular goiter 05/01/2012   • Osteoarthritis 05/01/2012     He  has a past surgical history that includes Tonsillectomy and adenoidectomy; Knee surgery; Back surgery; pr colonoscopy flx dx w/collj spec when pfrmd (N/A, 09/30/2016); Back surgery; Knee arthroscopy; Colonoscopy; Lymph node dissection (N/A, 6/20/2023); and Small intestine surgery (N/A, 6/20/2023). His family history includes Coronary artery disease in his brother; Heart attack in his brother; Hypertension in his father; Thyroid disease in his mother. He  reports that he has never smoked. He has never used smokeless tobacco. He reports current alcohol use. He reports that he does not use drugs.   Current Outpatient Medications   Medication Sig Dispense Refill   • allopurinol (ZYLOPRIM) 100 mg tablet TAKE 1 TABLET DAILY (Patient taking differently: every evening) 90 tablet 3   • amLODIPine-benazepril (LOTREL 5-20) 5-20 MG per capsule TAKE 1 CAPSULE TWICE A  capsule 3   • atenolol (TENORMIN) 50 mg tablet TAKE 1 TABLET TWICE A  tablet 3   • atorvastatin (LIPITOR) 40 mg tablet TAKE 1 TABLET DAILY (Patient taking differently: every evening) 90 tablet 3   • furosemide (LASIX) 40 mg tablet TAKE ONE-HALF (1/2) TABLET DAILY 45 tablet 3   • Jardiance 25 MG TABS TAKE 1 TABLET EVERY MORNING 90 tablet 3   • magnesium oxide (MAG-OX) 400 mg Take 400 mg by mouth daily     • olmesartan-hydrochlorothiazide (BENICAR HCT) 40-12.5 MG per tablet TAKE 1 TABLET DAILY 90 tablet 3   • Ozempic, 1 MG/DOSE, 4 MG/3ML injection pen INJECT 1 MG UNDER THE SKIN ONCE A WEEK (Patient taking differently: Monday) 9 mL 3   • potassium chloride (K-DUR,KLOR-CON) 10 mEq tablet TAKE 3 TABLETS TWICE A DAY. TAKE 3 TABLETS IN THE MORNING AND TAKE 3 TABLETS IN THE EVENING 540 tablet 3   • sulfamethoxazole-trimethoprim (BACTRIM DS) 800-160 mg per tablet Take 1 tablet by mouth 2 (two) times a day for 7 days smx-tmp DS (BACTRIM) 800-160 mg tabs (1tab q12 D10) 14 tablet 0   • [START ON 7/9/2023] amoxicillin-clavulanate (AUGMENTIN) 875-125 mg per tablet Take 1 tablet by mouth every 12 (twelve) hours for 3 days Do not start before July 9, 2023. 14 tablet 0     No current facility-administered medications for this visit.      Current Outpatient Medications on File Prior to Visit   Medication Sig   • allopurinol (ZYLOPRIM) 100 mg tablet TAKE 1 TABLET DAILY (Patient taking differently: every evening)   • amLODIPine-benazepril (LOTREL 5-20) 5-20 MG per capsule TAKE 1 CAPSULE TWICE A DAY   • atenolol (TENORMIN) 50 mg tablet TAKE 1 TABLET TWICE A DAY   • atorvastatin (LIPITOR) 40 mg tablet TAKE 1 TABLET DAILY (Patient taking differently: every evening)   • furosemide (LASIX) 40 mg tablet TAKE ONE-HALF (1/2) TABLET DAILY   • Jardiance 25 MG TABS TAKE 1 TABLET EVERY MORNING   • magnesium oxide (MAG-OX) 400 mg Take 400 mg by mouth daily   • olmesartan-hydrochlorothiazide (BENICAR HCT) 40-12.5 MG per tablet TAKE 1 TABLET DAILY   • Ozempic, 1 MG/DOSE, 4 MG/3ML injection pen INJECT 1 MG UNDER THE SKIN ONCE A WEEK (Patient taking differently: Monday)   • potassium chloride (K-DUR,KLOR-CON) 10 mEq tablet TAKE 3 TABLETS TWICE A DAY. TAKE 3 TABLETS IN THE MORNING AND TAKE 3 TABLETS IN THE EVENING   • sulfamethoxazole-trimethoprim (BACTRIM DS) 800-160 mg per tablet Take 1 tablet by mouth 2 (two) times a day for 7 days smx-tmp DS (BACTRIM) 800-160 mg tabs (1tab q12 D10)     No current facility-administered medications on file prior to visit. He has No Known Allergies. .    Objective:     Physical Exam  Vitals and nursing note reviewed. Constitutional:       General: He is not in acute distress. Appearance: He is well-developed and well-groomed. HENT:      Head: Normocephalic and atraumatic. Comments: Moist mucous membranes     Nose: Nose normal.   Eyes:      Conjunctiva/sclera: Conjunctivae normal.      Pupils: Pupils are equal, round, and reactive to light. Cardiovascular:      Rate and Rhythm: Normal rate and regular rhythm. Heart sounds: Normal heart sounds. No murmur heard. No friction rub. No gallop. Pulmonary:      Breath sounds: No wheezing or rales. Chest:      Chest wall: No tenderness. Abdominal:      General: Bowel sounds are normal. There is no distension. Tenderness: There is no abdominal tenderness. There is no guarding or rebound. Lymphadenopathy:      Cervical: No cervical adenopathy. Skin:     General: Skin is warm and dry. Comments: Vertical incision noted with some purulent drainage around the umbilical area and just below with slight tenderness and slight erythema   Neurological:      Mental Status: He is alert and oriented to person, place, and time.    Psychiatric:         Mood and Affect: Mood and affect normal. Speech: Speech normal.         Behavior: Behavior normal. Behavior is cooperative. Cognition and Memory: Cognition and memory normal.           Vitals:    07/06/23 1012   BP: 106/68   BP Location: Left arm   Patient Position: Sitting   Cuff Size: Large   Pulse: 95   Temp: 98.2 °F (36.8 °C)   SpO2: 95%   Weight: 101 kg (221 lb 14.4 oz)   Height: 5' 10" (1.778 m)       CT abdomen pelvis with contrast    Result Date: 7/2/2023  Narrative: CT ABDOMEN AND PELVIS WITH IV CONTRAST INDICATION:   Abdominal abscess/infection suspected abd wall cellulitis around incision. COMPARISON: 5/2/2023, PET/CT 5/24/2023. TECHNIQUE:  CT examination of the abdomen and pelvis was performed. Multiplanar 2D reformatted images were created from the source data. This examination, like all CT scans performed in the [facility], was performed utilizing techniques to minimize radiation dose exposure, including the use of iterative reconstruction and automated exposure control. Radiation dose length product (DLP) for this visit:  919.67 mGy-cm IV Contrast:  100 mL of iohexol (OMNIPAQUE) Enteric Contrast:  Enteric contrast was not administered. FINDINGS: ABDOMEN LOWER CHEST:  No clinically significant abnormality identified in the visualized lower chest. LIVER/BILIARY TREE:  Unremarkable. GALLBLADDER:  No calcified gallstones. No pericholecystic inflammatory change. SPLEEN:  Unremarkable. PANCREAS:  Unremarkable. ADRENAL GLANDS:  Unremarkable. KIDNEYS/URETERS:  Unremarkable. No hydronephrosis. STOMACH AND BOWEL: Postoperative changes involving the right lower quadrant small bowel identified. APPENDIX:  No findings to suggest appendicitis. ABDOMINOPELVIC CAVITY: Enhancing mesenteric masses are again identified with the largest measuring 4.0 x 3.3 cm series 2 image 97. Adjacent smaller 3.0 x 1.9 cm lesion is present. No ascites. No pneumoperitoneum. No lymphadenopathy. VESSELS:  Unremarkable for patient's age.  PELVIS REPRODUCTIVE ORGANS:  Unremarkable for patient's age. URINARY BLADDER:  Unremarkable. ABDOMINAL WALL/INGUINAL REGIONS: Postoperative changes are seen in the periumbilical region. Some surrounding inflammatory change is present. There is suggestion of a small superficial abscess measuring 1.5 x 1.6 cm series 2 image 142. OSSEOUS STRUCTURES:  No acute fracture or destructive osseous lesion. Impression: Postoperative changes in the periumbilical region with probable small superficial abscess and surrounding cellulitis. No abdominal wall dehiscence identified. Enhancing mesenteric mass is again present.  Workstation performed: [id number]       Admission on 07/06/2023, Discharged on 07/06/2023   Component Date Value Ref Range Status   • WBC 07/06/2023 6.10  4.31 - 10.16 Thousand/uL Final   • RBC 07/06/2023 4.12  3.88 - 5.62 Million/uL Final   • Hemoglobin 07/06/2023 13.0  12.0 - 17.0 g/dL Final   • Hematocrit 07/06/2023 40.0  36.5 - 49.3 % Final   • MCV 07/06/2023 97  82 - 98 fL Final   • MCH 07/06/2023 31.6  26.8 - 34.3 pg Final   • MCHC 07/06/2023 32.5  31.4 - 37.4 g/dL Final   • RDW 07/06/2023 13.4  11.6 - 15.1 % Final   • MPV 07/06/2023 9.8  8.9 - 12.7 fL Final   • Platelets 26/57/4237 290  149 - 390 Thousands/uL Final   • nRBC 07/06/2023 0  /100 WBCs Final   • Neutrophils Relative 07/06/2023 69  43 - 75 % Final   • Immat GRANS % 07/06/2023 1  0 - 2 % Final   • Lymphocytes Relative 07/06/2023 20  14 - 44 % Final   • Monocytes Relative 07/06/2023 6  4 - 12 % Final   • Eosinophils Relative 07/06/2023 3  0 - 6 % Final   • Basophils Relative 07/06/2023 1  0 - 1 % Final   • Neutrophils Absolute 07/06/2023 4.19  1.85 - 7.62 Thousands/µL Final   • Immature Grans Absolute 07/06/2023 0.05  0.00 - 0.20 Thousand/uL Final   • Lymphocytes Absolute 07/06/2023 1.23  0.60 - 4.47 Thousands/µL Final   • Monocytes Absolute 07/06/2023 0.39  0.17 - 1.22 Thousand/µL Final   • Eosinophils Absolute 07/06/2023 0.18  0.00 - 0.61 Thousand/µL Final   • Basophils Absolute 07/06/2023 0.06  0.00 - 0.10 Thousands/µL Final   • Sodium 07/06/2023 137  135 - 147 mmol/L Final   • Potassium 07/06/2023 3.9  3.5 - 5.3 mmol/L Final   • Chloride 07/06/2023 110 (H)  96 - 108 mmol/L Final   • CO2 07/06/2023 25  21 - 32 mmol/L Final   • ANION GAP 07/06/2023 2  mmol/L Final   • BUN 07/06/2023 20  5 - 25 mg/dL Final   • Creatinine 07/06/2023 1.42 (H)  0.60 - 1.30 mg/dL Final    Standardized to IDMS reference method   • Glucose 07/06/2023 136  65 - 140 mg/dL Final    Specimen collection should occur prior to Sulfasalazine administration due to the potential for falsely depressed results. Specimen collection should occur prior to Sulfapyridine administration due to the potential for falsely elevated results. If the patient is fasting, the ADA then defines impaired fasting glucose as > 100 mg/dL and diabetes as > or equal to 123 mg/dL. • Calcium 07/06/2023 9.0  8.3 - 10.1 mg/dL Final   • eGFR 07/06/2023 51  ml/min/1.73sq m Final   • Blood Culture 07/06/2023 Received in Microbiology Lab. Culture in Progress. Preliminary   • Blood Culture 07/06/2023 Received in Microbiology Lab. Culture in Progress. Preliminary   • Procalcitonin 07/06/2023 0.08  <=0.25 ng/ml Final    Comment: Suspected Lower Respiratory Tract Infection (LRTI):  - LESS than or EQUAL to 0.25 ng/mL:   low likelihood for bacterial LRTI; antibiotics DISCOURAGED.  - GREATER than 0.25 ng/mL:   increased likelihood for bacterial LRTI; antibiotics ENCOURAGED. Suspected Sepsis:  - Strongly consider initiating antibiotics in ALL UNSTABLE patients. - LESS than or EQUAL to 0.5 ng/mL:   low likelihood for bacterial sepsis; antibiotics DISCOURAGED.  - GREATER than 0.5 ng/mL:   increased likelihood for bacterial sepsis; antibiotics ENCOURAGED.  - GREATER than 2 ng/mL:   high risk for severe sepsis / septic shock; antibiotics strongly ENCOURAGED.     Decisions on antibiotic use should not be based solely on Procalcitonin (PCT) levels. If PCT is low but uncertainty exists with stopping antibiotics, repeat PCT in 6-24 hours to confirm the low level. If antibiotics are administered (regardless if initial PCT was high or low), repeat PCT every 1-2 days to consider early antibiotic cessation (when GREATER                            than 80% decrease from the peak OR when PCT drops below designated cutoffs, whichever comes first), so long as the infection is NOT one that typically requires prolonged treatment durations (e.g., bone/joint infections, endocarditis, Staph. aureus bacteremia).     Situations of FALSE-POSITIVE Procalcitonin values:  1) Newborns < 67 hours old  2) Massive stress from severe trauma / burns, major surgery, acute pancreatitis, cardiogenic / hemorrhagic shock, sickle cell crisis, or other organ perfusion abnormalities  3) Malaria and some Candidal infections  4) Treatment with agents that stimulate cytokines (e.g., OKT3, anti-lymphocyte globulins, alemtuzumab, IL-2, granulocyte transfusion [NOT GCSFs])  5) Chronic renal disease causes elevated baseline levels (consider GREATER than 0.75 ng/mL as an abnormal cut-off); initiating HD/CRRT may cause transient decreases  6) Paraneoplastic syndromes from medullary thyroid or SCLC, some forms of vasculitis, and acute [id number]                            disease    Situations of FALSE-NEGATIVE Procalcitonin values:  1) Too early in clinical course for PCT to have reached its peak (may repeat in 6-24 hours to confirm low level)  2) Localized infection WITHOUT systemic (SIRS / sepsis) response (e.g., an abscess, osteomyelitis, cystitis)  3) Mycobacteria (e.g., Tuberculosis, MAC)  4) Cystic fibrosis exacerbations     Admission on 07/02/2023, Discharged on 07/02/2023   Component Date Value Ref Range Status   • WBC 07/02/2023 6.38  4.31 - 10.16 Thousand/uL Final   • RBC 07/02/2023 4.04  3.88 - 5.62 Million/uL Final   • Hemoglobin 07/02/2023 13.0  12.0 - 17.0 g/dL Final   • Hematocrit 07/02/2023 39.1  36.5 - 49.3 % Final   • MCV 07/02/2023 97  82 - 98 fL Final   • MCH 07/02/2023 32.2  26.8 - 34.3 pg Final   • MCHC 07/02/2023 33.2  31.4 - 37.4 g/dL Final   • RDW 07/02/2023 13.2  11.6 - 15.1 % Final   • MPV 07/02/2023 9.6  8.9 - 12.7 fL Final   • Platelets 25/17/2459 313  149 - 390 Thousands/uL Final   • nRBC 07/02/2023 0  /100 WBCs Final   • Neutrophils Relative 07/02/2023 73  43 - 75 % Final   • Immat GRANS % 07/02/2023 0  0 - 2 % Final   • Lymphocytes Relative 07/02/2023 16  14 - 44 % Final   • Monocytes Relative 07/02/2023 8  4 - 12 % Final   • Eosinophils Relative 07/02/2023 2  0 - 6 % Final   • Basophils Relative 07/02/2023 1  0 - 1 % Final   • Neutrophils Absolute 07/02/2023 4.68  1.85 - 7.62 Thousands/µL Final   • Immature Grans Absolute 07/02/2023 0.02  0.00 - 0.20 Thousand/uL Final   • Lymphocytes Absolute 07/02/2023 1.02  0.60 - 4.47 Thousands/µL Final   • Monocytes Absolute 07/02/2023 0.48  0.17 - 1.22 Thousand/µL Final   • Eosinophils Absolute 07/02/2023 0.15  0.00 - 0.61 Thousand/µL Final   • Basophils Absolute 07/02/2023 0.03  0.00 - 0.10 Thousands/µL Final   • Sodium 07/02/2023 143  135 - 147 mmol/L Final   • Potassium 07/02/2023 3.5  3.5 - 5.3 mmol/L Final   • Chloride 07/02/2023 110 (H)  96 - 108 mmol/L Final   • CO2 07/02/2023 30  21 - 32 mmol/L Final   • ANION GAP 07/02/2023 3  mmol/L Final   • BUN 07/02/2023 19  5 - 25 mg/dL Final   • Creatinine 07/02/2023 1.10  0.60 - 1.30 mg/dL Final    Standardized to IDMS reference method   • Glucose 07/02/2023 201 (H)  65 - 140 mg/dL Final    If the patient is fasting, the ADA then defines impaired fasting glucose as > 100 mg/dL and diabetes as > or equal to 123 mg/dL. Specimen collection should occur prior to Sulfasalazine administration due to the potential for falsely depressed results. Specimen collection should occur prior to Sulfapyridine administration due to the potential for falsely elevated results.    • Calcium 07/02/2023 9.4  8.3 - 10.1 mg/dL Final   • Corrected Calcium 07/02/2023 10.1  8.3 - 10.1 mg/dL Final   • AST 07/02/2023 31  5 - 45 U/L Final    Specimen collection should occur prior to Sulfasalazine administration due to the potential for falsely depressed results. • ALT 07/02/2023 60  12 - 78 U/L Final    Specimen collection should occur prior to Sulfasalazine and/or Sulfapyridine administration due to the potential for falsely depressed results. • Alkaline Phosphatase 07/02/2023 65  46 - 116 U/L Final   • Total Protein 07/02/2023 6.8  6.4 - 8.4 g/dL Final   • Albumin 07/02/2023 3.1 (L)  3.5 - 5.0 g/dL Final   • Total Bilirubin 07/02/2023 0.34  0.20 - 1.00 mg/dL Final    Use of this assay is not recommended for patients undergoing treatment with eltrombopag due to the potential for falsely elevated results. • eGFR 07/02/2023 69  ml/min/1.73sq m Final   • LACTIC ACID 07/02/2023 1.6  0.5 - 2.0 mmol/L Final   • Procalcitonin 07/02/2023 0.13  <=0.25 ng/ml Final    Comment: Suspected Lower Respiratory Tract Infection (LRTI):  - LESS than or EQUAL to 0.25 ng/mL:   low likelihood for bacterial LRTI; antibiotics DISCOURAGED.  - GREATER than 0.25 ng/mL:   increased likelihood for bacterial LRTI; antibiotics ENCOURAGED. Suspected Sepsis:  - Strongly consider initiating antibiotics in ALL UNSTABLE patients. - LESS than or EQUAL to 0.5 ng/mL:   low likelihood for bacterial sepsis; antibiotics DISCOURAGED.  - GREATER than 0.5 ng/mL:   increased likelihood for bacterial sepsis; antibiotics ENCOURAGED.  - GREATER than 2 ng/mL:   high risk for severe sepsis / septic shock; antibiotics strongly ENCOURAGED. Decisions on antibiotic use should not be based solely on Procalcitonin (PCT) levels. If PCT is low but uncertainty exists with stopping antibiotics, repeat PCT in 6-24 hours to confirm the low level.  If antibiotics are administered (regardless if initial PCT was high or low), repeat PCT every 1-2 days to consider early antibiotic cessation (when GREATER                            than 80% decrease from the peak OR when PCT drops below designated cutoffs, whichever comes first), so long as the infection is NOT one that typically requires prolonged treatment durations (e.g., bone/joint infections, endocarditis, Staph. aureus bacteremia). Situations of FALSE-POSITIVE Procalcitonin values:  1) Newborns < 67 hours old  2) Massive stress from severe trauma / burns, major surgery, acute pancreatitis, cardiogenic / hemorrhagic shock, sickle cell crisis, or other organ perfusion abnormalities  3) Malaria and some Candidal infections  4) Treatment with agents that stimulate cytokines (e.g., OKT3, anti-lymphocyte globulins, alemtuzumab, IL-2, granulocyte transfusion [NOT GCSFs])  5) Chronic renal disease causes elevated baseline levels (consider GREATER than 0.75 ng/mL as an abnormal cut-off); initiating HD/CRRT may cause transient decreases  6) Paraneoplastic syndromes from medullary thyroid or SCLC, some forms of vasculitis, and acute [id number]                            disease    Situations of FALSE-NEGATIVE Procalcitonin values:  1) Too early in clinical course for PCT to have reached its peak (may repeat in 6-24 hours to confirm low level)  2) Localized infection WITHOUT systemic (SIRS / sepsis) response (e.g., an abscess, osteomyelitis, cystitis)  3) Mycobacteria (e.g., Tuberculosis, MAC)  4) Cystic fibrosis exacerbations     • Protime 07/02/2023 13.5  11.6 - 14.5 seconds Final   • INR 07/02/2023 1.01  0.84 - 1.19 Final   • PTT 07/02/2023 29  23 - 37 seconds Final    Therapeutic Heparin Range =  60-90 seconds   • Blood Culture 07/02/2023 Staphylococcus epidermidis (A)   Final    see related culture for Identification and/or Susceptibilitiy   • Gram Stain Result 07/02/2023 Gram positive cocci in clusters (A)   Final    This is an appended report.  These results have been appended to a previously preliminary verified report. • Blood Culture 07/02/2023 Staphylococcus epidermidis (A)   Corrected    This organism has been edited. The previous result was Staphylococcus coagulase negative on 7/5/2023 at 0923 EDT. • Blood Culture 07/02/2023 Staphylococcus epidermidis (A)   Final    Strain 2   • Gram Stain Result 07/02/2023 Gram positive cocci in clusters (A)   Final    Comment: Refer to Blood Culture Identification Panel results                               This is an appended report. These results have been appended to a previously preliminary verified report. • Ventricular Rate 07/02/2023 82  BPM Final   • Atrial Rate 07/02/2023 82  BPM Final   • HI Interval 07/02/2023 176  ms Final   • QRSD Interval 07/02/2023 96  ms Final   • QT Interval 07/02/2023 360  ms Final   • QTC Interval 07/02/2023 420  ms Final   • P Axis 07/02/2023 54  degrees Final   • QRS Axis 07/02/2023 -3  degrees Final   • T Wave Axis 07/02/2023 33  degrees Final   • Staphylococcus epidermidis 07/02/2023 Detected (A)  Not Detected Final    This organism is a coagulase-negative Staphylococcus    If only 1 set of blood cultures is positive, this may represent a contaminant. Consider holding antibiotics or repeating cultures prior to starting antibiotics. If >1 one set of blood cultures is positive, cefazolin or nafcillin is the preferred therapy.      Admission on 06/20/2023, Discharged on 06/24/2023   Component Date Value Ref Range Status   • ABO Grouping 06/20/2023 A   Final   • Rh Factor 06/20/2023 Positive   Final   • Antibody Screen 06/20/2023 Negative   Final   • Specimen Expiration Date 06/20/2023 [id number]   Final   • ABO Grouping 06/20/2023 A   Final   • Rh Factor 06/20/2023 Positive   Final   • POC Glucose 06/20/2023 205 (H)  65 - 140 mg/dl Final   • Case Report 06/20/2023    Final                    Value:Surgical Pathology Report                         Case: [id number]                                   Authorizing Provider:  Wendi King Deng Knowles MD           Collected:           06/20/2023 0801              Ordering Location:     [facility]      [facility]            [facility] [address] [facility]                                                      Pathologist:           George Sosa MD                                                             Intraop:               George Sosa MD                                                             Specimens:   A) - Soft Tissue, Other, Mesenteric mass                                                            B) - Small Bowel, NOS, Small Bowel                                                                  C) - Soft Tissue, Other, Mesenteric Mass part 2                                           • Addendum 06/20/2023    Final                    Value: This result contains rich text formatting which cannot be displayed here. • Final Diagnosis 06/20/2023    Final                    Value: This result contains rich text formatting which cannot be displayed here. This is an appended report. These results have been appended to a previously preliminary verified report. • Note 06/20/2023    Final                    Value: This result contains rich text formatting which cannot be displayed here. This is an appended report. These results have been appended to a previously preliminary verified report. • Additional Information 06/20/2023    Final                    Value: This result contains rich text formatting which cannot be displayed here. This is an appended report. These results have been appended to a previously preliminary verified report.    • Synoptic Checklist 06/20/2023    Final                    Value:JEJUNUM AND ILEUM NEUROENDOCRINE TUMOR                            JEJUNUM AND ILEUM NEUROENDOCRINE TUMOR - All Specimens                            8th Edition - Protocol posted: 3/23/2022 SPECIMEN                               Procedure:    Segmental resection, small intestine                                                         TUMOR                               Tumor Site:    Small intestine, not otherwise specified                                Histologic Type and Grade:    G2, well-differentiated neuroendocrine tumor                                Ki-67 Labeling Index:    4 %                             Tumor Size:    Greatest Dimension (Centimeters): 1.9 cm                             Tumor Focality:    Unifocal                              Tumor Extent:    Invades visceral peritoneum (serosa)                              Lymphovascular Invasion:    Present                              Perineural Invasion:    Present                              Large Mesenteric Masses (greater than 2 cm): Not identified                              Tumor Comment:    Mesenteric mass is present (slide C1), but dimesion based on tissue received is not larger than 2 cm. Please also correlate clinically. MARGINS                             Margin Status: All margins negative for tumor                                Closest Margin(s) to Tumor:    Cannot be determined                                Distance from Tumor to Closest Margin:    16 mm                                                        REGIONAL LYMPH NODES                             Regional Lymph Node Status:                                   :    Tumor present in regional lymph node(s)                                  Number of Lymph Nodes with Tumor: At least two positive nodes present (based on parts B and C). Pending submission of residual adipose tissue. ADDENDUM TO FOLLOW. Number of Lymph Nodes Examined:     At least: 2                                                         PATHOLOGIC STAGE CLASSIFICATION (pTNM, AJCC 8th Edition) Reporting of pT, pN, and (when applicable) pM categories is based on information available to the pathologist at the time the report is issued. As per the AJCC (Chapter 1, 8th Ed.) it is the managing physician’s responsibility to establish the final pathologic stage based upon all pertinent information, including but potentially not limited to this pathology report. pT Category:    pT4                              pN Category:    pN not assigned (cannot be determined based on available pathological information)                                                          Comment(s): At least two positive nodes present (based on part B and C). Pending submission of residual adipose tissue. ADDENDUM TO FOLLOW. • Intraoperative Consultation 06/20/2023    Final                    Value: This result contains rich text formatting which cannot be displayed here. • Gross Description 06/20/2023    Final                    Value: This result contains rich text formatting which cannot be displayed here. This is an appended report. These results have been appended to a previously preliminary verified report. • Clinical Information 06/20/2023    Final                    Value:Preop Diagnosis:  Lymphadenopathy, abdominal [R59.0]     Post-Op Diagnosis Codes:     * Lymphadenopathy, abdominal [R59.0]     Procedure(s):  biopsy of MESENTERIC MASS. tap block  SMALL BOWEL RESECTION    Operative Findings:  Frozen section revealed nonspecific tissue. Specimen was sent for flow cytometry. Clinically, this appeared to be a neuroendocrine tumor with a small bowel mass with enlarged mesenteric lymph nodes. Resection of all of the enlarged mesenteric lymph nodes would have required significant small bowel resection of a large portion of normal bowel. Consequently biopsy of the lymph node was performed for diagnostic purposes.       This is an appended report.  These results have been appended to a previously preliminary verified report. • Scan Result 06/20/2023 SEE WRITTEN REPORT   Final   • POC Glucose 06/20/2023 202 (H)  65 - 140 mg/dl Final   • POC Glucose 06/20/2023 146 (H)  65 - 140 mg/dl Final   • POC Glucose 06/20/2023 131  65 - 140 mg/dl Final   • Sodium 06/21/2023 139  135 - 147 mmol/L Final   • Potassium 06/21/2023 3.2 (L)  3.5 - 5.3 mmol/L Final   • Chloride 06/21/2023 105  96 - 108 mmol/L Final   • CO2 06/21/2023 31  21 - 32 mmol/L Final   • ANION GAP 06/21/2023 3  mmol/L Final   • BUN 06/21/2023 7  5 - 25 mg/dL Final   • Creatinine 06/21/2023 0.71  0.60 - 1.30 mg/dL Final    Standardized to IDMS reference method   • Glucose 06/21/2023 173 (H)  65 - 140 mg/dL Final    Specimen collection should occur prior to Sulfasalazine administration due to the potential for falsely depressed results. Specimen collection should occur prior to Sulfapyridine administration due to the potential for falsely elevated results. If the patient is fasting, the ADA then defines impaired fasting glucose as > 100 mg/dL and diabetes as > or equal to 123 mg/dL.    • Calcium 06/21/2023 8.7  8.3 - 10.1 mg/dL Final   • eGFR 06/21/2023 97  ml/min/1.73sq m Final   • Magnesium 06/21/2023 1.8  1.6 - 2.6 mg/dL Final   • WBC 06/21/2023 8.53  4.31 - 10.16 Thousand/uL Final   • RBC 06/21/2023 4.34  3.88 - 5.62 Million/uL Final   • Hemoglobin 06/21/2023 13.8  12.0 - 17.0 g/dL Final   • Hematocrit 06/21/2023 42.1  36.5 - 49.3 % Final   • MCV 06/21/2023 97  82 - 98 fL Final   • MCH 06/21/2023 31.8  26.8 - 34.3 pg Final   • MCHC 06/21/2023 32.8  31.4 - 37.4 g/dL Final   • RDW 06/21/2023 13.8  11.6 - 15.1 % Final   • Platelets 17/90/2922 136 (L)  149 - 390 Thousands/uL Final   • MPV 06/21/2023 10.5  8.9 - 12.7 fL Final   • POC Glucose 06/21/2023 179 (H)  65 - 140 mg/dl Final   • POC Glucose 06/21/2023 196 (H)  65 - 140 mg/dl Final   • POC Glucose 06/21/2023 206 (H)  65 - 140 mg/dl Final   • POC Glucose 06/21/2023 147 (H) 65 - 140 mg/dl Final   • WBC 06/22/2023 6.75  4.31 - 10.16 Thousand/uL Final   • RBC 06/22/2023 3.93  3.88 - 5.62 Million/uL Final   • Hemoglobin 06/22/2023 12.6  12.0 - 17.0 g/dL Final   • Hematocrit 06/22/2023 39.1  36.5 - 49.3 % Final   • MCV 06/22/2023 100 (H)  82 - 98 fL Final   • MCH 06/22/2023 32.1  26.8 - 34.3 pg Final   • MCHC 06/22/2023 32.2  31.4 - 37.4 g/dL Final   • RDW 06/22/2023 13.6  11.6 - 15.1 % Final   • MPV 06/22/2023 10.6  8.9 - 12.7 fL Final   • Platelets 43/47/5653 131 (L)  149 - 390 Thousands/uL Final   • nRBC 06/22/2023 0  /100 WBCs Final   • Neutrophils Relative 06/22/2023 72  43 - 75 % Final   • Immat GRANS % 06/22/2023 0  0 - 2 % Final   • Lymphocytes Relative 06/22/2023 15  14 - 44 % Final   • Monocytes Relative 06/22/2023 11  4 - 12 % Final   • Eosinophils Relative 06/22/2023 2  0 - 6 % Final   • Basophils Relative 06/22/2023 0  0 - 1 % Final   • Neutrophils Absolute 06/22/2023 4.85  1.85 - 7.62 Thousands/µL Final   • Immature Grans Absolute 06/22/2023 0.02  0.00 - 0.20 Thousand/uL Final   • Lymphocytes Absolute 06/22/2023 1.02  0.60 - 4.47 Thousands/µL Final   • Monocytes Absolute 06/22/2023 0.71  0.17 - 1.22 Thousand/µL Final   • Eosinophils Absolute 06/22/2023 0.13  0.00 - 0.61 Thousand/µL Final   • Basophils Absolute 06/22/2023 0.02  0.00 - 0.10 Thousands/µL Final   • Sodium 06/22/2023 140  135 - 147 mmol/L Final   • Potassium 06/22/2023 3.0 (L)  3.5 - 5.3 mmol/L Final   • Chloride 06/22/2023 105  96 - 108 mmol/L Final   • CO2 06/22/2023 29  21 - 32 mmol/L Final   • ANION GAP 06/22/2023 6  mmol/L Final   • BUN 06/22/2023 7  5 - 25 mg/dL Final   • Creatinine 06/22/2023 0.75  0.60 - 1.30 mg/dL Final    Standardized to IDMS reference method   • Glucose 06/22/2023 168 (H)  65 - 140 mg/dL Final    Specimen collection should occur prior to Sulfasalazine administration due to the potential for falsely depressed results.  Specimen collection should occur prior to Sulfapyridine administration due to the potential for falsely elevated results. If the patient is fasting, the ADA then defines impaired fasting glucose as > 100 mg/dL and diabetes as > or equal to 123 mg/dL. • Calcium 06/22/2023 9.0  8.3 - 10.1 mg/dL Final   • eGFR 06/22/2023 95  ml/min/1.73sq m Final   • POC Glucose 06/22/2023 181 (H)  65 - 140 mg/dl Final   • POC Glucose 06/22/2023 172 (H)  65 - 140 mg/dl Final   • POC Glucose 06/22/2023 154 (H)  65 - 140 mg/dl Final   • POC Glucose 06/22/2023 145 (H)  65 - 140 mg/dl Final   • Sodium 06/23/2023 139  135 - 147 mmol/L Final   • Potassium 06/23/2023 3.3 (L)  3.5 - 5.3 mmol/L Final   • Chloride 06/23/2023 107  96 - 108 mmol/L Final   • CO2 06/23/2023 30  21 - 32 mmol/L Final   • ANION GAP 06/23/2023 2  mmol/L Final   • BUN 06/23/2023 7  5 - 25 mg/dL Final   • Creatinine 06/23/2023 0.76  0.60 - 1.30 mg/dL Final    Standardized to IDMS reference method   • Glucose 06/23/2023 151 (H)  65 - 140 mg/dL Final    Specimen collection should occur prior to Sulfasalazine administration due to the potential for falsely depressed results. Specimen collection should occur prior to Sulfapyridine administration due to the potential for falsely elevated results. If the patient is fasting, the ADA then defines impaired fasting glucose as > 100 mg/dL and diabetes as > or equal to 123 mg/dL.    • Calcium 06/23/2023 8.9  8.3 - 10.1 mg/dL Final   • eGFR 06/23/2023 95  ml/min/1.73sq m Final   • POC Glucose 06/23/2023 155 (H)  65 - 140 mg/dl Final   • POC Glucose 06/23/2023 164 (H)  65 - 140 mg/dl Final   • POC Glucose 06/23/2023 165 (H)  65 - 140 mg/dl Final   • POC Glucose 06/23/2023 138  65 - 140 mg/dl Final   • Sodium 06/24/2023 141  135 - 147 mmol/L Final   • Potassium 06/24/2023 3.4 (L)  3.5 - 5.3 mmol/L Final   • Chloride 06/24/2023 109 (H)  96 - 108 mmol/L Final   • CO2 06/24/2023 28  21 - 32 mmol/L Final   • ANION GAP 06/24/2023 4  mmol/L Final   • BUN 06/24/2023 11  5 - 25 mg/dL Final   • Creatinine 06/24/2023 0.81  0.60 - 1.30 mg/dL Final    Standardized to IDMS reference method   • Glucose 06/24/2023 143 (H)  65 - 140 mg/dL Final    Specimen collection should occur prior to Sulfasalazine administration due to the potential for falsely depressed results. Specimen collection should occur prior to Sulfapyridine administration due to the potential for falsely elevated results. If the patient is fasting, the ADA then defines impaired fasting glucose as > 100 mg/dL and diabetes as > or equal to 123 mg/dL. • Calcium 06/24/2023 9.1  8.3 - 10.1 mg/dL Final   • eGFR 06/24/2023 92  ml/min/1.73sq m Final   • POC Glucose 06/24/2023 171 (H)  65 - 140 mg/dl Final    CRITICAL VALUE NOTED   Lab on 06/15/2023   Component Date Value Ref Range Status   • PSA 06/15/2023 0.73  0.00 - 4.00 ng/mL Final    Lobo Mov[employer] Access chemiluminescent immunoassay. Confirm baseline values for patients being serially monitored.     Appointment on 06/08/2023   Component Date Value Ref Range Status   • Ventricular Rate 06/08/2023 82  BPM Final   • Atrial Rate 06/08/2023 82  BPM Final   • IL Interval 06/08/2023 160  ms Final   • QRSD Interval 06/08/2023 100  ms Final   • QT Interval 06/08/2023 376  ms Final   • QTC Interval 06/08/2023 439  ms Final   • P Axis 06/08/2023 41  degrees Final   • QRS Axis 06/08/2023 -10  degrees Final   • T Wave [place] 06/08/2023 18  degrees Final   Appointment on 06/08/2023   Component Date Value Ref Range Status   • Sodium 06/08/2023 146  135 - 147 mmol/L Final   • Potassium 06/08/2023 3.4 (L)  3.5 - 5.3 mmol/L Final   • Chloride 06/08/2023 103  96 - 108 mmol/L Final   • CO2 06/08/2023 34 (H)  21 - 32 mmol/L Final   • ANION GAP 06/08/2023 9  4 - 13 mmol/L Final   • BUN 06/08/2023 16  5 - 25 mg/dL Final   • Creatinine 06/08/2023 1.14  0.60 - 1.30 mg/dL Final    Standardized to IDMS reference method   • Glucose 06/08/2023 169 (H)  65 - 140 mg/dL Final    If the patient is fasting, the ADA then defines impaired fasting glucose as > 100 mg/dL and diabetes as > or equal to 123 mg/dL. • Calcium 06/08/2023 10.3 (H)  8.4 - 10.2 mg/dL Final   • AST 06/08/2023 14  13 - 39 U/L Final   • ALT 06/08/2023 26  7 - 52 U/L Final    Specimen collection should occur prior to Sulfasalazine administration due to the potential for falsely depressed results. • Alkaline Phosphatase 06/08/2023 65  34 - 104 U/L Final   • Total Protein 06/08/2023 7.2  6.4 - 8.4 g/dL Final   • Albumin 06/08/2023 4.7  3.5 - 5.0 g/dL Final   • Total Bilirubin 06/08/2023 0.71  0.20 - 1.00 mg/dL Final    Use of this assay is not recommended for patients undergoing treatment with eltrombopag due to the potential for falsely elevated results. N-acetyl-p-benzoquinone imine (metabolite of Acetaminophen) will generate erroneously low results in samples for patients that have taken an overdose of Acetaminophen.    • eGFR 06/08/2023 66  ml/min/1.73sq m Final   • WBC 06/08/2023 5.92  4.31 - 10.16 Thousand/uL Final   • RBC 06/08/2023 4.89  3.88 - 5.62 Million/uL Final   • Hemoglobin 06/08/2023 15.4  12.0 - 17.0 g/dL Final   • Hematocrit 06/08/2023 47.3  36.5 - 49.3 % Final   • MCV 06/08/2023 97  82 - 98 fL Final   • MCH 06/08/2023 31.5  26.8 - 34.3 pg Final   • MCHC 06/08/2023 32.6  31.4 - 37.4 g/dL Final   • RDW 06/08/2023 13.8  11.6 - 15.1 % Final   • MPV 06/08/2023 9.8  8.9 - 12.7 fL Final   • Platelets 55/70/6004 163  149 - 390 Thousands/uL Final   • nRBC 06/08/2023 0  /100 WBCs Final   • Neutrophils Relative 06/08/2023 69  43 - 75 % Final   • Immat GRANS % 06/08/2023 0  0 - 2 % Final   • Lymphocytes Relative 06/08/2023 22  14 - 44 % Final   • Monocytes Relative 06/08/2023 7  4 - 12 % Final   • Eosinophils Relative 06/08/2023 1  0 - 6 % Final   • Basophils Relative 06/08/2023 1  0 - 1 % Final   • Neutrophils Absolute 06/08/2023 4.11  1.85 - 7.62 Thousands/µL Final   • Immature Grans Absolute 06/08/2023 0.00  0.00 - 0.20 Thousand/uL Final   • Lymphocytes Absolute 06/08/2023 1.27  0.60 - 4.47 Thousands/µL Final   • Monocytes Absolute 06/08/2023 0.44  0.17 - 1.22 Thousand/µL Final   • Eosinophils Absolute 06/08/2023 0.07  0.00 - 0.61 Thousand/µL Final   • Basophils Absolute 06/08/2023 0.03  0.00 - 0.10 Thousands/µL Final   • PTT 06/08/2023 24  23 - 37 seconds Final    Therapeutic Heparin Range =  60-90 seconds   • Protime 06/08/2023 12.8  11.6 - 14.5 seconds Final   • INR 06/08/2023 0.94  0.84 - 1.19 Final   Hospital Outpatient Visit on 05/24/2023   Component Date Value Ref Range Status   • POC Glucose 05/24/2023 142 (H)  65 - 140 mg/dl Final   Appointment on 05/18/2023   Component Date Value Ref Range Status   • Epinephrine, 24H Ur 05/18/2023 5  0 - 20 ug/24 hr Final   • Norepinephrine, 24H Ur 05/18/2023 54  0 - 135 ug/24 hr Final   • Dopamine , 24H Ur 05/18/2023 237  0 - 510 ug/24 hr Final   • Epinephrine, Rand Ur 05/18/2023 2  Undefined ug/L Final    Total Volume: 2550 mL   • Norepinephrine, Rand Ur 05/18/2023 21  Undefined ug/L Final   • Dopamine, Rand Ur 05/18/2023 93  Undefined ug/L Final   • Cortisol,F,ug/L,U 05/18/2023 20  Undefined ug/L Final    Total Volume: 2550 mL   • Cortisol, 24H Ur 05/18/2023 51  5 - 64 ug/24 hr Final   Appointment on 05/15/2023   Component Date Value Ref Range Status   • Aldosterone 05/15/2023 16.1  0.0 - 30.0 ng/dL Final   • Norepinephrine Plasma 05/15/2023 442  0 - 874 pg/mL Final   • Epinephrine Plasma 05/15/2023 40  0 - 62 pg/mL Final   • Dopamine Plasma 05/15/2023 <30  0 - 48 pg/mL Final   • Sodium 05/15/2023 138  135 - 147 mmol/L Final   • Potassium 05/15/2023 3.8  3.5 - 5.3 mmol/L Final   • Chloride 05/15/2023 109 (H)  96 - 108 mmol/L Final   • CO2 05/15/2023 28  21 - 32 mmol/L Final   • ANION GAP 05/15/2023 1 (L)  4 - 13 mmol/L Final   • BUN 05/15/2023 16  5 - 25 mg/dL Final   • Creatinine 05/15/2023 1.09  0.60 - 1.30 mg/dL Final    Standardized to IDMS reference method   • Glucose, Fasting 05/15/2023 176 (H)  65 - 99 mg/dL Final Specimen collection should occur prior to Sulfasalazine administration due to the potential for falsely depressed results. Specimen collection should occur prior to Sulfapyridine administration due to the potential for falsely elevated results. • Calcium 05/15/2023 9.2  8.3 - 10.1 mg/dL Final   • eGFR 05/15/2023 70  ml/min/1.73sq m Final   • Cortisol - AM 05/15/2023 1.6 (LL)  4.2 - 22.4 ug/dL Final   • Normetanephrine, Free 05/15/2023 129.9  0.0 - 285.2 pg/mL Final   • Metanephrine, Free 05/15/2023 37.7  0.0 - 88.0 pg/mL Final   • Renin 05/15/2023 <0.167 (L)  0.167 - 5.380 ng/mL/hr Final   Appointment on 04/28/2023   Component Date Value Ref Range Status   • Sodium 04/28/2023 141  135 - 147 mmol/L Final   • Potassium 04/28/2023 3.1 (L)  3.5 - 5.3 mmol/L Final   • Chloride 04/28/2023 105  96 - 108 mmol/L Final   • CO2 04/28/2023 27  21 - 32 mmol/L Final   • ANION GAP 04/28/2023 9  4 - 13 mmol/L Final   • BUN 04/28/2023 27 (H)  5 - 25 mg/dL Final   • Creatinine 04/28/2023 1.26  0.60 - 1.30 mg/dL Final    Standardized to IDMS reference method   • Glucose, Fasting 04/28/2023 129 (H)  65 - 99 mg/dL Final    Specimen collection should occur prior to Sulfasalazine administration due to the potential for falsely depressed results. Specimen collection should occur prior to Sulfapyridine administration due to the potential for falsely elevated results. • Calcium 04/28/2023 9.5  8.3 - 10.1 mg/dL Final   • eGFR 04/28/2023 59  ml/min/1.73sq m Final   There may be more visits with results that are not included. Transitional Care Management Review:  Yumiko Fraire is a 78 y.o. male here for TCM follow up.      During the TCM phone call patient stated:    TCM Call     Date and time call was made  6/26/2023  [facility] care reviewed  Records reviewed    Patient was hospitialized at  [address] [place] [address]    Date of Admission  06/20/23    Date of discharge  06/24/23    Diagnosis  Lymphadenopathy, abdominal Disposition  Home    Were the patients medications reviewed and updated  Yes    Current Symptoms  Incisional pain    Incisional pain severity  Moderate    Incisional pain onset  Ongoing      TCM Call     Post hospital issues  Reduced activity    Should patient be enrolled in anticoag monitoring? No    Scheduled for follow up? Yes    Patients specialists  Other (comment)    Other specialists names  Hessie Look    Did you obtain your prescribed medications  Yes    Do you need help managing your prescriptions or medications  No    Is transportation to your appointment needed  No    I have advised the patient to call PCP with any new or worsening symptoms  [facility] [place] [place] or [facility] other    Support System  Spouse    The type of support provided  Emotional; Financial; Physical    Do you have social support  Yes, as much as I need    Are you recieving any outpatient services  No    Are you recieving home care services  No    Are you using any community resources  No    Current waiver services  No    Have you fallen in the last 12 months  No    Interperter language line needed  No    Counseling  Family    Counseling topics  Activities of daily living    Comments  Pt taking small walks daily to get back into shape.               Tanesha Yo MD

## 2023-07-07 NOTE — TELEPHONE ENCOUNTER
Spoke with Valente. He is scheduled for [facility] 1/3/24 9am for his CT, pt aware of prep and to  barium. He will have labs done before CT. ENT and Hem/Onc offices will call pt directly to [facility] consults.

## 2023-07-07 NOTE — LETTER
July 7, 2023     Nita Schafer, [address]  [address]    Patient: Omayra Goodman   YOB: 1955   Date of Visit: 7/7/2023       Dear Dr. Betzaida Vasques:    Thank you for referring Eva Farris to me for evaluation. Below are my notes for this consultation. If you have questions, please do not hesitate to call me. I look forward to following your patient along with you. Sincerely,        Florina Moffett MD        CC: No Recipients    Florina Moffett MD  7/7/2023  2:07 PM  Sign when Signing Visit               Surgical Oncology Follow Up       [address] [place] [address] ONCOLOGY [facility]  [address] [facility] [id number]  [address] [facility]  1955  [id number]  [address] [address] SURGICAL ONCOLOGY Jesi Junior  [address] [facility] [id number] [phone]    Diagnoses and all orders for this visit:    Neuroendocrine tumor  -     Ambulatory referral to Hematology / Oncology; Future  -     Ambulatory Referral to Otolaryngology; Future  -     CT abdomen pelvis w contrast; Future  -     BUN; Future  -     Creatinine, serum; Future    Neuroendocrine carcinoma [facility])        Chief Complaint   Patient presents with   • Post-op       Return in about 6 months (around 1/7/2024) for Office Visit, Imaging - See orders. Oncology History   Neuroendocrine tumor   6/20/2023 Initial Diagnosis    Neuroendocrine tumor     6/20/2023 Surgery    Small Bowel Resection and biopsy mesenteric mass:    A. Soft Tissue, Other, Mesenteric mass:  - Dense fibrotic tissue with chronic inflammation. Immunostains did not reveal neuroendocrine tumor cells.        B. Small Bowel, NOS, Small Bowel :  - Well differential neuroendocrine tumor, grade 2.   - Tumor measures 1.9 cm in largest dimension.   - Tumor extends to inked serosal surface (slide B6)  - Resection margins negative for differentiated neuroendocrine tumor. Tumor is 1.6 cm (16 mm) from closest margin of resection  - Lymphovascular invasion present (slide B10). - Perineural invasion present (slide B10)  - Favor metastatic carcinoma involving 1 lymph node (slide B10). C. Soft Tissue, Other, Mesenteric Mass part 2:  - Fragments of well differential neuroendocrine tumor. Favor tumor effaces at least 1 lymph node (1/1). Largest dimension of tumor measuring 1.2 cm (slide C1). No residual lymph node tissue seen. Adjacent dense fibrotic tissue with inflammation present. - Please see separate report for flow cytometry study result. Cancer Staged    Cancer Staging   T4N1M0       Neuroendocrine carcinoma ([address])   7/7/2023 Initial Diagnosis    Neuroendocrine carcinoma [facility])      Cancer Staged    Cancer Staging   T4N1M0           Staging: [id number] small bowel neuroendocrine tumor  Ki-67 is 4%  Treatment history: Small bowel resection with biopsy of mesenteric mass, June 2023  Resection of the mesenteric mass would have required extensive normal small bowel resection. Current treatment: Octreotide pending  Disease status:      History of Present Illness: Patient returns in follow-up. He is doing well. He was seen in the emergency room last week and had a superficial wound infection. Purulent fluid was expressed. Blood cultures did reveal Staph epidermidis in 2 bottles, he was started on Bactrim and he is completing a 7-day course. He had 1 episode of fever prior to that visit, and has not had any fevers or chills since. He denies any abdominal pain, nausea or vomiting. His appetite is good. He is passing flatus and having bowel movements. Review of Systems  Complete ROS Surg Onc:   Complete ROS Surg Onc:   Constitutional: The patient denies new or recent history of general fatigue, no recent weight loss, no change in appetite. Eyes: No complaints of visual problems, no scleral icterus.    ENT: no complaints of ear pain, no hoarseness, no difficulty swallowing,  no tinnitus and no new masses in head, oral cavity, or neck. Cardiovascular: No complaints of chest pain, no palpitations, no ankle edema. Respiratory: No complaints of shortness of breath, no cough. Gastrointestinal: No complaints of jaundice, no bloody stools, no pale stools. Genitourinary: No complaints of dysuria, no hematuria, no nocturia, no frequent urination, no urethral discharge. Musculoskeletal: No complaints of weakness, paralysis, joint stiffness or arthralgias. Integumentary: No complaints of rash, no new lesions. Neurological: No complaints of convulsions, no seizures, no dizziness. Hematologic/Lymphatic: No complaints of easy bruising. Endocrine:  No hot or cold intolerance. No polydipsia, polyphagia, or polyuria. Allergy/immunology:  No environmental allergies. No food allergies. Not immunocompromised. Skin:  No pallor or rash. No wound.         Patient Active Problem List   Diagnosis   • Aneurysm of thoracic aorta (HCC)   • Edema   • Heel spur   • Mixed hyperlipidemia   • Hypertension   • Hyperuricemia without signs inflammatory arthritis/tophaceous disease   • Chronic bilateral low back pain   • Neuropathy of left foot   • Non-toxic uninodular goiter   • Osteoarthritis   • Sciatica   • Diabetes mellitus type 2 in obese [facility])   • Restless leg syndrome   • Skin tag   • Acute pain of left shoulder   • Microscopic hematuria   • Pain of left thigh   • Lumbar radiculopathy   • Colonic polyp   • Hypokalemia   • Severe obesity (BMI 35.0-35.9 with comorbidity) ([facility])   • Type 2 diabetes mellitus with mild nonproliferative retinopathy of both eyes without macular edema ([facility])   • Type 2 diabetes with stage 2 chronic kidney disease GFR 60-89 ([facility])   • Nausea and vomiting   • Generalized abdominal pain   • Stage 3a chronic kidney disease (HCC)   • Adrenal nodule (HCC)   • Lymphadenopathy, abdominal   • Neuroendocrine tumor   • Positive blood culture   • Neuroendocrine carcinoma ([address])     Past Medical History:   Diagnosis Date   • Allergic rhinitis     last assessed: 5/4/2015   • Aneurysm of thoracic aorta ([address])    • Cataract     resolved: 12/12/2014   • Colon polyp    • Diabetes mellitus (HCC)     diet control   • Herniated nucleus pulposus, L4-5 left     last assessed: 4/9/2014   • Herniated nucleus pulposus, L5-S1, left     last assessed: 4/9/2014   • Hx of echocardiogram 10/14/2016    EF 55%, Mild LVH, mild aortic root and ascending aorta enlargement, measuring 42 mm, trace MR, trace aortic regurg.    • Hypercholesterolemia    • Hypertension    • Kidney stone    • Lumbar radiculopathy      Past Surgical History:   Procedure Laterality Date   • BACK SURGERY     • BACK SURGERY     • COLONOSCOPY     • KNEE ARTHROSCOPY      (therapeutic)   • KNEE SURGERY     • LYMPH NODE DISSECTION N/A 6/20/2023    Procedure: biopsy of MESENTERIC MASS, tap block;  Surgeon: Maria Luisa Mast MD;  [facility];  Service: Surgical Oncology   • [place] COLONOSCOPY FLX DX W/COLLJ SPEC WHEN PFRMD N/A 09/30/2016    Procedure: COLONOSCOPY;  Surgeon: iDego Wallace MD;  Location: [facility];  Service: Gastroenterology   • SMALL INTESTINE SURGERY N/A 6/20/2023    Procedure: SMALL BOWEL RESECTION;  Surgeon: Maria Luisa Mast MD;  Location: [facility];  Service: Surgical Oncology   • TONSILLECTOMY AND ADENOIDECTOMY       Family History   Problem Relation Age of Onset   • Thyroid disease Mother         disorder   • Hypertension Father    • Coronary artery disease Brother    • Heart attack Brother         prior myocardial infarction     Social History     Socioeconomic History   • Marital status: /Civil Union     Spouse name: Not on file   • Number of children: Not on file   • Years of education: Not on file   • Highest education level: Not on file   Occupational History   • Not on file   Tobacco Use   • Smoking status: Never   • Smokeless tobacco: Never   Vaping Use   • Vaping Use: Never used Substance and Sexual Activity   • Alcohol use: Yes     Comment: social once a month   • Drug use: Never   • Sexual activity: Not on file   Other Topics Concern   • Not on file   Social History Narrative   • Not on file     Social Determinants of Health     Financial Resource Strain: Not on file   Food Insecurity: Not on file   Transportation Needs: Not on file   Physical Activity: Not on file   Stress: Not on file   Social Connections: Not on file   Intimate Partner Violence: Not on file   Housing Stability: Not on file       Current Outpatient Medications:   •  allopurinol (ZYLOPRIM) 100 mg tablet, TAKE 1 TABLET DAILY (Patient taking differently: every evening), Disp: 90 tablet, Rfl: 3  •  amLODIPine-benazepril (LOTREL 5-20) 5-20 MG per capsule, TAKE 1 CAPSULE TWICE A DAY, Disp: 180 capsule, Rfl: 3  •  [START ON 7/9/2023] amoxicillin-clavulanate (AUGMENTIN) 875-125 mg per tablet, Take 1 tablet by mouth every 12 (twelve) hours for 3 days Do not start before July 9, 2023., Disp: 14 tablet, Rfl: 0  •  atenolol (TENORMIN) 50 mg tablet, TAKE 1 TABLET TWICE A DAY, Disp: 180 tablet, Rfl: 3  •  atorvastatin (LIPITOR) 40 mg tablet, TAKE 1 TABLET DAILY (Patient taking differently: every evening), Disp: 90 tablet, Rfl: 3  •  furosemide (LASIX) 40 mg tablet, TAKE ONE-HALF (1/2) TABLET DAILY, Disp: 45 tablet, Rfl: 3  •  Jardiance 25 MG TABS, TAKE 1 TABLET EVERY MORNING, Disp: 90 tablet, Rfl: 3  •  magnesium oxide (MAG-OX) 400 mg, Take 400 mg by mouth daily, Disp: , Rfl:   •  olmesartan-hydrochlorothiazide (BENICAR HCT) 40-12.5 MG per tablet, TAKE 1 TABLET DAILY, Disp: 90 tablet, Rfl: 3  •  Ozempic, 1 MG/DOSE, 4 MG/3ML injection pen, INJECT 1 MG UNDER THE SKIN ONCE A WEEK (Patient taking differently: Monday), Disp: 9 mL, Rfl: 3  •  potassium chloride (K-DUR,KLOR-CON) 10 mEq tablet, TAKE 3 TABLETS TWICE A DAY.  TAKE 3 TABLETS IN THE MORNING AND TAKE 3 TABLETS IN THE EVENING, Disp: 540 tablet, Rfl: 3  • sulfamethoxazole-trimethoprim (BACTRIM DS) 800-160 mg per tablet, Take 1 tablet by mouth 2 (two) times a day for 7 days smx-tmp DS (BACTRIM) 800-160 mg tabs (1tab q12 D10), Disp: 14 tablet, Rfl: 0  No current facility-administered medications for this visit. No Known Allergies  Vitals:    07/07/23 1333   BP: 118/82   Pulse: 85   Resp: 18   Temp: (!) 97.3 °F (36.3 °C)   SpO2: 98%       Physical Exam  Constitutional: General appearance: The Patient is well-developed and well-nourished who appears the stated age in no acute distress. Patient is pleasant and talkative. HEENT:  Normocephalic. Sclerae are anicteric. Mucous membranes are moist.   Abdomen: Abdomen is soft, non-tender, non-distended and without masses. Incision is C/D/I   Extremities: There is no clubbing or cyanosis. There is no edema. Symmetric. Neuro: Grossly nonfocal. Gait is normal.     Skin: Warm, anicteric. Psych:  Patient is pleasant and talkative. Breasts:        Pathology:  Addendum 2      B. Small Bowel, NOS, Small Bowel :  - Remaining adipose tissue entirely submitted in blocks B19-B22. No additional lymph nodes were found. Lymphovascular invasion present on slide B22. See note. - Updated TNM stage: pN1     NOTE B: Following initial microscopic evaluation, the remainder of the fat is entirely submitted in biopsy bags in cassettes B19-B22. EDoolittle. Addendum electronically signed by Che Eldridge MD on 7/7/2023 at 10:29 AM   Addendum   BEST TUMOR BLOCK(S) FOR POTENTIAL FUTURE STUDIES: B8 and B9      Addendum electronically signed by Che Eldridge MD on 7/4/2023 at  3:17 PM   Final Diagnosis   A. Soft Tissue, Other, Mesenteric mass:  - Dense fibrotic tissue with chronic inflammation. Immunostains did not reveal neuroendocrine tumor cells. See note. NOTE: Immunostains were performed. Fibrotic tissue are positive for CAM 5.2, but negative for chromogranin and synaptophysin. CD45 positive inflammatory cells are present. Calretinin stain is negative. WT-1 stain shows scattered positive cells. B. Small Bowel, NOS, Small Bowel :  - Well differential neuroendocrine tumor, grade 2.   - Tumor measures 1.9 cm in largest dimension.   - Tumor extends to inked serosal surface (slide B6)  - Resection margins negative for differentiated neuroendocrine tumor. Tumor is 1.6 cm (16 mm) from closest margin of resection  - Lymphovascular invasion present (slide B10). - Perineural invasion present (slide B10)  - Favor metastatic carcinoma involving 1 lymph node (slide B10). See note. - Pending submission of the residual adipose tissue to search for more lymph nodes. ADDENDUM TO FOLLOW. - Please see CAP checklist     NOTE B: The lymph node appeared to be entirely replaced by metastatic carcinoma. No residual lymph node tissue is seen (slide B10). Immunostains were performed. The tumor cells are positive for synaptophysin and chromogranin. Ki-67 is positive in about 4% of tumor cells. C. Soft Tissue, Other, Mesenteric Mass part 2:  - Fragments of well differential neuroendocrine tumor. Favor tumor effaces at least 1 lymph node (1/1). Largest dimension of tumor measuring 1.2 cm (slide C1). No residual lymph node tissue seen. Adjacent dense fibrotic tissue with inflammation present. - Please see separate report for flow cytometry study result. Electronically signed by Natalie Sosa MD on 7/4/2023 at  3:12 PM         Comments: This is an appended report. These results have been appended to a previously preliminary verified report. Note  [facility]   Intradepartmental consultation was obtained. Findings were communicated to Dr. Tierney Patel by [facility] on 7/3/2023. Brandi Abdi Comment: This is an appended report. These results have been appended to a previously preliminary verified report. Additional Information  [facility]   All reported additional testing was performed with appropriately reactive controls.   These tests were developed and their performance characteristics determined by [facility] or appropriate performing facility, though some tests may be performed on tissues which have not been validated for performance characteristics (such as staining performed on alcohol exposed cell blocks and decalcified tissues). Results should be interpreted with caution and in the context of the patients’ clinical condition. These tests may not be cleared or approved by the U.S. Food and Drug Administration, though the FDA has determined that such clearance or approval is not necessary. These tests are used for clinical purposes and they should not be regarded as investigational or for research. This laboratory has been approved by CLIA 88, designated as a high-complexity laboratory and is qualified to perform these tests. Interpretation performed at , [address] [place] [address]      . Comment: This is an appended report. These results have been appended to a previously preliminary verified report. Synoptic Checklist   JEJUNUM AND ILEUM NEUROENDOCRINE TUMOR  8th Edition - Protocol posted: 3/23/2022  JEJUNUM AND ILEUM NEUROENDOCRINE TUMOR - All Specimens  SPECIMEN   Procedure  Segmental resection, small intestine    TUMOR   Tumor Site  Small intestine, not otherwise specified    Histologic Type and Grade  G2, well-differentiated neuroendocrine tumor         Ki-67 Labeling Index  4 %   Tumor Size  Greatest Dimension (Centimeters): 1.9 cm   Tumor Focality  Unifocal    Tumor Extent  Invades visceral peritoneum (serosa)    Lymphovascular Invasion  Present    Perineural Invasion  Present    Large Mesenteric Masses (greater than 2 cm)  Not identified    Tumor Comment  Mesenteric mass is present (slide C1), but dimesion based on tissue received is not larger than 2 cm. Please also correlate clinically.     MARGINS   Margin Status  All margins negative for tumor    Closest Margin(s) to Tumor  Cannot be determined    Distance from Tumor to Closest Margin  16 mm   REGIONAL LYMPH NODES   Regional Lymph Node Status  Tumor present in regional lymph node(s)    Number of Lymph Nodes with Tumor  At least two positive nodes present (based on parts B and C). Pending submission of residual adipose tissue. ADDENDUM TO FOLLOW. Number of Lymph Nodes Examined  At least: 2    PATHOLOGIC STAGE CLASSIFICATION (pTNM, AJCC 8th Edition)   Reporting of pT, pN, and (when applicable) pM categories is based on information available to the pathologist at the time the report is issued. As per the AJCC (Chapter 1, 8th Ed.) it is the managing physician’s responsibility to establish the final pathologic stage based upon all pertinent information, including but potentially not limited to this pathology report. pT Category  pT4    pN Category  pN not assigned (cannot be determined based on available pathological information)    Comment(s)  At least two positive nodes present (based on part B and C). Pending submission of residual adipose tissue. ADDENDUM TO FOLLOW. Labs:      Imaging  CT abdomen pelvis with contrast    Result Date: 7/2/2023  Narrative: CT ABDOMEN AND PELVIS WITH IV CONTRAST INDICATION:   Abdominal abscess/infection suspected abd wall cellulitis around incision. COMPARISON: 5/2/2023, PET/CT 5/24/2023. TECHNIQUE:  CT examination of the abdomen and pelvis was performed. Multiplanar 2D reformatted images were created from the source data. This examination, like all CT scans performed in the [facility], was performed utilizing techniques to minimize radiation dose exposure, including the use of iterative reconstruction and automated exposure control. Radiation dose length product (DLP) for this visit:  919.67 mGy-cm IV Contrast:  100 mL of iohexol (OMNIPAQUE) Enteric Contrast:  Enteric contrast was not administered.  FINDINGS: ABDOMEN LOWER CHEST:  No clinically significant abnormality identified in the visualized lower chest. LIVER/BILIARY TREE:  Unremarkable. GALLBLADDER:  No calcified gallstones. No pericholecystic inflammatory change. SPLEEN:  Unremarkable. PANCREAS:  Unremarkable. ADRENAL GLANDS:  Unremarkable. KIDNEYS/URETERS:  Unremarkable. No hydronephrosis. STOMACH AND BOWEL: Postoperative changes involving the right lower quadrant small bowel identified. APPENDIX:  No findings to suggest appendicitis. ABDOMINOPELVIC CAVITY: Enhancing mesenteric masses are again identified with the largest measuring 4.0 x 3.3 cm series 2 image 97. Adjacent smaller 3.0 x 1.9 cm lesion is present. No ascites. No pneumoperitoneum. No lymphadenopathy. VESSELS:  Unremarkable for patient's age. PELVIS REPRODUCTIVE ORGANS:  Unremarkable for patient's age. URINARY BLADDER:  Unremarkable. ABDOMINAL WALL/INGUINAL REGIONS: Postoperative changes are seen in the periumbilical region. Some surrounding inflammatory change is present. There is suggestion of a small superficial abscess measuring 1.5 x 1.6 cm series 2 image 142. OSSEOUS STRUCTURES:  No acute fracture or destructive osseous lesion. Impression: Postoperative changes in the periumbilical region with probable small superficial abscess and surrounding cellulitis. No abdominal wall dehiscence identified. Enhancing mesenteric mass is again present. Workstation performed: [id number]     I reviewed the above laboratory and imaging data. Discussion/Summary: 43-year-old male status post small bowel resection and biopsy of a mesenteric mass for a T4N1M0 neuroendocrine tumor. This is well differentiated, grade 2. The mesenteric masses were not resected secondary to the potential of short gut and resecting a significant portion of normal small bowel. We had a long discussion regarding treatment options. I would plan on treating him with octreotide. I will set him up with medical oncology to discuss this. I would plan on obtaining 6-month imaging.   I will repeat his CT in 6 months. He will likely require a dotatate PET in either 6 months or a year. We discussed if he has any flushing, diarrhea, palpitations, headaches or sweats to contact us. Also in regard to his blood cultures, he will complete his course of Bactrim. If he has any fevers he will have to come back in the hospital.  I did discuss this with infectious disease. We will follow-up his repeat blood cultures that were performed yesterday. I will place a referral to ENT because of the PET/CT findings at the base of the tongue. He and his wife are agreeable to this plan. All their questions were answered. This office visit took 25 minutes of face-to-face time the patient greater than 50% time spent counseling and coordinating care for his neuroendocrine tumor.

## 2023-07-09 LAB
BACTERIA BLD CULT: NORMAL
BACTERIA BLD CULT: NORMAL

## 2023-07-10 ENCOUNTER — TELEPHONE (OUTPATIENT)
Dept: HEMATOLOGY ONCOLOGY | Facility: CLINIC | Age: 68
End: 2023-07-10

## 2023-07-10 NOTE — TELEPHONE ENCOUNTER
I called Andreina Agosto in response to a referral that was received for patient to establish care with Hematology. Outreach was made to schedule a consultation. .    I left a voicemail explaining the reason for my call and advised patient to call [facility] at [phone]. Another attempt will be made to contact patient.

## 2023-07-11 LAB
BACTERIA BLD CULT: NORMAL
BACTERIA BLD CULT: NORMAL

## 2023-07-13 DIAGNOSIS — E11.69 DIABETES MELLITUS TYPE 2 IN OBESE (HCC): ICD-10-CM

## 2023-07-13 DIAGNOSIS — E66.9 DIABETES MELLITUS TYPE 2 IN OBESE (HCC): ICD-10-CM

## 2023-07-13 RX ORDER — EMPAGLIFLOZIN 25 MG/1
TABLET, FILM COATED ORAL
Qty: 90 TABLET | Refills: 3 | Status: SHIPPED | OUTPATIENT
Start: 2023-07-13

## 2023-07-25 ENCOUNTER — CONSULT (OUTPATIENT)
Dept: HEMATOLOGY ONCOLOGY | Facility: CLINIC | Age: 68
End: 2023-07-25
Payer: COMMERCIAL

## 2023-07-25 VITALS
TEMPERATURE: 98.1 F | HEART RATE: 91 BPM | HEIGHT: 70 IN | DIASTOLIC BLOOD PRESSURE: 81 MMHG | BODY MASS INDEX: 31.87 KG/M2 | WEIGHT: 222.6 LBS | OXYGEN SATURATION: 94 % | RESPIRATION RATE: 16 BRPM | SYSTOLIC BLOOD PRESSURE: 114 MMHG

## 2023-07-25 DIAGNOSIS — C7A.8 NEUROENDOCRINE CARCINOMA (HCC): Primary | ICD-10-CM

## 2023-07-25 DIAGNOSIS — D3A.8 NEUROENDOCRINE TUMOR: ICD-10-CM

## 2023-07-25 PROCEDURE — 99215 OFFICE O/P EST HI 40 MIN: CPT | Performed by: INTERNAL MEDICINE

## 2023-07-25 NOTE — PROGRESS NOTES
Erasmo Cabrera  1955  HEM  [facility]  [address] [id number] [phone]    Chief Complaint   Patient presents with   • Consult         Oncology History   Neuroendocrine tumor   6/20/2023 Initial Diagnosis    Neuroendocrine tumor     6/20/2023 Surgery    Small Bowel Resection and biopsy mesenteric mass:    A. Soft Tissue, Other, Mesenteric mass:  - Dense fibrotic tissue with chronic inflammation. Immunostains did not reveal neuroendocrine tumor cells. B. Small Bowel, NOS, Small Bowel :  - Well differential neuroendocrine tumor, grade 2.   - Tumor measures 1.9 cm in largest dimension.   - Tumor extends to inked serosal surface (slide B6)  - Resection margins negative for differentiated neuroendocrine tumor. Tumor is 1.6 cm (16 mm) from closest margin of resection  - Lymphovascular invasion present (slide B10). - Perineural invasion present (slide B10)  - Favor metastatic carcinoma involving 1 lymph node (slide B10). C. Soft Tissue, Other, Mesenteric Mass part 2:  - Fragments of well differential neuroendocrine tumor. Favor tumor effaces at least 1 lymph node (1/1). Largest dimension of tumor measuring 1.2 cm (slide C1). No residual lymph node tissue seen. Adjacent dense fibrotic tissue with inflammation present. - Please see separate report for flow cytometry study result. Cancer Staged    Cancer Staging   T4N1M0       Neuroendocrine carcinoma ([address])   7/7/2023 Initial Diagnosis    Neuroendocrine carcinoma [facility])      Cancer Staged    Cancer Staging   T4N1M0           History of Present Illness:  March 31, 2023 CT of the abdomen pelvis to investigate nausea and vomiting showed 3 mm right ureteral calculus. 14 mm right adrenal nodule. May 24, 2023 PET/CT showed right mesenteric mass up to 3.4 cm, SUV 2.5.   Slight nodularity of small bowel loop in right hemiabdomen, SUV 3.2.  1.4 cm right adrenal, no hypermetabolism. Incidentally noted hypermetabolism at the left base of tongue. June 20, 2023 patient underwent small bowel resection and biopsy of mesenteric mass. Pathology indicates well differentiated neuroendocrine tumor of small bowel, grade 2, T4, N1. Mesenteric mass was not resected due to concern for possible short gut syndrome. Biopsy showed fragments of well differentiated neuroendocrine tumor. Review of Systems   Constitutional: Negative for chills and fever. HENT: Negative for nosebleeds. Eyes: Negative for discharge. Respiratory: Negative for cough and shortness of breath. Cardiovascular: Negative for chest pain. Gastrointestinal: Negative for abdominal pain, constipation and diarrhea. Endocrine: Negative for polydipsia. Genitourinary: Negative for hematuria. Musculoskeletal: Negative for arthralgias. Skin: Negative for color change. Allergic/Immunologic: Negative for immunocompromised state. Neurological: Negative for dizziness and headaches. Hematological: Negative for adenopathy. Psychiatric/Behavioral: Negative for agitation.        Patient Active Problem List   Diagnosis   • Aneurysm of thoracic aorta ([facility])   • Edema   • Heel spur   • Mixed hyperlipidemia   • Hypertension   • Hyperuricemia without signs inflammatory arthritis/tophaceous disease   • Chronic bilateral low back pain   • Neuropathy of left foot   • Non-toxic uninodular goiter   • Osteoarthritis   • Sciatica   • Diabetes mellitus type 2 in obese [facility])   • Restless leg syndrome   • Skin tag   • Acute pain of left shoulder   • Microscopic hematuria   • Pain of left thigh   • Lumbar radiculopathy   • Colonic polyp   • Hypokalemia   • Severe obesity (BMI 35.0-35.9 with comorbidity) ([facility])   • Type 2 diabetes mellitus with mild nonproliferative retinopathy of both eyes without macular edema ([facility])   • Type 2 diabetes with stage 2 chronic kidney disease GFR 60-89 ([facility])   • Nausea and vomiting   • Generalized abdominal pain   • Stage 3a chronic kidney disease (HCC)   • Adrenal nodule (HCC)   • Lymphadenopathy, abdominal   • Neuroendocrine tumor   • Positive blood culture   • Neuroendocrine carcinoma (HCC)     Past Medical History:   Diagnosis Date   • Allergic rhinitis     last assessed: 5/4/2015   • Aneurysm of thoracic aorta (HCC)    • Cataract     resolved: 12/12/2014   • Colon polyp    • Diabetes mellitus (HCC)     diet control   • Herniated nucleus pulposus, L4-5 left     last assessed: 4/9/2014   • Herniated nucleus pulposus, L5-S1, left     last assessed: 4/9/2014   • Hx of echocardiogram 10/14/2016    EF 55%, Mild LVH, mild aortic root and ascending aorta enlargement, measuring 42 mm, trace MR, trace aortic regurg.    • Hypercholesterolemia    • Hypertension    • Kidney stone    • Lumbar radiculopathy      Past Surgical History:   Procedure Laterality Date   • BACK SURGERY     • BACK SURGERY     • COLONOSCOPY     • KNEE ARTHROSCOPY      (therapeutic)   • KNEE SURGERY     • LYMPH NODE DISSECTION N/A 6/20/2023    Procedure: biopsy of MESENTERIC MASS, tap block;  Surgeon: Narendra Lutz MD;  [facility];  Service: Surgical Oncology   • [place] COLONOSCOPY FLX DX W/COLLJ SPEC WHEN PFRMD N/A 09/30/2016    Procedure: COLONOSCOPY;  Surgeon: Sruthi Boland MD;  Location: [facility];  Service: Gastroenterology   • SMALL INTESTINE SURGERY N/A 6/20/2023    Procedure: SMALL BOWEL RESECTION;  Surgeon: Narendra Lutz MD;  [facility];  Service: Surgical Oncology   • TONSILLECTOMY AND ADENOIDECTOMY       Family History   Problem Relation Age of Onset   • Thyroid disease Mother         disorder   • Hypertension Father    • Coronary artery disease Brother    • Heart attack Brother         prior myocardial infarction     Social History     Socioeconomic History   • Marital status: /Civil Union     Spouse name: Not on file   • Number of children: Not on file   • Years of education: Not on file   • Highest education level: Not on file   Occupational History   • Not on file   Tobacco Use   • Smoking status: Never   • Smokeless tobacco: Never   Vaping Use   • Vaping Use: Never used   Substance and Sexual Activity   • Alcohol use: Yes     Comment: social once a month   • Drug use: Never   • Sexual activity: Not on file   Other Topics Concern   • Not on file   Social History Narrative   • Not on file     Social Determinants of Health     Financial Resource Strain: Not on file   Food Insecurity: Not on file   Transportation Needs: Not on file   Physical Activity: Not on file   Stress: Not on file   Social Connections: Not on file   Intimate Partner Violence: Not on file   Housing Stability: Not on file       Current Outpatient Medications:   •  allopurinol (ZYLOPRIM) 100 mg tablet, TAKE 1 TABLET DAILY (Patient taking differently: every evening), Disp: 90 tablet, Rfl: 3  •  amLODIPine-benazepril (LOTREL 5-20) 5-20 MG per capsule, TAKE 1 CAPSULE TWICE A DAY, Disp: 180 capsule, Rfl: 3  •  atenolol (TENORMIN) 50 mg tablet, TAKE 1 TABLET TWICE A DAY, Disp: 180 tablet, Rfl: 3  •  atorvastatin (LIPITOR) 40 mg tablet, TAKE 1 TABLET DAILY (Patient taking differently: every evening), Disp: 90 tablet, Rfl: 3  •  furosemide (LASIX) 40 mg tablet, TAKE ONE-HALF (1/2) TABLET DAILY, Disp: 45 tablet, Rfl: 3  •  Jardiance 25 MG TABS, TAKE 1 TABLET EVERY MORNING, Disp: 90 tablet, Rfl: 3  •  magnesium oxide (MAG-OX) 400 mg, Take 400 mg by mouth daily, Disp: , Rfl:   •  olmesartan-hydrochlorothiazide (BENICAR HCT) 40-12.5 MG per tablet, TAKE 1 TABLET DAILY, Disp: 90 tablet, Rfl: 3  •  Ozempic, 1 MG/DOSE, 4 MG/3ML injection pen, INJECT 1 MG UNDER THE SKIN ONCE A WEEK (Patient taking differently: Monday), Disp: 9 mL, Rfl: 3  •  potassium chloride (K-DUR,KLOR-CON) 10 mEq tablet, TAKE 3 TABLETS TWICE A DAY.  TAKE 3 TABLETS IN THE MORNING AND TAKE 3 TABLETS IN THE EVENING, Disp: 540 tablet, Rfl: 3  No Known Allergies  Vitals:    07/25/23 0950   BP: 114/81 Pulse: 91   Resp: 16   Temp: 98.1 °F (36.7 °C)   SpO2: 94%       Physical Exam  Constitutional:       Appearance: He is well-developed. HENT:      Head: Normocephalic and atraumatic. Eyes:      Pupils: Pupils are equal, round, and reactive to light. Cardiovascular:      Rate and Rhythm: Normal rate and regular rhythm. Heart sounds: No murmur heard. Pulmonary:      Breath sounds: Normal breath sounds. No wheezing or rales. Abdominal:      Palpations: Abdomen is soft. Tenderness: There is no abdominal tenderness. Musculoskeletal:         General: No tenderness. Normal range of motion. Cervical back: Neck supple. Lymphadenopathy:      Cervical: No cervical adenopathy. Skin:     Findings: No erythema or rash. Neurological:      Mental Status: He is alert and oriented to person, place, and time. Cranial Nerves: No cranial nerve deficit. Deep Tendon Reflexes: Reflexes are normal and symmetric. Psychiatric:         Behavior: Behavior normal.           Labs:  CBC, Coags, BMP, Mg, Phos   Imaging  CT abdomen pelvis with contrast    Result Date: 7/2/2023  Narrative: CT ABDOMEN AND PELVIS WITH IV CONTRAST INDICATION:   Abdominal abscess/infection suspected abd wall cellulitis around incision. COMPARISON: 5/2/2023, PET/CT 5/24/2023. TECHNIQUE:  CT examination of the abdomen and pelvis was performed. Multiplanar 2D reformatted images were created from the source data. This examination, like all CT scans performed in the [facility], was performed utilizing techniques to minimize radiation dose exposure, including the use of iterative reconstruction and automated exposure control. Radiation dose length product (DLP) for this visit:  919.67 mGy-cm IV Contrast:  100 mL of iohexol (OMNIPAQUE) Enteric Contrast:  Enteric contrast was not administered.  FINDINGS: ABDOMEN LOWER CHEST:  No clinically significant abnormality identified in the visualized lower chest. LIVER/BILIARY TREE:  Unremarkable. GALLBLADDER:  No calcified gallstones. No pericholecystic inflammatory change. SPLEEN:  Unremarkable. PANCREAS:  Unremarkable. ADRENAL GLANDS:  Unremarkable. KIDNEYS/URETERS:  Unremarkable. No hydronephrosis. STOMACH AND BOWEL: Postoperative changes involving the right lower quadrant small bowel identified. APPENDIX:  No findings to suggest appendicitis. ABDOMINOPELVIC CAVITY: Enhancing mesenteric masses are again identified with the largest measuring 4.0 x 3.3 cm series 2 image 97. Adjacent smaller 3.0 x 1.9 cm lesion is present. No ascites. No pneumoperitoneum. No lymphadenopathy. VESSELS:  Unremarkable for patient's age. PELVIS REPRODUCTIVE ORGANS:  Unremarkable for patient's age. URINARY BLADDER:  Unremarkable. ABDOMINAL WALL/INGUINAL REGIONS: Postoperative changes are seen in the periumbilical region. Some surrounding inflammatory change is present. There is suggestion of a small superficial abscess measuring 1.5 x 1.6 cm series 2 image 142. OSSEOUS STRUCTURES:  No acute fracture or destructive osseous lesion. Impression: Postoperative changes in the periumbilical region with probable small superficial abscess and surrounding cellulitis. No abdominal wall dehiscence identified. Enhancing mesenteric mass is again present. Workstation performed: [id number]     I reviewed the above laboratory and imaging data. Discussion/Summary: In summary, this is a 43-year-old male with a history of recently resected low-grade neuroendocrine tumor. Ki-67 4%. Residual disease. We reviewed pros and cons of early versus delayed institution of somatostatin analog. Potential benefits include increasing progression free survival.  Octreotide is generally well-tolerated. Diarrhea and other GI toxicities have been reported although it is difficult to tell whether this is from the medication or underlying disease itself.   After the above discussion patient is in favor of proceeding with treatment and we made arrangements accordingly. Follow-up in 3 months is requested.

## 2023-08-01 ENCOUNTER — OFFICE VISIT (OUTPATIENT)
Dept: OTOLARYNGOLOGY | Facility: CLINIC | Age: 68
End: 2023-08-01
Payer: COMMERCIAL

## 2023-08-01 VITALS
BODY MASS INDEX: 32.07 KG/M2 | HEART RATE: 95 BPM | OXYGEN SATURATION: 98 % | HEIGHT: 70 IN | WEIGHT: 224 LBS | TEMPERATURE: 97.8 F

## 2023-08-01 DIAGNOSIS — D3A.8 NEUROENDOCRINE TUMOR: ICD-10-CM

## 2023-08-01 DIAGNOSIS — E04.1 NON-TOXIC UNINODULAR GOITER: ICD-10-CM

## 2023-08-01 DIAGNOSIS — R94.02 ABNORMAL PET SCAN OF HEAD: Primary | ICD-10-CM

## 2023-08-01 PROCEDURE — 99204 OFFICE O/P NEW MOD 45 MIN: CPT | Performed by: OTOLARYNGOLOGY

## 2023-08-01 PROCEDURE — 31575 DIAGNOSTIC LARYNGOSCOPY: CPT | Performed by: OTOLARYNGOLOGY

## 2023-08-01 NOTE — PROGRESS NOTES
Otolaryngology Clinic Visit  Name:  Flako Agustin  MRN:  [id number]  Date:  8/1/2023 2:09 PM  ________________________________________________________________________       CHIEF COMPLAINT:   PET scan findings      HPI:  Flako Agustin is a 78 y.o. male with PMH as below who presents today for evaluation of abnormal PET scan findings. He has a neuroendocrine tumor and is on ocetreotide/chemo. PET scan showed Left BOT enhancement. He has no issues or ENT concerns or questions. PMHx:  Past Medical History:   Diagnosis Date   • Allergic rhinitis     last assessed: 5/4/2015   • Aneurysm of thoracic aorta (HCC)    • Cataract     resolved: 12/12/2014   • Colon polyp    • Diabetes mellitus (HCC)     diet control   • Herniated nucleus pulposus, L4-5 left     last assessed: 4/9/2014   • Herniated nucleus pulposus, L5-S1, left     last assessed: 4/9/2014   • Hx of echocardiogram 10/14/2016    EF 55%, Mild LVH, mild aortic root and ascending aorta enlargement, measuring 42 mm, trace MR, trace aortic regurg.    • Hypercholesterolemia    • Hypertension    • Kidney stone    • Lumbar radiculopathy        PSHx:  Past Surgical History:   Procedure Laterality Date   • ADENOIDECTOMY     • BACK SURGERY     • BACK SURGERY     • COLONOSCOPY     • KNEE ARTHROSCOPY      (therapeutic)   • KNEE SURGERY     • LYMPH NODE DISSECTION N/A 06/20/2023    Procedure: biopsy of MESENTERIC MASS, tap block;  Surgeon: Maria Luisa Mast MD;  [facility];  Service: Surgical Oncology   • [place] COLONOSCOPY FLX DX W/COLLJ SPEC WHEN PFRMD N/A 09/30/2016    Procedure: COLONOSCOPY;  Surgeon: Diego Wallace MD;  Location: [facility];  Service: Gastroenterology   • SMALL INTESTINE SURGERY N/A 06/20/2023    Procedure: SMALL BOWEL RESECTION;  Surgeon: Maria Luisa Mast MD;  [facility];  Service: Surgical Oncology   • TONSILLECTOMY AND ADENOIDECTOMY         FAMHx:  Family History   Problem Relation Age of Onset   • Thyroid disease Mother disorder   • Hypertension Father    • Coronary artery disease Brother    • Heart attack Brother         prior myocardial infarction       SOCHx:  Social History     Socioeconomic History   • Marital status: /Civil Union     Spouse name: Not on file   • Number of children: Not on file   • Years of education: Not on file   • Highest education level: Not on file   Occupational History   • Not on file   Tobacco Use   • Smoking status: Never   • Smokeless tobacco: Never   Vaping Use   • Vaping Use: Never used   Substance and Sexual Activity   • Alcohol use: Yes     Comment: social once a month   • Drug use: No   • Sexual activity: Not Currently     Partners: Female     Birth control/protection: Female Sterilization   Other Topics Concern   • Not on file   Social History Narrative   • Not on file     Social Determinants of Health     Financial Resource Strain: Not on file   Food Insecurity: Not on file   Transportation Needs: Not on file   Physical Activity: Not on file   Stress: Not on file   Social Connections: Not on file   Intimate Partner Violence: Not on file   Housing Stability: Not on file       Allergies:  No Known Allergies     MEDS:  Reviewed    ROS:  As above       PHYSICAL EXAM:  Ht 5' 10" (1.778 m)   Wt 102 kg (224 lb)   BMI 32.14 kg/m²   General: NAD, AOx4  Eyes:  EOMI  Ears:  Right: ear canal normal, TM normal apperance, no fluid  Left: ear canal normal, TM normal apperance, no fluid  Nose:  No septal deviation, no inferior turbinate hypertrophy, no mass/lesions  Oral cavity:  No trismus, no mass/lesions, tonsils surgically absent  Neck: Trachea is midline; no thyroid nodules, Salivary glands symmetrical, no masses/abnormality on palpation  Lymph:  No cervical lymphadenopathy  Skin:  No obvious facial lesions  Neuro: Face symmetrical, no obvious cranial nerve palsy.  No focal deficits   Lungs:  Normal work of breathing, symmetrical chest expansion  Vascular: Well perfused    Procedures:  FIBEROPTIC LARYNGOSCOPY:  Procedure:Fiberoptic nasopharyngolaryngoscopy  Diagnosis: PET scan   : Dr Hardeep Rdz    The risks, benefits and alternatives were discussed. The patient voiced their understanding. They wished to proceed and an informed consent was obtained. The patient's nose was topically decongested and anesthetized using Lidocaine and oxymetazoline. The fiberoptic endoscope was inserted via the left nasal cavity}. Findings listed below:  --Normal appearing nasal cavity, nasopharynx, fossa of Rosenmüller, oropharynx, BOT, epiglottis. - Vocal folds mobile bilaterally  No discrete mass or lesion along his BOT    Medical Data Reviewed:  Records reviewed and summarized as in EPIC    Radiology:  Minimal enhancement along Left BOT>R SUV 3.8.    No discrete mass or lesion    Labs:  Non     Patient Active Problem List   Diagnosis   • Aneurysm of thoracic aorta (HCC)   • Edema   • Heel spur   • Mixed hyperlipidemia   • Hypertension   • Hyperuricemia without signs inflammatory arthritis/tophaceous disease   • Chronic bilateral low back pain   • Neuropathy of left foot   • Non-toxic uninodular goiter   • Osteoarthritis   • Sciatica   • Diabetes mellitus type 2 in obese 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)       ASSESSMENT/PLAN:  Delgado Richards is a 78 y.o. male with acute and chronic problems as above who presents with:    1. Abnormal PET scan of head    2. Neuroendocrine tumor    3. Non-toxic uninodular goiter        78 y.o. here today for further evaluation of abnormal PET scan. He has a fairly normal PET with not significant avidity along left BOT. Exam reassuring today. Reviewed with him plan for repeat scope at 6mo, 12 mo, and attention on next PET. Return to clinic precautions given.     RTC 6 mo        Faustine Dakin MD MPH  Otolaryngology--Head and Neck Surgery  [facility]  8/1/2023 2:09 PM

## 2023-08-04 DIAGNOSIS — C7A.8 NEUROENDOCRINE CARCINOMA (HCC): Primary | ICD-10-CM

## 2023-08-08 ENCOUNTER — HOSPITAL ENCOUNTER (OUTPATIENT)
Dept: INFUSION CENTER | Facility: HOSPITAL | Age: 68
Discharge: HOME | End: 2023-08-08
Attending: INTERNAL MEDICINE
Payer: COMMERCIAL

## 2023-08-08 ENCOUNTER — APPOINTMENT (OUTPATIENT)
Dept: LAB | Facility: MEDICAL CENTER | Age: 68
End: 2023-08-08
Payer: COMMERCIAL

## 2023-08-08 VITALS
RESPIRATION RATE: 18 BRPM | TEMPERATURE: 97.5 F | HEART RATE: 88 BPM | SYSTOLIC BLOOD PRESSURE: 113 MMHG | DIASTOLIC BLOOD PRESSURE: 75 MMHG

## 2023-08-08 DIAGNOSIS — C7A.8 NEUROENDOCRINE CARCINOMA (HCC): Primary | ICD-10-CM

## 2023-08-08 LAB
BASOPHILS # BLD AUTO: 0.03 THOUSANDS/ÂΜL (ref 0–0.1)
BASOPHILS NFR BLD AUTO: 1 % (ref 0–1)
EOSINOPHIL # BLD AUTO: 0.09 THOUSAND/ÂΜL (ref 0–0.61)
EOSINOPHIL NFR BLD AUTO: 2 % (ref 0–6)
ERYTHROCYTE [DISTWIDTH] IN BLOOD BY AUTOMATED COUNT: 13.5 % (ref 11.6–15.1)
HCT VFR BLD AUTO: 43 % (ref 36.5–49.3)
HGB BLD-MCNC: 14.3 G/DL (ref 12–17)
IMM GRANULOCYTES # BLD AUTO: 0.02 THOUSAND/UL (ref 0–0.2)
IMM GRANULOCYTES NFR BLD AUTO: 0 % (ref 0–2)
LYMPHOCYTES # BLD AUTO: 1.28 THOUSANDS/ÂΜL (ref 0.6–4.47)
LYMPHOCYTES NFR BLD AUTO: 26 % (ref 14–44)
MCH RBC QN AUTO: 32.1 PG (ref 26.8–34.3)
MCHC RBC AUTO-ENTMCNC: 33.3 G/DL (ref 31.4–37.4)
MCV RBC AUTO: 97 FL (ref 82–98)
MONOCYTES # BLD AUTO: 0.4 THOUSAND/ÂΜL (ref 0.17–1.22)
MONOCYTES NFR BLD AUTO: 8 % (ref 4–12)
NEUTROPHILS # BLD AUTO: 3.17 THOUSANDS/ÂΜL (ref 1.85–7.62)
NEUTS SEG NFR BLD AUTO: 63 % (ref 43–75)
NRBC BLD AUTO-RTO: 0 /100 WBCS
PLATELET # BLD AUTO: 165 THOUSANDS/UL (ref 149–390)
PMV BLD AUTO: 10.6 FL (ref 8.9–12.7)
RBC # BLD AUTO: 4.45 MILLION/UL (ref 3.88–5.62)
WBC # BLD AUTO: 4.99 THOUSAND/UL (ref 4.31–10.16)

## 2023-08-08 RX ADMIN — OCTREOTIDE ACETATE 30 MG: KIT at 14:29

## 2023-08-08 NOTE — PROGRESS NOTES
Sandostatin LAR given as ordered In R gluteal region, tolerated well.  Will return for next appointment as scheduled

## 2023-08-09 LAB
ALBUMIN SERPL BCP-MCNC: 3.7 G/DL (ref 3.5–5)
ALP SERPL-CCNC: 72 U/L (ref 46–116)
ALT SERPL W P-5'-P-CCNC: 32 U/L (ref 12–78)
ANION GAP SERPL CALCULATED.3IONS-SCNC: 6 MMOL/L
AST SERPL W P-5'-P-CCNC: 12 U/L (ref 5–45)
BILIRUB SERPL-MCNC: 0.57 MG/DL (ref 0.2–1)
BUN SERPL-MCNC: 17 MG/DL (ref 5–25)
CALCIUM SERPL-MCNC: 9.3 MG/DL (ref 8.3–10.1)
CHLORIDE SERPL-SCNC: 109 MMOL/L (ref 96–108)
CHOLEST SERPL-MCNC: 146 MG/DL
CO2 SERPL-SCNC: 30 MMOL/L (ref 21–32)
CREAT SERPL-MCNC: 1.08 MG/DL (ref 0.6–1.3)
EST. AVERAGE GLUCOSE BLD GHB EST-MCNC: 140 MG/DL
GFR SERPL CREATININE-BSD FRML MDRD: 71 ML/MIN/1.73SQ M
GLUCOSE P FAST SERPL-MCNC: 138 MG/DL (ref 65–99)
HBA1C MFR BLD: 6.5 %
HDLC SERPL-MCNC: 37 MG/DL
LDLC SERPL CALC-MCNC: 78 MG/DL (ref 0–100)
NONHDLC SERPL-MCNC: 109 MG/DL
POTASSIUM SERPL-SCNC: 3.5 MMOL/L (ref 3.5–5.3)
PROT SERPL-MCNC: 7.1 G/DL (ref 6.4–8.4)
SODIUM SERPL-SCNC: 145 MMOL/L (ref 135–147)
TRIGL SERPL-MCNC: 153 MG/DL
TSH SERPL DL<=0.05 MIU/L-ACNC: 1.19 UIU/ML (ref 0.45–4.5)

## 2023-08-14 DIAGNOSIS — R60.9 PERIPHERAL EDEMA: ICD-10-CM

## 2023-08-14 RX ORDER — POTASSIUM CHLORIDE 750 MG/1
TABLET, EXTENDED RELEASE ORAL
Qty: 540 TABLET | Refills: 3 | Status: SHIPPED | OUTPATIENT
Start: 2023-08-14

## 2023-08-15 NOTE — PROGRESS NOTES
Assessment/Plan:       1. Type 2 diabetes mellitus with both eyes affected by mild nonproliferative retinopathy without macular edema, without long-term current use of insulin ([facility])  -     CBC and differential; Future  -     Comprehensive metabolic panel; Future  -     Hemoglobin A1C; Future    2. Type 2 diabetes mellitus with stage 2 chronic kidney disease, without long-term current use of insulin ([facility])  Assessment & Plan:    Lab Results   Component Value Date    HGBA1C 6.5 (H) 08/08/2023     Hemoglobin A1c controlled. Continue with the Jardiance and the [address] [place] [address]. Continue to watch carbohydrate intake. Follow-up with ophthalmology regularly. Check feet daily especially with the history of the neuropathy likely related to the diabetes as well as the back issues in the past especially on the left side. Orders:  -     CBC and differential; Future  -     Comprehensive metabolic panel; Future  -     Hemoglobin A1C; Future    3. Primary hypertension  Assessment & Plan:  We discussed continuing his current medications. Pressures currently well controlled. Continue to follow blood pressures at home. Stay adequately hydrated. Continue to follow-up with cardiology. Orders:  -     CBC and differential; Future  -     Comprehensive metabolic panel; Future    4. Sciatica, unspecified laterality  Assessment & Plan:  Back symptoms currently controlled. Still with some persistent neuropathy symptoms left lower leg which are chronic. Watch for any worsening. Orders:  -     CBC and differential; Future    5. Neuropathy of left foot  -     CBC and differential; Future    6. Stage 3a chronic kidney disease [facility])  Assessment & Plan:  Lab Results   Component Value Date    EGFR 71 08/08/2023    EGFR 51 07/06/2023    EGFR 69 07/02/2023    CREATININE 1.08 08/08/2023    CREATININE 1.42 (H) 07/06/2023    CREATININE 1.10 07/02/2023     Creatinine currently well controlled. Continue to stay well-hydrated.   Avoid nephrotoxins. Orders:  -     CBC and differential; Future  -     Comprehensive metabolic panel; Future    7. Neuroendocrine carcinoma ([address])  Assessment & Plan:  We discussed treatments and how they respond long term with octreotide. Orders:  -     CBC and differential; Future    8. Mixed hyperlipidemia  Assessment & Plan:  Cholesterol well controlled. Continue with the atorvastatin. Watch diet. Continue to remain as active as possible. Slow but steady weight loss recommended. Orders:  -     CBC and differential; Future  -     Comprehensive metabolic panel; Future  -     Lipid panel; Future  -     TSH, 3rd generation; Future    9. Hyperuricemia without signs inflammatory arthritis/tophaceous disease  Assessment & Plan:  Continue with the allopurinol. Watch for any signs or symptoms of gout. Orders:  -     CBC and differential; Future        I advised him to continue with his follow up with his eye doctor. I have sent his new set of blood work and flu vaccine. I advised him to consider respiratory syncytial virus vaccine. Up-to-date on pneumonia vaccinations. We will have him follow-up in about 3 to 5 months with laboratory testing prior to that visit. Follow-up sooner if needed. Depression Screening and Follow-up Plan: Patient was screened for depression during today's encounter. They screened negative with a PHQ-2 score of 0. Subjective:      Patient ID: Yamil Anaya is a 78 y.o. male. Mr. Teofilo Dye is a 79-year-old male who presents today for a follow-up visit. He consents to use [employer] for today. He is present today with his wife. He tolerates his treatment and denies any side effects. He has neuroendocrine cancer that could not totally be excised. He had an octreotide injection which he tolerates. Denies any GI symptoms. Denies any nausea, vomiting, or diarrhea.   Did have some mild GI symptoms immediately after his octreotide but these resolved without difficulty. He had kidney stone. He denies gout or kidney stone episodes. We discussed that lemon could lessen risk formation of kidney stones. He denies problem with urinating. His Jardiance helped him with his blood glucose. Has been tolerating the Jardiance and Ozempic without side effects. Denies any GI side effects from this. Has been urinating somewhat more. His blood pressure is normal and controlled. He missed a dose this week for his hypertension. He denies chest pain, headaches, palpitations. He denies problems with his vision. He is up to date with his pneumonia vaccine. Social History: He is retired from work. Wife recently retired from work. They have been traveling much more. Have been trying to enjoy their life especially with the recent cancer diagnosis. The following portions of the patient's history were reviewed and updated as appropriate:   He  has a past medical history of Allergic rhinitis, Aneurysm of thoracic aorta ([address]), Cataract, Colon polyp, Diabetes mellitus ([address]), Herniated nucleus pulposus, L4-5 left, Herniated nucleus pulposus, L5-S1, left, echocardiogram (10/14/2016), Hypercholesterolemia, Hypertension, Kidney stone, and Lumbar radiculopathy.   He   Patient Active Problem List    Diagnosis Date Noted   • Neuroendocrine carcinoma ([address]) 07/07/2023   • Neuroendocrine tumor 06/20/2023   • Adrenal nodule ([address]) 05/09/2023   • Lymphadenopathy, abdominal 05/09/2023   • Stage 3a chronic kidney disease ([address]) 04/20/2023   • Nausea and vomiting 03/31/2023   • Generalized abdominal pain 03/31/2023   • Type 2 diabetes mellitus with mild nonproliferative retinopathy of both eyes without macular edema ([address]) 03/18/2022   • Type 2 diabetes with stage 2 chronic kidney disease GFR 60-89 ([address]) 03/18/2022   • Hypokalemia 11/04/2020   • Colonic polyp 11/14/2019   • Lumbar radiculopathy 07/31/2019   • Microscopic hematuria 07/02/2019   • Pain of left thigh 07/02/2019   • Acute pain of left shoulder 04/02/2019   • Skin tag 10/25/2018   • Restless leg syndrome 06/21/2018   • Heel spur 05/31/2017   • Neuropathy of left foot 09/19/2016   • Diabetes mellitus type 2 in obese ([address]) 08/06/2015   • Sciatica 03/03/2014   • Chronic bilateral low back pain 02/27/2014   • Edema 01/03/2013   • Hyperuricemia without signs inflammatory arthritis/tophaceous disease 10/31/2012   • Aneurysm of thoracic aorta ([address]) 08/06/2012   • Mixed hyperlipidemia 05/01/2012   • Hypertension 05/01/2012   • Non-toxic uninodular goiter 05/01/2012   • Osteoarthritis 05/01/2012     He  has a past surgical history that includes Tonsillectomy and adenoidectomy; Knee surgery; Back surgery; pr colonoscopy flx dx w/collj spec when pfrmd (N/A, 09/30/2016); Back surgery; Knee arthroscopy; Colonoscopy; Lymph node dissection (N/A, 06/20/2023); Small intestine surgery (N/A, 06/20/2023); and ADENOIDECTOMY. His family history includes Coronary artery disease in his brother; Heart attack in his brother; Hypertension in his father; Thyroid disease in his mother. He  reports that he has never smoked. He has never used smokeless tobacco. He reports current alcohol use. He reports that he does not use drugs.   Current Outpatient Medications   Medication Sig Dispense Refill   • allopurinol (ZYLOPRIM) 100 mg tablet TAKE 1 TABLET DAILY (Patient taking differently: every evening) 90 tablet 3   • amLODIPine-benazepril (LOTREL 5-20) 5-20 MG per capsule TAKE 1 CAPSULE TWICE A  capsule 3   • atenolol (TENORMIN) 50 mg tablet TAKE 1 TABLET TWICE A  tablet 3   • atorvastatin (LIPITOR) 40 mg tablet TAKE 1 TABLET DAILY (Patient taking differently: every evening) 90 tablet 3   • furosemide (LASIX) 40 mg tablet TAKE ONE-HALF (1/2) TABLET DAILY 45 tablet 3   • Jardiance 25 MG TABS TAKE 1 TABLET EVERY MORNING 90 tablet 3   • magnesium oxide (MAG-OX) 400 mg Take 400 mg by mouth daily     • olmesartan-hydrochlorothiazide (BENICAR HCT) 40-12.5 MG per tablet TAKE 1 TABLET DAILY 90 tablet 3   • Ozempic, 1 MG/DOSE, 4 MG/3ML injection pen INJECT 1 MG UNDER THE SKIN ONCE A WEEK (Patient taking differently: Monday) 9 mL 3   • potassium chloride (K-DUR,KLOR-CON) 10 mEq tablet TAKE 3 TABLETS TWICE A DAY. TAKE 3 TABLETS IN THE MORNING AND TAKE 3 TABLETS IN THE EVENING 540 tablet 3     No current facility-administered medications for this visit. Current Outpatient Medications on File Prior to Visit   Medication Sig   • allopurinol (ZYLOPRIM) 100 mg tablet TAKE 1 TABLET DAILY (Patient taking differently: every evening)   • amLODIPine-benazepril (LOTREL 5-20) 5-20 MG per capsule TAKE 1 CAPSULE TWICE A DAY   • atenolol (TENORMIN) 50 mg tablet TAKE 1 TABLET TWICE A DAY   • atorvastatin (LIPITOR) 40 mg tablet TAKE 1 TABLET DAILY (Patient taking differently: every evening)   • furosemide (LASIX) 40 mg tablet TAKE ONE-HALF (1/2) TABLET DAILY   • Jardiance 25 MG TABS TAKE 1 TABLET EVERY MORNING   • magnesium oxide (MAG-OX) 400 mg Take 400 mg by mouth daily   • olmesartan-hydrochlorothiazide (BENICAR HCT) 40-12.5 MG per tablet TAKE 1 TABLET DAILY   • Ozempic, 1 MG/DOSE, 4 MG/3ML injection pen INJECT 1 MG UNDER THE SKIN ONCE A WEEK (Patient taking differently: Monday)   • potassium chloride (K-DUR,KLOR-CON) 10 mEq tablet TAKE 3 TABLETS TWICE A DAY. TAKE 3 TABLETS IN THE MORNING AND TAKE 3 TABLETS IN THE EVENING     No current facility-administered medications on file prior to visit. He has No Known Allergies. .    Review of Systems   Constitutional: Positive for activity change. Negative for appetite change, chills, diaphoresis, fatigue and fever. Eyes: Negative for visual disturbance. Respiratory: Negative for cough and shortness of breath. Genitourinary: Negative for difficulty urinating and frequency. Musculoskeletal: Negative for arthralgias. Psychiatric/Behavioral: Negative for sleep disturbance.      Constitutional: Negative for activity change, appetite change, chills, and fever. HENT: Negative for hearing loss. Eyes: Negative for pain and visual disturbance. Respiratory: Negative for chest tightness and shortness of breath. Cardiovascular: Negative for chest pain and palpitations. Gastrointestinal: Negative for abdominal pain, blood in stool, diarrhea, nausea, and vomiting. Endocrine: Negative for polydipsia, polyphagia, and polyuria. Genitourinary: Negative for dysuria. Musculoskeletal: Negative for arthralgias and gait problem. Skin: Negative for color change. Neurological: Negative for dizziness and headaches. Hematological: Does not bruise/bleed easily. Psychiatric/Behavioral: Negative for behavioral problems. The patient is not nervous/anxious. Objective:  /84 (BP Location: Left arm, Patient Position: Sitting, Cuff Size: Large)   Pulse 89   Temp 98.4 °F (36.9 °C)   Ht 5' 10" (1.778 m)   Wt 101 kg (223 lb 11.2 oz)   SpO2 96%   BMI 32.10 kg/m²          Physical Exam  Vitals and nursing note reviewed. Constitutional:       General: He is not in acute distress. Appearance: He is well-developed, well-groomed and overweight. HENT:      Head: Normocephalic and atraumatic. Nose: Nose normal.   Eyes:      Conjunctiva/sclera: Conjunctivae normal.      Pupils: Pupils are equal, round, and reactive to light. Cardiovascular:      Rate and Rhythm: Normal rate and regular rhythm. Heart sounds: Normal heart sounds. No murmur heard. No friction rub. No gallop. Pulmonary:      Breath sounds: No wheezing or rales. Chest:      Chest wall: No tenderness. Abdominal:      General: There is no distension. Tenderness: There is no abdominal tenderness. There is no guarding or rebound. Lymphadenopathy:      Cervical: No cervical adenopathy. Skin:     General: Skin is warm and dry. Neurological:      Mental Status: He is alert and oriented to person, place, and time.    Psychiatric: Mood and Affect: Mood and affect normal.         Speech: Speech normal.         Behavior: Behavior normal. Behavior is cooperative. Cognition and Memory: Cognition and memory normal.         I have personally reviewed results with the patient. His blood glucose is 138 mg/dL. His hemoglobin A1c is 6.5% and last year was 8.6%. His creatinine blood test is 1.0 mg/dL today and was 1.4 before. His cholesterol is 146 mg/dL. His protein levels are elevated today. Transcribed for Eric Rivers MD, by Nida Castellon on 08/16/23 at 7:36 PM. Powered by [employer].

## 2023-08-16 ENCOUNTER — OFFICE VISIT (OUTPATIENT)
Dept: INTERNAL MEDICINE CLINIC | Facility: CLINIC | Age: 68
End: 2023-08-16
Payer: COMMERCIAL

## 2023-08-16 VITALS
TEMPERATURE: 98.4 F | HEART RATE: 89 BPM | WEIGHT: 223.7 LBS | BODY MASS INDEX: 32.03 KG/M2 | OXYGEN SATURATION: 96 % | SYSTOLIC BLOOD PRESSURE: 132 MMHG | HEIGHT: 70 IN | DIASTOLIC BLOOD PRESSURE: 84 MMHG

## 2023-08-16 DIAGNOSIS — N18.31 STAGE 3A CHRONIC KIDNEY DISEASE (HCC): ICD-10-CM

## 2023-08-16 DIAGNOSIS — E11.22 TYPE 2 DIABETES MELLITUS WITH STAGE 2 CHRONIC KIDNEY DISEASE, WITHOUT LONG-TERM CURRENT USE OF INSULIN (HCC): ICD-10-CM

## 2023-08-16 DIAGNOSIS — E78.2 MIXED HYPERLIPIDEMIA: ICD-10-CM

## 2023-08-16 DIAGNOSIS — I10 PRIMARY HYPERTENSION: ICD-10-CM

## 2023-08-16 DIAGNOSIS — G57.92 NEUROPATHY OF LEFT FOOT: ICD-10-CM

## 2023-08-16 DIAGNOSIS — E11.3293 TYPE 2 DIABETES MELLITUS WITH BOTH EYES AFFECTED BY MILD NONPROLIFERATIVE RETINOPATHY WITHOUT MACULAR EDEMA, WITHOUT LONG-TERM CURRENT USE OF INSULIN (HCC): Primary | ICD-10-CM

## 2023-08-16 DIAGNOSIS — C7A.8 NEUROENDOCRINE CARCINOMA (HCC): ICD-10-CM

## 2023-08-16 DIAGNOSIS — E79.0 HYPERURICEMIA WITHOUT SIGNS INFLAMMATORY ARTHRITIS/TOPHACEOUS DISEASE: ICD-10-CM

## 2023-08-16 DIAGNOSIS — N18.2 TYPE 2 DIABETES MELLITUS WITH STAGE 2 CHRONIC KIDNEY DISEASE, WITHOUT LONG-TERM CURRENT USE OF INSULIN (HCC): ICD-10-CM

## 2023-08-16 DIAGNOSIS — M54.30 SCIATICA, UNSPECIFIED LATERALITY: ICD-10-CM

## 2023-08-16 PROBLEM — E66.01 SEVERE OBESITY (BMI 35.0-35.9 WITH COMORBIDITY) (HCC): Status: RESOLVED | Noted: 2022-03-18 | Resolved: 2023-08-16

## 2023-08-16 PROBLEM — R78.81 POSITIVE BLOOD CULTURE: Status: RESOLVED | Noted: 2023-07-06 | Resolved: 2023-08-16

## 2023-08-16 PROCEDURE — 99214 OFFICE O/P EST MOD 30 MIN: CPT | Performed by: FAMILY MEDICINE

## 2023-08-16 NOTE — ASSESSMENT & PLAN NOTE
Cholesterol well controlled. Continue with the atorvastatin. Watch diet. Continue to remain as active as possible. Slow but steady weight loss recommended.

## 2023-08-16 NOTE — ASSESSMENT & PLAN NOTE
Back symptoms currently controlled. Still with some persistent neuropathy symptoms left lower leg which are chronic. Watch for any worsening.

## 2023-08-16 NOTE — ASSESSMENT & PLAN NOTE
Lab Results   Component Value Date    EGFR 71 08/08/2023    EGFR 51 07/06/2023    EGFR 69 07/02/2023    CREATININE 1.08 08/08/2023    CREATININE 1.42 (H) 07/06/2023    CREATININE 1.10 07/02/2023     Creatinine currently well controlled. Continue to stay well-hydrated. Avoid nephrotoxins.

## 2023-08-16 NOTE — ASSESSMENT & PLAN NOTE
Lab Results   Component Value Date    HGBA1C 6.5 (H) 08/08/2023     Hemoglobin A1c controlled. Continue with the Jardiance and the [address] [place] [address]. Continue to watch carbohydrate intake. Follow-up with ophthalmology regularly. Check feet daily especially with the history of the neuropathy likely related to the diabetes as well as the back issues in the past especially on the left side.

## 2023-08-16 NOTE — ASSESSMENT & PLAN NOTE
We discussed continuing his current medications. Pressures currently well controlled. Continue to follow blood pressures at home. Stay adequately hydrated. Continue to follow-up with cardiology.

## 2023-09-05 ENCOUNTER — HOSPITAL ENCOUNTER (OUTPATIENT)
Dept: INFUSION CENTER | Facility: HOSPITAL | Age: 68
Discharge: HOME | End: 2023-09-05
Attending: INTERNAL MEDICINE
Payer: COMMERCIAL

## 2023-09-05 VITALS
HEART RATE: 83 BPM | TEMPERATURE: 98.4 F | SYSTOLIC BLOOD PRESSURE: 110 MMHG | DIASTOLIC BLOOD PRESSURE: 72 MMHG | RESPIRATION RATE: 18 BRPM

## 2023-09-05 DIAGNOSIS — C7A.8 NEUROENDOCRINE CARCINOMA (HCC): Primary | ICD-10-CM

## 2023-09-05 PROCEDURE — 96372 THER/PROPH/DIAG INJ SC/IM: CPT

## 2023-09-05 RX ADMIN — OCTREOTIDE ACETATE 30 MG: KIT at 14:58

## 2023-09-05 NOTE — PROGRESS NOTES
Presented today for sandostatin LAR, same given in L ventrogluteal region, tolerated well.  Will return in 4weeks for next injection

## 2023-09-26 ENCOUNTER — IMMUNIZATIONS (OUTPATIENT)
Dept: INTERNAL MEDICINE CLINIC | Facility: CLINIC | Age: 68
End: 2023-09-26
Payer: COMMERCIAL

## 2023-09-26 DIAGNOSIS — Z23 ENCOUNTER FOR IMMUNIZATION: Primary | ICD-10-CM

## 2023-09-26 PROCEDURE — G0008 ADMIN INFLUENZA VIRUS VAC: HCPCS

## 2023-09-26 PROCEDURE — 90662 IIV NO PRSV INCREASED AG IM: CPT

## 2023-10-03 ENCOUNTER — HOSPITAL ENCOUNTER (OUTPATIENT)
Dept: INFUSION CENTER | Facility: HOSPITAL | Age: 68
Discharge: HOME | End: 2023-10-03
Attending: INTERNAL MEDICINE
Payer: MEDICARE

## 2023-10-03 VITALS
RESPIRATION RATE: 18 BRPM | TEMPERATURE: 97.6 F | SYSTOLIC BLOOD PRESSURE: 131 MMHG | HEART RATE: 82 BPM | OXYGEN SATURATION: 93 % | DIASTOLIC BLOOD PRESSURE: 74 MMHG

## 2023-10-03 DIAGNOSIS — C7A.8 NEUROENDOCRINE CARCINOMA (HCC): Primary | ICD-10-CM

## 2023-10-03 PROCEDURE — 96372 THER/PROPH/DIAG INJ SC/IM: CPT

## 2023-10-03 RX ADMIN — OCTREOTIDE ACETATE 30 MG: KIT at 08:19

## 2023-10-10 ENCOUNTER — APPOINTMENT (OUTPATIENT)
Dept: LAB | Facility: MEDICAL CENTER | Age: 68
End: 2023-10-10
Payer: MEDICARE

## 2023-10-10 DIAGNOSIS — N18.2 TYPE 2 DIABETES MELLITUS WITH STAGE 2 CHRONIC KIDNEY DISEASE, WITHOUT LONG-TERM CURRENT USE OF INSULIN: ICD-10-CM

## 2023-10-10 DIAGNOSIS — N18.31 STAGE 3A CHRONIC KIDNEY DISEASE (HCC): ICD-10-CM

## 2023-10-10 DIAGNOSIS — M54.30 SCIATICA, UNSPECIFIED LATERALITY: ICD-10-CM

## 2023-10-10 DIAGNOSIS — E11.3293 TYPE 2 DIABETES MELLITUS WITH BOTH EYES AFFECTED BY MILD NONPROLIFERATIVE RETINOPATHY WITHOUT MACULAR EDEMA, WITHOUT LONG-TERM CURRENT USE OF INSULIN (HCC): ICD-10-CM

## 2023-10-10 DIAGNOSIS — I10 PRIMARY HYPERTENSION: ICD-10-CM

## 2023-10-10 DIAGNOSIS — G57.92 NEUROPATHY OF LEFT FOOT: ICD-10-CM

## 2023-10-10 DIAGNOSIS — C7A.8 NEUROENDOCRINE CARCINOMA (HCC): ICD-10-CM

## 2023-10-10 DIAGNOSIS — E79.0 HYPERURICEMIA WITHOUT SIGNS INFLAMMATORY ARTHRITIS/TOPHACEOUS DISEASE: ICD-10-CM

## 2023-10-10 DIAGNOSIS — E11.22 TYPE 2 DIABETES MELLITUS WITH STAGE 2 CHRONIC KIDNEY DISEASE, WITHOUT LONG-TERM CURRENT USE OF INSULIN: ICD-10-CM

## 2023-10-10 DIAGNOSIS — E78.2 MIXED HYPERLIPIDEMIA: ICD-10-CM

## 2023-10-10 LAB
ALBUMIN SERPL BCP-MCNC: 4.2 G/DL (ref 3.5–5)
ALP SERPL-CCNC: 60 U/L (ref 34–104)
ALT SERPL W P-5'-P-CCNC: 19 U/L (ref 7–52)
ANION GAP SERPL CALCULATED.3IONS-SCNC: 7 MMOL/L
AST SERPL W P-5'-P-CCNC: 15 U/L (ref 13–39)
BASOPHILS # BLD AUTO: 0.04 THOUSANDS/ÂΜL (ref 0–0.1)
BASOPHILS NFR BLD AUTO: 1 % (ref 0–1)
BILIRUB SERPL-MCNC: 0.7 MG/DL (ref 0.2–1)
BUN SERPL-MCNC: 16 MG/DL (ref 5–25)
CALCIUM SERPL-MCNC: 9.2 MG/DL (ref 8.4–10.2)
CHLORIDE SERPL-SCNC: 105 MMOL/L (ref 96–108)
CO2 SERPL-SCNC: 32 MMOL/L (ref 21–32)
CREAT SERPL-MCNC: 0.97 MG/DL (ref 0.6–1.3)
EOSINOPHIL # BLD AUTO: 0.21 THOUSAND/ÂΜL (ref 0–0.61)
EOSINOPHIL NFR BLD AUTO: 4 % (ref 0–6)
ERYTHROCYTE [DISTWIDTH] IN BLOOD BY AUTOMATED COUNT: 13.9 % (ref 11.6–15.1)
GFR SERPL CREATININE-BSD FRML MDRD: 81 ML/MIN/1.73SQ M
GLUCOSE P FAST SERPL-MCNC: 147 MG/DL (ref 65–99)
HCT VFR BLD AUTO: 45.1 % (ref 36.5–49.3)
HGB BLD-MCNC: 14.4 G/DL (ref 12–17)
IMM GRANULOCYTES # BLD AUTO: 0.01 THOUSAND/UL (ref 0–0.2)
IMM GRANULOCYTES NFR BLD AUTO: 0 % (ref 0–2)
LYMPHOCYTES # BLD AUTO: 1.4 THOUSANDS/ÂΜL (ref 0.6–4.47)
LYMPHOCYTES NFR BLD AUTO: 25 % (ref 14–44)
MCH RBC QN AUTO: 30.6 PG (ref 26.8–34.3)
MCHC RBC AUTO-ENTMCNC: 31.9 G/DL (ref 31.4–37.4)
MCV RBC AUTO: 96 FL (ref 82–98)
MONOCYTES # BLD AUTO: 0.4 THOUSAND/ÂΜL (ref 0.17–1.22)
MONOCYTES NFR BLD AUTO: 7 % (ref 4–12)
NEUTROPHILS # BLD AUTO: 3.61 THOUSANDS/ÂΜL (ref 1.85–7.62)
NEUTS SEG NFR BLD AUTO: 63 % (ref 43–75)
NRBC BLD AUTO-RTO: 0 /100 WBCS
PLATELET # BLD AUTO: 164 THOUSANDS/UL (ref 149–390)
PMV BLD AUTO: 10.6 FL (ref 8.9–12.7)
POTASSIUM SERPL-SCNC: 3.6 MMOL/L (ref 3.5–5.3)
PROT SERPL-MCNC: 6.6 G/DL (ref 6.4–8.4)
RBC # BLD AUTO: 4.71 MILLION/UL (ref 3.88–5.62)
SODIUM SERPL-SCNC: 144 MMOL/L (ref 135–147)
WBC # BLD AUTO: 5.67 THOUSAND/UL (ref 4.31–10.16)

## 2023-10-10 PROCEDURE — 80053 COMPREHEN METABOLIC PANEL: CPT

## 2023-10-10 PROCEDURE — 36415 COLL VENOUS BLD VENIPUNCTURE: CPT

## 2023-10-10 PROCEDURE — 85025 COMPLETE CBC W/AUTO DIFF WBC: CPT

## 2023-10-24 ENCOUNTER — OFFICE VISIT (OUTPATIENT)
Dept: HEMATOLOGY ONCOLOGY | Facility: CLINIC | Age: 68
End: 2023-10-24
Payer: MEDICARE

## 2023-10-24 VITALS
OXYGEN SATURATION: 99 % | WEIGHT: 225 LBS | TEMPERATURE: 98.2 F | HEART RATE: 81 BPM | DIASTOLIC BLOOD PRESSURE: 84 MMHG | HEIGHT: 70 IN | BODY MASS INDEX: 32.21 KG/M2 | SYSTOLIC BLOOD PRESSURE: 128 MMHG

## 2023-10-24 DIAGNOSIS — C7A.8 NEUROENDOCRINE CARCINOMA (HCC): Primary | ICD-10-CM

## 2023-10-24 PROCEDURE — 99215 OFFICE O/P EST HI 40 MIN: CPT | Performed by: INTERNAL MEDICINE

## 2023-10-24 NOTE — PROGRESS NOTES
[address] [place] [address] [facility] [address] [address] [phone]  [facility] CARSON Pastrana,1955, [id number]  10/24/23    Discussion:   Number, this is a 52-year-old male with a history of recently resected low-grade neuroendocrine tumor, Ki-67 4%. Residual disease at surgery. Patient elected early institution of octreotide. He is tolerating this without difficulty. Generally functions without restriction. No bowel or urinary complaints. No wheezing. No flushing. Recent CBC and chemistry are normal except glucose 147. He has reimaging scheduled in January. I will see him back thereafter. If all is well I will probably see him every 6 months. At some length, we reviewed the questionable utility of specific serologic markers. These are deferred for the moment. I discussed the above with the patient. The patient and his wife voiced understanding and agreement.  ______________________________________________________________________    No chief complaint on file. HPI:  Oncology History   Neuroendocrine tumor   6/20/2023 Initial Diagnosis    Neuroendocrine tumor     6/20/2023 Surgery    Small Bowel Resection and biopsy mesenteric mass:    A. Soft Tissue, Other, Mesenteric mass:  - Dense fibrotic tissue with chronic inflammation. Immunostains did not reveal neuroendocrine tumor cells. B. Small Bowel, NOS, Small Bowel :  - Well differential neuroendocrine tumor, grade 2.   - Tumor measures 1.9 cm in largest dimension.   - Tumor extends to inked serosal surface (slide B6)  - Resection margins negative for differentiated neuroendocrine tumor. Tumor is 1.6 cm (16 mm) from closest margin of resection  - Lymphovascular invasion present (slide B10). - Perineural invasion present (slide B10)  - Favor metastatic carcinoma involving 1 lymph node (slide B10).      C. Soft Tissue, Other, Mesenteric Mass part 2:  - Fragments of well differential neuroendocrine tumor. Favor tumor effaces at least 1 lymph node (1/1). Largest dimension of tumor measuring 1.2 cm (slide C1). No residual lymph node tissue seen. Adjacent dense fibrotic tissue with inflammation present. - Please see separate report for flow cytometry study result. Cancer Staged    Cancer Staging   T4N1M0       Neuroendocrine carcinoma ([address])   3/31/2023 Initial Diagnosis    March 31, 2023 CT of the abdomen pelvis to investigate nausea and vomiting showed 3 mm right ureteral calculus. 14 mm right adrenal nodule. May 24, 2023 PET/CT showed right mesenteric mass up to 3.4 cm, SUV 2.5. Slight nodularity of small bowel loop in right hemiabdomen, SUV 3.2.  1.4 cm right adrenal, no hypermetabolism. Incidentally noted hypermetabolism at the left base of tongue. June 20, 2023 patient underwent small bowel resection and biopsy of mesenteric mass. Pathology indicates well differentiated neuroendocrine tumor of small bowel, grade 2, T4, N1. Mesenteric mass was not resected due to concern for possible short gut syndrome. Biopsy showed fragments of well differentiated neuroendocrine tumor. Cancer Staged    Cancer Staging   T4N1M0           Interval History: Clinically stable. ECOG-  0 - Asymptomatic    Review of Systems   Constitutional:  Negative for chills and fever. HENT:  Negative for nosebleeds. Eyes:  Negative for discharge. Respiratory:  Negative for cough and shortness of breath. Cardiovascular:  Negative for chest pain. Gastrointestinal:  Negative for abdominal pain, constipation and diarrhea. Endocrine: Negative for polydipsia. Genitourinary:  Negative for hematuria. Musculoskeletal:  Negative for arthralgias. Skin:  Negative for color change. Allergic/Immunologic: Negative for immunocompromised state. Neurological:  Negative for dizziness and headaches. Hematological:  Negative for adenopathy. Psychiatric/Behavioral:  Negative for agitation.         Past Medical History:   Diagnosis Date    Allergic rhinitis     last assessed: 5/4/2015    Aneurysm of thoracic aorta (HCC)     Cataract     resolved: 12/12/2014    Colon polyp     Diabetes mellitus ([address])     diet control    Herniated nucleus pulposus, L4-5 left     last assessed: 4/9/2014    Herniated nucleus pulposus, L5-S1, left     last assessed: 4/9/2014    Hx of echocardiogram 10/14/2016    EF 55%, Mild LVH, mild aortic root and ascending aorta enlargement, measuring 42 mm, trace MR, trace aortic regurg.     Hypercholesterolemia     Hypertension     Kidney stone     Lumbar radiculopathy      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)    Type 2 diabetes with stage 2 chronic kidney disease GFR 60-89     Nausea and vomiting    Generalized abdominal pain    Stage 3a chronic kidney disease (HCC)    Adrenal nodule (HCC)    Lymphadenopathy, abdominal    Neuroendocrine tumor    Neuroendocrine carcinoma (HCC)       Current Outpatient Medications:     allopurinol (ZYLOPRIM) 100 mg tablet, TAKE 1 TABLET DAILY (Patient taking differently: every evening), Disp: 90 tablet, Rfl: 3    amLODIPine-benazepril (LOTREL 5-20) 5-20 MG per capsule, TAKE 1 CAPSULE TWICE A DAY, Disp: 180 capsule, Rfl: 3    atenolol (TENORMIN) 50 mg tablet, TAKE 1 TABLET TWICE A DAY, Disp: 180 tablet, Rfl: 3    atorvastatin (LIPITOR) 40 mg tablet, TAKE 1 TABLET DAILY (Patient taking differently: every evening), Disp: 90 tablet, Rfl: 3    furosemide (LASIX) 40 mg tablet, TAKE ONE-HALF (1/2) TABLET DAILY, Disp: 45 tablet, Rfl: 3    Jardiance 25 MG TABS, TAKE 1 TABLET EVERY MORNING, Disp: 90 tablet, Rfl: 3    magnesium oxide (MAG-OX) 400 mg, Take 400 mg by mouth daily, Disp: , Rfl:     olmesartan-hydrochlorothiazide (BENICAR HCT) 40-12.5 MG per tablet, TAKE 1 TABLET DAILY, Disp: 90 tablet, Rfl: 3    Ozempic, 1 MG/DOSE, 4 MG/3ML injection pen, INJECT 1 MG UNDER THE SKIN ONCE A WEEK (Patient taking differently: Monday), Disp: 9 mL, Rfl: 3    potassium chloride (K-DUR,KLOR-CON) 10 mEq tablet, TAKE 3 TABLETS TWICE A DAY. TAKE 3 TABLETS IN THE MORNING AND TAKE 3 TABLETS IN THE EVENING, Disp: 540 tablet, Rfl: 3  No Known Allergies  Past Surgical History:   Procedure Laterality Date    ADENOIDECTOMY      BACK SURGERY      BACK SURGERY      COLONOSCOPY      KNEE ARTHROSCOPY      (therapeutic)    KNEE SURGERY      LYMPH NODE DISSECTION N/A 06/20/2023    Procedure: biopsy of MESENTERIC MASS, tap block;  Surgeon: Wen Anderson MD;  Location:  MAIN OR;  Service: Surgical Oncology    UT COLONOSCOPY FLX DX W/COLLJ SPEC WHEN PFRMD N/A 09/30/2016    Procedure: COLONOSCOPY;  Surgeon: Viviana Reynoso MD;  Location: [facility];  Service: Gastroenterology    SMALL INTESTINE SURGERY N/A 06/20/2023    Procedure: SMALL BOWEL RESECTION;  Surgeon: Wen Anderson MD;  Location:  MAIN OR;  Service: Surgical Oncology    TONSILLECTOMY AND ADENOIDECTOMY       Social History     Objective:  Vitals:    10/24/23 0904   BP: 128/84   BP Location: Left arm   Patient Position: Sitting   Cuff Size: Standard   Pulse: 81   Temp: 98.2 °F (36.8 °C)   TempSrc: Temporal   SpO2: 99%   Weight: 102 kg (225 lb)   Height: 5' 10" (1.778 m)     Physical Exam  Constitutional:       Appearance: He is well-developed. HENT:      Head: Normocephalic and atraumatic.       Mouth/Throat:      Mouth: Mucous membranes are moist.   Eyes: Pupils: Pupils are equal, round, and reactive to light. Cardiovascular:      Rate and Rhythm: Normal rate and regular rhythm. Heart sounds: No murmur heard. Pulmonary:      Breath sounds: Normal breath sounds. No wheezing or rales. Abdominal:      Palpations: Abdomen is soft. Tenderness: There is no abdominal tenderness. Musculoskeletal:         General: No tenderness. Normal range of motion. Cervical back: Neck supple. Lymphadenopathy:      Cervical: No cervical adenopathy. Skin:     Findings: No erythema or rash. Neurological:      Mental Status: He is alert and oriented to person, place, and time. Cranial Nerves: No cranial nerve deficit. Deep Tendon Reflexes: Reflexes are normal and symmetric. Psychiatric:         Behavior: Behavior normal.           Labs: I personally reviewed the labs and imaging pertinent to this patient care.

## 2023-10-31 ENCOUNTER — HOSPITAL ENCOUNTER (OUTPATIENT)
Dept: INFUSION CENTER | Facility: HOSPITAL | Age: 68
Discharge: HOME | End: 2023-10-31
Attending: INTERNAL MEDICINE
Payer: MEDICARE

## 2023-10-31 VITALS
RESPIRATION RATE: 16 BRPM | SYSTOLIC BLOOD PRESSURE: 112 MMHG | OXYGEN SATURATION: 93 % | DIASTOLIC BLOOD PRESSURE: 66 MMHG | HEART RATE: 66 BPM | TEMPERATURE: 96.8 F

## 2023-10-31 DIAGNOSIS — C7A.8 NEUROENDOCRINE CARCINOMA (HCC): Primary | ICD-10-CM

## 2023-10-31 PROCEDURE — 96372 THER/PROPH/DIAG INJ SC/IM: CPT

## 2023-10-31 RX ADMIN — OCTREOTIDE ACETATE 30 MG: KIT at 08:18

## 2023-11-27 ENCOUNTER — HOSPITAL ENCOUNTER (OUTPATIENT)
Dept: INFUSION CENTER | Facility: HOSPITAL | Age: 68
Discharge: HOME | End: 2023-11-27
Attending: INTERNAL MEDICINE
Payer: MEDICARE

## 2023-11-27 DIAGNOSIS — C7A.8 NEUROENDOCRINE CARCINOMA (HCC): Primary | ICD-10-CM

## 2023-11-27 PROCEDURE — 96372 THER/PROPH/DIAG INJ SC/IM: CPT

## 2023-11-27 RX ADMIN — OCTREOTIDE ACETATE 30 MG: KIT at 08:06

## 2023-11-27 NOTE — PROGRESS NOTES
Patient tolerated Sandostatin injection to right buttock without incident. AVS declined, patient aware of next appointment. Patient ambulated off unit in stable condition.

## 2023-12-24 NOTE — PROGRESS NOTES
PT Re-Evaluation     Today's date: 2019  Patient name: Eldon Campo  : 1955  MRN: [id number]  Referring provider: Dayday Pugh MD  Dx:   Encounter Diagnosis     ICD-10-CM    1  Chronic bilateral low back pain, with sciatica presence unspecified M54 5     G89 29    2  Acute pain of left shoulder M25 512    3  Pain of left thigh M79 [id number]        Start Time: 1000  Stop Time: 1130  Total time in clinic (min): 90 minutes    Assessment  Assessment details: PT notes the patient with improved ROM and mm strength t/o L-spine and B/L LEs, however, continuation of decreased mm strength and endurance of the left LE 2* nerve injury leading to impaired gait mechanics, decreased activity tolerance, and decreased overall LOF  Pt would continue to benefit from skilled therapy 2-3x/wk for 4-6 wks to further decrease symptoms and improve overall LOF  Pt agreeable to continuation of skilled therapy  Impairments: abnormal gait, abnormal or restricted ROM, activity intolerance, impaired balance, impaired physical strength, lacks appropriate home exercise program and pain with function  Understanding of Dx/Px/POC: good   Prognosis: good    Goals  ST  Initiate HEP- Met  2  Decrease symptoms by 25-50%- Partially Met  3  Increase strength by 1-2 mm grades- Partially Met   4  Increase ROM by 25-50%- Met  LT  Improve gait pattern and balance to good/normal  2  Decrease limitations with squatting, walking, and stair climbing activities  3  Return to recreational activities with minimal to no limitations  4    DC with HEP     Plan  Plan details: Pt agreeable to continuation of skilled therapy 2-3x/wk for 4-6wks   Patient would benefit from: skilled physical therapy  Referral necessary: No  Planned modality interventions: thermotherapy: hydrocollator packs  Planned therapy interventions: manual therapy, neuromuscular re-education, patient education, postural training, self care, strengthening, stretching, therapeutic exercise, home exercise program, graded exercise, functional ROM exercises, flexibility, gait training and balance  Frequency: 3x week  Duration in visits: 12  Duration in weeks: 4  Treatment plan discussed with: patient        Subjective Evaluation    History of Present Illness  Mechanism of injury: Patient reports development of right LB and hip pain about 5 months ago from insidious onset  Patient reports overtime the symptoms have worsened with increase pain traveling down the left leg further  Patient reports during that time development of left shoulder and arm pain with N/T traveling down the left arm into the hand  Patient reports the symptoms in the arm have recently dissipated with decrease pain levels in the left shoulder but continuation of occasional N/T in the left hand and forearm  Patient reports PMH of lumbar laminectomy in  with N/T in the left foot  The patient states continuation of low back pain with limitations with bending, lifting, squatting and ADL  Patient reports he did receive multiple treatments from a chiropractor for the low back symptoms with minimal change in status  Patient reports significant PMH of AAA, right knee bursa sac removal, and HBP  Patient reports he his retired at this time  Presently, the patient has attended 9 sessions of skilled therapy and reports approximately 30% overall improvement  Reports significant improvement in LE flexibility  No radiating symptoms in left UE or right LE  Continues to have limited endurance and weakness in left LE affecting gait and activity tolerance  Pt feels that he would continue to benefit from skilled therapy to decrease limp in left LE and improve overall LOF     Pain  Current pain ratin  At best pain ratin  At worst pain ratin  Location: Low Back Pain and left LE-Numbness/Tingling   Relieving factors: rest  Aggravating factors: lifting    Treatments  Previous treatment: chiropractic and medication  Current treatment: medication and physical therapy  Patient Goals  Patient goals for therapy: decreased pain, improved balance, increased motion, increased strength, return to sport/leisure activities and independence with ADLs/IADLs          Objective     Postural Observations  Seated posture: fair  Standing posture: fair    Additional Postural Observation Details  PT notes flattening of the lumbar spine secondary to + spasm/tightness of the lumbar paraspinals     Palpation   Left   Muscle spasm in the erector spinae and lumbar paraspinals  Tenderness of the erector spinae and lumbar paraspinals  Right   Muscle spasm in the erector spinae and lumbar paraspinals  Tenderness of the erector spinae and lumbar paraspinals       Additional Palpation Details  PT notes + spasm/tightness of the lumbar paraspinals from levels L3-S1     Neurological Testing     Reflexes   Left   Biceps (C5/C6): normal (2+)  Brachioradialis (C6): normal (2+)  Patellar (L4): normal (2+)  Achilles (S1): normal (2+)    Right   Biceps (C5/C6): normal (2+)  Brachioradialis (C6): normal (2+)  Patellar (L4): normal (2+)  Achilles (S1): normal (2+)    Active Range of Motion   Cervical/Thoracic Spine     Normal active range of motion  Left Shoulder   Normal active range of motion    Right Shoulder   Normal active range of motion    Lumbar   Flexion:  Restriction level: minimal  Extension:  Restriction level: moderate  Left lateral flexion:  Restriction level: moderate  Right lateral flexion:  Restriction level: moderate  Left rotation:  Restriction level: moderate  Right rotation:  Restriction level: moderate  Left Hip   Flexion: 60 degrees   External rotation (90/90): 35 degrees   Internal rotation (90/90): 15 degrees     Right Hip   Flexion: 60 degrees   External rotation (90/90): 35 degrees   Internal rotation (90/90): 15 degrees   Left Knee   Normal active range of motion    Right Knee   Normal active range of motion  Left Ankle/Foot   Normal active range of motion    Right Ankle/Foot   Normal active range of motion    Additional Active Range of Motion Details  PT notes the patient with decreased AROM t/o L-spine with muscular tightness reported at end ranges   PT notes decreased flexibility t/o BLE     Strength/Myotome Testing     Left Shoulder   Normal muscle strength    Right Shoulder   Normal muscle strength    Left Hip   Planes of Motion   Flexion: 4  Extension: 5  Abduction: 5  Adduction: 5  External rotation: 4+  Internal rotation: 4+    Right Hip   Planes of Motion   Flexion: 4+  Extension: 5  Abduction: 5  Adduction: 5  External rotation: 5  Internal rotation: 5    Left Knee   Flexion: 5  Extension: 4+    Right Knee   Flexion: 5  Extension: 5    Left Ankle/Foot   Dorsiflexion: 5  Plantar flexion: 5    Right Ankle/Foot   Normal strength  Dorsiflexion: 5  Plantar flexion: 5    Ambulation     Observational Gait   Decreased walking speed, stride length and left stance time  Additional Observational Gait Details  PT notes demonstration of impaired gait pattern with decrease stance on the left, decrease step length, decrease hip and knee flex, and decrease shekhar with rating of fair+ with static and dynamic activities          Flowsheet Rows      Most Recent Value   PT/OT G-Codes   Current Score  49   Projected Score  63                Precautions  PMH Lumbar Laminectomy         Manual  7/29 7/31 8/1 8/6    Bilateral hip and LE stretching with manual lumbar traction  15 min  15 min  15 min 15 min                                                               Exercise Diary  7/29 7/31 8/1 8/6    Nu-step  10 min L5  10 min L5 10 min L5 L5 10 min     HR/TR           Stand lumbar extension  2x10   3" Hold  2x10   3" hold 2x10 3" hd 2x10 3" hd    Stand OAL  2x10 Bilat  2x10 Bilat  2x10 Bilat 2x10 B/L     Monster walk  6x10 Feet   Green  6x 10 Feet   Green  6x 10 ft Green 6x10 ft  Blue     Side step and squat  6x10 Feet   Green  8x 10 Feet  Green  4x 10 ft Green 6x10 ft  Blue     High knee walk    Leg and calf press   10x Each   40#  2x10 40# ea 2x10 ea   50#    TB Side step with resist trunk rotation  Arms Out  15x Bilat   Single Blue  15x each way  Single Blue   15x ea way Single Blue 15x ea  Double blue     Tandem and side step walk on foam  4x10 Feet  NT       Front and lateral step up  15x Bilat   6" step  2x10 Bilateral  6" step  2x10 Bilateral 6" Step 2x10 B/L  6" step     Step over    Hip AB/AD machine   45+1#/2x10   45#/2x10  45+1# AB  45# AD  2x10 2x10 ea  45+1# AB/AD    DKTC with Tball 10x 5" Hold  10x 5" hold 20x 3-5" hd 20x 3-5" hd    Bridge with Tball           Supine Tball ABD crunch  10x3" Hold   Orange Tball  10x3" Hold   Orange Tball  10x 3-5" hd 20x 3" hd     Seated Tball LAQ and hip march            Seated Tball trunk rotation and PNF                                                                              Modalities 7/29 7/31 8/1 8/6 7/25   MHP to the LB in seated  15 min  15 min  15 min 15 min  15 min                             EKG completed

## 2023-12-26 ENCOUNTER — HOSPITAL ENCOUNTER (OUTPATIENT)
Dept: INFUSION CENTER | Facility: HOSPITAL | Age: 68
Discharge: HOME | End: 2023-12-26
Attending: INTERNAL MEDICINE
Payer: MEDICARE

## 2023-12-26 VITALS
SYSTOLIC BLOOD PRESSURE: 139 MMHG | HEART RATE: 92 BPM | DIASTOLIC BLOOD PRESSURE: 90 MMHG | RESPIRATION RATE: 18 BRPM | TEMPERATURE: 97.3 F

## 2023-12-26 DIAGNOSIS — C7A.8 NEUROENDOCRINE CARCINOMA (HCC): Primary | ICD-10-CM

## 2023-12-26 PROCEDURE — 96372 THER/PROPH/DIAG INJ SC/IM: CPT

## 2023-12-26 RX ADMIN — OCTREOTIDE ACETATE 30 MG: KIT at 08:17

## 2023-12-26 NOTE — PROGRESS NOTES
Prashant Pastrana  tolerated treatment well with no complications.      Prashant Pastrana is aware of future appt on 1/23/24 at 0930.         No (Declined by Prashant Pastrana)

## 2023-12-28 ENCOUNTER — OFFICE VISIT (OUTPATIENT)
Dept: CARDIOLOGY CLINIC | Facility: CLINIC | Age: 68
End: 2023-12-28
Payer: MEDICARE

## 2023-12-28 VITALS
SYSTOLIC BLOOD PRESSURE: 116 MMHG | HEIGHT: 69 IN | OXYGEN SATURATION: 96 % | RESPIRATION RATE: 20 BRPM | WEIGHT: 227 LBS | DIASTOLIC BLOOD PRESSURE: 74 MMHG | BODY MASS INDEX: 33.62 KG/M2 | HEART RATE: 88 BPM

## 2023-12-28 DIAGNOSIS — I10 PRIMARY HYPERTENSION: ICD-10-CM

## 2023-12-28 DIAGNOSIS — I71.21 ANEURYSM OF ASCENDING AORTA WITHOUT RUPTURE (HCC): Primary | ICD-10-CM

## 2023-12-28 DIAGNOSIS — E11.69 DYSLIPIDEMIA ASSOCIATED WITH TYPE 2 DIABETES MELLITUS: ICD-10-CM

## 2023-12-28 DIAGNOSIS — E78.5 DYSLIPIDEMIA ASSOCIATED WITH TYPE 2 DIABETES MELLITUS: ICD-10-CM

## 2023-12-28 PROCEDURE — 99213 OFFICE O/P EST LOW 20 MIN: CPT | Performed by: INTERNAL MEDICINE

## 2023-12-28 NOTE — PATIENT INSTRUCTIONS
Cholesterol and Your Health   AMBULATORY CARE:   Cholesterol  is a waxy, fat-like substance. Your body uses cholesterol to make hormones and new cells, and to protect nerves. Cholesterol is made by your body. It also comes from certain foods you eat, such as meat and dairy products. Your healthcare provider can help you set goals for your cholesterol levels. Your provider can help you create a plan to meet your goals.  Cholesterol level goals:  Your cholesterol level goals depend on your risk for heart disease, your age, and your other health conditions. The following are general guidelines:  Total cholesterol  includes low-density lipoprotein (LDL), high-density lipoprotein (HDL), and triglyceride levels. The total cholesterol level should be lower than 200 mg/dL and is best at about 150 mg/dL.    LDL cholesterol  is called bad cholesterol  because it forms plaque in your arteries. As plaque builds up, your arteries become narrow, and less blood flows through. When plaque decreases blood flow to your heart, you may have chest pain. If plaque completely blocks an artery that brings blood to your heart, you may have a heart attack. Plaque can break off and form blood clots. Blood clots may block arteries in your brain and cause a stroke. The level should be less than 130 mg/dL and is best at about 100 mg/dL.         HDL cholesterol  is called good cholesterol  because it helps remove LDL cholesterol from your arteries. It does this by attaching to LDL cholesterol and carrying it to your liver. Your liver breaks down LDL cholesterol so your body can get rid of it. High levels of HDL cholesterol can help prevent a heart attack and stroke. Low levels of HDL cholesterol can increase your risk for heart disease, heart attack, and stroke. The level should be at least 40 mg/dL in males or at least 50 mg/dL in females.    Triglycerides  are a type of fat that store energy from foods you eat. High levels of triglycerides also  cause plaque buildup. This can increase your risk for a heart attack or stroke. If your triglyceride level is high, your LDL cholesterol level may also be high. The level should be less than 150 mg/dL.    Any of the following can increase your risk for high cholesterol:   Smoking or drinking large amounts of alcohol    Having overweight or obesity, or not getting enough exercise    A medical condition such as hypertension (high blood pressure) or diabetes    A family history of high cholesterol    Age older than 65    What you need to know about having your cholesterol levels checked:  Adults 20 to 45 years of age should have their cholesterol levels checked every 4 to 6 years. Adults 45 years or older should have their cholesterol checked every 1 to 2 years. You may need your cholesterol checked more often, or at a younger age, if you have risk factors for heart disease. You may also need to have your cholesterol checked more often if you have other health conditions, such as diabetes. Blood tests are used to check cholesterol levels. Blood tests measure your levels of triglycerides, LDL cholesterol, and HDL cholesterol.  How healthy fats affect your cholesterol levels:  Healthy fats, also called unsaturated fats, help lower LDL cholesterol and triglyceride levels. Healthy fats include the following:  Monounsaturated fats  are found in foods such as olive oil, canola oil, avocado, nuts, and olives.    Polyunsaturated fats,  such as omega 3 fats, are found in fish, such as salmon, trout, and tuna. They can also be found in plant foods such as flaxseed, walnuts, and soybeans.    How unhealthy fats affect your cholesterol levels:  Unhealthy fats increase LDL cholesterol and triglyceride levels. They are found in foods high in cholesterol, saturated fat, and trans fat:  Cholesterol  is found in eggs, dairy, and meat.    Saturated fat  is found in butter, cheese, ice cream, whole milk, and coconut oil. Saturated fat is  also found in meat, such as sausage, hot dogs, and bologna.    Trans fat  is found in liquid oils and is used in fried and baked foods. Foods that contain trans fats include chips, crackers, muffins, sweet rolls, microwave popcorn, and cookies.    Treatment  for high cholesterol will also decrease your risk of heart disease, heart attack, and stroke. Treatment may include any of the following:  Lifestyle changes  may include food, exercise, weight loss, and quitting smoking. You may also need to decrease the amount of alcohol you drink. Your healthcare provider will want you to start with lifestyle changes. Other treatment may be added if lifestyle changes are not enough. Your healthcare provider may recommend you work with a team to manage hyperlipidemia. The team may include medical experts such as a dietitian, an exercise or physical therapist, and a behavior therapist. Your family members may be included in helping you create lifestyle changes.    Medicines  may be given to lower your LDL cholesterol, triglyceride levels, or total cholesterol level. You may need medicines to lower your cholesterol if any of the following is true:    You have a history of stroke, TIA, unstable angina, or a heart attack.    Your LDL cholesterol level is 190 mg/dL or higher.    You are age 40 to 75 years, have diabetes or heart disease risk factors, and your LDL cholesterol is 70 mg/dL or higher.    Supplements  include fish oil, red yeast rice, and garlic. Fish oil may help lower your triglyceride and LDL cholesterol levels. It may also increase your HDL cholesterol level. Red yeast rice may help decrease your total cholesterol level and LDL cholesterol level. Garlic may help lower your total cholesterol level. Do not take any supplements without talking to your healthcare provider.    Food changes you can make to lower your cholesterol levels:  A dietitian can help you create a healthy eating plan. Your dietitian can show you how  to read food labels and choose foods low in saturated fat, trans fats, and cholesterol.     Decrease the total amount of fat you eat.  Choose lean meats, fat-free or 1% fat milk, and low-fat dairy products, such as yogurt and cheese. Try to limit or avoid red meats. Limit or do not eat fried foods or baked goods, such as cookies.    Replace unhealthy fats with healthy fats.  Cook foods in olive oil or canola oil. Choose soft margarines that are low in saturated fat and trans fat. Seeds, nuts, and avocados are other examples of healthy fats.    Eat foods with omega-3 fats.  Examples include salmon, tuna, mackerel, walnuts, and flaxseed. Eat fish 2 times per week. Pregnant women should not eat fish that have high levels of mercury, such as shark, swordfish, and virgil mackerel.         Increase the amount of high-fiber foods you eat.  High-fiber foods can help lower your LDL cholesterol. Aim to get between 20 and 30 grams of fiber each day. Fruits and vegetables are high in fiber. Eat at least 5 servings each day. Other high-fiber foods are whole-grain or whole-wheat breads, pastas, or cereals, and brown rice. Eat 3 ounces of whole-grain foods each day. Increase fiber slowly. You may have abdominal discomfort, bloating, and gas if you add fiber to your diet too quickly.         Eat healthy protein foods.  Examples include low-fat dairy products, skinless chicken and turkey, fish, and nuts.    Limit foods and drinks that are high in sugar.  Your dietitian or healthcare provider can help you create daily limits for high-sugar foods and drinks. The limit may be lower if you have diabetes or another health condition. Limits can also help you lose weight if needed.  Lifestyle changes you can make to lower your cholesterol levels:   Maintain a healthy weight.  Ask your healthcare provider what a healthy weight is for you. Ask your provider to help you create a weight loss plan if needed. Weight loss can decrease your total  cholesterol and triglyceride levels. Weight loss may also help keep your blood pressure at a healthy level.    Be physically active throughout the day.  Physical activity, such as exercise, can help lower your total cholesterol level and maintain a healthy weight. Physical activity can also help increase your HDL cholesterol level. Work with your healthcare provider to create an program that is right for you. Get at least 30 to 40 minutes of moderate physical activity most days of the week. Examples of exercise include brisk walking, swimming, or biking. Also include strength training at least 2 times each week. Your healthcare providers can help you create a physical activity plan.            Do not smoke.  Nicotine and other chemicals in cigarettes and cigars can raise your cholesterol levels. Ask your healthcare provider for information if you currently smoke and need help to quit. E-cigarettes or smokeless tobacco still contain nicotine. Talk to your healthcare provider before you use these products.         Limit or do not drink alcohol.  Alcohol can increase your triglyceride levels. Ask your healthcare provider before you drink alcohol. Ask how much is okay for you to drink in 24 hours or 1 week.    Follow up with your doctor as directed:  Write down your questions so you remember to ask them during your visits.  © Copyright [employer] 2023 Information is for End User's use only and may not be sold, redistributed or otherwise used for commercial purposes.  The above information is an  only. It is not intended as medical advice for individual conditions or treatments. Talk to your doctor, nurse or pharmacist before following any medical regimen to see if it is safe and effective for you.

## 2023-12-29 ENCOUNTER — APPOINTMENT (OUTPATIENT)
Dept: LAB | Facility: MEDICAL CENTER | Age: 68
End: 2023-12-29
Payer: COMMERCIAL

## 2023-12-29 DIAGNOSIS — D3A.8 NEUROENDOCRINE TUMOR: ICD-10-CM

## 2023-12-29 DIAGNOSIS — C7A.8 NEUROENDOCRINE CARCINOMA (HCC): ICD-10-CM

## 2023-12-29 LAB
ALBUMIN SERPL BCP-MCNC: 4.4 G/DL (ref 3.5–5)
ALP SERPL-CCNC: 69 U/L (ref 34–104)
ALT SERPL W P-5'-P-CCNC: 24 U/L (ref 7–52)
ANION GAP SERPL CALCULATED.3IONS-SCNC: 7 MMOL/L
AST SERPL W P-5'-P-CCNC: 18 U/L (ref 13–39)
BASOPHILS # BLD AUTO: 0.03 THOUSANDS/ÂΜL (ref 0–0.1)
BASOPHILS NFR BLD AUTO: 1 % (ref 0–1)
BILIRUB SERPL-MCNC: 0.8 MG/DL (ref 0.2–1)
BUN SERPL-MCNC: 14 MG/DL (ref 5–25)
CALCIUM SERPL-MCNC: 9.4 MG/DL (ref 8.4–10.2)
CHLORIDE SERPL-SCNC: 103 MMOL/L (ref 96–108)
CHOLEST SERPL-MCNC: 134 MG/DL
CO2 SERPL-SCNC: 34 MMOL/L (ref 21–32)
CREAT SERPL-MCNC: 1 MG/DL (ref 0.6–1.3)
EOSINOPHIL # BLD AUTO: 0.11 THOUSAND/ÂΜL (ref 0–0.61)
EOSINOPHIL NFR BLD AUTO: 2 % (ref 0–6)
ERYTHROCYTE [DISTWIDTH] IN BLOOD BY AUTOMATED COUNT: 13.6 % (ref 11.6–15.1)
GFR SERPL CREATININE-BSD FRML MDRD: 77 ML/MIN/1.73SQ M
GLUCOSE P FAST SERPL-MCNC: 162 MG/DL (ref 65–99)
HCT VFR BLD AUTO: 48.4 % (ref 36.5–49.3)
HDLC SERPL-MCNC: 41 MG/DL
HGB BLD-MCNC: 15.5 G/DL (ref 12–17)
IMM GRANULOCYTES # BLD AUTO: 0.02 THOUSAND/UL (ref 0–0.2)
IMM GRANULOCYTES NFR BLD AUTO: 0 % (ref 0–2)
LDLC SERPL CALC-MCNC: 60 MG/DL (ref 0–100)
LYMPHOCYTES # BLD AUTO: 1.45 THOUSANDS/ÂΜL (ref 0.6–4.47)
LYMPHOCYTES NFR BLD AUTO: 30 % (ref 14–44)
MCH RBC QN AUTO: 30.9 PG (ref 26.8–34.3)
MCHC RBC AUTO-ENTMCNC: 32 G/DL (ref 31.4–37.4)
MCV RBC AUTO: 97 FL (ref 82–98)
MONOCYTES # BLD AUTO: 0.31 THOUSAND/ÂΜL (ref 0.17–1.22)
MONOCYTES NFR BLD AUTO: 6 % (ref 4–12)
NEUTROPHILS # BLD AUTO: 2.93 THOUSANDS/ÂΜL (ref 1.85–7.62)
NEUTS SEG NFR BLD AUTO: 61 % (ref 43–75)
NONHDLC SERPL-MCNC: 93 MG/DL
NRBC BLD AUTO-RTO: 0 /100 WBCS
PLATELET # BLD AUTO: 161 THOUSANDS/UL (ref 149–390)
PMV BLD AUTO: 10.8 FL (ref 8.9–12.7)
POTASSIUM SERPL-SCNC: 4 MMOL/L (ref 3.5–5.3)
PROT SERPL-MCNC: 6.9 G/DL (ref 6.4–8.4)
RBC # BLD AUTO: 5.01 MILLION/UL (ref 3.88–5.62)
SODIUM SERPL-SCNC: 144 MMOL/L (ref 135–147)
TRIGL SERPL-MCNC: 165 MG/DL
TSH SERPL DL<=0.05 MIU/L-ACNC: 1.17 UIU/ML (ref 0.45–4.5)
WBC # BLD AUTO: 4.85 THOUSAND/UL (ref 4.31–10.16)

## 2023-12-29 PROCEDURE — 83036 HEMOGLOBIN GLYCOSYLATED A1C: CPT

## 2023-12-29 PROCEDURE — 80061 LIPID PANEL: CPT

## 2023-12-29 PROCEDURE — 85025 COMPLETE CBC W/AUTO DIFF WBC: CPT

## 2023-12-29 PROCEDURE — 84443 ASSAY THYROID STIM HORMONE: CPT

## 2023-12-29 PROCEDURE — 80053 COMPREHEN METABOLIC PANEL: CPT

## 2023-12-30 LAB
EST. AVERAGE GLUCOSE BLD GHB EST-MCNC: 171 MG/DL
HBA1C MFR BLD: 7.6 %

## 2024-01-03 ENCOUNTER — HOSPITAL ENCOUNTER (OUTPATIENT)
Dept: CT IMAGING | Facility: HOSPITAL | Age: 69
Discharge: HOME | End: 2024-01-03
Attending: SURGERY
Payer: MEDICARE

## 2024-01-03 DIAGNOSIS — D3A.8 NEUROENDOCRINE TUMOR: ICD-10-CM

## 2024-01-03 PROCEDURE — 74177 CT ABD & PELVIS W/CONTRAST: CPT

## 2024-01-03 PROCEDURE — G1004 CDSM NDSC: HCPCS

## 2024-01-03 RX ADMIN — IOHEXOL 100 ML: 350 INJECTION, SOLUTION INTRAVENOUS at 09:29

## 2024-01-11 ENCOUNTER — OFFICE VISIT (OUTPATIENT)
Dept: INTERNAL MEDICINE CLINIC | Facility: CLINIC | Age: 69
End: 2024-01-11
Payer: MEDICARE

## 2024-01-11 ENCOUNTER — OFFICE VISIT (OUTPATIENT)
Dept: SURGICAL ONCOLOGY | Facility: CLINIC | Age: 69
End: 2024-01-11
Payer: MEDICARE

## 2024-01-11 VITALS
WEIGHT: 230 LBS | TEMPERATURE: 97.8 F | HEIGHT: 69 IN | HEART RATE: 86 BPM | BODY MASS INDEX: 34.07 KG/M2 | DIASTOLIC BLOOD PRESSURE: 62 MMHG | OXYGEN SATURATION: 96 % | SYSTOLIC BLOOD PRESSURE: 118 MMHG

## 2024-01-11 VITALS
WEIGHT: 229.7 LBS | OXYGEN SATURATION: 93 % | HEIGHT: 69 IN | SYSTOLIC BLOOD PRESSURE: 124 MMHG | BODY MASS INDEX: 34.02 KG/M2 | HEART RATE: 85 BPM | TEMPERATURE: 98.3 F | DIASTOLIC BLOOD PRESSURE: 78 MMHG

## 2024-01-11 DIAGNOSIS — C7A.8 NEUROENDOCRINE CARCINOMA (HCC): ICD-10-CM

## 2024-01-11 DIAGNOSIS — D3A.8 NEUROENDOCRINE TUMOR: Primary | ICD-10-CM

## 2024-01-11 DIAGNOSIS — E78.2 MIXED HYPERLIPIDEMIA: ICD-10-CM

## 2024-01-11 DIAGNOSIS — I71.21 ANEURYSM OF ASCENDING AORTA WITHOUT RUPTURE (HCC): ICD-10-CM

## 2024-01-11 DIAGNOSIS — N18.31 STAGE 3A CHRONIC KIDNEY DISEASE (HCC): ICD-10-CM

## 2024-01-11 DIAGNOSIS — I10 ESSENTIAL HYPERTENSION: ICD-10-CM

## 2024-01-11 DIAGNOSIS — E79.0 HYPERURICEMIA: ICD-10-CM

## 2024-01-11 DIAGNOSIS — E11.69 DIABETES MELLITUS TYPE 2 IN OBESE: ICD-10-CM

## 2024-01-11 DIAGNOSIS — E66.9 DIABETES MELLITUS TYPE 2 IN OBESE: ICD-10-CM

## 2024-01-11 DIAGNOSIS — I10 PRIMARY HYPERTENSION: ICD-10-CM

## 2024-01-11 DIAGNOSIS — E11.22 TYPE 2 DIABETES MELLITUS WITH STAGE 2 CHRONIC KIDNEY DISEASE, WITHOUT LONG-TERM CURRENT USE OF INSULIN: Primary | ICD-10-CM

## 2024-01-11 DIAGNOSIS — G57.92 NEUROPATHY OF LEFT FOOT: ICD-10-CM

## 2024-01-11 DIAGNOSIS — N18.2 TYPE 2 DIABETES MELLITUS WITH STAGE 2 CHRONIC KIDNEY DISEASE, WITHOUT LONG-TERM CURRENT USE OF INSULIN: Primary | ICD-10-CM

## 2024-01-11 DIAGNOSIS — E11.3293 TYPE 2 DIABETES MELLITUS WITH BOTH EYES AFFECTED BY MILD NONPROLIFERATIVE RETINOPATHY WITHOUT MACULAR EDEMA, WITHOUT LONG-TERM CURRENT USE OF INSULIN (HCC): ICD-10-CM

## 2024-01-11 DIAGNOSIS — R60.9 PERIPHERAL EDEMA: ICD-10-CM

## 2024-01-11 DIAGNOSIS — M54.16 LUMBAR RADICULOPATHY: ICD-10-CM

## 2024-01-11 PROBLEM — E27.9 ADRENAL NODULE (HCC): Status: RESOLVED | Noted: 2023-05-09 | Resolved: 2024-01-11

## 2024-01-11 PROBLEM — E27.8 ADRENAL NODULE (HCC): Status: RESOLVED | Noted: 2023-05-09 | Resolved: 2024-01-11

## 2024-01-11 PROCEDURE — 99214 OFFICE O/P EST MOD 30 MIN: CPT | Performed by: FAMILY MEDICINE

## 2024-01-11 PROCEDURE — 99214 OFFICE O/P EST MOD 30 MIN: CPT | Performed by: SURGERY

## 2024-01-11 RX ORDER — AMLODIPINE BESYLATE AND BENAZEPRIL HYDROCHLORIDE 5; 20 MG/1; MG/1
1 CAPSULE ORAL 2 TIMES DAILY
Qty: 60 CAPSULE | Refills: 0 | Status: SHIPPED | OUTPATIENT
Start: 2024-01-11

## 2024-01-11 RX ORDER — POTASSIUM CHLORIDE 750 MG/1
30 TABLET, EXTENDED RELEASE ORAL 2 TIMES DAILY
Qty: 180 TABLET | Refills: 0 | Status: SHIPPED | OUTPATIENT
Start: 2024-01-11

## 2024-01-11 RX ORDER — ALLOPURINOL 100 MG/1
100 TABLET ORAL DAILY
Qty: 30 TABLET | Refills: 0 | Status: SHIPPED | OUTPATIENT
Start: 2024-01-11

## 2024-01-11 RX ORDER — ATENOLOL 50 MG/1
50 TABLET ORAL 2 TIMES DAILY
Qty: 60 TABLET | Refills: 0 | Status: SHIPPED | OUTPATIENT
Start: 2024-01-11

## 2024-01-11 NOTE — LETTER
January 11, 2024     Tosha Mtz MD  [address] [facility] [address]    Patient: Prashant Pastrana   YOB: 1955   Date of Visit: 1/11/2024       Dear Dr. Mtz:    Thank you for referring Prashant Pastrana to me for evaluation. Below are my notes for this consultation.    If you have questions, please do not hesitate to call me. I look forward to following your patient along with you.         Sincerely,        Francis Gardner MD        CC: DO Francis Claudio MD  1/11/2024  2:47 PM  Sign when Signing Visit               Surgical Oncology Follow Up       [facility] [address] [address]  [phone]    Prashant Pastrana  1955  [id number]  [facility] [place]  [address] [facility] [address]  [phone]    Diagnoses and all orders for this visit:    Neuroendocrine tumor  -     CT abdomen pelvis w contrast; Future  -     BUN; Future  -     Creatinine, serum; Future    Neuroendocrine carcinoma (HCC)        Chief Complaint   Patient presents with   • Follow-up       Return in about 6 months (around 7/11/2024) for Office Visit, Imaging - See orders, with Torie.      Oncology History   Neuroendocrine tumor   6/20/2023 Initial Diagnosis    Neuroendocrine tumor     6/20/2023 Surgery    Small Bowel Resection and biopsy mesenteric mass:    A. Soft Tissue, Other, Mesenteric mass:  - Dense fibrotic tissue with chronic inflammation.  Immunostains did not reveal neuroendocrine tumor cells.       B. Small Bowel, NOS, Small Bowel :  - Well differential neuroendocrine tumor, grade 2.   - Tumor measures 1.9 cm in largest dimension.   - Tumor extends to inked serosal surface (slide B6)  - Resection margins negative for differentiated neuroendocrine tumor.  Tumor is 1.6 cm (16 mm) from closest margin of resection  -  Lymphovascular invasion present (slide B10).  - Perineural invasion present (slide B10)  - Favor metastatic carcinoma involving 1 lymph node (slide B10).     C. Soft Tissue, Other, Mesenteric Mass part 2:  - Fragments of well differential neuroendocrine tumor. Favor tumor effaces at least 1 lymph node (1/1).  Largest dimension of tumor measuring 1.2 cm (slide C1). No residual lymph node tissue seen.  Adjacent dense fibrotic tissue with inflammation present.  - Please see separate report for flow cytometry study result.       Cancer Staged    Cancer Staging   T4N1M0       Neuroendocrine carcinoma (HCC)   3/31/2023 Initial Diagnosis    March 31, 2023 CT of the abdomen pelvis to investigate nausea and vomiting showed 3 mm right ureteral calculus.  14 mm right adrenal nodule.  May 24, 2023 PET/CT showed right mesenteric mass up to 3.4 cm, SUV 2.5.  Slight nodularity of small bowel loop in right hemiabdomen, SUV 3.2.  1.4 cm right adrenal, no hypermetabolism.  Incidentally noted hypermetabolism at the left base of tongue.  June 20, 2023 patient underwent small bowel resection and biopsy of mesenteric mass.  Pathology indicates well differentiated neuroendocrine tumor of small bowel, grade 2, T4, N1.  Mesenteric mass was not resected due to concern for possible short gut syndrome.  Biopsy showed fragments of well differentiated neuroendocrine tumor.      Cancer Staged    Cancer Staging   T4N1M0           Staging: [id number] small bowel neuroendocrine tumor  Ki-67 is 4%  Treatment history: Small bowel resection with biopsy of mesenteric mass, June 2023  Resection of the mesenteric mass would have required extensive normal small bowel resection.  Current treatment: Octreotide   Disease status:  Stable    History of Present Illness: Returns in follow-up.  He is doing well.  He denies any abdominal pain, nausea or vomiting.  No flushing, diarrhea, palpitations, headaches or sweats.  He has started octreotide and is tolerating  this well.  CT from January 3, 2024 shows stable mesenteric masses.  No evidence of metastatic disease.  The adrenal lesion is stable.  I personally reviewed the films.    Review of Systems  Complete ROS Surg Onc:   Complete ROS Surg Onc:   Constitutional: The patient denies new or recent history of general fatigue, no recent weight loss, no change in appetite.   Eyes: No complaints of visual problems, no scleral icterus.   ENT: no complaints of ear pain, no hoarseness, no difficulty swallowing,  no tinnitus and no new masses in head, oral cavity, or neck.   Cardiovascular: No complaints of chest pain, no palpitations, no ankle edema.   Respiratory: No complaints of shortness of breath, no cough.   Gastrointestinal: No complaints of jaundice, no bloody stools, no pale stools.   Genitourinary: No complaints of dysuria, no hematuria, no nocturia, no frequent urination, no urethral discharge.   Musculoskeletal: No complaints of weakness, paralysis, joint stiffness or arthralgias.  Integumentary: No complaints of rash, no new lesions.   Neurological: No complaints of convulsions, no seizures, no dizziness.   Hematologic/Lymphatic: No complaints of easy bruising.   Endocrine:  No hot or cold intolerance.  No polydipsia, polyphagia, or polyuria.  Allergy/immunology:  No environmental allergies.  No food allergies.  Not immunocompromised.  Skin:  No pallor or rash.  No wound.        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)   • Type 2 diabetes with stage 2 chronic kidney disease GFR 60-89    • Nausea and vomiting   • Generalized abdominal pain   • Stage 3a chronic kidney disease (HCC)   • Lymphadenopathy, abdominal   • Neuroendocrine tumor   • Neuroendocrine carcinoma (HCC)     Past Medical History:   Diagnosis Date   • Allergic rhinitis     last assessed: 5/4/2015   • Aneurysm of thoracic aorta (HCC)    • Cataract     resolved: 12/12/2014   • Colon polyp    • Diabetes mellitus (HCC)     diet control   • Herniated nucleus pulposus, L4-5 left     last assessed: 4/9/2014   • Herniated nucleus pulposus, L5-S1, left     last assessed: 4/9/2014   • Hx of echocardiogram 10/14/2016    EF 55%, Mild LVH, mild aortic root and ascending aorta enlargement, measuring 42 mm, trace MR, trace aortic regurg.   • Hypercholesterolemia    • Hypertension    • Kidney stone    • Lumbar radiculopathy      Past Surgical History:   Procedure Laterality Date   • ADENOIDECTOMY     • BACK SURGERY     • BACK SURGERY     • COLONOSCOPY     • KNEE ARTHROSCOPY      (therapeutic)   • KNEE SURGERY     • LYMPH NODE DISSECTION N/A 06/20/2023    Procedure: biopsy of MESENTERIC MASS, tap block;  Surgeon: Francis Gardner MD;  Location: [facility];  Service: Surgical Oncology   • ID COLONOSCOPY FLX DX W/COLLJ SPEC WHEN PFRMD N/A 09/30/2016    Procedure: COLONOSCOPY;  Surgeon: Laith Pinzon MD;  Location: [facility];  Service: Gastroenterology   • SMALL INTESTINE SURGERY N/A 06/20/2023    Procedure: SMALL BOWEL RESECTION;  Surgeon: Francis Gardner MD;  Location:  MAIN OR;  Service: Surgical Oncology   • TONSILLECTOMY AND ADENOIDECTOMY       Family History   Problem Relation Age of Onset   • Thyroid disease Mother         disorder   • Hypertension Father    • Coronary artery disease Brother    • Heart attack Brother         prior myocardial infarction     Social  History     Socioeconomic History   • Marital status: /Civil Union     Spouse name: Not on file   • Number of children: Not on file   • Years of education: Not on file   • Highest education level: Not on file   Occupational History   • Not on file   Tobacco Use   • Smoking status: Never   • Smokeless tobacco: Never   Vaping Use   • Vaping status: Never Used   Substance and Sexual Activity   • Alcohol use: Yes     Comment: social once a month   • Drug use: No   • Sexual activity: Not Currently     Partners: Female     Birth control/protection: Female Sterilization   Other Topics Concern   • Not on file   Social History Narrative   • Not on file     Social Determinants of Health     Financial Resource Strain: Not on file   Food Insecurity: Not on file   Transportation Needs: Not on file   Physical Activity: Not on file   Stress: Not on file   Social Connections: Not on file   Intimate Partner Violence: Not on file   Housing Stability: Not on file       Current Outpatient Medications:   •  allopurinol (ZYLOPRIM) 100 mg tablet, Take 1 tablet (100 mg total) by mouth daily, Disp: 30 tablet, Rfl: 0  •  amLODIPine-benazepril (LOTREL 5-20) 5-20 MG per capsule, Take 1 capsule by mouth 2 (two) times a day, Disp: 60 capsule, Rfl: 0  •  atenolol (TENORMIN) 50 mg tablet, Take 1 tablet (50 mg total) by mouth 2 (two) times a day, Disp: 60 tablet, Rfl: 0  •  atorvastatin (LIPITOR) 40 mg tablet, TAKE 1 TABLET DAILY (Patient taking differently: every evening), Disp: 90 tablet, Rfl: 3  •  Empagliflozin (Jardiance) 25 MG TABS, Take 1 tablet (25 mg total) by mouth every morning, Disp: 30 tablet, Rfl: 0  •  furosemide (LASIX) 40 mg tablet, TAKE ONE-HALF (1/2) TABLET DAILY, Disp: 45 tablet, Rfl: 3  •  magnesium oxide (MAG-OX) 400 mg, Take 400 mg by mouth daily, Disp: , Rfl:   •  olmesartan-hydrochlorothiazide (BENICAR HCT) 40-12.5 MG per tablet, TAKE 1 TABLET DAILY, Disp: 90 tablet, Rfl: 3  •  Ozempic, 1 MG/DOSE, 4 MG/3ML injection  pen, INJECT 1 MG UNDER THE SKIN ONCE A WEEK (Patient taking differently: Monday), Disp: 9 mL, Rfl: 3  •  potassium chloride (K-DUR,KLOR-CON) 10 mEq tablet, Take 3 tablets (30 mEq total) by mouth 2 (two) times a day, Disp: 180 tablet, Rfl: 0  No Known Allergies  Vitals:    01/11/24 1358   BP: 118/62   Pulse: 86   Temp: 97.8 °F (36.6 °C)   SpO2: 96%       Physical Exam  Constitutional: General appearance: The Patient is well-developed and well-nourished who appears the stated age in no acute distress. Patient is pleasant and talkative.     HEENT:  Normocephalic.  Sclerae are anicteric. Mucous membranes are moist. Neck is supple without adenopathy. No JVD.     Chest: The lungs are clear to auscultation.     Cardiac: Heart is regular rate.     Abdomen: Abdomen is soft, non-tender, non-distended and without masses.     Extremities: There is no clubbing or cyanosis. There is no edema.  Symmetric.  Neuro: Grossly nonfocal. Gait is normal.     Lymphatic: No evidence of cervical adenopathy bilaterally.   No evidence of axillary adenopathy bilaterally.nicteric.    Psych:  Patient is pleasant and talkative.  Breasts:        Pathology:  [unfilled]    Labs:      Imaging  CT abdomen pelvis w contrast    Result Date: 1/10/2024  Narrative: CT ABDOMEN AND PELVIS WITH IV CONTRAST INDICATION:   D3A.8: Other benign neuroendocrine tumors. History of small bowel resection and mesenteric mass. COMPARISON: 5/2/2023 and 7/2/2023 abdominopelvic CTs. 8/22/2022 chest CT TECHNIQUE:  CT examination of the abdomen and pelvis was performed.   Axial, sagittal, and coronal 2D reformatted images were created from the source data and submitted for interpretation. Radiation dose length product (DLP) for this visit:  1113 mGy-cm .  This examination, like all CT scans performed in the [facility], was performed utilizing techniques to minimize radiation dose exposure, including the use of iterative reconstruction and automated exposure  control. IV Contrast:  100 mL of iohexol (OMNIPAQUE) Enteric Contrast: Enteric contrast was administered. FINDINGS: ABDOMEN LOWER CHEST: Mild dependent atelectasis. Atherosclerosis. LIVER/BILIARY TREE: Chronic tiny left lobe hypodensity chronic since, likely a cyst. Chronic small hypodensity at the medial left lobe capsule most compatible with volume averaging with ligament or vessel. No new enhancing mass. Normal liver morphology and bile duct caliber. GALLBLADDER:  Within normal limits. SPLEEN:  Within normal limits. PANCREAS:  Within normal limits. ADRENAL GLANDS: Chronic 1.2 cm right adrenal nodule, most compatible with benign adenoma. Left adrenal is within normal limits. KIDNEYS/URETERS: Chronic left lower pole peripelvic cyst and bilateral tiny cortical hypodensities, likely cysts. STOMACH AND BOWEL: Mild diverticulosis. Normal caliber and wall thickness. APPENDIX: Not visualized.  No secondary signs of appendicitis. ABDOMINOPELVIC CAVITY: Similar right upper abdominal mesenteric masses (depth by width): 3.4 x 2.7 cm (2/95) 2.3 x 2.8 cm (2/102) No new masses, enlarged lymph nodes, abnormal fluid or air. VESSELS: Mild atherosclerosis. Normal aortic caliber and patent central mesenteric vessels. PELVIS: REPRODUCTIVE ORGANS:  Prostate and seminal vesicles within normal limits. URINARY BLADDER:  Within normal limits. ABDOMINAL WALL/INGUINAL REGIONS: Anterior abdominal wall surgical change. Resolved infectious/inflammatory changes compared to 7/2/2023. Similar left posterior lower back calcification. New, small bilateral lower back soft tissue nodular opacities, largest 2.0 cm on the right, contains a tiny focus of air attenuation (2/158). Likely traumatic or secondary to injections. OSSEOUS STRUCTURES:  Degenerative changes.     Impression: Unchanged appearance of mesenteric masses. No new findings of malignancy. Resolved findings of anterior abdominal wall infection/inflammation. Other stable and nonemergent  findings above.. Workstation performed: [id number]     I personally reviewed and interpreted the above laboratory and imaging data.    Discussion/Summary: 68 year-old male status post small bowel resection and biopsy of a mesenteric mass for a T4N1M0 neuroendocrine tumor.  This is well differentiated, grade 2.  The mesenteric masses were not resected secondary to the potential of short gut and resecting a significant portion of normal small bowel.  He has started on octreotide and is tolerating this well. I will repeat his CT in 6 months.  We discussed if he has any flushing, diarrhea, palpitations, headaches or sweats to contact us.  He and his wife are agreeable to this plan.  All their questions were answered.

## 2024-01-11 NOTE — PROGRESS NOTES
Surgical Oncology Follow Up       [facility] [place]  [address] [facility] [address]  [phone]    Prashant Pastrana  1955  [id number]  [facility] [place]  [address] [facility] [id number] [phone]    Diagnoses and all orders for this visit:    Neuroendocrine tumor  -     CT abdomen pelvis w contrast; Future  -     BUN; Future  -     Creatinine, serum; Future    Neuroendocrine carcinoma (HCC)        Chief Complaint   Patient presents with    Follow-up       Return in about 6 months (around 7/11/2024) for Office Visit, Imaging - See orders, with Torie.      Oncology History   Neuroendocrine tumor   6/20/2023 Initial Diagnosis    Neuroendocrine tumor     6/20/2023 Surgery    Small Bowel Resection and biopsy mesenteric mass:    A. Soft Tissue, Other, Mesenteric mass:  - Dense fibrotic tissue with chronic inflammation.  Immunostains did not reveal neuroendocrine tumor cells.       B. Small Bowel, NOS, Small Bowel :  - Well differential neuroendocrine tumor, grade 2.   - Tumor measures 1.9 cm in largest dimension.   - Tumor extends to inked serosal surface (slide B6)  - Resection margins negative for differentiated neuroendocrine tumor.  Tumor is 1.6 cm (16 mm) from closest margin of resection  - Lymphovascular invasion present (slide B10).  - Perineural invasion present (slide B10)  - Favor metastatic carcinoma involving 1 lymph node (slide B10).     C. Soft Tissue, Other, Mesenteric Mass part 2:  - Fragments of well differential neuroendocrine tumor. Favor tumor effaces at least 1 lymph node (1/1).  Largest dimension of tumor measuring 1.2 cm (slide C1). No residual lymph node tissue seen.  Adjacent dense fibrotic tissue with inflammation present.  - Please see separate report for flow cytometry study result.       Cancer Staged    Cancer Staging    T4N1M0       Neuroendocrine carcinoma (HCC)   3/31/2023 Initial Diagnosis    March 31, 2023 CT of the abdomen pelvis to investigate nausea and vomiting showed 3 mm right ureteral calculus.  14 mm right adrenal nodule.  May 24, 2023 PET/CT showed right mesenteric mass up to 3.4 cm, SUV 2.5.  Slight nodularity of small bowel loop in right hemiabdomen, SUV 3.2.  1.4 cm right adrenal, no hypermetabolism.  Incidentally noted hypermetabolism at the left base of tongue.  June 20, 2023 patient underwent small bowel resection and biopsy of mesenteric mass.  Pathology indicates well differentiated neuroendocrine tumor of small bowel, grade 2, T4, N1.  Mesenteric mass was not resected due to concern for possible short gut syndrome.  Biopsy showed fragments of well differentiated neuroendocrine tumor.      Cancer Staged    Cancer Staging   T4N1M0           Staging: [id number] small bowel neuroendocrine tumor  Ki-67 is 4%  Treatment history: Small bowel resection with biopsy of mesenteric mass, June 2023  Resection of the mesenteric mass would have required extensive normal small bowel resection.  Current treatment: Octreotide   Disease status:  Stable    History of Present Illness: Returns in follow-up.  He is doing well.  He denies any abdominal pain, nausea or vomiting.  No flushing, diarrhea, palpitations, headaches or sweats.  He has started octreotide and is tolerating this well.  CT from January 3, 2024 shows stable mesenteric masses.  No evidence of metastatic disease.  The adrenal lesion is stable.  I personally reviewed the films.    Review of Systems  Complete ROS Surg Onc:   Complete ROS Surg Onc:   Constitutional: The patient denies new or recent history of general fatigue, no recent weight loss, no change in appetite.   Eyes: No complaints of visual problems, no scleral icterus.   ENT: no complaints of ear pain, no hoarseness, no difficulty swallowing,  no tinnitus and no new masses in head, oral cavity, or neck.    Cardiovascular: No complaints of chest pain, no palpitations, no ankle edema.   Respiratory: No complaints of shortness of breath, no cough.   Gastrointestinal: No complaints of jaundice, no bloody stools, no pale stools.   Genitourinary: No complaints of dysuria, no hematuria, no nocturia, no frequent urination, no urethral discharge.   Musculoskeletal: No complaints of weakness, paralysis, joint stiffness or arthralgias.  Integumentary: No complaints of rash, no new lesions.   Neurological: No complaints of convulsions, no seizures, no dizziness.   Hematologic/Lymphatic: No complaints of easy bruising.   Endocrine:  No hot or cold intolerance.  No polydipsia, polyphagia, or polyuria.  Allergy/immunology:  No environmental allergies.  No food allergies.  Not immunocompromised.  Skin:  No pallor or rash.  No wound.        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)    Type 2 diabetes with stage 2 chronic kidney disease GFR 60-89     Nausea and vomiting    Generalized abdominal pain    Stage 3a chronic kidney disease (HCC)    Lymphadenopathy, abdominal    Neuroendocrine tumor    Neuroendocrine carcinoma (HCC)     Past Medical History:   Diagnosis Date    Allergic rhinitis     last assessed: 5/4/2015    Aneurysm of thoracic aorta (HCC)     Cataract     resolved: 12/12/2014    Colon polyp     Diabetes mellitus (HCC)      diet control    Herniated nucleus pulposus, L4-5 left     last assessed: 4/9/2014    Herniated nucleus pulposus, L5-S1, left     last assessed: 4/9/2014    Hx of echocardiogram 10/14/2016    EF 55%, Mild LVH, mild aortic root and ascending aorta enlargement, measuring 42 mm, trace MR, trace aortic regurg.    Hypercholesterolemia     Hypertension     Kidney stone     Lumbar radiculopathy      Past Surgical History:   Procedure Laterality Date    ADENOIDECTOMY      BACK SURGERY      BACK SURGERY      COLONOSCOPY      KNEE ARTHROSCOPY      (therapeutic)    KNEE SURGERY      LYMPH NODE DISSECTION N/A 06/20/2023    Procedure: biopsy of MESENTERIC MASS, tap block;  Surgeon: Francis Gardner MD;  Location: [facility];  Service: Surgical Oncology    PA COLONOSCOPY FLX DX W/COLLJ SPEC WHEN PFRMD N/A 09/30/2016    Procedure: COLONOSCOPY;  Surgeon: Laith Pinzon MD;  Location: [facility];  Service: Gastroenterology    SMALL INTESTINE SURGERY N/A 06/20/2023    Procedure: SMALL BOWEL RESECTION;  Surgeon: Francis Gardner MD;  Location: [facility];  Service: Surgical Oncology    TONSILLECTOMY AND ADENOIDECTOMY       Family History   Problem Relation Age of Onset    Thyroid disease Mother         disorder    Hypertension Father     Coronary artery disease Brother     Heart attack Brother         prior myocardial infarction     Social History     Socioeconomic History    Marital status: /Civil Union     Spouse name: Not on file    Number of children: Not on file    Years of education: Not on file    Highest education level: Not on file   Occupational History    Not on file   Tobacco Use    Smoking status: Never    Smokeless tobacco: Never   Vaping Use    Vaping status: Never Used   Substance and Sexual Activity    Alcohol use: Yes     Comment: social once a month    Drug use: No    Sexual activity: Not Currently     Partners: Female     Birth control/protection: Female Sterilization   Other Topics Concern    Not on file   Social  History Narrative    Not on file     Social Determinants of Health     Financial Resource Strain: Not on file   Food Insecurity: Not on file   Transportation Needs: Not on file   Physical Activity: Not on file   Stress: Not on file   Social Connections: Not on file   Intimate Partner Violence: Not on file   Housing Stability: Not on file       Current Outpatient Medications:     allopurinol (ZYLOPRIM) 100 mg tablet, Take 1 tablet (100 mg total) by mouth daily, Disp: 30 tablet, Rfl: 0    amLODIPine-benazepril (LOTREL 5-20) 5-20 MG per capsule, Take 1 capsule by mouth 2 (two) times a day, Disp: 60 capsule, Rfl: 0    atenolol (TENORMIN) 50 mg tablet, Take 1 tablet (50 mg total) by mouth 2 (two) times a day, Disp: 60 tablet, Rfl: 0    atorvastatin (LIPITOR) 40 mg tablet, TAKE 1 TABLET DAILY (Patient taking differently: every evening), Disp: 90 tablet, Rfl: 3    Empagliflozin (Jardiance) 25 MG TABS, Take 1 tablet (25 mg total) by mouth every morning, Disp: 30 tablet, Rfl: 0    furosemide (LASIX) 40 mg tablet, TAKE ONE-HALF (1/2) TABLET DAILY, Disp: 45 tablet, Rfl: 3    magnesium oxide (MAG-OX) 400 mg, Take 400 mg by mouth daily, Disp: , Rfl:     olmesartan-hydrochlorothiazide (BENICAR HCT) 40-12.5 MG per tablet, TAKE 1 TABLET DAILY, Disp: 90 tablet, Rfl: 3    Ozempic, 1 MG/DOSE, 4 MG/3ML injection pen, INJECT 1 MG UNDER THE SKIN ONCE A WEEK (Patient taking differently: Monday), Disp: 9 mL, Rfl: 3    potassium chloride (K-DUR,KLOR-CON) 10 mEq tablet, Take 3 tablets (30 mEq total) by mouth 2 (two) times a day, Disp: 180 tablet, Rfl: 0  No Known Allergies  Vitals:    01/11/24 1358   BP: 118/62   Pulse: 86   Temp: 97.8 °F (36.6 °C)   SpO2: 96%       Physical Exam  Constitutional: General appearance: The Patient is well-developed and well-nourished who appears the stated age in no acute distress. Patient is pleasant and talkative.     HEENT:  Normocephalic.  Sclerae are anicteric. Mucous membranes are moist. Neck is supple  without adenopathy. No JVD.     Chest: The lungs are clear to auscultation.     Cardiac: Heart is regular rate.     Abdomen: Abdomen is soft, non-tender, non-distended and without masses.     Extremities: There is no clubbing or cyanosis. There is no edema.  Symmetric.  Neuro: Grossly nonfocal. Gait is normal.     Lymphatic: No evidence of cervical adenopathy bilaterally.   No evidence of axillary adenopathy bilaterally.nicteric.    Psych:  Patient is pleasant and talkative.  Breasts:        Pathology:  [unfilled]    Labs:      Imaging  CT abdomen pelvis w contrast    Result Date: 1/10/2024  Narrative: CT ABDOMEN AND PELVIS WITH IV CONTRAST INDICATION:   D3A.8: Other benign neuroendocrine tumors. History of small bowel resection and mesenteric mass. COMPARISON: 5/2/2023 and 7/2/2023 abdominopelvic CTs. 8/22/2022 chest CT TECHNIQUE:  CT examination of the abdomen and pelvis was performed.   Axial, sagittal, and coronal 2D reformatted images were created from the source data and submitted for interpretation. Radiation dose length product (DLP) for this visit:  1113 mGy-cm .  This examination, like all CT scans performed in the [facility], was performed utilizing techniques to minimize radiation dose exposure, including the use of iterative reconstruction and automated exposure control. IV Contrast:  100 mL of iohexol (OMNIPAQUE) Enteric Contrast: Enteric contrast was administered. FINDINGS: ABDOMEN LOWER CHEST: Mild dependent atelectasis. Atherosclerosis. LIVER/BILIARY TREE: Chronic tiny left lobe hypodensity chronic since, likely a cyst. Chronic small hypodensity at the medial left lobe capsule most compatible with volume averaging with ligament or vessel. No new enhancing mass. Normal liver morphology and bile duct caliber. GALLBLADDER:  Within normal limits. SPLEEN:  Within normal limits. PANCREAS:  Within normal limits. ADRENAL GLANDS: Chronic 1.2 cm right adrenal nodule, most compatible with  benign adenoma. Left adrenal is within normal limits. KIDNEYS/URETERS: Chronic left lower pole peripelvic cyst and bilateral tiny cortical hypodensities, likely cysts. STOMACH AND BOWEL: Mild diverticulosis. Normal caliber and wall thickness. APPENDIX: Not visualized.  No secondary signs of appendicitis. ABDOMINOPELVIC CAVITY: Similar right upper abdominal mesenteric masses (depth by width): 3.4 x 2.7 cm (2/95) 2.3 x 2.8 cm (2/102) No new masses, enlarged lymph nodes, abnormal fluid or air. VESSELS: Mild atherosclerosis. Normal aortic caliber and patent central mesenteric vessels. PELVIS: REPRODUCTIVE ORGANS:  Prostate and seminal vesicles within normal limits. URINARY BLADDER:  Within normal limits. ABDOMINAL WALL/INGUINAL REGIONS: Anterior abdominal wall surgical change. Resolved infectious/inflammatory changes compared to 7/2/2023. Similar left posterior lower back calcification. New, small bilateral lower back soft tissue nodular opacities, largest 2.0 cm on the right, contains a tiny focus of air attenuation (2/158). Likely traumatic or secondary to injections. OSSEOUS STRUCTURES:  Degenerative changes.     Impression: Unchanged appearance of mesenteric masses. No new findings of malignancy. Resolved findings of anterior abdominal wall infection/inflammation. Other stable and nonemergent findings above.. Workstation performed: [id number]     I personally reviewed and interpreted the above laboratory and imaging data.    Discussion/Summary: 68 year-old male status post small bowel resection and biopsy of a mesenteric mass for a T4N1M0 neuroendocrine tumor.  This is well differentiated, grade 2.  The mesenteric masses were not resected secondary to the potential of short gut and resecting a significant portion of normal small bowel.  He has started on octreotide and is tolerating this well. I will repeat his CT in 6 months.  We discussed if he has any flushing, diarrhea, palpitations, headaches or sweats to  contact us.  He and his wife are agreeable to this plan.  All their questions were answered.

## 2024-01-11 NOTE — PROGRESS NOTES
Assessment/Plan:       1. Type 2 diabetes mellitus with stage 2 chronic kidney disease, without long-term current use of insulin   Assessment & Plan:    Lab Results   Component Value Date    HGBA1C 7.6 (H) 12/29/2023     Hemoglobin A1c somewhat higher.  Admits that he has not been eating as well.  He wants to try dietary changes prior to medication changes.  Watch carbohydrate intake.  Try to remain as active as possible.  Discussed potentially increasing the dose of the Ozempic in the future or possibly changing to the Mounjaro.  Follow-up with ophthalmology regularly.  Foot exam completed today.    Orders:  -     CBC and differential; Future; Expected date: 04/11/2024  -     Comprehensive metabolic panel; Future; Expected date: 04/11/2024    2. Diabetes mellitus type 2 in obese   -     Empagliflozin (Jardiance) 25 MG TABS; Take 1 tablet (25 mg total) by mouth every morning  -     CBC and differential; Future; Expected date: 04/11/2024  -     Hemoglobin A1C; Future; Expected date: 04/11/2024    3. Essential hypertension  -     atenolol (TENORMIN) 50 mg tablet; Take 1 tablet (50 mg total) by mouth 2 (two) times a day  -     amLODIPine-benazepril (LOTREL 5-20) 5-20 MG per capsule; Take 1 capsule by mouth 2 (two) times a day  -     CBC and differential; Future; Expected date: 04/11/2024    4. Peripheral edema  -     potassium chloride (K-DUR,KLOR-CON) 10 mEq tablet; Take 3 tablets (30 mEq total) by mouth 2 (two) times a day  -     CBC and differential; Future; Expected date: 04/11/2024    5. Hyperuricemia  -     allopurinol (ZYLOPRIM) 100 mg tablet; Take 1 tablet (100 mg total) by mouth daily  -     CBC and differential; Future; Expected date: 04/11/2024    6. Type 2 diabetes mellitus with both eyes affected by mild nonproliferative retinopathy without macular edema, without long-term current use of insulin ([facility])  -     CBC and differential; Future; Expected date: 04/11/2024  -     Comprehensive metabolic panel;  Future; Expected date: 04/11/2024    7. Primary hypertension  Assessment & Plan:  Blood pressure well controlled.  Continue current medications.  Watch salt intake.  Stay adequately hydrated.    Orders:  -     CBC and differential; Future; Expected date: 04/11/2024  -     Comprehensive metabolic panel; Future; Expected date: 04/11/2024    8. Lumbar radiculopathy  -     CBC and differential; Future; Expected date: 04/11/2024    9. Neuropathy of left foot  Assessment & Plan:  Likely related to previous back issues since the left foot symptoms have persisted since his previous surgery.    Orders:  -     CBC and differential; Future; Expected date: 04/11/2024    10. Stage 3a chronic kidney disease (HCC)  Assessment & Plan:  Lab Results   Component Value Date    EGFR 77 12/29/2023    EGFR 81 10/10/2023    EGFR 71 08/08/2023    CREATININE 1.00 12/29/2023    CREATININE 0.97 10/10/2023    CREATININE 1.08 08/08/2023     Avoid nephrotoxins.  Stay adequately hydrated.    Orders:  -     CBC and differential; Future; Expected date: 04/11/2024  -     Comprehensive metabolic panel; Future; Expected date: 04/11/2024    11. Neuroendocrine carcinoma (HCC)  Assessment & Plan:  Continue to follow-up with oncology.  Recent CT scan discussed with them.    Orders:  -     CBC and differential; Future; Expected date: 04/11/2024    12. Mixed hyperlipidemia  Assessment & Plan:  Continue with the atorvastatin.  Watch diet.  Recent lipid panel reviewed with him.    Orders:  -     CBC and differential; Future; Expected date: 04/11/2024  -     Lipid Panel with Direct LDL reflex; Future; Expected date: 04/11/2024  -     TSH, 3rd generation with Free T4 reflex; Future; Expected date: 04/11/2024  -     Hemoglobin A1C; Future; Expected date: 04/11/2024  -     Comprehensive metabolic panel; Future; Expected date: 04/11/2024    13. Aneurysm of ascending aorta without rupture (HCC)  Assessment & Plan:  Continue to follow-up with  cardiology.    Orders:  -     CBC and differential; Future; Expected date: 04/11/2024    Orders and recommendations as noted above.  Recent laboratory testing reviewed.  Continue current medications.  Up-to-date on immunizations.  Will have him follow-up in about 3 to 5 months or sooner if needed.              Subjective:      Patient ID: Prashant Pastrana is a 68 y.o. male who presents for routine follow-up. He is accompanied by his wife.    The patient has not been conscious about his diet recently. He is not monitoring his blood glucose levels. His blood glucose levels have remained at the same level; however, he suspects that it is currently not currently in the normal range.     He had a renal calculus in 2023 and then subsequently neuroendocrine tumor and is not bothered by his treatments. He does not feel anything and denies any further renal calculus. He has been drinking iced tea and is inquiring whether it is bad to drink. He only drinks water as he cannot drink soda, but he does drink sugar-free soda.     He is inquiring about Ozempic. Last week, he had an unusual feeling which was intermittent, but he felt slightly better when he ate. He wants to eat something when his stomach feels irritated. He frequently eats sweet snacks.     He had sciatica for 2 months. His back has been in good condition. His shoulder is sore, and he plans to do some stretching. He has numbness in his left foot. He is inquiring about neuropathy treatments.    He denies any problems with his blood pressure medications. He denies any headaches and his vision has been okay. He has a good sleep quality. He gets up in the middle of the night to urinate. He is responding positively to atorvastatin.  Has any chest pain or palpitations.  Appetite has been stable.    The following portions of the patient's history were reviewed and updated as appropriate: He  has a past medical history of Allergic rhinitis, Aneurysm of thoracic aorta  (HCC), Cataract, Colon polyp, Diabetes mellitus (HCC), Herniated nucleus pulposus, L4-5 left, Herniated nucleus pulposus, L5-S1, left, echocardiogram (10/14/2016), Hypercholesterolemia, Hypertension, Kidney stone, and Lumbar radiculopathy.  He   Patient Active Problem List    Diagnosis Date Noted   • Neuroendocrine carcinoma (HCC) 07/07/2023   • Neuroendocrine tumor 06/20/2023   • Lymphadenopathy, abdominal 05/09/2023   • Stage 3a chronic kidney disease (HCC) 04/20/2023   • Nausea and vomiting 03/31/2023   • Generalized abdominal pain 03/31/2023   • 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) 03/18/2022   • Type 2 diabetes with stage 2 chronic kidney disease GFR 60-89  03/18/2022   • Hypokalemia 11/04/2020   • Colonic polyp 11/14/2019   • Lumbar radiculopathy 07/31/2019   • Microscopic hematuria 07/02/2019   • Pain of left thigh 07/02/2019   • Acute pain of left shoulder 04/02/2019   • Skin tag 10/25/2018   • Restless leg syndrome 06/21/2018   • Heel spur 05/31/2017   • Neuropathy of left foot 09/19/2016   • Diabetes mellitus type 2 in obese  08/06/2015   • Sciatica 03/03/2014   • Chronic bilateral low back pain 02/27/2014   • Edema 01/03/2013   • Hyperuricemia without signs inflammatory arthritis/tophaceous disease 10/31/2012   • Aneurysm of thoracic aorta (HCC) 08/06/2012   • Mixed hyperlipidemia 05/01/2012   • Hypertension 05/01/2012   • Non-toxic uninodular goiter 05/01/2012   • Osteoarthritis 05/01/2012     He  has a past surgical history that includes Tonsillectomy and adenoidectomy; Knee surgery; Back surgery; pr colonoscopy flx dx w/collj spec when pfrmd (N/A, 09/30/2016); Back surgery; Knee arthroscopy; Colonoscopy; Lymph node dissection (N/A, 06/20/2023); Small intestine surgery (N/A, 06/20/2023); and ADENOIDECTOMY.  His family history includes Coronary artery disease in his brother; Heart attack in his brother; Hypertension in his father;  Thyroid disease in his mother.  He  reports that he has never smoked. He has never used smokeless tobacco. He reports current alcohol use. He reports that he does not use drugs.  Current Outpatient Medications   Medication Sig Dispense Refill   • allopurinol (ZYLOPRIM) 100 mg tablet Take 1 tablet (100 mg total) by mouth daily 30 tablet 0   • amLODIPine-benazepril (LOTREL 5-20) 5-20 MG per capsule Take 1 capsule by mouth 2 (two) times a day 60 capsule 0   • atenolol (TENORMIN) 50 mg tablet Take 1 tablet (50 mg total) by mouth 2 (two) times a day 60 tablet 0   • atorvastatin (LIPITOR) 40 mg tablet TAKE 1 TABLET DAILY (Patient taking differently: every evening) 90 tablet 3   • Empagliflozin (Jardiance) 25 MG TABS Take 1 tablet (25 mg total) by mouth every morning 30 tablet 0   • furosemide (LASIX) 40 mg tablet TAKE ONE-HALF (1/2) TABLET DAILY 45 tablet 3   • magnesium oxide (MAG-OX) 400 mg Take 400 mg by mouth daily     • olmesartan-hydrochlorothiazide (BENICAR HCT) 40-12.5 MG per tablet TAKE 1 TABLET DAILY 90 tablet 3   • Ozempic, 1 MG/DOSE, 4 MG/3ML injection pen INJECT 1 MG UNDER THE SKIN ONCE A WEEK (Patient taking differently: Monday) 9 mL 3   • potassium chloride (K-DUR,KLOR-CON) 10 mEq tablet Take 3 tablets (30 mEq total) by mouth 2 (two) times a day 180 tablet 0     No current facility-administered medications for this visit.     Current Outpatient Medications on File Prior to Visit   Medication Sig   • atorvastatin (LIPITOR) 40 mg tablet TAKE 1 TABLET DAILY (Patient taking differently: every evening)   • furosemide (LASIX) 40 mg tablet TAKE ONE-HALF (1/2) TABLET DAILY   • magnesium oxide (MAG-OX) 400 mg Take 400 mg by mouth daily   • olmesartan-hydrochlorothiazide (BENICAR HCT) 40-12.5 MG per tablet TAKE 1 TABLET DAILY   • Ozempic, 1 MG/DOSE, 4 MG/3ML injection pen INJECT 1 MG UNDER THE SKIN ONCE A WEEK (Patient taking differently: Monday)   • [DISCONTINUED] allopurinol (ZYLOPRIM) 100 mg tablet TAKE 1 TABLET  DAILY (Patient taking differently: every evening)   • [DISCONTINUED] amLODIPine-benazepril (LOTREL 5-20) 5-20 MG per capsule TAKE 1 CAPSULE TWICE A DAY   • [DISCONTINUED] atenolol (TENORMIN) 50 mg tablet TAKE 1 TABLET TWICE A DAY   • [DISCONTINUED] Jardiance 25 MG TABS TAKE 1 TABLET EVERY MORNING   • [DISCONTINUED] potassium chloride (K-DUR,KLOR-CON) 10 mEq tablet TAKE 3 TABLETS TWICE A DAY. TAKE 3 TABLETS IN THE MORNING AND TAKE 3 TABLETS IN THE EVENING     No current facility-administered medications on file prior to visit.     He has No Known Allergies..    Review of Systems   Constitutional:  Negative for activity change, appetite change, chills and fever.   HENT:  Negative for hearing loss.    Eyes:  Negative for pain and visual disturbance.   Respiratory:  Negative for chest tightness and shortness of breath.    Cardiovascular:  Negative for chest pain and palpitations.   Gastrointestinal:  Negative for abdominal pain, blood in stool, diarrhea, nausea and vomiting.   Endocrine: Negative for polydipsia, polyphagia and polyuria.   Genitourinary:  Negative for dysuria.   Musculoskeletal:  Positive for back pain. Negative for arthralgias and gait problem.   Skin:  Negative for color change.   Neurological:  Positive for numbness. Negative for dizziness and headaches.   Hematological:  Does not bruise/bleed easily.   Psychiatric/Behavioral:  Negative for behavioral problems. The patient is not nervous/anxious.      Constitutional: Negative for activity change, appetite change, chills and fever.   HENT: Negative for hearing loss.    Eyes: Negative for pain and visual disturbance.   Respiratory: Negative for chest tightness and shortness of breath.    Cardiovascular: Negative for chest pain and palpitations.   Gastrointestinal: Negative for abdominal pain, blood in stool, diarrhea, nausea and vomiting.   Endocrine: Negative for polydipsia, polyphagia and polyuria.   Genitourinary: Negative for dysuria.  "  Musculoskeletal: Positive for ore shoulders and numbness of left foot. Negative for arthralgias and gait problem.   Skin: Negative for color change.   Neurological: Negative for dizziness and headaches.   Hematological: Does not bruise/bleed easily.   Psychiatric/Behavioral: Negative for behavioral problems. The patient is not nervous/anxious.      Objective:  /78 (BP Location: Left arm, Patient Position: Sitting, Cuff Size: Large)   Pulse 85   Temp 98.3 °F (36.8 °C)   Ht 5' 8.9\" (1.75 m)   Wt 104 kg (229 lb 11.2 oz)   SpO2 93%   BMI 34.02 kg/m²          Physical Exam  Vitals reviewed.   Constitutional:       General: He is not in acute distress.     Appearance: He is well-developed, well-groomed and overweight.   HENT:      Head: Normocephalic and atraumatic.      Nose: Nose normal.   Eyes:      Conjunctiva/sclera: Conjunctivae normal.      Pupils: Pupils are equal, round, and reactive to light.   Neck:      Vascular: No carotid bruit.   Cardiovascular:      Rate and Rhythm: Normal rate and regular rhythm.      Heart sounds: Normal heart sounds. No murmur heard.     No friction rub. No gallop.   Pulmonary:      Breath sounds: No wheezing or rales.   Chest:      Chest wall: No tenderness.   Abdominal:      General: There is no distension.      Tenderness: There is no abdominal tenderness. There is no guarding or rebound.   Musculoskeletal:      Cervical back: Neck supple.   Lymphadenopathy:      Cervical: No cervical adenopathy.   Skin:     General: Skin is warm and dry.   Neurological:      Mental Status: He is alert and oriented to person, place, and time.   Psychiatric:         Mood and Affect: Mood and affect normal.         Speech: Speech normal.         Behavior: Behavior normal. Behavior is cooperative.         Cognition and Memory: Cognition and memory normal.         Below is the patient's most recent value for Albumin, ALT, AST, BUN, Calcium, Chloride, Cholesterol, CO2, Creatinine, GFR, " Glucose, HDL, Hematocrit, Hemoglobin, Hemoglobin A1C, LDL, Magnesium, Phosphorus, Platelets, Potassium, PSA, Sodium, Triglycerides, and WBC.   Lab Results   Component Value Date    ALT 24 12/29/2023    AST 18 12/29/2023    BUN 14 12/29/2023    CALCIUM 9.4 12/29/2023     12/29/2023    CHOL 162 09/09/2015    CO2 34 (H) 12/29/2023    CREATININE 1.00 12/29/2023    HDL 41 12/29/2023    HCT 48.4 12/29/2023    HGB 15.5 12/29/2023    HGBA1C 7.6 (H) 12/29/2023    MG 1.8 06/21/2023     12/29/2023    K 4.0 12/29/2023    PSA 0.73 06/15/2023     09/09/2015    TRIG 165 (H) 12/29/2023    WBC 4.85 12/29/2023     Note: for a comprehensive list of the patient's lab results, access the Results Review activity.  Vital signs.   Blood pressure: Within normal limits.  Physical Exam.   HENT: Dry ears and slight cerumen noted.     I have personally reviewed results with the patient.  CAT scan: Within normal limits. His inflammation from his previous surgery has subsided.  His previous hemoglobin A1c level was 6.5% and before that was 6.7%.  Kidney function test: Within normal limits.        Transcribed for Tosha Mtz MD, by Manuel Louis on 01/11/24 at 7:56 PM. Powered by Dragon [employer].

## 2024-01-12 NOTE — ASSESSMENT & PLAN NOTE
Blood pressure well controlled.  Continue current medications.  Watch salt intake.  Stay adequately hydrated.

## 2024-01-12 NOTE — ASSESSMENT & PLAN NOTE
Lab Results   Component Value Date    EGFR 77 12/29/2023    EGFR 81 10/10/2023    EGFR 71 08/08/2023    CREATININE 1.00 12/29/2023    CREATININE 0.97 10/10/2023    CREATININE 1.08 08/08/2023     Avoid nephrotoxins.  Stay adequately hydrated.

## 2024-01-12 NOTE — ASSESSMENT & PLAN NOTE
Lab Results   Component Value Date    HGBA1C 7.6 (H) 12/29/2023     Hemoglobin A1c somewhat higher.  Admits that he has not been eating as well.  He wants to try dietary changes prior to medication changes.  Watch carbohydrate intake.  Try to remain as active as possible.  Discussed potentially increasing the dose of the Ozempic in the future or possibly changing to the Mounjaro.  Follow-up with ophthalmology regularly.  Foot exam completed today.

## 2024-01-12 NOTE — ASSESSMENT & PLAN NOTE
Likely related to previous back issues since the left foot symptoms have persisted since his previous surgery.

## 2024-01-23 ENCOUNTER — OFFICE VISIT (OUTPATIENT)
Dept: HEMATOLOGY ONCOLOGY | Facility: CLINIC | Age: 69
End: 2024-01-23
Payer: MEDICARE

## 2024-01-23 ENCOUNTER — HOSPITAL ENCOUNTER (OUTPATIENT)
Dept: INFUSION CENTER | Facility: HOSPITAL | Age: 69
Discharge: HOME | End: 2024-01-23
Attending: INTERNAL MEDICINE
Payer: MEDICARE

## 2024-01-23 VITALS
SYSTOLIC BLOOD PRESSURE: 109 MMHG | WEIGHT: 227 LBS | HEART RATE: 79 BPM | BODY MASS INDEX: 33.62 KG/M2 | HEIGHT: 69 IN | TEMPERATURE: 97.8 F | OXYGEN SATURATION: 95 % | DIASTOLIC BLOOD PRESSURE: 75 MMHG

## 2024-01-23 VITALS
OXYGEN SATURATION: 91 % | SYSTOLIC BLOOD PRESSURE: 130 MMHG | HEART RATE: 73 BPM | RESPIRATION RATE: 16 BRPM | TEMPERATURE: 97 F | DIASTOLIC BLOOD PRESSURE: 83 MMHG

## 2024-01-23 DIAGNOSIS — C7A.8 NEUROENDOCRINE CARCINOMA (HCC): Primary | ICD-10-CM

## 2024-01-23 DIAGNOSIS — R59.0 LYMPHADENOPATHY, ABDOMINAL: ICD-10-CM

## 2024-01-23 PROBLEM — E87.6 HYPOKALEMIA: Status: RESOLVED | Noted: 2020-11-04 | Resolved: 2024-01-23

## 2024-01-23 PROBLEM — R10.84 GENERALIZED ABDOMINAL PAIN: Status: RESOLVED | Noted: 2023-03-31 | Resolved: 2024-01-23

## 2024-01-23 PROBLEM — M25.512 ACUTE PAIN OF LEFT SHOULDER: Status: RESOLVED | Noted: 2019-04-02 | Resolved: 2024-01-23

## 2024-01-23 PROCEDURE — 96372 THER/PROPH/DIAG INJ SC/IM: CPT

## 2024-01-23 PROCEDURE — 99213 OFFICE O/P EST LOW 20 MIN: CPT | Performed by: INTERNAL MEDICINE

## 2024-01-23 RX ADMIN — OCTREOTIDE ACETATE 30 MG: KIT at 09:29

## 2024-01-23 NOTE — PROGRESS NOTES
Pt tolerated Sandostatin injection in L intragluteal without incident.      Prashant Pastrana is aware of future appt on 2/20/24 at 8:30AM.     AVS printed and given to Prashant Pastrana:  Yes X  No (Declined by Prashant Pastrana)     Pt discharged off unit in stable condition with all personal belongings.

## 2024-01-23 NOTE — PLAN OF CARE
Problem: INFECTION - ADULT  Goal: Absence or prevention of progression during hospitalization  Description: INTERVENTIONS:  - Assess and monitor for signs and symptoms of infection  - Monitor lab/diagnostic results  - Monitor all insertion sites, i.e. indwelling lines, tubes, and drains  - Monitor endotracheal if appropriate and nasal secretions for changes in amount and color  - [place] appropriate cooling/warming therapies per order  - Administer medications as ordered  - Instruct and encourage patient and family to use good hand hygiene technique  - Identify and instruct in appropriate isolation precautions for identified infection/condition  Reactivated     Problem: Knowledge Deficit  Goal: Patient/family/caregiver demonstrates understanding of disease process, treatment plan, medications, and discharge instructions  Description: Complete learning assessment and assess knowledge base.  Interventions:  - Provide teaching at level of understanding  - Provide teaching via preferred learning methods  Reactivated

## 2024-01-23 NOTE — LETTER
January 23, 2024     Tosha Mtz MD  [address] [facility] [address]    Patient: Prashant Pastrana   YOB: 1955   Date of Visit: 1/23/2024       Dear Dr. Mtz:    Thank you for referring Prashant Pastrana to me for evaluation. Below are my notes for this consultation.    If you have questions, please do not hesitate to call me. I look forward to following your patient along with you.         Sincerely,        Kyle Kincaid, DO        CC: MD Kyle Philip, DO  1/23/2024  8:53 AM  Sign when Signing Visit  [facility] [place]  [address] [facility] [address]  [phone]  [phone]    Prashant Pastrana,1955, [id number]  01/23/24    Discussion:   In summary, this is a 68-year-old male with a history of low-grade neuroendocrine tumor, T4, N1, G2, status post R2 resection mid 2023.  Early institution of octreotide elected.  Recent CBC and differential normal.  CMP shows glucose 162, otherwise normal.  TSH normal.  CT abdomen pelvis January 3, 2024 showed stable mesenteric masses compared to prior study of 6 months earlier.  Clinically, he is doing well.  Functions without restriction.  Will continue octreotide monthly.  Reimaging just prior to his next visit in 6 months.  I discussed the above with the patient.  The patient and his wife voiced understanding and agreement.  ______________________________________________________________________    No chief complaint on file.      HPI:  Oncology History   Neuroendocrine tumor   6/20/2023 Initial Diagnosis    Neuroendocrine tumor     6/20/2023 Surgery    Small Bowel Resection and biopsy mesenteric mass:    A. Soft Tissue, Other, Mesenteric mass:  - Dense fibrotic tissue with chronic inflammation.  Immunostains did not reveal neuroendocrine tumor cells.       B. Small Bowel, NOS, Small Bowel :  - Well differential neuroendocrine tumor, grade 2.   - Tumor measures 1.9 cm in  largest dimension.   - Tumor extends to inked serosal surface (slide B6)  - Resection margins negative for differentiated neuroendocrine tumor.  Tumor is 1.6 cm (16 mm) from closest margin of resection  - Lymphovascular invasion present (slide B10).  - Perineural invasion present (slide B10)  - Favor metastatic carcinoma involving 1 lymph node (slide B10).     C. Soft Tissue, Other, Mesenteric Mass part 2:  - Fragments of well differential neuroendocrine tumor. Favor tumor effaces at least 1 lymph node (1/1).  Largest dimension of tumor measuring 1.2 cm (slide C1). No residual lymph node tissue seen.  Adjacent dense fibrotic tissue with inflammation present.  - Please see separate report for flow cytometry study result.       Cancer Staged    Cancer Staging   T4N1M0       Neuroendocrine carcinoma (HCC)   3/31/2023 Initial Diagnosis    March 31, 2023 CT of the abdomen pelvis to investigate nausea and vomiting showed 3 mm right ureteral calculus.  14 mm right adrenal nodule.  May 24, 2023 PET/CT showed right mesenteric mass up to 3.4 cm, SUV 2.5.  Slight nodularity of small bowel loop in right hemiabdomen, SUV 3.2.  1.4 cm right adrenal, no hypermetabolism.  Incidentally noted hypermetabolism at the left base of tongue.  June 20, 2023 patient underwent small bowel resection and biopsy of mesenteric mass.  Pathology indicates well differentiated neuroendocrine tumor of small bowel, grade 2, T4, N1.  Mesenteric mass was not resected due to concern for possible short gut syndrome.  Biopsy showed fragments of well differentiated neuroendocrine tumor.      Cancer Staged    Cancer Staging   T4N1M0           Interval History: Clinically stable.  ECOG-  0 - Asymptomatic     Review of Systems   Constitutional:  Negative for chills and fever.   HENT:  Negative for nosebleeds.    Eyes:  Negative for discharge.   Respiratory:  Negative for cough and shortness of breath.    Cardiovascular:  Negative for chest pain.    Gastrointestinal:  Negative for abdominal pain, constipation and diarrhea.   Endocrine: Negative for polydipsia.   Genitourinary:  Negative for hematuria.   Musculoskeletal:  Negative for arthralgias.   Skin:  Negative for color change.   Allergic/Immunologic: Negative for immunocompromised state.   Neurological:  Negative for dizziness and headaches.   Hematological:  Negative for adenopathy.   Psychiatric/Behavioral:  Negative for agitation.        Past Medical History:   Diagnosis Date   • Allergic rhinitis     last assessed: 5/4/2015   • Aneurysm of thoracic aorta (HCC)    • Cataract     resolved: 12/12/2014   • Colon polyp    • Diabetes mellitus ([facility])     diet control   • Herniated nucleus pulposus, L4-5 left     last assessed: 4/9/2014   • Herniated nucleus pulposus, L5-S1, left     last assessed: 4/9/2014   • Hx of echocardiogram 10/14/2016    EF 55%, Mild LVH, mild aortic root and ascending aorta enlargement, measuring 42 mm, trace MR, trace aortic regurg.   • Hypercholesterolemia    • Hypertension    • Kidney stone    • Lumbar radiculopathy      Patient Active Problem List   Diagnosis   • Aneurysm of thoracic aorta (HCC)   • Edema   • Heel spur   • Mixed hyperlipidemia   • Hypertension   • Hyperuricemia without signs inflammatory arthritis/tophaceous disease   • Chronic bilateral low back pain   • Neuropathy of left foot   • Non-toxic uninodular goiter   • Osteoarthritis   • Sciatica   • Diabetes mellitus type 2 in obese    • Restless leg syndrome   • Skin tag   • Acute pain of left shoulder   • Microscopic hematuria   • Pain of left thigh   • Lumbar radiculopathy   • Colonic polyp   • Hypokalemia   • Type 2 diabetes mellitus with mild nonproliferative retinopathy of both eyes without macular edema ([facility])   • Type 2 diabetes with stage 2 chronic kidney disease GFR 60-89    • Nausea and vomiting   • Generalized abdominal pain   • Stage 3a chronic kidney disease (HCC)   • Lymphadenopathy, abdominal   •  Neuroendocrine tumor   • Neuroendocrine carcinoma (HCC)       Current Outpatient Medications:   •  allopurinol (ZYLOPRIM) 100 mg tablet, Take 1 tablet (100 mg total) by mouth daily, Disp: 30 tablet, Rfl: 0  •  amLODIPine-benazepril (LOTREL 5-20) 5-20 MG per capsule, Take 1 capsule by mouth 2 (two) times a day, Disp: 60 capsule, Rfl: 0  •  atenolol (TENORMIN) 50 mg tablet, Take 1 tablet (50 mg total) by mouth 2 (two) times a day, Disp: 60 tablet, Rfl: 0  •  atorvastatin (LIPITOR) 40 mg tablet, TAKE 1 TABLET DAILY (Patient taking differently: every evening), Disp: 90 tablet, Rfl: 3  •  Empagliflozin (Jardiance) 25 MG TABS, Take 1 tablet (25 mg total) by mouth every morning, Disp: 30 tablet, Rfl: 0  •  furosemide (LASIX) 40 mg tablet, TAKE ONE-HALF (1/2) TABLET DAILY, Disp: 45 tablet, Rfl: 3  •  magnesium oxide (MAG-OX) 400 mg, Take 400 mg by mouth daily, Disp: , Rfl:   •  olmesartan-hydrochlorothiazide (BENICAR HCT) 40-12.5 MG per tablet, TAKE 1 TABLET DAILY, Disp: 90 tablet, Rfl: 3  •  Ozempic, 1 MG/DOSE, 4 MG/3ML injection pen, INJECT 1 MG UNDER THE SKIN ONCE A WEEK (Patient taking differently: Monday), Disp: 9 mL, Rfl: 3  •  potassium chloride (K-DUR,KLOR-CON) 10 mEq tablet, Take 3 tablets (30 mEq total) by mouth 2 (two) times a day, Disp: 180 tablet, Rfl: 0  No Known Allergies  Past Surgical History:   Procedure Laterality Date   • ADENOIDECTOMY     • BACK SURGERY     • BACK SURGERY     • COLONOSCOPY     • KNEE ARTHROSCOPY      (therapeutic)   • KNEE SURGERY     • LYMPH NODE DISSECTION N/A 06/20/2023    Procedure: biopsy of MESENTERIC MASS, tap block;  Surgeon: Francis Gardner MD;  [facility];  Service: Surgical Oncology   • TN COLONOSCOPY FLX DX W/COLLJ SPEC WHEN PFRMD N/A 09/30/2016    Procedure: COLONOSCOPY;  Surgeon: Laith Pinzon MD;  Location: [facility];  Service: Gastroenterology   • SMALL INTESTINE SURGERY N/A 06/20/2023    Procedure: SMALL BOWEL RESECTION;  Surgeon: Francis Gardner MD;  Location:  [facility];  Service: Surgical Oncology   • TONSILLECTOMY AND ADENOIDECTOMY       Social History    Objective:  Vitals:    01/23/24 0825   BP: 109/75   BP Location: Left arm   Patient Position: Sitting   Cuff Size: Standard   Pulse: 79   Temp: 97.8 °F (36.6 °C)   TempSrc: Temporal   SpO2: 95%   Weight: 103 kg (227 lb)   Height: 5' 8.9\" (1.75 m)     Physical Exam  Constitutional:       Appearance: He is well-developed.   HENT:      Head: Normocephalic and atraumatic.      Mouth/Throat:      Mouth: Mucous membranes are moist.   Eyes:      Pupils: Pupils are equal, round, and reactive to light.   Cardiovascular:      Rate and Rhythm: Normal rate and regular rhythm.      Heart sounds: No murmur heard.  Pulmonary:      Breath sounds: Normal breath sounds. No wheezing or rales.   Abdominal:      Palpations: Abdomen is soft.      Tenderness: There is no abdominal tenderness.   Musculoskeletal:         General: No tenderness. Normal range of motion.      Cervical back: Neck supple.   Lymphadenopathy:      Cervical: No cervical adenopathy.   Skin:     Findings: No erythema or rash.   Neurological:      Mental Status: He is alert and oriented to person, place, and time.      Cranial Nerves: No cranial nerve deficit.      Deep Tendon Reflexes: Reflexes are normal and symmetric.   Psychiatric:         Behavior: Behavior normal.           Labs:  I personally reviewed the labs and imaging pertinent to this patient care.  "

## 2024-01-23 NOTE — PROGRESS NOTES
[facility] [place]  [address] [facility] [address]  [phone]  [phone]    Prashant Pastrana,1955, [id number]  01/23/24    Discussion:   In summary, this is a 68-year-old male with a history of low-grade neuroendocrine tumor, T4, N1, G2, status post R2 resection mid 2023.  Early institution of octreotide elected.  Recent CBC and differential normal.  CMP shows glucose 162, otherwise normal.  TSH normal.  CT abdomen pelvis January 3, 2024 showed stable mesenteric masses compared to prior study of 6 months earlier.  Clinically, he is doing well.  Functions without restriction.  Will continue octreotide monthly.  Reimaging just prior to his next visit in 6 months.  I discussed the above with the patient.  The patient and his wife voiced understanding and agreement.  ______________________________________________________________________    No chief complaint on file.      HPI:  Oncology History   Neuroendocrine tumor   6/20/2023 Initial Diagnosis    Neuroendocrine tumor     6/20/2023 Surgery    Small Bowel Resection and biopsy mesenteric mass:    A. Soft Tissue, Other, Mesenteric mass:  - Dense fibrotic tissue with chronic inflammation.  Immunostains did not reveal neuroendocrine tumor cells.       B. Small Bowel, NOS, Small Bowel :  - Well differential neuroendocrine tumor, grade 2.   - Tumor measures 1.9 cm in largest dimension.   - Tumor extends to inked serosal surface (slide B6)  - Resection margins negative for differentiated neuroendocrine tumor.  Tumor is 1.6 cm (16 mm) from closest margin of resection  - Lymphovascular invasion present (slide B10).  - Perineural invasion present (slide B10)  - Favor metastatic carcinoma involving 1 lymph node (slide B10).     C. Soft Tissue, Other, Mesenteric Mass part 2:  - Fragments of well differential neuroendocrine tumor. Favor tumor effaces at least 1 lymph node (1/1).  Largest dimension of tumor measuring 1.2 cm (slide C1).  No residual lymph node tissue seen.  Adjacent dense fibrotic tissue with inflammation present.  - Please see separate report for flow cytometry study result.       Cancer Staged    Cancer Staging   T4N1M0       Neuroendocrine carcinoma (HCC)   3/31/2023 Initial Diagnosis    March 31, 2023 CT of the abdomen pelvis to investigate nausea and vomiting showed 3 mm right ureteral calculus.  14 mm right adrenal nodule.  May 24, 2023 PET/CT showed right mesenteric mass up to 3.4 cm, SUV 2.5.  Slight nodularity of small bowel loop in right hemiabdomen, SUV 3.2.  1.4 cm right adrenal, no hypermetabolism.  Incidentally noted hypermetabolism at the left base of tongue.  June 20, 2023 patient underwent small bowel resection and biopsy of mesenteric mass.  Pathology indicates well differentiated neuroendocrine tumor of small bowel, grade 2, T4, N1.  Mesenteric mass was not resected due to concern for possible short gut syndrome.  Biopsy showed fragments of well differentiated neuroendocrine tumor.      Cancer Staged    Cancer Staging   T4N1M0           Interval History: Clinically stable.  ECOG-  0 - Asymptomatic     Review of Systems   Constitutional:  Negative for chills and fever.   HENT:  Negative for nosebleeds.    Eyes:  Negative for discharge.   Respiratory:  Negative for cough and shortness of breath.    Cardiovascular:  Negative for chest pain.   Gastrointestinal:  Negative for abdominal pain, constipation and diarrhea.   Endocrine: Negative for polydipsia.   Genitourinary:  Negative for hematuria.   Musculoskeletal:  Negative for arthralgias.   Skin:  Negative for color change.   Allergic/Immunologic: Negative for immunocompromised state.   Neurological:  Negative for dizziness and headaches.   Hematological:  Negative for adenopathy.   Psychiatric/Behavioral:  Negative for agitation.        Past Medical History:   Diagnosis Date    Allergic rhinitis     last assessed: 5/4/2015    Aneurysm of thoracic aorta (HCC)      Cataract     resolved: 12/12/2014    Colon polyp     Diabetes mellitus (HCC)     diet control    Herniated nucleus pulposus, L4-5 left     last assessed: 4/9/2014    Herniated nucleus pulposus, L5-S1, left     last assessed: 4/9/2014    Hx of echocardiogram 10/14/2016    EF 55%, Mild LVH, mild aortic root and ascending aorta enlargement, measuring 42 mm, trace MR, trace aortic regurg.    Hypercholesterolemia     Hypertension     Kidney stone     Lumbar radiculopathy      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)    Type 2 diabetes with stage 2 chronic kidney disease GFR 60-89     Nausea and vomiting    Generalized abdominal pain    Stage 3a chronic kidney disease (HCC)    Lymphadenopathy, abdominal    Neuroendocrine tumor    Neuroendocrine carcinoma (HCC)       Current Outpatient Medications:     allopurinol (ZYLOPRIM) 100 mg tablet, Take 1 tablet (100 mg total) by mouth daily, Disp: 30 tablet, Rfl: 0    amLODIPine-benazepril (LOTREL 5-20) 5-20 MG per capsule, Take 1 capsule by mouth 2 (two) times a day, Disp: 60 capsule, Rfl: 0    atenolol (TENORMIN) 50 mg tablet, Take 1 tablet (50 mg total) by mouth 2 (two) times a day, Disp: 60 tablet, Rfl: 0    atorvastatin (LIPITOR) 40 mg tablet, TAKE 1 TABLET DAILY (Patient taking differently: every evening), Disp: 90 tablet, Rfl: 3    Empagliflozin (Jardiance) 25 MG TABS, Take 1  "tablet (25 mg total) by mouth every morning, Disp: 30 tablet, Rfl: 0    furosemide (LASIX) 40 mg tablet, TAKE ONE-HALF (1/2) TABLET DAILY, Disp: 45 tablet, Rfl: 3    magnesium oxide (MAG-OX) 400 mg, Take 400 mg by mouth daily, Disp: , Rfl:     olmesartan-hydrochlorothiazide (BENICAR HCT) 40-12.5 MG per tablet, TAKE 1 TABLET DAILY, Disp: 90 tablet, Rfl: 3    Ozempic, 1 MG/DOSE, 4 MG/3ML injection pen, INJECT 1 MG UNDER THE SKIN ONCE A WEEK (Patient taking differently: Monday), Disp: 9 mL, Rfl: 3    potassium chloride (K-DUR,KLOR-CON) 10 mEq tablet, Take 3 tablets (30 mEq total) by mouth 2 (two) times a day, Disp: 180 tablet, Rfl: 0  No Known Allergies  Past Surgical History:   Procedure Laterality Date    ADENOIDECTOMY      BACK SURGERY      BACK SURGERY      COLONOSCOPY      KNEE ARTHROSCOPY      (therapeutic)    KNEE SURGERY      LYMPH NODE DISSECTION N/A 06/20/2023    Procedure: biopsy of MESENTERIC MASS, tap block;  Surgeon: Francis Gardner MD;  [facility];  Service: Surgical Oncology    MO COLONOSCOPY FLX DX W/COLLJ SPEC WHEN PFRMD N/A 09/30/2016    Procedure: COLONOSCOPY;  Surgeon: Laith Pinzon MD;  [facility]: [facility];  Service: Gastroenterology    SMALL INTESTINE SURGERY N/A 06/20/2023    Procedure: SMALL BOWEL RESECTION;  Surgeon: Francis Gardner MD;  Location:  MAIN OR;  Service: Surgical Oncology    TONSILLECTOMY AND ADENOIDECTOMY       Social History     Objective:  Vitals:    01/23/24 0825   BP: 109/75   BP Location: Left arm   Patient Position: Sitting   Cuff Size: Standard   Pulse: 79   Temp: 97.8 °F (36.6 °C)   TempSrc: Temporal   SpO2: 95%   Weight: 103 kg (227 lb)   Height: 5' 8.9\" (1.75 m)     Physical Exam  Constitutional:       Appearance: He is well-developed.   HENT:      Head: Normocephalic and atraumatic.      Mouth/Throat:      Mouth: Mucous membranes are moist.   Eyes:      Pupils: Pupils are equal, round, and reactive to light.   Cardiovascular:      Rate and Rhythm: Normal rate " and regular rhythm.      Heart sounds: No murmur heard.  Pulmonary:      Breath sounds: Normal breath sounds. No wheezing or rales.   Abdominal:      Palpations: Abdomen is soft.      Tenderness: There is no abdominal tenderness.   Musculoskeletal:         General: No tenderness. Normal range of motion.      Cervical back: Neck supple.   Lymphadenopathy:      Cervical: No cervical adenopathy.   Skin:     Findings: No erythema or rash.   Neurological:      Mental Status: He is alert and oriented to person, place, and time.      Cranial Nerves: No cranial nerve deficit.      Deep Tendon Reflexes: Reflexes are normal and symmetric.   Psychiatric:         Behavior: Behavior normal.           Labs:  I personally reviewed the labs and imaging pertinent to this patient care.

## 2024-01-26 ENCOUNTER — TELEPHONE (OUTPATIENT)
Dept: INTERNAL MEDICINE CLINIC | Facility: CLINIC | Age: 69
End: 2024-01-26

## 2024-01-26 NOTE — TELEPHONE ENCOUNTER
Wife of patient called requesting that I give you this message.  I was going to put all of patient's medication to [facility] as wife requested but she said to tell you.  Also that you sent 5 scripts to [facility] already.

## 2024-01-29 DIAGNOSIS — I10 ESSENTIAL HYPERTENSION: ICD-10-CM

## 2024-01-29 DIAGNOSIS — E11.65 UNCONTROLLED TYPE 2 DIABETES MELLITUS WITH HYPERGLYCEMIA (HCC): ICD-10-CM

## 2024-01-29 DIAGNOSIS — R60.9 PERIPHERAL EDEMA: ICD-10-CM

## 2024-01-29 DIAGNOSIS — E66.9 DIABETES MELLITUS TYPE 2 IN OBESE: ICD-10-CM

## 2024-01-29 DIAGNOSIS — E78.5 HYPERLIPIDEMIA, UNSPECIFIED HYPERLIPIDEMIA TYPE: ICD-10-CM

## 2024-01-29 DIAGNOSIS — E11.69 DIABETES MELLITUS TYPE 2 IN OBESE: ICD-10-CM

## 2024-01-29 DIAGNOSIS — E79.0 HYPERURICEMIA: ICD-10-CM

## 2024-01-29 RX ORDER — ALLOPURINOL 100 MG/1
100 TABLET ORAL DAILY
Qty: 90 TABLET | Refills: 3 | Status: SHIPPED | OUTPATIENT
Start: 2024-01-29

## 2024-01-29 RX ORDER — ATENOLOL 50 MG/1
50 TABLET ORAL 2 TIMES DAILY
Qty: 180 TABLET | Refills: 3 | Status: SHIPPED | OUTPATIENT
Start: 2024-01-29

## 2024-01-29 RX ORDER — OLMESARTAN MEDOXOMIL AND HYDROCHLOROTHIAZIDE 40/12.5 40; 12.5 MG/1; MG/1
1 TABLET ORAL DAILY
Qty: 90 TABLET | Refills: 3 | Status: SHIPPED | OUTPATIENT
Start: 2024-01-29

## 2024-01-29 RX ORDER — POTASSIUM CHLORIDE 750 MG/1
30 TABLET, EXTENDED RELEASE ORAL 2 TIMES DAILY
Qty: 540 TABLET | Refills: 3 | Status: SHIPPED | OUTPATIENT
Start: 2024-01-29

## 2024-01-29 RX ORDER — AMLODIPINE BESYLATE AND BENAZEPRIL HYDROCHLORIDE 5; 20 MG/1; MG/1
1 CAPSULE ORAL 2 TIMES DAILY
Qty: 180 CAPSULE | Refills: 3 | Status: SHIPPED | OUTPATIENT
Start: 2024-01-29

## 2024-01-29 RX ORDER — FUROSEMIDE 40 MG/1
20 TABLET ORAL DAILY
Qty: 45 TABLET | Refills: 3 | Status: SHIPPED | OUTPATIENT
Start: 2024-01-29

## 2024-01-29 RX ORDER — ATORVASTATIN CALCIUM 40 MG/1
40 TABLET, FILM COATED ORAL EVERY EVENING
Qty: 90 TABLET | Refills: 3 | Status: SHIPPED | OUTPATIENT
Start: 2024-01-29

## 2024-02-05 ENCOUNTER — TELEPHONE (OUTPATIENT)
Dept: INTERNAL MEDICINE CLINIC | Facility: CLINIC | Age: 69
End: 2024-02-05

## 2024-02-05 NOTE — TELEPHONE ENCOUNTER
Received a request to switch patients Amlodopine-Benazepril 5/20mg or try to get a quantity limit as he takes twice per day.    Did start authorization for quantity limit. Spoke to Jenny @ [phone]. Ticket # [id number] .

## 2024-02-13 ENCOUNTER — TELEPHONE (OUTPATIENT)
Dept: BARIATRICS | Facility: CLINIC | Age: 69
End: 2024-02-13

## 2024-02-13 NOTE — TELEPHONE ENCOUNTER
----- Message from Antione Marie MD sent at 2/13/2024  8:19 AM EST -----  Prashant needs to be seen in any office in 1-2 months

## 2024-02-15 ENCOUNTER — OFFICE VISIT (OUTPATIENT)
Dept: OTOLARYNGOLOGY | Facility: CLINIC | Age: 69
End: 2024-02-15
Payer: MEDICARE

## 2024-02-15 VITALS
HEART RATE: 81 BPM | TEMPERATURE: 98.1 F | DIASTOLIC BLOOD PRESSURE: 88 MMHG | HEIGHT: 69 IN | OXYGEN SATURATION: 95 % | BODY MASS INDEX: 33.8 KG/M2 | SYSTOLIC BLOOD PRESSURE: 138 MMHG | WEIGHT: 228.2 LBS

## 2024-02-15 DIAGNOSIS — D3A.8 NEUROENDOCRINE TUMOR: Primary | ICD-10-CM

## 2024-02-15 DIAGNOSIS — R94.02 ABNORMAL PET SCAN OF HEAD: ICD-10-CM

## 2024-02-15 PROCEDURE — 99213 OFFICE O/P EST LOW 20 MIN: CPT | Performed by: PHYSICIAN ASSISTANT

## 2024-02-15 PROCEDURE — 31575 DIAGNOSTIC LARYNGOSCOPY: CPT | Performed by: PHYSICIAN ASSISTANT

## 2024-02-15 NOTE — PROGRESS NOTES
[facility] Otolaryngology Follow up visit      Prashant Pastrana is a 68 y.o. male who presents with a chief complaint of throat     Independent historian: none      Time interval of problem since last visit:  6 months     Pertinent elements of the history:  Here for follow up on incidental findings on PET/CT -- enhancement in L tongue base and lingual tonsil    H/o neuroendocrine tumor   On ocetreotide/chemo    No changes in history     Weight is stable     No throat pain  No dysphagia   No dysphonia   No otalgia     Nonsmoker   Social ETOH      Review of any relevant imaging: images from any scan reviewed personally  Scans: PET/CT 05/24/23: Incidentally noted small focus of FDG uptake at the left base of the tongue. Asymmetric activity also noted in the left lingual tonsillar region, SUV 3.7.   Labs:   Notes:     Review of Systems:  As above    PMHx:  Past Medical History:   Diagnosis Date    Allergic rhinitis     last assessed: 5/4/2015    Aneurysm of thoracic aorta (HCC)     Cataract     resolved: 12/12/2014    Colon polyp     Diabetes mellitus (HCC)     diet control    Herniated nucleus pulposus, L4-5 left     last assessed: 4/9/2014    Herniated nucleus pulposus, L5-S1, left     last assessed: 4/9/2014    Hx of echocardiogram 10/14/2016    EF 55%, Mild LVH, mild aortic root and ascending aorta enlargement, measuring 42 mm, trace MR, trace aortic regurg.    Hypercholesterolemia     Hypertension     Kidney stone     Lumbar radiculopathy         FAMHx:  Family History   Problem Relation Age of Onset    Thyroid disease Mother         disorder    Hypertension Father     Coronary artery disease Brother     Heart attack Brother         prior myocardial infarction       SOCHx:  Social History     Socioeconomic History    Marital status: /Civil Union     Spouse name: None    Number of children: None    Years of education: None    Highest education level: None   Occupational History    None   Tobacco Use     "Smoking status: Never    Smokeless tobacco: Never   Vaping Use    Vaping status: Never Used   Substance and Sexual Activity    Alcohol use: Yes     Comment: social once a month    Drug use: No    Sexual activity: Not Currently     Partners: Female     Birth control/protection: Female Sterilization   Other Topics Concern    None   Social History Narrative    None     Social Determinants of Health     Financial Resource Strain: Not on file   Food Insecurity: Not on file   Transportation Needs: Not on file   Physical Activity: Not on file   Stress: Not on file   Social Connections: Not on file   Intimate Partner Violence: Not on file   Housing Stability: Not on file       Allergies:  No Known Allergies     MEDS:  Reviewed      Physical exam: (abnormal findings appear in bold and supercede any conflicting normal findings listed below)    /88   Pulse 81   Temp 98.1 °F (36.7 °C) (Temporal)   Ht 5' 8.9\" (1.75 m)   Wt 104 kg (228 lb 3.2 oz)   SpO2 95%   BMI 33.80 kg/m²     Constitutional:  Well developed, well nourished and groomed, in no acute distress.     Eyes:  Extra-ocular movements intact, pupils equally round and reactive to light and accommodation, the lids and conjunctivae are normal in appearance.    Head: Atraumatic, normocephalic, no visible scalp lesions, bony palpation unremarkable without stepoffs, parotid and submandibular salivary glands non-tender to palpation and without masses bilaterally.     Ears:  Auricles normal in appearance bilaterally, mastoid prominence non-tender, external auditory canals clear bilaterally, tympanic membranes intact bilaterally without evidence of middle ear effusion or masses, normal appearing ossicles.     Nose/Sinuses:  External appearance unremarkable, no maxillary or frontal sinus tenderness to palpation bilaterally. Anterior rhinoscopy demonstrates  pink mucosa. No polyps or other masses identified. Turbinates are non-edematous. No evidence of purulent " drainage.    Oral Cavity:  Moist mucus membranes, gums and dentition unremarkable, no oral mucosal masses or lesions, floor of mouth soft, tongue mobile without masses or lesions.     Oropharynx:  Base of tongue soft and without masses, tonsils bilaterally unremarkable, soft palate mucosa unremarkable.     Neck:  No visible or palpable cervical lesions or lymphadenopathy, thyroid gland is normal in size and symmetry and without masses, normal laryngeal elevation with swallowing.     Cardiovascular:  Normal rate and rhythm, no palpable thrills, no jugulovenous distension observed.  Respiratory:  Normal respiratory effort without evidence of retractions or use of accessory muscles.  Integument:  Normal appearing without observed masses or lesions.  Neurologic:  Cranial nerves II-XII intact bilaterally.  Psychiatric:  Alert and oriented to time, place and person, normal affect.      Procedure:   Laryngoscopy          Performed by Camila Covarrubias PA-C      Authorized by Camila Covarrubias PA-C        Consent: Verbal consent obtained.  Consent given by: patient  Patient understanding: patient states understanding of the procedure being performed        Local anesthesia used: yes      Anesthesia    Local anesthesia used: yes  Local Anesthetic: topical anesthetic      Sedation    Patient sedated: no           Culture: no   Procedure Details    Procedure Notes:  Nasopharynx unremarkable, with normal eustachian tube orifices and normal Fossa of Rosenmuller bilaterally. Posterior nasopharyngeal and oropharyngeal walls normal. Oropharyngeal mucosa moist, no masses or lesions. Tongue base normal without masses or lesions. Vallecula and pyriform sinuses clear, without pooling of secretions. Epiglottis, aryepiglottic folds and remainder of supraglottis well-appearing. True vocal folds mobile, without masses or lesions, normal glottic chink.     Other findings: Tongue base is smooth. No asymmetry.    Patient tolerance: Patient  "tolerated the procedure well with no immediate complications           Assessment:  1. Neuroendocrine tumor        2. Abnormal PET scan of head            Plan:  1. Prashant Pastrana is a 68 y.o. male with acute and chronic problems as above who presents for re-evaluation of incidental findings on PET/CT indicating mild enhancement along left BOT and lingual tonsil.     Flexible laryngoscopy is stable. Reassured patient of exam today. This was his 6 month follow up. Reviewed with him plan for repeat scope in 12 months and attention on next PET.      F/u 1 year   Will schedule at [facility].     Return to clinic precautions given-- discussed new onset throat or ear pain, dysphagia, dysphonia, neck mass.        ** Please Note: Portions of the record may have been created with voice recognition software. Occasional wrong word or \"sound a like\" substitutions may have occurred due to the inherent limitations of voice recognition software. There may also be notations and random deletions of words or characters from malfunctioning software. Read the chart carefully and recognize, using context, where substitutions/deletions have occurred.**    "

## 2024-02-20 ENCOUNTER — HOSPITAL ENCOUNTER (OUTPATIENT)
Dept: INFUSION CENTER | Facility: HOSPITAL | Age: 69
Discharge: HOME | End: 2024-02-20
Attending: INTERNAL MEDICINE
Payer: MEDICARE

## 2024-02-20 VITALS
SYSTOLIC BLOOD PRESSURE: 129 MMHG | OXYGEN SATURATION: 95 % | HEART RATE: 82 BPM | DIASTOLIC BLOOD PRESSURE: 79 MMHG | RESPIRATION RATE: 16 BRPM | TEMPERATURE: 97.2 F

## 2024-02-20 DIAGNOSIS — C7A.8 NEUROENDOCRINE CARCINOMA (HCC): Primary | ICD-10-CM

## 2024-02-20 PROCEDURE — 96372 THER/PROPH/DIAG INJ SC/IM: CPT

## 2024-02-20 RX ADMIN — OCTREOTIDE ACETATE 30 MG: KIT at 08:51

## 2024-02-20 NOTE — PLAN OF CARE
Problem: INFECTION - ADULT  Goal: Absence or prevention of progression during hospitalization  Description: INTERVENTIONS:  - Assess and monitor for signs and symptoms of infection  - Monitor lab/diagnostic results  - Monitor all insertion sites, i.e. indwelling lines, tubes, and drains  - Monitor endotracheal if appropriate and nasal secretions for changes in amount and color  - [place] appropriate cooling/warming therapies per order  - Administer medications as ordered  - Instruct and encourage patient and family to use good hand hygiene technique  - Identify and instruct in appropriate isolation precautions for identified infection/condition  Outcome: Progressing     Problem: Knowledge Deficit  Goal: Patient/family/caregiver demonstrates understanding of disease process, treatment plan, medications, and discharge instructions  Description: Complete learning assessment and assess knowledge base.  Interventions:  - Provide teaching at level of understanding  - Provide teaching via preferred learning methods  Outcome: Progressing

## 2024-02-20 NOTE — PROGRESS NOTES
Pt tolerated Sandostatin injection in R intragluteal without incident.       Prashant Pastrana is aware of future appt on 3/19/24 at 8:30AM.      AVS printed and given to Prashant Pastrana:  Yes X  No (Declined by Prashant Pastrana)      Pt discharged off unit in stable condition with all personal belongings

## 2024-02-29 ENCOUNTER — HOSPITAL ENCOUNTER (OUTPATIENT)
Dept: NON INVASIVE DIAGNOSTICS | Facility: CLINIC | Age: 69
Discharge: HOME | End: 2024-02-29
Payer: MEDICARE

## 2024-02-29 ENCOUNTER — TELEPHONE (OUTPATIENT)
Dept: CARDIOLOGY CLINIC | Facility: CLINIC | Age: 69
End: 2024-02-29

## 2024-02-29 VITALS
WEIGHT: 228 LBS | DIASTOLIC BLOOD PRESSURE: 79 MMHG | SYSTOLIC BLOOD PRESSURE: 129 MMHG | BODY MASS INDEX: 33.77 KG/M2 | HEART RATE: 76 BPM | HEIGHT: 69 IN

## 2024-02-29 DIAGNOSIS — I71.21 ANEURYSM OF ASCENDING AORTA WITHOUT RUPTURE (HCC): ICD-10-CM

## 2024-02-29 LAB
AORTIC ARCH: 3.8 CM
AORTIC ROOT: 4.1 CM
AORTIC VALVE MEAN VELOCITY: 9.5 M/S
APICAL FOUR CHAMBER EJECTION FRACTION: 58 %
ASCENDING AORTA: 4 CM
AV AREA BY CONTINUOUS VTI: 2.5 CM2
AV AREA PEAK VELOCITY: 2.6 CM2
AV LVOT MEAN GRADIENT: 1 MMHG
AV LVOT PEAK GRADIENT: 4 MMHG
AV MEAN GRADIENT: 4 MMHG
AV PEAK GRADIENT: 6 MMHG
AV REGURGITATION PRESSURE HALF TIME: 1092 MS
AV VALVE AREA: 2.52 CM2
AV VELOCITY RATIO: 0.75
BSA FOR ECHO PROCEDURE: 2.18 M2
DOP CALC AO PEAK VEL: 1.26 M/S
DOP CALC AO VTI: 24.3 CM
DOP CALC LVOT AREA: 3.46 CM2
DOP CALC LVOT DIAMETER: 2.1 CM
DOP CALC LVOT PEAK VEL VTI: 17.68 CM
DOP CALC LVOT PEAK VEL: 0.95 M/S
DOP CALC LVOT STROKE INDEX: 27.5 ML/M2
DOP CALC LVOT STROKE VOLUME: 60
E WAVE DECELERATION TIME: 189 MS
E/A RATIO: 0.58
FRACTIONAL SHORTENING: 35 (ref 28–44)
INTERVENTRICULAR SEPTUM IN DIASTOLE (PARASTERNAL SHORT AXIS VIEW): 1.9 CM
INTERVENTRICULAR SEPTUM: 1.9 CM (ref 0.6–1.1)
LAAS-AP2: 17.2 CM2
LAAS-AP4: 13.1 CM2
LEFT ATRIUM SIZE: 4.3 CM
LEFT ATRIUM VOLUME (MOD BIPLANE): 34 ML
LEFT ATRIUM VOLUME INDEX (MOD BIPLANE): 15.6 ML/M2
LEFT INTERNAL DIMENSION IN SYSTOLE: 2 CM (ref 2.1–4)
LEFT VENTRICULAR INTERNAL DIMENSION IN DIASTOLE: 3.1 CM (ref 3.5–6)
LEFT VENTRICULAR POSTERIOR WALL IN END DIASTOLE: 1.6 CM
LEFT VENTRICULAR STROKE VOLUME: 25 ML
LVSV (TEICH): 25 ML
MV PEAK A VEL: 0.83 M/S
MV PEAK E VEL: 48 CM/S
RIGHT ATRIUM AREA SYSTOLE A4C: 12.6 CM2
RIGHT VENTRICLE ID DIMENSION: 3.1 CM
SL CV AV DECELERATION TIME RETROGRADE: 3766 MS
SL CV AV PEAK GRADIENT RETROGRADE: 51 MMHG
SL CV LEFT ATRIUM LENGTH A2C: 5.2 CM
SL CV LV EF: 70
SL CV PED ECHO LEFT VENTRICLE DIASTOLIC VOLUME (MOD BIPLANE) 2D: 38 ML
SL CV PED ECHO LEFT VENTRICLE SYSTOLIC VOLUME (MOD BIPLANE) 2D: 13 ML
TRICUSPID ANNULAR PLANE SYSTOLIC EXCURSION: 2.3 CM

## 2024-02-29 PROCEDURE — 93306 TTE W/DOPPLER COMPLETE: CPT

## 2024-02-29 PROCEDURE — 93306 TTE W/DOPPLER COMPLETE: CPT | Performed by: INTERNAL MEDICINE

## 2024-02-29 NOTE — TELEPHONE ENCOUNTER
----- Message from Salvatore Olguin DO sent at 2/29/2024 12:13 PM EST -----  Please call the patient regarding his echo result.  All stable on echo, no new recs.  TRB

## 2024-03-19 ENCOUNTER — HOSPITAL ENCOUNTER (OUTPATIENT)
Dept: INFUSION CENTER | Facility: HOSPITAL | Age: 69
Discharge: HOME | End: 2024-03-19
Attending: INTERNAL MEDICINE
Payer: MEDICARE

## 2024-03-19 VITALS
DIASTOLIC BLOOD PRESSURE: 82 MMHG | SYSTOLIC BLOOD PRESSURE: 139 MMHG | HEART RATE: 72 BPM | TEMPERATURE: 97.4 F | RESPIRATION RATE: 16 BRPM | OXYGEN SATURATION: 94 %

## 2024-03-19 DIAGNOSIS — C7A.8 NEUROENDOCRINE CARCINOMA (HCC): Primary | ICD-10-CM

## 2024-03-19 PROCEDURE — 96372 THER/PROPH/DIAG INJ SC/IM: CPT

## 2024-03-19 RX ADMIN — OCTREOTIDE ACETATE 30 MG: KIT at 08:27

## 2024-03-19 NOTE — PLAN OF CARE
Problem: INFECTION - ADULT  Goal: Absence or prevention of progression during hospitalization  Description: INTERVENTIONS:  - Assess and monitor for signs and symptoms of infection  - Monitor lab/diagnostic results  - Monitor all insertion sites, i.e. indwelling lines, tubes, and drains  - Monitor endotracheal if appropriate and nasal secretions for changes in amount and color  - [place] appropriate cooling/warming therapies per order  - Administer medications as ordered  - Instruct and encourage patient and family to use good hand hygiene technique  - Identify and instruct in appropriate isolation precautions for identified infection/condition  Reactivated     Problem: Knowledge Deficit  Goal: Patient/family/caregiver demonstrates understanding of disease process, treatment plan, medications, and discharge instructions  Description: Complete learning assessment and assess knowledge base.  Interventions:  - Provide teaching at level of understanding  - Provide teaching via preferred learning methods  Reactivated

## 2024-03-19 NOTE — PROGRESS NOTES
Pt tolerated Sandostatin injection in L intragluteal without incident.       Prashant Pastrana is aware of future appt on 4/16/24 at 8:30AM.      AVS declined by Prashant Pastrana.     Pt discharged off unit in stable condition with all personal belongings

## 2024-04-10 NOTE — PROGRESS NOTES
"Subjective:        Patient ID: Prashant Pasrtana is a 68 y.o. male.    Chief Complaint:  Prashant is here for TAA and Hypertensive FU.  Offers no complaints, see ROS.      The following portions of the patient's history were reviewed and updated as appropriate: allergies, current medications, past family history, past medical history, past social history, past surgical history, and problem list.  Review of Systems   Constitutional: Negative for chills, diaphoresis, malaise/fatigue and weight gain.   HENT:  Negative for nosebleeds and stridor.    Eyes:  Negative for double vision, vision loss in left eye, vision loss in right eye and visual disturbance.   Cardiovascular:  Negative for chest pain, claudication, cyanosis, dyspnea on exertion, irregular heartbeat, leg swelling, near-syncope, orthopnea, palpitations, paroxysmal nocturnal dyspnea and syncope.   Respiratory:  Negative for cough, shortness of breath, snoring and wheezing.    Endocrine: Negative for polydipsia, polyphagia and polyuria.   Hematologic/Lymphatic: Negative for bleeding problem. Does not bruise/bleed easily.   Skin:  Negative for flushing and rash.   Musculoskeletal:  Negative for falls and myalgias.   Gastrointestinal:  Negative for abdominal pain, heartburn, hematemesis, hematochezia, melena and nausea.   Genitourinary:  Negative for hematuria.   Neurological:  Negative for brief paralysis, dizziness, focal weakness, headaches, light-headedness, loss of balance and vertigo.   Psychiatric/Behavioral:  Negative for altered mental status and substance abuse.    Allergic/Immunologic: Negative for hives.          Objective:      /74 (BP Location: Left arm, Patient Position: Sitting, Cuff Size: Large)   Pulse 88   Resp 20   Ht 5' 8.9\" (1.75 m)   Wt 103 kg (227 lb)   SpO2 96%   BMI 33.62 kg/m²   Physical Exam  Constitutional:       General: He is not in acute distress.     Appearance: He is well-developed. He is not diaphoretic.   HENT:      " He will be available just  in the morning ; will leave the town in the afternoon . Head: Normocephalic and atraumatic.   Eyes:      General: No scleral icterus.     Pupils: Pupils are equal, round, and reactive to light.   Neck:      Thyroid: No thyromegaly.      Vascular: No carotid bruit or JVD.   Cardiovascular:      Rate and Rhythm: Normal rate and regular rhythm.      Heart sounds: Normal heart sounds. No murmur heard.     No friction rub. No gallop.   Pulmonary:      Effort: Pulmonary effort is normal. No respiratory distress.      Breath sounds: Normal breath sounds. No stridor. No wheezing or rales.   Abdominal:      General: Bowel sounds are normal. There is no distension.      Palpations: Abdomen is soft. There is no mass.      Tenderness: There is no abdominal tenderness.   Musculoskeletal:      Cervical back: Normal range of motion and neck supple.      Right lower leg: No edema.      Left lower leg: No edema.   Skin:     General: Skin is warm and dry.      Coloration: Skin is not pale.      Findings: No erythema.   Neurological:      Mental Status: He is alert and oriented to person, place, and time. Mental status is at baseline.      Coordination: Coordination normal.   Psychiatric:         Mood and Affect: Mood normal.         Behavior: Behavior normal.         Thought Content: Thought content normal.         Judgment: Judgment normal.         Lab Review:   Appointment on 10/10/2023   Component Date Value    WBC 10/10/2023 5.67     RBC 10/10/2023 4.71     Hemoglobin 10/10/2023 14.4     Hematocrit 10/10/2023 45.1     MCV 10/10/2023 96     MCH 10/10/2023 30.6     MCHC 10/10/2023 31.9     RDW 10/10/2023 13.9     MPV 10/10/2023 10.6     Platelets 10/10/2023 164     nRBC 10/10/2023 0     Neutrophils Relative 10/10/2023 63     Immat GRANS % 10/10/2023 0     Lymphocytes Relative 10/10/2023 25     Monocytes Relative 10/10/2023 7     Eosinophils Relative 10/10/2023 4     Basophils Relative 10/10/2023 1     Neutrophils Absolute 10/10/2023 3.61     Immature Grans Absolute 10/10/2023 0.01      Lymphocytes Absolute 10/10/2023 1.40     Monocytes Absolute 10/10/2023 0.40     Eosinophils Absolute 10/10/2023 0.21     Basophils Absolute 10/10/2023 0.04     Sodium 10/10/2023 144     Potassium 10/10/2023 3.6     Chloride 10/10/2023 105     CO2 10/10/2023 32     ANION GAP 10/10/2023 7     BUN 10/10/2023 16     Creatinine 10/10/2023 0.97     Glucose, Fasting 10/10/2023 147 (H)     Calcium 10/10/2023 9.2     AST 10/10/2023 15     ALT 10/10/2023 19     Alkaline Phosphatase 10/10/2023 60     Total Protein 10/10/2023 6.6     Albumin 10/10/2023 4.2     Total Bilirubin 10/10/2023 0.70     eGFR 10/10/2023 81    Admission on 07/06/2023, Discharged on 07/06/2023   Component Date Value    WBC 07/06/2023 6.10     RBC 07/06/2023 4.12     Hemoglobin 07/06/2023 13.0     Hematocrit 07/06/2023 40.0     MCV 07/06/2023 97     MCH 07/06/2023 31.6     MCHC 07/06/2023 32.5     RDW 07/06/2023 13.4     MPV 07/06/2023 9.8     Platelets 07/06/2023 290     nRBC 07/06/2023 0     Neutrophils Relative 07/06/2023 69     Immat GRANS % 07/06/2023 1     Lymphocytes Relative 07/06/2023 20     Monocytes Relative 07/06/2023 6     Eosinophils Relative 07/06/2023 3     Basophils Relative 07/06/2023 1     Neutrophils Absolute 07/06/2023 4.19     Immature Grans Absolute 07/06/2023 0.05     Lymphocytes Absolute 07/06/2023 1.23     Monocytes Absolute 07/06/2023 0.39     Eosinophils Absolute 07/06/2023 0.18     Basophils Absolute 07/06/2023 0.06     Sodium 07/06/2023 137     Potassium 07/06/2023 3.9     Chloride 07/06/2023 110 (H)     CO2 07/06/2023 25     ANION GAP 07/06/2023 2     BUN 07/06/2023 20     Creatinine 07/06/2023 1.42 (H)     Glucose 07/06/2023 136     Calcium 07/06/2023 9.0     eGFR 07/06/2023 51     Blood Culture 07/06/2023 No Growth After 5 Days.     Blood Culture 07/06/2023 No Growth After 5 Days.     Procalcitonin 07/06/2023 0.08    Admission on 07/02/2023, Discharged on 07/02/2023   Component Date Value    WBC 07/02/2023 6.38     RBC  07/02/2023 4.04     Hemoglobin 07/02/2023 13.0     Hematocrit 07/02/2023 39.1     MCV 07/02/2023 97     MCH 07/02/2023 32.2     MCHC 07/02/2023 33.2     RDW 07/02/2023 13.2     MPV 07/02/2023 9.6     Platelets 07/02/2023 313     nRBC 07/02/2023 0     Neutrophils Relative 07/02/2023 73     Immat GRANS % 07/02/2023 0     Lymphocytes Relative 07/02/2023 16     Monocytes Relative 07/02/2023 8     Eosinophils Relative 07/02/2023 2     Basophils Relative 07/02/2023 1     Neutrophils Absolute 07/02/2023 4.68     Immature Grans Absolute 07/02/2023 0.02     Lymphocytes Absolute 07/02/2023 1.02     Monocytes Absolute 07/02/2023 0.48     Eosinophils Absolute 07/02/2023 0.15     Basophils Absolute 07/02/2023 0.03     Sodium 07/02/2023 143     Potassium 07/02/2023 3.5     Chloride 07/02/2023 110 (H)     CO2 07/02/2023 30     ANION GAP 07/02/2023 3     BUN 07/02/2023 19     Creatinine 07/02/2023 1.10     Glucose 07/02/2023 201 (H)     Calcium 07/02/2023 9.4     Corrected Calcium 07/02/2023 10.1     AST 07/02/2023 31     ALT 07/02/2023 60     Alkaline Phosphatase 07/02/2023 65     Total Protein 07/02/2023 6.8     Albumin 07/02/2023 3.1 (L)     Total Bilirubin 07/02/2023 0.34     eGFR 07/02/2023 69     LACTIC ACID 07/02/2023 1.6     Procalcitonin 07/02/2023 0.13     Protime 07/02/2023 13.5     INR 07/02/2023 1.01     PTT 07/02/2023 29     Blood Culture 07/02/2023 Staphylococcus epidermidis (A)     Gram Stain Result 07/02/2023 Gram positive cocci in clusters (A)     Blood Culture 07/02/2023 Staphylococcus epidermidis (A)     Blood Culture 07/02/2023 Staphylococcus epidermidis (A)     Gram Stain Result 07/02/2023 Gram positive cocci in clusters (A)     Ventricular Rate 07/02/2023 82     Atrial Rate 07/02/2023 82     IL Interval 07/02/2023 176     QRSD Interval 07/02/2023 96     QT Interval 07/02/2023 360     QTC Interval 07/02/2023 420     P Axis 07/02/2023 54     QRS Axis 07/02/2023 -3     T Wave [place] 07/02/2023 33      Staphylococcus epidermid* 07/02/2023 Detected (A)      No results found.      Assessment:       1. Aneurysm of ascending aorta without rupture (HCC)  Echo complete w/ contrast if indicated      2. Primary hypertension        3. Dyslipidemia associated with type 2 diabetes mellitus              Plan:       Prashant is without any signs nor symptoms reminiscent of angina, CHF nor dysrhythmia.  Normotensive, euvolemic, normocardic and LDL nearly at goal.  Cardiac auscultation unremarkable.  No TAA sx.  Echo in February for TAA fu ordered.  No changes advised, meds reviewed in detail.  RTO 6 mos, call sooner with any potential cardiac sx prior.

## 2024-04-16 ENCOUNTER — HOSPITAL ENCOUNTER (OUTPATIENT)
Dept: INFUSION CENTER | Facility: HOSPITAL | Age: 69
Discharge: HOME | End: 2024-04-16
Attending: INTERNAL MEDICINE
Payer: MEDICARE

## 2024-04-16 VITALS
HEART RATE: 84 BPM | RESPIRATION RATE: 18 BRPM | DIASTOLIC BLOOD PRESSURE: 85 MMHG | OXYGEN SATURATION: 90 % | SYSTOLIC BLOOD PRESSURE: 120 MMHG | TEMPERATURE: 97.6 F

## 2024-04-16 DIAGNOSIS — C7A.8 NEUROENDOCRINE CARCINOMA (HCC): Primary | ICD-10-CM

## 2024-04-16 PROCEDURE — 96372 THER/PROPH/DIAG INJ SC/IM: CPT

## 2024-04-16 RX ADMIN — OCTREOTIDE ACETATE 30 MG: KIT at 08:48

## 2024-04-16 NOTE — PROGRESS NOTES
Prashant Pastrana  tolerated treatment well with no complications.      Prashant Pastrana is aware of future appt on 5/14/24 at 0830.     AVS printed and given to Prashant Pastrana:    No (Declined by Prashant Pastrana) x

## 2024-05-07 ENCOUNTER — APPOINTMENT (OUTPATIENT)
Dept: LAB | Facility: MEDICAL CENTER | Age: 69
End: 2024-05-07
Payer: MEDICARE

## 2024-05-07 DIAGNOSIS — E11.69 TYPE 2 DIABETES MELLITUS WITH OBESITY  (HCC): ICD-10-CM

## 2024-05-07 DIAGNOSIS — M54.16 LUMBAR RADICULOPATHY: ICD-10-CM

## 2024-05-07 DIAGNOSIS — I10 PRIMARY HYPERTENSION: ICD-10-CM

## 2024-05-07 DIAGNOSIS — E79.0 HYPERURICEMIA: ICD-10-CM

## 2024-05-07 DIAGNOSIS — E78.2 MIXED HYPERLIPIDEMIA: ICD-10-CM

## 2024-05-07 DIAGNOSIS — E11.3293 TYPE 2 DIABETES MELLITUS WITH BOTH EYES AFFECTED BY MILD NONPROLIFERATIVE RETINOPATHY WITHOUT MACULAR EDEMA, WITHOUT LONG-TERM CURRENT USE OF INSULIN (HCC): ICD-10-CM

## 2024-05-07 DIAGNOSIS — N18.2 TYPE 2 DIABETES MELLITUS WITH STAGE 2 CHRONIC KIDNEY DISEASE, WITHOUT LONG-TERM CURRENT USE OF INSULIN  (HCC): ICD-10-CM

## 2024-05-07 DIAGNOSIS — I10 ESSENTIAL HYPERTENSION: ICD-10-CM

## 2024-05-07 DIAGNOSIS — R60.0 PERIPHERAL EDEMA: ICD-10-CM

## 2024-05-07 DIAGNOSIS — E66.9 TYPE 2 DIABETES MELLITUS WITH OBESITY  (HCC): ICD-10-CM

## 2024-05-07 DIAGNOSIS — C7A.8 NEUROENDOCRINE CARCINOMA (HCC): ICD-10-CM

## 2024-05-07 DIAGNOSIS — E11.22 TYPE 2 DIABETES MELLITUS WITH STAGE 2 CHRONIC KIDNEY DISEASE, WITHOUT LONG-TERM CURRENT USE OF INSULIN  (HCC): ICD-10-CM

## 2024-05-07 DIAGNOSIS — G57.92 NEUROPATHY OF LEFT FOOT: ICD-10-CM

## 2024-05-07 DIAGNOSIS — N18.31 STAGE 3A CHRONIC KIDNEY DISEASE (HCC): ICD-10-CM

## 2024-05-07 DIAGNOSIS — I71.21 ANEURYSM OF ASCENDING AORTA WITHOUT RUPTURE (HCC): ICD-10-CM

## 2024-05-07 LAB
ALBUMIN SERPL BCP-MCNC: 4.4 G/DL (ref 3.5–5)
ALP SERPL-CCNC: 54 U/L (ref 34–104)
ALT SERPL W P-5'-P-CCNC: 17 U/L (ref 7–52)
ANION GAP SERPL CALCULATED.3IONS-SCNC: 10 MMOL/L (ref 4–13)
AST SERPL W P-5'-P-CCNC: 16 U/L (ref 13–39)
BASOPHILS # BLD AUTO: 0.04 THOUSANDS/ÂΜL (ref 0–0.1)
BASOPHILS NFR BLD AUTO: 1 % (ref 0–1)
BILIRUB SERPL-MCNC: 0.81 MG/DL (ref 0.2–1)
BUN SERPL-MCNC: 16 MG/DL (ref 5–25)
CALCIUM SERPL-MCNC: 9.2 MG/DL (ref 8.4–10.2)
CHLORIDE SERPL-SCNC: 103 MMOL/L (ref 96–108)
CHOLEST SERPL-MCNC: 129 MG/DL
CO2 SERPL-SCNC: 31 MMOL/L (ref 21–32)
CREAT SERPL-MCNC: 0.96 MG/DL (ref 0.6–1.3)
EOSINOPHIL # BLD AUTO: 0.1 THOUSAND/ÂΜL (ref 0–0.61)
EOSINOPHIL NFR BLD AUTO: 2 % (ref 0–6)
ERYTHROCYTE [DISTWIDTH] IN BLOOD BY AUTOMATED COUNT: 13.9 % (ref 11.6–15.1)
EST. AVERAGE GLUCOSE BLD GHB EST-MCNC: 154 MG/DL
GFR SERPL CREATININE-BSD FRML MDRD: 81 ML/MIN/1.73SQ M
GLUCOSE P FAST SERPL-MCNC: 161 MG/DL (ref 65–99)
HBA1C MFR BLD: 7 %
HCT VFR BLD AUTO: 47.8 % (ref 36.5–49.3)
HDLC SERPL-MCNC: 42 MG/DL
HGB BLD-MCNC: 15.6 G/DL (ref 12–17)
IMM GRANULOCYTES # BLD AUTO: 0.01 THOUSAND/UL (ref 0–0.2)
IMM GRANULOCYTES NFR BLD AUTO: 0 % (ref 0–2)
LDLC SERPL CALC-MCNC: 58 MG/DL (ref 0–100)
LYMPHOCYTES # BLD AUTO: 1.4 THOUSANDS/ÂΜL (ref 0.6–4.47)
LYMPHOCYTES NFR BLD AUTO: 28 % (ref 14–44)
MCH RBC QN AUTO: 31.6 PG (ref 26.8–34.3)
MCHC RBC AUTO-ENTMCNC: 32.6 G/DL (ref 31.4–37.4)
MCV RBC AUTO: 97 FL (ref 82–98)
MONOCYTES # BLD AUTO: 0.41 THOUSAND/ÂΜL (ref 0.17–1.22)
MONOCYTES NFR BLD AUTO: 8 % (ref 4–12)
NEUTROPHILS # BLD AUTO: 3.02 THOUSANDS/ÂΜL (ref 1.85–7.62)
NEUTS SEG NFR BLD AUTO: 61 % (ref 43–75)
NRBC BLD AUTO-RTO: 0 /100 WBCS
PLATELET # BLD AUTO: 162 THOUSANDS/UL (ref 149–390)
PMV BLD AUTO: 11.2 FL (ref 8.9–12.7)
POTASSIUM SERPL-SCNC: 3.5 MMOL/L (ref 3.5–5.3)
PROT SERPL-MCNC: 6.9 G/DL (ref 6.4–8.4)
RBC # BLD AUTO: 4.93 MILLION/UL (ref 3.88–5.62)
SODIUM SERPL-SCNC: 144 MMOL/L (ref 135–147)
TRIGL SERPL-MCNC: 145 MG/DL
TSH SERPL DL<=0.05 MIU/L-ACNC: 1.45 UIU/ML (ref 0.45–4.5)
WBC # BLD AUTO: 4.98 THOUSAND/UL (ref 4.31–10.16)

## 2024-05-07 PROCEDURE — 84443 ASSAY THYROID STIM HORMONE: CPT

## 2024-05-07 PROCEDURE — 80061 LIPID PANEL: CPT

## 2024-05-07 PROCEDURE — 85025 COMPLETE CBC W/AUTO DIFF WBC: CPT

## 2024-05-07 PROCEDURE — 36415 COLL VENOUS BLD VENIPUNCTURE: CPT

## 2024-05-07 PROCEDURE — 80053 COMPREHEN METABOLIC PANEL: CPT

## 2024-05-07 PROCEDURE — 83036 HEMOGLOBIN GLYCOSYLATED A1C: CPT

## 2024-05-14 ENCOUNTER — HOSPITAL ENCOUNTER (OUTPATIENT)
Dept: INFUSION CENTER | Facility: HOSPITAL | Age: 69
Discharge: HOME | End: 2024-05-14
Attending: INTERNAL MEDICINE
Payer: MEDICARE

## 2024-05-14 VITALS
DIASTOLIC BLOOD PRESSURE: 79 MMHG | HEART RATE: 78 BPM | OXYGEN SATURATION: 95 % | TEMPERATURE: 97 F | RESPIRATION RATE: 16 BRPM | SYSTOLIC BLOOD PRESSURE: 120 MMHG

## 2024-05-14 DIAGNOSIS — C7A.8 NEUROENDOCRINE CARCINOMA (HCC): Primary | ICD-10-CM

## 2024-05-14 RX ADMIN — OCTREOTIDE ACETATE 30 MG: KIT at 08:46

## 2024-05-14 NOTE — PROGRESS NOTES
Prashant Pastrana tolerated IM Sandostatin in left gluteal muscle well with no complications.      Prashant Pastrana is aware of future appt on 06/11/24 at 0830.     AVS declined.

## 2024-05-16 ENCOUNTER — TELEPHONE (OUTPATIENT)
Dept: ADMINISTRATIVE | Facility: OTHER | Age: 69
End: 2024-05-16

## 2024-05-16 ENCOUNTER — OFFICE VISIT (OUTPATIENT)
Dept: INTERNAL MEDICINE CLINIC | Facility: CLINIC | Age: 69
End: 2024-05-16
Payer: COMMERCIAL

## 2024-05-16 VITALS
SYSTOLIC BLOOD PRESSURE: 122 MMHG | HEIGHT: 70 IN | TEMPERATURE: 98.1 F | BODY MASS INDEX: 32.2 KG/M2 | DIASTOLIC BLOOD PRESSURE: 80 MMHG | OXYGEN SATURATION: 95 % | HEART RATE: 78 BPM | WEIGHT: 224.9 LBS

## 2024-05-16 DIAGNOSIS — I10 PRIMARY HYPERTENSION: ICD-10-CM

## 2024-05-16 DIAGNOSIS — E11.22 TYPE 2 DIABETES MELLITUS WITH STAGE 2 CHRONIC KIDNEY DISEASE, WITHOUT LONG-TERM CURRENT USE OF INSULIN  (HCC): Primary | ICD-10-CM

## 2024-05-16 DIAGNOSIS — E66.9 TYPE 2 DIABETES MELLITUS WITH OBESITY  (HCC): ICD-10-CM

## 2024-05-16 DIAGNOSIS — E78.2 MIXED HYPERLIPIDEMIA: ICD-10-CM

## 2024-05-16 DIAGNOSIS — E11.3293 TYPE 2 DIABETES MELLITUS WITH BOTH EYES AFFECTED BY MILD NONPROLIFERATIVE RETINOPATHY WITHOUT MACULAR EDEMA, WITHOUT LONG-TERM CURRENT USE OF INSULIN (HCC): ICD-10-CM

## 2024-05-16 DIAGNOSIS — N18.31 STAGE 3A CHRONIC KIDNEY DISEASE (HCC): ICD-10-CM

## 2024-05-16 DIAGNOSIS — C7A.8 NEUROENDOCRINE CARCINOMA (HCC): ICD-10-CM

## 2024-05-16 DIAGNOSIS — Z12.5 SCREENING FOR PROSTATE CANCER: ICD-10-CM

## 2024-05-16 DIAGNOSIS — E11.69 TYPE 2 DIABETES MELLITUS WITH OBESITY  (HCC): ICD-10-CM

## 2024-05-16 DIAGNOSIS — N18.2 TYPE 2 DIABETES MELLITUS WITH STAGE 2 CHRONIC KIDNEY DISEASE, WITHOUT LONG-TERM CURRENT USE OF INSULIN  (HCC): Primary | ICD-10-CM

## 2024-05-16 PROCEDURE — 99214 OFFICE O/P EST MOD 30 MIN: CPT | Performed by: FAMILY MEDICINE

## 2024-05-16 PROCEDURE — G0402 INITIAL PREVENTIVE EXAM: HCPCS | Performed by: FAMILY MEDICINE

## 2024-05-16 NOTE — LETTER
Diabetic Eye Exam Form    Date Requested: 24  Patient: Prashant Pastrana  Patient : 1955   Referring Provider: Tosha Mtz MD      DIABETIC Eye Exam Date _______________________________      Type of Exam MUST be documented for Diabetic Eye Exams. Please CHECK ONE.     Retinal Exam       Dilated Retinal Exam       OCT       Optomap-Iris Exam      Fundus Photography       Left Eye - Please check Retinopathy or No Retinopathy        Exam did show retinopathy    Exam did not show retinopathy       Right Eye - Please check Retinopathy or No Retinopathy       Exam did show retinopathy    Exam did not show retinopathy       Comments __________________________________________________________    Practice Providing Exam ______________________________________________    Exam Performed By (print name) _______________________________________      Provider Signature ___________________________________________________      These reports are needed for  compliance.  Please fax this completed form and a copy of the Diabetic Eye Exam report to our office located at [address] [place] [address] as soon as possible via Fax [phone] attention Sheila: Phone [phone]  We thank you for your assistance in treating our mutual patient.

## 2024-05-16 NOTE — TELEPHONE ENCOUNTER
----- Message from Shirley MANCILLA sent at 5/16/2024  7:57 AM EDT -----  05/16/24 7:58 AM    Hello, our patient Prashant Pastrana has had Diabetic Eye Exam completed/performed. Please assist in updating the patient chart by making an External outreach to Dr. Yayo So facility located in [phone]  . The date of service is Within last 6 months.    Thank you,  Shirley Vera MA   [facility]

## 2024-05-16 NOTE — PROGRESS NOTES
Ambulatory Visit  Name: Prashant Pastrana      : 1955      MRN: [id number]  Encounter Provider: Tosha Mtz MD  Encounter Date: 2024   Encounter department: [facility]    Assessment & Plan   1. Type 2 diabetes mellitus with stage 2 chronic kidney disease, without long-term current use of insulin  (HCC)  -     CBC and differential; Future  -     Comprehensive metabolic panel; Future  -     Hemoglobin A1C; Future  -     Albumin / creatinine urine ratio  2. Primary hypertension  -     CBC and differential; Future  -     Comprehensive metabolic panel; Future  3. Type 2 diabetes mellitus with both eyes affected by mild nonproliferative retinopathy without macular edema, without long-term current use of insulin (HCC)  -     CBC and differential; Future  -     Comprehensive metabolic panel; Future  4. Type 2 diabetes mellitus with obesity  (HCC)  -     CBC and differential; Future  -     Comprehensive metabolic panel; Future  5. Stage 3a chronic kidney disease (HCC)  -     CBC and differential; Future  -     Comprehensive metabolic panel; Future  6. Neuroendocrine carcinoma (HCC)  -     CBC and differential; Future  -     Comprehensive metabolic panel; Future  7. Mixed hyperlipidemia  -     CBC and differential; Future  -     Comprehensive metabolic panel; Future  -     Lipid panel; Future  -     TSH, 3rd generation; Future  8. Screening for prostate cancer  -     PSA, Total Screen; Future    Orders and recommendations as noted above.  Reviewed recent laboratory testing with him.  Blood work is relatively stable.  Hemoglobin A1c is improving but not yet at goal.  Continue with the Jardiance as previously.  Continue with the Ozempic.  Continue with the slow but steady weight loss.  Continue to exercise regularly.  Discussed potentially increasing the dose of the Ozempic in the future.  Blood pressure well-controlled.  Continue current medications.  Continue the atorvastatin as  previously.  Recent creatinine has been relatively stable.  Avoid nephrotoxins.  Stay adequately hydrated.  Continue to follow-up with oncology.  Continue with the octreotide infusions for the neuroendocrine tumor.  Will have him follow-up in about 3 to 5 months with laboratory testing prior to that visit.  Follow-up sooner if needed.      Depression Screening and Follow-up Plan: Patient was screened for depression during today's encounter. They screened negative with a PHQ-2 score of 0.      Preventive health issues were discussed with patient, and age appropriate screening tests were ordered as noted in patient's After Visit Summary. Personalized health advice and appropriate referrals for health education or preventive services given if needed, as noted in patient's After Visit Summary.    History of Present Illness     He presents for routine follow-up as well as welcome to Medicare visit.  Has generally been doing relatively well.  He has tried to remain as active as possible.  Tries to walk regularly.  Has noticed that his appetite has decreased.  He continues with the Jardiance and the metformin without difficulty.  He continues to follow-up with hematology/oncology for the neuroendocrine tumor.  Continues with the octreotide.  Denies any side effect from these infusions.  Does have upcoming CT scan and follow-up with the specialists.  Tolerating his blood pressure medications without difficulty.  Denies any headaches or localized weakness.  Denies any chest pain or palpitations.  Peripheral edema has improved significantly over the years.  Back symptoms have remained stable.  Still with the chronic numbness into the left foot.  Tolerating the atorvastatin without difficulty.  Denies any significant muscle aches or weakness with this.       Patient Care Team:  Tosha Mtz MD as PCP - General  Tosha Mzt MD as PCP - PCP-[facility] Attributed-MD Laith Oglesby  MD Jeromy as Endoscopist  Francis Gardner MD (Surgical Oncology)    Review of Systems   Constitutional:  Positive for activity change. Negative for appetite change, chills and fever.   HENT:  Negative for hearing loss.    Eyes:  Negative for pain and visual disturbance.   Respiratory:  Negative for chest tightness and shortness of breath.    Cardiovascular:  Negative for chest pain and palpitations.   Gastrointestinal:  Negative for abdominal pain, blood in stool, diarrhea, nausea and vomiting.   Endocrine: Negative for polydipsia, polyphagia and polyuria.   Genitourinary:  Negative for dysuria.   Musculoskeletal:  Negative for arthralgias and gait problem.   Skin:  Negative for color change.   Neurological:  Negative for dizziness and headaches.   Hematological:  Does not bruise/bleed easily.   Psychiatric/Behavioral:  Negative for behavioral problems. The patient is not nervous/anxious.      Medical History Reviewed by provider this encounter:  Tobacco  Allergies  Meds  Problems  Med Hx  Surg Hx  Fam Hx       Annual Wellness Visit Questionnaire   Prashant is here for his Welcome to Medicare visit.     Health Risk Assessment:   Patient rates overall health as good. Patient feels that their physical health rating is same. Patient is very satisfied with their life. Eyesight was rated as same. Hearing was rated as same. Patient feels that their emotional and mental health rating is same. Patients states they are sometimes angry. Patient states they are sometimes unusually tired/fatigued. Pain experienced in the last 7 days has been none. Patient states that he has experienced no weight loss or gain in last 6 months.     Depression Screening:   PHQ-2 Score: 0      Fall Risk Screening:   In the past year, patient has experienced: no history of falling in past year      Home Safety:  Patient does not have trouble with stairs inside or outside of their home. Patient has working smoke alarms and has working carbon  monoxide detector. Home safety hazards include: none.     Nutrition:   Current diet is Regular.     Medications:   Patient is currently taking over-the-counter supplements. OTC medications include: see medication list. Patient is able to manage medications.     Activities of Daily Living (ADLs)/Instrumental Activities of Daily Living (IADLs):   Walk and transfer into and out of bed and chair?: Yes  Dress and groom yourself?: Yes    Bathe or shower yourself?: Yes    Feed yourself? Yes  Do your laundry/housekeeping?: Yes  Manage your money, pay your bills and track your expenses?: Yes  Make your own meals?: Yes    Do your own shopping?: Yes    Previous Hospitalizations:   Any hospitalizations or ED visits within the last 12 months?: Yes    How many hospitalizations have you had in the last year?: 1-2    Advance Care Planning:   Living will: Yes    Durable POA for healthcare: Yes    Advanced directive: Yes    Provider agrees with end of life decisions: Yes      Cognitive Screening:   Provider or family/friend/caregiver concerned regarding cognition?: No    PREVENTIVE SCREENINGS      Cardiovascular Screening:    General: Screening Not Indicated and History Lipid Disorder      Diabetes Screening:     General: Screening Not Indicated and History Diabetes      Colorectal Cancer Screening:     General: Screening Current      Prostate Cancer Screening:    General: Screening Current      Osteoporosis Screening:    General: Screening Not Indicated      Abdominal Aortic Aneurysm (AAA) Screening:    Risk factors include: age between 65-76 yo        General: Screening Not Indicated      Lung Cancer Screening:     General: Screening Not Indicated      Hepatitis C Screening:    General: Screening Current    Screening, Brief Intervention, and Referral to Treatment (SBIRT)    Screening  Typical number of drinks in a day: 0  Typical number of drinks in a week: 0  Interpretation: Low risk drinking behavior.    Single Item Drug  "Screening:  How often have you used an illegal drug (including marijuana) or a prescription medication for non-medical reasons in the past year? never    Single Item Drug Screen Score: 0  Interpretation: Negative screen for possible drug use disorder    Brief Intervention  Alcohol & drug use screenings were reviewed. No concerns regarding substance use disorder identified.     Social Determinants of Health     Food Insecurity: No Food Insecurity (5/16/2024)    Hunger Vital Sign     Worried About Running Out of Food in the Last Year: Never true     Ran Out of Food in the Last Year: Never true   Transportation Needs: No Transportation Needs (5/16/2024)    PRAPARE - Transportation     Lack of Transportation (Medical): No     Lack of Transportation (Non-Medical): No   Housing Stability: Low Risk  (5/16/2024)    Housing Stability Vital Sign     Unable to Pay for Housing in the Last Year: No     Number of Times Moved in the Last Year: 1     Homeless in the Last Year: No   Utilities: Not At Risk (5/16/2024)    [facility] Utilities     Threatened with loss of utilities: No     Vision Screening    Right eye Left eye Both eyes   Without correction      With correction 20/13 20/200 20/13       Objective     /80 (BP Location: Left arm, Patient Position: Sitting, Cuff Size: Large)   Pulse 78   Temp 98.1 °F (36.7 °C)   Ht 5' 10\" (1.778 m)   Wt 102 kg (224 lb 14.4 oz)   SpO2 95%   BMI 32.27 kg/m²     Physical Exam  Vitals and nursing note reviewed.   Constitutional:       General: He is not in acute distress.     Appearance: He is well-developed, well-groomed and overweight.   HENT:      Head: Normocephalic and atraumatic.   Eyes:      Conjunctiva/sclera: Conjunctivae normal.   Cardiovascular:      Rate and Rhythm: Normal rate and regular rhythm.      Pulses: no weak pulses.           Dorsalis pedis pulses are 2+ on the right side and 2+ on the left side.        Posterior tibial pulses are 2+ on the right side and 2+ on the " left side.      Heart sounds: No murmur heard.  Pulmonary:      Effort: Pulmonary effort is normal. No respiratory distress.      Breath sounds: Normal breath sounds.   Abdominal:      Palpations: Abdomen is soft.      Tenderness: There is no abdominal tenderness.   Musculoskeletal:         General: No swelling.      Cervical back: Neck supple.   Feet:      Right foot:      Skin integrity: No ulcer, skin breakdown, erythema, warmth, callus or dry skin.      Left foot:      Skin integrity: No ulcer, skin breakdown, erythema, warmth, callus or dry skin.   Skin:     General: Skin is warm and dry.      Capillary Refill: Capillary refill takes less than 2 seconds.   Neurological:      Mental Status: He is alert.   Psychiatric:         Mood and Affect: Mood and affect normal.         Speech: Speech normal.         Behavior: Behavior is cooperative.         Cognition and Memory: Cognition and memory normal.     Diabetic Foot Exam    Patient's shoes and socks removed.    Right Foot/Ankle   Right Foot Inspection  Skin Exam: skin normal and skin intact. No dry skin, no warmth, no callus, no erythema, no maceration, no abnormal color, no pre-ulcer, no ulcer and no callus.     Toe Exam: ROM and strength within normal limits.     Sensory   Monofilament testing: intact    Vascular  Capillary refills: < 3 seconds  The right DP pulse is 2+. The right PT pulse is 2+.     Left Foot/Ankle  Left Foot Inspection  Skin Exam: skin normal and skin intact. No dry skin, no warmth, no erythema, no maceration, normal color, no pre-ulcer, no ulcer and no callus.     Toe Exam: ROM and strength within normal limits.     Sensory   Monofilament testing: diminished    Vascular  Capillary refills: < 3 seconds  The left DP pulse is 2+. The left PT pulse is 2+.     Assign Risk Category  No deformity present  Loss of protective sensation  No weak pulses  Risk: 1      Below is the patient's most recent value for Albumin, ALT, AST, BUN, Calcium, Chloride,  Cholesterol, CO2, Creatinine, GFR, Glucose, HDL, Hematocrit, Hemoglobin, Hemoglobin A1C, LDL, Magnesium, Phosphorus, Platelets, Potassium, PSA, Sodium, Triglycerides, and WBC.   Lab Results   Component Value Date    ALT 17 05/07/2024    AST 16 05/07/2024    BUN 16 05/07/2024    CALCIUM 9.2 05/07/2024     05/07/2024    CHOL 162 09/09/2015    CO2 31 05/07/2024    CREATININE 0.96 05/07/2024    HDL 42 05/07/2024    HCT 47.8 05/07/2024    HGB 15.6 05/07/2024    HGBA1C 7.0 (H) 05/07/2024    MG 1.8 06/21/2023     05/07/2024    K 3.5 05/07/2024    PSA 0.73 06/15/2023     09/09/2015    TRIG 145 05/07/2024    WBC 4.98 05/07/2024     Note: for a comprehensive list of the patient's lab results, access the Results Review activity.      Administrative Statements   I have spent a total time of 25 minutes on 05/16/24 In caring for this patient including Diagnostic results, Prognosis, Risks and benefits of tx options, Instructions for management, Patient and family education, Importance of tx compliance, Risk factor reductions, Impressions, Counseling / Coordination of care, Documenting in the medical record, Reviewing / ordering tests, medicine, procedures  , and Obtaining or reviewing history  .

## 2024-05-16 NOTE — TELEPHONE ENCOUNTER
Upon review of the In Basket request and the patient's chart, initial outreach has been made via fax to facility. Please see Contacts section for details.     Thank you  Sheila Russell MA

## 2024-05-16 NOTE — LETTER
Diabetic Eye Exam Form    Date Requested: 24  Patient: Prashant Pastrana  Patient : 1955   Referring Provider: Tosha Mtz MD      DIABETIC Eye Exam Date _______________________________      Type of Exam MUST be documented for Diabetic Eye Exams. Please CHECK ONE.     Retinal Exam       Dilated Retinal Exam       OCT       Optomap-Iris Exam      Fundus Photography       Left Eye - Please check Retinopathy or No Retinopathy        Exam did show retinopathy    Exam did not show retinopathy       Right Eye - Please check Retinopathy or No Retinopathy       Exam did show retinopathy    Exam did not show retinopathy       Comments __________________________________________________________    Practice Providing Exam ______________________________________________    Exam Performed By (print name) _______________________________________      Provider Signature ___________________________________________________      These reports are needed for  compliance.  Please fax this completed form and a copy of the Diabetic Eye Exam report to our office located at [address] [place] [address] as soon as possible via Fax [phone] attention Sheila: Phone [phone]  We thank you for your assistance in treating our mutual patient.

## 2024-05-22 NOTE — TELEPHONE ENCOUNTER
As a follow-up, a second attempt has been made for outreach via fax to facility. Please see Contacts section for details.    Thank you  Sheila Russell MA

## 2024-05-28 PROBLEM — I12.9 HYPERTENSIVE KIDNEY DISEASE WITH CKD (CHRONIC KIDNEY DISEASE): Status: ACTIVE | Noted: 2024-05-28

## 2024-05-28 PROBLEM — N18.31 TYPE 2 DIABETES MELLITUS WITH STAGE 3A CHRONIC KIDNEY DISEASE (HCC): Status: ACTIVE | Noted: 2022-03-18

## 2024-05-28 PROBLEM — Z86.0100 PERSONAL HISTORY OF COLONIC POLYPS: Status: ACTIVE | Noted: 2019-11-14

## 2024-05-28 PROBLEM — M77.30 HEEL SPUR: Status: RESOLVED | Noted: 2017-05-31 | Resolved: 2024-05-28

## 2024-05-28 PROBLEM — E11.22 TYPE 2 DIABETES MELLITUS WITH STAGE 3A CHRONIC KIDNEY DISEASE (HCC): Status: ACTIVE | Noted: 2024-05-28

## 2024-05-28 PROBLEM — Z86.0100 PERSONAL HISTORY OF COLONIC POLYPS: Status: ACTIVE | Noted: 2024-05-28

## 2024-05-28 PROBLEM — G25.81 RESTLESS LEG SYNDROME: Status: RESOLVED | Noted: 2018-06-21 | Resolved: 2024-05-28

## 2024-05-28 PROBLEM — M79.652 PAIN OF LEFT THIGH: Status: RESOLVED | Noted: 2019-07-02 | Resolved: 2024-05-28

## 2024-05-28 PROBLEM — E11.22 TYPE 2 DIABETES MELLITUS WITH STAGE 3A CHRONIC KIDNEY DISEASE (HCC): Status: ACTIVE | Noted: 2022-03-18

## 2024-05-28 PROBLEM — L91.8 SKIN TAG: Status: RESOLVED | Noted: 2018-10-25 | Resolved: 2024-05-28

## 2024-05-28 PROBLEM — Z86.010 PERSONAL HISTORY OF COLONIC POLYPS: Status: ACTIVE | Noted: 2024-05-28

## 2024-05-28 PROBLEM — N18.31 TYPE 2 DIABETES MELLITUS WITH STAGE 3A CHRONIC KIDNEY DISEASE (HCC): Status: ACTIVE | Noted: 2024-05-28

## 2024-05-28 PROBLEM — Z86.010 PERSONAL HISTORY OF COLONIC POLYPS: Status: ACTIVE | Noted: 2019-11-14

## 2024-05-30 NOTE — TELEPHONE ENCOUNTER
As a final attempt, a third outreach has been made via telephone call to facility. Please see Contacts section for details. This encounter will be closed and completed by end of day. Should we receive the requested information because of previous outreach attempts, the requested patient's chart will be updated appropriately.     Thank you  Sheila Russell MA

## 2024-06-11 ENCOUNTER — OFFICE VISIT (OUTPATIENT)
Dept: UROLOGY | Facility: CLINIC | Age: 69
End: 2024-06-11
Payer: MEDICARE

## 2024-06-11 ENCOUNTER — HOSPITAL ENCOUNTER (OUTPATIENT)
Dept: INFUSION CENTER | Facility: HOSPITAL | Age: 69
Discharge: HOME | End: 2024-06-11
Attending: INTERNAL MEDICINE
Payer: MEDICARE

## 2024-06-11 VITALS
HEART RATE: 79 BPM | TEMPERATURE: 96.9 F | DIASTOLIC BLOOD PRESSURE: 86 MMHG | RESPIRATION RATE: 16 BRPM | OXYGEN SATURATION: 96 % | SYSTOLIC BLOOD PRESSURE: 138 MMHG

## 2024-06-11 VITALS
OXYGEN SATURATION: 97 % | SYSTOLIC BLOOD PRESSURE: 136 MMHG | DIASTOLIC BLOOD PRESSURE: 84 MMHG | BODY MASS INDEX: 31.87 KG/M2 | WEIGHT: 222.6 LBS | HEIGHT: 70 IN | HEART RATE: 86 BPM

## 2024-06-11 DIAGNOSIS — Z12.5 PROSTATE CANCER SCREENING: Primary | ICD-10-CM

## 2024-06-11 DIAGNOSIS — E27.8 ADRENAL NODULE (HCC): ICD-10-CM

## 2024-06-11 DIAGNOSIS — C7A.8 NEUROENDOCRINE CARCINOMA (HCC): ICD-10-CM

## 2024-06-11 DIAGNOSIS — C7A.8 NEUROENDOCRINE CARCINOMA (HCC): Primary | ICD-10-CM

## 2024-06-11 PROCEDURE — 99213 OFFICE O/P EST LOW 20 MIN: CPT

## 2024-06-11 PROCEDURE — 96372 THER/PROPH/DIAG INJ SC/IM: CPT

## 2024-06-11 RX ADMIN — OCTREOTIDE ACETATE 30 MG: KIT at 08:36

## 2024-06-11 NOTE — PROGRESS NOTES
6/11/2024    No chief complaint on file.      Assessment and Plan    68 y.o. male manage by    1.  Prostate cancer screening  PSA 0.73 (6/15/2023)  Rectal exam benign today  He will go for updated PSA and I will call him with those results.  Follow-up 1 year    2.  Adrenal nodule  Stable 1.2 cm right adrenal nodule.  Biochemical workup was negative.  This can be monitored peripherally during his surveillance CT imaging for his neuroendocrine tumor.    3.  Neuroendocrine carcinoma  status post small bowel resection and biopsy of a mesenteric mass for a T4N1M0 neuroendocrine tumor 6/2023.  The mesenteric masses were not resected secondary to the potential of short gut and resecting a significant portion of normal small bowel.    He follows with hematology oncology and is also on octreotide and is tolerating this well.  He also follows with ENT due to incidentally noted small focus of FDG uptake at the left base of the tongue and asymmetric activity noted in the left lingual tonsil region.            Interval HPI:    He presents today reporting doing well overall from a urologic standpoint.  Denies any issues with urination, no gross hematuria, or flank pain.    History of Present Illness  Prashant Pastrana is a 68 y.o. male here for annual follow-up evaluation of nephrolithiasis, adrenal nodule, and prostate cancer screening    Established patient originally seen by me in April 2023 for evaluation of a right ureteral calculi.  He had presented to [facility] emergency department in March 2023 for complaints of right flank pain.  CT imaging had shown a 3 mm right ureteral calculi. He was able to spontaneously pass this without surgical intervention.      There was an additional finding on his outpatient CT from 3/31/2023 which showed a high density material in the left collecting system could represent hemorrhage or excreting recently administered IV contrast material.  There is also a simple left renal  parapelvic cyst.  I recommended a CT renal protocol for further evaluation of this which was completed on 5/2/2023 which showed showed resolution of the hyperdense appearance of the left collecting system on prior exam. There is no enhancing solid renal mass identified bilaterally. There is also resolution of the previously seen right-sided hydronephrosis and hydroureter. There is a 1.4 cm right adrenal nodule, but this has been stable for over a year. Incidentally there was a enhancing mesenteric masses or enlarged lymph nodes in the mid abdomen with concern for neoplastic process.  He was referred to surgical oncology and was seen by Dr. Gardner on 5/11/2023 felt that the adrenal nodule was benign however he never had a functional work-up and this was ordered. In regards to the lymphadenopathy he discussed consideration for IR biopsy versus a PET/CT. If the PET showed no FDG avid we will consider close observation. PET/CT was completed on 5/24/2023 which showed mild radiotracer uptake in the mesenteric masses. Additionally, mild patchy radiotracer uptake related to a suspected small bowel lesion in the right hemiabdomen. Differential would include entities such as well-differentiated neuroendocrine tumor vs low-grade lymphoma.  Additionally incidentally noted small focus of FDG uptake at the left base of the tongue. Asymmetric activity also noted in the left lingual tonsillar region.  He has status post small bowel resection and biopsy of a mesenteric mass for a T4N1M0 neuroendocrine tumor.  This is well differentiated, grade 2.  The mesenteric masses were not resected secondary to the potential of short gut and resecting a significant portion of normal small bowel.  He has started on octreotide and is tolerating this well.     Prostate cancer screening: PSA 0.73 as of 6/15/2023 previously 0.8 on 9/9/2021, 0.8 on 6/25/2020, and 0.6 on 4/3/2018.                Review of Systems   Constitutional:  Negative for chills  and fever.   HENT:  Negative for congestion and sore throat.    Respiratory:  Negative for cough and shortness of breath.    Cardiovascular:  Negative for chest pain and leg swelling.   Gastrointestinal:  Negative for abdominal pain, constipation and diarrhea.   Genitourinary:  Negative for difficulty urinating, dysuria, frequency, hematuria and urgency.   Musculoskeletal:  Negative for back pain and gait problem.   Skin:  Negative for wound.   Allergic/Immunologic: Negative for immunocompromised state.   Hematological:  Does not bruise/bleed easily.               Vitals  There were no vitals filed for this visit.    Physical Exam  Vitals reviewed.   Constitutional:       General: He is not in acute distress.     Appearance: Normal appearance. He is not ill-appearing or toxic-appearing.   HENT:      Head: Normocephalic and atraumatic.   Eyes:      General: No scleral icterus.     Conjunctiva/sclera: Conjunctivae normal.   Cardiovascular:      Rate and Rhythm: Normal rate.   Pulmonary:      Effort: Pulmonary effort is normal. No respiratory distress.   Abdominal:      Tenderness: There is no right CVA tenderness or left CVA tenderness.      Hernia: No hernia is present.   Musculoskeletal:      Cervical back: Normal range of motion.      Right lower leg: No edema.      Left lower leg: No edema.   Skin:     General: Skin is warm and dry.      Coloration: Skin is not jaundiced or pale.   Neurological:      General: No focal deficit present.      Mental Status: He is alert and oriented to person, place, and time. Mental status is at baseline.      Gait: Gait normal.   Psychiatric:         Mood and Affect: Mood normal.         Behavior: Behavior normal.         Thought Content: Thought content normal.         Judgment: Judgment normal.         Past History  Past Medical History:   Diagnosis Date    Allergic rhinitis     last assessed: 5/4/2015    Aneurysm of thoracic aorta (HCC)     Cataract     resolved: 12/12/2014     Chronic bilateral low back pain 02/27/2014    Colon polyp     Diabetes mellitus (HCC)     diet control    Heel spur 05/31/2017    Herniated nucleus pulposus, L4-5 left     last assessed: 4/9/2014    Herniated nucleus pulposus, L5-S1, left     last assessed: 4/9/2014    Hx of echocardiogram 10/14/2016    EF 55%, Mild LVH, mild aortic root and ascending aorta enlargement, measuring 42 mm, trace MR, trace aortic regurg.    Hypercholesterolemia     Hypertension     Kidney stone     Lumbar radiculopathy     Pain of left thigh 07/02/2019    Restless leg syndrome 06/21/2018    Skin tag 10/25/2018     Social History     Socioeconomic History    Marital status: /Civil Union     Spouse name: Not on file    Number of children: Not on file    Years of education: Not on file    Highest education level: Not on file   Occupational History    Not on file   Tobacco Use    Smoking status: Never    Smokeless tobacco: Never   Vaping Use    Vaping status: Never Used   Substance and Sexual Activity    Alcohol use: Yes     Comment: social once a month    Drug use: No    Sexual activity: Not Currently     Partners: Female     Birth control/protection: Female Sterilization   Other Topics Concern    Not on file   Social History Narrative    Not on file     Social Determinants of Health     Financial Resource Strain: Not on file   Food Insecurity: No Food Insecurity (5/16/2024)    Hunger Vital Sign     Worried About Running Out of Food in the Last Year: Never true     Ran Out of Food in the Last Year: Never true   Transportation Needs: No Transportation Needs (5/16/2024)    PRAPARE - Transportation     Lack of Transportation (Medical): No     Lack of Transportation (Non-Medical): No   Physical Activity: Not on file   Stress: Not on file   Social Connections: Not on file   Intimate Partner Violence: Not on file   Housing Stability: Low Risk  (5/16/2024)    Housing Stability Vital Sign     Unable to Pay for Housing in the Last Year: No      Number of Times Moved in the Last Year: 1     Homeless in the Last Year: No     Social History     Tobacco Use   Smoking Status Never   Smokeless Tobacco Never     Family History   Problem Relation Age of Onset    Thyroid disease Mother         disorder    Hypertension Father     Coronary artery disease Brother     Heart attack Brother         prior myocardial infarction       The following portions of the patient's history were reviewed and updated as appropriate allergies, current medications, past medical history, past social history, past surgical history and problem list    Imagin/02/2023  CT ABDOMEN AND PELVIS WITH AND WITHOUT IV CONTRAST     INDICATION:   R93.89: Abnormal findings on diagnostic imaging of other specified body structures.     COMPARISON: 3/31/2023     TECHNIQUE: Initial CT of the abdomen and pelvis was performed without intravenous contrast.  Subsequent dynamic CT evaluation of the abdomen and pelvis was performed after the administration of intravenous contrast in both nephrographic and delayed   phases after the administration of intravenous contrast.   Multiplanar 2D reformatted images were created from the source data.     Radiation dose length product (DLP) for this visit:  2699.37 mGy-cm .  This examination, like all CT scans performed in the [facility], was performed utilizing techniques to minimize radiation dose exposure, including the use of   iterative reconstruction and automated exposure control.     IV Contrast:  100 mL of iohexol (OMNIPAQUE)  Enteric Contrast:  Enteric contrast was not administered.     FINDINGS:     ABDOMEN     RIGHT KIDNEY AND URETER:  No solid renal mass.  No detectable urothelial mass.  Extrarenal right renal pelvis.  No hydronephrosis or hydroureter.  No urinary tract calculi. No perinephric collection.     LEFT KIDNEY AND URETER:  No solid renal mass.  No detectable urothelial mass.  2.7 cm interpolar cyst.  No hydronephrosis  or hydroureter.  No urinary tract calculi. No perinephric collection.     URINARY BLADDER:  No bladder wall mass.  No calculi.        LOWER CHEST:  No clinically significant abnormality identified in the visualized lower chest.     LIVER/BILIARY TREE:  One or more subcentimeter sharply circumscribed low-density hepatic lesion(s) are noted, too small to accurately characterize, but statistically most likely to represent subcentimeter hepatic cysts.  No suspicious solid hepatic   lesion is identified.  Hepatic contours are normal.  No biliary dilatation.     GALLBLADDER:  No calcified gallstones. No pericholecystic inflammatory change.     SPLEEN:  Unremarkable.     PANCREAS:  Unremarkable.     ADRENAL GLANDS: There is a 14 mm right adrenal nodule. Although its imaging features on this study is indeterminate, because this lesion has been stable for > 1 year, it is considered benign and no further followup or workup is needed.     Adrenal recommendation based on institutional consensus and Journal of American College of Radiology 2017;14:3750-5161     Left adrenal gland is unremarkable.     STOMACH AND BOWEL: There is colonic diverticulosis without evidence of acute diverticulitis.     APPENDIX:  No findings to suggest appendicitis.     ABDOMINOPELVIC CAVITY: No ascites or intraperitoneal free air. Interloop mesenteric mass or lymph node in the right mid abdomen measuring 3.5 x 2.7 x 3.2 cm and a similar enhancing mass or lymph node measuring 1.8 x 2.9 x 3 cm.     VESSELS:  Unremarkable for patient's age.     PELVIS     REPRODUCTIVE ORGANS:  Unremarkable for patient's age.     ABDOMINAL WALL/INGUINAL REGIONS: Bilateral fat-containing inguinal hernias.     OSSEOUS STRUCTURES:  There are age appropriate degenerative changes. No acute fracture or destructive osseous lesion.     IMPRESSION:     1. Resolution of the hyperdense appearance of the left collecting system on the prior exam. No enhancing solid renal masses are  identified bilaterally.  2. Resolution of the previously seen right-sided hydronephrosis and hydroureter. Dilatation of the right renal pelvis.  3. Enhancing mesenteric masses or enlarged lymph nodes in the midabdomen. Concern for a neoplastic process.  4. Stable 14 mm right adrenal nodule.                             3/31/2023  CT ABDOMEN AND PELVIS WITHOUT IV CONTRAST     INDICATION:   R11.2: Nausea with vomiting, unspecified  R10.84: Generalized abdominal pain.     COMPARISON:  Retroperitoneal ultrasound 9/22/2009.     TECHNIQUE:  CT examination of the abdomen and pelvis was performed without intravenous contrast. Multiplanar 2D reformatted images were created from the source data.     Radiation dose length product (DLP) for this visit:  1001.49 mGy-cm .  This examination, like all CT scans performed in the [facility], was performed utilizing techniques to minimize radiation dose exposure, including the use of   iterative reconstruction and automated exposure control.      Enteric contrast was administered.      FINDINGS:     ABDOMEN     LOWER CHEST:  No clinically significant abnormality identified in the visualized lower chest.     LIVER/BILIARY TREE:  One or more subcentimeter sharply circumscribed low-density hepatic lesion(s) are noted, too small to accurately characterize, but statistically most likely to represent subcentimeter hepatic cysts.  No suspicious solid hepatic   lesion is identified.  Hepatic contours are normal.  No biliary dilatation.     GALLBLADDER:  No calcified gallstones. No pericholecystic inflammatory change.     SPLEEN:  Unremarkable.     PANCREAS:  Unremarkable.     ADRENAL GLANDS: There is a 14 mm right adrenal nodule. Although its imaging features on this study is indeterminate, because this lesion has been stable for > 1 year, it is considered benign and no further followup or workup is needed.      Adrenal recommendation based on institutional consensus and  Journal of American College of Radiology 2017;14:8603-8505           KIDNEYS/URETERS:  Obstructive 3 mm right ureteral calculus located just proximal to the right UVJ causes mild right hydroureteronephrosis.     High density material within the left renal collecting system could represent hemorrhage or contrast from a prior study.     Simple left renal parapelvic cysts.     STOMACH AND BOWEL:  There is colonic diverticulosis without evidence of acute diverticulitis.     APPENDIX:  No findings to suggest appendicitis.     ABDOMINOPELVIC CAVITY:  No ascites.  No pneumoperitoneum.  No lymphadenopathy.     VESSELS:  Unremarkable for patient's age.     PELVIS     REPRODUCTIVE ORGANS:  Prostamegaly.     URINARY BLADDER:  Unremarkable.     ABDOMINAL WALL/INGUINAL REGIONS:  Small uncomplicated fat-containing left inguinal hernia.     OSSEOUS STRUCTURES:  No acute fracture or destructive osseous lesion.     IMPRESSION:     Obstructive 3 mm right ureteral calculus located just proximal to the right UVJ causes mild right hydroureteronephrosis.     High density material in the left collecting system could represent hemorrhage or excreted recently administered IV contrast material.  Correlate with the patient's recent outside history; if the patient has not recently had IV contrast then consider   follow-up with CT renal protocol.        Results  No results found for this or any previous visit (from the past 1 hour(s)).]  Lab Results   Component Value Date    PSA 0.73 06/15/2023    PSA 0.8 09/09/2021    PSA 0.8 06/25/2020    PSA 0.6 04/03/2018     Lab Results   Component Value Date    GLUCOSE 141 (H) 09/09/2015    CALCIUM 9.2 05/07/2024     09/09/2015    K 3.5 05/07/2024    CO2 31 05/07/2024     05/07/2024    BUN 16 05/07/2024    CREATININE 0.96 05/07/2024     Lab Results   Component Value Date    WBC 4.98 05/07/2024    HGB 15.6 05/07/2024    HCT 47.8 05/07/2024    MCV 97 05/07/2024     05/07/2024       Please  Note:  Voice dictation software has been used to create this document. There may be inadvertent transcriptions errors.     VIN Gallegos 06/11/24

## 2024-06-11 NOTE — PLAN OF CARE
Problem: INFECTION - ADULT  Goal: Absence or prevention of progression during hospitalization  Description: INTERVENTIONS:  - Assess and monitor for signs and symptoms of infection  - Monitor lab/diagnostic results  - Monitor all insertion sites, i.e. indwelling lines, tubes, and drains  - Monitor endotracheal if appropriate and nasal secretions for changes in amount and color  - [place] appropriate cooling/warming therapies per order  - Administer medications as ordered  - Instruct and encourage patient and family to use good hand hygiene technique  - Identify and instruct in appropriate isolation precautions for identified infection/condition  Outcome: Progressing     Problem: Knowledge Deficit  Goal: Patient/family/caregiver demonstrates understanding of disease process, treatment plan, medications, and discharge instructions  Description: Complete learning assessment and assess knowledge base.  Interventions:  - Provide teaching at level of understanding  - Provide teaching via preferred learning methods  Outcome: Progressing

## 2024-06-11 NOTE — PROGRESS NOTES
Pt tolerated Sandostatin injection in right intragluteal well without complications.       Prashant Pastrana is aware of future appt on 7/9/24 at 8:30AM.      AVS declined by Prashant Pastrana.     Pt discharged off unit in stable condition with all personal belongings

## 2024-06-21 ENCOUNTER — APPOINTMENT (OUTPATIENT)
Dept: LAB | Facility: MEDICAL CENTER | Age: 69
End: 2024-06-21
Payer: MEDICARE

## 2024-06-21 DIAGNOSIS — C7A.8 NEUROENDOCRINE CARCINOMA (HCC): ICD-10-CM

## 2024-06-21 DIAGNOSIS — Z12.5 PROSTATE CANCER SCREENING: ICD-10-CM

## 2024-06-21 DIAGNOSIS — E27.8 ADRENAL NODULE (HCC): ICD-10-CM

## 2024-06-21 LAB — PSA SERPL-MCNC: 0.87 NG/ML (ref 0–4)

## 2024-06-21 PROCEDURE — G0103 PSA SCREENING: HCPCS

## 2024-06-21 PROCEDURE — 36415 COLL VENOUS BLD VENIPUNCTURE: CPT

## 2024-07-01 ENCOUNTER — APPOINTMENT (OUTPATIENT)
Dept: LAB | Facility: MEDICAL CENTER | Age: 69
End: 2024-07-01
Payer: MEDICARE

## 2024-07-01 ENCOUNTER — OFFICE VISIT (OUTPATIENT)
Dept: CARDIOLOGY CLINIC | Facility: CLINIC | Age: 69
End: 2024-07-01
Payer: COMMERCIAL

## 2024-07-01 VITALS
SYSTOLIC BLOOD PRESSURE: 126 MMHG | HEART RATE: 76 BPM | BODY MASS INDEX: 32.21 KG/M2 | DIASTOLIC BLOOD PRESSURE: 78 MMHG | HEIGHT: 70 IN | WEIGHT: 225 LBS

## 2024-07-01 DIAGNOSIS — E78.2 MIXED HYPERLIPIDEMIA: ICD-10-CM

## 2024-07-01 DIAGNOSIS — N18.31 STAGE 3A CHRONIC KIDNEY DISEASE (HCC): ICD-10-CM

## 2024-07-01 DIAGNOSIS — C7A.8 NEUROENDOCRINE CARCINOMA (HCC): ICD-10-CM

## 2024-07-01 DIAGNOSIS — E66.9 TYPE 2 DIABETES MELLITUS WITH OBESITY  (HCC): ICD-10-CM

## 2024-07-01 DIAGNOSIS — I71.21 ANEURYSM OF ASCENDING AORTA WITHOUT RUPTURE (HCC): ICD-10-CM

## 2024-07-01 DIAGNOSIS — E11.69 TYPE 2 DIABETES MELLITUS WITH OBESITY  (HCC): ICD-10-CM

## 2024-07-01 DIAGNOSIS — E11.3293 TYPE 2 DIABETES MELLITUS WITH BOTH EYES AFFECTED BY MILD NONPROLIFERATIVE RETINOPATHY WITHOUT MACULAR EDEMA, WITHOUT LONG-TERM CURRENT USE OF INSULIN (HCC): ICD-10-CM

## 2024-07-01 DIAGNOSIS — D3A.8 NEUROENDOCRINE TUMOR: ICD-10-CM

## 2024-07-01 DIAGNOSIS — E11.22 TYPE 2 DIABETES MELLITUS WITH STAGE 2 CHRONIC KIDNEY DISEASE, WITHOUT LONG-TERM CURRENT USE OF INSULIN  (HCC): ICD-10-CM

## 2024-07-01 DIAGNOSIS — N18.2 TYPE 2 DIABETES MELLITUS WITH STAGE 2 CHRONIC KIDNEY DISEASE, WITHOUT LONG-TERM CURRENT USE OF INSULIN  (HCC): ICD-10-CM

## 2024-07-01 DIAGNOSIS — I10 PRIMARY HYPERTENSION: Primary | ICD-10-CM

## 2024-07-01 DIAGNOSIS — I10 PRIMARY HYPERTENSION: ICD-10-CM

## 2024-07-01 LAB
ALBUMIN SERPL BCG-MCNC: 4 G/DL (ref 3.5–5)
ALP SERPL-CCNC: 60 U/L (ref 34–104)
ALT SERPL W P-5'-P-CCNC: 17 U/L (ref 7–52)
ANION GAP SERPL CALCULATED.3IONS-SCNC: 10 MMOL/L (ref 4–13)
AST SERPL W P-5'-P-CCNC: 13 U/L (ref 13–39)
BASOPHILS # BLD AUTO: 0.05 THOUSANDS/ÂΜL (ref 0–0.1)
BASOPHILS NFR BLD AUTO: 1 % (ref 0–1)
BILIRUB SERPL-MCNC: 0.71 MG/DL (ref 0.2–1)
BUN SERPL-MCNC: 16 MG/DL (ref 5–25)
CALCIUM SERPL-MCNC: 9.2 MG/DL (ref 8.4–10.2)
CHLORIDE SERPL-SCNC: 106 MMOL/L (ref 96–108)
CO2 SERPL-SCNC: 31 MMOL/L (ref 21–32)
CREAT SERPL-MCNC: 0.98 MG/DL (ref 0.6–1.3)
EOSINOPHIL # BLD AUTO: 0.1 THOUSAND/ÂΜL (ref 0–0.61)
EOSINOPHIL NFR BLD AUTO: 2 % (ref 0–6)
ERYTHROCYTE [DISTWIDTH] IN BLOOD BY AUTOMATED COUNT: 13.9 % (ref 11.6–15.1)
GFR SERPL CREATININE-BSD FRML MDRD: 79 ML/MIN/1.73SQ M
GLUCOSE P FAST SERPL-MCNC: 157 MG/DL (ref 65–99)
HCT VFR BLD AUTO: 42.9 % (ref 36.5–49.3)
HGB BLD-MCNC: 14.1 G/DL (ref 12–17)
IMM GRANULOCYTES # BLD AUTO: 0.02 THOUSAND/UL (ref 0–0.2)
IMM GRANULOCYTES NFR BLD AUTO: 0 % (ref 0–2)
LYMPHOCYTES # BLD AUTO: 1.3 THOUSANDS/ÂΜL (ref 0.6–4.47)
LYMPHOCYTES NFR BLD AUTO: 23 % (ref 14–44)
MCH RBC QN AUTO: 31.9 PG (ref 26.8–34.3)
MCHC RBC AUTO-ENTMCNC: 32.9 G/DL (ref 31.4–37.4)
MCV RBC AUTO: 97 FL (ref 82–98)
MONOCYTES # BLD AUTO: 0.45 THOUSAND/ÂΜL (ref 0.17–1.22)
MONOCYTES NFR BLD AUTO: 8 % (ref 4–12)
NEUTROPHILS # BLD AUTO: 3.63 THOUSANDS/ÂΜL (ref 1.85–7.62)
NEUTS SEG NFR BLD AUTO: 66 % (ref 43–75)
NRBC BLD AUTO-RTO: 0 /100 WBCS
PLATELET # BLD AUTO: 150 THOUSANDS/UL (ref 149–390)
PMV BLD AUTO: 10.7 FL (ref 8.9–12.7)
POTASSIUM SERPL-SCNC: 3.9 MMOL/L (ref 3.5–5.3)
PROT SERPL-MCNC: 6.4 G/DL (ref 6.4–8.4)
RBC # BLD AUTO: 4.42 MILLION/UL (ref 3.88–5.62)
SODIUM SERPL-SCNC: 147 MMOL/L (ref 135–147)
WBC # BLD AUTO: 5.55 THOUSAND/UL (ref 4.31–10.16)

## 2024-07-01 PROCEDURE — 36415 COLL VENOUS BLD VENIPUNCTURE: CPT

## 2024-07-01 PROCEDURE — 80053 COMPREHEN METABOLIC PANEL: CPT

## 2024-07-01 PROCEDURE — 93000 ELECTROCARDIOGRAM COMPLETE: CPT | Performed by: INTERNAL MEDICINE

## 2024-07-01 PROCEDURE — 85025 COMPLETE CBC W/AUTO DIFF WBC: CPT

## 2024-07-01 PROCEDURE — 99213 OFFICE O/P EST LOW 20 MIN: CPT | Performed by: INTERNAL MEDICINE

## 2024-07-01 NOTE — PROGRESS NOTES
Patient ID: Prashant Pastrana is a 68 y.o. male.        Plan:      Aneurysm of thoracic aorta (HCC)  Small, asymptomatic, stable  Will recheck in     Hypertension  Controlled    Mixed hyperlipidemia  Controlled     No evidence of angina, CHF Nor dysrhythmia.    Follow up Plan/Other summary comments:  Reviewed meds, advised no changes nor acute testing.  Return in about 6 months (around 2025).    HPI: Prashant is here for FU.  Feels well.  Denies any cp sob edema palps  Staying active  We reviwed his May bloodwork and Feb Echo      Results for orders placed or performed in visit on 24   POCT ECG    Impression    NSR,  old IWMI, 76 bpm.  Unchanged from prior.         Most recent or relevant cardiac/vascular testin/24 Echo    Left Ventricle: Left ventricular cavity size is normal. Wall thickness is mildly increased. There is mild to moderate concentric hypertrophy. The left ventricular ejection fraction is 70%. Systolic function is normal. Wall motion is normal. Diastolic function is mildly abnormal, consistent with grade I (abnormal) relaxation.    Right Ventricle: Systolic function is normal. Normal tricuspid annular plane systolic excursion (TAPSE) > 1.7 cm.    Aortic Valve: There is mild regurgitation.    Aorta: The aortic root is mildly dilated. The ascending aorta is mildly dilated. The aortic root is 4.10 cm. The ascending aorta is 4.0 cm.  Arch 3.8 cm.    Past Surgical History:   Procedure Laterality Date    ADENOIDECTOMY      BACK SURGERY      BACK SURGERY      COLONOSCOPY      KNEE ARTHROSCOPY      (therapeutic)    KNEE SURGERY      LYMPH NODE DISSECTION N/A 2023    Procedure: biopsy of MESENTERIC MASS, tap block;  Surgeon: Francis Gardner MD;  Location: [facility];  Service: Surgical Oncology    OK COLONOSCOPY FLX DX W/COLLJ SPEC WHEN PFRMD N/A 2016    Procedure: COLONOSCOPY;  Surgeon: Laith Pinzon MD;  Location: [facility];  Service: Gastroenterology    SMALL INTESTINE  "SURGERY N/A 06/20/2023    Procedure: SMALL BOWEL RESECTION;  Surgeon: Francis Gardner MD;  Location: [facility];  Service: Surgical Oncology    TONSILLECTOMY AND ADENOIDECTOMY         Lipid Profile: Reviewed      Review of Systems   10  point ROS  was otherwise non pertinent or negative except as per HPI or as below.           Objective:     /78   Pulse 76   Ht 5' 10\" (1.778 m)   Wt 102 kg (225 lb)   BMI 32.28 kg/m²     PHYSICAL EXAM:    General:  Normal appearance in no distress.  Eyes:  Anicteric.  Oral mucosa:  Moist.  Neck:  No JVD. Carotid upstrokes are brisk without bruits.  No masses.  Chest:  Clear to auscultation.  Cardiac:  No palpable PMI.  Normal S1 and S2.  No murmur gallop or rub.  Abdomen:  Soft and nontender. No palpable organomegaly or aortic enlargement.  Extremities:  No peripheral edema.  Musculoskeletal:  Symmetric.   Vascular:Pedal pulses are intact.  Neuro:  Grossly symmetric.  Psych:  Alert and oriented x3.      Meds reviewed.    Past Medical History:   Diagnosis Date    Allergic rhinitis     last assessed: 5/4/2015    Aneurysm of ascending aorta without rupture (HCC)     Aneurysm of thoracic aorta (HCC)     Cataract     resolved: 12/12/2014    Chronic bilateral low back pain 02/27/2014    Colon polyp     Diabetes mellitus (HCC)     diet control    Dyslipidemia associated with type 2 diabetes mellitus  (HCC)     Heel spur 05/31/2017    Herniated nucleus pulposus, L4-5 left     last assessed: 4/9/2014    Herniated nucleus pulposus, L5-S1, left     last assessed: 4/9/2014    Hx of echocardiogram 10/14/2016    EF 55%, Mild LVH, mild aortic root and ascending aorta enlargement, measuring 42 mm, trace MR, trace aortic regurg.    Hypercholesterolemia     Hypertension     Kidney stone     Lumbar radiculopathy     Pain of left thigh 07/02/2019    Restless leg syndrome 06/21/2018    Skin tag 10/25/2018           Social History     Tobacco Use   Smoking Status Never   Smokeless Tobacco Never "       Current Outpatient Medications:     allopurinol (ZYLOPRIM) 100 mg tablet, Take 1 tablet (100 mg total) by mouth daily, Disp: 90 tablet, Rfl: 3    amLODIPine-benazepril (LOTREL 5-20) 5-20 MG per capsule, Take 1 capsule by mouth 2 (two) times a day, Disp: 180 capsule, Rfl: 3    atenolol (TENORMIN) 50 mg tablet, Take 1 tablet (50 mg total) by mouth 2 (two) times a day, Disp: 180 tablet, Rfl: 3    atorvastatin (LIPITOR) 40 mg tablet, Take 1 tablet (40 mg total) by mouth every evening, Disp: 90 tablet, Rfl: 3    Empagliflozin (Jardiance) 25 MG TABS, Take 1 tablet (25 mg total) by mouth every morning, Disp: 90 tablet, Rfl: 3    furosemide (LASIX) 40 mg tablet, Take 0.5 tablets (20 mg total) by mouth daily, Disp: 45 tablet, Rfl: 3    magnesium oxide (MAG-OX) 400 mg, Take 400 mg by mouth daily, Disp: , Rfl:     olmesartan-hydrochlorothiazide (BENICAR HCT) 40-12.5 MG per tablet, Take 1 tablet by mouth daily, Disp: 90 tablet, Rfl: 3    potassium chloride (Klor-Con M10) 10 mEq tablet, Take 3 tablets (30 mEq total) by mouth 2 (two) times a day, Disp: 540 tablet, Rfl: 3    semaglutide, 1 mg/dose, (Ozempic, 1 MG/DOSE,) 4 mg/3 mL injection pen, Inject 0.75 mL (1 mg total) under the skin once a week, Disp: 9 mL, Rfl: 3

## 2024-07-01 NOTE — PATIENT INSTRUCTIONS
"Patient Education     High cholesterol   The Basics   Written by the doctors and editors at [facility]   What is cholesterol? -- Cholesterol is a substance found in blood. Everyone has some. It is needed for good health. But people sometimes have too much cholesterol.  Compared with people with normal cholesterol, people with high cholesterol have a higher risk of heart attack, stroke, and other health problems. The higher your cholesterol, the higher your risk of these problems.  Are there different types of cholesterol? -- Yes, there are a few different types. If you get a cholesterol test, you might hear your doctor or nurse talk about:   Total cholesterol   LDL cholesterol - Some people call this the \"bad\" cholesterol. That's because having high LDL levels raises your risk of heart attack, stroke, and other health problems.   HDL cholesterol - Some people call this the \"good\" cholesterol. That's because people with high HDL levels tend to have a lower risk of heart attack, stroke, and other health problems.   Non-HDL cholesterol - Non-HDL cholesterol is your total cholesterol minus your HDL cholesterol.   Triglycerides - Triglycerides are not cholesterol. They are another type of fat. But they often get measured when cholesterol is measured. (Having high triglycerides also seems to increase the risk of heart attack and stroke.)  What should my numbers be? -- Ask your doctor or nurse what your numbers should be. Different people need different goals. If you live outside of the [place], see the table (table 1).  In general, people who do not already have heart disease should aim for:   Total cholesterol below 200   LDL cholesterol below 130, or much lower if they are at risk of heart attack or stroke   HDL cholesterol above 60   Non-HDL cholesterol below 160, or lower if they are at risk of heart attack or stroke   Triglycerides below 150  Remember, though, that many people who cannot meet these goals still have a low " "risk of heart attack and stroke.  What should I do if I have high cholesterol? -- Ask your doctor what your overall risk of heart attack and stroke is. Just having high cholesterol is not always a reason to worry. Having high cholesterol is just one of many things that can increase your risk of heart attack and stroke.  Other things that increase your risk include:   Smoking   High blood pressure   Having a parent or sibling who got heart disease at a young age - Young, in this case, means younger than 55 for males and younger than 65 for females.   A diet that is not heart healthy - A \"heart-healthy\" diet includes lots of fruits and vegetables, fiber, and healthy fats (like those found in fish, nuts, and certain oils). It also means limiting sugar and unhealthy fats.   Older age  If you are at high risk of heart attack and stroke, having high cholesterol is a problem. But if you are at low risk, high cholesterol might not need treatment.  Should I take medicine to lower cholesterol? -- Not everyone who has high cholesterol needs medicines. Your doctor or nurse will decide if you need them based on your age, family history, and other health concerns.  There are many different medicines used to lower cholesterol (table 2). Some help your body make less cholesterol. Some keep your body from absorbing cholesterol from foods. Some help your body get rid of cholesterol faster. The medicines most often used to treat high cholesterol are called \"statins.\"  You should probably take a statin if you:   Already had a heart attack or stroke   Have known heart disease   Have diabetes   Have a condition called \"peripheral artery disease,\" which makes it painful to walk, and happens when the arteries in your legs get clogged with fatty deposits   Have an \"abdominal aortic aneurysm,\" which is a widening of the main artery in the belly  Most people with any of the conditions listed above should take a statin no matter what their " "cholesterol level is. If your doctor or nurse prescribes a statin, it's important to keep taking it. The medicine might not make you feel any different. But it can help prevent heart attack, stroke, and death.  If your doctor or nurse recommends taking medicine to help lower your cholesterol, make sure that you know what it is called. Follow all the instructions for how to take it. For example, some medicines work better when you take them in the evening. Some need to be taken with food.  Tell your doctor or nurse if your medicine causes any side effects that bother you. They might be able to switch you to a different medicine.  Can I lower my cholesterol without medicines? -- Yes. You can help lower your cholesterol by doing these things:   You can lower your LDL, or \"bad,\" cholesterol by avoiding red meat, butter, fried foods, cheese, and other foods that have a lot of saturated fat.   You can lower triglycerides by avoiding sugary foods, fried foods, and excess alcohol.   If you have excess weight, it can help to lose weight. Your doctor or nurse can help you do this in a healthy way.   Try to get regular physical activity. Even gentle forms of exercise, like walking, are good for your health.  Even if these steps don't change your cholesterol very much, they can improve your health in many other ways.  All topics are updated as new evidence becomes available and our peer review process is complete.  This topic retrieved from [employer] on: Mar 01, 2024.  Topic 63708 Version 25.0  Release: 32.2.4 - C32.59  © 2024 UpToDate, Inc. and/or its affiliates. All rights reserved.  table 1: Cholesterol and triglyceride measurements in the [place] and elsewhere     Measurement used within the [place] Milligrams/deciliter (mg/dL)  Measurement used most places outside of the [place] Millimoles/liter (mmol/Liter)     Level to aim for  Level to aim for    Total cholesterol  Below 200 Below 5.17   LDL cholesterol  Below 130, or much lower if at " risk of heart attack and stroke Below 3.36, or much lower if at risk of heart attack and stroke   HDL cholesterol  Above 60 Above 1.55   Triglycerides  Below 150 Below 1.7   Cholesterol is measured differently in the [place] than it is in most other countries. This table shows values used within and outside of the [place]. It includes the cholesterol and triglyceride levels that most people who do not have heart disease should aim for.  Graphic 72596 Version 5.0  table 2: Lipid-lowering medicines  Generic name  Brand name    Statins    Atorvastatin Lipitor   Fluvastatin Lescol, Lescol XL   Lovastatin Mevacor, Altoprev   Pitavastatin Livalo   Pravastatin Pravachol   Rosuvastatin Crestor   Simvastatin Zocor   PCSK9 inhibitors    Alirocumab Praluent   Evolocumab Repatha, Repatha SureClick   Cholesterol absorption inhibitors    Ezetimibe Zetia   Bile acid sequestrants    Cholestyramine Prevalite, Questran, Questran Light   Colesevelam Welchol   Colestipol Colestid   Niacin (nicotinic acid)    Niacin immediate release     Niacin extended release Niaspan   Fibrates    Fenofibrate Fenoglide, Tricor, Triglide, others   Gemfibrozil Lopid   Brand names listed are for medicines available in the [place] and some other countries.  Graphic 37768 Version 7.0  Consumer Information Use and Disclaimer   Disclaimer: This generalized information is a limited summary of diagnosis, treatment, and/or medication information. It is not meant to be comprehensive and should be used as a tool to help the user understand and/or assess potential diagnostic and treatment options. It does NOT include all information about conditions, treatments, medications, side effects, or risks that may apply to a specific patient. It is not intended to be medical advice or a substitute for the medical advice, diagnosis, or treatment of a health care provider based on the health care provider's examination and assessment of a patient's specific and unique circumstances.  Patients must speak with a health care provider for complete information about their health, medical questions, and treatment options, including any risks or benefits regarding use of medications. This information does not endorse any treatments or medications as safe, effective, or approved for treating a specific patient. UpToDate, Inc. and its affiliates disclaim any warranty or liability relating to this information or the use thereof.The use of this information is governed by the Terms of Use, available at https://www.woltersCentrifyuwer.com/en/know/clinical-effectiveness-terms. 2024© UpToDate, Inc. and its affiliates and/or licensors. All rights reserved.  Copyright   © 2024 UpToDate, Inc. and/or its affiliates. All rights reserved.

## 2024-07-09 ENCOUNTER — HOSPITAL ENCOUNTER (OUTPATIENT)
Dept: INFUSION CENTER | Facility: HOSPITAL | Age: 69
Discharge: HOME | End: 2024-07-09
Attending: INTERNAL MEDICINE
Payer: MEDICARE

## 2024-07-09 VITALS
DIASTOLIC BLOOD PRESSURE: 71 MMHG | SYSTOLIC BLOOD PRESSURE: 101 MMHG | TEMPERATURE: 98.4 F | HEART RATE: 88 BPM | RESPIRATION RATE: 18 BRPM

## 2024-07-09 DIAGNOSIS — C7A.8 NEUROENDOCRINE CARCINOMA (HCC): Primary | ICD-10-CM

## 2024-07-09 PROCEDURE — 96372 THER/PROPH/DIAG INJ SC/IM: CPT

## 2024-07-09 RX ADMIN — OCTREOTIDE ACETATE 30 MG: KIT at 08:27

## 2024-07-09 NOTE — PROGRESS NOTES
Prashant Pastrana  tolerated treatment well with no complications.      Prashant Pastrana is aware of future appt on 8/6/24 at 0830.     AVS printed and given to Prashant Pastrana:   No (Declined by Prashant Pastrana) x

## 2024-07-11 ENCOUNTER — HOSPITAL ENCOUNTER (OUTPATIENT)
Dept: CT IMAGING | Facility: HOSPITAL | Age: 69
Discharge: HOME | End: 2024-07-11
Attending: SURGERY
Payer: MEDICARE

## 2024-07-11 DIAGNOSIS — D3A.8 NEUROENDOCRINE TUMOR: ICD-10-CM

## 2024-07-11 PROCEDURE — G1004 CDSM NDSC: HCPCS

## 2024-07-11 PROCEDURE — 74177 CT ABD & PELVIS W/CONTRAST: CPT

## 2024-07-11 RX ADMIN — IOHEXOL 100 ML: 350 INJECTION, SOLUTION INTRAVENOUS at 08:15

## 2024-07-17 ENCOUNTER — TELEPHONE (OUTPATIENT)
Dept: SURGICAL ONCOLOGY | Facility: CLINIC | Age: 69
End: 2024-07-17

## 2024-07-17 NOTE — TELEPHONE ENCOUNTER
Called patient and left voice message. Reminded patient to have blood work done (Chrom A) prior to appointment with VIN Campos 7/23/24. Gave patient office number if needed to call.

## 2024-07-17 NOTE — TELEPHONE ENCOUNTER
"Patient and wife called regarding message left from office to complete blood work prior to visit on 07/23/2024 in regards to \"Chrom A\"    No order \"Chrom A\" is ordered on file for patient. Please advise.    Requesting call back from office to discuss.  "

## 2024-07-17 NOTE — TELEPHONE ENCOUNTER
Returned patient's call. Patient did not see Chrom A order, spoke to patient and told patient to regard lab work.

## 2024-07-22 ENCOUNTER — TELEPHONE (OUTPATIENT)
Dept: SURGICAL ONCOLOGY | Facility: CLINIC | Age: 69
End: 2024-07-22

## 2024-07-22 NOTE — TELEPHONE ENCOUNTER
Called patient and made appt with Torie BANUELOS as a virtual telephone visit for tomorrow 7/23/24.

## 2024-07-23 ENCOUNTER — TELEMEDICINE (OUTPATIENT)
Dept: SURGICAL ONCOLOGY | Facility: CLINIC | Age: 69
End: 2024-07-23
Payer: COMMERCIAL

## 2024-07-23 DIAGNOSIS — C7A.8 NEUROENDOCRINE CARCINOMA (HCC): Primary | ICD-10-CM

## 2024-07-23 PROCEDURE — 99441 PR PHYS/QHP TELEPHONE EVALUATION 5-10 MIN: CPT

## 2024-07-23 NOTE — ASSESSMENT & PLAN NOTE
The patient continues on monthly Octreotide injections without difficulty.  He is doing well, and the mesenteric mass remains stable on most recent imaging.  Will plan to repeat CT in 6 months.

## 2024-07-23 NOTE — PROGRESS NOTES
Virtual Regular Visit  Name: Prashant Pastrana      : 1955      MRN: [id number]  Encounter Provider: VIN Bal  Encounter Date: 2024   Encounter department: [facility] [place]    Verification of patient location:    Patient is located at Home in the following state in which I hold an active license [place]    Assessment & Plan   1. Neuroendocrine carcinoma (HCC)  Assessment & Plan:  The patient continues on monthly Octreotide injections without difficulty.  He is doing well, and the mesenteric mass remains stable on most recent imaging.  Will plan to repeat CT in 6 months.  Orders:  -     CT abdomen pelvis w contrast; Future; Expected date: 2025  -     BUN; Future; Expected date: 2025  -     Creatinine, serum; Future; Expected date: 2025        Encounter provider VIN Bal    The patient was identified by name and date of birth. Prashant Pastrana was informed that this is a telemedicine visit and that the visit is being conducted through Telephone.  My office door was closed. No one else was in the room.  He acknowledged consent and understanding of privacy and security of the video platform. The patient has agreed to participate and understands they can discontinue the visit at any time.    Patient is aware this is a billable service.     History of Present Illness     Prashant Pastrana is a 68 y.o. male who presents today in follow-up for neuroendocrine carcinoma.  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  He reports he feels well overall, and denies any flushing, nausea, diarrhea or weight loss.  He continues on Octreotide indefinitely.  Abdominopelvic CT was performed on .        Review of Systems   Constitutional:  Negative for activity change, appetite change, fatigue and unexpected weight change.   HENT: Negative.     Respiratory: Negative.      Cardiovascular: Negative.  Negative for palpitations.   Gastrointestinal: Negative.  Negative for abdominal distention, abdominal pain, diarrhea, nausea and vomiting.   Musculoskeletal: Negative.    Skin: Negative.  Negative for color change.   Neurological: Negative.    Hematological: Negative.  Negative for adenopathy.   Psychiatric/Behavioral: Negative.         Objective     There were no vitals taken for this visit.  Physical Exam  Physical exam was not performed.      Imaging  CT abdomen pelvis w contrast  07/11/2024  Narrative & Impression         CT ABDOMEN AND PELVIS WITH IV CONTRAST     INDICATION:   Other benign neuroendocrine tumors.       COMPARISON: 1/3/2024     TECHNIQUE:  CT examination of the abdomen and pelvis was performed. Multiplanar 2D reformatted images were created from the source data.     This examination, like all CT scans performed in the [facility], was performed utilizing techniques to minimize radiation dose exposure, including the use of iterative reconstruction and automated exposure control. Radiation dose length   product (DLP) for this visit:     IV Contrast:   - CDS  Enteric Contrast:  Enteric contrast was administered.     FINDINGS:     ABDOMEN     LOWER CHEST: No clinically significant abnormality in the visualized lower chest.     LIVER/BILIARY TREE: Borderline hepatomegaly with steatosis..     GALLBLADDER: No calcified gallstones. No pericholecystic inflammatory change.     SPLEEN: Borderline splenomegaly measuring 13 cm.     PANCREAS: Unremarkable.      ADRENAL GLANDS: Stable 1.1 cm right adrenal nodule consistent with known adenoma. Normal size left adrenal gland.     KIDNEYS/URETERS: Simple renal cyst(s). Otherwise unremarkable kidneys. No hydronephrosis.     STOMACH AND BOWEL: Colonic diverticulosis without findings of acute diverticulitis. Partial small bowel resection. Small hiatal hernia.     APPENDIX: No findings to suggest appendicitis.      ABDOMINOPELVIC CAVITY: No ascites. No pneumoperitoneum. No enlarged para-aortic or pelvic lymph nodes. Stable mesenteric masses measuring 3.4 x 2.8 cm and 3 x 2 cm best seen on image 89 series 2 and image 97 series 2. No new mesenteric lesions.     VESSELS: Atherosclerosis without abdominal aortic aneurysm.     PELVIS     REPRODUCTIVE ORGANS: Enlarged prostate.     URINARY BLADDER: Unremarkable.     ABDOMINAL WALL/INGUINAL REGIONS: Fat-containing left inguinal hernia. Midline scar from prior laparotomy.     BONES: No acute fracture or suspicious osseous lesion. Spinal degenerative changes.     IMPRESSION:     1. Stable central mesenteric lesions consistent with metastatic disease from known carcinoid. Post resection of small bowel carcinoid with no obvious new small bowel lesions.     2. Borderline hepatomegaly with steatosis. No hypervascular liver lesions.     3. Stable borderline splenomegaly measuring 13 cm.     4. Stable right adrenal nodule consistent with known adenoma.            Visit Time  Total Visit Duration:  This visit lasted a total of 10 minutes, including time for direct patient interaction as well as chart and imaging review.

## 2024-08-06 ENCOUNTER — HOSPITAL ENCOUNTER (OUTPATIENT)
Dept: INFUSION CENTER | Facility: HOSPITAL | Age: 69
Discharge: HOME | End: 2024-08-06
Attending: INTERNAL MEDICINE
Payer: MEDICARE

## 2024-08-06 VITALS
RESPIRATION RATE: 16 BRPM | SYSTOLIC BLOOD PRESSURE: 110 MMHG | DIASTOLIC BLOOD PRESSURE: 76 MMHG | OXYGEN SATURATION: 98 % | HEART RATE: 84 BPM | TEMPERATURE: 97.4 F

## 2024-08-06 DIAGNOSIS — C7A.8 NEUROENDOCRINE CARCINOMA (HCC): Primary | ICD-10-CM

## 2024-08-06 PROCEDURE — 96372 THER/PROPH/DIAG INJ SC/IM: CPT

## 2024-08-06 RX ADMIN — OCTREOTIDE ACETATE 30 MG: KIT at 08:37

## 2024-08-06 NOTE — PROGRESS NOTES
Prashant Pastrana tolerated IM Sandostatin in right ventrogluteal muscle well with no complications.      Prashant Pastrana is aware of future appt on 09/03/24 at 0830.     AVS declined.

## 2024-08-13 ENCOUNTER — OFFICE VISIT (OUTPATIENT)
Dept: HEMATOLOGY ONCOLOGY | Facility: CLINIC | Age: 69
End: 2024-08-13
Payer: MEDICARE

## 2024-08-13 VITALS
HEART RATE: 80 BPM | OXYGEN SATURATION: 96 % | WEIGHT: 221.4 LBS | DIASTOLIC BLOOD PRESSURE: 83 MMHG | HEIGHT: 70 IN | TEMPERATURE: 97.8 F | SYSTOLIC BLOOD PRESSURE: 122 MMHG | BODY MASS INDEX: 31.7 KG/M2

## 2024-08-13 DIAGNOSIS — C7A.8 NEUROENDOCRINE CARCINOMA (HCC): Primary | ICD-10-CM

## 2024-08-13 PROCEDURE — 99214 OFFICE O/P EST MOD 30 MIN: CPT | Performed by: INTERNAL MEDICINE

## 2024-08-13 PROCEDURE — G2211 COMPLEX E/M VISIT ADD ON: HCPCS | Performed by: INTERNAL MEDICINE

## 2024-09-03 ENCOUNTER — HOSPITAL ENCOUNTER (OUTPATIENT)
Dept: INFUSION CENTER | Facility: HOSPITAL | Age: 69
Discharge: HOME | End: 2024-09-03
Attending: INTERNAL MEDICINE
Payer: MEDICARE

## 2024-09-03 VITALS
SYSTOLIC BLOOD PRESSURE: 126 MMHG | HEART RATE: 76 BPM | TEMPERATURE: 97 F | RESPIRATION RATE: 16 BRPM | DIASTOLIC BLOOD PRESSURE: 79 MMHG

## 2024-09-03 DIAGNOSIS — C7A.8 NEUROENDOCRINE CARCINOMA (HCC): Primary | ICD-10-CM

## 2024-09-03 PROCEDURE — 96372 THER/PROPH/DIAG INJ SC/IM: CPT

## 2024-09-03 RX ADMIN — OCTREOTIDE ACETATE 30 MG: KIT at 08:31

## 2024-09-03 NOTE — PLAN OF CARE
Problem: INFECTION - ADULT  Goal: Absence or prevention of progression during hospitalization  Description: INTERVENTIONS:  - Assess and monitor for signs and symptoms of infection  - Monitor lab/diagnostic results  - Monitor all insertion sites, i.e. indwelling lines, tubes, and drains  - Monitor endotracheal if appropriate and nasal secretions for changes in amount and color  - [place] appropriate cooling/warming therapies per order  - Administer medications as ordered  - Instruct and encourage patient and family to use good hand hygiene technique  - Identify and instruct in appropriate isolation precautions for identified infection/condition  Outcome: Progressing     Problem: Knowledge Deficit  Goal: Patient/family/caregiver demonstrates understanding of disease process, treatment plan, medications, and discharge instructions  Description: Complete learning assessment and assess knowledge base.  Interventions:  - Provide teaching at level of understanding  - Provide teaching via preferred learning methods  Outcome: Progressing

## 2024-09-03 NOTE — PROGRESS NOTES
Pt tolerated Sandostatin injection in left intragluteal well without complications.       Prashant Pastrana is aware of future appt on 10/1/24 at 8:30AM.      AVS declined by Prashant Pastrana.     Pt discharged off unit in stable condition with all personal belongings

## 2024-10-01 ENCOUNTER — HOSPITAL ENCOUNTER (OUTPATIENT)
Dept: INFUSION CENTER | Facility: HOSPITAL | Age: 69
Discharge: HOME | End: 2024-10-01
Attending: INTERNAL MEDICINE
Payer: MEDICARE

## 2024-10-01 VITALS
TEMPERATURE: 97.1 F | DIASTOLIC BLOOD PRESSURE: 78 MMHG | SYSTOLIC BLOOD PRESSURE: 111 MMHG | HEART RATE: 80 BPM | OXYGEN SATURATION: 96 % | RESPIRATION RATE: 16 BRPM

## 2024-10-01 DIAGNOSIS — C7A.8 NEUROENDOCRINE CARCINOMA (HCC): Primary | ICD-10-CM

## 2024-10-01 PROCEDURE — 96372 THER/PROPH/DIAG INJ SC/IM: CPT

## 2024-10-01 RX ADMIN — OCTREOTIDE ACETATE 30 MG: KIT at 08:42

## 2024-10-01 NOTE — PROGRESS NOTES
Patient tolerated Sandostatin injection in right intragluteal well without complications.       Prashant Pastrana is aware of future appt on 10/29/24 at 0830.      AVS declined, patient has MyChart.

## 2024-10-18 ENCOUNTER — IMMUNIZATIONS (OUTPATIENT)
Dept: INTERNAL MEDICINE CLINIC | Facility: CLINIC | Age: 69
End: 2024-10-18
Payer: MEDICARE

## 2024-10-18 DIAGNOSIS — Z23 ENCOUNTER FOR IMMUNIZATION: Primary | ICD-10-CM

## 2024-10-18 PROCEDURE — G0008 ADMIN INFLUENZA VIRUS VAC: HCPCS

## 2024-10-18 PROCEDURE — 90662 IIV NO PRSV INCREASED AG IM: CPT

## 2024-10-29 ENCOUNTER — HOSPITAL ENCOUNTER (OUTPATIENT)
Dept: INFUSION CENTER | Facility: HOSPITAL | Age: 69
Discharge: HOME | End: 2024-10-29
Attending: INTERNAL MEDICINE
Payer: MEDICARE

## 2024-10-29 VITALS
DIASTOLIC BLOOD PRESSURE: 79 MMHG | TEMPERATURE: 97.4 F | RESPIRATION RATE: 16 BRPM | HEART RATE: 79 BPM | OXYGEN SATURATION: 96 % | SYSTOLIC BLOOD PRESSURE: 116 MMHG

## 2024-10-29 DIAGNOSIS — C7A.8 NEUROENDOCRINE CARCINOMA (HCC): Primary | ICD-10-CM

## 2024-10-29 RX ADMIN — OCTREOTIDE ACETATE 30 MG: KIT at 08:40

## 2024-10-29 NOTE — PROGRESS NOTES
Prashant Pastrana tolerated IM Sandostatin in left dorsogluteal muscle well with no complications.      Prashant Pastrana is aware of future appt on 11/26/24 at 0830.     AVS declined. Patient has MyChart.

## 2024-11-20 ENCOUNTER — APPOINTMENT (OUTPATIENT)
Dept: LAB | Facility: MEDICAL CENTER | Age: 69
End: 2024-11-20
Payer: MEDICARE

## 2024-11-20 LAB
ALBUMIN SERPL BCG-MCNC: 4.3 G/DL (ref 3.5–5)
ALP SERPL-CCNC: 62 U/L (ref 34–104)
ALT SERPL W P-5'-P-CCNC: 16 U/L (ref 7–52)
ANION GAP SERPL CALCULATED.3IONS-SCNC: 11 MMOL/L (ref 4–13)
AST SERPL W P-5'-P-CCNC: 13 U/L (ref 13–39)
BASOPHILS # BLD AUTO: 0.03 THOUSANDS/ÂΜL (ref 0–0.1)
BASOPHILS NFR BLD AUTO: 1 % (ref 0–1)
BILIRUB SERPL-MCNC: 0.74 MG/DL (ref 0.2–1)
BUN SERPL-MCNC: 22 MG/DL (ref 5–25)
CALCIUM SERPL-MCNC: 8.9 MG/DL (ref 8.4–10.2)
CHLORIDE SERPL-SCNC: 104 MMOL/L (ref 96–108)
CHOLEST SERPL-MCNC: 121 MG/DL (ref ?–200)
CO2 SERPL-SCNC: 30 MMOL/L (ref 21–32)
CREAT SERPL-MCNC: 1.03 MG/DL (ref 0.6–1.3)
CREAT UR-MCNC: 88.3 MG/DL
EOSINOPHIL # BLD AUTO: 0.1 THOUSAND/ÂΜL (ref 0–0.61)
EOSINOPHIL NFR BLD AUTO: 2 % (ref 0–6)
ERYTHROCYTE [DISTWIDTH] IN BLOOD BY AUTOMATED COUNT: 13.6 % (ref 11.6–15.1)
EST. AVERAGE GLUCOSE BLD GHB EST-MCNC: 169 MG/DL
GFR SERPL CREATININE-BSD FRML MDRD: 74 ML/MIN/1.73SQ M
GLUCOSE P FAST SERPL-MCNC: 162 MG/DL (ref 65–99)
HBA1C MFR BLD: 7.5 %
HCT VFR BLD AUTO: 47.9 % (ref 36.5–49.3)
HDLC SERPL-MCNC: 35 MG/DL
HGB BLD-MCNC: 15.8 G/DL (ref 12–17)
IMM GRANULOCYTES # BLD AUTO: 0.01 THOUSAND/UL (ref 0–0.2)
IMM GRANULOCYTES NFR BLD AUTO: 0 % (ref 0–2)
LDLC SERPL CALC-MCNC: 55 MG/DL (ref 0–100)
LYMPHOCYTES # BLD AUTO: 1.45 THOUSANDS/ÂΜL (ref 0.6–4.47)
LYMPHOCYTES NFR BLD AUTO: 28 % (ref 14–44)
MCH RBC QN AUTO: 32.2 PG (ref 26.8–34.3)
MCHC RBC AUTO-ENTMCNC: 33 G/DL (ref 31.4–37.4)
MCV RBC AUTO: 98 FL (ref 82–98)
MICROALBUMIN UR-MCNC: 14.7 MG/L
MICROALBUMIN/CREAT 24H UR: 17 MG/G CREATININE (ref 0–30)
MONOCYTES # BLD AUTO: 0.42 THOUSAND/ÂΜL (ref 0.17–1.22)
MONOCYTES NFR BLD AUTO: 8 % (ref 4–12)
NEUTROPHILS # BLD AUTO: 3.22 THOUSANDS/ÂΜL (ref 1.85–7.62)
NEUTS SEG NFR BLD AUTO: 61 % (ref 43–75)
NONHDLC SERPL-MCNC: 86 MG/DL
NRBC BLD AUTO-RTO: 0 /100 WBCS
PLATELET # BLD AUTO: 162 THOUSANDS/UL (ref 149–390)
PMV BLD AUTO: 10.8 FL (ref 8.9–12.7)
POTASSIUM SERPL-SCNC: 3.7 MMOL/L (ref 3.5–5.3)
PROT SERPL-MCNC: 6.7 G/DL (ref 6.4–8.4)
RBC # BLD AUTO: 4.9 MILLION/UL (ref 3.88–5.62)
SODIUM SERPL-SCNC: 145 MMOL/L (ref 135–147)
TRIGL SERPL-MCNC: 153 MG/DL (ref ?–150)
TSH SERPL DL<=0.05 MIU/L-ACNC: 1.59 UIU/ML (ref 0.45–4.5)
WBC # BLD AUTO: 5.23 THOUSAND/UL (ref 4.31–10.16)

## 2024-11-21 ENCOUNTER — RESULTS FOLLOW-UP (OUTPATIENT)
Dept: INTERNAL MEDICINE CLINIC | Facility: CLINIC | Age: 69
End: 2024-11-21

## 2024-11-25 ENCOUNTER — OFFICE VISIT (OUTPATIENT)
Dept: INTERNAL MEDICINE CLINIC | Facility: CLINIC | Age: 69
End: 2024-11-25
Payer: MEDICARE

## 2024-11-25 VITALS
HEART RATE: 89 BPM | OXYGEN SATURATION: 94 % | TEMPERATURE: 98 F | BODY MASS INDEX: 32.58 KG/M2 | SYSTOLIC BLOOD PRESSURE: 112 MMHG | DIASTOLIC BLOOD PRESSURE: 78 MMHG | HEIGHT: 70 IN | WEIGHT: 227.6 LBS

## 2024-11-25 DIAGNOSIS — E11.22 TYPE 2 DIABETES MELLITUS WITH STAGE 3A CHRONIC KIDNEY DISEASE, WITHOUT LONG-TERM CURRENT USE OF INSULIN (HCC): Primary | ICD-10-CM

## 2024-11-25 DIAGNOSIS — E11.3293 TYPE 2 DIABETES MELLITUS WITH BOTH EYES AFFECTED BY MILD NONPROLIFERATIVE RETINOPATHY WITHOUT MACULAR EDEMA, WITHOUT LONG-TERM CURRENT USE OF INSULIN (HCC): ICD-10-CM

## 2024-11-25 DIAGNOSIS — C7A.8 NEUROENDOCRINE CARCINOMA (HCC): ICD-10-CM

## 2024-11-25 DIAGNOSIS — E66.9 TYPE 2 DIABETES MELLITUS WITH OBESITY  (HCC): ICD-10-CM

## 2024-11-25 DIAGNOSIS — E78.2 MIXED HYPERLIPIDEMIA: ICD-10-CM

## 2024-11-25 DIAGNOSIS — I71.21 ANEURYSM OF ASCENDING AORTA WITHOUT RUPTURE (HCC): ICD-10-CM

## 2024-11-25 DIAGNOSIS — N18.31 STAGE 3A CHRONIC KIDNEY DISEASE (HCC): ICD-10-CM

## 2024-11-25 DIAGNOSIS — I10 PRIMARY HYPERTENSION: ICD-10-CM

## 2024-11-25 DIAGNOSIS — E11.69 TYPE 2 DIABETES MELLITUS WITH OBESITY  (HCC): ICD-10-CM

## 2024-11-25 DIAGNOSIS — N18.31 TYPE 2 DIABETES MELLITUS WITH STAGE 3A CHRONIC KIDNEY DISEASE, WITHOUT LONG-TERM CURRENT USE OF INSULIN (HCC): Primary | ICD-10-CM

## 2024-11-25 PROCEDURE — 99214 OFFICE O/P EST MOD 30 MIN: CPT | Performed by: FAMILY MEDICINE

## 2024-11-25 PROCEDURE — G2211 COMPLEX E/M VISIT ADD ON: HCPCS | Performed by: FAMILY MEDICINE

## 2024-11-25 NOTE — PROGRESS NOTES
Name: Prashant Pastrana      : 1955      MRN: [id number]  Encounter Provider: Tosha Mtz MD  Encounter Date: 2024   Encounter department: [facility]  :  Assessment & Plan  Type 2 diabetes mellitus with stage 3a chronic kidney disease, without long-term current use of insulin (HCC)  Hemoglobin A1c has increased.  On maximum dosing of the Jardiance.  Did not tolerate the metformin.  On Ozempic 1 mg weekly.  He wishes to hold off on medication adjustment at present.  Discussed carbohydrate intake.  Dietary changes discussed.  Try to increase activity level.  Follow-up with ophthalmology regularly.  Check feet daily.  Lab Results   Component Value Date    HGBA1C 7.5 (H) 2024       Orders:    CBC and differential; Future    Comprehensive metabolic panel; Future    Hemoglobin A1C; Future    Type 2 diabetes mellitus with obesity  (HCC)  Slow but steady weight loss recommended.  Portion control discussed.  Lab Results   Component Value Date    HGBA1C 7.5 (H) 2024       Orders:    CBC and differential; Future    Comprehensive metabolic panel; Future    Hemoglobin A1C; Future    Type 2 diabetes mellitus with both eyes affected by mild nonproliferative retinopathy without macular edema, without long-term current use of insulin (HCC)  Continue to follow-up with ophthalmology regularly.  Watch for any vision changes.  Better control of blood sugars discussed.  Lab Results   Component Value Date    HGBA1C 7.5 (H) 2024       Orders:    CBC and differential; Future    Comprehensive metabolic panel; Future    Hemoglobin A1C; Future    Stage 3a chronic kidney disease (HCC)  Lab Results   Component Value Date    EGFR 74 2024    EGFR 79 2024    EGFR 81 2024    CREATININE 1.03 2024    CREATININE 0.98 2024    CREATININE 0.96 2024   At noon and GFR stable.  Avoid nephrotoxins.  Control risk factors such as diabetes and hypertension.   Watch salt intake.  Stay adequately hydrated.    Orders:    CBC and differential; Future    Comprehensive metabolic panel; Future    Primary hypertension  Blood pressure currently well-controlled.  Continue current medications.  Watch salt intake.  Stay adequately hydrated.  Orders:    CBC and differential; Future    Comprehensive metabolic panel; Future    Aneurysm of ascending aorta without rupture (HCC)  Continue to follow-up with cardiology.  Orders:    CBC and differential; Future    Neuroendocrine carcinoma (HCC)  Continue with the treatments as per oncology.  Has been tolerating these well.  Orders:    CBC and differential; Future    Mixed hyperlipidemia  Continue with the atorvastatin.  Lipid panel relatively well-controlled.  Discussed goals for lipid panel.  Watch diet.  Slow but steady weight loss recommended.  Orders:    CBC and differential; Future    Comprehensive metabolic panel; Future    Lipid panel; Future    TSH, 3rd generation; Future    Orders and recommendations as noted above.  Up-to-date on flu shot.  Try to increase activity level.  Continue with the allopurinol.  Will have him follow-up in about 3 to 5 months or sooner if needed.       History of Present Illness     He presents for routine follow-up.  Has generally been doing relatively well.  He continues with the left leg pain and numbness.  Seems to be worse when he is relaxing at night or sleeping.  Seems to be better with activity.  Back symptoms have been relatively stable.  Tolerating his chemotherapy for the neuroendocrine tumor without difficulty.  He continues to follow-up regularly with oncology.  Tolerating his blood pressure medications well.  Denies any headaches or localized weakness.  Continues with the atorvastatin.  Denies any significant muscle aches or weakness.  He admits that he has been eating poorly.  Has been eating more sweets.  Has not been exercising as regularly.  Has been tolerating the Jardiance and the  Ozempic.  They will be traveling after Thanksgiving to [place] again.      Review of Systems   Constitutional:  Positive for activity change and appetite change. Negative for chills and fever.   HENT:  Negative for hearing loss.    Eyes:  Negative for pain and visual disturbance.   Respiratory:  Negative for chest tightness and shortness of breath.    Cardiovascular:  Negative for chest pain and palpitations.   Gastrointestinal:  Negative for abdominal pain, blood in stool, diarrhea, nausea and vomiting.   Endocrine: Negative for polydipsia, polyphagia and polyuria.   Genitourinary:  Negative for dysuria.   Musculoskeletal:  Negative for arthralgias and gait problem.   Skin:  Negative for color change.   Neurological:  Positive for numbness. Negative for dizziness and headaches.   Hematological:  Does not bruise/bleed easily.   Psychiatric/Behavioral:  Negative for behavioral problems. The patient is not nervous/anxious.      Medical History Reviewed by provider this encounter:     .  Past Medical History   Past Medical History:   Diagnosis Date    Allergic rhinitis     last assessed: 5/4/2015    Aneurysm of ascending aorta without rupture (HCC)     Aneurysm of thoracic aorta (HCC)     Cataract     resolved: 12/12/2014    Chronic bilateral low back pain 02/27/2014    Colon polyp     Diabetes mellitus (HCC)     diet control    Dyslipidemia associated with type 2 diabetes mellitus  (HCC)     Heel spur 05/31/2017    Herniated nucleus pulposus, L4-5 left     last assessed: 4/9/2014    Herniated nucleus pulposus, L5-S1, left     last assessed: 4/9/2014    Hx of echocardiogram 10/14/2016    EF 55%, Mild LVH, mild aortic root and ascending aorta enlargement, measuring 42 mm, trace MR, trace aortic regurg.    Hypercholesterolemia     Hypertension     Kidney stone     Lumbar radiculopathy     Pain of left thigh 07/02/2019    Restless leg syndrome 06/21/2018    Skin tag 10/25/2018     Past Surgical History:   Procedure  Laterality Date    ADENOIDECTOMY      BACK SURGERY      BACK SURGERY      COLONOSCOPY      KNEE ARTHROSCOPY      (therapeutic)    KNEE SURGERY      LYMPH NODE DISSECTION N/A 06/20/2023    Procedure: biopsy of MESENTERIC MASS, tap block;  Surgeon: Francis Gardner MD;  [facility];  Service: Surgical Oncology    MT COLONOSCOPY FLX DX W/COLLJ SPEC WHEN PFRMD N/A 09/30/2016    Procedure: COLONOSCOPY;  Surgeon: Laith Pinzon MD;  Location: [facility];  Service: Gastroenterology    SMALL INTESTINE SURGERY N/A 06/20/2023    Procedure: SMALL BOWEL RESECTION;  Surgeon: Francis Gardner MD;  [facility];  Service: Surgical Oncology    TONSILLECTOMY AND ADENOIDECTOMY       Family History   Problem Relation Age of Onset    Hypertension Father     Thyroid disease Mother         disorder    Coronary artery disease Brother     Heart attack Brother         prior myocardial infarction      reports that he has never smoked. He has never used smokeless tobacco. He reports current alcohol use. He reports that he does not use drugs.  Current Outpatient Medications on File Prior to Visit   Medication Sig Dispense Refill    allopurinol (ZYLOPRIM) 100 mg tablet Take 1 tablet (100 mg total) by mouth daily 90 tablet 3    amLODIPine-benazepril (LOTREL 5-20) 5-20 MG per capsule Take 1 capsule by mouth 2 (two) times a day 180 capsule 3    atenolol (TENORMIN) 50 mg tablet Take 1 tablet (50 mg total) by mouth 2 (two) times a day 180 tablet 3    atorvastatin (LIPITOR) 40 mg tablet Take 1 tablet (40 mg total) by mouth every evening 90 tablet 3    Empagliflozin (Jardiance) 25 MG TABS Take 1 tablet (25 mg total) by mouth every morning 90 tablet 3    furosemide (LASIX) 40 mg tablet Take 0.5 tablets (20 mg total) by mouth daily 45 tablet 3    magnesium oxide (MAG-OX) 400 mg Take 400 mg by mouth daily      olmesartan-hydrochlorothiazide (BENICAR HCT) 40-12.5 MG per tablet Take 1 tablet by mouth daily 90 tablet 3    potassium chloride  (Klor-Con M10) 10 mEq tablet Take 3 tablets (30 mEq total) by mouth 2 (two) times a day 540 tablet 3    semaglutide, 1 mg/dose, (Ozempic, 1 MG/DOSE,) 4 mg/3 mL injection pen Inject 0.75 mL (1 mg total) under the skin once a week 9 mL 3     No current facility-administered medications on file prior to visit.   No Known Allergies   Current Outpatient Medications on File Prior to Visit   Medication Sig Dispense Refill    allopurinol (ZYLOPRIM) 100 mg tablet Take 1 tablet (100 mg total) by mouth daily 90 tablet 3    amLODIPine-benazepril (LOTREL 5-20) 5-20 MG per capsule Take 1 capsule by mouth 2 (two) times a day 180 capsule 3    atenolol (TENORMIN) 50 mg tablet Take 1 tablet (50 mg total) by mouth 2 (two) times a day 180 tablet 3    atorvastatin (LIPITOR) 40 mg tablet Take 1 tablet (40 mg total) by mouth every evening 90 tablet 3    Empagliflozin (Jardiance) 25 MG TABS Take 1 tablet (25 mg total) by mouth every morning 90 tablet 3    furosemide (LASIX) 40 mg tablet Take 0.5 tablets (20 mg total) by mouth daily 45 tablet 3    magnesium oxide (MAG-OX) 400 mg Take 400 mg by mouth daily      olmesartan-hydrochlorothiazide (BENICAR HCT) 40-12.5 MG per tablet Take 1 tablet by mouth daily 90 tablet 3    potassium chloride (Klor-Con M10) 10 mEq tablet Take 3 tablets (30 mEq total) by mouth 2 (two) times a day 540 tablet 3    semaglutide, 1 mg/dose, (Ozempic, 1 MG/DOSE,) 4 mg/3 mL injection pen Inject 0.75 mL (1 mg total) under the skin once a week 9 mL 3     No current facility-administered medications on file prior to visit.      Social History     Tobacco Use    Smoking status: Never    Smokeless tobacco: Never   Vaping Use    Vaping status: Never Used   Substance and Sexual Activity    Alcohol use: Yes     Comment: social once a month    Drug use: No    Sexual activity: Not Currently     Partners: Female     Birth control/protection: Female Sterilization        Objective   /78 (BP Location: Left arm, Patient  "Position: Sitting, Cuff Size: Large)   Pulse 89   Temp 98 °F (36.7 °C)   Ht 5' 10\" (1.778 m)   Wt 103 kg (227 lb 9.6 oz)   SpO2 94%   BMI 32.66 kg/m²      Physical Exam  Vitals and nursing note reviewed.   Constitutional:       General: He is not in acute distress.     Appearance: He is well-developed, well-groomed and overweight.   HENT:      Head: Normocephalic and atraumatic.      Nose: Nose normal.   Eyes:      Conjunctiva/sclera: Conjunctivae normal.      Pupils: Pupils are equal, round, and reactive to light.   Cardiovascular:      Rate and Rhythm: Normal rate and regular rhythm.      Heart sounds: Normal heart sounds. No murmur heard.     No friction rub. No gallop.   Pulmonary:      Breath sounds: No wheezing or rales.   Chest:      Chest wall: No tenderness.   Abdominal:      General: There is no distension.      Tenderness: There is no abdominal tenderness. There is no guarding or rebound.   Lymphadenopathy:      Cervical: No cervical adenopathy.   Skin:     General: Skin is warm and dry.   Neurological:      Mental Status: He is alert and oriented to person, place, and time.   Psychiatric:         Mood and Affect: Mood and affect normal.         Speech: Speech normal.         Behavior: Behavior normal. Behavior is cooperative.         Cognition and Memory: Cognition and memory normal.         "

## 2024-11-26 ENCOUNTER — HOSPITAL ENCOUNTER (OUTPATIENT)
Dept: INFUSION CENTER | Facility: HOSPITAL | Age: 69
Discharge: HOME | End: 2024-11-26
Attending: INTERNAL MEDICINE
Payer: MEDICARE

## 2024-11-26 VITALS
RESPIRATION RATE: 16 BRPM | DIASTOLIC BLOOD PRESSURE: 86 MMHG | HEART RATE: 78 BPM | OXYGEN SATURATION: 95 % | TEMPERATURE: 97.2 F | SYSTOLIC BLOOD PRESSURE: 138 MMHG

## 2024-11-26 DIAGNOSIS — C7A.8 NEUROENDOCRINE CARCINOMA (HCC): Primary | ICD-10-CM

## 2024-11-26 PROCEDURE — 96372 THER/PROPH/DIAG INJ SC/IM: CPT

## 2024-11-26 RX ORDER — OCTREOTIDE ACETATE 30 MG
30 KIT INTRAMUSCULAR ONCE
OUTPATIENT
Start: 2024-12-24

## 2024-11-26 RX ORDER — OCTREOTIDE ACETATE 30 MG
30 KIT INTRAMUSCULAR ONCE
Status: COMPLETED | OUTPATIENT
Start: 2024-11-26 | End: 2024-11-26

## 2024-11-26 RX ADMIN — OCTREOTIDE ACETATE 30 MG: KIT at 08:37

## 2024-11-26 NOTE — PROGRESS NOTES
Pt tolerated Sandostatin injection in right intragluteal well without complications.       Prashant Pastrana is aware of future appt on 12/24/24 at 8:30AM.      AVS declined by Prashant Patsrana. Pt uses Mychart.     Pt discharged off unit in stable condition with all personal belongings

## 2024-11-26 NOTE — ASSESSMENT & PLAN NOTE
Continue to follow-up with ophthalmology regularly.  Watch for any vision changes.  Better control of blood sugars discussed.  Lab Results   Component Value Date    HGBA1C 7.5 (H) 11/20/2024       Orders:    CBC and differential; Future    Comprehensive metabolic panel; Future    Hemoglobin A1C; Future

## 2024-11-26 NOTE — ASSESSMENT & PLAN NOTE
Continue with the treatments as per oncology.  Has been tolerating these well.  Orders:    CBC and differential; Future

## 2024-11-26 NOTE — ASSESSMENT & PLAN NOTE
Slow but steady weight loss recommended.  Portion control discussed.  Lab Results   Component Value Date    HGBA1C 7.5 (H) 11/20/2024       Orders:    CBC and differential; Future    Comprehensive metabolic panel; Future    Hemoglobin A1C; Future

## 2024-11-26 NOTE — PLAN OF CARE
Problem: INFECTION - ADULT  Goal: Absence or prevention of progression during hospitalization  Description: INTERVENTIONS:  - Assess and monitor for signs and symptoms of infection  - Monitor lab/diagnostic results  - Monitor all insertion sites, i.e. indwelling lines, tubes, and drains  - Monitor endotracheal if appropriate and nasal secretions for changes in amount and color  - [place] appropriate cooling/warming therapies per order  - Administer medications as ordered  - Instruct and encourage patient and family to use good hand hygiene technique  - Identify and instruct in appropriate isolation precautions for identified infection/condition  Outcome: Progressing     Problem: Knowledge Deficit  Goal: Patient/family/caregiver demonstrates understanding of disease process, treatment plan, medications, and discharge instructions  Description: Complete learning assessment and assess knowledge base.  Interventions:  - Provide teaching at level of understanding  - Provide teaching via preferred learning methods  Outcome: Progressing

## 2024-11-26 NOTE — ASSESSMENT & PLAN NOTE
Lab Results   Component Value Date    EGFR 74 11/20/2024    EGFR 79 07/01/2024    EGFR 81 05/07/2024    CREATININE 1.03 11/20/2024    CREATININE 0.98 07/01/2024    CREATININE 0.96 05/07/2024   At noon and GFR stable.  Avoid nephrotoxins.  Control risk factors such as diabetes and hypertension.  Watch salt intake.  Stay adequately hydrated.    Orders:    CBC and differential; Future    Comprehensive metabolic panel; Future

## 2024-11-26 NOTE — ASSESSMENT & PLAN NOTE
Blood pressure currently well-controlled.  Continue current medications.  Watch salt intake.  Stay adequately hydrated.  Orders:    CBC and differential; Future    Comprehensive metabolic panel; Future

## 2024-11-26 NOTE — ASSESSMENT & PLAN NOTE
Hemoglobin A1c has increased.  On maximum dosing of the Jardiance.  Did not tolerate the metformin.  On Ozempic 1 mg weekly.  He wishes to hold off on medication adjustment at present.  Discussed carbohydrate intake.  Dietary changes discussed.  Try to increase activity level.  Follow-up with ophthalmology regularly.  Check feet daily.  Lab Results   Component Value Date    HGBA1C 7.5 (H) 11/20/2024       Orders:    CBC and differential; Future    Comprehensive metabolic panel; Future    Hemoglobin A1C; Future

## 2024-11-26 NOTE — ASSESSMENT & PLAN NOTE
Continue with the atorvastatin.  Lipid panel relatively well-controlled.  Discussed goals for lipid panel.  Watch diet.  Slow but steady weight loss recommended.  Orders:    CBC and differential; Future    Comprehensive metabolic panel; Future    Lipid panel; Future    TSH, 3rd generation; Future

## 2024-12-09 DIAGNOSIS — E11.65 UNCONTROLLED TYPE 2 DIABETES MELLITUS WITH HYPERGLYCEMIA (HCC): ICD-10-CM

## 2024-12-10 RX ORDER — SEMAGLUTIDE 1.34 MG/ML
INJECTION, SOLUTION SUBCUTANEOUS
Qty: 9 ML | Refills: 1 | Status: SHIPPED | OUTPATIENT
Start: 2024-12-10

## 2024-12-24 ENCOUNTER — HOSPITAL ENCOUNTER (OUTPATIENT)
Dept: INFUSION CENTER | Facility: HOSPITAL | Age: 69
Discharge: HOME | End: 2024-12-24
Attending: INTERNAL MEDICINE
Payer: MEDICARE

## 2024-12-24 ENCOUNTER — TELEPHONE (OUTPATIENT)
Dept: OBGYN CLINIC | Facility: CLINIC | Age: 69
End: 2024-12-24

## 2024-12-24 VITALS
TEMPERATURE: 96 F | HEART RATE: 69 BPM | OXYGEN SATURATION: 95 % | RESPIRATION RATE: 16 BRPM | SYSTOLIC BLOOD PRESSURE: 136 MMHG | DIASTOLIC BLOOD PRESSURE: 82 MMHG

## 2024-12-24 DIAGNOSIS — C7A.8 NEUROENDOCRINE CARCINOMA (HCC): Primary | ICD-10-CM

## 2024-12-24 RX ORDER — OCTREOTIDE ACETATE 30 MG
30 KIT INTRAMUSCULAR ONCE
Status: COMPLETED | OUTPATIENT
Start: 2024-12-24 | End: 2024-12-24

## 2024-12-24 RX ORDER — OCTREOTIDE ACETATE 30 MG
30 KIT INTRAMUSCULAR ONCE
OUTPATIENT
Start: 2025-01-21

## 2024-12-24 RX ADMIN — OCTREOTIDE ACETATE 30 MG: KIT at 08:54

## 2024-12-24 NOTE — PROGRESS NOTES
Prashant Pastrana  tolerated Sandostatin injected into left intragluteal well with no complications.      Prashant Pastrana is aware of future appt on 1/21/25 at 0830.     AVS declined.

## 2024-12-31 DIAGNOSIS — I10 ESSENTIAL HYPERTENSION: ICD-10-CM

## 2024-12-31 DIAGNOSIS — E78.5 HYPERLIPIDEMIA, UNSPECIFIED HYPERLIPIDEMIA TYPE: ICD-10-CM

## 2024-12-31 DIAGNOSIS — E11.69 TYPE 2 DIABETES MELLITUS WITH OBESITY  (HCC): ICD-10-CM

## 2024-12-31 DIAGNOSIS — E79.0 HYPERURICEMIA: ICD-10-CM

## 2024-12-31 DIAGNOSIS — E66.9 TYPE 2 DIABETES MELLITUS WITH OBESITY  (HCC): ICD-10-CM

## 2024-12-31 DIAGNOSIS — R60.0 PERIPHERAL EDEMA: ICD-10-CM

## 2024-12-31 RX ORDER — ATENOLOL 50 MG/1
50 TABLET ORAL 2 TIMES DAILY
Qty: 180 TABLET | Refills: 1 | Status: SHIPPED | OUTPATIENT
Start: 2024-12-31

## 2024-12-31 RX ORDER — OLMESARTAN MEDOXOMIL AND HYDROCHLOROTHIAZIDE 40/12.5 40; 12.5 MG/1; MG/1
1 TABLET ORAL DAILY
Qty: 90 TABLET | Refills: 1 | Status: SHIPPED | OUTPATIENT
Start: 2024-12-31

## 2024-12-31 RX ORDER — ATORVASTATIN CALCIUM 40 MG/1
40 TABLET, FILM COATED ORAL EVERY EVENING
Qty: 90 TABLET | Refills: 1 | Status: SHIPPED | OUTPATIENT
Start: 2024-12-31

## 2024-12-31 RX ORDER — ALLOPURINOL 100 MG/1
100 TABLET ORAL DAILY
Qty: 90 TABLET | Refills: 1 | Status: SHIPPED | OUTPATIENT
Start: 2024-12-31

## 2024-12-31 RX ORDER — FUROSEMIDE 40 MG/1
20 TABLET ORAL
Qty: 45 TABLET | Refills: 1 | Status: SHIPPED | OUTPATIENT
Start: 2024-12-31

## 2024-12-31 RX ORDER — EMPAGLIFLOZIN 25 MG/1
25 TABLET, FILM COATED ORAL EVERY MORNING
Qty: 90 TABLET | Refills: 1 | Status: SHIPPED | OUTPATIENT
Start: 2024-12-31

## 2024-12-31 RX ORDER — POTASSIUM CHLORIDE 750 MG/1
30 TABLET, EXTENDED RELEASE ORAL 2 TIMES DAILY
Qty: 540 TABLET | Refills: 1 | Status: SHIPPED | OUTPATIENT
Start: 2024-12-31

## 2025-01-04 ENCOUNTER — OFFICE VISIT (OUTPATIENT)
Dept: URGENT CARE | Facility: MEDICAL CENTER | Age: 70
End: 2025-01-04
Payer: MEDICARE

## 2025-01-04 VITALS
BODY MASS INDEX: 31.92 KG/M2 | TEMPERATURE: 99.6 F | HEIGHT: 70 IN | WEIGHT: 223 LBS | DIASTOLIC BLOOD PRESSURE: 82 MMHG | RESPIRATION RATE: 18 BRPM | HEART RATE: 103 BPM | SYSTOLIC BLOOD PRESSURE: 124 MMHG | OXYGEN SATURATION: 96 %

## 2025-01-04 DIAGNOSIS — J01.00 ACUTE MAXILLARY SINUSITIS, RECURRENCE NOT SPECIFIED: Primary | ICD-10-CM

## 2025-01-04 PROCEDURE — 99213 OFFICE O/P EST LOW 20 MIN: CPT

## 2025-01-04 PROCEDURE — G0463 HOSPITAL OUTPT CLINIC VISIT: HCPCS

## 2025-01-04 RX ORDER — FLUTICASONE PROPIONATE 50 MCG
1 SPRAY, SUSPENSION (ML) NASAL DAILY
Qty: 11.1 ML | Refills: 0 | Status: SHIPPED | OUTPATIENT
Start: 2025-01-04

## 2025-01-04 NOTE — PROGRESS NOTES
[facility]        NAME: Prashant Pastrana is a 69 y.o. male  : 1955    MRN: [id number]  DATE: 2025  TIME: 9:53 AM    Assessment and Plan   Acute maxillary sinusitis, recurrence not specified [J01.00]  1. Acute maxillary sinusitis, recurrence not specified  amoxicillin-clavulanate (AUGMENTIN) 875-125 mg per tablet    fluticasone (FLONASE) 50 mcg/act nasal spray            Patient Instructions       Follow up with PCP in 3-5 days.  Proceed to  ER if symptoms worsen.    If tests are performed, our office will contact you with results only if changes need to made to the care plan discussed with you at the visit. You can review your full results on [facility].    Chief Complaint     Chief Complaint   Patient presents with   • Cold Like Symptoms     1wk, nasal congestion & pressure, sinus ache, light cough, body aches, post nasal drip, clear mucus, low grade fever, sneezing, restlessness at night, Tylenol has been taken         History of Present Illness       70yo male history of HTN, DM, cardiac.  Patient here with reports of nasal congestion a little over 1 week. Has had sinus pressure and pain. He has had a cough. He has had low grade temperatures while taking tylenol. He has also had chills. He has had b/l ear discomfort on and off. At home he has only been taking tylenol and doing netti-pot and Vicks with only short relief. Significant worsening about 4 days ago. He reports pain into his teeth.     Review of Systems   Review of Systems   Constitutional:  Positive for chills, fatigue and fever.   HENT:  Positive for congestion, ear pain, rhinorrhea, sinus pressure and sinus pain. Negative for sore throat and trouble swallowing.    Respiratory:  Positive for cough. Negative for shortness of breath.    Cardiovascular:  Negative for chest pain and palpitations.   Gastrointestinal:  Negative for abdominal pain, constipation, diarrhea, nausea and vomiting.   Musculoskeletal:   Negative for arthralgias and back pain.   Skin:  Negative for color change and rash.   Neurological:  Negative for dizziness, light-headedness and headaches.   All other systems reviewed and are negative.        Current Medications       Current Outpatient Medications:   •  allopurinol (ZYLOPRIM) 100 mg tablet, TAKE 1 TABLET DAILY, Disp: 90 tablet, Rfl: 1  •  amLODIPine-benazepril (LOTREL 5-20) 5-20 MG per capsule, Take 1 capsule by mouth 2 (two) times a day, Disp: 180 capsule, Rfl: 3  •  amoxicillin-clavulanate (AUGMENTIN) 875-125 mg per tablet, Take 1 tablet by mouth every 12 (twelve) hours for 7 days, Disp: 14 tablet, Rfl: 0  •  atenolol (TENORMIN) 50 mg tablet, TAKE 1 TABLET TWICE A DAY, Disp: 180 tablet, Rfl: 1  •  atorvastatin (LIPITOR) 40 mg tablet, TAKE 1 TABLET EVERY EVENING, Disp: 90 tablet, Rfl: 1  •  Empagliflozin (Jardiance) 25 MG TABS, TAKE 1 TABLET EVERY MORNING, Disp: 90 tablet, Rfl: 1  •  fluticasone (FLONASE) 50 mcg/act nasal spray, 1 spray into each nostril daily, Disp: 11.1 mL, Rfl: 0  •  furosemide (LASIX) 40 mg tablet, TAKE ONE-HALF (1/2) TABLET DAILY, Disp: 45 tablet, Rfl: 1  •  magnesium oxide (MAG-OX) 400 mg, Take 400 mg by mouth daily, Disp: , Rfl:   •  olmesartan-hydrochlorothiazide (BENICAR HCT) 40-12.5 MG per tablet, TAKE 1 TABLET DAILY, Disp: 90 tablet, Rfl: 1  •  potassium chloride (Klor-Con M10) 10 mEq tablet, TAKE 3 TABLETS TWICE A DAY, Disp: 540 tablet, Rfl: 1  •  semaglutide, 1 mg/dose, (Ozempic, 1 MG/DOSE,) 4 mg/3 mL injection pen, INJECT 1 MG UNDER THE SKIN WEEKLY, Disp: 9 mL, Rfl: 1    Current Allergies     Allergies as of 01/04/2025   • (No Known Allergies)            The following portions of the patient's history were reviewed and updated as appropriate: allergies, current medications, past family history, past medical history, past social history, past surgical history and problem list.     Past Medical History:   Diagnosis Date   • Allergic rhinitis     last assessed:  "5/4/2015   • Aneurysm of ascending aorta without rupture (HCC)    • Aneurysm of thoracic aorta (HCC)    • Cataract     resolved: 12/12/2014   • Chronic bilateral low back pain 02/27/2014   • Colon polyp    • Diabetes mellitus (HCC)     diet control   • Dyslipidemia associated with type 2 diabetes mellitus  (HCC)    • Heel spur 05/31/2017   • Herniated nucleus pulposus, L4-5 left     last assessed: 4/9/2014   • Herniated nucleus pulposus, L5-S1, left     last assessed: 4/9/2014   • Hx of echocardiogram 10/14/2016    EF 55%, Mild LVH, mild aortic root and ascending aorta enlargement, measuring 42 mm, trace MR, trace aortic regurg.   • Hypercholesterolemia    • Hypertension    • Kidney stone    • Lumbar radiculopathy    • Pain of left thigh 07/02/2019   • Restless leg syndrome 06/21/2018   • Skin tag 10/25/2018       Past Surgical History:   Procedure Laterality Date   • ADENOIDECTOMY     • BACK SURGERY     • BACK SURGERY     • COLONOSCOPY     • KNEE ARTHROSCOPY      (therapeutic)   • KNEE SURGERY     • LYMPH NODE DISSECTION N/A 06/20/2023    Procedure: biopsy of MESENTERIC MASS, tap block;  Surgeon: Francis Gardner MD;  [facility];  Service: Surgical Oncology   • MO COLONOSCOPY FLX DX W/COLLJ SPEC WHEN PFRMD N/A 09/30/2016    Procedure: COLONOSCOPY;  Surgeon: Laith Pinzon MD;  Location: [facility];  Service: Gastroenterology   • SMALL INTESTINE SURGERY N/A 06/20/2023    Procedure: SMALL BOWEL RESECTION;  Surgeon: Francis Gardner MD;  [facility];  Service: Surgical Oncology   • TONSILLECTOMY AND ADENOIDECTOMY         Family History   Problem Relation Age of Onset   • Hypertension Father    • Thyroid disease Mother         disorder   • Coronary artery disease Brother    • Heart attack Brother         prior myocardial infarction         Medications have been verified.        Objective   /82   Pulse 103   Temp 99.6 °F (37.6 °C)   Resp 18   Ht 5' 10\" (1.778 m)   Wt 101 kg (223 lb)   SpO2 96%   " BMI 32.00 kg/m²        Physical Exam     Physical Exam  Vitals and nursing note reviewed.   Constitutional:       General: He is not in acute distress.     Appearance: Normal appearance. He is normal weight. He is not ill-appearing.   HENT:      Head: Normocephalic and atraumatic.      Right Ear: Ear canal and external ear normal. A middle ear effusion is present. Tympanic membrane is bulging.      Left Ear: Ear canal and external ear normal. A middle ear effusion is present. Tympanic membrane is bulging.      Nose: Congestion and rhinorrhea present. Rhinorrhea is clear.      Right Sinus: Maxillary sinus tenderness present.      Left Sinus: Maxillary sinus tenderness present.      Mouth/Throat:      Lips: Pink.      Mouth: Mucous membranes are moist.      Pharynx: Oropharynx is clear. Postnasal drip present.   Eyes:      Extraocular Movements: Extraocular movements intact.      Conjunctiva/sclera: Conjunctivae normal.      Pupils: Pupils are equal, round, and reactive to light.   Cardiovascular:      Rate and Rhythm: Normal rate and regular rhythm.      Pulses: Normal pulses.      Heart sounds: Normal heart sounds.   Pulmonary:      Effort: Pulmonary effort is normal.      Breath sounds: Normal breath sounds.   Abdominal:      General: Abdomen is flat. Bowel sounds are normal.      Palpations: Abdomen is soft.   Musculoskeletal:         General: Normal range of motion.      Cervical back: Full passive range of motion without pain, normal range of motion and neck supple.   Skin:     General: Skin is warm and dry.      Capillary Refill: Capillary refill takes less than 2 seconds.      Findings: No rash.   Neurological:      General: No focal deficit present.      Mental Status: He is alert and oriented to person, place, and time.   Psychiatric:         Mood and Affect: Mood normal.         Behavior: Behavior normal.

## 2025-01-04 NOTE — PATIENT INSTRUCTIONS
You may take over the counter Tylenol (Acetaminophen) and/or Motrin (Ibuprofen) as needed, as directed on packaging.   Be sure to get plenty of rest, and drinking fluids to remain hydrated.     Please follow up with your primary provider in the next several days. Should you have any worsening of symptoms, or lack of improvement please be re-evaluated. If needed for significant concerns, consider 911 or ER evaluation.       Over the counter decongestants can be used, only as directed on the box. However if you have any history of cardiac disease or high blood pressure these should be avoided, as we caution the use of these since they can make place strain on your heart and cause increase in blood pressure. It is recommended in lieu of decongestants to use cool mist vaporizer, saline nasal spray to relieve nasal congestion. It is also important to remain hydrated and drink plenty of fluids (avoiding caffeine and alcohol).     OVER THE COUNTER: Flonase nasal spray or Astepro nasal spray may be appropriate for your symptoms as well. Please follow the directions on the package for use. (Store brand is perfectly fine).

## 2025-01-08 DIAGNOSIS — I10 ESSENTIAL HYPERTENSION: ICD-10-CM

## 2025-01-09 RX ORDER — AMLODIPINE AND BENAZEPRIL HYDROCHLORIDE 5; 20 MG/1; MG/1
1 CAPSULE ORAL 2 TIMES DAILY
Qty: 180 CAPSULE | Refills: 1 | Status: SHIPPED | OUTPATIENT
Start: 2025-01-09

## 2025-01-17 ENCOUNTER — APPOINTMENT (OUTPATIENT)
Dept: LAB | Facility: MEDICAL CENTER | Age: 70
End: 2025-01-17
Payer: MEDICARE

## 2025-01-17 DIAGNOSIS — C7A.8 NEUROENDOCRINE CARCINOMA (HCC): ICD-10-CM

## 2025-01-17 LAB
BUN SERPL-MCNC: 17 MG/DL (ref 5–25)
CREAT SERPL-MCNC: 0.97 MG/DL (ref 0.6–1.3)
GFR SERPL CREATININE-BSD FRML MDRD: 79 ML/MIN/1.73SQ M

## 2025-01-17 PROCEDURE — 84520 ASSAY OF UREA NITROGEN: CPT

## 2025-01-17 PROCEDURE — 36415 COLL VENOUS BLD VENIPUNCTURE: CPT

## 2025-01-17 PROCEDURE — 82565 ASSAY OF CREATININE: CPT

## 2025-01-21 ENCOUNTER — TELEPHONE (OUTPATIENT)
Dept: INTERNAL MEDICINE CLINIC | Facility: CLINIC | Age: 70
End: 2025-01-21

## 2025-01-21 ENCOUNTER — HOSPITAL ENCOUNTER (OUTPATIENT)
Dept: INFUSION CENTER | Facility: HOSPITAL | Age: 70
Discharge: HOME | End: 2025-01-21
Attending: INTERNAL MEDICINE
Payer: MEDICARE

## 2025-01-21 VITALS
RESPIRATION RATE: 16 BRPM | SYSTOLIC BLOOD PRESSURE: 128 MMHG | DIASTOLIC BLOOD PRESSURE: 80 MMHG | HEART RATE: 79 BPM | TEMPERATURE: 97.1 F

## 2025-01-21 DIAGNOSIS — C7A.8 NEUROENDOCRINE CARCINOMA (HCC): Primary | ICD-10-CM

## 2025-01-21 RX ORDER — OCTREOTIDE ACETATE 30 MG
30 KIT INTRAMUSCULAR ONCE
Status: COMPLETED | OUTPATIENT
Start: 2025-01-21 | End: 2025-01-21

## 2025-01-21 RX ORDER — OCTREOTIDE ACETATE 30 MG
30 KIT INTRAMUSCULAR ONCE
OUTPATIENT
Start: 2025-02-18

## 2025-01-21 RX ADMIN — OCTREOTIDE ACETATE 30 MG: KIT at 08:34

## 2025-01-21 NOTE — PROGRESS NOTES
Pt tolerated Sandostatin injection in right intragluteal well without complications.       Prashant Pastrana is aware of future appt on 2/18/25 at 8:30AM.      AVS declined by Prashant Pastrana. Pt uses Mychart.     Pt discharged off unit in stable condition with all personal belongings

## 2025-01-21 NOTE — PLAN OF CARE
Problem: INFECTION - ADULT  Goal: Absence or prevention of progression during hospitalization  Description: INTERVENTIONS:  - Assess and monitor for signs and symptoms of infection  - Monitor lab/diagnostic results  - Monitor all insertion sites, i.e. indwelling lines, tubes, and drains  - Monitor endotracheal if appropriate and nasal secretions for changes in amount and color  - [place] appropriate cooling/warming therapies per order  - Administer medications as ordered  - Instruct and encourage patient and family to use good hand hygiene technique  - Identify and instruct in appropriate isolation precautions for identified infection/condition  Outcome: Progressing     Problem: Knowledge Deficit  Goal: Patient/family/caregiver demonstrates understanding of disease process, treatment plan, medications, and discharge instructions  Description: Complete learning assessment and assess knowledge base.  Interventions:  - Provide teaching at level of understanding  - Provide teaching via preferred learning methods  Outcome: Progressing

## 2025-01-22 ENCOUNTER — HOSPITAL ENCOUNTER (OUTPATIENT)
Dept: CT IMAGING | Facility: HOSPITAL | Age: 70
Discharge: HOME | End: 2025-01-22
Payer: MEDICARE

## 2025-01-22 DIAGNOSIS — C7A.8 NEUROENDOCRINE CARCINOMA (HCC): ICD-10-CM

## 2025-01-22 PROCEDURE — 74177 CT ABD & PELVIS W/CONTRAST: CPT

## 2025-01-22 RX ADMIN — IOHEXOL 100 ML: 350 INJECTION, SOLUTION INTRAVENOUS at 07:56

## 2025-01-23 ENCOUNTER — OFFICE VISIT (OUTPATIENT)
Dept: CARDIOLOGY CLINIC | Facility: CLINIC | Age: 70
End: 2025-01-23
Payer: MEDICARE

## 2025-01-23 VITALS
BODY MASS INDEX: 33.03 KG/M2 | RESPIRATION RATE: 18 BRPM | HEIGHT: 69 IN | WEIGHT: 223 LBS | DIASTOLIC BLOOD PRESSURE: 74 MMHG | OXYGEN SATURATION: 96 % | SYSTOLIC BLOOD PRESSURE: 116 MMHG | HEART RATE: 88 BPM

## 2025-01-23 DIAGNOSIS — E78.2 MIXED HYPERLIPIDEMIA: ICD-10-CM

## 2025-01-23 DIAGNOSIS — I10 PRIMARY HYPERTENSION: ICD-10-CM

## 2025-01-23 DIAGNOSIS — I71.21 ANEURYSM OF ASCENDING AORTA WITHOUT RUPTURE (HCC): Primary | ICD-10-CM

## 2025-01-23 PROCEDURE — 99214 OFFICE O/P EST MOD 30 MIN: CPT | Performed by: INTERNAL MEDICINE

## 2025-01-23 NOTE — PROGRESS NOTES
Patient ID: Prashant Pastrana is a 69 y.o. male.        Plan:      Aneurysm of thoracic aorta (HCC)  Chronic  Small  Asymptomatic  Stable  Will reassess after next visit via Echo    Hypertension  Controlled    Mixed hyperlipidemia  Controlled on statin       Follow up Plan/Other summary comments:  There is no evidence for angina, CHF, electrical instability.   Advised no medication changes today. Advised he continue Lotrel 5-20 mg, Atenolol 50 mg BID and Lipitor 40 mg, Benicar HCT 40-12.5 mg and 20 mg Lasix all daily, and agree with Jardiance Rx.  Call sooner with issues, especially discussed he need call if ANDREW worsens in any manner for tress testing that he politely declined today.   Return in about 6 months (around 2025).    HPI: Prashant is here for routine FU.  Feels well.  Offers no cardiac related complaints on extensive ROS questioning.  No cp, no alarming sob(noted mild ANDREW he attributes to age and deconditioning) when carrying up a chair uphill recently, will notify me if worsens,no palps, nor any unusual edema, orthopnea, pnd, (pre)syncope, tia, or claudication like sx.       Most recent or relevant cardiac/vascular testin/24 Echo    Left Ventricle: Left ventricular cavity size is normal. Wall thickness is mildly increased. There is mild to moderate concentric hypertrophy. The left ventricular ejection fraction is 70%. Systolic function is normal. Wall motion is normal. Diastolic function is mildly abnormal, consistent with grade I (abnormal) relaxation.    Right Ventricle: Systolic function is normal. Normal tricuspid annular plane systolic excursion (TAPSE) > 1.7 cm.    Aortic Valve: There is mild regurgitation.    Aorta: The aortic root is mildly dilated. The ascending aorta is mildly dilated. The aortic root is 4.10 cm. The ascending aorta is 4.0 cm.  Arch 3.8 cm.  2019 Asc Ao 4.1 cm via Echo    Past Surgical History:   Procedure Laterality Date    ADENOIDECTOMY      BACK SURGERY      BACK  "SURGERY      COLONOSCOPY      KNEE ARTHROSCOPY      (therapeutic)    KNEE SURGERY      LYMPH NODE DISSECTION N/A 06/20/2023    Procedure: biopsy of MESENTERIC MASS, tap block;  Surgeon: Francis Gardner MD;  Location: [facility];  Service: Surgical Oncology    IN COLONOSCOPY FLX DX W/COLLJ SPEC WHEN PFRMD N/A 09/30/2016    Procedure: COLONOSCOPY;  Surgeon: Laith Pinzon MD;  Location: [facility];  Service: Gastroenterology    SMALL INTESTINE SURGERY N/A 06/20/2023    Procedure: SMALL BOWEL RESECTION;  Surgeon: Francis Gardner MD;  Location: [facility];  Service: Surgical Oncology    TONSILLECTOMY AND ADENOIDECTOMY         Lipid Profile: Reviewed      Review of Systems   10  point ROS  was otherwise non pertinent or negative except as per HPI or as below.         Objective:     /74 (BP Location: Left arm, Patient Position: Sitting, Cuff Size: Standard)   Pulse 88   Resp 18   Ht 5' 8.5\" (1.74 m)   Wt 101 kg (223 lb)   SpO2 96%   BMI 33.41 kg/m²     PHYSICAL EXAM:    2/24 Echo    Left Ventricle: Left ventricular cavity size is normal. Wall thickness is mildly increased. There is mild to moderate concentric hypertrophy. The left ventricular ejection fraction is 70%. Systolic function is normal. Wall motion is normal. Diastolic function is mildly abnormal, consistent with grade I (abnormal) relaxation.    Right Ventricle: Systolic function is normal. Normal tricuspid annular plane systolic excursion (TAPSE) > 1.7 cm.    Aortic Valve: There is mild regurgitation.    Aorta: The aortic root is mildly dilated. The ascending aorta is mildly dilated. The aortic root is 4.10 cm. The ascending aorta is 4.0 cm.  Arch 3.8 cm.      Meds reviewed.    Current Outpatient Medications:     allopurinol (ZYLOPRIM) 100 mg tablet, TAKE 1 TABLET DAILY, Disp: 90 tablet, Rfl: 1    amLODIPine-benazepril (LOTREL 5-20) 5-20 MG per capsule, TAKE 1 CAPSULE TWICE A DAY, Disp: 180 capsule, Rfl: 1    atenolol (TENORMIN) 50 mg tablet, TAKE 1 " TABLET TWICE A DAY, Disp: 180 tablet, Rfl: 1    atorvastatin (LIPITOR) 40 mg tablet, TAKE 1 TABLET EVERY EVENING, Disp: 90 tablet, Rfl: 1    Empagliflozin (Jardiance) 25 MG TABS, TAKE 1 TABLET EVERY MORNING, Disp: 90 tablet, Rfl: 1    fluticasone (FLONASE) 50 mcg/act nasal spray, 1 spray into each nostril daily, Disp: 11.1 mL, Rfl: 0    furosemide (LASIX) 40 mg tablet, TAKE ONE-HALF (1/2) TABLET DAILY, Disp: 45 tablet, Rfl: 1    magnesium oxide (MAG-OX) 400 mg, Take 400 mg by mouth daily, Disp: , Rfl:     olmesartan-hydrochlorothiazide (BENICAR HCT) 40-12.5 MG per tablet, TAKE 1 TABLET DAILY, Disp: 90 tablet, Rfl: 1    potassium chloride (Klor-Con M10) 10 mEq tablet, TAKE 3 TABLETS TWICE A DAY, Disp: 540 tablet, Rfl: 1    semaglutide, 1 mg/dose, (Ozempic, 1 MG/DOSE,) 4 mg/3 mL injection pen, INJECT 1 MG UNDER THE SKIN WEEKLY, Disp: 9 mL, Rfl: 1  No current facility-administered medications for this visit.     Past Medical History:   Diagnosis Date    Allergic rhinitis     last assessed: 5/4/2015    Aneurysm of ascending aorta without rupture (HCC)     Aneurysm of thoracic aorta (HCC)     Cataract     resolved: 12/12/2014    Chronic bilateral low back pain 02/27/2014    Colon polyp     Diabetes mellitus (HCC)     diet control    Dyslipidemia associated with type 2 diabetes mellitus  (HCC)     Heel spur 05/31/2017    Herniated nucleus pulposus, L4-5 left     last assessed: 4/9/2014    Herniated nucleus pulposus, L5-S1, left     last assessed: 4/9/2014    Hx of echocardiogram 10/14/2016    EF 55%, Mild LVH, mild aortic root and ascending aorta enlargement, measuring 42 mm, trace MR, trace aortic regurg.    Hypercholesterolemia     Hypertension     Kidney stone     Lumbar radiculopathy     Mixed hyperlipidemia     Pain of left thigh 07/02/2019    Restless leg syndrome 06/21/2018    Skin tag 10/25/2018           Social History     Tobacco Use   Smoking Status Never   Smokeless Tobacco Never

## 2025-01-23 NOTE — PATIENT INSTRUCTIONS
"Patient Education     High blood pressure in adults   The Basics   Written by the doctors and editors at [facility]   What is high blood pressure? -- High blood pressure is a condition that puts you at risk for heart attack, stroke, and kidney disease. It does not usually cause symptoms. But it can be serious.  When your doctor or nurse tells you your blood pressure, they say 2 numbers. For instance, your doctor or nurse might say that your blood pressure is \"130 over 80.\" The top number is the pressure inside your arteries when your heart is yakelin. The bottom number is the pressure inside your arteries when your heart is relaxed.  \"Elevated blood pressure\" is a term doctors or nurses use as a warning. People with elevated blood pressure do not yet have high blood pressure. But their blood pressure is not as low as it should be for good health.  Many experts define high, elevated, and normal blood pressure as follows:   High - Top number of 130 or above and/or bottom number of 80 or above.   Elevated - Top number between 120 and 129 and bottom number of 79 or below.   Normal - Top number of 119 or below and bottom number of 79 or below.  This information is also in the table (table 1).  How can I lower my blood pressure? -- If your doctor or nurse prescribed blood pressure medicine, the most important thing you can do is to take it. If it causes side effects, do not just stop taking it. Instead, talk to your doctor or nurse about the problems it causes. They might be able to lower your dose or switch you to another medicine. If cost is a problem, mention that, too. They might be able to put you on a less expensive medicine. Taking your blood pressure medicine can keep you from having a heart attack or stroke, and it can save your life!  Can I do anything on my own? -- You have a lot of control over your blood pressure. To lower it:   Lose weight (if you are overweight).   Choose a diet low in fat and rich in " "fruits, vegetables, and low-fat dairy products.   Eat less salt.   Do something active for at least 30 minutes a day on most days of the week.   Drink less alcohol (if you drink more than 2 alcoholic drinks per day).  It's also a good idea to get a home blood pressure meter. People who check their own blood pressure at home do better at keeping it low and can sometimes even reduce the amount of medicine they take.  All topics are updated as new evidence becomes available and our peer review process is complete.  This topic retrieved from [employer] on: Feb 26, 2024.  Topic 88734 Version 23.0  Release: 32.2.4 - C32.56  © 2024 UpToDate, Inc. and/or its affiliates. All rights reserved.  table 1: Definition of normal and high blood pressure  Level  Top number  Bottom number    High 130 or above 80 or above   Elevated 120 to 129 79 or below   Normal 119 or below 79 or below   These definitions are from the American College of Cardiology/American Heart Association. Other expert groups might use slightly different definitions.  \"Elevated blood pressure\" is a term doctor or nurses use as a warning. It means you do not yet have high blood pressure, but your blood pressure is not as low as it should be for good health.  Graphic 46221 Version 6.0  Consumer Information Use and Disclaimer   Disclaimer: This generalized information is a limited summary of diagnosis, treatment, and/or medication information. It is not meant to be comprehensive and should be used as a tool to help the user understand and/or assess potential diagnostic and treatment options. It does NOT include all information about conditions, treatments, medications, side effects, or risks that may apply to a specific patient. It is not intended to be medical advice or a substitute for the medical advice, diagnosis, or treatment of a health care provider based on the health care provider's examination and assessment of a patient's specific and unique circumstances. " Patients must speak with a health care provider for complete information about their health, medical questions, and treatment options, including any risks or benefits regarding use of medications. This information does not endorse any treatments or medications as safe, effective, or approved for treating a specific patient. UpToDate, Inc. and its affiliates disclaim any warranty or liability relating to this information or the use thereof.The use of this information is governed by the Terms of Use, available at https://www.[employer] Mobileuwer.com/en/know/clinical-effectiveness-terms. 2024© UpToDate, Inc. and its affiliates and/or licensors. All rights reserved.  Copyright   © 2024 UpToDate, Inc. and/or its affiliates. All rights reserved.

## 2025-01-28 ENCOUNTER — OFFICE VISIT (OUTPATIENT)
Dept: SURGICAL ONCOLOGY | Facility: CLINIC | Age: 70
End: 2025-01-28
Payer: MEDICARE

## 2025-01-28 VITALS
DIASTOLIC BLOOD PRESSURE: 86 MMHG | RESPIRATION RATE: 18 BRPM | OXYGEN SATURATION: 96 % | TEMPERATURE: 98.8 F | HEART RATE: 78 BPM | WEIGHT: 226 LBS | BODY MASS INDEX: 33.47 KG/M2 | HEIGHT: 69 IN | SYSTOLIC BLOOD PRESSURE: 128 MMHG

## 2025-01-28 DIAGNOSIS — C7A.8 NEUROENDOCRINE CARCINOMA (HCC): Primary | ICD-10-CM

## 2025-01-28 PROCEDURE — 99214 OFFICE O/P EST MOD 30 MIN: CPT | Performed by: SURGERY

## 2025-01-28 NOTE — ASSESSMENT & PLAN NOTE
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  This is well differentiated, grade 2.  The mesenteric masses were not resected secondary to the potential of short gut and resecting a significant portion of normal small bowel.  He has started on octreotide and is tolerating this well. I will repeat his CT in 6 months.  We discussed if he has any flushing, diarrhea, palpitations, headaches or sweats to contact us.  If his next imaging is stable, we will likely go up to once a year CT.  He and his wife are agreeable to this plan.  All their questions were answered.

## 2025-01-28 NOTE — PROGRESS NOTES
Surgical Oncology Follow Up       [facility] [place]  [address] [facility] [address]  [phone]    Prashant Pastrana  1955  [id number]  [facility] [place]  [address] [facility] [address]  [phone]    1. Neuroendocrine carcinoma (HCC)  Assessment & Plan:  69 year-old male status post small bowel resection and biopsy of a mesenteric mass for a T4N1M0 neuroendocrine tumor.  This is well differentiated, grade 2.  The mesenteric masses were not resected secondary to the potential of short gut and resecting a significant portion of normal small bowel.  He has started on octreotide and is tolerating this well. I will repeat his CT in 6 months.  We discussed if he has any flushing, diarrhea, palpitations, headaches or sweats to contact us.  If his next imaging is stable, we will likely go up to once a year CT.  He and his wife are agreeable to this plan.  All their questions were answered.   Orders:  -     CT abdomen pelvis w contrast; Future; Expected date: 07/28/2025  -     BUN; Future; Expected date: 07/15/2025  -     Creatinine, serum; Future; Expected date: 07/15/2025         Chief Complaint   Patient presents with    Office Visit       Return in about 6 months (around 7/28/2025) for Ofice visit short, Imaging - See orders, Labs - See Treatment Plan, with Torie.      Oncology History   Neuroendocrine carcinoma (HCC)   3/31/2023 Initial Diagnosis    March 31, 2023 CT of the abdomen pelvis to investigate nausea and vomiting showed 3 mm right ureteral calculus.  14 mm right adrenal nodule.  May 24, 2023 PET/CT showed right mesenteric mass up to 3.4 cm, SUV 2.5.  Slight nodularity of small bowel loop in right hemiabdomen, SUV 3.2.  1.4 cm right adrenal, no hypermetabolism.  Incidentally noted hypermetabolism at the left base of tongue.  June 20, 2023  patient underwent small bowel resection and biopsy of mesenteric mass.  Pathology indicates well differentiated neuroendocrine tumor of small bowel, grade 2, T4, N1.  Mesenteric mass was not resected due to concern for possible short gut syndrome.  Biopsy showed fragments of well differentiated neuroendocrine tumor.      Cancer Staged    Cancer Staging   T4N1M0           Staging: [id number] small bowel neuroendocrine tumor  Ki-67 is 4%  Treatment history: Small bowel resection with biopsy of mesenteric mass, June 2023  Resection of the mesenteric mass would have required extensive normal small bowel resection.  Current treatment: Octreotide   Disease status:  Stable    History of Present Illness: Patient returns in follow-up of his neuroendocrine tumor.  He is doing well at this time with no complaints.  No abdominal pain, nausea or vomiting.  No flushing, diarrhea, palpitations, headaches or sweats.  He is tolerating his octreotide without any difficulty.  CT from January 22, 2025 is stable.  The mesenteric lizbeth masses appear unchanged on my review.  I personally reviewed the films.    Review of Systems  Complete ROS Surg Onc:   Complete ROS Surg Onc:   Constitutional: The patient denies new or recent history of general fatigue, no recent weight loss, no change in appetite.   Eyes: No complaints of visual problems, no scleral icterus.   ENT: no complaints of ear pain, no hoarseness, no difficulty swallowing,  no tinnitus and no new masses in head, oral cavity, or neck.   Cardiovascular: No complaints of chest pain, no palpitations, no ankle edema.   Respiratory: No complaints of shortness of breath, no cough.   Gastrointestinal: No complaints of jaundice, no bloody stools, no pale stools.   Genitourinary: No complaints of dysuria, no hematuria, no nocturia, no frequent urination, no urethral discharge.   Musculoskeletal: No complaints of weakness, paralysis, joint stiffness or arthralgias.  Integumentary: No  complaints of rash, no new lesions.   Neurological: No complaints of convulsions, no seizures, no dizziness.   Hematologic/Lymphatic: No complaints of easy bruising.   Endocrine:  No hot or cold intolerance.  No polydipsia, polyphagia, or polyuria.  Allergy/immunology:  No environmental allergies.  No food allergies.  Not immunocompromised.  Skin:  No pallor or rash.  No wound.        Patient Active Problem List   Diagnosis    Aneurysm of thoracic aorta (HCC)    Edema    Mixed hyperlipidemia    Hypertension    Hyperuricemia without signs inflammatory arthritis/tophaceous disease    Neuropathy of left foot    Non-toxic uninodular goiter    Osteoarthritis    Sciatica    Type 2 diabetes mellitus with obesity  (HCC)    Microscopic hematuria    Lumbar radiculopathy    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)    Nausea and vomiting    Stage 3a chronic kidney disease (HCC)    Lymphadenopathy, abdominal    Neuroendocrine carcinoma (HCC)    Personal history of colonic polyps    Type 2 diabetes mellitus with stage 3a chronic kidney disease (HCC)    Hypertensive kidney disease with CKD (chronic kidney disease)     Past Medical History:   Diagnosis Date    Allergic rhinitis     last assessed: 5/4/2015    Aneurysm of ascending aorta without rupture (HCC)     Aneurysm of thoracic aorta (HCC)     Cataract     resolved: 12/12/2014    Chronic bilateral low back pain 02/27/2014    Colon polyp     Diabetes mellitus (HCC)     diet control    Dyslipidemia associated with type 2 diabetes mellitus  (HCC)     Heel spur 05/31/2017    Herniated nucleus pulposus, L4-5 left     last assessed: 4/9/2014    Herniated nucleus pulposus, L5-S1, left     last assessed: 4/9/2014    Hx of echocardiogram 10/14/2016    EF 55%, Mild LVH, mild aortic root and ascending aorta enlargement, measuring 42 mm, trace MR, trace aortic regurg.    Hypercholesterolemia     Hypertension     Kidney stone      Lumbar radiculopathy     Mixed hyperlipidemia     Pain of left thigh 07/02/2019    Restless leg syndrome 06/21/2018    Skin tag 10/25/2018     Past Surgical History:   Procedure Laterality Date    ADENOIDECTOMY      BACK SURGERY      BACK SURGERY      COLONOSCOPY      KNEE ARTHROSCOPY      (therapeutic)    KNEE SURGERY      LYMPH NODE DISSECTION N/A 06/20/2023    Procedure: biopsy of MESENTERIC MASS, tap block;  Surgeon: Francis Gardner MD;  Location: [facility];  Service: Surgical Oncology    LA COLONOSCOPY FLX DX W/COLLJ SPEC WHEN PFRMD N/A 09/30/2016    Procedure: COLONOSCOPY;  Surgeon: Laith Pinzon MD;  Location: [facility];  Service: Gastroenterology    SMALL INTESTINE SURGERY N/A 06/20/2023    Procedure: SMALL BOWEL RESECTION;  Surgeon: Francis Gardner MD;  Location: [facility];  Service: Surgical Oncology    TONSILLECTOMY AND ADENOIDECTOMY       Family History   Problem Relation Age of Onset    Hypertension Father     Thyroid disease Mother         disorder    Coronary artery disease Brother     Heart attack Brother         prior myocardial infarction     Social History     Socioeconomic History    Marital status: /Civil Union     Spouse name: Not on file    Number of children: Not on file    Years of education: Not on file    Highest education level: Not on file   Occupational History    Not on file   Tobacco Use    Smoking status: Never    Smokeless tobacco: Never   Vaping Use    Vaping status: Never Used   Substance and Sexual Activity    Alcohol use: Yes     Comment: social once a month    Drug use: No    Sexual activity: Not Currently     Partners: Female     Birth control/protection: Female Sterilization   Other Topics Concern    Not on file   Social History Narrative    Not on file     Social Drivers of Health     Financial Resource Strain: Not on file   Food Insecurity: No Food Insecurity (5/16/2024)    Nursing - Inadequate Food Risk Classification     Worried About Running Out of Food in the  Last Year: Never true     Ran Out of Food in the Last Year: Never true     Ran Out of Food in the Last Year: Not on file   Transportation Needs: No Transportation Needs (5/16/2024)    PRAPARE - Transportation     Lack of Transportation (Medical): No     Lack of Transportation (Non-Medical): No   Physical Activity: Not on file   Stress: Not on file   Social Connections: Not on file   Intimate Partner Violence: Not on file   Housing Stability: Low Risk  (5/16/2024)    Housing Stability Vital Sign     Unable to Pay for Housing in the Last Year: No     Number of Times Moved in the Last Year: 1     Homeless in the Last Year: No       Current Outpatient Medications:     allopurinol (ZYLOPRIM) 100 mg tablet, TAKE 1 TABLET DAILY, Disp: 90 tablet, Rfl: 1    amLODIPine-benazepril (LOTREL 5-20) 5-20 MG per capsule, TAKE 1 CAPSULE TWICE A DAY, Disp: 180 capsule, Rfl: 1    atenolol (TENORMIN) 50 mg tablet, TAKE 1 TABLET TWICE A DAY, Disp: 180 tablet, Rfl: 1    atorvastatin (LIPITOR) 40 mg tablet, TAKE 1 TABLET EVERY EVENING, Disp: 90 tablet, Rfl: 1    Empagliflozin (Jardiance) 25 MG TABS, TAKE 1 TABLET EVERY MORNING, Disp: 90 tablet, Rfl: 1    fluticasone (FLONASE) 50 mcg/act nasal spray, 1 spray into each nostril daily, Disp: 11.1 mL, Rfl: 0    furosemide (LASIX) 40 mg tablet, TAKE ONE-HALF (1/2) TABLET DAILY, Disp: 45 tablet, Rfl: 1    magnesium oxide (MAG-OX) 400 mg, Take 400 mg by mouth daily, Disp: , Rfl:     olmesartan-hydrochlorothiazide (BENICAR HCT) 40-12.5 MG per tablet, TAKE 1 TABLET DAILY, Disp: 90 tablet, Rfl: 1    potassium chloride (Klor-Con M10) 10 mEq tablet, TAKE 3 TABLETS TWICE A DAY, Disp: 540 tablet, Rfl: 1    semaglutide, 1 mg/dose, (Ozempic, 1 MG/DOSE,) 4 mg/3 mL injection pen, INJECT 1 MG UNDER THE SKIN WEEKLY, Disp: 9 mL, Rfl: 1  No Known Allergies  Vitals:    01/28/25 0758   BP: 128/86   Pulse: 78   Resp: 18   Temp: 98.8 °F (37.1 °C)   SpO2: 96%       Physical Exam  Constitutional: General appearance:  The Patient is well-developed and well-nourished who appears the stated age in no acute distress. Patient is pleasant and talkative.     HEENT:  Normocephalic.  Sclerae are anicteric. Mucous membranes are moist. Neck is supple without adenopathy. No JVD.     Chest: The lungs are clear to auscultation.     Cardiac: Heart is regular rate.     Abdomen: Abdomen is soft, non-tender, non-distended and without masses.     Extremities: There is no clubbing or cyanosis. There is no edema.  Symmetric.  Neuro: Grossly nonfocal. Gait is normal.     Lymphatic: No evidence of cervical adenopathy bilaterally.   No evidence of axillary adenopathy bilaterally.     Skin: Warm, anicteric.    Psych:  Patient is pleasant and talkative.  Breasts:        Pathology:  [unfilled]    Labs:      Imaging  CT abdomen pelvis w contrast  Result Date: 1/22/2025  Narrative: CT ABDOMEN AND PELVIS WITH IV CONTRAST INDICATION: C7A.8: Other malignant neuroendocrine tumors. . COMPARISON: 7/11/2024 TECHNIQUE: CT examination of the abdomen and pelvis was performed. Multiplanar 2D reformatted images were created from the source data. This examination, like all CT scans performed in the [facility], was performed utilizing techniques to minimize radiation dose exposure, including the use of iterative reconstruction and automated exposure control. Radiation dose length product (DLP) for this visit: 933 mGy-cm IV Contrast: 100 mL of iohexol (OMNIPAQUE) Enteric Contrast: Administered. FINDINGS: ABDOMEN LOWER CHEST: No clinically significant abnormality in the visualized lower chest. LIVER/BILIARY TREE: There is a stable small hepatic cyst. GALLBLADDER: No calcified gallstones. No pericholecystic inflammatory change. SPLEEN: Unremarkable. PANCREAS: Unremarkable. ADRENAL GLANDS: Stable small right adrenal adenoma. KIDNEYS/URETERS: There is a left-sided parapelvic cyst. No hydronephrosis. STOMACH AND BOWEL: Colonic diverticulosis without  findings of acute diverticulitis. APPENDIX: No findings to suggest appendicitis. ABDOMINOPELVIC CAVITY: There is a mesenteric mass on series 2 image 88 measuring 2.8 x 3.4 cm, previously 2.7 x 3.6 cm. And adjacent mesenteric mass on series 2 image 95 measures 2.0 x 3.1 cm, previously 1.9 x 3.0 cm. No ascites. No pneumoperitoneum. VESSELS: Unremarkable for patient's age. PELVIS REPRODUCTIVE ORGANS: Enlarged prostate. URINARY BLADDER: Unremarkable. ABDOMINAL WALL/INGUINAL REGIONS: There are multiple injection sites with gas overlying the right gluteus muscles. BONES: No acute fracture or suspicious osseous lesion.     Impression: No significant change in size of multiple mesenteric masses consistent with metastatic carcinoid tumor. No new metastatic lesions identified. Workstation performed: [id number]     I personally reviewed and interpreted the above laboratory and imaging data.

## 2025-01-28 NOTE — LETTER
January 28, 2025     Tosha Mtz MD  [address] [facility] [address]    Patient: Prashant Pastrana   YOB: 1955   Date of Visit: 1/28/2025       Dear Dr. Mtz:    Thank you for referring Prashant Pastrana to me for evaluation. Below are my notes for this consultation.    If you have questions, please do not hesitate to call me. I look forward to following your patient along with you.         Sincerely,        Francis Gardner MD        CC: DO Francis Claudio MD  1/28/2025  8:21 AM  Sign when Signing Visit               Surgical Oncology Follow Up       [facility]  [address] [place] [id number]  [phone]    Prashant Pastrana  1955  [id number]  [facility]  [address] [facility] [address]  [phone]    1. Neuroendocrine carcinoma (HCC)  Assessment & Plan:  69 year-old male status post small bowel resection and biopsy of a mesenteric mass for a T4N1M0 neuroendocrine tumor.  This is well differentiated, grade 2.  The mesenteric masses were not resected secondary to the potential of short gut and resecting a significant portion of normal small bowel.  He has started on octreotide and is tolerating this well. I will repeat his CT in 6 months.  We discussed if he has any flushing, diarrhea, palpitations, headaches or sweats to contact us.  If his next imaging is stable, we will likely go up to once a year CT.  He and his wife are agreeable to this plan.  All their questions were answered.   Orders:  -     CT abdomen pelvis w contrast; Future; Expected date: 07/28/2025  -     BUN; Future; Expected date: 07/15/2025  -     Creatinine, serum; Future; Expected date: 07/15/2025         Chief Complaint   Patient presents with   • Office Visit       Return in about 6 months (around 7/28/2025) for Ofice  visit short, Imaging - See orders, Labs - See Treatment Plan, with Torie.      Oncology History   Neuroendocrine carcinoma (HCC)   3/31/2023 Initial Diagnosis    March 31, 2023 CT of the abdomen pelvis to investigate nausea and vomiting showed 3 mm right ureteral calculus.  14 mm right adrenal nodule.  May 24, 2023 PET/CT showed right mesenteric mass up to 3.4 cm, SUV 2.5.  Slight nodularity of small bowel loop in right hemiabdomen, SUV 3.2.  1.4 cm right adrenal, no hypermetabolism.  Incidentally noted hypermetabolism at the left base of tongue.  June 20, 2023 patient underwent small bowel resection and biopsy of mesenteric mass.  Pathology indicates well differentiated neuroendocrine tumor of small bowel, grade 2, T4, N1.  Mesenteric mass was not resected due to concern for possible short gut syndrome.  Biopsy showed fragments of well differentiated neuroendocrine tumor.      Cancer Staged    Cancer Staging   T4N1M0           Staging: [id number] small bowel neuroendocrine tumor  Ki-67 is 4%  Treatment history: Small bowel resection with biopsy of mesenteric mass, June 2023  Resection of the mesenteric mass would have required extensive normal small bowel resection.  Current treatment: Octreotide   Disease status:  Stable    History of Present Illness: Patient returns in follow-up of his neuroendocrine tumor.  He is doing well at this time with no complaints.  No abdominal pain, nausea or vomiting.  No flushing, diarrhea, palpitations, headaches or sweats.  He is tolerating his octreotide without any difficulty.  CT from January 22, 2025 is stable.  The mesenteric lizbeth masses appear unchanged on my review.  I personally reviewed the films.    Review of Systems  Complete ROS Surg Onc:   Complete ROS Surg Onc:   Constitutional: The patient denies new or recent history of general fatigue, no recent weight loss, no change in appetite.   Eyes: No complaints of visual problems, no scleral icterus.   ENT: no complaints of  ear pain, no hoarseness, no difficulty swallowing,  no tinnitus and no new masses in head, oral cavity, or neck.   Cardiovascular: No complaints of chest pain, no palpitations, no ankle edema.   Respiratory: No complaints of shortness of breath, no cough.   Gastrointestinal: No complaints of jaundice, no bloody stools, no pale stools.   Genitourinary: No complaints of dysuria, no hematuria, no nocturia, no frequent urination, no urethral discharge.   Musculoskeletal: No complaints of weakness, paralysis, joint stiffness or arthralgias.  Integumentary: No complaints of rash, no new lesions.   Neurological: No complaints of convulsions, no seizures, no dizziness.   Hematologic/Lymphatic: No complaints of easy bruising.   Endocrine:  No hot or cold intolerance.  No polydipsia, polyphagia, or polyuria.  Allergy/immunology:  No environmental allergies.  No food allergies.  Not immunocompromised.  Skin:  No pallor or rash.  No wound.        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)   • Nausea and vomiting   • Stage 3a chronic kidney disease (HCC)   • Lymphadenopathy, abdominal   • Neuroendocrine carcinoma (HCC)   • Personal history of colonic polyps   • Type 2 diabetes mellitus with stage 3a chronic kidney disease (HCC)   • Hypertensive kidney disease with CKD (chronic kidney disease)     Past Medical History:   Diagnosis Date   • Allergic rhinitis     last assessed: 5/4/2015   • Aneurysm of ascending aorta without rupture (HCC)    • Aneurysm of thoracic aorta (HCC)     • Cataract     resolved: 12/12/2014   • Chronic bilateral low back pain 02/27/2014   • Colon polyp    • Diabetes mellitus (HCC)     diet control   • Dyslipidemia associated with type 2 diabetes mellitus  (HCC)    • Heel spur 05/31/2017   • Herniated nucleus pulposus, L4-5 left     last assessed: 4/9/2014   • Herniated nucleus pulposus, L5-S1, left     last assessed: 4/9/2014   • Hx of echocardiogram 10/14/2016    EF 55%, Mild LVH, mild aortic root and ascending aorta enlargement, measuring 42 mm, trace MR, trace aortic regurg.   • Hypercholesterolemia    • Hypertension    • Kidney stone    • Lumbar radiculopathy    • Mixed hyperlipidemia    • Pain of left thigh 07/02/2019   • Restless leg syndrome 06/21/2018   • Skin tag 10/25/2018     Past Surgical History:   Procedure Laterality Date   • ADENOIDECTOMY     • BACK SURGERY     • BACK SURGERY     • COLONOSCOPY     • KNEE ARTHROSCOPY      (therapeutic)   • KNEE SURGERY     • LYMPH NODE DISSECTION N/A 06/20/2023    Procedure: biopsy of MESENTERIC MASS, tap block;  Surgeon: Francis Gardner MD;  Location: [facility];  Service: Surgical Oncology   • FL COLONOSCOPY FLX DX W/COLLJ SPEC WHEN PFRMD N/A 09/30/2016    Procedure: COLONOSCOPY;  Surgeon: Laith Pinzon MD;  Location: [facility];  Service: Gastroenterology   • SMALL INTESTINE SURGERY N/A 06/20/2023    Procedure: SMALL BOWEL RESECTION;  Surgeon: Francis Gardner MD;  Location: [facility];  Service: Surgical Oncology   • TONSILLECTOMY AND ADENOIDECTOMY       Family History   Problem Relation Age of Onset   • Hypertension Father    • Thyroid disease Mother         disorder   • Coronary artery disease Brother    • Heart attack Brother         prior myocardial infarction     Social History     Socioeconomic History   • Marital status: /Civil Union     Spouse name: Not on file   • Number of children: Not on file   • Years of education: Not on file   • Highest education level: Not on file   Occupational History   • Not  on file   Tobacco Use   • Smoking status: Never   • Smokeless tobacco: Never   Vaping Use   • Vaping status: Never Used   Substance and Sexual Activity   • Alcohol use: Yes     Comment: social once a month   • Drug use: No   • Sexual activity: Not Currently     Partners: Female     Birth control/protection: Female Sterilization   Other Topics Concern   • Not on file   Social History Narrative   • Not on file     Social Drivers of Health     Financial Resource Strain: Not on file   Food Insecurity: No Food Insecurity (5/16/2024)    Nursing - Inadequate Food Risk Classification    • Worried About Running Out of Food in the Last Year: Never true    • Ran Out of Food in the Last Year: Never true    • Ran Out of Food in the Last Year: Not on file   Transportation Needs: No Transportation Needs (5/16/2024)    PRAPARE - Transportation    • Lack of Transportation (Medical): No    • Lack of Transportation (Non-Medical): No   Physical Activity: Not on file   Stress: Not on file   Social Connections: Not on file   Intimate Partner Violence: Not on file   Housing Stability: Low Risk  (5/16/2024)    Housing Stability Vital Sign    • Unable to Pay for Housing in the Last Year: No    • Number of Times Moved in the Last Year: 1    • Homeless in the Last Year: No       Current Outpatient Medications:   •  allopurinol (ZYLOPRIM) 100 mg tablet, TAKE 1 TABLET DAILY, Disp: 90 tablet, Rfl: 1  •  amLODIPine-benazepril (LOTREL 5-20) 5-20 MG per capsule, TAKE 1 CAPSULE TWICE A DAY, Disp: 180 capsule, Rfl: 1  •  atenolol (TENORMIN) 50 mg tablet, TAKE 1 TABLET TWICE A DAY, Disp: 180 tablet, Rfl: 1  •  atorvastatin (LIPITOR) 40 mg tablet, TAKE 1 TABLET EVERY EVENING, Disp: 90 tablet, Rfl: 1  •  Empagliflozin (Jardiance) 25 MG TABS, TAKE 1 TABLET EVERY MORNING, Disp: 90 tablet, Rfl: 1  •  fluticasone (FLONASE) 50 mcg/act nasal spray, 1 spray into each nostril daily, Disp: 11.1 mL, Rfl: 0  •  furosemide (LASIX) 40 mg tablet, TAKE ONE-HALF (1/2)  TABLET DAILY, Disp: 45 tablet, Rfl: 1  •  magnesium oxide (MAG-OX) 400 mg, Take 400 mg by mouth daily, Disp: , Rfl:   •  olmesartan-hydrochlorothiazide (BENICAR HCT) 40-12.5 MG per tablet, TAKE 1 TABLET DAILY, Disp: 90 tablet, Rfl: 1  •  potassium chloride (Klor-Con M10) 10 mEq tablet, TAKE 3 TABLETS TWICE A DAY, Disp: 540 tablet, Rfl: 1  •  semaglutide, 1 mg/dose, (Ozempic, 1 MG/DOSE,) 4 mg/3 mL injection pen, INJECT 1 MG UNDER THE SKIN WEEKLY, Disp: 9 mL, Rfl: 1  No Known Allergies  Vitals:    01/28/25 0758   BP: 128/86   Pulse: 78   Resp: 18   Temp: 98.8 °F (37.1 °C)   SpO2: 96%       Physical Exam  Constitutional: General appearance: The Patient is well-developed and well-nourished who appears the stated age in no acute distress. Patient is pleasant and talkative.     HEENT:  Normocephalic.  Sclerae are anicteric. Mucous membranes are moist. Neck is supple without adenopathy. No JVD.     Chest: The lungs are clear to auscultation.     Cardiac: Heart is regular rate.     Abdomen: Abdomen is soft, non-tender, non-distended and without masses.     Extremities: There is no clubbing or cyanosis. There is no edema.  Symmetric.  Neuro: Grossly nonfocal. Gait is normal.     Lymphatic: No evidence of cervical adenopathy bilaterally.   No evidence of axillary adenopathy bilaterally.     Skin: Warm, anicteric.    Psych:  Patient is pleasant and talkative.  Breasts:        Pathology:  [unfilled]    Labs:      Imaging  CT abdomen pelvis w contrast  Result Date: 1/22/2025  Narrative: CT ABDOMEN AND PELVIS WITH IV CONTRAST INDICATION: C7A.8: Other malignant neuroendocrine tumors. . COMPARISON: 7/11/2024 TECHNIQUE: CT examination of the abdomen and pelvis was performed. Multiplanar 2D reformatted images were created from the source data. This examination, like all CT scans performed in the [facility] access hospital, was performed utilizing techniques to minimize radiation dose exposure, including the use of iterative  reconstruction and automated exposure control. Radiation dose length product (DLP) for this visit: 933 mGy-cm IV Contrast: 100 mL of iohexol (OMNIPAQUE) Enteric Contrast: Administered. FINDINGS: ABDOMEN LOWER CHEST: No clinically significant abnormality in the visualized lower chest. LIVER/BILIARY TREE: There is a stable small hepatic cyst. GALLBLADDER: No calcified gallstones. No pericholecystic inflammatory change. SPLEEN: Unremarkable. PANCREAS: Unremarkable. ADRENAL GLANDS: Stable small right adrenal adenoma. KIDNEYS/URETERS: There is a left-sided parapelvic cyst. No hydronephrosis. STOMACH AND BOWEL: Colonic diverticulosis without findings of acute diverticulitis. APPENDIX: No findings to suggest appendicitis. ABDOMINOPELVIC CAVITY: There is a mesenteric mass on series 2 image 88 measuring 2.8 x 3.4 cm, previously 2.7 x 3.6 cm. And adjacent mesenteric mass on series 2 image 95 measures 2.0 x 3.1 cm, previously 1.9 x 3.0 cm. No ascites. No pneumoperitoneum. VESSELS: Unremarkable for patient's age. PELVIS REPRODUCTIVE ORGANS: Enlarged prostate. URINARY BLADDER: Unremarkable. ABDOMINAL WALL/INGUINAL REGIONS: There are multiple injection sites with gas overlying the right gluteus muscles. BONES: No acute fracture or suspicious osseous lesion.     Impression: No significant change in size of multiple mesenteric masses consistent with metastatic carcinoid tumor. No new metastatic lesions identified. Workstation performed: [id number]     I personally reviewed and interpreted the above laboratory and imaging data.

## 2025-02-04 NOTE — TELEPHONE ENCOUNTER
Lisa Daniels called and asked what his next dose of Ozempic should be  He did the 1st month and is wondering what to do next  Recommended patient be sure to get at least 70 grams of protein per day by eating small, frequent meals all with high lean protein choices. Be sure to limit/cut back on daily carbohydrate intake. Discussed with the patient the recommended amount of water per day to intake.Be sure to do routine exercise including both cardio and strength training.

## 2025-02-06 DIAGNOSIS — E66.9 TYPE 2 DIABETES MELLITUS WITH OBESITY  (HCC): ICD-10-CM

## 2025-02-06 DIAGNOSIS — R60.0 PERIPHERAL EDEMA: ICD-10-CM

## 2025-02-06 DIAGNOSIS — E79.0 HYPERURICEMIA: ICD-10-CM

## 2025-02-06 DIAGNOSIS — E78.5 HYPERLIPIDEMIA, UNSPECIFIED HYPERLIPIDEMIA TYPE: ICD-10-CM

## 2025-02-06 DIAGNOSIS — E11.69 TYPE 2 DIABETES MELLITUS WITH OBESITY  (HCC): ICD-10-CM

## 2025-02-06 DIAGNOSIS — E11.65 UNCONTROLLED TYPE 2 DIABETES MELLITUS WITH HYPERGLYCEMIA (HCC): ICD-10-CM

## 2025-02-06 DIAGNOSIS — I10 ESSENTIAL HYPERTENSION: ICD-10-CM

## 2025-02-06 NOTE — TELEPHONE ENCOUNTER
Reason for call:   [x] Refill   [] Prior Auth  [] Other:     Office:   [x] PCP/Provider - [facility]   [] Specialty/Provider -     allopurinol (ZYLOPRIM) 100 mg tab    100 mg, Daily   90    amLODIPine-benazepril (LOTREL 5-20) 5-20 MG per cap   1 capsule, 2 times daily   180    atenolol (TENORMIN) 50 mg tab   50 mg, 2 times daily   180    atorvastatin (LIPITOR) 40 mg tab  40 mg, Every evening   90    Empagliflozin (Jardiance) 25 MG TABS   25 mg, Every morning   90    furosemide (LASIX) 40 mg tab   20 mg   45    olmesartan-hydrochlorothiazide (BENICAR HCT) 40-12.5 MG per tab   1 tablet, Daily   90    potassium chloride (Klor-Con M10) 10 mEq tablet   30 mEq, 2 times daily   540    semaglutide, 1 mg/dose, (Ozempic, 1 MG/DOSE,) 4 mg/3 mL injection pen   INJECT 1 MG UNDER THE SKIN WEEKLY   9 mL    Pharmacy: [facility]    Does the patient have enough for 3 days?   [x] Yes   [] No - Send as HP to POD

## 2025-02-06 NOTE — PROGRESS NOTES
Assessment/Plan:    No problem-specific Assessment & Plan notes found for this encounter  Diagnoses and all orders for this visit:    Type 2 diabetes mellitus with both eyes affected by mild nonproliferative retinopathy without macular edema, without long-term current use of insulin (HCC)  -     Hemoglobin A1C; Future    Type 2 diabetes mellitus with stage 2 chronic kidney disease, without long-term current use of insulin (HCC)  -     Hemoglobin A1C; Future    Primary hypertension  -     CBC and differential; Future  -     Comprehensive metabolic panel; Future    Mixed hyperlipidemia  -     Comprehensive metabolic panel; Future  -     Lipid panel; Future  -     TSH, 3rd generation; Future    Hyperuricemia without signs inflammatory arthritis/tophaceous disease  -     Uric acid; Future   Orders and recommendations as noted above  Reviewed recent laboratory testing with him  Blood work is improved  Hemoglobin A1c has improved significantly  Has adjusted to the [address]  Continue with the Ozempic and [place]ica Xiongen as previously  Watch diet  Try to increase activity level  Follow-up with ophthalmology regularly  Foot exam completed today  Blood pressure relatively well controlled  Continue current medications  Watch salt intake  Stay adequately hydrated  Continue with the atorvastatin as previously  Watch diet  Increase fiber in diet  Continue with the allopurinol  Watch for any episodes of gout  Potassium slightly low  Check BMP in about 3 to 4 weeks  Up-to-date on flu shot  We will have him follow-up in about 3 to 5 months or sooner if needed  Subjective:      Patient ID: Stacia Ribera is a 78 y o  male  He presents for routine follow-up  Has generally been doing relatively well  He did have some initial GI issues with the Ozempic but has begun to tolerate this better  Blood sugars have improved significantly  Rarely above 120s to 140s  Continues with the Jardiance    Tolerating Hx BPH.  Reports nocturia x1-2 at night.  Symptoms managed with flomax.  Will check PSA.    his blood pressure medications without difficulty  Denies any headaches or localized weakness  Continues with his potassium regularly  Potassium was slightly low but states that he has been taking this regularly  Tolerating the allopurinol  No episodes of gout  Continues with the atorvastatin  Denies any muscle aches or weakness with this  He continues with chronic numbness in the left foot area  This is no worse than previously  The following portions of the patient's history were reviewed and updated as appropriate:   He  has a past medical history of Allergic rhinitis, Aneurysm of thoracic aorta, Cataract, Colon polyp, Diabetes mellitus ([facility] [address] ), Herniated nucleus pulposus, L4-5 left, Herniated nucleus pulposus, L5-S1, left, echocardiogram (10/14/2016), Hypercholesterolemia, Hypertension, and Lumbar radiculopathy    He   Patient Active Problem List    Diagnosis Date Noted   • Severe obesity (BMI 35 0-35 9 with comorbidity) ([facility] ) 03/18/2022   • Type 2 diabetes mellitus with mild nonproliferative retinopathy of both eyes without macular edema ([facility] ) 03/18/2022   • Type 2 diabetes with stage 2 chronic kidney disease GFR 60-89 ([facility] ) 03/18/2022   • Hypokalemia 11/04/2020   • Colonic polyp 11/14/2019   • Lumbar radiculopathy 07/31/2019   • Microscopic hematuria 07/02/2019   • Pain of left thigh 07/02/2019   • Acute pain of left shoulder 04/02/2019   • Skin tag 10/25/2018   • Restless leg syndrome 06/21/2018   • Heel spur 05/31/2017   • Neuropathy of left foot 09/19/2016   • Diabetes mellitus type 2 in obese ([facility] [address] ) 08/06/2015   • Sciatica 03/03/2014   • Chronic bilateral low back pain 02/27/2014   • Edema 01/03/2013   • Hyperuricemia without signs inflammatory arthritis/tophaceous disease 10/31/2012   • Aneurysm of thoracic aorta 08/06/2012   • Mixed hyperlipidemia 05/01/2012   • Hypertension 05/01/2012   • Non-toxic uninodular goiter 05/01/2012   • Osteoarthritis 05/01/2012     He  has a past surgical history that includes Tonsillectomy and adenoidectomy; Knee surgery; Back surgery; pr colonoscopy flx dx w/collj spec when pfrmd (N/A, 9/30/2016); Back surgery; and Knee arthroscopy  His family history includes Coronary artery disease in his brother; Heart attack in his brother; Hypertension in his father; Thyroid disease in his mother  He  reports that he has never smoked  He has never used smokeless tobacco  He reports that he does not drink alcohol and does not use drugs  Current Outpatient Medications   Medication Sig Dispense Refill   • allopurinol (ZYLOPRIM) 100 mg tablet TAKE 1 TABLET DAILY 90 tablet 3   • amLODIPine-benazepril (LOTREL 5-20) 5-20 MG per capsule TAKE 1 CAPSULE TWICE A  capsule 3   • atenolol (TENORMIN) 50 mg tablet TAKE 1 TABLET TWICE A  tablet 3   • atorvastatin (LIPITOR) 40 mg tablet Take 1 tablet (40 mg total) by mouth daily 90 tablet 3   • furosemide (LASIX) 40 mg tablet TAKE ONE-HALF (1/2) TABLET DAILY 45 tablet 3   • Jardiance 25 MG TABS TAKE 1 TABLET EVERY MORNING 90 tablet 3   • magnesium oxide (MAG-OX) 400 mg Take 400 mg by mouth 2 (two) times a day     • olmesartan-hydrochlorothiazide (BENICAR HCT) 40-12 5 MG per tablet TAKE 1 TABLET DAILY 90 tablet 3   • potassium chloride (K-DUR,KLOR-CON) 10 mEq tablet TAKE 3 TABLETS TWICE A DAY  TAKE 3 TABLETS IN THE MORNING AND TAKE 3 TABLETS IN THE EVENING 540 tablet 3   • semaglutide, 1 mg/dose, (Ozempic, 1 MG/DOSE,) 4 MG/3ML SOPN injection pen Inject 0 75 mL (1 mg total) under the skin once a week 9 mL 1     No current facility-administered medications for this visit       Current Outpatient Medications on File Prior to Visit   Medication Sig   • allopurinol (ZYLOPRIM) 100 mg tablet TAKE 1 TABLET DAILY   • amLODIPine-benazepril (LOTREL 5-20) 5-20 MG per capsule TAKE 1 CAPSULE TWICE A DAY   • atenolol (TENORMIN) 50 mg tablet TAKE 1 TABLET TWICE A DAY   • atorvastatin (LIPITOR) 40 mg tablet Take 1 tablet (40 mg total) by mouth daily   • furosemide (LASIX) 40 mg tablet TAKE ONE-HALF (1/2) TABLET DAILY   • Jardiance 25 MG TABS TAKE 1 TABLET EVERY MORNING   • magnesium oxide (MAG-OX) 400 mg Take 400 mg by mouth 2 (two) times a day   • olmesartan-hydrochlorothiazide (BENICAR HCT) 40-12 5 MG per tablet TAKE 1 TABLET DAILY   • potassium chloride (K-DUR,KLOR-CON) 10 mEq tablet TAKE 3 TABLETS TWICE A DAY  TAKE 3 TABLETS IN THE MORNING AND TAKE 3 TABLETS IN THE EVENING   • semaglutide, 1 mg/dose, (Ozempic, 1 MG/DOSE,) 4 MG/3ML SOPN injection pen Inject 0 75 mL (1 mg total) under the skin once a week     No current facility-administered medications on file prior to visit  He has No Known Allergies       Review of Systems   Constitutional: Positive for activity change  Negative for appetite change, chills and fever  HENT: Negative for hearing loss  Eyes: Negative for pain and visual disturbance  Respiratory: Negative for cough, chest tightness and shortness of breath  Cardiovascular: Negative for chest pain and palpitations  Gastrointestinal: Negative for abdominal pain, blood in stool, diarrhea, nausea and vomiting  Endocrine: Negative for polydipsia, polyphagia and polyuria  Genitourinary: Negative for dysuria  Musculoskeletal: Negative for arthralgias and gait problem  Skin: Negative for color change  Neurological: Positive for numbness  Negative for dizziness and headaches  Hematological: Does not bruise/bleed easily  Psychiatric/Behavioral: Negative for behavioral problems  The patient is not nervous/anxious  Objective:      /84 (BP Location: Left arm, Patient Position: Sitting, Cuff Size: Large)   Pulse 90   Temp (!) 97 °F (36 1 °C)   Ht 5' 10" (1 778 m)   Wt 110 kg (242 lb 9 6 oz)   SpO2 95%   BMI 34 81 kg/m²          Physical Exam  Vitals and nursing note reviewed  Constitutional:       General: He is not in acute distress  Appearance: He is well-developed, well-groomed and overweight     HENT: Head: Normocephalic and atraumatic  Nose: Nose normal    Eyes:      Conjunctiva/sclera: Conjunctivae normal       Pupils: Pupils are equal, round, and reactive to light  Cardiovascular:      Rate and Rhythm: Normal rate and regular rhythm  Pulses: no weak pulses          Dorsalis pedis pulses are 2+ on the right side and 2+ on the left side  Posterior tibial pulses are 2+ on the right side and 2+ on the left side  Heart sounds: Normal heart sounds  No murmur heard  No friction rub  No gallop  Pulmonary:      Breath sounds: No wheezing or rales  Chest:      Chest wall: No tenderness  Abdominal:      General: There is no distension  Tenderness: There is no abdominal tenderness  There is no guarding or rebound  Feet:      Right foot:      Skin integrity: No ulcer, skin breakdown, erythema, warmth, callus or dry skin  Left foot:      Skin integrity: No ulcer, skin breakdown, erythema, warmth, callus or dry skin  Lymphadenopathy:      Cervical: No cervical adenopathy  Skin:     General: Skin is warm and dry  Neurological:      Mental Status: He is alert and oriented to person, place, and time  Psychiatric:         Mood and Affect: Mood and affect normal          Speech: Speech normal          Behavior: Behavior normal  Behavior is cooperative  Cognition and Memory: Cognition and memory normal          Diabetic Foot Exam    Patient's shoes and socks removed  Right Foot/Ankle   Right Foot Inspection  Skin Exam: skin normal and skin intact  No dry skin, no warmth, no callus, no erythema, no maceration, no abnormal color, no pre-ulcer, no ulcer and no callus  Toe Exam: ROM and strength within normal limits  Sensory   Monofilament testing: intact    Vascular  Capillary refills: < 3 seconds  The right DP pulse is 2+  The right PT pulse is 2+  Left Foot/Ankle  Left Foot Inspection  Skin Exam: skin normal and skin intact   No dry skin, no warmth, no erythema, no maceration, normal color, no pre-ulcer, no ulcer and no callus  Toe Exam: ROM and strength within normal limits  Sensory   Monofilament testing: diminished    Vascular  Capillary refills: < 3 seconds  The left DP pulse is 2+  The left PT pulse is 2+       Assign Risk Category  No deformity present  Loss of protective sensation  No weak pulses  Risk: 1

## 2025-02-07 ENCOUNTER — TELEPHONE (OUTPATIENT)
Dept: GASTROENTEROLOGY | Facility: CLINIC | Age: 70
End: 2025-02-07

## 2025-02-07 RX ORDER — ATENOLOL 50 MG/1
50 TABLET ORAL 2 TIMES DAILY
Qty: 180 TABLET | Refills: 0 | Status: SHIPPED | OUTPATIENT
Start: 2025-02-07

## 2025-02-07 RX ORDER — ATORVASTATIN CALCIUM 40 MG/1
40 TABLET, FILM COATED ORAL EVERY EVENING
Qty: 90 TABLET | Refills: 0 | Status: SHIPPED | OUTPATIENT
Start: 2025-02-07

## 2025-02-07 RX ORDER — POTASSIUM CHLORIDE 750 MG/1
30 TABLET, EXTENDED RELEASE ORAL 2 TIMES DAILY
Qty: 540 TABLET | Refills: 0 | Status: SHIPPED | OUTPATIENT
Start: 2025-02-07

## 2025-02-07 RX ORDER — ALLOPURINOL 100 MG/1
100 TABLET ORAL DAILY
Qty: 90 TABLET | Refills: 0 | Status: SHIPPED | OUTPATIENT
Start: 2025-02-07

## 2025-02-07 RX ORDER — FUROSEMIDE 40 MG/1
20 TABLET ORAL DAILY
Qty: 45 TABLET | Refills: 0 | Status: SHIPPED | OUTPATIENT
Start: 2025-02-07

## 2025-02-07 RX ORDER — AMLODIPINE AND BENAZEPRIL HYDROCHLORIDE 5; 20 MG/1; MG/1
1 CAPSULE ORAL 2 TIMES DAILY
Qty: 180 CAPSULE | Refills: 0 | Status: SHIPPED | OUTPATIENT
Start: 2025-02-07

## 2025-02-07 RX ORDER — OLMESARTAN MEDOXOMIL AND HYDROCHLOROTHIAZIDE 40/12.5 40; 12.5 MG/1; MG/1
1 TABLET ORAL DAILY
Qty: 90 TABLET | Refills: 0 | Status: SHIPPED | OUTPATIENT
Start: 2025-02-07

## 2025-02-07 NOTE — TELEPHONE ENCOUNTER
Calling because you are due for repeat colonoscopy after May 29th of this year. Please call us at [phone] and we would be happy to schedule this for you. We will also review how to hold your Jardiance and Ozempic prior to colonoscopy.

## 2025-02-07 NOTE — LETTER
Hello,    Attached are your prep instructions for your upcoming procedure on 7/30/2025. If you have any questions, please give us a call at [phone].    Thank you,     [facility]

## 2025-02-14 ENCOUNTER — PREP FOR PROCEDURE (OUTPATIENT)
Age: 70
End: 2025-02-14

## 2025-02-14 DIAGNOSIS — Z12.11 SCREENING FOR COLON CANCER: Primary | ICD-10-CM

## 2025-02-14 NOTE — TELEPHONE ENCOUNTER
02/14/25  Screened by: Lianne Reyes    Referring Provider     Pre- Screening:     There is no height or weight on file to calculate BMI.33.86  Ht-5'8  Wt-226  Has patient been referred for a routine screening Colonoscopy? yes  Is the patient between 45-75 years old? yes      Previous Colonoscopy yes   If yes:    Date: 2020    Facility:     Reason:       Does the patient want to see a Gastroenterologist prior to their procedure OR are they having any GI symptoms? no    Has the patient been hospitalized or had abdominal surgery in the past 6 months? no    Does the patient use supplemental oxygen? no    Does the patient take Coumadin, Lovenox, Plavix, Elliquis, Xarelto, or other blood thinning medication? no    Has the patient had a stroke, cardiac event, or stent placed in the past year? no        If patient is between 45yrs - 49yrs, please advise patient that we will have to confirm benefits & coverage with their insurance company for a routine screening colonoscopy.

## 2025-02-14 NOTE — TELEPHONE ENCOUNTER
Scheduled date of colonoscopy (as of today):7/30/2025  Physician performing colonoscopy:Dr. Jaiyeola  Location of colonoscopy:[facility]  Bowel prep reviewed with patient:heather/dul  Instructions reviewed with patient by:Khadar-Heather/dul prep instructions sent via email and [facility]  Clearances: n/a

## 2025-02-18 ENCOUNTER — HOSPITAL ENCOUNTER (OUTPATIENT)
Dept: INFUSION CENTER | Facility: HOSPITAL | Age: 70
Discharge: HOME | End: 2025-02-18
Attending: INTERNAL MEDICINE
Payer: MEDICARE

## 2025-02-18 DIAGNOSIS — C7A.8 NEUROENDOCRINE CARCINOMA (HCC): Primary | ICD-10-CM

## 2025-02-18 PROCEDURE — 96372 THER/PROPH/DIAG INJ SC/IM: CPT

## 2025-02-18 RX ORDER — OCTREOTIDE ACETATE 30 MG
30 KIT INTRAMUSCULAR ONCE
Status: COMPLETED | OUTPATIENT
Start: 2025-02-18 | End: 2025-02-18

## 2025-02-18 RX ORDER — OCTREOTIDE ACETATE 30 MG
30 KIT INTRAMUSCULAR ONCE
OUTPATIENT
Start: 2025-03-18

## 2025-02-18 RX ADMIN — OCTREOTIDE ACETATE 30 MG: KIT at 08:30

## 2025-02-18 NOTE — PROGRESS NOTES
Prashant Pastrana  tolerated Sandostatin LAR in Left Intragluteal area without incident      Prashant Pastrana is aware of future appt on 3-18-25 at 830    AVS declined patient uses Mychart.

## 2025-02-19 ENCOUNTER — TELEPHONE (OUTPATIENT)
Dept: INTERNAL MEDICINE CLINIC | Facility: CLINIC | Age: 70
End: 2025-02-19

## 2025-02-19 NOTE — TELEPHONE ENCOUNTER
Reason for call:   [x] Prior Auth  [] Other:     Caller:  [x] Patient  [] Pharmacy  Name:   Address:   Callback Number:     Medication: semaglutide, 1 mg/dose, (Ozempic, 1 MG/DOSE,) 4 mg/3 mL injection pen     Dose/Frequency: 1 mg, Subcutaneous, Every 7 days     Quantity: 9 ml    Ordering Provider:   [x] PCP/Provider -  Tosha Mtz MD   [] Speciality/Provider -     Has the patient tried other medications and failed? If failed, which medications did they fail?    [] No   [] Yes -     Is the patient's insurance updated in EPIC?   [x] Yes   [] No     Is a copy of the patient's insurance scanned in EPIC?   [x] Yes   [] No

## 2025-02-19 NOTE — TELEPHONE ENCOUNTER
Reason for call:   [x] Prior Auth  [] Other:     Caller:  [x] Patient  [] Pharmacy  Name:   Address:   Callback Number:     Medication: amLODIPine-benazepril (LOTREL 5-20) 5-20 MG per capsule     Dose/Frequency: 1 capsule, Oral, 2 times daily     Quantity: 180    Ordering Provider:   [x] PCP/Provider - Tosha Mtz MD  [] Speciality/Provider -     Has the patient tried other medications and failed? If failed, which medications did they fail?    [] No   [] Yes -     Is the patient's insurance updated in EPIC?   [x] Yes   [] No     Is a copy of the patient's insurance scanned in EPIC?   [x] Yes   [] No

## 2025-02-25 ENCOUNTER — OFFICE VISIT (OUTPATIENT)
Dept: OTOLARYNGOLOGY | Facility: CLINIC | Age: 70
End: 2025-02-25
Payer: MEDICARE

## 2025-02-25 VITALS
SYSTOLIC BLOOD PRESSURE: 130 MMHG | HEART RATE: 80 BPM | WEIGHT: 227 LBS | BODY MASS INDEX: 33.62 KG/M2 | RESPIRATION RATE: 16 BRPM | TEMPERATURE: 97.4 F | DIASTOLIC BLOOD PRESSURE: 80 MMHG | OXYGEN SATURATION: 96 % | HEIGHT: 69 IN

## 2025-02-25 DIAGNOSIS — R94.02 ABNORMAL PET SCAN OF HEAD: Primary | ICD-10-CM

## 2025-02-25 DIAGNOSIS — D3A.8 NEUROENDOCRINE TUMOR: ICD-10-CM

## 2025-02-25 DIAGNOSIS — Z90.89 HISTORY OF TONSILLECTOMY AND ADENOIDECTOMY: ICD-10-CM

## 2025-02-25 PROCEDURE — 99213 OFFICE O/P EST LOW 20 MIN: CPT | Performed by: PHYSICIAN ASSISTANT

## 2025-02-25 PROCEDURE — 31575 DIAGNOSTIC LARYNGOSCOPY: CPT | Performed by: PHYSICIAN ASSISTANT

## 2025-02-25 NOTE — PROGRESS NOTES
"ASSESSMENT:        1. Abnormal PET scan of head        2. Neuroendocrine tumor        3. History of tonsillectomy and adenoidectomy             PLAN: Explained unremarkable ENT exam today, and he has been without significant findings since his initial exam in August 2023.  Will see as needed.           Patient ID: Prashant Pastrana is a 69 y.o. male.    SUBJECTIVE:     The patient has a significant history of small bowel and mesenteric neuroendocrine carcinoma.  He presents in follow-up to PET/CT in May 2023, with incidental note of small FDG uptake at the left base of the tongue, with asymmetric activity also noted in the left lingual tonsillar region.  He was initially seen for this by Dr. Marie in August 2023, with follow-up by Camila Covarrubias in February 2024, with recommendation to follow-up in 1 year.  He presently denies ENT complaints.    OBJECTIVE:    Vitals:    02/25/25 0814   BP: 130/80   BP Location: Left arm   Patient Position: Sitting   Cuff Size: Large   Pulse: 80   Resp: 16   Temp: (!) 97.4 °F (36.3 °C)   TempSrc: Temporal   SpO2: 96%   Weight: 103 kg (227 lb)   Height: 5' 8.5\" (1.74 m)        Physical Exam   Auricles normal.  External auditory canals are patent.  Tympanic membranes appear grossly intact, without discernible middle ear effusions.  Nares patent.  Anterior rhinoscopy reveals normal mucosa bilaterally.  Oral mucosa moist.  The patient is missing several teeth.  Floor of mouth is unremarkable.  Tongue mobile and symmetric, without appreciable lesions or masses.  Oropharynx clear, with redundant soft palate and uvula.  Widener tonsils absent.  Mallampati IV.  No palpable parotid or submandibular swelling, although submandibular glands are ptotic bilaterally.  No palpable periauricular or cervical lymphadenopathy.  Trachea is grossly midline.  No mastoid region tenderness or swelling.    PROCEDURE:     Flexible Fiberoptic Laryngoscopy:  Verbal consent was obtained from the patient.  " The patient was placed in the upright seated position, and each nasal cavity was sprayed with 50/50 blend of oxymetazoline and lidocaine 4% plain.  A flexible 0-degree endoscope was then passed through the nasal cavity, into the larynx. Findings: Nasal cavities are unremarkable bilaterally.  Nasopharynx unremarkable.  Eustachian tube os patent bilaterally.  Lingual tonsils normal.  Epiglottis normal.  No apparent erythema, edema, lesions, or masses within the larynx.  No pooling of secretions.  Arytenoids normal.  TVCs appear fully mobile and symmetric.  Post-cricoid region is unremarkable.  The patient tolerated this well.  No complications.

## 2025-02-26 ENCOUNTER — TELEPHONE (OUTPATIENT)
Dept: HEMATOLOGY ONCOLOGY | Facility: CLINIC | Age: 70
End: 2025-02-26

## 2025-02-26 ENCOUNTER — APPOINTMENT (OUTPATIENT)
Dept: LAB | Facility: MEDICAL CENTER | Age: 70
End: 2025-02-26
Payer: MEDICARE

## 2025-02-26 DIAGNOSIS — C7A.8 NEUROENDOCRINE CARCINOMA (HCC): ICD-10-CM

## 2025-02-26 LAB
ALBUMIN SERPL BCG-MCNC: 4.2 G/DL (ref 3.5–5)
ALP SERPL-CCNC: 65 U/L (ref 34–104)
ALT SERPL W P-5'-P-CCNC: 18 U/L (ref 7–52)
ANION GAP SERPL CALCULATED.3IONS-SCNC: 11 MMOL/L (ref 4–13)
AST SERPL W P-5'-P-CCNC: 16 U/L (ref 13–39)
BASOPHILS # BLD AUTO: 0.03 THOUSANDS/ÂΜL (ref 0–0.1)
BASOPHILS NFR BLD AUTO: 1 % (ref 0–1)
BILIRUB SERPL-MCNC: 0.74 MG/DL (ref 0.2–1)
BUN SERPL-MCNC: 10 MG/DL (ref 5–25)
CALCIUM SERPL-MCNC: 9.1 MG/DL (ref 8.4–10.2)
CHLORIDE SERPL-SCNC: 102 MMOL/L (ref 96–108)
CO2 SERPL-SCNC: 30 MMOL/L (ref 21–32)
CREAT SERPL-MCNC: 0.94 MG/DL (ref 0.6–1.3)
EOSINOPHIL # BLD AUTO: 0.1 THOUSAND/ÂΜL (ref 0–0.61)
EOSINOPHIL NFR BLD AUTO: 2 % (ref 0–6)
ERYTHROCYTE [DISTWIDTH] IN BLOOD BY AUTOMATED COUNT: 13.9 % (ref 11.6–15.1)
GFR SERPL CREATININE-BSD FRML MDRD: 82 ML/MIN/1.73SQ M
GLUCOSE P FAST SERPL-MCNC: 159 MG/DL (ref 65–99)
HCT VFR BLD AUTO: 46.1 % (ref 36.5–49.3)
HGB BLD-MCNC: 15 G/DL (ref 12–17)
IMM GRANULOCYTES # BLD AUTO: 0.01 THOUSAND/UL (ref 0–0.2)
IMM GRANULOCYTES NFR BLD AUTO: 0 % (ref 0–2)
LYMPHOCYTES # BLD AUTO: 1.47 THOUSANDS/ÂΜL (ref 0.6–4.47)
LYMPHOCYTES NFR BLD AUTO: 31 % (ref 14–44)
MCH RBC QN AUTO: 30.9 PG (ref 26.8–34.3)
MCHC RBC AUTO-ENTMCNC: 32.5 G/DL (ref 31.4–37.4)
MCV RBC AUTO: 95 FL (ref 82–98)
MONOCYTES # BLD AUTO: 0.4 THOUSAND/ÂΜL (ref 0.17–1.22)
MONOCYTES NFR BLD AUTO: 8 % (ref 4–12)
NEUTROPHILS # BLD AUTO: 2.78 THOUSANDS/ÂΜL (ref 1.85–7.62)
NEUTS SEG NFR BLD AUTO: 58 % (ref 43–75)
NRBC BLD AUTO-RTO: 0 /100 WBCS
PLATELET # BLD AUTO: 147 THOUSANDS/UL (ref 149–390)
PMV BLD AUTO: 10.3 FL (ref 8.9–12.7)
POTASSIUM SERPL-SCNC: 3.5 MMOL/L (ref 3.5–5.3)
PROT SERPL-MCNC: 6.5 G/DL (ref 6.4–8.4)
RBC # BLD AUTO: 4.85 MILLION/UL (ref 3.88–5.62)
SODIUM SERPL-SCNC: 143 MMOL/L (ref 135–147)
WBC # BLD AUTO: 4.79 THOUSAND/UL (ref 4.31–10.16)

## 2025-02-26 PROCEDURE — 85025 COMPLETE CBC W/AUTO DIFF WBC: CPT

## 2025-02-26 PROCEDURE — 36415 COLL VENOUS BLD VENIPUNCTURE: CPT

## 2025-02-26 PROCEDURE — 80053 COMPREHEN METABOLIC PANEL: CPT

## 2025-02-28 ENCOUNTER — OFFICE VISIT (OUTPATIENT)
Dept: HEMATOLOGY ONCOLOGY | Facility: CLINIC | Age: 70
End: 2025-02-28
Payer: MEDICARE

## 2025-02-28 ENCOUNTER — TELEPHONE (OUTPATIENT)
Age: 70
End: 2025-02-28

## 2025-02-28 VITALS
WEIGHT: 225 LBS | SYSTOLIC BLOOD PRESSURE: 121 MMHG | OXYGEN SATURATION: 95 % | HEART RATE: 69 BPM | HEIGHT: 69 IN | DIASTOLIC BLOOD PRESSURE: 78 MMHG | TEMPERATURE: 98.2 F | BODY MASS INDEX: 33.33 KG/M2

## 2025-02-28 DIAGNOSIS — E11.3293 TYPE 2 DIABETES MELLITUS WITH BOTH EYES AFFECTED BY MILD NONPROLIFERATIVE RETINOPATHY WITHOUT MACULAR EDEMA, WITHOUT LONG-TERM CURRENT USE OF INSULIN (HCC): ICD-10-CM

## 2025-02-28 DIAGNOSIS — N18.31 STAGE 3A CHRONIC KIDNEY DISEASE (HCC): ICD-10-CM

## 2025-02-28 DIAGNOSIS — C7A.8 NEUROENDOCRINE CARCINOMA (HCC): Primary | ICD-10-CM

## 2025-02-28 PROBLEM — R11.2 NAUSEA AND VOMITING: Status: RESOLVED | Noted: 2023-03-31 | Resolved: 2025-02-28

## 2025-02-28 PROCEDURE — 99213 OFFICE O/P EST LOW 20 MIN: CPT | Performed by: INTERNAL MEDICINE

## 2025-02-28 PROCEDURE — G2211 COMPLEX E/M VISIT ADD ON: HCPCS | Performed by: INTERNAL MEDICINE

## 2025-02-28 NOTE — ASSESSMENT & PLAN NOTE
Lab Results   Component Value Date    HGBA1C 7.5 (H) 11/20/2024   Has started Ozempic.  Hemoglobin as listed.  Continue treatment under PCP.

## 2025-02-28 NOTE — TELEPHONE ENCOUNTER
PA for amLODIPine-benazepril (LOTREL 5-20) 5-20 MG per capsule DSUBMITTED to     via    []CMM-KEY:   [x]Surescripts-Case ID # [id number]   []Availity-Auth ID # NDC #   []Faxed to plan   []Other website   []Phone call Case ID #     [x]PA sent as URGENT    All office notes, labs and other pertaining documents and studies sent. Clinical questions answered. Awaiting determination from insurance company.     Turnaround time for your insurance to make a decision on your Prior Authorization can take 7-21 business days.

## 2025-02-28 NOTE — ASSESSMENT & PLAN NOTE
Lab Results   Component Value Date    EGFR 82 02/26/2025    EGFR 79 01/17/2025    EGFR 74 11/20/2024    CREATININE 0.94 02/26/2025    CREATININE 0.97 01/17/2025    CREATININE 1.03 11/20/2024     Creatinine values are stable.  Continue blood pressure medication and creatinine monitoring.

## 2025-02-28 NOTE — PROGRESS NOTES
Name: Prashant Pastrana      : 1955      MRN: [id number]  Encounter Provider: Kyle Kincaid DO  Encounter Date: 2025   Encounter department: [facility] [place]  :  Assessment & Plan  Neuroendocrine carcinoma (HCC)  Octreotide 30 mg IM q. months.  Recent CBC shows platelets 147, normal white count, hemoglobin, differential.  CMP shows glucose 159, otherwise normal.  Recent CT abdomen pelvis shows mesenteric mass stable 3.4 cm.  Adjacent mass likewise stable, 3.1 cm.  Clinically, he is doing well.  No symptoms referable to neuroendocrine carcinoma.  Continue monthly injection.  CT scan scheduled in 6 months.  Follow-up thereafter.  Orders:  •  CBC and differential; Future  •  Comprehensive metabolic panel; Future    Type 2 diabetes mellitus with both eyes affected by mild nonproliferative retinopathy without macular edema, without long-term current use of insulin (HCC)    Lab Results   Component Value Date    HGBA1C 7.5 (H) 2024   Has started Ozempic.  Hemoglobin as listed.  Continue treatment under PCP.         Stage 3a chronic kidney disease (HCC)  Lab Results   Component Value Date    EGFR 82 2025    EGFR 79 2025    EGFR 74 2024    CREATININE 0.94 2025    CREATININE 0.97 2025    CREATININE 1.03 2024     Creatinine values are stable.  Continue blood pressure medication and creatinine monitoring.           Return in about 6 months (around 2025) for Labs - See orders.    History of Present Illness   No chief complaint on file.    Oncology History   Cancer Staging   No matching staging information was found for the patient.  Oncology History   Neuroendocrine carcinoma (HCC)   3/31/2023 Initial Diagnosis    2023 CT of the abdomen pelvis to investigate nausea and vomiting showed 3 mm right ureteral calculus.  14 mm right adrenal nodule.  May 24, 2023 PET/CT showed right mesenteric mass up to 3.4 cm, SUV 2.5.   "Slight nodularity of small bowel loop in right hemiabdomen, SUV 3.2.  1.4 cm right adrenal, no hypermetabolism.  Incidentally noted hypermetabolism at the left base of tongue.  June 20, 2023 patient underwent small bowel resection and biopsy of mesenteric mass.  Pathology indicates well differentiated neuroendocrine tumor of small bowel, grade 2, T4, N1.  Mesenteric mass was not resected due to concern for possible short gut syndrome.  Biopsy showed fragments of well differentiated neuroendocrine tumor.      Cancer Staged    Cancer Staging   T4N1M0          Pertinent Medical History   Mid 2023 resection of small bowel low-grade neuroendocrine tumor, T4, N1, G2.  Status post R2 resection.  Early institution of octreotide elected.     Review of Systems   Constitutional:  Negative for chills and fever.   HENT:  Negative for nosebleeds.    Eyes:  Negative for discharge.   Respiratory:  Negative for cough and shortness of breath.    Cardiovascular:  Negative for chest pain.   Gastrointestinal:  Negative for abdominal pain, constipation and diarrhea.   Endocrine: Negative for polydipsia.   Genitourinary:  Negative for hematuria.   Musculoskeletal:  Negative for arthralgias.   Skin:  Negative for color change.   Allergic/Immunologic: Negative for immunocompromised state.   Neurological:  Negative for dizziness and headaches.   Hematological:  Negative for adenopathy.   Psychiatric/Behavioral:  Negative for agitation.            Objective   /78 (BP Location: Left arm, Patient Position: Sitting, Cuff Size: Adult)   Pulse 69   Temp 98.2 °F (36.8 °C) (Temporal)   Ht 5' 8.5\" (1.74 m)   Wt 102 kg (225 lb)   SpO2 95%   BMI 33.71 kg/m²     Pain Screening:     ECOG     Physical Exam  Constitutional:       Appearance: He is well-developed.   HENT:      Head: Normocephalic and atraumatic.      Mouth/Throat:      Mouth: Mucous membranes are moist.   Eyes:      Pupils: Pupils are equal, round, and reactive to light. "   Cardiovascular:      Rate and Rhythm: Normal rate and regular rhythm.      Heart sounds: No murmur heard.  Pulmonary:      Breath sounds: Normal breath sounds. No wheezing or rales.   Abdominal:      Palpations: Abdomen is soft.      Tenderness: There is no abdominal tenderness.   Musculoskeletal:         General: No tenderness. Normal range of motion.      Cervical back: Neck supple.   Lymphadenopathy:      Cervical: No cervical adenopathy.   Skin:     Findings: No erythema or rash.   Neurological:      Mental Status: He is alert and oriented to person, place, and time.      Cranial Nerves: No cranial nerve deficit.      Deep Tendon Reflexes: Reflexes are normal and symmetric.   Psychiatric:         Behavior: Behavior normal.         Labs: I have reviewed the following labs:  Lab Results   Component Value Date/Time    WBC 4.79 02/26/2025 12:26 PM    RBC 4.85 02/26/2025 12:26 PM    Hemoglobin 15.0 02/26/2025 12:26 PM    Hematocrit 46.1 02/26/2025 12:26 PM    MCV 95 02/26/2025 12:26 PM    MCH 30.9 02/26/2025 12:26 PM    RDW 13.9 02/26/2025 12:26 PM    Platelets 147 (L) 02/26/2025 12:26 PM    Segmented % 58 02/26/2025 12:26 PM    Lymphocytes % 31 02/26/2025 12:26 PM    Monocytes % 8 02/26/2025 12:26 PM    Eosinophils Relative 2 02/26/2025 12:26 PM    Basophils Relative 1 02/26/2025 12:26 PM    Immature Grans % 0 02/26/2025 12:26 PM    Absolute Neutrophils 2.78 02/26/2025 12:26 PM     Lab Results   Component Value Date/Time    Potassium 3.5 02/26/2025 12:26 PM    Chloride 102 02/26/2025 12:26 PM    CO2 30 02/26/2025 12:26 PM    BUN 10 02/26/2025 12:26 PM    Creatinine 0.94 02/26/2025 12:26 PM    Glucose, Fasting 159 (H) 02/26/2025 12:26 PM    Calcium 9.1 02/26/2025 12:26 PM    AST 16 02/26/2025 12:26 PM    ALT 18 02/26/2025 12:26 PM    Alkaline Phosphatase 65 02/26/2025 12:26 PM    Total Protein 6.5 02/26/2025 12:26 PM    Albumin 4.2 02/26/2025 12:26 PM    Total Bilirubin 0.74 02/26/2025 12:26 PM    eGFR 82  02/26/2025 12:26 PM

## 2025-02-28 NOTE — ASSESSMENT & PLAN NOTE
Octreotide 30 mg IM q. months.  Recent CBC shows platelets 147, normal white count, hemoglobin, differential.  CMP shows glucose 159, otherwise normal.  Recent CT abdomen pelvis shows mesenteric mass stable 3.4 cm.  Adjacent mass likewise stable, 3.1 cm.  Clinically, he is doing well.  No symptoms referable to neuroendocrine carcinoma.  Continue monthly injection.  CT scan scheduled in 6 months.  Follow-up thereafter.  Orders:  •  CBC and differential; Future  •  Comprehensive metabolic panel; Future

## 2025-02-28 NOTE — TELEPHONE ENCOUNTER
Pts wife called to see if any updates were received on PA- patients wife stated the pharmacy is still waiting for additional information to be sent in to approve   amLODIPine-benazepril (LOTREL 5-20) 5-20 MG per capsule     [facility]  Pharmacy is requesting additional information in regards to refill request     Tosha Mtz MD- Primary care [facility]

## 2025-03-10 ENCOUNTER — TELEPHONE (OUTPATIENT)
Age: 70
End: 2025-03-10

## 2025-03-10 NOTE — TELEPHONE ENCOUNTER
PA for (Ozempic, 1 MG/DOSE,) SUBMITTED to Medicare     via      [x][employer]-Case ID #       [x]PA sent as URGENT    All office notes, labs and other pertaining documents and studies sent. Clinical questions answered. Awaiting determination from insurance company.     Turnaround time for your insurance to make a decision on your Prior Authorization can take 7-21 business days.

## 2025-03-11 NOTE — TELEPHONE ENCOUNTER
PA for (Ozempic, 1 MG/DOSE,)  APPROVED     Date(s) approved March 10, 2025 to December 31, 2025    Case #[id number]     Patient advised by          []Yella Rewardshart Message  [x]Phone call   []LMOM  []L/M to call office as no active Communication consent on file  []Unable to leave detailed message as VM not approved on Communication consent       Pharmacy advised by    [x]Fax  []Phone call  []Secure Chat    Specialty Pharmacy    []     Approval letter scanned into Media Yes

## 2025-03-18 ENCOUNTER — HOSPITAL ENCOUNTER (OUTPATIENT)
Dept: INFUSION CENTER | Facility: HOSPITAL | Age: 70
Discharge: HOME | End: 2025-03-18
Attending: INTERNAL MEDICINE
Payer: MEDICARE

## 2025-03-18 VITALS
DIASTOLIC BLOOD PRESSURE: 82 MMHG | OXYGEN SATURATION: 97 % | RESPIRATION RATE: 16 BRPM | SYSTOLIC BLOOD PRESSURE: 126 MMHG | TEMPERATURE: 97.9 F | HEART RATE: 81 BPM

## 2025-03-18 DIAGNOSIS — C7A.8 NEUROENDOCRINE CARCINOMA (HCC): Primary | ICD-10-CM

## 2025-03-18 RX ORDER — OCTREOTIDE ACETATE 30 MG
30 KIT INTRAMUSCULAR ONCE
Status: COMPLETED | OUTPATIENT
Start: 2025-03-18 | End: 2025-03-18

## 2025-03-18 RX ORDER — OCTREOTIDE ACETATE 30 MG
30 KIT INTRAMUSCULAR ONCE
OUTPATIENT
Start: 2025-04-15

## 2025-03-18 RX ADMIN — OCTREOTIDE ACETATE 30 MG: KIT at 08:53

## 2025-03-18 NOTE — PROGRESS NOTES
Pt tolerated Sandostatin injection in right intragluteal well without complications.       Prashant Pastrana is aware of future appt on 4/15/25 at 8:30AM.      AVS declined by Prashant Pastrana. Pt uses Mychart.     Pt discharged off unit in stable condition with all personal belongings

## 2025-03-18 NOTE — PLAN OF CARE
Problem: INFECTION - ADULT  Goal: Absence or prevention of progression during hospitalization  Description: INTERVENTIONS:  - Assess and monitor for signs and symptoms of infection  - Monitor lab/diagnostic results  - Monitor all insertion sites, i.e. indwelling lines, tubes, and drains  - Monitor endotracheal if appropriate and nasal secretions for changes in amount and color  - [place] appropriate cooling/warming therapies per order  - Administer medications as ordered  - Instruct and encourage patient and family to use good hand hygiene technique  - Identify and instruct in appropriate isolation precautions for identified infection/condition  Outcome: Progressing     Problem: Knowledge Deficit  Goal: Patient/family/caregiver demonstrates understanding of disease process, treatment plan, medications, and discharge instructions  Description: Complete learning assessment and assess knowledge base.  Interventions:  - Provide teaching at level of understanding  - Provide teaching via preferred learning methods  Outcome: Progressing

## 2025-04-01 DIAGNOSIS — I10 ESSENTIAL HYPERTENSION: ICD-10-CM

## 2025-04-02 RX ORDER — ATENOLOL 50 MG/1
50 TABLET ORAL 2 TIMES DAILY
Qty: 180 TABLET | Refills: 1 | Status: SHIPPED | OUTPATIENT
Start: 2025-04-02

## 2025-04-15 ENCOUNTER — HOSPITAL ENCOUNTER (OUTPATIENT)
Dept: INFUSION CENTER | Facility: HOSPITAL | Age: 70
Discharge: HOME | End: 2025-04-15
Attending: INTERNAL MEDICINE
Payer: MEDICARE

## 2025-04-15 VITALS
TEMPERATURE: 97.2 F | DIASTOLIC BLOOD PRESSURE: 84 MMHG | SYSTOLIC BLOOD PRESSURE: 129 MMHG | HEART RATE: 75 BPM | RESPIRATION RATE: 16 BRPM

## 2025-04-15 DIAGNOSIS — C7A.8 NEUROENDOCRINE CARCINOMA (HCC): Primary | ICD-10-CM

## 2025-04-15 PROCEDURE — 96372 THER/PROPH/DIAG INJ SC/IM: CPT

## 2025-04-15 RX ORDER — OCTREOTIDE ACETATE 30 MG
30 KIT INTRAMUSCULAR ONCE
Status: COMPLETED | OUTPATIENT
Start: 2025-04-15 | End: 2025-04-15

## 2025-04-15 RX ORDER — OCTREOTIDE ACETATE 30 MG
30 KIT INTRAMUSCULAR ONCE
OUTPATIENT
Start: 2025-05-13

## 2025-04-15 RX ADMIN — OCTREOTIDE ACETATE 30 MG: KIT at 08:32

## 2025-04-15 NOTE — PLAN OF CARE
Problem: INFECTION - ADULT  Goal: Absence or prevention of progression during hospitalization  Description: INTERVENTIONS:- Assess and monitor for signs and symptoms of infection- Monitor lab/diagnostic results- Monitor all insertion sites, i.e. indwelling lines, tubes, and drains- Monitor endotracheal if appropriate and nasal secretions for changes in amount and color- [place] appropriate cooling/warming therapies per order- Administer medications as ordered- Instruct and encourage patient and family to use good hand hygiene technique- Identify and instruct in appropriate isolation precautions for identified infection/condition  Outcome: Progressing     Problem: Knowledge Deficit  Goal: Patient/family/caregiver demonstrates understanding of disease process, treatment plan, medications, and discharge instructions  Description: Complete learning assessment and assess knowledge base.Interventions:- Provide teaching at level of understanding- Provide teaching via preferred learning methods  Outcome: Progressing

## 2025-04-15 NOTE — PROGRESS NOTES
Pt tolerated Sandostatin injection in left intragluteal well without complications.       Prashant Pastrana is aware of future appt on 5/13/25 at 8:30AM.      AVS declined by Prashant Pastrana. Pt uses Mychart.     Pt discharged off unit in stable condition with all personal belongings

## 2025-04-16 DIAGNOSIS — E11.69 TYPE 2 DIABETES MELLITUS WITH OBESITY  (HCC): ICD-10-CM

## 2025-04-16 DIAGNOSIS — R60.0 PERIPHERAL EDEMA: ICD-10-CM

## 2025-04-16 DIAGNOSIS — E78.5 HYPERLIPIDEMIA, UNSPECIFIED HYPERLIPIDEMIA TYPE: ICD-10-CM

## 2025-04-16 DIAGNOSIS — I10 ESSENTIAL HYPERTENSION: ICD-10-CM

## 2025-04-16 DIAGNOSIS — E66.9 TYPE 2 DIABETES MELLITUS WITH OBESITY  (HCC): ICD-10-CM

## 2025-04-16 DIAGNOSIS — E79.0 HYPERURICEMIA: ICD-10-CM

## 2025-04-17 RX ORDER — ATORVASTATIN CALCIUM 40 MG/1
40 TABLET, FILM COATED ORAL EVERY EVENING
Qty: 90 TABLET | Refills: 3 | Status: SHIPPED | OUTPATIENT
Start: 2025-04-17

## 2025-04-17 RX ORDER — POTASSIUM CHLORIDE 750 MG/1
30 TABLET, EXTENDED RELEASE ORAL 2 TIMES DAILY
Qty: 540 TABLET | Refills: 3 | Status: SHIPPED | OUTPATIENT
Start: 2025-04-17

## 2025-04-17 RX ORDER — ALLOPURINOL 100 MG/1
100 TABLET ORAL DAILY
Qty: 90 TABLET | Refills: 3 | Status: SHIPPED | OUTPATIENT
Start: 2025-04-17

## 2025-04-17 RX ORDER — FUROSEMIDE 40 MG/1
20 TABLET ORAL DAILY
Qty: 45 TABLET | Refills: 3 | Status: SHIPPED | OUTPATIENT
Start: 2025-04-17

## 2025-04-17 RX ORDER — EMPAGLIFLOZIN 25 MG/1
25 TABLET, FILM COATED ORAL EVERY MORNING
Qty: 90 TABLET | Refills: 3 | Status: SHIPPED | OUTPATIENT
Start: 2025-04-17

## 2025-04-17 RX ORDER — OLMESARTAN MEDOXOMIL AND HYDROCHLOROTHIAZIDE 40/12.5 40; 12.5 MG/1; MG/1
1 TABLET ORAL DAILY
Qty: 90 TABLET | Refills: 3 | Status: SHIPPED | OUTPATIENT
Start: 2025-04-17

## 2025-05-08 DIAGNOSIS — E11.65 UNCONTROLLED TYPE 2 DIABETES MELLITUS WITH HYPERGLYCEMIA (HCC): ICD-10-CM

## 2025-05-08 RX ORDER — SEMAGLUTIDE 1.34 MG/ML
INJECTION, SOLUTION SUBCUTANEOUS
Qty: 9 ML | Refills: 1 | Status: SHIPPED | OUTPATIENT
Start: 2025-05-08

## 2025-05-13 ENCOUNTER — HOSPITAL ENCOUNTER (OUTPATIENT)
Dept: INFUSION CENTER | Facility: HOSPITAL | Age: 70
Discharge: HOME | End: 2025-05-13
Attending: INTERNAL MEDICINE
Payer: MEDICARE

## 2025-05-13 VITALS
SYSTOLIC BLOOD PRESSURE: 124 MMHG | DIASTOLIC BLOOD PRESSURE: 81 MMHG | RESPIRATION RATE: 16 BRPM | OXYGEN SATURATION: 95 % | TEMPERATURE: 97.1 F | HEART RATE: 80 BPM

## 2025-05-13 DIAGNOSIS — C7A.8 NEUROENDOCRINE CARCINOMA (HCC): Primary | ICD-10-CM

## 2025-05-13 PROCEDURE — 96372 THER/PROPH/DIAG INJ SC/IM: CPT

## 2025-05-13 RX ORDER — OCTREOTIDE ACETATE 30 MG
30 KIT INTRAMUSCULAR ONCE
OUTPATIENT
Start: 2025-06-10

## 2025-05-13 RX ORDER — OCTREOTIDE ACETATE 30 MG
30 KIT INTRAMUSCULAR ONCE
Status: COMPLETED | OUTPATIENT
Start: 2025-05-13 | End: 2025-05-13

## 2025-05-13 RX ADMIN — OCTREOTIDE ACETATE 30 MG: KIT at 08:43

## 2025-05-13 NOTE — PLAN OF CARE
Problem: INFECTION - ADULT  Goal: Absence or prevention of progression during hospitalization  Description: INTERVENTIONS:- Assess and monitor for signs and symptoms of infection- Monitor lab/diagnostic results- Monitor all insertion sites, i.e. indwelling lines, tubes, and drains- Monitor endotracheal if appropriate and nasal secretions for changes in amount and color- [place] appropriate cooling/warming therapies per order- Administer medications as ordered- Instruct and encourage patient and family to use good hand hygiene technique- Identify and instruct in appropriate isolation precautions for identified infection/condition  Outcome: Progressing     Problem: Knowledge Deficit  Goal: Patient/family/caregiver demonstrates understanding of disease process, treatment plan, medications, and discharge instructions  Description: Complete learning assessment and assess knowledge base.Interventions:- Provide teaching at level of understanding- Provide teaching via preferred learning methods  Outcome: Progressing

## 2025-05-13 NOTE — PROGRESS NOTES
Pt tolerated Sandostatin injection in right intragluteal well without complications.       Prashant Pastrana is aware of future appt on 6/10/25 at 8:30AM.      AVS declined by Prashant Pastrana. Pt uses Mychart.     Pt discharged off unit in stable condition with all personal belongings.

## 2025-05-29 DIAGNOSIS — C7A.8 NEUROENDOCRINE CARCINOMA (HCC): Primary | ICD-10-CM

## 2025-06-02 ENCOUNTER — APPOINTMENT (EMERGENCY)
Dept: CT IMAGING | Facility: HOSPITAL | Age: 70
End: 2025-06-02
Payer: MEDICARE

## 2025-06-02 ENCOUNTER — HOSPITAL ENCOUNTER (EMERGENCY)
Facility: HOSPITAL | Age: 70
Discharge: HOME | End: 2025-06-02
Attending: EMERGENCY MEDICINE
Payer: MEDICARE

## 2025-06-02 ENCOUNTER — APPOINTMENT (EMERGENCY)
Dept: RADIOLOGY | Facility: HOSPITAL | Age: 70
End: 2025-06-02
Payer: MEDICARE

## 2025-06-02 VITALS
SYSTOLIC BLOOD PRESSURE: 115 MMHG | RESPIRATION RATE: 18 BRPM | WEIGHT: 229 LBS | OXYGEN SATURATION: 92 % | TEMPERATURE: 99.3 F | DIASTOLIC BLOOD PRESSURE: 67 MMHG | BODY MASS INDEX: 34.31 KG/M2 | HEART RATE: 102 BPM

## 2025-06-02 DIAGNOSIS — N39.0 UTI (URINARY TRACT INFECTION): Primary | ICD-10-CM

## 2025-06-02 LAB
ALBUMIN SERPL BCG-MCNC: 4 G/DL (ref 3.5–5)
ALP SERPL-CCNC: 70 U/L (ref 34–104)
ALT SERPL W P-5'-P-CCNC: 19 U/L (ref 7–52)
ANION GAP SERPL CALCULATED.3IONS-SCNC: 10 MMOL/L (ref 4–13)
AST SERPL W P-5'-P-CCNC: 14 U/L (ref 13–39)
BACTERIA UR QL AUTO: ABNORMAL /HPF
BASOPHILS # BLD AUTO: 0.02 THOUSANDS/ÂΜL (ref 0–0.1)
BASOPHILS NFR BLD AUTO: 0 % (ref 0–1)
BILIRUB SERPL-MCNC: 1.85 MG/DL (ref 0.2–1)
BILIRUB UR QL STRIP: ABNORMAL
BUN SERPL-MCNC: 17 MG/DL (ref 5–25)
CALCIUM SERPL-MCNC: 8.9 MG/DL (ref 8.4–10.2)
CHLORIDE SERPL-SCNC: 104 MMOL/L (ref 96–108)
CLARITY UR: CLEAR
CO2 SERPL-SCNC: 26 MMOL/L (ref 21–32)
COLOR UR: YELLOW
CREAT SERPL-MCNC: 1.14 MG/DL (ref 0.6–1.3)
EOSINOPHIL # BLD AUTO: 0.22 THOUSAND/ÂΜL (ref 0–0.61)
EOSINOPHIL NFR BLD AUTO: 4 % (ref 0–6)
ERYTHROCYTE [DISTWIDTH] IN BLOOD BY AUTOMATED COUNT: 14.3 % (ref 11.6–15.1)
FLUAV AG UPPER RESP QL IA.RAPID: NEGATIVE
FLUBV AG UPPER RESP QL IA.RAPID: NEGATIVE
GFR SERPL CREATININE-BSD FRML MDRD: 65 ML/MIN/1.73SQ M
GLUCOSE SERPL-MCNC: 181 MG/DL (ref 65–140)
GLUCOSE UR STRIP-MCNC: ABNORMAL MG/DL
HCT VFR BLD AUTO: 45.8 % (ref 36.5–49.3)
HGB BLD-MCNC: 15.1 G/DL (ref 12–17)
HGB UR QL STRIP.AUTO: ABNORMAL
IMM GRANULOCYTES # BLD AUTO: 0.03 THOUSAND/UL (ref 0–0.2)
IMM GRANULOCYTES NFR BLD AUTO: 1 % (ref 0–2)
KETONES UR STRIP-MCNC: ABNORMAL MG/DL
LACTATE SERPL-SCNC: 1.8 MMOL/L (ref 0.5–2)
LEUKOCYTE ESTERASE UR QL STRIP: ABNORMAL
LIPASE SERPL-CCNC: 19 U/L (ref 11–82)
LYMPHOCYTES # BLD AUTO: 0.32 THOUSANDS/ÂΜL (ref 0.6–4.47)
LYMPHOCYTES NFR BLD AUTO: 5 % (ref 14–44)
MCH RBC QN AUTO: 31.7 PG (ref 26.8–34.3)
MCHC RBC AUTO-ENTMCNC: 33 G/DL (ref 31.4–37.4)
MCV RBC AUTO: 96 FL (ref 82–98)
MONOCYTES # BLD AUTO: 0.93 THOUSAND/ÂΜL (ref 0.17–1.22)
MONOCYTES NFR BLD AUTO: 15 % (ref 4–12)
NEUTROPHILS # BLD AUTO: 4.83 THOUSANDS/ÂΜL (ref 1.85–7.62)
NEUTS SEG NFR BLD AUTO: 75 % (ref 43–75)
NITRITE UR QL STRIP: NEGATIVE
NON-SQ EPI CELLS URNS QL MICRO: ABNORMAL /HPF
NRBC BLD AUTO-RTO: 0 /100 WBCS
PH UR STRIP.AUTO: 6 [PH]
PLATELET # BLD AUTO: 102 THOUSANDS/UL (ref 149–390)
PMV BLD AUTO: 9.8 FL (ref 8.9–12.7)
POTASSIUM SERPL-SCNC: 3.8 MMOL/L (ref 3.5–5.3)
PROT SERPL-MCNC: 6.7 G/DL (ref 6.4–8.4)
PROT UR STRIP-MCNC: NEGATIVE MG/DL
RBC # BLD AUTO: 4.76 MILLION/UL (ref 3.88–5.62)
RBC #/AREA URNS AUTO: ABNORMAL /HPF
SARS-COV+SARS-COV-2 AG RESP QL IA.RAPID: NEGATIVE
SODIUM SERPL-SCNC: 140 MMOL/L (ref 135–147)
SP GR UR STRIP.AUTO: 1.01 (ref 1–1.03)
UROBILINOGEN UR QL STRIP.AUTO: 0.2 E.U./DL
WBC # BLD AUTO: 6.35 THOUSAND/UL (ref 4.31–10.16)
WBC #/AREA URNS AUTO: ABNORMAL /HPF

## 2025-06-02 PROCEDURE — 36415 COLL VENOUS BLD VENIPUNCTURE: CPT

## 2025-06-02 PROCEDURE — 96375 TX/PRO/DX INJ NEW DRUG ADDON: CPT

## 2025-06-02 PROCEDURE — 85025 COMPLETE CBC W/AUTO DIFF WBC: CPT

## 2025-06-02 PROCEDURE — 96365 THER/PROPH/DIAG IV INF INIT: CPT

## 2025-06-02 PROCEDURE — 87804 INFLUENZA ASSAY W/OPTIC: CPT | Performed by: EMERGENCY MEDICINE

## 2025-06-02 PROCEDURE — 99284 EMERGENCY DEPT VISIT MOD MDM: CPT

## 2025-06-02 PROCEDURE — 81001 URINALYSIS AUTO W/SCOPE: CPT

## 2025-06-02 PROCEDURE — 80053 COMPREHEN METABOLIC PANEL: CPT

## 2025-06-02 PROCEDURE — 99284 EMERGENCY DEPT VISIT MOD MDM: CPT | Performed by: EMERGENCY MEDICINE

## 2025-06-02 PROCEDURE — 83690 ASSAY OF LIPASE: CPT

## 2025-06-02 PROCEDURE — 83605 ASSAY OF LACTIC ACID: CPT | Performed by: EMERGENCY MEDICINE

## 2025-06-02 PROCEDURE — 87811 SARS-COV-2 COVID19 W/OPTIC: CPT | Performed by: EMERGENCY MEDICINE

## 2025-06-02 PROCEDURE — 71045 X-RAY EXAM CHEST 1 VIEW: CPT

## 2025-06-02 PROCEDURE — 74176 CT ABD & PELVIS W/O CONTRAST: CPT

## 2025-06-02 RX ORDER — CEFPODOXIME PROXETIL 200 MG/1
200 TABLET, FILM COATED ORAL 2 TIMES DAILY
Qty: 20 TABLET | Refills: 0 | Status: SHIPPED | OUTPATIENT
Start: 2025-06-02 | End: 2025-06-02

## 2025-06-02 RX ORDER — CEFPODOXIME PROXETIL 200 MG/1
200 TABLET, FILM COATED ORAL 2 TIMES DAILY
Qty: 20 TABLET | Refills: 0 | Status: SHIPPED | OUTPATIENT
Start: 2025-06-02 | End: 2025-06-12

## 2025-06-02 RX ORDER — CEFTRIAXONE 1 G/50ML
1000 INJECTION, SOLUTION INTRAVENOUS ONCE
Status: COMPLETED | OUTPATIENT
Start: 2025-06-02 | End: 2025-06-02

## 2025-06-02 RX ORDER — ONDANSETRON 2 MG/ML
4 INJECTION INTRAMUSCULAR; INTRAVENOUS ONCE
Status: COMPLETED | OUTPATIENT
Start: 2025-06-02 | End: 2025-06-02

## 2025-06-02 RX ORDER — KETOROLAC TROMETHAMINE 30 MG/ML
15 INJECTION, SOLUTION INTRAMUSCULAR; INTRAVENOUS ONCE
Status: COMPLETED | OUTPATIENT
Start: 2025-06-02 | End: 2025-06-02

## 2025-06-02 RX ADMIN — CEFTRIAXONE 1000 MG: 1 INJECTION, SOLUTION INTRAVENOUS at 21:27

## 2025-06-02 RX ADMIN — ONDANSETRON 4 MG: 2 INJECTION INTRAMUSCULAR; INTRAVENOUS at 20:37

## 2025-06-02 RX ADMIN — SODIUM CHLORIDE 1000 ML: 0.9 INJECTION, SOLUTION INTRAVENOUS at 21:27

## 2025-06-02 RX ADMIN — KETOROLAC TROMETHAMINE 15 MG: 30 INJECTION, SOLUTION INTRAMUSCULAR; INTRAVENOUS at 19:54

## 2025-06-03 ENCOUNTER — APPOINTMENT (OUTPATIENT)
Dept: LAB | Facility: MEDICAL CENTER | Age: 70
End: 2025-06-03
Payer: MEDICARE

## 2025-06-03 ENCOUNTER — HOSPITAL ENCOUNTER (EMERGENCY)
Facility: HOSPITAL | Age: 70
Discharge: HOME | End: 2025-06-04
Attending: EMERGENCY MEDICINE
Payer: MEDICARE

## 2025-06-03 ENCOUNTER — APPOINTMENT (OUTPATIENT)
Dept: LAB | Facility: CLINIC | Age: 70
End: 2025-06-03
Payer: MEDICARE

## 2025-06-03 ENCOUNTER — OFFICE VISIT (OUTPATIENT)
Dept: INTERNAL MEDICINE CLINIC | Facility: CLINIC | Age: 70
End: 2025-06-03
Payer: MEDICARE

## 2025-06-03 VITALS
WEIGHT: 221.9 LBS | SYSTOLIC BLOOD PRESSURE: 118 MMHG | BODY MASS INDEX: 31.77 KG/M2 | DIASTOLIC BLOOD PRESSURE: 68 MMHG | HEART RATE: 93 BPM | HEIGHT: 70 IN | OXYGEN SATURATION: 94 % | TEMPERATURE: 97.9 F

## 2025-06-03 DIAGNOSIS — C7A.8 NEUROENDOCRINE CARCINOMA (HCC): ICD-10-CM

## 2025-06-03 DIAGNOSIS — R50.9 FEVER, UNSPECIFIED FEVER CAUSE: Primary | ICD-10-CM

## 2025-06-03 DIAGNOSIS — N39.0 UTI (URINARY TRACT INFECTION): Primary | ICD-10-CM

## 2025-06-03 DIAGNOSIS — R68.89 RIGORS: ICD-10-CM

## 2025-06-03 DIAGNOSIS — T78.40XA ALLERGIC REACTION TO DRUG, INITIAL ENCOUNTER: ICD-10-CM

## 2025-06-03 DIAGNOSIS — C7A.8 NEUROENDOCRINE CARCINOMA (HCC): Primary | ICD-10-CM

## 2025-06-03 DIAGNOSIS — R39.9 UTI SYMPTOMS: Primary | ICD-10-CM

## 2025-06-03 DIAGNOSIS — R39.9 UTI SYMPTOMS: ICD-10-CM

## 2025-06-03 PROCEDURE — G2211 COMPLEX E/M VISIT ADD ON: HCPCS | Performed by: FAMILY MEDICINE

## 2025-06-03 PROCEDURE — 99214 OFFICE O/P EST MOD 30 MIN: CPT | Performed by: FAMILY MEDICINE

## 2025-06-03 PROCEDURE — 99282 EMERGENCY DEPT VISIT SF MDM: CPT

## 2025-06-03 PROCEDURE — 99284 EMERGENCY DEPT VISIT MOD MDM: CPT | Performed by: EMERGENCY MEDICINE

## 2025-06-03 PROCEDURE — 87086 URINE CULTURE/COLONY COUNT: CPT

## 2025-06-03 RX ORDER — DIPHENHYDRAMINE HCL 25 MG
25 TABLET ORAL ONCE
Status: COMPLETED | OUTPATIENT
Start: 2025-06-03 | End: 2025-06-03

## 2025-06-03 RX ORDER — PREDNISONE 20 MG/1
60 TABLET ORAL ONCE
Status: COMPLETED | OUTPATIENT
Start: 2025-06-03 | End: 2025-06-03

## 2025-06-03 RX ORDER — FAMOTIDINE 20 MG/1
20 TABLET, FILM COATED ORAL ONCE
Status: COMPLETED | OUTPATIENT
Start: 2025-06-03 | End: 2025-06-03

## 2025-06-03 RX ADMIN — DIPHENHYDRAMINE HYDROCHLORIDE 25 MG: 25 TABLET ORAL at 23:54

## 2025-06-03 RX ADMIN — PREDNISONE 60 MG: 20 TABLET ORAL at 23:54

## 2025-06-03 RX ADMIN — FAMOTIDINE 20 MG: 20 TABLET, FILM COATED ORAL at 23:54

## 2025-06-03 NOTE — ED PROVIDER NOTES
Time reflects when diagnosis was documented in both MDM as applicable and the Disposition within this note       Time User Action Codes Description Comment    6/2/2025  9:16 PM Km Valadez Add [N39.0] UTI (urinary tract infection)           ED Disposition       ED Disposition   Discharge    Condition   Stable    Date/Time   Mon Jun 2, 2025  9:16 PM    Comment   Prashant Holmchung discharge to home/self care.                   Assessment & Plan       Medical Decision Making  69-year-old male presents to the emergency department with complaint of flank pain, differential diagnosis include nephrolithiasis, urolithiasis, pyelonephritis, UTI, musculoskeletal back pain, diverticulitis, will perform CT abdomen pelvis with IV contrast, basic labs, and reassess.    Discussed all results with the patient.  He will follow-up with urology and his primary care doctor outpatient.  Provided him with antibiotics.  Strict return precautions given.  Patient or stands and agrees with treatment plan.    Amount and/or Complexity of Data Reviewed  Labs: ordered.  Radiology: ordered.    Risk  Prescription drug management.        ED Course as of 06/02/25 2119 Mon Jun 02, 2025 2115 IMPRESSION:     Mild circumferential wall thickening of the urinary bladder. Correlate with urinalysis for cystitis.     Interval development of splenomegaly and slight enlargement of several upper abdominal lymph nodes.     No significant change in the size of mesenteric masses, in keeping with metastatic carcinoid tumor.     Workstation performed: [id number]         Medications   sodium chloride 0.9 % bolus 1,000 mL (has no administration in time range)   cefTRIAXone (ROCEPHIN) IVPB (premix in dextrose) 1,000 mg 50 mL (has no administration in time range)   ondansetron (ZOFRAN) injection 4 mg (4 mg Intravenous Given 6/2/25 2037)   ketorolac (TORADOL) injection 15 mg (15 mg Intravenous Given 6/2/25 1954)       ED Risk Strat Scores                    No data  recorded        SBIRT 20yo+      Flowsheet Row Most Recent Value   Initial Alcohol Screen: US AUDIT-C     1. How often do you have a drink containing alcohol? 0 Filed at: 06/02/2025 1804   2. How many drinks containing alcohol do you have on a typical day you are drinking?  0 Filed at: 06/02/2025 1804   3b. FEMALE Any Age, or MALE 65+: How often do you have 4 or more drinks on one occassion? 0 Filed at: 06/02/2025 1804   Audit-C Score 0 Filed at: 06/02/2025 1804                            History of Present Illness       Chief Complaint   Patient presents with    Back Pain     Patient presents to the ED with complaints of lower back pain and nausea that began last evening. He reports increase in urination.        Past Medical History[1]   Past Surgical History[2]   Family History[3]   Social History[4]   E-Cigarette/Vaping    E-Cigarette Use Never User       E-Cigarette/Vaping Substances    Nicotine No     THC No     CBD No     Flavoring No     Other No     Unknown No       I have reviewed and agree with the history as documented.     69-year-old male with past medical history of kidney stones in the past presents to the emergency department with complaint of bilateral flank pain with radiation into his abdomen.  Patient states that he also felt nauseous.  His symptoms started last evening, patient does report frequency with urination.  He denies any chills, fevers, however patient does have low-grade temp of 99.3, is heart rate of 102, he denies any chest pain, shortness of breath, bloody stool, bloody urine.      Back Pain  Associated symptoms: no abdominal pain, no chest pain, no dysuria and no fever        Review of Systems   Constitutional:  Negative for chills and fever.   HENT:  Negative for ear pain and sore throat.    Eyes:  Negative for pain and visual disturbance.   Respiratory:  Negative for cough and shortness of breath.    Cardiovascular:  Negative for chest pain and palpitations.   Gastrointestinal:   Negative for abdominal pain and vomiting.   Genitourinary:  Negative for dysuria and hematuria.   Musculoskeletal:  Positive for back pain. Negative for arthralgias.   Skin:  Negative for color change and rash.   Neurological:  Negative for seizures and syncope.   All other systems reviewed and are negative.          Objective       ED Triage Vitals [06/02/25 1802]   Temperature Pulse Blood Pressure Respirations SpO2 Patient Position - Orthostatic VS   99.3 °F (37.4 °C) (!) 113 116/86 18 95 % Sitting      Temp Source Heart Rate Source BP Location FiO2 (%) Pain Score    Temporal Monitor Left arm -- 4      Vitals      Date and Time Temp Pulse SpO2 Resp BP Pain Score FACES Pain Rating User   06/02/25 2000 -- 102 92 % 18 115/67 -- --    06/02/25 1954 -- -- -- -- -- 3 --    06/02/25 1802 99.3 °F (37.4 °C) 113 95 % 18 116/86 4 -- RR            Physical Exam  Vitals and nursing note reviewed.   Constitutional:       General: He is not in acute distress.     Appearance: He is well-developed.   HENT:      Head: Normocephalic and atraumatic.     Eyes:      Conjunctiva/sclera: Conjunctivae normal.       Cardiovascular:      Rate and Rhythm: Normal rate and regular rhythm.   Pulmonary:      Effort: Pulmonary effort is normal. No respiratory distress.      Breath sounds: Normal breath sounds.   Abdominal:      Palpations: Abdomen is soft.      Tenderness: There is abdominal tenderness. There is right CVA tenderness and left CVA tenderness.     Musculoskeletal:         General: No swelling.      Cervical back: Neck supple.     Skin:     General: Skin is warm and dry.      Capillary Refill: Capillary refill takes less than 2 seconds.     Neurological:      Mental Status: He is alert.     Psychiatric:         Mood and Affect: Mood normal.         Results Reviewed       Procedure Component Value Units Date/Time    FLU/COVID Rapid Antigen (30 min. TAT) - Preferred screening test in ED [[id number]]  (Normal) Collected: 06/02/25  2028    Lab Status: Final result Specimen: Nares from Nose Updated: 06/02/25 2051     SARS COV Rapid Antigen Negative     Influenza A Rapid Antigen Negative     Influenza B Rapid Antigen Negative    Narrative:      This test has been performed using the [employer] Flaquita 2 FLU+SARS Antigen test under the Emergency Use Authorization (EUA). This test has been validated by the  and verified by the performing laboratory. The Flaquita uses lateral flow immunofluorescent sandwich assay to detect SARS-COV, Influenza A and Influenza B Antigen.     The [employer] Flaquita 2 SARS Antigen test does not differentiate between SARS-CoV and SARS-CoV-2.     Negative results are presumptive and may be confirmed with a molecular assay, if necessary, for patient management. Negative results do not rule out SARS-CoV-2 or influenza infection and should not be used as the sole basis for treatment or patient management decisions. A negative test result may occur if the level of antigen in a sample is below the limit of detection of this test.     Positive results are indicative of the presence of viral antigens, but do not rule out bacterial infection or co-infection with other viruses.     All test results should be used as an adjunct to clinical observations and other information available to the provider.    FOR PEDIATRIC PATIENTS - copy/paste COVID Guidelines URL to browser: [email or web address]    Lactic acid, plasma (w/reflex if result > 2.0) [[id number]]  (Normal) Collected: 06/02/25 1941    Lab Status: Final result Specimen: Blood from Arm, Right Updated: 06/02/25 2016     LACTIC ACID 1.8 mmol/L     Narrative:      Result may be elevated if tourniquet was used during collection.    Comprehensive metabolic panel [[id number]]  (Abnormal) Collected: 06/02/25 1924    Lab Status: Final result Specimen: Blood from Arm, Right Updated: 06/02/25 1952     Sodium 140 mmol/L      Potassium 3.8  mmol/L      Chloride 104 mmol/L      CO2 26 mmol/L      ANION GAP 10 mmol/L      BUN 17 mg/dL      Creatinine 1.14 mg/dL      Glucose 181 mg/dL      Calcium 8.9 mg/dL      AST 14 U/L      ALT 19 U/L      Alkaline Phosphatase 70 U/L      Total Protein 6.7 g/dL      Albumin 4.0 g/dL      Total Bilirubin 1.85 mg/dL      eGFR 65 ml/min/1.73sq m     Narrative:      National Kidney Disease Foundation guidelines for Chronic Kidney Disease (CKD):     Stage 1 with normal or high GFR (GFR > 90 mL/min/1.73 square meters)    Stage 2 Mild CKD (GFR = 60-89 mL/min/1.73 square meters)    Stage 3A Moderate CKD (GFR = 45-59 mL/min/1.73 square meters)    Stage 3B Moderate CKD (GFR = 30-44 mL/min/1.73 square meters)    Stage 4 Severe CKD (GFR = 15-29 mL/min/1.73 square meters)    Stage 5 End Stage CKD (GFR <15 mL/min/1.73 square meters)  Note: GFR calculation is accurate only with a steady state creatinine    Lipase [[id number]]  (Normal) Collected: 06/02/25 1924    Lab Status: Final result Specimen: Blood from Arm, Right Updated: 06/02/25 1952     Lipase 19 u/L     Urine Microscopic [[id number]]  (Abnormal) Collected: 06/02/25 1925    Lab Status: Final result Specimen: Urine, Clean Catch Updated: 06/02/25 1939     RBC, UA 10-20 /hpf      WBC, UA 0-5 /hpf      Epithelial Cells Occasional /hpf      Bacteria, UA Occasional /hpf     CBC and differential [[id number]]  (Abnormal) Collected: 06/02/25 1924    Lab Status: Final result Specimen: Blood from Arm, Right Updated: 06/02/25 1937     WBC 6.35 Thousand/uL      RBC 4.76 Million/uL      Hemoglobin 15.1 g/dL      Hematocrit 45.8 %      MCV 96 fL      MCH 31.7 pg      MCHC 33.0 g/dL      RDW 14.3 %      MPV 9.8 fL      Platelets 102 Thousands/uL      nRBC 0 /100 WBCs      Segmented % 75 %      Immature Grans % 1 %      Lymphocytes % 5 %      Monocytes % 15 %      Eosinophils Relative 4 %      Basophils Relative 0 %      Absolute Neutrophils 4.83 Thousands/µL      Absolute Immature Grans 0.03  Thousand/uL      Absolute Lymphocytes 0.32 Thousands/µL      Absolute Monocytes 0.93 Thousand/µL      Eosinophils Absolute 0.22 Thousand/µL      Basophils Absolute 0.02 Thousands/µL     UA w Reflex to Microscopic w Reflex to Culture [[id number]]  (Abnormal) Collected: 06/02/25 1925    Lab Status: Final result Specimen: Urine, Clean Catch Updated: 06/02/25 1932     Color, UA Yellow     Clarity, UA Clear     Specific Gravity, UA 1.010     pH, UA 6.0     Leukocytes, UA Elevated glucose may cause decreased leukocyte values. See urine microscopic for UWBC result     Nitrite, UA Negative     Protein, UA Negative mg/dl      Glucose, UA >=1000 (1%) mg/dl      Ketones, UA 40 (2+) mg/dl      Urobilinogen, UA 0.2 E.U./dl      Bilirubin, UA Small     Occult Blood, UA Large            CT abdomen pelvis without contrast   Final Interpretation by Sadia Minaya MD (06/02 2111)      Mild circumferential wall thickening of the urinary bladder. Correlate with urinalysis for cystitis.      Interval development of splenomegaly and slight enlargement of several upper abdominal lymph nodes.      No significant change in the size of mesenteric masses, in keeping with metastatic carcinoid tumor.      Workstation performed: [id number]         XR chest 1 view portable    (Results Pending)       Procedures    ED Medication and Procedure Management   Prior to Admission Medications   Prescriptions Last Dose Informant Patient Reported? Taking?   Jardiance 25 MG TABS   No No   Sig: TAKE 1 TABLET BY MOUTH EVERY  MORNING   allopurinol (ZYLOPRIM) 100 mg tablet   No No   Sig: TAKE 1 TABLET BY MOUTH DAILY   amLODIPine-benazepril (LOTREL 5-20) 5-20 MG per capsule  Self No No   Sig: Take 1 capsule by mouth 2 (two) times a day   atenolol (TENORMIN) 50 mg tablet   No No   Sig: TAKE 1 TABLET BY MOUTH TWICE  DAILY   atorvastatin (LIPITOR) 40 mg tablet   No No   Sig: TAKE 1 TABLET BY MOUTH IN THE  EVENING   fluticasone (FLONASE) 50 mcg/act nasal spray   Self No No   Si spray into each nostril daily   furosemide (LASIX) 40 mg tablet   No No   Sig: TAKE ONE-HALF TABLET BY MOUTH  DAILY   magnesium oxide (MAG-OX) 400 mg  Self Yes No   Sig: Take 400 mg by mouth daily   olmesartan-hydrochlorothiazide (BENICAR HCT) 40-12.5 MG per tablet   No No   Sig: TAKE 1 TABLET BY MOUTH DAILY   potassium chloride (Klor-Con M10) 10 mEq tablet   No No   Sig: TAKE 3 TABLETS BY MOUTH TWICE  DAILY   semaglutide, 1 mg/dose, (Ozempic, 1 MG/DOSE,) 4 mg/3 mL injection pen   No No   Sig: INJECT SUBCUTANEOUSLY 1 MG EVERY WEEK      Facility-Administered Medications: None     Patient's Medications   Discharge Prescriptions    CEFPODOXIME (VANTIN) 200 MG TABLET    Take 1 tablet (200 mg total) by mouth 2 (two) times a day for 10 days       Start Date: 2025  End Date: 2025       Order Dose: 200 mg       Quantity: 20 tablet    Refills: 0       ED SEPSIS DOCUMENTATION   Time reflects when diagnosis was documented in both MDM as applicable and the Disposition within this note       Time User Action Codes Description Comment    2025  9:16 PM Km Valadez Add [N39.0] UTI (urinary tract infection)                    [1]   Past Medical History:  Diagnosis Date    Allergic rhinitis     last assessed: 2015    Aneurysm of ascending aorta without rupture (HCC)     Aneurysm of thoracic aorta (HCC)     Cataract     resolved: 2014    Chronic bilateral low back pain 2014    Colon polyp     Diabetes mellitus (HCC)     diet control    Dyslipidemia associated with type 2 diabetes mellitus  (HCC)     Heel spur 2017    Herniated nucleus pulposus, L4-5 left     last assessed: 2014    Herniated nucleus pulposus, L5-S1, left     last assessed: 2014    Hx of echocardiogram 10/14/2016    EF 55%, Mild LVH, mild aortic root and ascending aorta enlargement, measuring 42 mm, trace MR, trace aortic regurg.    Hypercholesterolemia     Hypertension     Kidney stone     Lumbar  radiculopathy     Mixed hyperlipidemia     Pain of left thigh 07/02/2019    Restless leg syndrome 06/21/2018    Skin tag 10/25/2018   [2]   Past Surgical History:  Procedure Laterality Date    ADENOIDECTOMY      BACK SURGERY      BACK SURGERY      COLONOSCOPY      KNEE ARTHROSCOPY      (therapeutic)    KNEE SURGERY      LYMPH NODE DISSECTION N/A 06/20/2023    Procedure: biopsy of MESENTERIC MASS, tap block;  Surgeon: Francis Gardner MD;  Location: [facility];  Service: Surgical Oncology    AZ COLONOSCOPY FLX DX W/COLLJ SPEC WHEN PFRMD N/A 09/30/2016    Procedure: COLONOSCOPY;  Surgeon: Laith Pinzon MD;  Location: [facility];  Service: Gastroenterology    SMALL INTESTINE SURGERY N/A 06/20/2023    Procedure: SMALL BOWEL RESECTION;  Surgeon: Francis Gardner MD;  Location: [facility];  Service: Surgical Oncology    TONSILLECTOMY AND ADENOIDECTOMY     [3]   Family History  Problem Relation Name Age of Onset    Hypertension Father      Thyroid disease Mother Jessica         disorder    Coronary artery disease Brother      Heart attack Brother          prior myocardial infarction   [4]   Social History  Tobacco Use    Smoking status: Never    Smokeless tobacco: Never   Vaping Use    Vaping status: Never Used   Substance Use Topics    Alcohol use: Yes     Comment: social once a month    Drug use: No        Km Valadez,   06/02/25 6077

## 2025-06-03 NOTE — DISCHARGE INSTRUCTIONS
You were seen in the emergency department for flank pain with radiation to the abdomen, your CT scan results are below, your presentation is likely due to cystitis or a urinary tract infection.  We provided you with the first dose of antibiotics here.  Please review your remaining CT scan results with your primary care doctor.  Please  the antibiotics that we have sent to your pharmacy and take them as directed.  We have also provided you with follow-up to urology.  Please call their office and schedule an appointment.  If your symptoms worsen or persist, please return to the emergency department immediately.    IMPRESSION:     Mild circumferential wall thickening of the urinary bladder. Correlate with urinalysis for cystitis.     Interval development of splenomegaly and slight enlargement of several upper abdominal lymph nodes.     No significant change in the size of mesenteric masses, in keeping with metastatic carcinoid tumor.     Workstation performed: [id number]

## 2025-06-04 ENCOUNTER — TELEPHONE (OUTPATIENT)
Dept: INTERNAL MEDICINE CLINIC | Facility: CLINIC | Age: 70
End: 2025-06-04

## 2025-06-04 VITALS
SYSTOLIC BLOOD PRESSURE: 120 MMHG | DIASTOLIC BLOOD PRESSURE: 74 MMHG | TEMPERATURE: 97.3 F | HEART RATE: 93 BPM | OXYGEN SATURATION: 91 % | RESPIRATION RATE: 16 BRPM

## 2025-06-04 PROBLEM — R68.89 RIGORS: Status: ACTIVE | Noted: 2025-06-04

## 2025-06-04 PROBLEM — R39.9 UTI SYMPTOMS: Status: ACTIVE | Noted: 2025-06-04

## 2025-06-04 PROBLEM — R50.9 FEVER: Status: ACTIVE | Noted: 2025-06-04

## 2025-06-04 LAB — BACTERIA UR CULT: NORMAL

## 2025-06-04 RX ORDER — SULFAMETHOXAZOLE AND TRIMETHOPRIM 800; 160 MG/1; MG/1
1 TABLET ORAL 2 TIMES DAILY
Qty: 14 TABLET | Refills: 0 | Status: SHIPPED | OUTPATIENT
Start: 2025-06-04 | End: 2025-06-11

## 2025-06-04 RX ORDER — FAMOTIDINE 20 MG/1
20 TABLET, FILM COATED ORAL 2 TIMES DAILY
Qty: 8 TABLET | Refills: 0 | Status: SHIPPED | OUTPATIENT
Start: 2025-06-04 | End: 2025-06-08

## 2025-06-04 RX ORDER — PREDNISONE 20 MG/1
40 TABLET ORAL DAILY
Qty: 8 TABLET | Refills: 0 | Status: SHIPPED | OUTPATIENT
Start: 2025-06-04 | End: 2025-06-08

## 2025-06-04 NOTE — PROGRESS NOTES
Name: Prashant Pastrana      : 1955      MRN: [id number]  Encounter Provider: Tosha Mtz MD  Encounter Date: 6/3/2025   Encounter department: [facility]  :  Assessment & Plan  Fever, unspecified fever cause  Recent fever and rigors discussed.  Likely related to urinary tract infection.  Possibly extending into the kidney area or possibly the prostate.  Recent ER visit reviewed.  Recent CT scan reviewed.  Discussed findings with them.  Discussed with them that if the fevers or rigors return, he should either proceed to the emergency room if severe or get laboratory testing as noted below.  Orders:  •  Blood culture; Future  •  Blood culture; Future  •  CBC and differential; Future  •  Comprehensive metabolic panel; Future    UTI symptoms  Symptoms are consistent with likely UTI.  Discussed that he may have passed the stone as well.  Was given Rocephin in the emergency room.  Urinalysis was completed but urine culture was not done through the emergency room.  Urine culture was ordered through our office but this may not show positive results even if he has an infection since he already received the Rocephin.  Antibiotic through the emergency room discussed.  This may need to change depending on his symptoms and urine culture results.  Orders:  •  Blood culture; Future  •  Blood culture; Future  •  CBC and differential; Future  •  Comprehensive metabolic panel; Future    Rigors  Rigors are concerning with his wife stating that he was visibly and uncontrollably shaking at times.  Discussed potential causes for this with them.  Concerning for systemic illness.  See above.  Orders:  •  Blood culture; Future  •  Blood culture; Future  •  CBC and differential; Future  •  Comprehensive metabolic panel; Future    Neuroendocrine carcinoma (HCC)  Reviewed recent CT scan with them.  This did show some new adenopathy and splenomegaly.  It is possible this is related to his illness but  "possibly related to his underlying neuroendocrine cancer.  Does have follow-up CT scan scheduled for specialist.  Orders:  •  Blood culture; Future  •  Blood culture; Future  •  CBC and differential; Future  •  Comprehensive metabolic panel; Future      Assessment & Plan           History of Present Illness   He presents for problem visit.  He developed some left-sided flank pain and an upset stomach.  Was slightly nauseous.  Thereafter began to have episodes of uncontrollable shivering/rigors.  Wife states that this happened 3 times.  Also he states that he was drenched in sweat at times.  Felt feverish.  Had some urinary frequency.  Was concern for possible kidney stone since he has had these in the past.  Presented to the emergency room.  CT scan did not show a kidney stone but showed some thickening of the bladder and enlarged prostate.  This also showed the neuroendocrine tumor/masses.  It did show some new adenopathy and some splenomegaly.  Was given a dose of ceftriaxone in the emergency room and discharged on Vantin.  Unfortunately urine through the emergency room was not sent for culture.  Culture through our office is pending.  Has not yet started the Vantin.  Did have 1 episode of rigors this morning.      Review of Systems   Constitutional:  Positive for activity change, chills, fatigue and fever.   Respiratory:  Negative for cough.    Gastrointestinal:  Positive for nausea. Negative for diarrhea and vomiting.   Genitourinary:  Positive for flank pain, frequency and urgency.   Psychiatric/Behavioral:  Positive for sleep disturbance.        Objective   /68 (BP Location: Left arm, Patient Position: Sitting, Cuff Size: Standard)   Pulse 93   Temp 97.9 °F (36.6 °C)   Ht 5' 10\" (1.778 m)   Wt 101 kg (221 lb 14.4 oz)   SpO2 94%   BMI 31.84 kg/m²      Physical Exam  Vitals and nursing note reviewed.   Constitutional:       General: He is not in acute distress.     Appearance: He is well-developed " and well-groomed.   HENT:      Head:      Comments: Moist mucous membranes; no pharyngeal erythema    Cardiovascular:      Rate and Rhythm: Normal rate and regular rhythm.   Pulmonary:      Breath sounds: No decreased breath sounds, wheezing or rhonchi.   Abdominal:      Tenderness: There is no abdominal tenderness.     Musculoskeletal:      Cervical back: Neck supple.   Lymphadenopathy:      Cervical: No cervical adenopathy.     Neurological:      Mental Status: He is alert.     Psychiatric:         Behavior: Behavior is cooperative.

## 2025-06-04 NOTE — TELEPHONE ENCOUNTER
Pt's spouse called stating she took Prashant to the ED.  Stated he took the Abx and developed an itchy rash all over.  Overnight some of the rash went away, but still has it on back, legs and top of feet.  Very itchy.  Stated she had him put anti itch cream on.  Reviewed with Dr Mtz.  She advised cool showers, Benadryl and the Prednisone prescribed from the ED.  Abx was changed to Bactrim by ED. He was informed to take 25 mg every 8 hrs, but can take 50 mg at night, but will cause drosiness.  Informed him to watch sugars on prednisone.  Watch carbs, sweets, bread and pasta.  Stay out of sun to avoid sweating and overheating.  If worse and other symptoms arise go to ED.  Advised him urine culture was not back.

## 2025-06-04 NOTE — ASSESSMENT & PLAN NOTE
Symptoms are consistent with likely UTI.  Discussed that he may have passed the stone as well.  Was given Rocephin in the emergency room.  Urinalysis was completed but urine culture was not done through the emergency room.  Urine culture was ordered through our office but this may not show positive results even if he has an infection since he already received the Rocephin.  Antibiotic through the emergency room discussed.  This may need to change depending on his symptoms and urine culture results.  Orders:  •  Blood culture; Future  •  Blood culture; Future  •  CBC and differential; Future  •  Comprehensive metabolic panel; Future

## 2025-06-04 NOTE — ASSESSMENT & PLAN NOTE
Rigors are concerning with his wife stating that he was visibly and uncontrollably shaking at times.  Discussed potential causes for this with them.  Concerning for systemic illness.  See above.  Orders:  •  Blood culture; Future  •  Blood culture; Future  •  CBC and differential; Future  •  Comprehensive metabolic panel; Future

## 2025-06-04 NOTE — ED ATTENDING ATTESTATION
Final Diagnosis:  1. UTI (urinary tract infection)    2. Allergic reaction to drug, initial encounter           I, Isaiah Marina MD, saw and evaluated the patient. All available labs and X-rays were ordered by me or the resident and have been reviewed by myself. I discussed the patient with the resident / non-physician and agree with the resident's / non-physician practitioner's findings and plan as documented in the resident's / non-physician practicitioner's note, except where noted.   At this point, I agree with the current assessment done in the ED.   I was present during key portions of all procedures performed unless otherwise stated.     Chief Complaint   Patient presents with    Rash     Pt broke out in hives today approx 1.5 ago on his arms, legs, stomach, groin, and back. Recently started  taking cefpodoxime 200mg today for a uti. Denies any chest pain or trouble breathing.      This is a 69 y.o. male presenting for evaluation of rash.  Patient started noticed a rash earlier today.  States that the only new medication that has had was some Vantin that he was started on for UTI.  Patient has a history of UTIs.  States that he did have a kidney stone in the past though.  Patient states that he otherwise feels well.    PMH:   has a past medical history of Allergic rhinitis, Aneurysm of ascending aorta without rupture (HCC), Aneurysm of thoracic aorta (HCC), Cataract, Chronic bilateral low back pain (02/27/2014), Colon polyp, Diabetes mellitus (HCC), Dyslipidemia associated with type 2 diabetes mellitus  (HCC), Heel spur (05/31/2017), Herniated nucleus pulposus, L4-5 left, Herniated nucleus pulposus, L5-S1, left, echocardiogram (10/14/2016), Hypercholesterolemia, Hypertension, Kidney stone, Lumbar radiculopathy, Mixed hyperlipidemia, Pain of left thigh (07/02/2019), Restless leg syndrome (06/21/2018), and Skin tag (10/25/2018).    PSH:   has a past surgical history that includes Tonsillectomy and  adenoidectomy; Knee surgery; Back surgery; pr colonoscopy flx dx w/collj spec when pfrmd (N/A, 09/30/2016); Back surgery; Knee arthroscopy; Colonoscopy; Lymph node dissection (N/A, 06/20/2023); Small intestine surgery (N/A, 06/20/2023); and ADENOIDECTOMY.    Procedures     Social:  Social History     Substance and Sexual Activity   Alcohol Use Yes    Comment: social once a month     Tobacco Use History[1]  Social History     Substance and Sexual Activity   Drug Use No     PE:  Vitals:    06/03/25 2332 06/03/25 2341   BP: 129/88    BP Location: Right arm    Pulse: 92    Resp: 16    Temp: (!) 97.3 °F (36.3 °C)    TempSrc: Temporal    SpO2: 91% 94%       A:    Unless otherwise specified above:     General: VS reviewed  Appears in NAD     Head: Normocephalic, atraumatic     CV: No pallor noted  Lungs:   No respiratory distress     Abdomen:  Soft, non-tender, non-distended     MSK:   No obvious deformity     Skin: No obvious rash.     Neuro: Awake, alert, GCS15, CN II-XII grossly intact. Speaking in full sentences.   Motor grossly intact.     Psychiatric/Behavioral: Appropriate mood and affect   Exam: deferred    P:  - Patient with a pinpoint rash over most the chest that is confluent nature around the back.  Overall this appears almost more to be like a heat rash.  Patient denies any activities outside.  Do not think that this necessarily of vanda allergic reaction will cannot be ruled out.  As a precaution we will try a change of the antibiotics.  I discussed with him and wife that this may not fully resolve his symptoms.  There has been no oral, facial involvement.  No change in breathing.  No signs of anaphylaxis.  - 13 point ROS was performed and all are normal unless stated in the history above.   - Nursing note reviewed. Vitals reviewed.   - Orders placed by myself and/or advanced practitioner / resident.    - Previous chart was reviewed  - No language barrier.   - History obtained from patient.   - There are no  limitations to the history obtained.     Unless otherwise specified:  CC is exclusive from any separately billable procedures  CC is exclusive of treating other patients  CC is exclusive of teaching time     Code Status: Prior  Advance Directive and Living Will: Yes    Power of :    POLST:      Medications   famotidine (PEPCID) tablet 20 mg (20 mg Oral Given 6/3/25 2354)   diphenhydrAMINE (BENADRYL) tablet 25 mg (25 mg Oral Given 6/3/25 2354)   predniSONE tablet 60 mg (60 mg Oral Given 6/3/25 2354)     No orders to display     No orders of the defined types were placed in this encounter.    Labs Reviewed - No data to display  Time reflects when diagnosis was documented in both MDM as applicable and the Disposition within this note       Time User Action Codes Description Comment    6/4/2025 12:20 AM Danii Orellana Add [N39.0] UTI (urinary tract infection)     6/4/2025 12:27 AM Danii Orellana Add [T78.40XA] Allergic reaction to drug, initial encounter           ED Disposition       ED Disposition   Discharge    Condition   Stable    Date/Time   Wed Jun 4, 2025 12:28 AM    Comment   Prashant Pastrana discharge to home/self care.                   Follow-up Information       Follow up With Specialties Details Why Contact Info Additional Information    Tosha Mtz MD Family Medicine Call  As needed [address] [facility] [id number] [phone]       [facility] Emergency Medicine Go to  As needed, If symptoms worsen [address] [facility] [id number] [phone] [facility], [address], [place], [address]          Patient's Medications   Discharge Prescriptions    FAMOTIDINE (PEPCID) 20 MG TABLET    Take 1 tablet (20 mg total) by mouth 2 (two) times a day for 4 days       Start Date: 6/4/2025  End Date: 6/8/2025       Order Dose: 20 mg       Quantity: 8 tablet    Refills: 0     PREDNISONE 20 MG TABLET    Take 2 tablets (40 mg total) by mouth daily for 4 days       Start Date: 2025  End Date: 2025       Order Dose: 40 mg       Quantity: 8 tablet    Refills: 0    SULFAMETHOXAZOLE-TRIMETHOPRIM (BACTRIM DS) 800-160 MG PER TABLET    Take 1 tablet by mouth 2 (two) times a day for 7 days smx-tmp DS (BACTRIM) 800-160 mg tabs (1tab q12 D10)       Start Date: 2025  End Date: 2025       Order Dose: 1 tablet       Quantity: 14 tablet    Refills: 0     No discharge procedures on file.  Prior to Admission Medications   Prescriptions Last Dose Informant Patient Reported? Taking?   Jardiance 25 MG TABS 6/3/2025  No Yes   Sig: TAKE 1 TABLET BY MOUTH EVERY  MORNING   allopurinol (ZYLOPRIM) 100 mg tablet 2025  No Yes   Sig: TAKE 1 TABLET BY MOUTH DAILY   amLODIPine-benazepril (LOTREL 5-20) 5-20 MG per capsule 6/3/2025 Self No Yes   Sig: Take 1 capsule by mouth 2 (two) times a day   atenolol (TENORMIN) 50 mg tablet 6/3/2025  No Yes   Sig: TAKE 1 TABLET BY MOUTH TWICE  DAILY   atorvastatin (LIPITOR) 40 mg tablet 2025  No Yes   Sig: TAKE 1 TABLET BY MOUTH IN THE  EVENING   cefpodoxime (VANTIN) 200 mg tablet 6/3/2025  No Yes   Sig: Take 1 tablet (200 mg total) by mouth 2 (two) times a day for 10 days   fluticasone (FLONASE) 50 mcg/act nasal spray Not Taking Self No No   Si spray into each nostril daily   Patient not taking: Reported on 6/3/2025   furosemide (LASIX) 40 mg tablet 6/3/2025  No Yes   Sig: TAKE ONE-HALF TABLET BY MOUTH  DAILY   magnesium oxide (MAG-OX) 400 mg 6/3/2025 Self Yes Yes   Sig: Take 400 mg by mouth in the morning.   olmesartan-hydrochlorothiazide (BENICAR HCT) 40-12.5 MG per tablet 6/3/2025  No Yes   Sig: TAKE 1 TABLET BY MOUTH DAILY   potassium chloride (Klor-Con M10) 10 mEq tablet 6/3/2025  No Yes   Sig: TAKE 3 TABLETS BY MOUTH TWICE  DAILY   semaglutide, 1 mg/dose, (Ozempic, 1 MG/DOSE,) 4 mg/3 mL injection pen 6/3/2025  No Yes   Sig: INJECT SUBCUTANEOUSLY 1  "MG EVERY WEEK      Facility-Administered Medications: None       Portions of the record may have been created with voice recognition software. Occasional wrong word or \"sound a like\" substitutions may have occurred due to the inherent limitations of voice recognition software. Read the chart carefully and recognize, using context, where substitutions have occurred.    Electronically signed by:  Isaiah Marina         [1]   Social History  Tobacco Use   Smoking Status Never   Smokeless Tobacco Never     "

## 2025-06-04 NOTE — ASSESSMENT & PLAN NOTE
Reviewed recent CT scan with them.  This did show some new adenopathy and splenomegaly.  It is possible this is related to his illness but possibly related to his underlying neuroendocrine cancer.  Does have follow-up CT scan scheduled for specialist.  Orders:  •  Blood culture; Future  •  Blood culture; Future  •  CBC and differential; Future  •  Comprehensive metabolic panel; Future

## 2025-06-04 NOTE — ASSESSMENT & PLAN NOTE
Recent fever and rigors discussed.  Likely related to urinary tract infection.  Possibly extending into the kidney area or possibly the prostate.  Recent ER visit reviewed.  Recent CT scan reviewed.  Discussed findings with them.  Discussed with them that if the fevers or rigors return, he should either proceed to the emergency room if severe or get laboratory testing as noted below.  Orders:  •  Blood culture; Future  •  Blood culture; Future  •  CBC and differential; Future  •  Comprehensive metabolic panel; Future

## 2025-06-04 NOTE — ED PROVIDER NOTES
Time reflects when diagnosis was documented in both MDM as applicable and the Disposition within this note       Time User Action Codes Description Comment    6/4/2025 12:20 AM Danii Orellana Add [N39.0] UTI (urinary tract infection)     6/4/2025 12:27 AM Danii Orellana Add [T78.40XA] Allergic reaction to drug, initial encounter           ED Disposition       ED Disposition   Discharge    Condition   Stable    Date/Time   Wed Jun 4, 2025 12:28 AM    Comment   Prashant Pastrana discharge to home/self care.                   Assessment & Plan       Medical Decision Making  Risk  OTC drugs.  Prescription drug management.    68 yo M presented to ED for rash. Associated symptoms: pruritus. Exam findings: Diffuse maculopapular rash with coalescing on back.      Differentials diagnoses considered: Drug eruption versus allergic reaction versus contact dermatitis versus other.     Based on these results and H&P, because drug reaction cannot be ruled out we will treat as such. Results and clinical impressions were discussed with patient and family. They expressed understanding. Plan: Discharged home with steroids, Pepcid instructed take Benadryl, instructed to discontinue this antibiotic, a new prescription for different antibiotic was sent to pharmacy, instructed follow-up with primary care doctor with urine culture they ordered, instructed to return to the emergency department for any new or worsening symptoms. This plan was also discussed with patient, who was agreeable with this plan. Patient was given the opportunity to ask questions in ED. All questions and concerns were addressed in ED.     This document was created using speech voice recognition software.   Grammatical errors, random word insertions, pronoun errors, and incomplete sentences are an occasional consequence of this system due to software limitations, ambient noise, and hardware issues.   Any formal questions or concerns about content, text, or  information contained within the body of this dictation should be directly addressed to the provider for clarification.        Medications   famotidine (PEPCID) tablet 20 mg (20 mg Oral Given 6/3/25 2354)   diphenhydrAMINE (BENADRYL) tablet 25 mg (25 mg Oral Given 6/3/25 2354)   predniSONE tablet 60 mg (60 mg Oral Given 6/3/25 2354)       ED Risk Strat Scores                    No data recorded        SBIRT 20yo+      Flowsheet Row Most Recent Value   Initial Alcohol Screen: US AUDIT-C     1. How often do you have a drink containing alcohol? 0 Filed at: 06/03/2025 2334   2. How many drinks containing alcohol do you have on a typical day you are drinking?  0 Filed at: 06/03/2025 2334   3a. Male UNDER 65: How often do you have five or more drinks on one occasion? 0 Filed at: 06/03/2025 2334   3b. FEMALE Any Age, or MALE 65+: How often do you have 4 or more drinks on one occassion? 0 Filed at: 06/03/2025 2334   Audit-C Score 0 Filed at: 06/03/2025 2334   OMARI: How many times in the past year have you...    Used an illegal drug or used a prescription medication for non-medical reasons? Never Filed at: 06/03/2025 2334                            History of Present Illness       Chief Complaint   Patient presents with    Rash     Pt broke out in hives today approx 1.5 ago on his arms, legs, stomach, groin, and back. Recently started  taking cefpodoxime 200mg today for a uti. Denies any chest pain or trouble breathing.        Past Medical History[1]   Past Surgical History[2]   Family History[3]   Social History[4]   E-Cigarette/Vaping    E-Cigarette Use Never User       E-Cigarette/Vaping Substances    Nicotine No     THC No     CBD No     Flavoring No     Other No     Unknown No       I have reviewed and agree with the history as documented.     HPI  69-year-old M with h/o hypertension, restless leg, hyperlipidemia, diabetes, CKD presenting to ED with rash diffusely to body.  Developed this an hour and a half prior to  arrival.  Patient states he took his second dose of his antibiotics that he was prescribed last night for UTI.  Patient states he started cefpodoxime 200 mg.  Patient denies any other allergies to any medications he knows of besides Vantin.  Reports associated pruritus.  Denies fever, chills, chest pain, shortness of breath, oral swelling, throat swelling, voice changes, wheezing, nausea, vomiting, headache, any other signs symptoms.      Review of Systems  ROS otherwise negative unless stated above.       Objective       ED Triage Vitals [06/03/25 2332]   Temperature Pulse Blood Pressure Respirations SpO2 Patient Position - Orthostatic VS   (!) 97.3 °F (36.3 °C) 92 129/88 16 91 % Sitting      Temp Source Heart Rate Source BP Location FiO2 (%) Pain Score    Temporal Monitor Right arm -- No Pain      Vitals      Date and Time Temp Pulse SpO2 Resp BP Pain Score FACES Pain Rating User   06/04/25 0030 -- 93 91 % -- 120/74 -- -- SS   06/04/25 0000 -- 96 93 % -- 117/78 -- -- SS   06/03/25 2341 -- -- 94 % -- -- -- -- CD   06/03/25 2332 97.3 °F (36.3 °C) 92 91 % 16 129/88 No Pain -- CD            Physical Exam    General appearance: resting comfortably, no acute distress   HENT: Normocephalic, atraumatic, hearing grossly intact, and mucous membranes moist.  No oral mucosa swelling.  Eyes: Conjunctiva normal   Lungs/Chest: Respirations even and unlabored   Cardiovascular: Normal rate  Abdomen: soft, non-distended, non-tender  Musculoskeletal: extremities normal, atraumatic, no cyanosis or edema   Pulses: radial / dorsalis pedis pulses palpable  Skin: warm and dry  Rash: Maculopapular rash to bilateral lower and upper extremities, trunk with convalescing around the back.  Neurologic: Awake and alert, no apparent focal deficit  Psych: Mood consistent with affect      Results Reviewed       None            No orders to display       Procedures    ED Medication and Procedure Management   Prior to Admission Medications    Prescriptions Last Dose Informant Patient Reported? Taking?   Jardiance 25 MG TABS 6/3/2025  No Yes   Sig: TAKE 1 TABLET BY MOUTH EVERY  MORNING   allopurinol (ZYLOPRIM) 100 mg tablet 6/2/2025  No Yes   Sig: TAKE 1 TABLET BY MOUTH DAILY   amLODIPine-benazepril (LOTREL 5-20) 5-20 MG per capsule 6/3/2025 Self No Yes   Sig: Take 1 capsule by mouth 2 (two) times a day   atenolol (TENORMIN) 50 mg tablet 6/3/2025  No Yes   Sig: TAKE 1 TABLET BY MOUTH TWICE  DAILY   atorvastatin (LIPITOR) 40 mg tablet 6/2/2025  No Yes   Sig: TAKE 1 TABLET BY MOUTH IN THE  EVENING   cefpodoxime (VANTIN) 200 mg tablet 6/3/2025  No Yes   Sig: Take 1 tablet (200 mg total) by mouth 2 (two) times a day for 10 days   furosemide (LASIX) 40 mg tablet 6/3/2025  No Yes   Sig: TAKE ONE-HALF TABLET BY MOUTH  DAILY   magnesium oxide (MAG-OX) 400 mg 6/3/2025 Self Yes Yes   Sig: Take 400 mg by mouth in the morning.   olmesartan-hydrochlorothiazide (BENICAR HCT) 40-12.5 MG per tablet 6/3/2025  No Yes   Sig: TAKE 1 TABLET BY MOUTH DAILY   potassium chloride (Klor-Con M10) 10 mEq tablet 6/3/2025  No Yes   Sig: TAKE 3 TABLETS BY MOUTH TWICE  DAILY   semaglutide, 1 mg/dose, (Ozempic, 1 MG/DOSE,) 4 mg/3 mL injection pen 6/3/2025  No Yes   Sig: INJECT SUBCUTANEOUSLY 1 MG EVERY WEEK      Facility-Administered Medications: None     Discharge Medication List as of 6/4/2025 12:48 AM        START taking these medications    Details   famotidine (PEPCID) 20 mg tablet Take 1 tablet (20 mg total) by mouth 2 (two) times a day for 4 days, Starting Wed 6/4/2025, Until Sun 6/8/2025, Normal      predniSONE 20 mg tablet Take 2 tablets (40 mg total) by mouth daily for 4 days, Starting Wed 6/4/2025, Until Sun 6/8/2025, Normal      sulfamethoxazole-trimethoprim (BACTRIM DS) 800-160 mg per tablet Take 1 tablet by mouth 2 (two) times a day for 7 days smx-tmp DS (BACTRIM) 800-160 mg tabs (1tab q12 D10), Starting Wed 6/4/2025, Until Wed 6/11/2025, Normal           CONTINUE these  medications which have NOT CHANGED    Details   allopurinol (ZYLOPRIM) 100 mg tablet TAKE 1 TABLET BY MOUTH DAILY, Starting Thu 4/17/2025, Normal      amLODIPine-benazepril (LOTREL 5-20) 5-20 MG per capsule Take 1 capsule by mouth 2 (two) times a day, Starting Fri 2/7/2025, Normal      atenolol (TENORMIN) 50 mg tablet TAKE 1 TABLET BY MOUTH TWICE  DAILY, Starting Wed 4/2/2025, Normal      atorvastatin (LIPITOR) 40 mg tablet TAKE 1 TABLET BY MOUTH IN THE  EVENING, Starting Thu 4/17/2025, Normal      cefpodoxime (VANTIN) 200 mg tablet Take 1 tablet (200 mg total) by mouth 2 (two) times a day for 10 days, Starting Mon 6/2/2025, Until Thu 6/12/2025, Normal      furosemide (LASIX) 40 mg tablet TAKE ONE-HALF TABLET BY MOUTH  DAILY, Starting Thu 4/17/2025, Normal      Jardiance 25 MG TABS TAKE 1 TABLET BY MOUTH EVERY  MORNING, Starting Thu 4/17/2025, Normal      magnesium oxide (MAG-OX) 400 mg Take 400 mg by mouth in the morning., Historical Med      olmesartan-hydrochlorothiazide (BENICAR HCT) 40-12.5 MG per tablet TAKE 1 TABLET BY MOUTH DAILY, Starting Thu 4/17/2025, Normal      potassium chloride (Klor-Con M10) 10 mEq tablet TAKE 3 TABLETS BY MOUTH TWICE  DAILY, Starting Thu 4/17/2025, Normal      semaglutide, 1 mg/dose, (Ozempic, 1 MG/DOSE,) 4 mg/3 mL injection pen INJECT SUBCUTANEOUSLY 1 MG EVERY WEEK, Normal      fluticasone (FLONASE) 50 mcg/act nasal spray 1 spray into each nostril daily, Starting Sat 1/4/2025, Normal           No discharge procedures on file.  ED SEPSIS DOCUMENTATION   Time reflects when diagnosis was documented in both MDM as applicable and the Disposition within this note       Time User Action Codes Description Comment    6/4/2025 12:20 AM Danii Orellana [N39.0] UTI (urinary tract infection)     6/4/2025 12:27 AM Danii Orellana [T78.40XA] Allergic reaction to drug, initial encounter                      [1]   Past Medical History:  Diagnosis Date    Allergic rhinitis     last  assessed: 5/4/2015    Aneurysm of ascending aorta without rupture (HCC)     Aneurysm of thoracic aorta (HCC)     Cataract     resolved: 12/12/2014    Chronic bilateral low back pain 02/27/2014    Colon polyp     Diabetes mellitus (HCC)     diet control    Dyslipidemia associated with type 2 diabetes mellitus  (HCC)     Heel spur 05/31/2017    Herniated nucleus pulposus, L4-5 left     last assessed: 4/9/2014    Herniated nucleus pulposus, L5-S1, left     last assessed: 4/9/2014    Hx of echocardiogram 10/14/2016    EF 55%, Mild LVH, mild aortic root and ascending aorta enlargement, measuring 42 mm, trace MR, trace aortic regurg.    Hypercholesterolemia     Hypertension     Kidney stone     Lumbar radiculopathy     Mixed hyperlipidemia     Pain of left thigh 07/02/2019    Restless leg syndrome 06/21/2018    Skin tag 10/25/2018   [2]   Past Surgical History:  Procedure Laterality Date    ADENOIDECTOMY      BACK SURGERY      BACK SURGERY      COLONOSCOPY      KNEE ARTHROSCOPY      (therapeutic)    KNEE SURGERY      LYMPH NODE DISSECTION N/A 06/20/2023    Procedure: biopsy of MESENTERIC MASS, tap block;  Surgeon: Francis Gardner MD;  Location: [facility];  Service: Surgical Oncology    AR COLONOSCOPY FLX DX W/COLLJ SPEC WHEN PFRMD N/A 09/30/2016    Procedure: COLONOSCOPY;  Surgeon: Laith Pinzon MD;  Location: [facility];  Service: Gastroenterology    SMALL INTESTINE SURGERY N/A 06/20/2023    Procedure: SMALL BOWEL RESECTION;  Surgeon: Francis Gardner MD;  Location: [facility];  Service: Surgical Oncology    TONSILLECTOMY AND ADENOIDECTOMY     [3]   Family History  Problem Relation Name Age of Onset    Hypertension Father      Thyroid disease Mother Jessica         disorder    Coronary artery disease Brother      Heart attack Brother          prior myocardial infarction   [4]   Social History  Tobacco Use    Smoking status: Never    Smokeless tobacco: Never   Vaping Use    Vaping status: Never Used   Substance Use Topics     Alcohol use: Yes     Comment: social once a month    Drug use: No        Danii Orellana,   06/06/25 [id number]

## 2025-06-04 NOTE — DISCHARGE INSTRUCTIONS
Avoid medications that include cephalosporins, most antibiotics.  These also might have cross-reactivity to penicillins.  However it is very very low.    I change her antibiotic to Bactrim.  This has a low coverage of E. coli in our area please follow-up with your primary care doctor with the urine culture to ensure you are on antibiotics that will cover the bacteria.    Please return the emergency department for any worsening symptoms, including but not limited to: Hallucinations, shortness of breath, lip or tongue swelling, or any new or worsening symptoms

## 2025-06-05 ENCOUNTER — RESULTS FOLLOW-UP (OUTPATIENT)
Dept: INTERNAL MEDICINE CLINIC | Facility: CLINIC | Age: 70
End: 2025-06-05

## 2025-06-05 DIAGNOSIS — C7A.8 NEUROENDOCRINE CARCINOMA (HCC): Primary | ICD-10-CM

## 2025-06-05 DIAGNOSIS — L50.9 URTICARIA: Primary | ICD-10-CM

## 2025-06-05 RX ORDER — HYDROXYZINE HYDROCHLORIDE 50 MG/1
50 TABLET, FILM COATED ORAL 3 TIMES DAILY PRN
Qty: 30 TABLET | Refills: 0 | Status: SHIPPED | OUTPATIENT
Start: 2025-06-05

## 2025-06-10 ENCOUNTER — OFFICE VISIT (OUTPATIENT)
Age: 70
End: 2025-06-10
Payer: MEDICARE

## 2025-06-10 ENCOUNTER — HOSPITAL ENCOUNTER (OUTPATIENT)
Dept: INFUSION CENTER | Facility: HOSPITAL | Age: 70
Discharge: HOME | End: 2025-06-10
Attending: INTERNAL MEDICINE
Payer: MEDICARE

## 2025-06-10 VITALS
SYSTOLIC BLOOD PRESSURE: 122 MMHG | TEMPERATURE: 97.7 F | OXYGEN SATURATION: 96 % | HEIGHT: 70 IN | HEART RATE: 91 BPM | WEIGHT: 221 LBS | BODY MASS INDEX: 31.64 KG/M2 | DIASTOLIC BLOOD PRESSURE: 84 MMHG

## 2025-06-10 VITALS — TEMPERATURE: 97.5 F

## 2025-06-10 DIAGNOSIS — C7A.8 NEUROENDOCRINE CARCINOMA (HCC): Primary | ICD-10-CM

## 2025-06-10 DIAGNOSIS — Z12.5 PROSTATE CANCER SCREENING: ICD-10-CM

## 2025-06-10 DIAGNOSIS — R10.9 FLANK PAIN: Primary | ICD-10-CM

## 2025-06-10 PROCEDURE — 99214 OFFICE O/P EST MOD 30 MIN: CPT

## 2025-06-10 PROCEDURE — 96372 THER/PROPH/DIAG INJ SC/IM: CPT

## 2025-06-10 RX ORDER — OCTREOTIDE ACETATE 30 MG
30 KIT INTRAMUSCULAR ONCE
Status: COMPLETED | OUTPATIENT
Start: 2025-06-10 | End: 2025-06-10

## 2025-06-10 RX ORDER — OCTREOTIDE ACETATE 30 MG
30 KIT INTRAMUSCULAR ONCE
OUTPATIENT
Start: 2025-07-08

## 2025-06-10 RX ADMIN — OCTREOTIDE ACETATE 30 MG: KIT at 08:47

## 2025-06-10 NOTE — PROGRESS NOTES
Prashant Pastrana  tolerated Sandostatin in Left intragluteal area well with no complications.Patient is currently taking Bactrim for a UTI. OK to give Sandostatin per Felicitas Goldsmith RN with Dr Sanjiv Pastrana is aware of future appt on 7-8-25 at 830    AVS declined. He uses mychart :

## 2025-06-10 NOTE — PROGRESS NOTES
Name: Prashant Pastrana      : 1955      MRN: [id number]  Encounter Provider: VIN Gallegos  Encounter Date: 6/10/2025   [facility]    :  Assessment & Plan  Flank pain  Acute ER visit last week due to complaints of flank pain along with frequency and discolored urine.  CT imaging did not show any urinary tract calculi or obstruction.  There was bladder wall thickening and he was treated for presumed cystitis.  Unfortunately urine did not reflex to culture.  Microscopic analysis showed 10-20 RBCs, 0-5 WBCs, occasional bacteria.  He received IV Rocephin in the ER and was discharged on p.o. Vantin.  Unfortunately he experienced an allergic reaction to p.o. Vantin and was later switched to Bactrim.  Symptoms are improving.  Etiology of his flank pain is not exactly clear.  Could be possible that he did pass a small stone however when I personally reviewed his prior CT from January there was no urinary tract calculi seen then.  He has not been known to be a rapid stone former.  Has for the bladder wall thickening noted on CT, this may have been due to infection.  This can be followed up on his next surveillance CT in July.  I will see him for follow-up in approximately 3 months.         Prostate cancer screening  PSA 0.867 (2024)  He is due for annual PSA however I would not recommend having this done for at least another 4 weeks or so due to questionable infection.  We can review this at follow-up.               Interval HPI:    Patient was originally scheduled for annual follow-up visit however last week he was seen in [facility] emergency department on 2025 for complaints of right flank pain with associate urinary frequency and chills. He has a history of kidney stones, CT imaging showed no shadowing calculi bilaterally, no hydronephrosis.  He has a left-sided parapelvic cyst.  Bladder did show mild circumferential wall thickening.  Microscopic analysis  had shown 10-20 RBCs, 0-5 WBCs, occasional bacteria. He was treated for presumed UTI and received 1 dose of IV Rocephin in the ER he was discharged on p.o. Vantin.  Unfortunately his urine did not reflex to culture.  He did experience a allergic reaction from p.o. Vantin and was seen again in [facility] ER the following day on 6/03.  Urine culture from 6/3 was negative but he had already received antibiotics. He was switched to Bactrim instead and he has seen improvement in his frequency since then.  He is due to complete Bactrim today.       History of Present Illness     Established patient last seen by me in June 2024 for annual follow-up evaluation with history of nephrolithiasis, adrenal nodule, and prostate cancer screening.    Established patient originally seen by me in April 2023 for evaluation of a right ureteral calculi.  He had presented to [facility] emergency department in March 2023 for complaints of right flank pain.  CT imaging had shown a 3 mm right ureteral calculi. He was able to spontaneously pass this without surgical intervention.       There was an additional finding on his outpatient CT from 3/31/2023 which showed a high density material in the left collecting system could represent hemorrhage or excreting recently administered IV contrast material.  There is also a simple left renal parapelvic cyst.  I recommended a CT renal protocol for further evaluation of this which was completed on 5/2/2023 which showed showed resolution of the hyperdense appearance of the left collecting system on prior exam. There is no enhancing solid renal mass identified bilaterally. There is also resolution of the previously seen right-sided hydronephrosis and hydroureter. There is a 1.4 cm right adrenal nodule, but this has been stable for over a year. Incidentally there was a enhancing mesenteric masses or enlarged lymph nodes in the mid abdomen with concern for neoplastic process.  He was referred to  surgical oncology and was seen by Dr. Gardner on 5/11/2023 felt that the adrenal nodule was benign however he never had a functional work-up and this was ordered. In regards to the lymphadenopathy he discussed consideration for IR biopsy versus a PET/CT. If the PET showed no FDG avid we will consider close observation. PET/CT was completed on 5/24/2023 which showed mild radiotracer uptake in the mesenteric masses. Additionally, mild patchy radiotracer uptake related to a suspected small bowel lesion in the right hemiabdomen. Differential would include entities such as well-differentiated neuroendocrine tumor vs low-grade lymphoma.  Additionally incidentally noted small focus of FDG uptake at the left base of the tongue. Asymmetric activity also noted in the left lingual tonsillar region.  He has status post small bowel resection and biopsy of a mesenteric mass for a T4N1M0 neuroendocrine tumor.  This is well differentiated, grade 2.  The mesenteric masses were not resected secondary to the potential of short gut and resecting a significant portion of normal small bowel.  He has started on octreotide and is tolerating this well.        Prostate cancer screening:  PSA Trend  0.867 (6/21/2024)  0.73 (6/15/2023)  0.8 (9/9/2021)  0.8 (6/25/2020)  0.6 (4/3/2018)      Objective   There were no vitals taken for this visit.    Review of Systems   Constitutional:  Negative for chills and fever.   HENT:  Negative for congestion and sore throat.    Respiratory:  Negative for cough and shortness of breath.    Cardiovascular:  Negative for chest pain and leg swelling.   Gastrointestinal:  Negative for abdominal pain, constipation and diarrhea.   Genitourinary:  Positive for flank pain (Resolved) and frequency (Resolved). Negative for difficulty urinating, dysuria, hematuria and urgency.   Musculoskeletal:  Negative for back pain and gait problem.   Skin:  Negative for wound.   Allergic/Immunologic: Negative for immunocompromised  state.   Hematological:  Does not bruise/bleed easily.       Physical Exam  Vitals and nursing note reviewed.   Constitutional:       General: He is not in acute distress.     Appearance: He is well-developed.   HENT:      Head: Normocephalic and atraumatic.     Eyes:      Conjunctiva/sclera: Conjunctivae normal.       Cardiovascular:      Rate and Rhythm: Normal rate and regular rhythm.      Heart sounds: No murmur heard.  Pulmonary:      Effort: Pulmonary effort is normal. No respiratory distress.      Breath sounds: Normal breath sounds.   Abdominal:      Palpations: Abdomen is soft.      Tenderness: There is no abdominal tenderness.     Musculoskeletal:         General: No swelling.      Cervical back: Neck supple.     Skin:     General: Skin is warm and dry.      Capillary Refill: Capillary refill takes less than 2 seconds.     Neurological:      Mental Status: He is alert.     Psychiatric:         Mood and Affect: Mood normal.           Imagin2025    CT ABDOMEN AND PELVIS WITHOUT IV CONTRAST     INDICATION: flank pain.     COMPARISON: CT abdomen and pelvis on 2025.     TECHNIQUE: CT examination of the abdomen and pelvis was performed without intravenous contrast. Multiplanar 2D reformatted images were created from the source data.     This examination, like all CT scans performed in the [facility], was performed utilizing techniques to minimize radiation dose exposure, including the use of iterative reconstruction and automated exposure control. Radiation dose length   product (DLP) for this visit: 770 mGy-cm.     Enteric Contrast: Not administered.     FINDINGS:     ABDOMEN     LOWER CHEST: No clinically significant abnormality in the visualized lower chest.     LIVER/BILIARY TREE: Hepatic steatosis. Stable small hepatic cyst. Normal hepatic contours. No biliary dilation.     GALLBLADDER: No calcified gallstones. No pericholecystic inflammatory change.     SPLEEN: Enlarged  spleen measuring 14.5 cm in craniocaudal diameter, new since the prior study.     PANCREAS: Unremarkable.     ADRENAL GLANDS: Stable small right adrenal adenoma.     KIDNEYS/URETERS: Left-sided parapelvic cyst. No hydronephrosis.     STOMACH AND BOWEL: Small bowel anastomosis in the right lower quadrant. Colonic diverticulosis without findings of acute diverticulitis.     APPENDIX: No findings to suggest appendicitis.     ABDOMINOPELVIC CAVITY: Redemonstration of two adjacent mesenteric masses measuring 2.8 x 3.5 cm anteriorly (2/87) and 1.9 x 3.0 cm posteriorly (2/93), previously measured 2.8 x 3.4 cm and 2.0 x 3.1 cm, respectively. Slight enlargement of several upper   abdominal lymph nodes. For example, 1.0 x 2.2 cm periportal lymph node (2/55), previously measured 0.6 x 1.3 cm. No pneumoperitoneum.     VESSELS: Unremarkable for patient's age.     PELVIS     REPRODUCTIVE ORGANS: Enlarged prostate.     URINARY BLADDER: Mild circumferential wall thickening.     ABDOMINAL WALL/INGUINAL REGIONS: Scarring in the anterior abdominal wall. Probable injection site with gas in the left gluteal subcutaneous tissues.     BONES: No acute fracture or suspicious osseous lesion. Spinal degenerative changes.     IMPRESSION:     Mild circumferential wall thickening of the urinary bladder. Correlate with urinalysis for cystitis.     Interval development of splenomegaly and slight enlargement of several upper abdominal lymph nodes.     No significant change in the size of mesenteric masses, in keeping with metastatic carcinoid tumor.          Please Note:  Voice dictation software has been used to create this document. There may be inadvertent transcriptions errors.     VIN Gallegos 06/10/25

## 2025-06-30 ENCOUNTER — APPOINTMENT (OUTPATIENT)
Dept: LAB | Facility: MEDICAL CENTER | Age: 70
End: 2025-06-30
Payer: MEDICARE

## 2025-06-30 DIAGNOSIS — E66.9 TYPE 2 DIABETES MELLITUS WITH OBESITY  (HCC): ICD-10-CM

## 2025-06-30 DIAGNOSIS — I10 PRIMARY HYPERTENSION: ICD-10-CM

## 2025-06-30 DIAGNOSIS — E11.22 TYPE 2 DIABETES MELLITUS WITH STAGE 3A CHRONIC KIDNEY DISEASE, WITHOUT LONG-TERM CURRENT USE OF INSULIN (HCC): ICD-10-CM

## 2025-06-30 DIAGNOSIS — N18.31 STAGE 3A CHRONIC KIDNEY DISEASE (HCC): ICD-10-CM

## 2025-06-30 DIAGNOSIS — E11.69 TYPE 2 DIABETES MELLITUS WITH OBESITY  (HCC): ICD-10-CM

## 2025-06-30 DIAGNOSIS — I71.21 ANEURYSM OF ASCENDING AORTA WITHOUT RUPTURE (HCC): ICD-10-CM

## 2025-06-30 DIAGNOSIS — E11.3293 TYPE 2 DIABETES MELLITUS WITH BOTH EYES AFFECTED BY MILD NONPROLIFERATIVE RETINOPATHY WITHOUT MACULAR EDEMA, WITHOUT LONG-TERM CURRENT USE OF INSULIN (HCC): ICD-10-CM

## 2025-06-30 DIAGNOSIS — E78.2 MIXED HYPERLIPIDEMIA: ICD-10-CM

## 2025-06-30 DIAGNOSIS — C7A.8 NEUROENDOCRINE CARCINOMA (HCC): ICD-10-CM

## 2025-06-30 DIAGNOSIS — N18.31 TYPE 2 DIABETES MELLITUS WITH STAGE 3A CHRONIC KIDNEY DISEASE, WITHOUT LONG-TERM CURRENT USE OF INSULIN (HCC): ICD-10-CM

## 2025-06-30 LAB
ALBUMIN SERPL BCG-MCNC: 3.9 G/DL (ref 3.5–5)
ALP SERPL-CCNC: 65 U/L (ref 34–104)
ALT SERPL W P-5'-P-CCNC: 17 U/L (ref 7–52)
ANION GAP SERPL CALCULATED.3IONS-SCNC: 9 MMOL/L (ref 4–13)
ANISOCYTOSIS BLD QL SMEAR: PRESENT
AST SERPL W P-5'-P-CCNC: 13 U/L (ref 13–39)
BASOPHILS # BLD AUTO: 0.01 THOUSANDS/ÂΜL (ref 0–0.1)
BASOPHILS NFR BLD AUTO: 0 % (ref 0–1)
BILIRUB SERPL-MCNC: 0.88 MG/DL (ref 0.2–1)
BUN SERPL-MCNC: 18 MG/DL (ref 5–25)
CALCIUM SERPL-MCNC: 8.8 MG/DL (ref 8.4–10.2)
CHLORIDE SERPL-SCNC: 106 MMOL/L (ref 96–108)
CHOLEST SERPL-MCNC: 126 MG/DL (ref ?–200)
CO2 SERPL-SCNC: 29 MMOL/L (ref 21–32)
CREAT SERPL-MCNC: 1.12 MG/DL (ref 0.6–1.3)
EOSINOPHIL # BLD AUTO: 0.17 THOUSAND/ÂΜL (ref 0–0.61)
EOSINOPHIL NFR BLD AUTO: 5 % (ref 0–6)
ERYTHROCYTE [DISTWIDTH] IN BLOOD BY AUTOMATED COUNT: 14.6 % (ref 11.6–15.1)
EST. AVERAGE GLUCOSE BLD GHB EST-MCNC: 174 MG/DL
GFR SERPL CREATININE-BSD FRML MDRD: 67 ML/MIN/1.73SQ M
GLUCOSE P FAST SERPL-MCNC: 160 MG/DL (ref 65–99)
HBA1C MFR BLD: 7.7 %
HCT VFR BLD AUTO: 41.8 % (ref 36.5–49.3)
HDLC SERPL-MCNC: 34 MG/DL
HGB BLD-MCNC: 13.8 G/DL (ref 12–17)
IMM GRANULOCYTES # BLD AUTO: 0.01 THOUSAND/UL (ref 0–0.2)
IMM GRANULOCYTES NFR BLD AUTO: 0 % (ref 0–2)
LDLC SERPL CALC-MCNC: 63 MG/DL (ref 0–100)
LYMPHOCYTES # BLD AUTO: 0.89 THOUSANDS/ÂΜL (ref 0.6–4.47)
LYMPHOCYTES NFR BLD AUTO: 25 % (ref 14–44)
MACROCYTES BLD QL AUTO: PRESENT
MCH RBC QN AUTO: 31.4 PG (ref 26.8–34.3)
MCHC RBC AUTO-ENTMCNC: 33 G/DL (ref 31.4–37.4)
MCV RBC AUTO: 95 FL (ref 82–98)
MONOCYTES # BLD AUTO: 0.34 THOUSAND/ÂΜL (ref 0.17–1.22)
MONOCYTES NFR BLD AUTO: 10 % (ref 4–12)
NEUTROPHILS # BLD AUTO: 2.15 THOUSANDS/ÂΜL (ref 1.85–7.62)
NEUTS SEG NFR BLD AUTO: 60 % (ref 43–75)
NONHDLC SERPL-MCNC: 92 MG/DL
NRBC BLD AUTO-RTO: 0 /100 WBCS
PLATELET # BLD AUTO: 90 THOUSANDS/UL (ref 149–390)
PLATELET BLD QL SMEAR: ABNORMAL
PMV BLD AUTO: 10.5 FL (ref 8.9–12.7)
POLYCHROMASIA BLD QL SMEAR: PRESENT
POTASSIUM SERPL-SCNC: 3.8 MMOL/L (ref 3.5–5.3)
PROT SERPL-MCNC: 6.3 G/DL (ref 6.4–8.4)
RBC # BLD AUTO: 4.4 MILLION/UL (ref 3.88–5.62)
RBC MORPH BLD: PRESENT
SODIUM SERPL-SCNC: 144 MMOL/L (ref 135–147)
TRIGL SERPL-MCNC: 144 MG/DL (ref ?–150)
TSH SERPL DL<=0.05 MIU/L-ACNC: 1.36 UIU/ML (ref 0.45–4.5)
WBC # BLD AUTO: 3.57 THOUSAND/UL (ref 4.31–10.16)

## 2025-06-30 PROCEDURE — 84443 ASSAY THYROID STIM HORMONE: CPT

## 2025-06-30 PROCEDURE — 36415 COLL VENOUS BLD VENIPUNCTURE: CPT

## 2025-06-30 PROCEDURE — 80061 LIPID PANEL: CPT

## 2025-06-30 PROCEDURE — 83036 HEMOGLOBIN GLYCOSYLATED A1C: CPT

## 2025-06-30 PROCEDURE — 85025 COMPLETE CBC W/AUTO DIFF WBC: CPT

## 2025-06-30 PROCEDURE — 80053 COMPREHEN METABOLIC PANEL: CPT

## 2025-07-04 PROBLEM — R50.9 FEVER: Status: RESOLVED | Noted: 2025-06-04 | Resolved: 2025-07-04

## 2025-07-08 ENCOUNTER — HOSPITAL ENCOUNTER (OUTPATIENT)
Dept: INFUSION CENTER | Facility: HOSPITAL | Age: 70
Discharge: HOME | End: 2025-07-08
Attending: INTERNAL MEDICINE
Payer: MEDICARE

## 2025-07-08 VITALS
SYSTOLIC BLOOD PRESSURE: 115 MMHG | TEMPERATURE: 97.7 F | OXYGEN SATURATION: 95 % | HEART RATE: 87 BPM | DIASTOLIC BLOOD PRESSURE: 66 MMHG | RESPIRATION RATE: 16 BRPM

## 2025-07-08 DIAGNOSIS — I10 ESSENTIAL HYPERTENSION: ICD-10-CM

## 2025-07-08 DIAGNOSIS — C7A.8 NEUROENDOCRINE CARCINOMA (HCC): Primary | ICD-10-CM

## 2025-07-08 PROCEDURE — 96372 THER/PROPH/DIAG INJ SC/IM: CPT

## 2025-07-08 RX ORDER — OCTREOTIDE ACETATE 30 MG
30 KIT INTRAMUSCULAR ONCE
Status: COMPLETED | OUTPATIENT
Start: 2025-07-08 | End: 2025-07-08

## 2025-07-08 RX ORDER — OCTREOTIDE ACETATE 30 MG
30 KIT INTRAMUSCULAR ONCE
OUTPATIENT
Start: 2025-08-05

## 2025-07-08 RX ADMIN — OCTREOTIDE ACETATE 30 MG: KIT at 08:43

## 2025-07-08 NOTE — PROGRESS NOTES
Prashant Pastrana  tolerated sandostatin injection well with no complications.      Prashant Pastrana is aware of future appt on 8/5/25 at 0830.     AVS printed and given to Prashant Pastrana:  No (Declined by Prashant Pastrana)

## 2025-07-09 ENCOUNTER — HOSPITAL ENCOUNTER (OUTPATIENT)
Dept: CT IMAGING | Facility: HOSPITAL | Age: 70
Discharge: HOME | End: 2025-07-09
Attending: SURGERY
Payer: MEDICARE

## 2025-07-09 DIAGNOSIS — C7A.8 NEUROENDOCRINE CARCINOMA (HCC): ICD-10-CM

## 2025-07-09 PROCEDURE — 74177 CT ABD & PELVIS W/CONTRAST: CPT

## 2025-07-09 RX ORDER — AMLODIPINE AND BENAZEPRIL HYDROCHLORIDE 5; 20 MG/1; MG/1
1 CAPSULE ORAL 2 TIMES DAILY
Qty: 180 CAPSULE | Refills: 2 | Status: SHIPPED | OUTPATIENT
Start: 2025-07-09

## 2025-07-09 RX ADMIN — IOHEXOL 100 ML: 350 INJECTION, SOLUTION INTRAVENOUS at 07:57

## 2025-07-10 ENCOUNTER — TELEPHONE (OUTPATIENT)
Dept: SURGICAL ONCOLOGY | Facility: CLINIC | Age: 70
End: 2025-07-10

## 2025-07-10 DIAGNOSIS — C7A.8 NEUROENDOCRINE CARCINOMA (HCC): Primary | ICD-10-CM

## 2025-07-10 NOTE — TELEPHONE ENCOUNTER
Left message for patient to review CT results. When patient calls back, please transfer call to me.

## 2025-07-10 NOTE — TELEPHONE ENCOUNTER
Spoke to patient and reviewed CT scan. Dotatate PET ordered, will see Dr Gardner after PET. Appt with NP canceled.

## 2025-07-11 ENCOUNTER — TELEPHONE (OUTPATIENT)
Dept: SURGICAL ONCOLOGY | Facility: CLINIC | Age: 70
End: 2025-07-11

## 2025-07-11 NOTE — TELEPHONE ENCOUNTER
Called pt. Spoke to Prashant, completed scheduling his PET and F/U with Jj successfully. Confirmed both dates, times, and locations with patient.

## 2025-07-28 ENCOUNTER — HOSPITAL ENCOUNTER (OUTPATIENT)
Dept: RADIOLOGY | Age: 70
Discharge: HOME | End: 2025-07-28
Attending: SURGERY
Payer: MEDICARE

## 2025-07-28 DIAGNOSIS — C7A.8 NEUROENDOCRINE CARCINOMA (HCC): ICD-10-CM

## 2025-07-28 PROCEDURE — A9587 GALLIUM GA-68: HCPCS

## 2025-07-28 PROCEDURE — 78815 PET IMAGE W/CT SKULL-THIGH: CPT

## 2025-07-29 ENCOUNTER — TELEPHONE (OUTPATIENT)
Dept: SURGICAL ONCOLOGY | Facility: CLINIC | Age: 70
End: 2025-07-29

## 2025-07-30 ENCOUNTER — ANESTHESIA (OUTPATIENT)
Dept: PERIOP | Facility: HOSPITAL | Age: 70
End: 2025-07-30
Payer: MEDICARE

## 2025-07-30 ENCOUNTER — HOSPITAL ENCOUNTER (OUTPATIENT)
Dept: PERIOP | Facility: HOSPITAL | Age: 70
Setting detail: OUTPATIENT SURGERY
Discharge: HOME | End: 2025-07-30
Attending: STUDENT IN AN ORGANIZED HEALTH CARE EDUCATION/TRAINING PROGRAM
Payer: MEDICARE

## 2025-07-30 ENCOUNTER — ANESTHESIA EVENT (OUTPATIENT)
Dept: PERIOP | Facility: HOSPITAL | Age: 70
End: 2025-07-30
Payer: MEDICARE

## 2025-07-30 VITALS
OXYGEN SATURATION: 94 % | TEMPERATURE: 97.7 F | BODY MASS INDEX: 30.64 KG/M2 | WEIGHT: 214 LBS | RESPIRATION RATE: 22 BRPM | DIASTOLIC BLOOD PRESSURE: 82 MMHG | HEART RATE: 75 BPM | SYSTOLIC BLOOD PRESSURE: 125 MMHG | HEIGHT: 70 IN

## 2025-07-30 DIAGNOSIS — Z12.11 SCREENING FOR COLON CANCER: ICD-10-CM

## 2025-07-30 LAB — GLUCOSE SERPL-MCNC: 147 MG/DL (ref 65–140)

## 2025-07-30 PROCEDURE — 88305 TISSUE EXAM BY PATHOLOGIST: CPT | Performed by: PATHOLOGY

## 2025-07-30 PROCEDURE — 45385 COLONOSCOPY W/LESION REMOVAL: CPT | Performed by: INTERNAL MEDICINE

## 2025-07-30 PROCEDURE — 82948 REAGENT STRIP/BLOOD GLUCOSE: CPT

## 2025-07-30 RX ORDER — PROPOFOL 10 MG/ML
INJECTION, EMULSION INTRAVENOUS AS NEEDED
Status: DISCONTINUED | OUTPATIENT
Start: 2025-07-30 | End: 2025-07-30

## 2025-07-30 RX ORDER — ONDANSETRON 2 MG/ML
4 INJECTION INTRAMUSCULAR; INTRAVENOUS ONCE AS NEEDED
Status: DISCONTINUED | OUTPATIENT
Start: 2025-07-30 | End: 2025-08-03 | Stop reason: HOSPADM

## 2025-07-30 RX ORDER — SODIUM CHLORIDE, SODIUM LACTATE, POTASSIUM CHLORIDE, CALCIUM CHLORIDE 600; 310; 30; 20 MG/100ML; MG/100ML; MG/100ML; MG/100ML
INJECTION, SOLUTION INTRAVENOUS CONTINUOUS PRN
Status: DISCONTINUED | OUTPATIENT
Start: 2025-07-30 | End: 2025-07-30

## 2025-07-30 RX ORDER — LIDOCAINE HYDROCHLORIDE 10 MG/ML
INJECTION, SOLUTION EPIDURAL; INFILTRATION; INTRACAUDAL; PERINEURAL AS NEEDED
Status: DISCONTINUED | OUTPATIENT
Start: 2025-07-30 | End: 2025-07-30

## 2025-07-30 RX ADMIN — SODIUM CHLORIDE, SODIUM LACTATE, POTASSIUM CHLORIDE, AND CALCIUM CHLORIDE: .6; .31; .03; .02 INJECTION, SOLUTION INTRAVENOUS at 11:12

## 2025-07-30 RX ADMIN — PROPOFOL 120 MCG/KG/MIN: 10 INJECTION, EMULSION INTRAVENOUS at 11:19

## 2025-07-30 RX ADMIN — PROPOFOL 50 MG: 10 INJECTION, EMULSION INTRAVENOUS at 11:18

## 2025-07-30 RX ADMIN — PROPOFOL 100 MG: 10 INJECTION, EMULSION INTRAVENOUS at 11:16

## 2025-07-30 RX ADMIN — LIDOCAINE HYDROCHLORIDE 50 MG: 10 INJECTION, SOLUTION EPIDURAL; INFILTRATION; INTRACAUDAL; PERINEURAL at 11:16

## 2025-07-31 ENCOUNTER — OFFICE VISIT (OUTPATIENT)
Dept: CARDIOLOGY CLINIC | Facility: CLINIC | Age: 70
End: 2025-07-31
Payer: MEDICARE

## 2025-07-31 VITALS
WEIGHT: 211 LBS | HEIGHT: 70 IN | HEART RATE: 75 BPM | BODY MASS INDEX: 30.21 KG/M2 | DIASTOLIC BLOOD PRESSURE: 70 MMHG | SYSTOLIC BLOOD PRESSURE: 104 MMHG

## 2025-07-31 DIAGNOSIS — R60.0 PERIPHERAL EDEMA: ICD-10-CM

## 2025-07-31 DIAGNOSIS — I71.21 ANEURYSM OF ASCENDING AORTA WITHOUT RUPTURE (HCC): Primary | ICD-10-CM

## 2025-07-31 DIAGNOSIS — E78.2 MIXED HYPERLIPIDEMIA: ICD-10-CM

## 2025-07-31 DIAGNOSIS — I25.84 CORONARY ARTERY CALCIFICATION OF NATIVE ARTERY: ICD-10-CM

## 2025-07-31 DIAGNOSIS — I10 PRIMARY HYPERTENSION: ICD-10-CM

## 2025-07-31 DIAGNOSIS — I25.10 CORONARY ARTERY CALCIFICATION OF NATIVE ARTERY: ICD-10-CM

## 2025-07-31 PROCEDURE — 99214 OFFICE O/P EST MOD 30 MIN: CPT | Performed by: INTERNAL MEDICINE

## 2025-07-31 PROCEDURE — 93000 ELECTROCARDIOGRAM COMPLETE: CPT | Performed by: INTERNAL MEDICINE

## 2025-07-31 RX ORDER — FUROSEMIDE 40 MG/1
TABLET ORAL
Start: 2025-07-31

## 2025-08-01 ENCOUNTER — TELEMEDICINE (OUTPATIENT)
Dept: SURGICAL ONCOLOGY | Facility: CLINIC | Age: 70
End: 2025-08-01
Payer: MEDICARE

## 2025-08-01 ENCOUNTER — DOCUMENTATION (OUTPATIENT)
Dept: HEMATOLOGY ONCOLOGY | Facility: CLINIC | Age: 70
End: 2025-08-01

## 2025-08-01 DIAGNOSIS — C7A.8 NEUROENDOCRINE CARCINOMA (HCC): Primary | ICD-10-CM

## 2025-08-01 PROCEDURE — 99215 OFFICE O/P EST HI 40 MIN: CPT | Performed by: SURGERY

## 2025-08-04 PROCEDURE — 88305 TISSUE EXAM BY PATHOLOGIST: CPT | Performed by: PATHOLOGY

## 2025-08-05 ENCOUNTER — OFFICE VISIT (OUTPATIENT)
Dept: HEMATOLOGY ONCOLOGY | Facility: CLINIC | Age: 70
End: 2025-08-05
Payer: MEDICARE

## 2025-08-05 ENCOUNTER — TELEPHONE (OUTPATIENT)
Dept: HEMATOLOGY ONCOLOGY | Facility: CLINIC | Age: 70
End: 2025-08-05

## 2025-08-05 ENCOUNTER — HOSPITAL ENCOUNTER (OUTPATIENT)
Dept: INFUSION CENTER | Facility: HOSPITAL | Age: 70
Discharge: HOME | End: 2025-08-05
Attending: INTERNAL MEDICINE
Payer: MEDICARE

## 2025-08-05 VITALS
OXYGEN SATURATION: 95 % | WEIGHT: 214 LBS | TEMPERATURE: 97.4 F | DIASTOLIC BLOOD PRESSURE: 77 MMHG | HEART RATE: 77 BPM | SYSTOLIC BLOOD PRESSURE: 127 MMHG | BODY MASS INDEX: 30.64 KG/M2 | HEIGHT: 70 IN

## 2025-08-05 DIAGNOSIS — C7A.8 NEUROENDOCRINE CARCINOMA (HCC): Primary | ICD-10-CM

## 2025-08-05 PROCEDURE — 99215 OFFICE O/P EST HI 40 MIN: CPT | Performed by: INTERNAL MEDICINE

## 2025-08-05 PROCEDURE — G2211 COMPLEX E/M VISIT ADD ON: HCPCS | Performed by: INTERNAL MEDICINE

## 2025-08-07 ENCOUNTER — DOCUMENTATION (OUTPATIENT)
Dept: HEMATOLOGY ONCOLOGY | Facility: CLINIC | Age: 70
End: 2025-08-07

## 2025-08-11 ENCOUNTER — OFFICE VISIT (OUTPATIENT)
Dept: INTERNAL MEDICINE CLINIC | Facility: CLINIC | Age: 70
End: 2025-08-11
Payer: MEDICARE

## 2025-08-11 PROBLEM — R68.89 RIGORS: Status: RESOLVED | Noted: 2025-06-04 | Resolved: 2025-08-11

## 2025-08-12 ENCOUNTER — TELEPHONE (OUTPATIENT)
Dept: ADMINISTRATIVE | Facility: OTHER | Age: 70
End: 2025-08-12

## 2025-08-14 ENCOUNTER — HOSPITAL ENCOUNTER (OUTPATIENT)
Age: 70
Discharge: HOME | End: 2025-08-14

## (undated) DEVICE — ADHESIVE SKIN HIGH VISCOSITY EXOFIN 1ML

## (undated) DEVICE — PROXIMATE LINEAR CUTTER RELOAD, BLUE, 75MM: Brand: PROXIMATE

## (undated) DEVICE — SUT MONOCRYL 4-0 PS-2 27 IN Y426H

## (undated) DEVICE — MEDI-VAC YANK SUCT HNDL W/TPRD BULBOUS TIP: Brand: CARDINAL HEALTH

## (undated) DEVICE — PROXIMATE RELOADABLE LINEAR STAPLER, 60MM: Brand: PROXIMATE

## (undated) DEVICE — GLOVE SRG BIOGEL ECLIPSE 8

## (undated) DEVICE — PROXIMATE RELOADABLE LINEAR CUTTER WITH SAFETY LOCK-OUT, 75MM: Brand: PROXIMATE

## (undated) DEVICE — SUT VICRYL 2-0 D-SPECIAL 27 IN D8114

## (undated) DEVICE — CHLORAPREP HI-LITE 26ML ORANGE

## (undated) DEVICE — VESSEL LOOPS X-RAY DETECTABLE: Brand: DEROYAL

## (undated) DEVICE — INTENDED FOR TISSUE SEPARATION, AND OTHER PROCEDURES THAT REQUIRE A SHARP SURGICAL BLADE TO PUNCTURE OR CUT.: Brand: BARD-PARKER SAFETY BLADES SIZE 10, STERILE

## (undated) DEVICE — GLOVE INDICATOR PI UNDERGLOVE SZ 8 BLUE

## (undated) DEVICE — PROXIMATE SKIN STAPLERS (35 WIDE) CONTAINS 35 STAINLESS STEEL STAPLES (FIXED HEAD): Brand: PROXIMATE

## (undated) DEVICE — TRAY FOLEY 16FR URIMETER SURESTEP

## (undated) DEVICE — SUT SILK 2-0 SH 30 IN K833H

## (undated) DEVICE — SUT SILK 3-0 SH CR/8 18 IN C013D

## (undated) DEVICE — SUT SILK 2-0 SH CR/8 18 IN C012D

## (undated) DEVICE — PLUMEPEN PRO 10FT

## (undated) DEVICE — HEMOSTATIC MATRIX SURGIFLO 8ML W/THROMBIN

## (undated) DEVICE — INTENDED FOR TISSUE SEPARATION, AND OTHER PROCEDURES THAT REQUIRE A SHARP SURGICAL BLADE TO PUNCTURE OR CUT.: Brand: BARD-PARKER SAFETY BLADES SIZE 15, STERILE

## (undated) DEVICE — BETHLEHEM MAJOR GENERAL PACK: Brand: CARDINAL HEALTH

## (undated) DEVICE — SUT SILK 2-0 18 IN A185H

## (undated) DEVICE — SUT SILK 3-0 SH 30 IN K832H

## (undated) DEVICE — LIGACLIP MCA MULTIPLE CLIP APPLIERS, 20 MEDIUM CLIPS: Brand: LIGACLIP

## (undated) DEVICE — ANTIBACTERIAL UNDYED BRAIDED (POLYGLACTIN 910), SYNTHETIC ABSORBABLE SUTURE: Brand: COATED VICRYL

## (undated) DEVICE — SUT PROLENE 3-0 SH 36 IN 8522H